# Patient Record
Sex: FEMALE | Race: WHITE | NOT HISPANIC OR LATINO | Employment: OTHER | URBAN - METROPOLITAN AREA
[De-identification: names, ages, dates, MRNs, and addresses within clinical notes are randomized per-mention and may not be internally consistent; named-entity substitution may affect disease eponyms.]

---

## 2017-01-19 ENCOUNTER — OFFICE VISIT (OUTPATIENT)
Dept: FAMILY MEDICINE | Facility: CLINIC | Age: 52
End: 2017-01-19
Payer: COMMERCIAL

## 2017-01-19 VITALS
WEIGHT: 145 LBS | TEMPERATURE: 98.3 F | OXYGEN SATURATION: 96 % | SYSTOLIC BLOOD PRESSURE: 132 MMHG | DIASTOLIC BLOOD PRESSURE: 87 MMHG | RESPIRATION RATE: 18 BRPM | HEIGHT: 65 IN | HEART RATE: 112 BPM | BODY MASS INDEX: 24.16 KG/M2

## 2017-01-19 DIAGNOSIS — E03.9 HYPOTHYROIDISM, UNSPECIFIED TYPE: ICD-10-CM

## 2017-01-19 DIAGNOSIS — J45.41 MODERATE PERSISTENT ASTHMA WITH (ACUTE) EXACERBATION: Primary | ICD-10-CM

## 2017-01-19 DIAGNOSIS — Z12.31 VISIT FOR SCREENING MAMMOGRAM: ICD-10-CM

## 2017-01-19 DIAGNOSIS — Z11.59 NEED FOR HEPATITIS C SCREENING TEST: ICD-10-CM

## 2017-01-19 DIAGNOSIS — Z12.11 SCREEN FOR COLON CANCER: ICD-10-CM

## 2017-01-19 LAB
HCV AB SERPL QL IA: NORMAL
TSH SERPL DL<=0.005 MIU/L-ACNC: 2.66 MU/L (ref 0.4–4)

## 2017-01-19 PROCEDURE — 84443 ASSAY THYROID STIM HORMONE: CPT | Performed by: NURSE PRACTITIONER

## 2017-01-19 PROCEDURE — 99214 OFFICE O/P EST MOD 30 MIN: CPT | Performed by: NURSE PRACTITIONER

## 2017-01-19 PROCEDURE — 36415 COLL VENOUS BLD VENIPUNCTURE: CPT | Performed by: NURSE PRACTITIONER

## 2017-01-19 PROCEDURE — 86803 HEPATITIS C AB TEST: CPT | Performed by: NURSE PRACTITIONER

## 2017-01-19 RX ORDER — FLUTICASONE PROPIONATE 110 UG/1
2 AEROSOL, METERED RESPIRATORY (INHALATION) 2 TIMES DAILY
Qty: 1 INHALER | Refills: 5 | Status: SHIPPED | OUTPATIENT
Start: 2017-01-19 | End: 2017-07-10

## 2017-01-19 RX ORDER — ALBUTEROL SULFATE 90 UG/1
1-2 AEROSOL, METERED RESPIRATORY (INHALATION) EVERY 4 HOURS PRN
Qty: 1 INHALER | Status: SHIPPED | OUTPATIENT
Start: 2017-01-19 | End: 2017-04-25

## 2017-01-19 RX ORDER — PREDNISONE 20 MG/1
TABLET ORAL
Qty: 20 TABLET | Refills: 1 | Status: SHIPPED
Start: 2017-01-19 | End: 2018-07-16

## 2017-01-19 NOTE — MR AVS SNAPSHOT
After Visit Summary   1/19/2017    Kathy Lei    MRN: [id number]           Patient Information     Date Of Birth          1965        Visit Information        Provider Department      1/19/2017 7:30 AM Rachel Arauz NP [facility]        Today's Diagnoses     Screen for colon cancer    -  1     Visit for screening mammogram         Need for hepatitis C screening test         Need for prophylactic vaccination and inoculation against influenza         Moderate persistent asthma with (acute) exacerbation         Hypothyroidism, unspecified type           Care Instructions    As soon as you start having cold symptoms, increase Flovent to 4 puffs twice daily.         Follow-ups after your visit        Future tests that were ordered for you today     Open Future Orders        Priority Expected Expires Ordered    MA SCREENING DIGITAL BILAT - Future  (s+30) Routine  1/19/2018 1/19/2017    Fecal colorectal cancer screen (FIT) Routine 2/9/2017 4/13/2017 1/19/2017            Who to contact     If you have questions or need follow up information about today's clinic visit or your schedule please contact [facility] directly at [phone].  Normal or non-critical lab and imaging results will be communicated to you by [employer], letter or phone within 4 business days after the clinic has received the results. If you do not hear from us within 7 days, please contact the clinic through [employer] or phone. If you have a critical or abnormal lab result, we will notify you by phone as soon as possible.  Submit refill requests through [employer] or call your pharmacy and they will forward the refill request to us. Please allow 3 business days for your refill to be completed.          Additional Information About Your Visit        MyChart Information     [employer] gives you secure access to your electronic health record. If you see a primary care provider, you can also  "send messages to your care team and make appointments. If you have questions, please call your primary care clinic.  If you do not have a primary care provider, please call [phone] and they will assist you.        Care EveryWhere ID     This is your Care EveryWhere ID. This could be used by other organizations to access your [place] medical records  [id number]        Your Vitals Were     Pulse Temperature Respirations Height BMI (Body Mass Index) Pulse Oximetry    112 98.3  F (36.8  C) (Tympanic) 18 5' 4.75\" (1.645 m) 24.31 kg/m2 96%       Blood Pressure from Last 3 Encounters:   01/19/17 132/87   12/07/15 135/85   01/26/15 130/86    Weight from Last 3 Encounters:   01/19/17 145 lb (65.772 kg)   12/07/15 154 lb (69.854 kg)   01/26/15 152 lb 4 oz (69.06 kg)              We Performed the Following     Asthma Action Plan (AAP)     HC FLU VAC PRESRV FREE QUAD SPLIT VIR 3+YRS IM     Hepatitis C Screen Reflex to HCV RNA Quant and Genotype     TSH with free T4 reflex          Where to get your medicines      These medications were sent to [id number] - [place], [place] - [address] AT [facility] [address]  [address], [place] [address]     Phone:  [phone]    - albuterol 108 (90 BASE) MCG/ACT Inhaler  - fluticasone 110 MCG/ACT Inhaler  - predniSONE 20 MG tablet       Primary Care Provider Office Phone # Fax #    Rachel Arauz [phone]       [facility] [address]  [facility] [address]        Thank you!     Thank you for choosing [facility]  for your care. Our goal is always to provide you with excellent care. Hearing back from our patients is one way we can continue to improve our services. Please take a few minutes to complete the written survey that you may receive in the mail after your visit with us. Thank you!             Your Updated Medication List - Protect others around you: Learn how to safely use, store and " throw away your medicines at www.disposemymeds.org.          This list is accurate as of: 1/19/17  8:00 AM.  Always use your most recent med list.                   Brand Name Dispense Instructions for use    ADVIL 200 MG tablet   Generic drug:  ibuprofen     0    1 TABLET EVERY 4 TO 6 HOURS AS NEEDED       * albuterol (2.5 MG/3ML) 0.083% neb solution     60 vial    Take 1 vial (2.5 mg) by nebulization every 6 hours as needed for shortness of breath / dyspnea or wheezing       * albuterol 108 (90 BASE) MCG/ACT Inhaler    PROAIR HFA/PROVENTIL HFA/VENTOLIN HFA    1 Inhaler    Inhale 1-2 puffs into the lungs every 4 hours as needed for shortness of breath / dyspnea       fluticasone 110 MCG/ACT Inhaler    FLOVENT HFA    1 Inhaler    Inhale 2 puffs into the lungs 2 times daily       levothyroxine 88 MCG tablet    SYNTHROID/LEVOTHROID    30 tablet    Take 1 tablet (88 mcg) by mouth daily PLEASE SCHEDULE A LAB APPOINTMENT TO RECEIVE FUTURE REFILLS [phone]       predniSONE 20 MG tablet    DELTASONE    20 tablet    Take 3 tablets daily for 3 days then decrease to 2 tablets daily for 3 days then decrease to one tablet daily for 3 days then 1/2 tablet daily for 3 days       * Notice:  This list has 2 medication(s) that are the same as other medications prescribed for you. Read the directions carefully, and ask your doctor or other care provider to review them with you.

## 2017-01-19 NOTE — Clinical Note
My Asthma Action Plan  Name: Kathy Lei   YOB: 1965  Date: 1/19/2017   My doctor: Rachel Arauz   My clinic: [facility]      My Control Medicine: fluticasone (FLOVENT HFA) 110 MCG/ACT inhaler    albuterol (PROAIR HFA, PROVENTIL HFA, VENTOLIN HFA) 108 (90 BASE) MCG/ACT inhaler    albuterol (2.5 MG/3ML) 0.083% nebulizer solution My Asthma Severity: mild persistent  Avoid your asthma triggers:         GREEN ZONE   Good Control    I feel good    No cough or wheeze    Can work, sleep and play without asthma symptoms       Take your asthma control medicine every day.     1. If exercise triggers your asthma, take your rescue medication    15 minutes before exercise or sports, and    During exercise if you have asthma symptoms  2. Spacer to use with inhaler: If you have a spacer, make sure to use it with your inhaler             YELLOW ZONE Getting Worse  I have ANY of these:    I do not feel good    Cough or wheeze    Chest feels tight    Wake up at night   1. Keep taking your Green Zone medications  2. Start taking your rescue medicine:    every 20 minutes for up to 1 hour. Then every 4 hours for 24-48 hours.  3. If you stay in the Yellow Zone for more than 12-24 hours, contact your doctor.  4. If you do not return to the Green Zone in 12-24 hours or you get worse, start taking your oral steroid medicine if prescribed by your provider.           RED ZONE Medical Alert - Get Help  I have ANY of these:    I feel awful    Medicine is not helping    Breathing getting harder    Trouble walking or talking    Nose opens wide to breathe       1. Take your rescue medicine NOW  2. If your provider has prescribed an oral steroid medicine, start taking it NOW  3. Call your doctor NOW  4. If you are still in the Red Zone after 20 minutes and you have not reached your doctor:    Take your rescue medicine again and    Call 911 or go to the emergency room right away    See your regular  doctor within 2 weeks of an Emergency Room or Urgent Care visit for follow-up treatment.        The above medication may be given at school or day care?: N/A (Adult Patient)  Child can carry and use inhaler(s) at school with approval of school nurse?: N/A (Adult Patient)    Electronically signed by: Brynn Goodman, January 19, 2017    Annual Reminders:  Meet with Asthma Educator,  Flu Shot in the Fall, consider Pneumonia Vaccination for patients with asthma (aged 19 and older).    Pharmacy:    [facility] - [place], [place] - [address]  [facility], [place] [address] [facility]  [id number] - [place], [place] - [address] AT [facility] [address]  [facility] [place], [place] - [address] AT [facility] & [address]                    Asthma Triggers  How To Control Things That Make Your Asthma Worse    Triggers are things that make your asthma worse.  Look at the list below to help you find your triggers and what you can do about them.  You can help prevent asthma flare-ups by staying away from your triggers.      Trigger                                                          What you can do   Cigarette Smoke  Tobacco smoke can make asthma worse. Do not allow smoking in your home, car or around you.  Be sure no one smokes at a child s day care or school.  If you smoke, ask your health care provider for ways to help you quit.  Ask family members to quit too.  Ask your health care provider for a referral to Quit Plan to help you quit smoking, or call 3-168-167-PLAN.     Colds, Flu, Bronchitis  These are common triggers of asthma. Wash your hands often.  Don t touch your eyes, nose or mouth.  Get a flu shot every year.     Dust Mites  These are tiny bugs that live in cloth or carpet. They are too small to see. Wash sheets and blankets in hot water every week.   Encase pillows and  mattress in dust mite proof covers.  Avoid having carpet if you can. If you have carpet, vacuum weekly.   Use a dust mask and HEPA vacuum.   Pollen and Outdoor Mold  Some people are allergic to trees, grass, or weed pollen, or molds. Try to keep your windows closed.  Limit time out doors when pollen count is high.   Ask you health care provider about taking medicine during allergy season.     Animal Dander  Some people are allergic to skin flakes, urine or saliva from pets with fur or feathers. Keep pets with fur or feathers out of your home.    If you can t keep the pet outdoors, then keep the pet out of your bedroom.  Keep the bedroom door closed.  Keep pets off cloth furniture and away from stuffed toys.     Mice, Rats, and Cockroaches  Some people are allergic to the waste from these pests.   Cover food and garbage.  Clean up spills and food crumbs.  Store grease in the refrigerator.   Keep food out of the bedroom.   Indoor Mold  This can be a trigger if your home has high moisture. Fix leaking faucets, pipes, or other sources of water.   Clean moldy surfaces.  Dehumidify basement if it is damp and smelly.   Smoke, Strong Odors, and Sprays  These can reduce air quality. Stay away from strong odors and sprays, such as perfume, powder, hair spray, paints, smoke incense, paint, cleaning products, candles and new carpet.   Exercise or Sports  Some people with asthma have this trigger. Be active!  Ask your doctor about taking medicine before sports or exercise to prevent symptoms.    Warm up for 5-10 minutes before and after sports or exercise.     Other Triggers of Asthma  Cold air:  Cover your nose and mouth with a scarf.  Sometimes laughing or crying can be a trigger.  Some medicines and food can trigger asthma.

## 2017-01-19 NOTE — PROGRESS NOTES
SUBJECTIVE:                                                    Kathy Lei is a 51 year old female who presents to clinic today for the following health issues:      Asthma Follow-Up    Symptoms started around cesilia  Symptoms include: coughing, difficulty breathing, triggering asthma.  Pt has needed to use her nebulizer medication every 3 hours.  Inhalers are effective   She had a cold about a month ago and this triggered an exacerbation of her asthma.  Her asthma has been much better controlled on Flovent and she very infrequently needs to use her albuterol unless she has has a viral illness.      Was ACT completed today?    Yes    ACT Total Scores 1/19/2017   ACT TOTAL SCORE -   ASTHMA ER VISITS -   ASTHMA HOSPITALIZATIONS -   ACT TOTAL SCORE (Goal Greater than or Equal to 20) 7   In the past 12 months, how many times did you visit the emergency room for your asthma without being admitted to the hospital? 0   In the past 12 months, how many times were you hospitalized overnight because of your asthma? 0         Amount of exercise or physical activity: none    Problems taking medications regularly: No    Medication side effects: none    Diet: regular (no restrictions) and gluten-free/reduced        Problem list and histories reviewed & adjusted, as indicated.  Additional history: as documented    Problem list, Medication list, Allergies, and Medical/Social/Surgical histories reviewed in [facility] and updated as appropriate.    ROS:  C: NEGATIVE for fever, chills, change in weight  INTEGUMENTARY/SKIN: NEGATIVE for worrisome rashes, moles or lesions  EYES: NEGATIVE for vision changes or irritation  E/M: NEGATIVE for ear, mouth and throat problems  RESP:see HPI  CV: NEGATIVE for chest pain, palpitations or peripheral edema  GI: NEGATIVE for nausea, abdominal pain, heartburn, or change in bowel habits  MUSCULOSKELETAL: NEGATIVE for significant arthralgias or myalgia  NEURO: NEGATIVE for weakness, dizziness or  "paresthesias  ENDOCRINE: NEGATIVE for temperature intolerance, skin/hair changes    OBJECTIVE:                                                    /87 mmHg  Pulse 112  Temp(Src) 98.3  F (36.8  C) (Tympanic)  Resp 18  Ht 5' 4.75\" (1.645 m)  Wt 145 lb (65.772 kg)  BMI 24.31 kg/m2  SpO2 96%  Body mass index is 24.31 kg/(m^2).  GENERAL: healthy, alert and no distress  EYES: Eyes grossly normal to inspection, PERRL and conjunctivae and sclerae normal  HENT: ear canals and TM's normal, nose and mouth without ulcers or lesions  NECK: no adenopathy, no asymmetry, masses, or scars and thyroid normal to palpation  RESP: expiratory wheezes throughout and inspiratory wheezes throughout  CV: regular rate and rhythm, normal S1 S2, no S3 or S4, no murmur, click or rub, no peripheral edema and peripheral pulses strong  SKIN: no suspicious lesions or rashes         ASSESSMENT/PLAN:                                                            1. Moderate persistent asthma with (acute) exacerbation  Patient Instructions   As soon as you start having cold symptoms, increase Flovent to 4 puffs twice daily.      She should increase Flovent now to 4 puffs BID and start Prednisone.    Go to the ED if symptoms worsen.   - fluticasone (FLOVENT HFA) 110 MCG/ACT Inhaler; Inhale 2 puffs into the lungs 2 times daily  Dispense: 1 Inhaler; Refill: 5  - predniSONE (DELTASONE) 20 MG tablet; Take 3 tablets daily for 3 days then decrease to 2 tablets daily for 3 days then decrease to one tablet daily for 3 days then 1/2 tablet daily for 3 days  Dispense: 20 tablet; Refill: 1  - albuterol (PROAIR HFA/PROVENTIL HFA/VENTOLIN HFA) 108 (90 BASE) MCG/ACT Inhaler; Inhale 1-2 puffs into the lungs every 4 hours as needed for shortness of breath / dyspnea  Dispense: 1 Inhaler; Refill: PRN    2. Hypothyroidism, unspecified type    - TSH with free T4 reflex    3. Screen for colon cancer    - Fecal colorectal cancer screen (FIT); Future    4. Visit for " screening mammogram    - MA SCREENING DIGITAL BILAT - Future  (s+30); Future    5. Need for hepatitis C screening test    - Hepatitis C Screen Reflex to HCV RNA Quant and Genotype    6. Need for prophylactic vaccination and inoculation against influenza    - HC FLU VAC PRESRV FREE QUAD SPLIT VIR 3+YRS IM    Schedule a physical.     Rachel Arauz, SALINAS  [facility]

## 2017-01-19 NOTE — NURSING NOTE
"Chief Complaint   Patient presents with     Asthma       Initial /87 mmHg  Pulse 112  Temp(Src) 98.3  F (36.8  C) (Tympanic)  Resp 18  Ht 5' 4.75\" (1.645 m)  Wt 145 lb (65.772 kg)  BMI 24.31 kg/m2  SpO2 96% Estimated body mass index is 24.31 kg/(m^2) as calculated from the following:    Height as of this encounter: 5' 4.75\" (1.645 m).    Weight as of this encounter: 145 lb (65.772 kg).  BP completed using cuff size: regular    Brynn Goodman MA      "

## 2017-01-20 ASSESSMENT — ASTHMA QUESTIONNAIRES: ACT_TOTALSCORE: 7

## 2017-03-29 ENCOUNTER — TELEPHONE (OUTPATIENT)
Dept: FAMILY MEDICINE | Facility: CLINIC | Age: 52
End: 2017-03-29

## 2017-03-29 NOTE — TELEPHONE ENCOUNTER
Type of outreach:  Phone, left message for patient to call back.  and Sent letter. [employer] message sent.   Health Maintenance Due   Topic Date Due     LIPID SCREEN Q5 YR FEMALE (SYSTEM ASSIGNED)  10/08/2012     COLON CANCER SCREEN (SYSTEM ASSIGNED)  11/16/2013     PAP Q3 YR  10/10/2014     MAMMO SCREEN Q2 YR (SYSTEM ASSIGNED)  11/02/2016     TETANUS IMMUNIZATION (SYSTEM ASSIGNED)  02/06/2017     Brynn Goodman MA

## 2017-03-29 NOTE — LETTER
[facility]   [address] [place] [address]  [id number]  [phone]      March 29, 2017      Kathy Lei  [address] [place] [address]          Dear Kathy,    We noted that your last asthma control test score was low. We are just attempting to reach you to touch base to see how you are doing. We want to give you the best care so we are just checking in to see that your medication(s) are controlling your symptoms. If you could please complete and mail back the asthma questionnaire or schedule an asthma follow up appointment at your earliest convenience it would be appreciated. To schedule an appointment please call the clinic at [phone].      Sincerely,      Your Healthcare Team

## 2017-04-25 DIAGNOSIS — J45.41 MODERATE PERSISTENT ASTHMA WITH (ACUTE) EXACERBATION: ICD-10-CM

## 2017-04-27 RX ORDER — ALBUTEROL SULFATE 90 UG/1
AEROSOL, METERED RESPIRATORY (INHALATION)
Qty: 18 G | Refills: 0 | Status: SHIPPED | OUTPATIENT
Start: 2017-04-27 | End: 2017-06-12

## 2017-04-27 NOTE — TELEPHONE ENCOUNTER
Prescription approved per [facility] Refill Protocol.  RAINA Clemons, BSN, RN

## 2017-06-12 ENCOUNTER — E-VISIT (OUTPATIENT)
Dept: FAMILY MEDICINE | Facility: CLINIC | Age: 52
End: 2017-06-12
Payer: COMMERCIAL

## 2017-06-12 DIAGNOSIS — J45.41 MODERATE PERSISTENT ASTHMA WITH (ACUTE) EXACERBATION: ICD-10-CM

## 2017-06-12 PROCEDURE — 98969 ZZC NONPHYSICIAN ONLINE ASSESSMENT AND MANAGEMENT: CPT | Performed by: NURSE PRACTITIONER

## 2017-06-12 RX ORDER — PREDNISONE 20 MG/1
TABLET ORAL
Qty: 20 TABLET | Refills: 0 | Status: SHIPPED | OUTPATIENT
Start: 2017-06-12 | End: 2017-07-10

## 2017-06-14 NOTE — TELEPHONE ENCOUNTER
Ventolin rx was sent to [facility] pharmacy 6/12/17 by PCP. THis request is from [facility].  Attempted to reach, unable. Left message  to call back.    Need to ask pt which pharmacy she wanted.    Araceli Agee RN

## 2017-06-14 NOTE — TELEPHONE ENCOUNTER
Ventolin     Too early  Last Written Prescription Date: 6/12/17  Last Fill Quantity: 18g, # refills: prn    Last Office Visit with G, P or [facility] prescribing provider:  1/19/17  kimmy noonan     Future Office Visit:       Date of Last Asthma Action Plan Letter:   Asthma Action Plan Q1 Year    Topic Date Due     Asthma Action Plan - yearly  01/19/2018      Asthma Control Test:   ACT Total Scores 1/19/2017   ACT TOTAL SCORE (Goal Greater than or Equal to 20) 7   In the past 12 months, how many times did you visit the emergency room for your asthma without being admitted to the hospital? 0   In the past 12 months, how many times were you hospitalized overnight because of your asthma? 0       Date of Last Spirometry Test:   No results found for this or any previous visit.

## 2017-07-03 NOTE — TELEPHONE ENCOUNTER
LMOM that she could have [facility] call [facility] and transfer the RX over to [facility].  Allison Beltran RN

## 2017-07-05 RX ORDER — ALBUTEROL SULFATE 90 UG/1
AEROSOL, METERED RESPIRATORY (INHALATION)
Qty: 1 G | Refills: 0 | OUTPATIENT
Start: 2017-07-05

## 2017-07-05 NOTE — TELEPHONE ENCOUNTER
DUPLICATE    Refused Prescriptions:                       Disp   Refills    VENTOLIN  (90 BASE) MCG/ACT Inhaler*1 g    0        Sig: INHALE 1-2 PUFFS INTO THE LUNGS EVERY 4 HOURS AS           NEEDED FOR SHORTNESS OF BREATH OR DIFFICULTY           BREATHING  Refused By: VENESSA PARHAM  Reason for Refusal: Duplicate      Closing encounter - no further actions needed at this time    Venessa Parham RN

## 2017-07-10 ENCOUNTER — E-VISIT (OUTPATIENT)
Dept: FAMILY MEDICINE | Facility: CLINIC | Age: 52
End: 2017-07-10
Payer: COMMERCIAL

## 2017-07-10 ENCOUNTER — MYC REFILL (OUTPATIENT)
Dept: FAMILY MEDICINE | Facility: CLINIC | Age: 52
End: 2017-07-10

## 2017-07-10 DIAGNOSIS — J45.41 MODERATE PERSISTENT ASTHMA WITH (ACUTE) EXACERBATION: ICD-10-CM

## 2017-07-10 PROCEDURE — 98969 ZZC NONPHYSICIAN ONLINE ASSESSMENT AND MANAGEMENT: CPT | Performed by: NURSE PRACTITIONER

## 2017-07-10 RX ORDER — PREDNISONE 20 MG/1
TABLET ORAL
Qty: 20 TABLET | Refills: 0 | Status: CANCELLED | OUTPATIENT
Start: 2017-07-10

## 2017-07-10 RX ORDER — PREDNISONE 20 MG/1
TABLET ORAL
Qty: 20 TABLET | Refills: 0 | Status: SHIPPED | OUTPATIENT
Start: 2017-07-10 | End: 2018-07-16

## 2017-07-10 RX ORDER — FLUTICASONE PROPIONATE 110 UG/1
2 AEROSOL, METERED RESPIRATORY (INHALATION) 2 TIMES DAILY
Qty: 1 INHALER | Refills: 5 | Status: CANCELLED | OUTPATIENT
Start: 2017-07-10

## 2017-07-10 RX ORDER — FLUTICASONE PROPIONATE 110 UG/1
2 AEROSOL, METERED RESPIRATORY (INHALATION) 2 TIMES DAILY
Qty: 1 INHALER | Refills: 5 | Status: SHIPPED | OUTPATIENT
Start: 2017-07-10 | End: 2018-07-16

## 2017-07-10 NOTE — MR AVS SNAPSHOT
After Visit Summary   7/10/2017    Kathy Lei    MRN: [id number]           Patient Information     Date Of Birth          1965        Visit Information        Provider Department      7/10/2017 8:41 AM Rachel Arauz NP [facility]        Today's Diagnoses     Moderate persistent asthma with (acute) exacerbation           Follow-ups after your visit        Who to contact     If you have questions or need follow up information about today's clinic visit or your schedule please contact [facility] directly at [phone].  Normal or non-critical lab and imaging results will be communicated to you by [employer], letter or phone within 4 business days after the clinic has received the results. If you do not hear from us within 7 days, please contact the clinic through [employer] or phone. If you have a critical or abnormal lab result, we will notify you by phone as soon as possible.  Submit refill requests through [employer] or call your pharmacy and they will forward the refill request to us. Please allow 3 business days for your refill to be completed.          Additional Information About Your Visit        MyChart Information     [employer] gives you secure access to your electronic health record. If you see a primary care provider, you can also send messages to your care team and make appointments. If you have questions, please call your primary care clinic.  If you do not have a primary care provider, please call [phone] and they will assist you.        Care EveryWhere ID     This is your Care EveryWhere ID. This could be used by other organizations to access your [facility] medical records  [id number]         Blood Pressure from Last 3 Encounters:   01/19/17 132/87   12/07/15 135/85   01/26/15 130/86    Weight from Last 3 Encounters:   01/19/17 145 lb (65.8 kg)   12/07/15 154 lb (69.9 kg)   01/26/15 152 lb 4 oz (69.1 kg)              Today, you had  the following     No orders found for display         Where to get your medicines      These medications were sent to [facility] - [place], [place] - [address]  [address], [place] [address]     Phone:  [phone]     fluticasone 110 MCG/ACT Inhaler         Some of these will need a paper prescription and others can be bought over the counter.  Ask your nurse if you have questions.     Bring a paper prescription for each of these medications     predniSONE 20 MG tablet          Primary Care Provider Office Phone # Fax #    Rachel Arauz [phone]       [facility] [address]  [facility] [address]        Equal Access to Services     JESÚS VÁZQUEZ : Hadii aad ku hadasho Sofaustina, waaxda luqadaha, qaybta kaalmada adeegyada, jose francisco. So [facility] [phone].    ATENCIÓN: Si habla español, tiene a phillips disposición servicios gratuitos de asistencia lingüística. Llame al [phone].    We comply with applicable federal civil rights laws and [place] laws. We do not discriminate on the basis of race, color, national origin, age, disability sex, sexual orientation or gender identity.            Thank you!     Thank you for choosing [facility]  for your care. Our goal is always to provide you with excellent care. Hearing back from our patients is one way we can continue to improve our services. Please take a few minutes to complete the written survey that you may receive in the mail after your visit with us. Thank you!             Your Updated Medication List - Protect others around you: Learn how to safely use, store and throw away your medicines at www.disposemymeds.org.          This list is accurate as of: 7/10/17 12:43 PM.  Always use your most recent med list.                   Brand Name Dispense Instructions for use Diagnosis    ADVIL 200 MG tablet   Generic drug:  ibuprofen     0    1 TABLET  EVERY 4 TO 6 HOURS AS NEEDED        * albuterol (2.5 MG/3ML) 0.083% neb solution     60 vial    Take 1 vial (2.5 mg) by nebulization every 6 hours as needed for shortness of breath / dyspnea or wheezing    Moderate persistent asthma with (acute) exacerbation       * albuterol 108 (90 BASE) MCG/ACT Inhaler    VENTOLIN HFA    18 g    Inhale 2 puffs into the lungs every 4 hours as needed for shortness of breath / dyspnea or wheezing    Moderate persistent asthma with (acute) exacerbation       * VENTOLIN  (90 BASE) MCG/ACT Inhaler   Generic drug:  albuterol     18 g    INHALE 1-2 PUFFS INTO THE LUNGS EVERY 4 HOURS AS NEEDED FOR SHORTNESS OF BREATH OR DIFFICULTY BREATHING.    Moderate persistent asthma with (acute) exacerbation       fluticasone 110 MCG/ACT Inhaler    FLOVENT HFA    1 Inhaler    Inhale 2 puffs into the lungs 2 times daily    Moderate persistent asthma with (acute) exacerbation       levothyroxine 88 MCG tablet    SYNTHROID/LEVOTHROID    90 tablet    Take 1 tablet (88 mcg) by mouth daily    Hypothyroidism, unspecified type       * predniSONE 20 MG tablet    DELTASONE    20 tablet    Take 3 tablets daily for 3 days then decrease to 2 tablets daily for 3 days then decrease to one tablet daily for 3 days then 1/2 tablet daily for 3 days    Moderate persistent asthma with (acute) exacerbation       * predniSONE 20 MG tablet    DELTASONE    20 tablet    Take 3 tablets daily for 3 days then decrease to 2 tablets daily for 3 days then decrease to one tablet daily for 3 days then 1/2 tablet daily for 3 days    Moderate persistent asthma with (acute) exacerbation       * Notice:  This list has 5 medication(s) that are the same as other medications prescribed for you. Read the directions carefully, and ask your doctor or other care provider to review them with you.

## 2017-07-10 NOTE — TELEPHONE ENCOUNTER
Message from MyChart:  Original authorizing provider: SALINAS Mae would like a refill of the following medications:  fluticasone (FLOVENT HFA) 110 MCG/ACT Inhaler [Rachel Arauz NP]  predniSONE (DELTASONE) 20 MG tablet [Rachel Arauz NP]    Preferred pharmacy: [facility] - [place], [place] - [address]    Comment:

## 2017-07-10 NOTE — TELEPHONE ENCOUNTER
I walked Rx for Prednisone 20 MG tablet to [facility].    Brynn Goodman MA

## 2017-07-15 ENCOUNTER — HEALTH MAINTENANCE LETTER (OUTPATIENT)
Age: 52
End: 2017-07-15

## 2017-07-27 NOTE — TELEPHONE ENCOUNTER
Completed ACT, Patient says answers pertain to when she ran out of her flovent, but feels much better now, looking for alternative medication as FLovent is not  Covered by her insurance and is outrageous in cost. Document placed in pcp folder for review.    Sophie Henriquez, CMA

## 2017-07-28 ASSESSMENT — ASTHMA QUESTIONNAIRES: ACT_TOTALSCORE: 13

## 2017-08-03 RX ORDER — BUDESONIDE AND FORMOTEROL FUMARATE DIHYDRATE 160; 4.5 UG/1; UG/1
2 AEROSOL RESPIRATORY (INHALATION) 2 TIMES DAILY
Qty: 1 INHALER | Status: SHIPPED | OUTPATIENT
Start: 2017-08-03 | End: 2018-07-16

## 2017-08-03 NOTE — TELEPHONE ENCOUNTER
ADELITA the pharmacist said to try the symbicort as it is covered and she has coupons to bring the cost down more. It will be worth a try.  Please sign off if you agree. Check dosing.   Allison Beltran RN

## 2017-08-03 NOTE — TELEPHONE ENCOUNTER
I think there is a new medication similar to Advair that is generic.  Please check with pharmacy.

## 2018-07-16 ENCOUNTER — OFFICE VISIT (OUTPATIENT)
Dept: FAMILY MEDICINE | Facility: CLINIC | Age: 53
End: 2018-07-16
Payer: COMMERCIAL

## 2018-07-16 VITALS
SYSTOLIC BLOOD PRESSURE: 129 MMHG | HEART RATE: 86 BPM | BODY MASS INDEX: 24.22 KG/M2 | HEIGHT: 65 IN | TEMPERATURE: 97.8 F | DIASTOLIC BLOOD PRESSURE: 84 MMHG | WEIGHT: 145.38 LBS | OXYGEN SATURATION: 97 % | RESPIRATION RATE: 18 BRPM

## 2018-07-16 DIAGNOSIS — Z00.00 ROUTINE GENERAL MEDICAL EXAMINATION AT A HEALTH CARE FACILITY: Primary | ICD-10-CM

## 2018-07-16 DIAGNOSIS — R10.9 ABDOMINAL PAIN, UNSPECIFIED ABDOMINAL LOCATION: ICD-10-CM

## 2018-07-16 DIAGNOSIS — G47.00 INSOMNIA, UNSPECIFIED TYPE: ICD-10-CM

## 2018-07-16 DIAGNOSIS — Z23 NEED FOR TDAP VACCINATION: ICD-10-CM

## 2018-07-16 DIAGNOSIS — J45.40 MODERATE PERSISTENT ASTHMA WITHOUT COMPLICATION: ICD-10-CM

## 2018-07-16 DIAGNOSIS — E03.9 HYPOTHYROIDISM, UNSPECIFIED TYPE: ICD-10-CM

## 2018-07-16 DIAGNOSIS — F41.9 ANXIETY: ICD-10-CM

## 2018-07-16 DIAGNOSIS — K04.7 TOOTH INFECTION: ICD-10-CM

## 2018-07-16 LAB
CHOLEST SERPL-MCNC: 214 MG/DL
GLUCOSE SERPL-MCNC: 96 MG/DL (ref 70–99)
HDLC SERPL-MCNC: 66 MG/DL
LDLC SERPL CALC-MCNC: 131 MG/DL
NONHDLC SERPL-MCNC: 148 MG/DL
TRIGL SERPL-MCNC: 86 MG/DL
TSH SERPL DL<=0.005 MIU/L-ACNC: 2.93 MU/L (ref 0.4–4)

## 2018-07-16 PROCEDURE — 87624 HPV HI-RISK TYP POOLED RSLT: CPT | Performed by: NURSE PRACTITIONER

## 2018-07-16 PROCEDURE — 82947 ASSAY GLUCOSE BLOOD QUANT: CPT | Performed by: NURSE PRACTITIONER

## 2018-07-16 PROCEDURE — G0145 SCR C/V CYTO,THINLAYER,RESCR: HCPCS | Performed by: NURSE PRACTITIONER

## 2018-07-16 PROCEDURE — 90715 TDAP VACCINE 7 YRS/> IM: CPT | Performed by: NURSE PRACTITIONER

## 2018-07-16 PROCEDURE — 90471 IMMUNIZATION ADMIN: CPT | Performed by: NURSE PRACTITIONER

## 2018-07-16 PROCEDURE — 36415 COLL VENOUS BLD VENIPUNCTURE: CPT | Performed by: NURSE PRACTITIONER

## 2018-07-16 PROCEDURE — 99214 OFFICE O/P EST MOD 30 MIN: CPT | Mod: 25 | Performed by: NURSE PRACTITIONER

## 2018-07-16 PROCEDURE — 80061 LIPID PANEL: CPT | Performed by: NURSE PRACTITIONER

## 2018-07-16 PROCEDURE — 84443 ASSAY THYROID STIM HORMONE: CPT | Performed by: NURSE PRACTITIONER

## 2018-07-16 PROCEDURE — 99396 PREV VISIT EST AGE 40-64: CPT | Mod: 25 | Performed by: NURSE PRACTITIONER

## 2018-07-16 RX ORDER — LEVOTHYROXINE SODIUM 88 UG/1
88 TABLET ORAL DAILY
Qty: 90 TABLET | Status: SHIPPED | OUTPATIENT
Start: 2018-07-16 | End: 2019-04-10

## 2018-07-16 RX ORDER — ALBUTEROL SULFATE 90 UG/1
AEROSOL, METERED RESPIRATORY (INHALATION)
Qty: 18 G | Status: SHIPPED | OUTPATIENT
Start: 2018-07-16 | End: 2019-08-23

## 2018-07-16 RX ORDER — FLUTICASONE PROPIONATE 110 UG/1
2 AEROSOL, METERED RESPIRATORY (INHALATION) 2 TIMES DAILY
Qty: 1 INHALER | Status: SHIPPED | OUTPATIENT
Start: 2018-07-16 | End: 2019-02-06

## 2018-07-16 RX ORDER — LORAZEPAM 0.5 MG/1
0.5 TABLET ORAL EVERY 8 HOURS PRN
Qty: 20 TABLET | Refills: 0 | Status: SHIPPED | OUTPATIENT
Start: 2018-07-16 | End: 2018-08-13

## 2018-07-16 RX ORDER — PENICILLIN V POTASSIUM 500 MG/1
500 TABLET, FILM COATED ORAL 2 TIMES DAILY
Qty: 20 TABLET | Refills: 0 | Status: SHIPPED | OUTPATIENT
Start: 2018-07-16 | End: 2018-11-08

## 2018-07-16 RX ORDER — TRAZODONE HYDROCHLORIDE 50 MG/1
50-100 TABLET, FILM COATED ORAL
Qty: 90 TABLET | Status: SHIPPED | OUTPATIENT
Start: 2018-07-16 | End: 2019-01-03

## 2018-07-16 NOTE — NURSING NOTE
Prior to injection verified patient identity using patient's name and date of birth.  Due to injection administration, patient instructed to remain in clinic for 15 minutes  afterwards, and to report any adverse reaction to me immediately.    Screening Questionnaire for Adult Immunization    Are you sick today?   Yes   Do you have allergies to medications, food, a vaccine component or latex?   No   Have you ever had a serious reaction after receiving a vaccination?   No   Do you have a long-term health problem with heart disease, lung disease, asthma, kidney disease, metabolic disease (e.g. diabetes), anemia, or other blood disorder?   Asthma    Do you have cancer, leukemia, HIV/AIDS, or any other immune system problem?   No   In the past 3 months, have you taken medications that affect  your immune system, such as prednisone, other steroids, or anticancer drugs; drugs for the treatment of rheumatoid arthritis, Crohn s disease, or psoriasis; or have you had radiation treatments?   Prednisone over 3 months ago    Have you had a seizure, or a brain or other nervous system problem?   No   During the past year, have you received a transfusion of blood or blood     products, or been given immune (gamma) globulin or antiviral drug?   No   For women: Are you pregnant or is there a chance you could become        pregnant during the next month?   No   Have you received any vaccinations in the past 4 weeks?   No     Immunization questionnaire was positive for at least one answer.        Per orders of Rachel Arauz, injection of Adacel given by Brynn Goodman. Patient instructed to remain in clinic for 15 minutes afterwards, and to report any adverse reaction to me immediately.       Screening performed by Brynn Goodman on 7/16/2018 at 11:15 AM.

## 2018-07-16 NOTE — PROGRESS NOTES
SUBJECTIVE:   CC: Kathy Lei is an 52 year old woman who presents for preventive health visit.     Physical   Annual:     Getting at least 3 servings of Calcium per day:  NO    Bi-annual eye exam:  NO    Dental care twice a year:  NO    Sleep apnea or symptoms of sleep apnea:  Daytime drowsiness    Diet:  Gluten-free/reduced    Frequency of exercise:  None    Taking medications regularly:  Yes    Medication side effects:  None    Additional concerns today:  YES      Not sleeping. Since menopause at starting at age 48  When pt does wake up she has a feeling of panic.   She is waking up frequently throughout the night.  She has occasional anxiety during the day.  Discuss Trazodone for sleep and Lorazepam for anxiety     She has some discomfort above her umbilicus when she does any type of abdominal exercises. It can be tender to the touch.  She does not have any fevers, nausea or vomiting, change in bowel habits, melena, or hematochezia.               Today's PHQ-2 Score:   PHQ-2 ( 1999 Pfizer) 7/16/2018   Q1: Little interest or pleasure in doing things 0   Q2: Feeling down, depressed or hopeless 0   PHQ-2 Score 0   Q1: Little interest or pleasure in doing things Not at all   Q2: Feeling down, depressed or hopeless Not at all   PHQ-2 Score 0       Abuse: Current or Past(Physical, Sexual or Emotional)- No  Do you feel safe in your environment - Yes    Social History   Substance Use Topics     Smoking status: Passive Smoke Exposure - Never Smoker     Packs/day: 0.50     Years: 30.00     Types: Cigarettes     Start date: 11/16/1980     Last attempt to quit: 6/30/2011     Smokeless tobacco: Never Used      Comment: social smoker at times     Alcohol use Yes      Comment: on weekends if going out     Alcohol Use 7/16/2018   If you drink alcohol do you typically have greater than 3 drinks per day OR greater than 7 drinks per week? No       Reviewed orders with patient.  Reviewed health maintenance and updated  "orders accordingly - Yes          Pertinent mammograms are reviewed under the imaging tab.  History of abnormal Pap smear: NO - age 30-65 PAP every 5 years with negative HPV co-testing recommended  PAP / HPV 10/10/2011 10/8/2007 11/14/2005   PAP NIL NIL NIL     Reviewed and updated as needed this visit by clinical staff  Tobacco  Allergies  Meds  Med Hx  Surg Hx  Fam Hx  Soc Hx        Reviewed and updated as needed this visit by Provider            Review of Systems  CONSTITUTIONAL: NEGATIVE for fever, chills, change in weight  INTEGUMENTARY/SKIN: NEGATIVE for worrisome rashes, moles or lesions  EYES: NEGATIVE for vision changes or irritation  ENT: NEGATIVE for ear, mouth and throat problems  RESP: NEGATIVE for significant cough or SOB  BREAST: NEGATIVE for masses, tenderness or discharge  CV: NEGATIVE for chest pain, palpitations or peripheral edema  GI: NEGATIVE for nausea, heartburn, or change in bowel habits; see HPI  : NEGATIVE for unusual urinary or vaginal symptoms. No vaginal bleeding.  MUSCULOSKELETAL: NEGATIVE for significant arthralgias or myalgia  NEURO: NEGATIVE for weakness, dizziness or paresthesias  PSYCHIATRIC: see HPI     OBJECTIVE:   /84  Pulse 86  Temp 97.8  F (36.6  C) (Oral)  Resp 18  Ht 5' 4.75\" (1.645 m)  Wt 145 lb 6 oz (65.9 kg)  SpO2 97%  BMI 24.38 kg/m2  Physical Exam  GENERAL: healthy, alert and no distress  EYES: Eyes grossly normal to inspection, PERRL and conjunctivae and sclerae normal  HENT: ear canals and TM's normal, nose and mouth without ulcers or lesions  NECK: no adenopathy, no asymmetry, masses, or scars and thyroid normal to palpation  RESP: lungs clear to auscultation - no rales, rhonchi or wheezes  BREAST: normal without masses, tenderness or nipple discharge and no palpable axillary masses or adenopathy  CV: regular rate and rhythm, normal S1 S2, no S3 or S4, no murmur, click or rub, no peripheral edema and peripheral pulses strong  ABDOMEN: soft, mild " tenderness proximal to umbilicus, no hepatosplenomegaly, no masses and bowel sounds normal   (female): normal female external genitalia, normal urethral meatus, vaginal mucosa pink, moist, well rugated, and normal cervix/adnexa/uterus without masses or discharge  MS: no gross musculoskeletal defects noted, no edema  SKIN: no suspicious lesions or rashes  NEURO: Normal strength and tone, mentation intact and speech normal  PSYCH: mentation appears normal, affect normal/bright        ASSESSMENT/PLAN:   1. Routine general medical examination at a health care facility    - DX Hip/Pelvis/Spine; Future  - Fecal colorectal cancer screen (FIT); Future  - Lipid panel reflex to direct LDL Fasting  - Glucose  - Pap imaged thin layer screen with HPV - recommended age 30 - 65 years (select HPV order below)  - HPV High Risk Types DNA Cervical    2. Tooth infection  She would like some penicillin to keep on hand in case of a tooth infection while she's out of town.  She has a broken tooth and has had an infection in the past.  She cannot afford to see a dentist at the moment.   - penicillin V potassium (VEETID) 500 MG tablet; Take 1 tablet (500 mg) by mouth 2 times daily  Dispense: 20 tablet; Refill: 0    3. Insomnia, unspecified type  Discussed the use and indication of this medication as well as potential side effects.   - traZODone (DESYREL) 50 MG tablet; Take 1-2 tablets ( mg) by mouth nightly as needed for sleep  Dispense: 90 tablet; Refill: PRN    4. Anxiety  Take very sparingly.  Do not take with alcohol or drive after taking.  If anxiety becomes more consistent, she will follow up to discuss other treatment options.    - LORazepam (ATIVAN) 0.5 MG tablet; Take 1 tablet (0.5 mg) by mouth every 8 hours as needed for anxiety  Dispense: 20 tablet; Refill: 0    5. Hypothyroidism, unspecified type    - levothyroxine (SYNTHROID/LEVOTHROID) 88 MCG tablet; Take 1 tablet (88 mcg) by mouth daily  Dispense: 90 tablet; Refill:  "PRN  - TSH with free T4 reflex    6. Moderate persistent asthma with (acute) exacerbation  Well-controlled  - fluticasone (FLOVENT HFA) 110 MCG/ACT Inhaler; Inhale 2 puffs into the lungs 2 times daily  Dispense: 1 Inhaler; Refill: PRN  - albuterol (VENTOLIN HFA) 108 (90 Base) MCG/ACT Inhaler; INHALE 1-2 PUFFS INTO THE LUNGS EVERY 4 HOURS AS NEEDED FOR SHORTNESS OF BREATH OR DIFFICULTY BREATHING.  Dispense: 18 g; Refill: PRN    7. Abdominal pain, unspecified abdominal location  Will obtain an abdominal US to evaluate for possible hernia.   - US Abdomen Complete; Future    8. Need for Tdap vaccination    - TDAP, IM (10 - 64 YRS) - Adacel    COUNSELING:  Reviewed preventive health counseling, as reflected in patient instructions    BP Readings from Last 1 Encounters:   07/16/18 129/84     Estimated body mass index is 24.38 kg/(m^2) as calculated from the following:    Height as of this encounter: 5' 4.75\" (1.645 m).    Weight as of this encounter: 145 lb 6 oz (65.9 kg).           reports that she is a non-smoker but has been exposed to tobacco smoke. The exposure started about 37 years ago. She has been exposed to 0.50 packs per day for the past 30.00 years. She has never used smokeless tobacco.      Counseling Resources:  ATP IV Guidelines  Pooled Cohorts Equation Calculator  Breast Cancer Risk Calculator  FRAX Risk Assessment  ICSI Preventive Guidelines  Dietary Guidelines for Americans, 2010  USDA's MyPlate  ASA Prophylaxis  Lung CA Screening    Rachel Arauz NP  [facility]  Answers for HPI/ROS submitted by the patient on 7/16/2018   PHQ-2 Score: 0    "

## 2018-07-16 NOTE — MR AVS SNAPSHOT
After Visit Summary   7/16/2018    Kathy Lei    MRN: [id number]           Patient Information     Date Of Birth          1965        Visit Information        Provider Department      7/16/2018 9:00 AM Rachel Arauz NP [facility]        Today's Diagnoses     Routine general medical examination at a health care facility    -  1    Tooth infection        Insomnia, unspecified type        Anxiety        Hypothyroidism, unspecified type        Moderate persistent asthma with (acute) exacerbation        Abdominal pain, unspecified abdominal location        Need for Tdap vaccination          Care Instructions      Preventive Health Recommendations  Female Ages 50 - 64    Yearly exam: See your health care provider every year in order to  o Review health changes.   o Discuss preventive care.    o Review your medicines if your doctor has prescribed any.      Get a Pap test every three years (unless you have an abnormal result and your provider advises testing more often).    If you get Pap tests with HPV test, you only need to test every 5 years, unless you have an abnormal result.     You do not need a Pap test if your uterus was removed (hysterectomy) and you have not had cancer.    You should be tested each year for STDs (sexually transmitted diseases) if you're at risk.     Have a mammogram every 1 to 2 years.    Have a colonoscopy at age 50, or have a yearly FIT test (stool test). These exams screen for colon cancer.      Have a cholesterol test every 5 years, or more often if advised.    Have a diabetes test (fasting glucose) every three years. If you are at risk for diabetes, you should have this test more often.     If you are at risk for osteoporosis (brittle bone disease), think about having a bone density scan (DEXA).    Shots: Get a flu shot each year. Get a tetanus shot every 10 years.    Nutrition:     Eat at least 5 servings of fruits and vegetables each  "day.    Eat whole-grain bread, whole-wheat pasta and brown rice instead of white grains and rice.    Get adequate Calcium and Vitamin D.     Lifestyle    Exercise at least 150 minutes a week (30 minutes a day, 5 days a week). This will help you control your weight and prevent disease.    Limit alcohol to one drink per day.    No smoking.     Wear sunscreen to prevent skin cancer.     See your dentist every six months for an exam and cleaning.    See your eye doctor every 1 to 2 years.            Follow-ups after your visit        Your next 10 appointments already scheduled     Aug 13, 2018  9:00 AM CDT   MA SCREEN WITH IMPLANTS DIGITAL BILAT with EAMA1   [facility]    [address] ,[address]  [place] [address] [phone]           Do not use any powder, lotion or deodorant under your arms or on your breast. If you do, we will ask you to remove it before your exam.  Wear comfortable, two-piece clothing.  If you have any allergies, tell your care team.  Bring any previous mammograms from other facilities or have them mailed to the breast center. Three-dimensional (3D) mammograms are available at [place] locations in [facility], [facility], [facility], [place], [place], and [place]. [facility] locations include [place] and [facility] in [place]. Benefits of 3D mammograms include: - Improved rate of cancer detection - Decreases your chance of having to go back for more tests, which means fewer: - \"False-positive\" results (This means that there is an abnormal area but it isn't cancer.) - Invasive testing procedures, such as a biopsy or surgery - Can provide clearer images of the breast if you have dense breast tissue. 3D mammography is an optional exam that anyone can have with a 2D mammogram. It doesn't replace or take the place of a 2D mammogram. 2D mammograms remain an effective screening test for all women.  Not all " "insurance companies cover the cost of a 3D mammogram. Check with your insurance.              Future tests that were ordered for you today     Open Future Orders        Priority Expected Expires Ordered    US Abdomen Complete Routine  7/16/2019 7/16/2018    Fecal colorectal cancer screen (FIT) Routine 8/6/2018 10/8/2018 7/16/2018    DX Hip/Pelvis/Spine Routine  7/16/2019 7/16/2018            Who to contact     If you have questions or need follow up information about today's clinic visit or your schedule please contact [facility] directly at [phone].  Normal or non-critical lab and imaging results will be communicated to you by [employer]hart, letter or phone within 4 business days after the clinic has received the results. If you do not hear from us within 7 days, please contact the clinic through GenePeekst or phone. If you have a critical or abnormal lab result, we will notify you by phone as soon as possible.  Submit refill requests through [employer] or call your pharmacy and they will forward the refill request to us. Please allow 3 business days for your refill to be completed.          Additional Information About Your Visit        MyChart Information     [employer] gives you secure access to your electronic health record. If you see a primary care provider, you can also send messages to your care team and make appointments. If you have questions, please call your primary care clinic.  If you do not have a primary care provider, please call [phone] and they will assist you.        Care EveryWhere ID     This is your Care EveryWhere ID. This could be used by other organizations to access your [facility] medical records  [id number]        Your Vitals Were     Pulse Temperature Respirations Height Pulse Oximetry BMI (Body Mass Index)    86 97.8  F (36.6  C) (Oral) 18 5' 4.75\" (1.645 m) 97% 24.38 kg/m2       Blood Pressure from Last 3 Encounters:   07/16/18 129/84   01/19/17 132/87   12/07/15 " 135/85    Weight from Last 3 Encounters:   07/16/18 145 lb 6 oz (65.9 kg)   01/19/17 145 lb (65.8 kg)   12/07/15 154 lb (69.9 kg)              We Performed the Following     Glucose     Lipid panel reflex to direct LDL Fasting     TDAP, IM (10 - 64 YRS) - Adacel     TSH with free T4 reflex          Today's Medication Changes          These changes are accurate as of 7/16/18  9:57 AM.  If you have any questions, ask your nurse or doctor.               Start taking these medicines.        Dose/Directions    LORazepam 0.5 MG tablet   Commonly known as:  ATIVAN   Used for:  Anxiety   Started by:  Rachel Arauz NP        Dose:  0.5 mg   Take 1 tablet (0.5 mg) by mouth every 8 hours as needed for anxiety   Quantity:  20 tablet   Refills:  0       penicillin V potassium 500 MG tablet   Commonly known as:  VEETID   Used for:  Tooth infection   Started by:  Rachel Arauz NP        Dose:  500 mg   Take 1 tablet (500 mg) by mouth 2 times daily   Quantity:  20 tablet   Refills:  0       traZODone 50 MG tablet   Commonly known as:  DESYREL   Used for:  Insomnia, unspecified type   Started by:  Rachel Arauz NP        Dose:   mg   Take 1-2 tablets ( mg) by mouth nightly as needed for sleep   Quantity:  90 tablet   Refills:  PRN            Where to get your medicines      These medications were sent to [id number] - [place], [place] - [address] [facility] [address]  [address], [place] [address]     Phone:  [phone]     albuterol 108 (90 Base) MCG/ACT Inhaler    fluticasone 110 MCG/ACT Inhaler    levothyroxine 88 MCG tablet    penicillin V potassium 500 MG tablet    traZODone 50 MG tablet         Some of these will need a paper prescription and others can be bought over the counter.  Ask your nurse if you have questions.     Bring a paper prescription for each of these medications     LORazepam 0.5 MG tablet                Primary Care Provider Office Phone # Fax #     Rachel Arauz [phone]       [address] [facility] [address]        Equal Access to Services     JESÚS VÁZQUEZ : Hadii aad ku hadcao Sofaustina, waaxda luqadaha, qaybta kaalmada adedinh, jose cee marlincarmen larsen lalinwoodeleazar francisco. So [facility] [phone].    ATENCIÓN: Si habla español, tiene a phillips disposición servicios gratuitos de asistencia lingüística. Llame al [phone].    We comply with applicable federal civil rights laws and [place] laws. We do not discriminate on the basis of race, color, national origin, age, disability, sex, sexual orientation, or gender identity.            Thank you!     Thank you for choosing [facility]  for your care. Our goal is always to provide you with excellent care. Hearing back from our patients is one way we can continue to improve our services. Please take a few minutes to complete the written survey that you may receive in the mail after your visit with us. Thank you!             Your Updated Medication List - Protect others around you: Learn how to safely use, store and throw away your medicines at www.disposemymeds.org.          This list is accurate as of 7/16/18  9:57 AM.  Always use your most recent med list.                   Brand Name Dispense Instructions for use Diagnosis    ADVIL 200 MG tablet   Generic drug:  ibuprofen     0    1 TABLET EVERY 4 TO 6 HOURS AS NEEDED        albuterol 108 (90 Base) MCG/ACT Inhaler    VENTOLIN HFA    18 g    INHALE 1-2 PUFFS INTO THE LUNGS EVERY 4 HOURS AS NEEDED FOR SHORTNESS OF BREATH OR DIFFICULTY BREATHING.    Moderate persistent asthma with (acute) exacerbation       fluticasone 110 MCG/ACT Inhaler    FLOVENT HFA    1 Inhaler    Inhale 2 puffs into the lungs 2 times daily    Moderate persistent asthma with (acute) exacerbation       levothyroxine 88 MCG tablet    SYNTHROID/LEVOTHROID    90 tablet    Take 1 tablet (88 mcg) by mouth daily    Hypothyroidism, unspecified type        LORazepam 0.5 MG tablet    ATIVAN    20 tablet    Take 1 tablet (0.5 mg) by mouth every 8 hours as needed for anxiety    Anxiety       penicillin V potassium 500 MG tablet    VEETID    20 tablet    Take 1 tablet (500 mg) by mouth 2 times daily    Tooth infection       traZODone 50 MG tablet    DESYREL    90 tablet    Take 1-2 tablets ( mg) by mouth nightly as needed for sleep    Insomnia, unspecified type

## 2018-07-17 ASSESSMENT — ASTHMA QUESTIONNAIRES: ACT_TOTALSCORE: 22

## 2018-07-18 LAB
COPATH REPORT: NORMAL
PAP: NORMAL

## 2018-07-19 LAB
FINAL DIAGNOSIS: NORMAL
HPV HR 12 DNA CVX QL NAA+PROBE: NEGATIVE
HPV16 DNA SPEC QL NAA+PROBE: NEGATIVE
HPV18 DNA SPEC QL NAA+PROBE: NEGATIVE
SPECIMEN DESCRIPTION: NORMAL
SPECIMEN SOURCE CVX/VAG CYTO: NORMAL

## 2018-08-13 DIAGNOSIS — F41.9 ANXIETY: ICD-10-CM

## 2018-08-13 NOTE — TELEPHONE ENCOUNTER
LORazepam (ATIVAN) 0.5 MG tablet      Last Written Prescription Date:  7-16-18  Last Fill Quantity: 20 tab,   # refills: 0  Last Office Visit: 7-16-18  Future Office visit:       Routing refill request to provider for review/approval because:  Drug not on the [facility], P or [facility] refill protocol or controlled substance

## 2018-08-14 RX ORDER — LORAZEPAM 0.5 MG/1
0.5 TABLET ORAL EVERY 8 HOURS PRN
Qty: 20 TABLET | Refills: 0 | Status: SHIPPED | OUTPATIENT
Start: 2018-08-14 | End: 2019-01-03

## 2018-08-14 NOTE — TELEPHONE ENCOUNTER
How often is she needing to take Ativan?  It would appear that she is taking it fairly regularly.  I think we should probably follow up to discuss other treatment options to better control her ongoing anxiety.

## 2018-08-21 ENCOUNTER — TELEPHONE (OUTPATIENT)
Dept: FAMILY MEDICINE | Facility: CLINIC | Age: 53
End: 2018-08-21

## 2018-08-21 NOTE — TELEPHONE ENCOUNTER
Called patient to remind her about submission of FIT test, no answer, left detailed voice message to return call

## 2018-08-28 ENCOUNTER — MYC MEDICAL ADVICE (OUTPATIENT)
Dept: FAMILY MEDICINE | Facility: CLINIC | Age: 53
End: 2018-08-28

## 2018-09-10 ENCOUNTER — E-VISIT (OUTPATIENT)
Dept: FAMILY MEDICINE | Facility: CLINIC | Age: 53
End: 2018-09-10
Payer: COMMERCIAL

## 2018-09-10 DIAGNOSIS — F41.9 ANXIETY: Primary | ICD-10-CM

## 2018-09-10 PROCEDURE — 99444 ZZC PHYSICIAN ONLINE EVALUATION & MANAGEMENT SERVICE: CPT | Performed by: NURSE PRACTITIONER

## 2018-09-10 ASSESSMENT — ANXIETY QUESTIONNAIRES
GAD7 TOTAL SCORE: 7
1. FEELING NERVOUS, ANXIOUS, OR ON EDGE: NEARLY EVERY DAY
3. WORRYING TOO MUCH ABOUT DIFFERENT THINGS: SEVERAL DAYS
4. TROUBLE RELAXING: SEVERAL DAYS
7. FEELING AFRAID AS IF SOMETHING AWFUL MIGHT HAPPEN: SEVERAL DAYS
7. FEELING AFRAID AS IF SOMETHING AWFUL MIGHT HAPPEN: SEVERAL DAYS
5. BEING SO RESTLESS THAT IT IS HARD TO SIT STILL: NOT AT ALL
6. BECOMING EASILY ANNOYED OR IRRITABLE: NOT AT ALL
GAD7 TOTAL SCORE: 7
2. NOT BEING ABLE TO STOP OR CONTROL WORRYING: SEVERAL DAYS

## 2018-09-11 ASSESSMENT — ANXIETY QUESTIONNAIRES: GAD7 TOTAL SCORE: 7

## 2018-10-02 ENCOUNTER — E-VISIT (OUTPATIENT)
Dept: FAMILY MEDICINE | Facility: CLINIC | Age: 53
End: 2018-10-02
Payer: COMMERCIAL

## 2018-10-02 ENCOUNTER — MYC MEDICAL ADVICE (OUTPATIENT)
Dept: FAMILY MEDICINE | Facility: CLINIC | Age: 53
End: 2018-10-02

## 2018-10-02 DIAGNOSIS — J45.41 MODERATE PERSISTENT ASTHMA WITH (ACUTE) EXACERBATION: ICD-10-CM

## 2018-10-02 PROCEDURE — 99444 ZZC PHYSICIAN ONLINE EVALUATION & MANAGEMENT SERVICE: CPT | Performed by: NURSE PRACTITIONER

## 2018-10-02 NOTE — MR AVS SNAPSHOT
After Visit Summary   10/2/2018    Kathy Lei    MRN: [id number]           Patient Information     Date Of Birth          1965        Visit Information        Provider Department      10/2/2018 8:52 PM Rachel Arauz NP [facility]        Today's Diagnoses     Moderate persistent asthma with (acute) exacerbation           Follow-ups after your visit        Who to contact     If you have questions or need follow up information about today's clinic visit or your schedule please contact [facility] directly at [phone].  Normal or non-critical lab and imaging results will be communicated to you by ExecNotehart, letter or phone within 4 business days after the clinic has received the results. If you do not hear from us within 7 days, please contact the clinic through [employer] or phone. If you have a critical or abnormal lab result, we will notify you by phone as soon as possible.  Submit refill requests through [email or web address] or call your pharmacy and they will forward the refill request to us. Please allow 3 business days for your refill to be completed.          Additional Information About Your Visit        MyChart Information     [email or web address] gives you secure access to your electronic health record. If you see a primary care provider, you can also send messages to your care team and make appointments. If you have questions, please call your primary care clinic.  If you do not have a primary care provider, please call [phone] and they will assist you.        Care EveryWhere ID     This is your Care EveryWhere ID. This could be used by other organizations to access your [facility] medical records  [id number]         Blood Pressure from Last 3 Encounters:   07/16/18 129/84   01/19/17 132/87   12/07/15 135/85    Weight from Last 3 Encounters:   07/16/18 145 lb 6 oz (65.9 kg)   01/19/17 145 lb (65.8 kg)   12/07/15 154 lb (69.9 kg)              Today, you had  the following     No orders found for display         Today's Medication Changes          These changes are accurate as of 10/2/18 11:59 PM.  If you have any questions, ask your nurse or doctor.               Start taking these medicines.        Dose/Directions    predniSONE 20 MG tablet   Commonly known as:  DELTASONE   Used for:  Moderate persistent asthma with (acute) exacerbation   Started by:  Rachel Arauz NP        Take 3 tablets daily for 3 days then decrease to 2 tablets daily for 3 days then decrease to one tablet daily for 3 days then 1/2 tablet daily for 3 days   Quantity:  20 tablet   Refills:  0            Where to get your medicines      Some of these will need a paper prescription and others can be bought over the counter.  Ask your nurse if you have questions.     Bring a paper prescription for each of these medications     predniSONE 20 MG tablet                Primary Care Provider Office Phone # Fax #    Rachel Arauz [phone] [address] [facility] [address]        Equal Access to Services     KAY VÁZQUEZ : Hadii aad ku hadasho Soomaali, waaxda luqadaha, qaybta kaalmada adeegyada, waxay ayden gutierrez . So [facility] [phone].    ATENCIÓN: Si habla español, tiene a phillips disposición servicios gratuitos de asistencia lingüística. Llame al [phone].    We comply with applicable federal civil rights laws and [place] laws. We do not discriminate on the basis of race, color, national origin, age, disability, sex, sexual orientation, or gender identity.            Thank you!     Thank you for choosing [facility]  for your care. Our goal is always to provide you with excellent care. Hearing back from our patients is one way we can continue to improve our services. Please take a few minutes to complete the written survey that you may receive in the mail after your visit with us. Thank you!             Your Updated  Medication List - Protect others around you: Learn how to safely use, store and throw away your medicines at www.disposemymeds.org.          This list is accurate as of 10/2/18 11:59 PM.  Always use your most recent med list.                   Brand Name Dispense Instructions for use Diagnosis    ADVIL 200 MG tablet   Generic drug:  ibuprofen     0    1 TABLET EVERY 4 TO 6 HOURS AS NEEDED        albuterol 108 (90 Base) MCG/ACT inhaler    VENTOLIN HFA    18 g    INHALE 1-2 PUFFS INTO THE LUNGS EVERY 4 HOURS AS NEEDED FOR SHORTNESS OF BREATH OR DIFFICULTY BREATHING.    Moderate persistent asthma without complication       fluticasone 110 MCG/ACT Inhaler    FLOVENT HFA    1 Inhaler    Inhale 2 puffs into the lungs 2 times daily    Moderate persistent asthma without complication       levothyroxine 88 MCG tablet    SYNTHROID/LEVOTHROID    90 tablet    Take 1 tablet (88 mcg) by mouth daily    Hypothyroidism, unspecified type       LORazepam 0.5 MG tablet    ATIVAN    20 tablet    Take 1 tablet (0.5 mg) by mouth every 8 hours as needed for anxiety    Anxiety       penicillin V potassium 500 MG tablet    VEETID    20 tablet    Take 1 tablet (500 mg) by mouth 2 times daily    Tooth infection       predniSONE 20 MG tablet    DELTASONE    20 tablet    Take 3 tablets daily for 3 days then decrease to 2 tablets daily for 3 days then decrease to one tablet daily for 3 days then 1/2 tablet daily for 3 days    Moderate persistent asthma with (acute) exacerbation       sertraline 50 MG tablet    ZOLOFT    30 tablet    Take 1/2 tablet daily for one week then increase to one tablet daily.    Anxiety       traZODone 50 MG tablet    DESYREL    90 tablet    Take 1-2 tablets ( mg) by mouth nightly as needed for sleep    Insomnia, unspecified type

## 2018-10-03 RX ORDER — PREDNISONE 20 MG/1
TABLET ORAL
Qty: 20 TABLET | Refills: 0 | Status: SHIPPED | OUTPATIENT
Start: 2018-10-03 | End: 2018-11-26

## 2018-10-03 NOTE — TELEPHONE ENCOUNTER
I faxed Rx for Prednisone 20 MG tablet to [facility]  Start date: 10/03/2018    Brynn Goodman MA

## 2018-11-08 ENCOUNTER — E-VISIT (OUTPATIENT)
Dept: FAMILY MEDICINE | Facility: CLINIC | Age: 53
End: 2018-11-08
Payer: COMMERCIAL

## 2018-11-08 DIAGNOSIS — K04.7 TOOTH INFECTION: ICD-10-CM

## 2018-11-08 PROCEDURE — 99444 ZZC PHYSICIAN ONLINE EVALUATION & MANAGEMENT SERVICE: CPT | Performed by: FAMILY MEDICINE

## 2018-11-08 RX ORDER — PENICILLIN V POTASSIUM 500 MG/1
500 TABLET, FILM COATED ORAL 2 TIMES DAILY
Qty: 20 TABLET | Refills: 0 | Status: SHIPPED | OUTPATIENT
Start: 2018-11-08 | End: 2018-11-26

## 2018-11-26 ENCOUNTER — E-VISIT (OUTPATIENT)
Dept: FAMILY MEDICINE | Facility: CLINIC | Age: 53
End: 2018-11-26
Payer: COMMERCIAL

## 2018-11-26 DIAGNOSIS — J45.41 MODERATE PERSISTENT ASTHMA WITH (ACUTE) EXACERBATION: ICD-10-CM

## 2018-11-26 DIAGNOSIS — K04.7 TOOTH INFECTION: ICD-10-CM

## 2018-11-26 PROCEDURE — 99444 ZZC PHYSICIAN ONLINE EVALUATION & MANAGEMENT SERVICE: CPT | Performed by: NURSE PRACTITIONER

## 2018-11-26 RX ORDER — PREDNISONE 20 MG/1
TABLET ORAL
Qty: 20 TABLET | Refills: 0 | Status: SHIPPED | OUTPATIENT
Start: 2018-11-26 | End: 2018-12-06

## 2018-11-26 RX ORDER — PENICILLIN V POTASSIUM 500 MG/1
500 TABLET, FILM COATED ORAL 2 TIMES DAILY
Qty: 20 TABLET | Refills: 0 | Status: SHIPPED | OUTPATIENT
Start: 2018-11-26 | End: 2019-02-06

## 2018-11-26 NOTE — TELEPHONE ENCOUNTER
I faxed Rx for Prednisone 20 MG tablet to [facility]-[place], [place].  Start date: 11/26/2018    Brynn Goodman MA

## 2018-11-26 NOTE — MR AVS SNAPSHOT
After Visit Summary   11/26/2018    Kathy Lei    MRN: [id number]           Patient Information     Date Of Birth          1965        Visit Information        Provider Department      11/26/2018 6:34 AM Rachel Arauz NP [facility]        Today's Diagnoses     Moderate persistent asthma with (acute) exacerbation        Tooth infection           Follow-ups after your visit        Who to contact     If you have questions or need follow up information about today's clinic visit or your schedule please contact [facility] directly at [phone].  Normal or non-critical lab and imaging results will be communicated to you by JLGOVhart, letter or phone within 4 business days after the clinic has received the results. If you do not hear from us within 7 days, please contact the clinic through Roll20t or phone. If you have a critical or abnormal lab result, we will notify you by phone as soon as possible.  Submit refill requests through [employer] or call your pharmacy and they will forward the refill request to us. Please allow 3 business days for your refill to be completed.          Additional Information About Your Visit        MyChart Information     [employer] gives you secure access to your electronic health record. If you see a primary care provider, you can also send messages to your care team and make appointments. If you have questions, please call your primary care clinic.  If you do not have a primary care provider, please call [phone] and they will assist you.        Care EveryWhere ID     This is your Care EveryWhere ID. This could be used by other organizations to access your [facility] medical records  [id number]         Blood Pressure from Last 3 Encounters:   07/16/18 129/84   01/19/17 132/87   12/07/15 135/85    Weight from Last 3 Encounters:   07/16/18 145 lb 6 oz (65.9 kg)   01/19/17 145 lb (65.8 kg)   12/07/15 154 lb (69.9 kg)               Today, you had the following     No orders found for display         Where to get your medicines      These medications were sent to [id number] - [place], [place] - [address] AT [facility] [address]  [address], [place] [address]     Phone:  [phone]     penicillin V 500 MG tablet         Some of these will need a paper prescription and others can be bought over the counter.  Ask your nurse if you have questions.     Bring a paper prescription for each of these medications     predniSONE 20 MG tablet          Primary Care Provider Office Phone # Fax #    Rachel Arauz [phone]       [address]  [facility] [address]        Equal Access to Services     JESÚS VÁZQUEZ : Hadii won sheppardo Sofaustina, waaxda luqadaha, qaybta kaalmada adeegyada, jose gutierrez . So [facility] [phone].    ATENCIÓN: Si habla español, tiene a phillips disposición servicios gratuitos de asistencia lingüística. [facility] [phone].    We comply with applicable federal civil rights laws and [place] laws. We do not discriminate on the basis of race, color, national origin, age, disability, sex, sexual orientation, or gender identity.            Thank you!     Thank you for choosing [facility]  for your care. Our goal is always to provide you with excellent care. Hearing back from our patients is one way we can continue to improve our services. Please take a few minutes to complete the written survey that you may receive in the mail after your visit with us. Thank you!             Your Updated Medication List - Protect others around you: Learn how to safely use, store and throw away your medicines at www.disposemymeds.org.          This list is accurate as of 11/26/18  5:00 PM.  Always use your most recent med list.                   Brand Name Dispense Instructions for use Diagnosis    ADVIL 200 MG tablet   Generic drug:  ibuprofen      0    1 TABLET EVERY 4 TO 6 HOURS AS NEEDED        albuterol 108 (90 Base) MCG/ACT inhaler    VENTOLIN HFA    18 g    INHALE 1-2 PUFFS INTO THE LUNGS EVERY 4 HOURS AS NEEDED FOR SHORTNESS OF BREATH OR DIFFICULTY BREATHING.    Moderate persistent asthma without complication       fluticasone 110 MCG/ACT inhaler    FLOVENT HFA    1 Inhaler    Inhale 2 puffs into the lungs 2 times daily    Moderate persistent asthma without complication       levothyroxine 88 MCG tablet    SYNTHROID/LEVOTHROID    90 tablet    Take 1 tablet (88 mcg) by mouth daily    Hypothyroidism, unspecified type       LORazepam 0.5 MG tablet    ATIVAN    20 tablet    Take 1 tablet (0.5 mg) by mouth every 8 hours as needed for anxiety    Anxiety       penicillin V 500 MG tablet    VEETID    20 tablet    Take 1 tablet (500 mg) by mouth 2 times daily    Tooth infection       predniSONE 20 MG tablet    DELTASONE    20 tablet    Take 3 tablets daily for 3 days then decrease to 2 tablets daily for 3 days then decrease to one tablet daily for 3 days then 1/2 tablet daily for 3 days    Moderate persistent asthma with (acute) exacerbation       sertraline 50 MG tablet    ZOLOFT    30 tablet    Take 1/2 tablet daily for one week then increase to one tablet daily.    Anxiety       traZODone 50 MG tablet    DESYREL    90 tablet    Take 1-2 tablets ( mg) by mouth nightly as needed for sleep    Insomnia, unspecified type

## 2018-12-03 DIAGNOSIS — F41.9 ANXIETY: ICD-10-CM

## 2018-12-04 NOTE — TELEPHONE ENCOUNTER
"Requested Prescriptions   Pending Prescriptions Disp Refills     sertraline (ZOLOFT) 50 MG tablet [Pharmacy Med Name: SERTRALINE HCL 50MG TABS]  Last Written Prescription Date:  09/10/2018  Last Fill Quantity: 30,  # refills: 1   Last office visit: 7/16/2018 with prescribing provider:  KAPIL   Future Office Visit:     30 tablet 1     Sig: TAKE ONE-HALF TABLET BY MOUTH ONCE DAILY FOR 1 WEEK THEN INCREASE TO TAKE ONE TABLET BY MOUTH EVERY DAY    SSRIs Protocol Passed  No flowsheet data found.  ANH-7 SCORE 3/5/2012 3/4/2013 9/10/2018   Total Score 0 1 -   Total Score - 1 7 (mild anxiety)   Total Score - - 7           12/3/2018 12:07 AM       Passed - Recent (12 mo) or future (30 days) visit within the authorizing provider's specialty    Patient had office visit in the last 12 months or has a visit in the next 30 days with authorizing provider or within the authorizing provider's specialty.  See \"Patient Info\" tab in inbasket, or \"Choose Columns\" in Meds & Orders section of the refill encounter.             Passed - Patient is age 18 or older       Passed - No active pregnancy on record       Passed - No positive pregnancy test in last 12 months          "

## 2018-12-05 ENCOUNTER — MYC REFILL (OUTPATIENT)
Dept: FAMILY MEDICINE | Facility: CLINIC | Age: 53
End: 2018-12-05

## 2018-12-05 DIAGNOSIS — F41.9 ANXIETY: ICD-10-CM

## 2018-12-05 NOTE — TELEPHONE ENCOUNTER
Message from MyChart:  Original authorizing provider: SALINAS Mae would like a refill of the following medications:  sertraline (ZOLOFT) 50 MG tablet [Rachel Arauz NP]    Preferred pharmacy: [facility] - [place], [place] - [address]    Comment:

## 2018-12-05 NOTE — TELEPHONE ENCOUNTER
Duplicate request. See request on 12/3.   Patient was asked to initiate an e-visit.    Addie Rosales

## 2018-12-05 NOTE — TELEPHONE ENCOUNTER
Routing refill request to provider for review/approval because:  Patient overdue for eVisit follow up for anxiety, per review of 9/10/18 eVisit plan.      Kellie-Please sign if agree.  One month supply pended.    Team coordinators-Please contact patient to remind her to request eVisit for anxiety follow up with PCP.    Thank you!  RAINA Wong, AGN, RN

## 2018-12-05 NOTE — TELEPHONE ENCOUNTER
Patient due for eVisit f/u for anxiety.   [facility] message sent to patient.     Delilah AMBROCIO     [facility]

## 2018-12-05 NOTE — TELEPHONE ENCOUNTER
Per 9/10/2018 visit was to follow up in 1 month    Reception: please call and schedule visit then send back and we can refill until appt.  thanks

## 2018-12-06 ENCOUNTER — E-VISIT (OUTPATIENT)
Dept: FAMILY MEDICINE | Facility: CLINIC | Age: 53
End: 2018-12-06
Payer: COMMERCIAL

## 2018-12-06 DIAGNOSIS — F41.9 ANXIETY: ICD-10-CM

## 2018-12-06 PROCEDURE — 99444 ZZC PHYSICIAN ONLINE EVALUATION & MANAGEMENT SERVICE: CPT | Performed by: FAMILY MEDICINE

## 2018-12-06 ASSESSMENT — ANXIETY QUESTIONNAIRES
2. NOT BEING ABLE TO STOP OR CONTROL WORRYING: NOT AT ALL
1. FEELING NERVOUS, ANXIOUS, OR ON EDGE: NOT AT ALL
3. WORRYING TOO MUCH ABOUT DIFFERENT THINGS: NOT AT ALL
4. TROUBLE RELAXING: NOT AT ALL
GAD7 TOTAL SCORE: 0
7. FEELING AFRAID AS IF SOMETHING AWFUL MIGHT HAPPEN: NOT AT ALL
7. FEELING AFRAID AS IF SOMETHING AWFUL MIGHT HAPPEN: NOT AT ALL
5. BEING SO RESTLESS THAT IT IS HARD TO SIT STILL: NOT AT ALL
6. BECOMING EASILY ANNOYED OR IRRITABLE: NOT AT ALL
GAD7 TOTAL SCORE: 0

## 2018-12-06 NOTE — TELEPHONE ENCOUNTER
Pt informed of mychart error.   She states she will make eVisit appt as soon as she is able.   She understands medication was not approved at this time.         Delilah AMBROCIO     [facility]

## 2018-12-06 NOTE — TELEPHONE ENCOUNTER
Did send in one month if she wants to do the e visit with nasreen. nasreen is not back til next week.     Wally Gutierrez

## 2018-12-06 NOTE — TELEPHONE ENCOUNTER
Called patient back.  She was informed of the refill done by Dr. Gutierrez and also when her provider will be back in the office.   She states she will still be making eVisit.     Delilah AMBROCIO     [facility]

## 2018-12-07 ASSESSMENT — ANXIETY QUESTIONNAIRES: GAD7 TOTAL SCORE: 0

## 2019-01-03 ENCOUNTER — OFFICE VISIT (OUTPATIENT)
Dept: FAMILY MEDICINE | Facility: CLINIC | Age: 54
End: 2019-01-03
Payer: COMMERCIAL

## 2019-01-03 VITALS
HEIGHT: 65 IN | TEMPERATURE: 97.8 F | SYSTOLIC BLOOD PRESSURE: 128 MMHG | BODY MASS INDEX: 24.41 KG/M2 | HEART RATE: 106 BPM | RESPIRATION RATE: 18 BRPM | DIASTOLIC BLOOD PRESSURE: 84 MMHG | WEIGHT: 146.5 LBS | OXYGEN SATURATION: 94 %

## 2019-01-03 DIAGNOSIS — J32.9 SINUSITIS, UNSPECIFIED CHRONICITY, UNSPECIFIED LOCATION: ICD-10-CM

## 2019-01-03 DIAGNOSIS — J45.41 MODERATE PERSISTENT ASTHMA WITH (ACUTE) EXACERBATION: Primary | ICD-10-CM

## 2019-01-03 DIAGNOSIS — N63.0 LUMP OR MASS IN BREAST: ICD-10-CM

## 2019-01-03 PROCEDURE — 99214 OFFICE O/P EST MOD 30 MIN: CPT | Performed by: NURSE PRACTITIONER

## 2019-01-03 RX ORDER — PREDNISONE 20 MG/1
TABLET ORAL
Qty: 18 TABLET | Refills: 0 | Status: SHIPPED | OUTPATIENT
Start: 2019-01-03 | End: 2019-01-31

## 2019-01-03 RX ORDER — AZITHROMYCIN 250 MG/1
TABLET, FILM COATED ORAL
Qty: 6 TABLET | Refills: 0 | Status: SHIPPED | OUTPATIENT
Start: 2019-01-03 | End: 2019-02-06

## 2019-01-03 ASSESSMENT — MIFFLIN-ST. JEOR: SCORE: 1256.43

## 2019-01-03 NOTE — PROGRESS NOTES
"  SUBJECTIVE:   Kathy Lei is a 53 year old female who presents to clinic today for the following health issues:  Shooting pain in breast: Mammogram scheduled for Tuesday     RESPIRATORY SYMPTOMS      Duration: 7 days ago    Description  started out with a sore throat, cough, congestion: drainage, post nasal drainage, asthma exacerbated         Severity: severe    Accompanying signs and symptoms: none     History (predisposing factors): client on Friday was sick    Precipitating or alleviating factors: None    Therapies tried and outcome:  airborne, zinc, vicks, generic [employer] Mucinex D: helped a little bit    Recently had some right breat pain, noticed two lumps.  No redness, nipple discharge.  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  She has a diagnostic mammogram scheduled for next week.         Problem list and histories reviewed & adjusted, as indicated.  Additional history: as documented        Reviewed and updated as needed this visit by clinical staff  Meds       Reviewed and updated as needed this visit by Provider         ROS:  CONSTITUTIONAL: NEGATIVE for fever, chills, change in weight  INTEGUMENTARY/SKIN: NEGATIVE for worrisome rashes, moles or lesions  ENT/MOUTH: see HPI  RESP:see HPI  BREAST: see HPI  CV: NEGATIVE for chest pain, palpitations or peripheral edema  GI: NEGATIVE for nausea, abdominal pain, heartburn, or change in bowel habits  MUSCULOSKELETAL: NEGATIVE for significant arthralgias or myalgia  NEURO: NEGATIVE for weakness, dizziness or paresthesias    OBJECTIVE:     /84   Pulse 106   Temp 97.8  F (36.6  C) (Oral)   Resp 18   Ht 1.645 m (5' 4.75\")   Wt 66.5 kg (146 lb 8 oz)   SpO2 94%   BMI 24.57 kg/m    Body mass index is 24.57 kg/m .  GENERAL: healthy, alert and no distress  HENT: normal cephalic/atraumatic, ear canals and TM's normal, nose and mouth without ulcers or lesions, oropharynx clear, oral mucous membranes moist and sinuses: maxillary " tenderness on bilateral  NECK: no adenopathy, no asymmetry, masses, or scars and thyroid normal to palpation  RESP: expiratory wheezes throughout  BREAST: discrete mass at the 8:00 position and possibly the 6:00 position of the right breast   CV: regular rate and rhythm, normal S1 S2, no S3 or S4, no murmur, click or rub, no peripheral edema and peripheral pulses strong  SKIN: no suspicious lesions or rashes  PSYCH: mentation appears normal, affect normal/bright        ASSESSMENT/PLAN:             1. Moderate persistent asthma with (acute) exacerbation  Start Prednisone.  Use albuterol PRN.  Go to the ED if symptoms worsen.  Follow up in 2 days if no improvement.   - predniSONE (DELTASONE) 20 MG tablet; Take 3 tablets daily for 3 days then 2 tablets daily for 3 days then one tablet daily for 3 days.  Dispense: 18 tablet; Refill: 0    2. Sinusitis, unspecified chronicity, unspecified location    - azithromycin (ZITHROMAX) 250 MG tablet; two tablets first day and 1 tablet daily for 4 days  Dispense: 6 tablet; Refill: 0    3. Lump or mass in breast  She will have a diagnostic mammogram next week.           Rachel Arauz, SALINAS  [facility]

## 2019-01-08 ENCOUNTER — ANCILLARY PROCEDURE (OUTPATIENT)
Dept: MAMMOGRAPHY | Facility: CLINIC | Age: 54
End: 2019-01-08
Attending: NURSE PRACTITIONER
Payer: COMMERCIAL

## 2019-01-08 DIAGNOSIS — N63.10 LUMP OF RIGHT BREAST: ICD-10-CM

## 2019-01-14 ENCOUNTER — ANCILLARY PROCEDURE (OUTPATIENT)
Dept: MAMMOGRAPHY | Facility: CLINIC | Age: 54
End: 2019-01-14
Payer: COMMERCIAL

## 2019-01-14 DIAGNOSIS — N63.10 LUMP OF RIGHT BREAST: ICD-10-CM

## 2019-01-14 RX ORDER — IOPAMIDOL 612 MG/ML
100 INJECTION, SOLUTION INTRAVASCULAR ONCE
Status: COMPLETED | OUTPATIENT
Start: 2019-01-14 | End: 2019-01-14

## 2019-01-14 RX ORDER — LIDOCAINE HYDROCHLORIDE 10 MG/ML
10 INJECTION, SOLUTION EPIDURAL; INFILTRATION; INTRACAUDAL; PERINEURAL ONCE
Status: COMPLETED | OUTPATIENT
Start: 2019-01-14 | End: 2019-01-14

## 2019-01-14 RX ADMIN — IOPAMIDOL 100 ML: 612 INJECTION, SOLUTION INTRAVASCULAR at 10:15

## 2019-01-14 RX ADMIN — LIDOCAINE HYDROCHLORIDE 10 ML: 10 INJECTION, SOLUTION EPIDURAL; INFILTRATION; INTRACAUDAL; PERINEURAL at 11:36

## 2019-01-16 ENCOUNTER — MYC MEDICAL ADVICE (OUTPATIENT)
Dept: FAMILY MEDICINE | Facility: CLINIC | Age: 54
End: 2019-01-16

## 2019-01-16 ENCOUNTER — TELEPHONE (OUTPATIENT)
Dept: MAMMOGRAPHY | Facility: CLINIC | Age: 54
End: 2019-01-16

## 2019-01-16 NOTE — TELEPHONE ENCOUNTER
Spoke to Mily about her new diagnosis of Invasive Mammary Carcinoma found during her breast biopsy earlier this week.  We discussed the Radiologist's recommendation of surgical on oncology consultation.  Due to the size of the area she will be starting by seeing Dr. Christian Guzman on Tuesday, January 22nd at 4:00pm to discuss oncology treatment.  Mily verbalized understanding and all questions and concerns were answered at this time.

## 2019-01-18 LAB — COPATH REPORT: NORMAL

## 2019-01-21 NOTE — TELEPHONE ENCOUNTER
RECORDS STATUS - BREAST    [facility]   DATE RECEIVED:    NOTES STATUS DETAILS   OFFICE NOTE from referring provider  [facility]   OFFICE NOTE from medical oncologist NA    OFFICE NOTE from surgeon ARELI    OFFICE NOTE from radiation oncologist NA    DISCHARGE SUMMARY from hospital NA    DISCHARGE REPORT from the ER NA    OPERATIVE REPORT  Epic   MEDICATION LIST  [facility]   CLINICAL TRIAL TREATMENTS TO DATE     LABS     PATHOLOGY REPORTS  (Tissue diagnosis, Stage, ER/CO percentage positive and intensity of staining, HER2 IHC, FISH, and all biopsies from breast and any distant metastasis)                 1/14/19 - Her 2 (Epic)  1/14/19 - UMP (Complete)  Per pt, Path done @ [facility] in 11/2014 & 1/2015 were benign. Report in CE Complete   GENONOMIC TESTING     TYPE:   (Next Generation Sequencing, including Foundation One testing, and Oncotype score) NA    IMAGING (NEED IMAGES & REPORT)     CT SCANS NA    MRI NA    MAMMO  PACS   ULTRASOUND  PACS   PET NA    BONE SCAN NA    BRAIN MRI NA

## 2019-01-22 ENCOUNTER — PRE VISIT (OUTPATIENT)
Dept: ONCOLOGY | Facility: CLINIC | Age: 54
End: 2019-01-22

## 2019-01-22 ENCOUNTER — ONCOLOGY VISIT (OUTPATIENT)
Dept: ONCOLOGY | Facility: CLINIC | Age: 54
End: 2019-01-22
Attending: INTERNAL MEDICINE
Payer: COMMERCIAL

## 2019-01-22 VITALS
HEART RATE: 92 BPM | RESPIRATION RATE: 14 BRPM | OXYGEN SATURATION: 98 % | TEMPERATURE: 97.1 F | BODY MASS INDEX: 24.74 KG/M2 | SYSTOLIC BLOOD PRESSURE: 147 MMHG | DIASTOLIC BLOOD PRESSURE: 90 MMHG | HEIGHT: 65 IN | WEIGHT: 148.5 LBS

## 2019-01-22 DIAGNOSIS — Z17.0 MALIGNANT NEOPLASM OF UPPER-OUTER QUADRANT OF RIGHT BREAST IN FEMALE, ESTROGEN RECEPTOR POSITIVE (H): ICD-10-CM

## 2019-01-22 DIAGNOSIS — C50.411 MALIGNANT NEOPLASM OF UPPER-OUTER QUADRANT OF RIGHT BREAST IN FEMALE, ESTROGEN RECEPTOR POSITIVE (H): ICD-10-CM

## 2019-01-22 PROCEDURE — 99205 OFFICE O/P NEW HI 60 MIN: CPT | Mod: ZP | Performed by: INTERNAL MEDICINE

## 2019-01-22 PROCEDURE — G0463 HOSPITAL OUTPT CLINIC VISIT: HCPCS | Mod: ZF

## 2019-01-22 ASSESSMENT — MIFFLIN-ST. JEOR: SCORE: 1265.72

## 2019-01-22 ASSESSMENT — PAIN SCALES - GENERAL: PAINLEVEL: MILD PAIN (3)

## 2019-01-22 NOTE — TELEPHONE ENCOUNTER
Received path slides from [facility] & sent to our path dept for review.    [id number] - 6 slides & report  [id number] - 4 slides & report

## 2019-01-22 NOTE — NURSING NOTE
"Oncology Rooming Note    January 22, 2019 3:40 PM   Kathy Lei is a 53 year old female who presents for:    Chief Complaint   Patient presents with     Oncology Clinic Visit     P NEW- BREAST CA     Initial Vitals: /90 (BP Location: Right arm, Patient Position: Chair, Cuff Size: Adult Regular)   Pulse 92   Temp 97.1  F (36.2  C) (Oral)   Resp 14   Ht 1.645 m (5' 4.76\")   Wt 67.4 kg (148 lb 8 oz)   SpO2 98%   BMI 24.89 kg/m   Estimated body mass index is 24.89 kg/m  as calculated from the following:    Height as of this encounter: 1.645 m (5' 4.76\").    Weight as of this encounter: 67.4 kg (148 lb 8 oz). Body surface area is 1.75 meters squared.  Mild Pain (3) Comment: Data Unavailable   No LMP recorded. Patient is perimenopausal.  Allergies reviewed: Yes  Medications reviewed: Yes    Medications: Medication refills not needed today.  Pharmacy name entered into [facility]:    [facility] - [place], [place] - [address] [facility] [address] [place], [place] - [address] [facility] [address] [facility] - [place], [place] - [address] [facility] [address]    Clinical concerns: No new concerns. Thomas was notified.    10 minutes for nursing intake (face to face time)     Shakir Skaggs LPN            "

## 2019-01-22 NOTE — LETTER
2019       RE: Kathy Lei   [address] [place] [address]     Dear Colleague,    Thank you for referring your patient, Kathy Lei, to the [facility]. Please see a copy of my visit note below.        2019      Rachel Arauz NP   [facility]    [address], [place] [address]      RE: Kathy Lei   MRN: [id number]   : 1965      Dear Ms. Arauz:   Thank you for referring Kathy Lei to our clinic for a new diagnosis of an estrogen-receptor positive, SC-positive, HER2-negative breast cancer, grade 2, in the upper outer quadrant of the right breast.      Mily presented between  and New Years of 2018 with new sharp pains in the upper outer quadrant of the right breast.  She underwent mammographic screening.      IMAGING:  Mammography and ultrasound:  She had a diagnostic mammogram which showed bilateral prepectoral saline implants.  On the right breast at 11:30 position, there were grouped coarse heterogeneous calcifications spanning 1.3 cm, new since , adjacent calcifications 11-o'clock position posterior depth.  There was an irregular mass in the lateral right breast 9-o'clock position mid-posterior depth, and an additional mass with obscured margins.  No significant changes in the left breast.  Right breast ultrasound was performed.  In the area of the palpable lump in the right breast, 11-o'clock position, 6 cm from the nipple, there is an irregular hypoechoic mass with indistinct margins measuring approximately 1.0 x 0.9 x 1.6 cm in size.  Immediately adjacent to the mass, 11:30 position, are microcalcifications.  In the second area of palpable lump right breast, 9:30 position, 5 cm from the nipple, there was an irregular hypoechoic mass measuring 3.2 x 0.5 x 1.3 cm in size felt to account for the mammographic findings.  There were multiple additional round hypoechoic masses  similar to the findings at 9:30 position seen incidentally during real time and not directly palpable.  For example, in the right breast at 8-o'clock position, 4 cm from the nipple, there was a mass measuring 0.8 x 0.6 x 0.6 cm, BI-RADS 4.      Contrast mammogram was subsequently performed and the contrast mammogram showed a large area of mass and non-mass-like enhancement in the upper outer right breast measuring 7.2 cm in greatest dimension, corresponding global asymmetry in the upper outer right breast.  Enhancement extended to the implant without evidence of extension to the skin surface or nipple, and the calcifications were noted as previously found.      PATHOLOGY:  The pathology showed part A, breast needle biopsy 11 o'clock, 6 cm from the nipple, invasive mammary carcinoma, no special type, ductal (and mucinous) Donna grade 2.  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  Calcifications were associated with the DCIS.  There was a focus suspicious for lymphovascular invasion.  Invasive carcinoma was estrogen receptor positive and progesterone receptor negative by immunohistochemistry.  In part B, breast right, 8 o'clock, 4 cm from the nipple, ultrasound-guided core biopsy, invasive mammary carcinoma, no special type, ductal (and mucinous) Donna grade 2, occasional calcifications were noted.  Invasive carcinoma was estrogen receptor positive and progesterone receptor negative by immunohistochemistry.  On quantitation of the ER and FL, ER was positive 99% of the cells staining with strong intensity.  FL was subsequently noted to be positive with 15% of the cells staining with moderate intensity.      There was also a HER2 FISH performed, and the HER2 FISH showed average number of HER2 signals per nucleus 2.6.  Average number of CEN17 signals 1.7 with a ratio of 1.6, VASILIY negative for biopsy A.  For biopsy B, the HER2 signals per nucleus 2.9.  Average number  CEN17 signals per nucleus 1.7 with a ratio of 1.7, VASILIY negative.  Overall, by 2018 ASCO/CAP guidelines, this tumor is HER2 negative.      Mily now comes to clinic with her , Neri, and her son, Clay, for recommendations on how to proceed.  I did discuss the I-SPY 2 clinical trial as a potential option.  This discussion is detailed below.      Overall, Mily has been doing quite well.  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  She is able to perform all of her household activities and housework.  Overall, she has an ECOG 0 performance status.  She does complain of stabbing pain in the upper outer quadrant of the right breast which continues and it happens several times a day.  She rates the pain as a 2/10.  She has no fatigue, depression treated with Zoloft and no anxiety, although she did have a panic attack the other day.      She has no weight loss.  Diet has not changed.  No loss of energy.  She does not sleep during the day.      She has a mass in the right breast, which was self-palpated.  No masses in the left breast and has noted no nipple discharge, skin changes, breast or nipple redness.  She has had some increase in right breast size.  No pain and no changes in the nipple, and no dimpling of the breast.      REVIEW OF SYSTEMS:  She has no fever, headaches, cough, chest pain, shortness of breath, hemoptysis, loss of appetite, nausea, vomiting, abdominal pain, constipation, diarrhea, bone pain, back pain, muscle or joint complaints, numbness or tingling in the hands and feet or hearing loss.  She does have depression treated with Zoloft. The remainder of a 12 point review of systems was negative.     PAST MEDICAL HISTORY:  In terms of her breast history, she does have a history of a smaller right breast which was treated with implants 19 years ago.  She has a history of 2 papillomas in the right breast, 1 removed at the 6-o'clock position 5 years ago, another removed at the  6-o'clock position approximately 10 years ago.  These incisions are approximately at the 5:30 to 6-o'clock position.  She has no history of radiation therapy.  No history of breast cancer of any type.  No history of tumor of any kind.  No history of heart problems, heart attack, breathing problems, blood clots, seizures, stroke, arthritis, peptic ulcer disease or osteoporosis.  No history of bone fractures.  She is not currently participating in a clinical trial. She does have a history of asthma and a history of hypothyroidism.      The remainder of a 10-point review of systems is negative.      FAMILY HISTORY:  Entirely negative for breast cancer or ovarian cancer or breast cancer in a male relative.      ALLERGIES:  No known drug allergies.  No allergy to seafood, iodine or contrast dye.  She does not take aspirin.      PAST MENSTRUAL HISTORY:  First period was at age 11.  First and only pregnancy was at age 26.  No miscarriages or abortions.  Occasional use of oral contraceptives in her 20s.  No history of hormone replacement therapy.  Last period was 3 years ago.      SOCIAL HISTORY:  Tobacco history was from age 15 to present, smoking a pack of cigarettes a week.  She is now trying to stop cigarettes.      In terms of alcohol use, in her early teens and early 20s, she drank approximately 10 drinks on the weekend and has tapered off drinking since then.      PHYSICAL EXAMINATION:   VITAL SIGNS:  Blood pressure 149/90 which is higher than usual for her.  Pulse 92, respirations 14, temperature 97.1, O2 sat 98% on room air, weight 67.4 kg and height 1.6 m.  Pain score of 3.   GENERAL:  Mily appeared generally well.  She has no alopecia.   HEENT:  Oropharynx is without lesions.   LYMPH:  There is no palpable cervical, supraclavicular, subclavicular or axillary lymphadenopathy.   BREASTS:  Examination of right breast reveals overlying freckles, as are all over her chest.  She has a 7 x 7 cm mass at the 11-o'clock  position adjacent to the nipple-areolar complex.  There are no other masses in the right breast.  No masses in the left breast.   LUNGS:  Clear to percussion and auscultation.   HEART:  There is a regular rate and rhythm, S1, S2.   ABDOMEN:  Soft, nontender without hepatosplenomegaly.   EXTREMITIES:  Without edema.   PSYCHIATRIC:  Mood and affect were normal.   NEUROLOGIC:  Cranial nerves II-XII were grossly intact.  Motor strength was 5/5 in the upper and lower extremities bilaterally.  Deep tendon reflexes were 2+ in the upper and lower extremities bilaterally, and toes were downgoing bilaterally.  Finger-to-nose was intact bilaterally.      LABORATORY DATA:  Pending.      ASSESSMENT AND PLAN:     1.  Mily Lei is a 53-year-old woman with a new diagnosis of a clinical stage II, T3N0MX, invasive ductal carcinoma of the upper outer quadrant of the right breast, which is multifocal, 7.2 cm in largest dimension.  The mass is easily palpable.  The tumor is estrogen receptor positive in 95% of the cells, progesterone receptor positive in 15% of the cells, HER2 nonamplified, grade 2 and multifocal.  Mily comes to clinic now for recommendations regarding treatment options.  I discussed the I-SPY 2 clinical trial as a potential option and discussed the strengths and potential benefits of the clinical trial.  Mily would like to be considered for the trial and was given the consent form.  I also discussed with her that off-trial treatment would also be very reasonable and that based on the final analysis of MRI imaging and MammaPrint we would make a recommendation of either neoadjuvant chemotherapy or potentially off-trial neoadjuvant hormonal therapy versus surgery followed by adjuvant therapy.  Nonetheless, the 7 cm tumor size estimated from the contrast mammogram would make it clinical high risk and only 58 patients on MINDACT had clinical high risk and genomic low risk tumors.  Given that patients with tumors >5  cm were only 3.7% of the patients on the trial, it is not conclusive that the MammaPrint is a definitive arbiter of treatment decisions for patients with large T3  tumors.  Nonetheless if the MammaPrint is low risk neoadjuvant hormonal therapy could be considered, but I would still favor neoadjuvant chemotherapy for a large T3 invasive ductal cancer.  Mily would like to pursue the potential option of the I-SPY 2 clinical trial.  I have informed Lorne Geller, our clinical trials nurse, and he will be in touch with Mily tomorrow to discuss the trial.   2.  Discussion of I-SPY 2 clinical trial.  I discussed that her tumor is ER positive and HER2 negative.  I discussed that a MammaPrint test involving 70 genes would be sent to test whether her breast cancer is high risk or low risk.  I discussed that this will take 10 business days.  If the breast cancer comes back as low risk, she would not be a candidate for the I-SPY 2 clinical trial.  If it comes back high risk, she would be.   3.  I discussed on the I-SPY 2 clinical trial the platform consists of weekly paclitaxel 80 mg/m2 given for 12 weeks followed by dose-dense AC with Adriamycin 60 mg/m2 and cyclophosphamide 600 mg/m2 given on day 1 and Neulasta on day 2 to maintain blood counts.  The dose-dense AC is given every 2 weeks for a total of 4 cycles.  The total time for neoadjuvant chemotherapy would be 20 weeks.  Then surgery would be performed, and we would determine the response to treatment.  I discussed that knowing the response to treatment could be very helpful in terms of determining future treatment.  For example, there are clinical trials for ER-positive, HER2-negative breast cancer following neoadjuvant therapy which exhibits significant residual disease.  I discussed that in the context of the I-SPY 2 clinical trial there would also be an experimental agent offered in combination with the Taxol or she could be on a control arm.  I discussed that we  are currently beginning amendment 20 and that Lorne would go over the options on amendment 20 with her.  One of the potential options is pembrolizumab for 8 cycles.   4.  I discussed that she will need a port for treatment on the I-SPY 2 clinical trial.   5.  I discussed that there would be hair loss with the paclitaxel and that her hair would regrow after treatment is completed.   6.  I discussed the risks of the neoadjuvant therapy include the 0.5 or less percent risk of acute leukemia.  Another potential risk would be a 3% risk of congestive heart failure.  I discussed that her borderline hypertension as well as prior tobacco history are risk factors and that I strongly recommend tapering off smoking the cigarettes as soon as possible.  I discussed another potential risk is febrile neutropenia, and she would need to go to the emergency room within 1 hour if she has a fever.  Full chemotherapy teaching will be performed once we know the status of the MammaPrint result.   7.  I discussed in terms of hormonal therapy, hormonal therapy would be given for a total of 10 years.  Hormonal therapy could consist of use of an aromatase inhibitor.  Associated with the aromatase inhibitor, there could be a loss in bone density as well as joint stiffness.   8.  Discussion of diet and exercise.  We recommend a diet low in saturated fat but not low in fat, and I recommend 150 minutes of exercise per week.   9.  Bone health. Calcium and vitamin D3 are recommended.   10.  Check of menopausal status.  FSH and LH and E2 will be checked before beginning hormonal therapy if neoadjuvant.   11.  Follow up.  Mily was given the consent form and will meet with Lorne Leos in the next day or so.      Thank you, Ms. Regla, for referring Mily to our clinic and allowing us to participate in her care.  She will meet with Lorne within the next 1-2 days and will plan on signing the consent form and participating in the I-SPY 2  clinical trial.      Thank you for referring Mily to our clinic and allowing us to participate in her care.      Sincerely,       Christian Guzman MD        [facility]     ADDENDUM:  Further information was obtained about the Deepthi kim on 1-28-19.  From the [place] chart, Deepthi kim dollar sign shape September 14, 1978.  Placed at [facility].  Photocopy of surgeon's note page from the chart but no model number or further information.         I spent 90 minutes with the patient more than 50% of which was in counseling and coordination of care.                                                   Again, thank you for allowing me to participate in the care of your patient.      Sincerely,    Christian Guzman MD

## 2019-01-23 ENCOUNTER — TRANSFERRED RECORDS (OUTPATIENT)
Dept: HEALTH INFORMATION MANAGEMENT | Facility: CLINIC | Age: 54
End: 2019-01-23

## 2019-01-23 PROCEDURE — 00000346 ZZHCL STATISTIC REVIEW OUTSIDE SLIDES TC 88321: Performed by: INTERNAL MEDICINE

## 2019-01-23 NOTE — PROGRESS NOTES
2019      Rachel Arauz NP   [facility]    [address], [place] [address]      RE: Kathy Lei   MRN: [id number]   : 1965      Dear Ms. Arauz:   Thank you for referring Kathy Lei to our clinic for a new diagnosis of an estrogen-receptor positive, MS-positive, HER2-negative breast cancer, grade 2, in the upper outer quadrant of the right breast.      Mily presented between  and New Years of 2018 with new sharp pains in the upper outer quadrant of the right breast.  She underwent mammographic screening.      IMAGING:  Mammography and ultrasound:  She had a diagnostic mammogram which showed bilateral prepectoral saline implants.  On the right breast at 11:30 position, there were grouped coarse heterogeneous calcifications spanning 1.3 cm, new since , adjacent calcifications 11-o'clock position posterior depth.  There was an irregular mass in the lateral right breast 9-o'clock position mid-posterior depth, and an additional mass with obscured margins.  No significant changes in the left breast.  Right breast ultrasound was performed.  In the area of the palpable lump in the right breast, 11-o'clock position, 6 cm from the nipple, there is an irregular hypoechoic mass with indistinct margins measuring approximately 1.0 x 0.9 x 1.6 cm in size.  Immediately adjacent to the mass, 11:30 position, are microcalcifications.  In the second area of palpable lump right breast, 9:30 position, 5 cm from the nipple, there was an irregular hypoechoic mass measuring 3.2 x 0.5 x 1.3 cm in size felt to account for the mammographic findings.  There were multiple additional round hypoechoic masses similar to the findings at 9:30 position seen incidentally during real time and not directly palpable.  For example, in the right breast at 8-o'clock position, 4 cm from the nipple, there was a mass measuring 0.8 x 0.6 x 0.6 cm, BI-RADS 4.      Contrast  mammogram was subsequently performed and the contrast mammogram showed a large area of mass and non-mass-like enhancement in the upper outer right breast measuring 7.2 cm in greatest dimension, corresponding global asymmetry in the upper outer right breast.  Enhancement extended to the implant without evidence of extension to the skin surface or nipple, and the calcifications were noted as previously found.      PATHOLOGY:  The pathology showed part A, breast needle biopsy 11 o'clock, 6 cm from the nipple, invasive mammary carcinoma, no special type, ductal (and mucinous) Donna grade 2.  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  Calcifications were associated with the DCIS.  There was a focus suspicious for lymphovascular invasion.  Invasive carcinoma was estrogen receptor positive and progesterone receptor negative by immunohistochemistry.  In part B, breast right, 8 o'clock, 4 cm from the nipple, ultrasound-guided core biopsy, invasive mammary carcinoma, no special type, ductal (and mucinous) Clarence grade 2, occasional calcifications were noted.  Invasive carcinoma was estrogen receptor positive and progesterone receptor negative by immunohistochemistry.  On quantitation of the ER and NV, ER was positive 99% of the cells staining with strong intensity.  NV was subsequently noted to be positive with 15% of the cells staining with moderate intensity.      There was also a HER2 FISH performed, and the HER2 FISH showed average number of HER2 signals per nucleus 2.6.  Average number of CEN17 signals 1.7 with a ratio of 1.6, VASILIY negative for biopsy A.  For biopsy B, the HER2 signals per nucleus 2.9.  Average number CEN17 signals per nucleus 1.7 with a ratio of 1.7, VASILIY negative.  Overall, by 2018 ASCO/CAP guidelines, this tumor is HER2 negative.      Mily now comes to clinic with her , Neri, and her son, Clay, for recommendations on how to proceed.  I did  discuss the I-SPY 2 clinical trial as a potential option.  This discussion is detailed below.      Overall, Mily has been doing quite well.  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  She is able to perform all of her household activities and housework.  Overall, she has an ECOG 0 performance status.  She does complain of stabbing pain in the upper outer quadrant of the right breast which continues and it happens several times a day.  She rates the pain as a 2/10.  She has no fatigue, depression treated with Zoloft and no anxiety, although she did have a panic attack the other day.      She has no weight loss.  Diet has not changed.  No loss of energy.  She does not sleep during the day.      She has a mass in the right breast, which was self-palpated.  No masses in the left breast and has noted no nipple discharge, skin changes, breast or nipple redness.  She has had some increase in right breast size.  No pain and no changes in the nipple, and no dimpling of the breast.      REVIEW OF SYSTEMS:  She has no fever, headaches, cough, chest pain, shortness of breath, hemoptysis, loss of appetite, nausea, vomiting, abdominal pain, constipation, diarrhea, bone pain, back pain, muscle or joint complaints, numbness or tingling in the hands and feet or hearing loss.  She does have depression treated with Zoloft. The remainder of a 12 point review of systems was negative.     PAST MEDICAL HISTORY:  In terms of her breast history, she does have a history of a smaller right breast which was treated with implants 19 years ago.  She has a history of 2 papillomas in the right breast, 1 removed at the 6-o'clock position 5 years ago, another removed at the 6-o'clock position approximately 10 years ago.  These incisions are approximately at the 5:30 to 6-o'clock position.  She has no history of radiation therapy.  No history of breast cancer of any type.  No history of tumor of any kind.  No history of heart  problems, heart attack, breathing problems, blood clots, seizures, stroke, arthritis, peptic ulcer disease or osteoporosis.  No history of bone fractures.  She is not currently participating in a clinical trial. She does have a history of asthma and a history of hypothyroidism.      The remainder of a 10-point review of systems is negative.      FAMILY HISTORY:  Entirely negative for breast cancer or ovarian cancer or breast cancer in a male relative.      ALLERGIES:  No known drug allergies.  No allergy to seafood, iodine or contrast dye.  She does not take aspirin.      PAST MENSTRUAL HISTORY:  First period was at age 11.  First and only pregnancy was at age 26.  No miscarriages or abortions.  Occasional use of oral contraceptives in her 20s.  No history of hormone replacement therapy.  Last period was 3 years ago.      SOCIAL HISTORY:  Tobacco history was from age 15 to present, smoking a pack of cigarettes a week.  She is now trying to stop cigarettes.      In terms of alcohol use, in her early teens and early 20s, she drank approximately 10 drinks on the weekend and has tapered off drinking since then.      PHYSICAL EXAMINATION:   VITAL SIGNS:  Blood pressure 149/90 which is higher than usual for her.  Pulse 92, respirations 14, temperature 97.1, O2 sat 98% on room air, weight 67.4 kg and height 1.6 m.  Pain score of 3.   GENERAL:  Mily appeared generally well.  She has no alopecia.   HEENT:  Oropharynx is without lesions.   LYMPH:  There is no palpable cervical, supraclavicular, subclavicular or axillary lymphadenopathy.   BREASTS:  Examination of right breast reveals overlying freckles, as are all over her chest.  She has a 7 x 7 cm mass at the 11-o'clock position adjacent to the nipple-areolar complex.  There are no other masses in the right breast.  No masses in the left breast.   LUNGS:  Clear to percussion and auscultation.   HEART:  There is a regular rate and rhythm, S1, S2.   ABDOMEN:  Soft, nontender  without hepatosplenomegaly.   EXTREMITIES:  Without edema.   PSYCHIATRIC:  Mood and affect were normal.   NEUROLOGIC:  Cranial nerves II-XII were grossly intact.  Motor strength was 5/5 in the upper and lower extremities bilaterally.  Deep tendon reflexes were 2+ in the upper and lower extremities bilaterally, and toes were downgoing bilaterally.  Finger-to-nose was intact bilaterally.      LABORATORY DATA:  Pending.      ASSESSMENT AND PLAN:     1.  Mily Lei is a 53-year-old woman with a new diagnosis of a clinical stage II, T3N0MX, invasive ductal carcinoma of the upper outer quadrant of the right breast, which is multifocal, 7.2 cm in largest dimension.  The mass is easily palpable.  The tumor is estrogen receptor positive in 95% of the cells, progesterone receptor positive in 15% of the cells, HER2 nonamplified, grade 2 and multifocal.  Mily comes to clinic now for recommendations regarding treatment options.  I discussed the I-SPY 2 clinical trial as a potential option and discussed the strengths and potential benefits of the clinical trial.  Mily would like to be considered for the trial and was given the consent form.  I also discussed with her that off-trial treatment would also be very reasonable and that based on the final analysis of MRI imaging and MammaPrint we would make a recommendation of either neoadjuvant chemotherapy or potentially off-trial neoadjuvant hormonal therapy versus surgery followed by adjuvant therapy.  Nonetheless, the 7 cm tumor size estimated from the contrast mammogram would make it clinical high risk and only 58 patients on MINDACT had clinical high risk and genomic low risk tumors.  Given that patients with tumors >5 cm were only 3.7% of the patients on the trial, it is not conclusive that the MammaPrint is a definitive arbiter of treatment decisions for patients with large T3  tumors.  Nonetheless if the MammaPrint is low risk neoadjuvant hormonal therapy could be  considered, but I would still favor neoadjuvant chemotherapy for a large T3 invasive ductal cancer.  Mily would like to pursue the potential option of the I-SPY 2 clinical trial.  I have informed Lorne Geller, our clinical trials nurse, and he will be in touch with Mily tomorrow to discuss the trial.   2.  Discussion of I-SPY 2 clinical trial.  I discussed that her tumor is ER positive and HER2 negative.  I discussed that a MammaPrint test involving 70 genes would be sent to test whether her breast cancer is high risk or low risk.  I discussed that this will take 10 business days.  If the breast cancer comes back as low risk, she would not be a candidate for the I-SPY 2 clinical trial.  If it comes back high risk, she would be.   3.  I discussed on the I-SPY 2 clinical trial the platform consists of weekly paclitaxel 80 mg/m2 given for 12 weeks followed by dose-dense AC with Adriamycin 60 mg/m2 and cyclophosphamide 600 mg/m2 given on day 1 and Neulasta on day 2 to maintain blood counts.  The dose-dense AC is given every 2 weeks for a total of 4 cycles.  The total time for neoadjuvant chemotherapy would be 20 weeks.  Then surgery would be performed, and we would determine the response to treatment.  I discussed that knowing the response to treatment could be very helpful in terms of determining future treatment.  For example, there are clinical trials for ER-positive, HER2-negative breast cancer following neoadjuvant therapy which exhibits significant residual disease.  I discussed that in the context of the I-SPY 2 clinical trial there would also be an experimental agent offered in combination with the Taxol or she could be on a control arm.  I discussed that we are currently beginning amendment 20 and that Lorne would go over the options on amendment 20 with her.  One of the potential options is pembrolizumab for 8 cycles.   4.  I discussed that she will need a port for treatment on the I-SPY 2 clinical trial.    5.  I discussed that there would be hair loss with the paclitaxel and that her hair would regrow after treatment is completed.   6.  I discussed the risks of the neoadjuvant therapy include the 0.5 or less percent risk of acute leukemia.  Another potential risk would be a 3% risk of congestive heart failure.  I discussed that her borderline hypertension as well as prior tobacco history are risk factors and that I strongly recommend tapering off smoking the cigarettes as soon as possible.  I discussed another potential risk is febrile neutropenia, and she would need to go to the emergency room within 1 hour if she has a fever.  Full chemotherapy teaching will be performed once we know the status of the MammaPrint result.   7.  I discussed in terms of hormonal therapy, hormonal therapy would be given for a total of 10 years.  Hormonal therapy could consist of use of an aromatase inhibitor.  Associated with the aromatase inhibitor, there could be a loss in bone density as well as joint stiffness.   8.  Discussion of diet and exercise.  We recommend a diet low in saturated fat but not low in fat, and I recommend 150 minutes of exercise per week.   9.  Bone health. Calcium and vitamin D3 are recommended.   10.  Check of menopausal status.  FSH and LH and E2 will be checked before beginning hormonal therapy if neoadjuvant.   11.  Follow up.  Mily was given the consent form and will meet with Lorne Leos in the next day or so.      Thank you, Ms. Arauz, for referring Mily to our clinic and allowing us to participate in her care.  She will meet with Lorne within the next 1-2 days and will plan on signing the consent form and participating in the I-SPY 2 clinical trial.      Thank you for referring Mily to our clinic and allowing us to participate in her care.      Sincerely,       Christian Guzman MD        [facility]     ADDENDUM:  Further information was  obtained about the Deepthi kim on 1-28-19.  From the [place] chart, Deepthi alvarez dollar sign shape September 14, 1978.  Placed at [facility].  Photocopy of surgeon's note page from the chart but no model number or further information.         I spent 90 minutes with the patient more than 50% of which was in counseling and coordination of care.     ADDENDUM2:  Echocardiogram:  EF 50-55% with some concentric hypertrophy.   I did call her on 2-7 to discuss a cardiology consult.  Started on amlodipine today by PCP but could consider carvedilol instead.   Checking with Dr. Mckeon about microscopic hematuria.   She was able to tolerate the breast MRI. Results below.  CT CAP showed no distant metastatic disease discussed with her on 2-5-19.      Exam: Breast MRI, with and without Contrast, and with color encoding      History/Indication: Right breast cancer. ISPY 2 trial.     Comparison: 1/14/2019     Technique: Precontrast gradient recall axial nonfat sat T1.Precontrast  gradient recall axial fat-sat T1. Precontrast STIR axial fat  sat.Postcontrast gradient recall axial fat-sat T1 with dynamic  postcontrast acquisitions at multiple time points with subtractions.  Delayed high-resolution gradient recall sagittal fat-sat T1. MIP of  subtractions, axial coronal and sagittal. Color encoding of post  contrast dynamic acquisitions. IV contrast: 7.5mL Gadavist     Breast composition: Heterogeneous fibroglandular tissue     Background parenchymal enhancement 1st post C image: Minimal     Findings:      Bilateral saline implants. They appear at least partially subpectoral  in location superior laterally although there is no discernible  thickness of pectoralis muscle over the implants more anteriorly.     Heterogeneous clustered ring enhancement which is multifocal  anteriorly and confluent posteriorly in the upper outer and lower  outer right breast measures up to 8.9 cm in greatest dimension  measured on the  axial MIP images. There is closely contiguous or  superficial involvement of the posterior areola. Neoplasm posteriorly  tracks along the anterior implant scar capsule.      No suspicious enhancement in the left breast. No internal mammary  chain or axillary lymphadenopathy.                                                                       Impression: BI-RADS CATEGORY: 6 - Known Biopsy-Proven  Malignancy-Appropriate Action Should Be Taken.     Recommendation: Continued oncologic and surgical follow-up.     I have personally reviewed the examination and initial interpretation  and I agree with the findings.     JESSY MONREAL MD

## 2019-01-24 ENCOUNTER — CARE COORDINATION (OUTPATIENT)
Dept: ONCOLOGY | Facility: CLINIC | Age: 54
End: 2019-01-24

## 2019-01-24 ENCOUNTER — APPOINTMENT (OUTPATIENT)
Dept: ONCOLOGY | Facility: CLINIC | Age: 54
End: 2019-01-24
Attending: INTERNAL MEDICINE
Payer: COMMERCIAL

## 2019-01-24 ENCOUNTER — RESEARCH ENCOUNTER (OUTPATIENT)
Dept: ONCOLOGY | Facility: CLINIC | Age: 54
End: 2019-01-24

## 2019-01-24 PROCEDURE — 40000114 ZZH STATISTIC NO CHARGE CLINIC VISIT

## 2019-01-24 NOTE — NURSING NOTE
[id number]: Informed Consent Note     The consent form, including purpose, risks and benefits, was reviewed with Kathy Lei, and all questions were answered before she signed the consent form. The patient understands that the study involves an active treatment phase as well as a post-treatment follow up phase.     Present during the discussion was Mily and  Vincenzo. A copy of the signed form was provided to the patient. No procedures specific to this study were performed prior to the patient signing the consent form.    Consent Version Date: 10/31/2018  Consent obtained by: Lorne Leos    Date: 1/24/2019  HIPAA authorization signed?: yes, x4.  HIPAA authorization version date: 1/24/2012  Lorne Leos RN     Pager: [phone]      Form 503.03.01 (Version 2)     Effective date: 01AUG2018     Next Review Date: 01AUG2020

## 2019-01-24 NOTE — PROGRESS NOTES
Met with Mily and  Neri today and gave them writers card to contact the Nurse Triage with issues and not to send MyChart messages with symptom management. Gave brochures for [employer], [employer], [employer] and [employer]. Answered questions regarding chemotherapy and hair loss and being to work. Will send a referral to M[facility] to be in contact with her and her .  Answered all patient and 's questions and verbalized understanding. Mily Mortensen RN, BSN. .

## 2019-01-28 PROBLEM — C50.411 MALIGNANT NEOPLASM OF UPPER-OUTER QUADRANT OF RIGHT BREAST IN FEMALE, ESTROGEN RECEPTOR POSITIVE (H): Status: ACTIVE | Noted: 2019-01-28

## 2019-01-28 PROBLEM — Z17.0 MALIGNANT NEOPLASM OF UPPER-OUTER QUADRANT OF RIGHT BREAST IN FEMALE, ESTROGEN RECEPTOR POSITIVE (H): Status: ACTIVE | Noted: 2019-01-28

## 2019-01-30 ENCOUNTER — CARE COORDINATION (OUTPATIENT)
Dept: ONCOLOGY | Facility: CLINIC | Age: 54
End: 2019-01-30

## 2019-01-30 NOTE — PROGRESS NOTES
Returned call to patient with questions of scheduling the MRI. Contacted Lorne Geller by pager and left a VM questioning when MRI to be scheduled for the ISPY study. Message left included telephone number to contact patient when MRI is scheduled with patient having 2/4/19 off from her employment and would prefer that day.  Answered all patient's questions and verbalized understanding. Mily Mortensen RN, BSN.

## 2019-01-31 ENCOUNTER — E-VISIT (OUTPATIENT)
Dept: FAMILY MEDICINE | Facility: CLINIC | Age: 54
End: 2019-01-31
Payer: COMMERCIAL

## 2019-01-31 DIAGNOSIS — Z17.0 MALIGNANT NEOPLASM OF UPPER-OUTER QUADRANT OF RIGHT BREAST IN FEMALE, ESTROGEN RECEPTOR POSITIVE (H): Primary | ICD-10-CM

## 2019-01-31 DIAGNOSIS — C50.411 MALIGNANT NEOPLASM OF UPPER-OUTER QUADRANT OF RIGHT BREAST IN FEMALE, ESTROGEN RECEPTOR POSITIVE (H): Primary | ICD-10-CM

## 2019-01-31 DIAGNOSIS — J45.41 MODERATE PERSISTENT ASTHMA WITH (ACUTE) EXACERBATION: ICD-10-CM

## 2019-01-31 PROCEDURE — 99444 ZZC PHYSICIAN ONLINE EVALUATION & MANAGEMENT SERVICE: CPT | Performed by: NURSE PRACTITIONER

## 2019-01-31 RX ORDER — PREDNISONE 20 MG/1
TABLET ORAL
Qty: 18 TABLET | Refills: 0 | Status: SHIPPED | OUTPATIENT
Start: 2019-01-31 | End: 2019-02-25

## 2019-02-04 ENCOUNTER — ANCILLARY PROCEDURE (OUTPATIENT)
Dept: MRI IMAGING | Facility: CLINIC | Age: 54
End: 2019-02-04
Payer: COMMERCIAL

## 2019-02-04 ENCOUNTER — ANCILLARY PROCEDURE (OUTPATIENT)
Dept: MAMMOGRAPHY | Facility: CLINIC | Age: 54
End: 2019-02-04
Payer: COMMERCIAL

## 2019-02-04 DIAGNOSIS — Z00.6 EXAMINATION OF PARTICIPANT OR CONTROL IN CLINICAL RESEARCH: Primary | ICD-10-CM

## 2019-02-04 RX ORDER — GADOBUTROL 604.72 MG/ML
7.5 INJECTION INTRAVENOUS ONCE
Status: COMPLETED | OUTPATIENT
Start: 2019-02-04 | End: 2019-02-04

## 2019-02-04 RX ORDER — LIDOCAINE HYDROCHLORIDE 10 MG/ML
10 INJECTION, SOLUTION EPIDURAL; INFILTRATION; INTRACAUDAL; PERINEURAL ONCE
Status: COMPLETED | OUTPATIENT
Start: 2019-02-04 | End: 2019-02-04

## 2019-02-04 RX ADMIN — GADOBUTROL 7.5 ML: 604.72 INJECTION INTRAVENOUS at 10:49

## 2019-02-04 RX ADMIN — LIDOCAINE HYDROCHLORIDE 10 ML: 10 INJECTION, SOLUTION EPIDURAL; INFILTRATION; INTRACAUDAL; PERINEURAL at 11:34

## 2019-02-04 NOTE — DISCHARGE INSTRUCTIONS
MRI Contrast Discharge Instructions    The IV contrast you received today will pass out of your body in your  urine. This will happen in the next 24 hours. You will not feel this process.  Your urine will not change color.    Drink at least 4 extra glasses of water or juice today (unless your doctor  has restricted your fluids). This reduces the stress on your kidneys.  You may take your regular medicines.    If you are on dialysis: It is best to have dialysis today.    If you have a reaction: Most reactions happen right away. If you have  any new symptoms after leaving the hospital (such as hives or swelling),  call your hospital at the correct number below. Or call your family doctor.  If you have breathing distress or wheezing, call 911.    Special instructions: ***    I have read and understand the above information.    Signature:______________________________________ Date:___________    Staff:__________________________________________ Date:___________     Time:__________    [place] [facility] Departments:    ___[place]: [phone]  ___[facility]: [phone]  ___[place]: [phone] ___[facility]: [phone]  ___[facility]: [phone]  ___[facility]: [phone]  ___[phone]  ___[facility]: [phone]  ___[phone]

## 2019-02-05 ENCOUNTER — APPOINTMENT (OUTPATIENT)
Dept: LAB | Facility: CLINIC | Age: 54
End: 2019-02-05
Attending: INTERNAL MEDICINE
Payer: COMMERCIAL

## 2019-02-05 ENCOUNTER — MYC MEDICAL ADVICE (OUTPATIENT)
Dept: FAMILY MEDICINE | Facility: CLINIC | Age: 54
End: 2019-02-05

## 2019-02-05 ENCOUNTER — TELEPHONE (OUTPATIENT)
Dept: ONCOLOGY | Facility: CLINIC | Age: 54
End: 2019-02-05

## 2019-02-05 ENCOUNTER — ANCILLARY PROCEDURE (OUTPATIENT)
Dept: CT IMAGING | Facility: CLINIC | Age: 54
End: 2019-02-05
Payer: COMMERCIAL

## 2019-02-05 ENCOUNTER — ALLIED HEALTH/NURSE VISIT (OUTPATIENT)
Dept: ONCOLOGY | Facility: CLINIC | Age: 54
End: 2019-02-05
Attending: INTERNAL MEDICINE
Payer: COMMERCIAL

## 2019-02-05 ENCOUNTER — ANCILLARY PROCEDURE (OUTPATIENT)
Dept: CARDIOLOGY | Facility: CLINIC | Age: 54
End: 2019-02-05
Payer: COMMERCIAL

## 2019-02-05 VITALS
OXYGEN SATURATION: 97 % | SYSTOLIC BLOOD PRESSURE: 145 MMHG | DIASTOLIC BLOOD PRESSURE: 91 MMHG | WEIGHT: 152.6 LBS | BODY MASS INDEX: 25.58 KG/M2 | HEART RATE: 86 BPM | TEMPERATURE: 99.5 F | RESPIRATION RATE: 16 BRPM

## 2019-02-05 DIAGNOSIS — Z17.0 MALIGNANT NEOPLASM OF UPPER-OUTER QUADRANT OF RIGHT BREAST IN FEMALE, ESTROGEN RECEPTOR POSITIVE (H): ICD-10-CM

## 2019-02-05 DIAGNOSIS — C50.411 MALIGNANT NEOPLASM OF UPPER-OUTER QUADRANT OF RIGHT BREAST IN FEMALE, ESTROGEN RECEPTOR POSITIVE (H): ICD-10-CM

## 2019-02-05 LAB
ALBUMIN SERPL-MCNC: 3.5 G/DL (ref 3.4–5)
ALBUMIN UR-MCNC: NEGATIVE MG/DL
ALP SERPL-CCNC: 46 U/L (ref 40–150)
ALT SERPL W P-5'-P-CCNC: 18 U/L (ref 0–50)
ANION GAP SERPL CALCULATED.3IONS-SCNC: 6 MMOL/L (ref 3–14)
APPEARANCE UR: CLEAR
APTT PPP: 26 SEC (ref 22–37)
AST SERPL W P-5'-P-CCNC: 9 U/L (ref 0–45)
BASOPHILS # BLD AUTO: 0 10E9/L (ref 0–0.2)
BASOPHILS NFR BLD AUTO: 0.2 %
BILIRUB SERPL-MCNC: 0.2 MG/DL (ref 0.2–1.3)
BILIRUB UR QL STRIP: NEGATIVE
BUN SERPL-MCNC: 16 MG/DL (ref 7–30)
CALCIUM SERPL-MCNC: 8.4 MG/DL (ref 8.5–10.1)
CHLORIDE SERPL-SCNC: 105 MMOL/L (ref 94–109)
CO2 SERPL-SCNC: 27 MMOL/L (ref 20–32)
COLOR UR AUTO: COLORLESS
CREAT SERPL-MCNC: 0.6 MG/DL (ref 0.52–1.04)
DIFFERENTIAL METHOD BLD: ABNORMAL
EOSINOPHIL # BLD AUTO: 0 10E9/L (ref 0–0.7)
EOSINOPHIL NFR BLD AUTO: 0.2 %
ERYTHROCYTE [DISTWIDTH] IN BLOOD BY AUTOMATED COUNT: 12.7 % (ref 10–15)
FIBRINOGEN PPP-MCNC: 264 MG/DL (ref 200–420)
GFR SERPL CREATININE-BSD FRML MDRD: >90 ML/MIN/{1.73_M2}
GLUCOSE SERPL-MCNC: 86 MG/DL (ref 70–99)
GLUCOSE UR STRIP-MCNC: NEGATIVE MG/DL
HBA1C MFR BLD: 5.6 % (ref 0–5.6)
HCT VFR BLD AUTO: 40.7 % (ref 35–47)
HGB BLD-MCNC: 13.1 G/DL (ref 11.7–15.7)
HGB UR QL STRIP: ABNORMAL
IMM GRANULOCYTES # BLD: 0.1 10E9/L (ref 0–0.4)
IMM GRANULOCYTES NFR BLD: 0.7 %
INR PPP: 1.02 (ref 0.86–1.14)
KETONES UR STRIP-MCNC: NEGATIVE MG/DL
LEUKOCYTE ESTERASE UR QL STRIP: NEGATIVE
LYMPHOCYTES # BLD AUTO: 2.4 10E9/L (ref 0.8–5.3)
LYMPHOCYTES NFR BLD AUTO: 19.5 %
MAGNESIUM SERPL-MCNC: 2 MG/DL (ref 1.6–2.3)
MCH RBC QN AUTO: 28.2 PG (ref 26.5–33)
MCHC RBC AUTO-ENTMCNC: 32.2 G/DL (ref 31.5–36.5)
MCV RBC AUTO: 88 FL (ref 78–100)
MONOCYTES # BLD AUTO: 0.9 10E9/L (ref 0–1.3)
MONOCYTES NFR BLD AUTO: 7.6 %
NEUTROPHILS # BLD AUTO: 8.7 10E9/L (ref 1.6–8.3)
NEUTROPHILS NFR BLD AUTO: 71.8 %
NITRATE UR QL: NEGATIVE
NRBC # BLD AUTO: 0 10*3/UL
NRBC BLD AUTO-RTO: 0 /100
PH UR STRIP: 6 PH (ref 5–7)
PLATELET # BLD AUTO: 314 10E9/L (ref 150–450)
POTASSIUM SERPL-SCNC: 3.4 MMOL/L (ref 3.4–5.3)
PROT SERPL-MCNC: 6.8 G/DL (ref 6.8–8.8)
RBC # BLD AUTO: 4.64 10E12/L (ref 3.8–5.2)
RBC #/AREA URNS AUTO: <1 /HPF (ref 0–2)
SODIUM SERPL-SCNC: 138 MMOL/L (ref 133–144)
SOURCE: ABNORMAL
SP GR UR STRIP: 1.02 (ref 1–1.03)
TSH SERPL DL<=0.005 MIU/L-ACNC: 1.27 MU/L (ref 0.4–4)
UROBILINOGEN UR STRIP-MCNC: 0 MG/DL (ref 0–2)
WBC # BLD AUTO: 12.2 10E9/L (ref 4–11)
WBC #/AREA URNS AUTO: <1 /HPF (ref 0–5)

## 2019-02-05 PROCEDURE — 83735 ASSAY OF MAGNESIUM: CPT | Performed by: INTERNAL MEDICINE

## 2019-02-05 PROCEDURE — 81001 URINALYSIS AUTO W/SCOPE: CPT | Performed by: INTERNAL MEDICINE

## 2019-02-05 PROCEDURE — 36592 COLLECT BLOOD FROM PICC: CPT

## 2019-02-05 PROCEDURE — 80053 COMPREHEN METABOLIC PANEL: CPT | Performed by: INTERNAL MEDICINE

## 2019-02-05 PROCEDURE — 93005 ELECTROCARDIOGRAM TRACING: CPT

## 2019-02-05 PROCEDURE — 84443 ASSAY THYROID STIM HORMONE: CPT | Performed by: INTERNAL MEDICINE

## 2019-02-05 PROCEDURE — 93010 ELECTROCARDIOGRAM REPORT: CPT | Performed by: INTERNAL MEDICINE

## 2019-02-05 PROCEDURE — 85610 PROTHROMBIN TIME: CPT | Performed by: INTERNAL MEDICINE

## 2019-02-05 PROCEDURE — 83036 HEMOGLOBIN GLYCOSYLATED A1C: CPT | Performed by: INTERNAL MEDICINE

## 2019-02-05 PROCEDURE — 85384 FIBRINOGEN ACTIVITY: CPT | Performed by: INTERNAL MEDICINE

## 2019-02-05 PROCEDURE — 85730 THROMBOPLASTIN TIME PARTIAL: CPT | Performed by: INTERNAL MEDICINE

## 2019-02-05 PROCEDURE — 85025 COMPLETE CBC W/AUTO DIFF WBC: CPT | Performed by: INTERNAL MEDICINE

## 2019-02-05 RX ORDER — IOPAMIDOL 755 MG/ML
91 INJECTION, SOLUTION INTRAVASCULAR ONCE
Status: COMPLETED | OUTPATIENT
Start: 2019-02-05 | End: 2019-02-05

## 2019-02-05 RX ADMIN — IOPAMIDOL 91 ML: 755 INJECTION, SOLUTION INTRAVASCULAR at 08:36

## 2019-02-05 ASSESSMENT — PAIN SCALES - GENERAL: PAINLEVEL: NO PAIN (0)

## 2019-02-05 NOTE — TELEPHONE ENCOUNTER
Rachel Arauz, NP     Please review my chart message - concerns for elevated blood pressure (appointment made by writer for tomorrow 2/6/19)     BP Readings from Last 6 Encounters:   02/05/19 (!) 145/91   01/22/19 147/90   01/03/19 128/84   07/16/18 129/84   01/19/17 132/87   12/07/15 135/85     Patient has been diagnosed with breast cancer and seems that every time she has her bp checked it is high   Family history of hypertension and lots of stress with other things going on     Bettie Summers Registered Nurse   [facility]

## 2019-02-05 NOTE — DISCHARGE INSTRUCTIONS

## 2019-02-05 NOTE — TELEPHONE ENCOUNTER
I called Mily and discussed the results of the CT CAP and the breast MRI.  She was able to tolerate the MRI with slight warming of the hardware.  A lower magnetic field strength was used.     Christian Guzman MD

## 2019-02-05 NOTE — NURSING NOTE
Chief Complaint   Patient presents with     Blood Draw     labs drawn from previously-placed piv by rn.  vs taken     Good blood return noted from previously-placed piv.  Labs drawn by rn and piv dc'd.  Pt tolerated well. VS taken and pt checked in for next appt.    Katy Huizar RN

## 2019-02-06 ENCOUNTER — OFFICE VISIT (OUTPATIENT)
Dept: FAMILY MEDICINE | Facility: CLINIC | Age: 54
End: 2019-02-06
Payer: COMMERCIAL

## 2019-02-06 VITALS
SYSTOLIC BLOOD PRESSURE: 130 MMHG | RESPIRATION RATE: 18 BRPM | WEIGHT: 151 LBS | HEART RATE: 99 BPM | DIASTOLIC BLOOD PRESSURE: 82 MMHG | TEMPERATURE: 97.9 F | BODY MASS INDEX: 25.31 KG/M2 | OXYGEN SATURATION: 95 %

## 2019-02-06 DIAGNOSIS — Z17.0 MALIGNANT NEOPLASM OF UPPER-OUTER QUADRANT OF RIGHT BREAST IN FEMALE, ESTROGEN RECEPTOR POSITIVE (H): ICD-10-CM

## 2019-02-06 DIAGNOSIS — J45.40 MODERATE PERSISTENT ASTHMA WITHOUT COMPLICATION: ICD-10-CM

## 2019-02-06 DIAGNOSIS — C50.411 MALIGNANT NEOPLASM OF UPPER-OUTER QUADRANT OF RIGHT BREAST IN FEMALE, ESTROGEN RECEPTOR POSITIVE (H): ICD-10-CM

## 2019-02-06 DIAGNOSIS — F41.9 ANXIETY: ICD-10-CM

## 2019-02-06 DIAGNOSIS — I10 BENIGN ESSENTIAL HYPERTENSION: Primary | ICD-10-CM

## 2019-02-06 PROCEDURE — 99214 OFFICE O/P EST MOD 30 MIN: CPT | Performed by: NURSE PRACTITIONER

## 2019-02-06 RX ORDER — AMLODIPINE BESYLATE 2.5 MG/1
2.5 TABLET ORAL DAILY
Qty: 90 TABLET | Refills: 3 | Status: SHIPPED | OUTPATIENT
Start: 2019-02-06 | End: 2019-02-25

## 2019-02-06 RX ORDER — FLUTICASONE PROPIONATE 110 UG/1
2 AEROSOL, METERED RESPIRATORY (INHALATION) 2 TIMES DAILY
Qty: 1 INHALER | Status: SHIPPED | OUTPATIENT
Start: 2019-02-06 | End: 2020-10-26

## 2019-02-06 RX ORDER — LORAZEPAM 0.5 MG/1
0.5 TABLET ORAL EVERY 8 HOURS PRN
Qty: 20 TABLET | Refills: 2 | Status: SHIPPED | OUTPATIENT
Start: 2019-02-06 | End: 2019-03-11

## 2019-02-06 NOTE — TELEPHONE ENCOUNTER
I called Mily and went over the results of the CT CAP and breast MRI which she tolerated well.     Christian Guzman MD

## 2019-02-06 NOTE — PROGRESS NOTES
SUBJECTIVE:   Kathy Lei is a 53 year old female who presents to clinic today for the following health issues:      Hypertension Follow-up    Has been being seen at the [facility] for breast cancer      Outpatient blood pressures are being checked at [facility].     Low Salt Diet: no added salt    She was recently diagnosed with breast cancer, awaiting further results before starting treatment in a trial.  Her blood pressure has been elevated on a few occasions recently and this concerns her.    She had not been using Flovent regularly due to cost and has had a couple of asthma exacerbations requiring Prednisone recently.     Zoloft is working well for her anxiety.  She infrequently needs to take Ativan.  She feels like she is dealing with her diagnosis well.         Problem list and histories reviewed & adjusted, as indicated.  Additional history: as documented        Reviewed and updated as needed this visit by clinical staff       Reviewed and updated as needed this visit by Provider         ROS:  CONSTITUTIONAL: NEGATIVE for fever, chills, change in weight  ENT/MOUTH: NEGATIVE for ear, mouth and throat problems  RESP: NEGATIVE for significant cough or SOB  CV: NEGATIVE for chest pain, palpitations or peripheral edema  GI: NEGATIVE for nausea, abdominal pain, heartburn, or change in bowel habits  MUSCULOSKELETAL: NEGATIVE for significant arthralgias or myalgia  NEURO: NEGATIVE for weakness, dizziness or paresthesias  ENDOCRINE: NEGATIVE for temperature intolerance, skin/hair changes  PSYCHIATRIC: see HPI    OBJECTIVE:     /82   Pulse 99   Temp 97.9  F (36.6  C) (Oral)   Resp 18   Wt 68.5 kg (151 lb)   SpO2 95%   BMI 25.31 kg/m    Body mass index is 25.31 kg/m .  GENERAL: healthy, alert and no distress  RESP: lungs clear to auscultation - no rales, rhonchi or wheezes  CV: regular rate and rhythm, normal S1 S2, no S3 or S4, no murmur, click or rub, no peripheral edema and peripheral pulses  strong  PSYCH: mentation appears normal, affect teary        ASSESSMENT/PLAN:             1. Benign essential hypertension  Start a low dose of Amlodipine.  Discussed the use and indication of this medication as well as potential side effects.   Recheck blood pressure in 2 weeks.   - amLODIPine (NORVASC) 2.5 MG tablet; Take 1 tablet (2.5 mg) by mouth daily  Dispense: 90 tablet; Refill: 3    2. Moderate persistent asthma without complication  Restart Flovent.   - fluticasone (FLOVENT HFA) 110 MCG/ACT inhaler; Inhale 2 puffs into the lungs 2 times daily  Dispense: 1 Inhaler; Refill: PRN    3. Malignant neoplasm of upper-outer quadrant of right breast in female, estrogen receptor positive (H)  She will follow up with oncology.     4. Anxiety  She declines a therapy referral.  - LORazepam (ATIVAN) 0.5 MG tablet; Take 1 tablet (0.5 mg) by mouth every 8 hours as needed for anxiety  Dispense: 20 tablet; Refill: 2        Rachel Arauz NP  [facility]

## 2019-02-08 ENCOUNTER — CARE COORDINATION (OUTPATIENT)
Dept: ONCOLOGY | Facility: CLINIC | Age: 54
End: 2019-02-08

## 2019-02-08 DIAGNOSIS — Z17.0 MALIGNANT NEOPLASM OF UPPER-OUTER QUADRANT OF RIGHT BREAST IN FEMALE, ESTROGEN RECEPTOR POSITIVE (H): Primary | ICD-10-CM

## 2019-02-08 DIAGNOSIS — L65.9 ALOPECIA: ICD-10-CM

## 2019-02-08 DIAGNOSIS — C50.411 MALIGNANT NEOPLASM OF UPPER-OUTER QUADRANT OF RIGHT BREAST IN FEMALE, ESTROGEN RECEPTOR POSITIVE (H): Primary | ICD-10-CM

## 2019-02-08 LAB — COPATH REPORT: NORMAL

## 2019-02-08 ASSESSMENT — ENCOUNTER SYMPTOMS
DEPRESSION: 0
WHEEZING: 1
LIGHT-HEADEDNESS: 0
DYSPNEA ON EXERTION: 0
FATIGUE: 0
DECREASED APPETITE: 0
PALPITATIONS: 0
POLYPHAGIA: 0
HALLUCINATIONS: 0
HYPERTENSION: 1
NIGHT SWEATS: 0
NERVOUS/ANXIOUS: 1
INSOMNIA: 0
BREAST PAIN: 1
SLEEP DISTURBANCES DUE TO BREATHING: 0
SHORTNESS OF BREATH: 1
WEIGHT LOSS: 0
EXERCISE INTOLERANCE: 0
HYPOTENSION: 0
COUGH DISTURBING SLEEP: 0
COUGH: 0
DECREASED CONCENTRATION: 0
PANIC: 1
WEIGHT GAIN: 1
LEG PAIN: 0
SYNCOPE: 0
HEMOPTYSIS: 0
ORTHOPNEA: 0
FEVER: 0
SNORES LOUDLY: 0
ALTERED TEMPERATURE REGULATION: 0
CHILLS: 0
BREAST MASS: 1
SPUTUM PRODUCTION: 0
POLYDIPSIA: 0
POSTURAL DYSPNEA: 0
INCREASED ENERGY: 0

## 2019-02-08 NOTE — PROGRESS NOTES
Called Dr Alanis's nurse and arranged an appointment for 2/11/19 at 08:30 for discussion of echocardiogram results low normal prior to starting chemotherapy. Instructed patient to purchase a BP cuff and periodically check BP prior to coming to appointment with Dr Alanis and bring her BP machine to verify it is reading out accurately.  Patient is aware of appointment details of appointment with Dr Alanis. Prescription for cranial prosthesis mailed to patient's home. Answered all patient's questions and verbalized understanding. Mily Mortensen RN, BSN.

## 2019-02-10 NOTE — PROGRESS NOTES
CARDIOLOGY CLINIC NOTE  02/11/2019    HPI:  The patient is a 53-year-old female with a past medical history significant for asthma, depression, hypothyroidism, and hypertension (recently started on amlodipine), and newly diagnosed ER/MI positive, HER-2 negative breast cancer who presents for evaluation of an abnormal echocardiogram.    The patient has recently diagnosed breast cancer in for her initial visit a echocardiogram was ordered and was found to have abnormal strain imaging at 16%, an ejection fraction of 50-55%, and mild left ventricular hypertrophy.  The plan is to undergo neoadjuvant chemotherapy with Adriamycin followed by surgery and cardiology is consulted for evaluation of this in the setting of possible I-SPY enrollment.     The patient reports that her main complaint today is a fluttering feeling in her chest.  She describes a sensation as a racing sensation.  It is abrupt in both onset and cessation.  It has been present for at least the past 3 years.  It occurs daily, at least 2-3 times per day.  The duration is seconds.  She has no  associated syncope or presyncope.  She carries a historical diagnosis of anxiety, though reports that this is a separate sensation for her anxiety sensation.    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  She does not exercise.  She is able to walk up and down steps without issue.  She reports no chest pressure, orthopnea, paroxysmal nocturnal dyspnea, or lower extremity swelling.    She has no personal history of atrial fibrillation or coronary artery disease.  She has been newly diagnosed with hypertension and has initiated Norvasc for treatment.    Her mother and father both have a history of hypertension.    She reports that she previously smoked socially.  She smoked approximately a pack per week for 30 years.  She quit 1 month ago.  She drinks approximately 2-3 fifths of vodka per month.    ROS:  A complete 10-point ROS was negative except  as above.    PAST MEDICAL HISTORY:  Past Medical History:   Diagnosis Date     Depressive disorder, not elsewhere classified      Hypothyroidism      Hypothyroidism, unspecified hypothyroidism type      Malignant neoplasm of upper-outer quadrant of right breast in female, estrogen receptor positive (H) 2019     Mild intermittent asthma      Scoliosis (and kyphoscoliosis), idiopathic        PAST SURGICAL HISTORY:  Past Surgical History:   Procedure Laterality Date     ABDOMEN SURGERY   c section     BACK SURGERY  scoliosis fusion      BIOPSY  breast     BREAST SURGERY  implants      C/SECTION, LOW TRANSVERSE      , Low Transverse     COSMETIC SURGERY  breast implants      SURGICAL HISTORY OF -       spinal fusion- Lacey kim     SURGICAL HISTORY OF -       removal of right breast papilloma       FAMILY HISTORY:  Family History   Problem Relation Age of Onset     Hypertension Mother      Thyroid Disease Mother         on meds     Psychotic Disorder Mother         anxiety     Cerebrovascular Disease Father         minor     Hypertension Father      Osteoporosis Maternal Grandmother      Asthma Maternal Grandfather      Thyroid Disease Sister         x2-on meds     Psychotic Disorder Sister         problems making decisions     Asthma Other      C.A.D. No family hx of      Diabetes No family hx of      Breast Cancer No family hx of      Cancer - colorectal No family hx of        SOCIAL HISTORY:  Social History     Socioeconomic History     Marital status:      Spouse name: Not on file     Number of children: Not on file     Years of education: Not on file     Highest education level: Not on file   Social Needs     Financial resource strain: Not on file     Food insecurity - worry: Not on file     Food insecurity - inability: Not on file     Transportation needs - medical: Not on file     Transportation needs - non-medical: Not on file   Occupational History     Not on file    Tobacco Use     Smoking status: Former Smoker     Packs/day: 0.50     Years: 30.00     Pack years: 15.00     Types: Cigarettes     Start date: 1980     Last attempt to quit: 2011     Years since quittin.6     Smokeless tobacco: Never Used     Tobacco comment: social smoker at times   Substance and Sexual Activity     Alcohol use: Yes     Comment: on weekends if going out     Drug use: No     Sexual activity: Not Currently     Partners: Male     Birth control/protection: Post-menopausal     Comment:    Other Topics Concern     Parent/sibling w/ CABG, MI or angioplasty before 65F 55M? No   Social History Narrative    Dairy/d 1 servings/d.     Caffeine 2-3 servings/d    Exercise 0 x week    Sunscreen used - Yes    Seatbelts used - Yes    Working smoke/CO detectors in the home - Yes    Guns stored in the home - Yes,hunting guns/rifles in gun cabinet    Self Breast Exams - No due to implants    Self Testicular Exam - NA    Eye Exam up to date - No    Dental Exam up to date - No    Pap Smear up to date - No    Mammogram up to date - Not sure how long ago,hx implants    PSA up to date - NA    Dexa Scan up to date - NA    Flex Sig / Colonoscopy up to date - NA    Immunizations up to date - Yes-Td     Abuse: Current or Past(Physical, Sexual or Emotional)- No    Do you feel safe in your environment - Yes       MEDICATIONS:  Current Outpatient Medications   Medication     ADVIL 200 MG OR TABS     albuterol (VENTOLIN HFA) 108 (90 Base) MCG/ACT Inhaler     amLODIPine (NORVASC) 2.5 MG tablet     fluticasone (FLOVENT HFA) 110 MCG/ACT inhaler     levothyroxine (SYNTHROID/LEVOTHROID) 88 MCG tablet     LORazepam (ATIVAN) 0.5 MG tablet     order for DME     predniSONE (DELTASONE) 20 MG tablet     sertraline (ZOLOFT) 50 MG tablet     albuterol (2.5 MG/3ML) 0.083% nebulizer solution     No current facility-administered medications for this visit.        ALLERGIES:   No Known Allergies    PHYSICAL EXAM:  BP  "130/84 (BP Location: Right arm, Patient Position: Chair, Cuff Size: Adult Regular)   Pulse 104   Ht 1.676 m (5' 6\")   Wt 69.9 kg (154 lb)   SpO2 95%   BMI 24.86 kg/m    Gen: alert, interactive, NAD  HEENT: atraumatic, EOMI, MMM  Neck: supple, no JVD  CV: RRR, no m/r/g  Chest: CTAB, no wheezes or crackles  Abd: soft, NT, ND, +BS  Ext: no LE edema, 2+ peripheral pulses  Skin: warm and dry, no rashes on exposed surfaces  Neuro: alert and oriented, no focal deficits  Endocrine: no thyromegaly  Musculoskeletal: no joint swelling or tenderness  Psych: pleasant and conversant      LABS:    Chemistry panel:   Recent Labs   Lab Test 02/05/19  1038 07/16/18  0959     --    POTASSIUM 3.4  --    CHLORIDE 105  --    CO2 27  --    ANIONGAP 6  --    GLC 86 96   BUN 16  --    CR 0.60  --    BRYANT 8.4*  --    MAG 2.0  --    GFRESTIMATED >90  --    AST 9  --    ALT 18  --        CBC:   Recent Labs   Lab Test 02/05/19  1038 01/26/15  1603   WBC 12.2*  --    RBC 4.64  --    HGB 13.1 13.8   HCT 40.7  --    MCV 88  --    MCH 28.2  --    MCHC 32.2  --    RDW 12.7  --      --        Lipid Panel:  Recent Labs   Lab Test 07/16/18  0959   CHOL 214*   HDL 66   *   TRIG 86       Thyroid:   TSH   Date Value Ref Range Status   02/05/2019 1.27 0.40 - 4.00 mU/L Final     T4 Free   Date Value Ref Range Status   06/13/2016 0.70 (L) 0.76 - 1.46 ng/dL Final     Hemoglobin A1C   Date Value Ref Range Status   02/05/2019 5.6 0 - 5.6 % Final     Comment:     Normal <5.7% Prediabetes 5.7-6.4%  Diabetes 6.5% or higher - adopted from ADA   consensus guidelines.       INR   Date Value Ref Range Status   02/05/2019 1.02 0.86 - 1.14 Final         CARDIAC DATA:  ECG 2/11/19: inferior Q waves, NSR        TTE 2/5/19  Interpretation Summary  Borderline (EF 50-55%) reduced left ventricular function is present.Mild concentric wall thickening consistent with left ventricular hypertrophy is present.  Global right ventricular function is " normal.  Mild sclerosis of the aortic valve and mitral annulus present.  The inferior vena cava was normal in size with preserved respiratory variability.  No pericardial effusion is present.      ASSESSMENT/PLAN:  In summary, this is a 53-year-old female with a past medical history notable for anxiety, hypothyroidism, asthma, newly diagnosed hypertension, and newly diagnosed breast cancer who presents for evaluation prior to undergoing Adriamycin neoadjuvant chemotherapy.    1.  Abnormal echocardiogram: The patient's echocardiogram has both low strain and low normal ejection fraction.  Both of these tests are very borderline, and she has no symptoms of heart failure.  In addition, she has no historical features that would put her at risk of having heart failure, with the exception of mildly excessive alcohol use.  However, in the setting of future Adriamycin use, she is likely at higher risk than normal for chemotherapy-induced cardiomyopathy.  Therefore, recommend that the patient switch to lisinopril 5 mg instead of amlodipine for cardioprotection.  She should be followed with serial strain imaging throughout the course of her chemotherapy.    2.  Palpitations: Unclear etiology.  She is overall not bothered by these palpitations, though they are occurring quite frequently. No further evaluation needed at this time.    Return to clinic in three months with a limited TTE.    Pt seen and discussed with Dr. Alanis.    Ji Quarles MD  Cardiology Fellow, PGY-4  [phone]      ATTENDING ATTESTATION:  This patient has been seen and examined by me February 11, 2019 with Ji Quarles MD, cardiology fellow. I have reviewed the vitals, laboratory and imaging data relevant to this patient's care. I have edited this note to reflect our joint assessment and plan, and discussed the plan with the patient.    Mily is a 53-year-old woman who was recently diagnosed with early stage breast cancer and mild essential hypertension.   She has no known history of coronary disease heart failure or significant valvular disease.  She underwent an echocardiogram prior to starting chemotherapy which is notable for low normal left ventricular systolic function, ejection fraction 53% and a mildly reduced global longitudinal strain of -16%.  She is here for cardiac evaluation. She also has a history of alcohol use and tobacco use.  There is no family history of premature cardiac disease.    Mily denies any anginal heart failure symptoms.  She is fairly active and reports an exercise tolerance of greater than 4 METs.  On exam she is euvolemic.  Her blood pressure is in normal range.  ECG is notable for sinus rhythm with minimal Q waves inferiorly.  Echocardiogram done on 2 5/19/2019 was notable for low normal LV function normal, right ventricular function,  mild sclerosis of the aortic and mitral valves.  There is no evidence of any inferior wall hypokinesis, pulmonary hypertension or pericardial effusion. None of these are prohibitive signs for her to proceed with the planned chemotherapy.     She was recently initiated on amlodipine which I would like to switch to ACE or ARB inhibitor,  given that there is some cardioprotective data in patients undergoing chemotherapy.  She was advised to contact us in the event she developed cough.     Follow up in 3 months with a limited echo, which will be after she completes the weekly paclitaxel, prior to initiation of dose dense AC therapy.     Mary Alanis MD, MS  Staff Cardiologist  Pager: [phone]

## 2019-02-11 ENCOUNTER — RESEARCH ENCOUNTER (OUTPATIENT)
Dept: ONCOLOGY | Facility: CLINIC | Age: 54
End: 2019-02-11

## 2019-02-11 ENCOUNTER — OFFICE VISIT (OUTPATIENT)
Dept: CARDIOLOGY | Facility: CLINIC | Age: 54
End: 2019-02-11
Attending: INTERNAL MEDICINE
Payer: COMMERCIAL

## 2019-02-11 VITALS
DIASTOLIC BLOOD PRESSURE: 84 MMHG | HEART RATE: 104 BPM | BODY MASS INDEX: 24.75 KG/M2 | SYSTOLIC BLOOD PRESSURE: 130 MMHG | OXYGEN SATURATION: 95 % | HEIGHT: 66 IN | WEIGHT: 154 LBS

## 2019-02-11 DIAGNOSIS — I42.9 CARDIOMYOPATHY, UNSPECIFIED TYPE (H): Primary | ICD-10-CM

## 2019-02-11 DIAGNOSIS — Z17.0 MALIGNANT NEOPLASM OF UPPER-OUTER QUADRANT OF RIGHT BREAST IN FEMALE, ESTROGEN RECEPTOR POSITIVE (H): Primary | ICD-10-CM

## 2019-02-11 DIAGNOSIS — C50.411 MALIGNANT NEOPLASM OF UPPER-OUTER QUADRANT OF RIGHT BREAST IN FEMALE, ESTROGEN RECEPTOR POSITIVE (H): Primary | ICD-10-CM

## 2019-02-11 DIAGNOSIS — C50.411 MALIGNANT NEOPLASM OF UPPER-OUTER QUADRANT OF RIGHT BREAST IN FEMALE, ESTROGEN RECEPTOR POSITIVE (H): ICD-10-CM

## 2019-02-11 DIAGNOSIS — Z17.0 MALIGNANT NEOPLASM OF UPPER-OUTER QUADRANT OF RIGHT BREAST IN FEMALE, ESTROGEN RECEPTOR POSITIVE (H): ICD-10-CM

## 2019-02-11 PROCEDURE — G0463 HOSPITAL OUTPT CLINIC VISIT: HCPCS

## 2019-02-11 PROCEDURE — 93005 ELECTROCARDIOGRAM TRACING: CPT | Mod: ZF

## 2019-02-11 PROCEDURE — 93010 ELECTROCARDIOGRAM REPORT: CPT | Mod: ZP | Performed by: INTERNAL MEDICINE

## 2019-02-11 PROCEDURE — 99204 OFFICE O/P NEW MOD 45 MIN: CPT | Mod: GC | Performed by: INTERNAL MEDICINE

## 2019-02-11 RX ORDER — LISINOPRIL 5 MG/1
5 TABLET ORAL DAILY
Qty: 90 TABLET | Refills: 3 | Status: SHIPPED | OUTPATIENT
Start: 2019-02-11 | End: 2019-04-16

## 2019-02-11 ASSESSMENT — MIFFLIN-ST. JEOR: SCORE: 1310.29

## 2019-02-11 ASSESSMENT — PAIN SCALES - GENERAL: PAINLEVEL: NO PAIN (0)

## 2019-02-11 NOTE — NURSING NOTE
Cardiac Testing: Patient given instructions regarding limited ECHO. Discussed purpose, preparation, procedure and when to expect results reported back to the patient. Patient demonstrated understanding of this information and agreed to call with further questions or concerns.  Med Reconcile: Reviewed and verified all current medications with the patient. The updated medication list was printed and given to the patient.  New Medication: Patient was educated regarding newly prescribed medication, including discussion of  the indication, administration, side effects, and when to report to MD or RN. Patient demonstrated understanding of this information and agreed to call with further questions or concerns.  Return Appointment: Patient given instructions regarding scheduling next clinic visit. Patient demonstrated understanding of this information and agreed to call with further questions or concerns.  Patient stated she understood all health information given and agreed to call with further questions or concerns.

## 2019-02-11 NOTE — NURSING NOTE
Chief Complaint   Patient presents with     New Patient     referral from Dr. Guzman for at risk for cardiomyopathy     Vitals were taken and medications were reconciled.EKG performed.    9:04 AM Stephen Huerta, Student CMA

## 2019-02-11 NOTE — LETTER
2/11/2019      RE: Kathy Lei  [address] [place] [address]       Dear Colleague,    Thank you for the opportunity to participate in the care of your patient, Kathy Lei, at the [facility]. Please see a copy of my visit note below.    CARDIOLOGY CLINIC NOTE  02/11/2019    HPI:  The patient is a 53-year-old female with a past medical history significant for asthma, depression, hypothyroidism, and hypertension (recently started on amlodipine), and newly diagnosed ER/AZ positive, HER-2 negative breast cancer who presents for evaluation of an abnormal echocardiogram.    The patient has recently diagnosed breast cancer in for her initial visit a echocardiogram was ordered and was found to have abnormal strain imaging at 16%, an ejection fraction of 50-55%, and mild left ventricular hypertrophy.  The plan is to undergo neoadjuvant chemotherapy with Adriamycin followed by surgery and cardiology is consulted for evaluation of this in the setting of possible I-SPY enrollment.     The patient reports that her main complaint today is a fluttering feeling in her chest.  She describes a sensation as a racing sensation.  It is abrupt in both onset and cessation.  It has been present for at least the past 3 years.  It occurs daily, at least 2-3 times per day.  The duration is seconds.  She has no  associated syncope or presyncope.  She carries a historical diagnosis of anxiety, though reports that this is a separate sensation for her anxiety sensation.    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  She does not exercise.  She is able to walk up and down steps without issue.  She reports no chest pressure, orthopnea, paroxysmal nocturnal dyspnea, or lower extremity swelling.    She has no personal history of atrial fibrillation or coronary artery disease.  She has been newly diagnosed with hypertension and has  initiated Norvasc for treatment.    Her mother and father both have a history of hypertension.    She reports that she previously smoked socially.  She smoked approximately a pack per week for 30 years.  She quit 1 month ago.  She drinks approximately 2-3 fifths of vodka per month.    ROS:  A complete 10-point ROS was negative except as above.    PAST MEDICAL HISTORY:  Past Medical History:   Diagnosis Date     Depressive disorder, not elsewhere classified      Hypothyroidism      Hypothyroidism, unspecified hypothyroidism type      Malignant neoplasm of upper-outer quadrant of right breast in female, estrogen receptor positive (H) 2019     Mild intermittent asthma      Scoliosis (and kyphoscoliosis), idiopathic        PAST SURGICAL HISTORY:  Past Surgical History:   Procedure Laterality Date     ABDOMEN SURGERY   c section     BACK SURGERY  scoliosis fusion      BIOPSY  breast     BREAST SURGERY  implants      C/SECTION, LOW TRANSVERSE      , Low Transverse     COSMETIC SURGERY  breast implants      SURGICAL HISTORY OF -       spinal fusion- Lacey kim     SURGICAL HISTORY OF -       removal of right breast papilloma       FAMILY HISTORY:  Family History   Problem Relation Age of Onset     Hypertension Mother      Thyroid Disease Mother         on meds     Psychotic Disorder Mother         anxiety     Cerebrovascular Disease Father         minor     Hypertension Father      Osteoporosis Maternal Grandmother      Asthma Maternal Grandfather      Thyroid Disease Sister         x2-on meds     Psychotic Disorder Sister         problems making decisions     Asthma Other      C.A.D. No family hx of      Diabetes No family hx of      Breast Cancer No family hx of      Cancer - colorectal No family hx of        SOCIAL HISTORY:  Social History     Socioeconomic History     Marital status:      Spouse name: Not on file     Number of children: Not on file     Years of education: Not on  file     Highest education level: Not on file   Social Needs     Financial resource strain: Not on file     Food insecurity - worry: Not on file     Food insecurity - inability: Not on file     Transportation needs - medical: Not on file     Transportation needs - non-medical: Not on file   Occupational History     Not on file   Tobacco Use     Smoking status: Former Smoker     Packs/day: 0.50     Years: 30.00     Pack years: 15.00     Types: Cigarettes     Start date: 1980     Last attempt to quit: 2011     Years since quittin.6     Smokeless tobacco: Never Used     Tobacco comment: social smoker at times   Substance and Sexual Activity     Alcohol use: Yes     Comment: on weekends if going out     Drug use: No     Sexual activity: Not Currently     Partners: Male     Birth control/protection: Post-menopausal     Comment:    Other Topics Concern     Parent/sibling w/ CABG, MI or angioplasty before 65F 55M? No   Social History Narrative    Dairy/d 1 servings/d.     Caffeine 2-3 servings/d    Exercise 0 x week    Sunscreen used - Yes    Seatbelts used - Yes    Working smoke/CO detectors in the home - Yes    Guns stored in the home - Yes,hunting guns/rifles in gun cabinet    Self Breast Exams - No due to implants    Self Testicular Exam - NA    Eye Exam up to date - No    Dental Exam up to date - No    Pap Smear up to date - No    Mammogram up to date - Not sure how long ago,hx implants    PSA up to date - NA    Dexa Scan up to date - NA    Flex Sig / Colonoscopy up to date - NA    Immunizations up to date - Yes-Td     Abuse: Current or Past(Physical, Sexual or Emotional)- No    Do you feel safe in your environment - Yes       MEDICATIONS:  Current Outpatient Medications   Medication     ADVIL 200 MG OR TABS     albuterol (VENTOLIN HFA) 108 (90 Base) MCG/ACT Inhaler     amLODIPine (NORVASC) 2.5 MG tablet     fluticasone (FLOVENT HFA) 110 MCG/ACT inhaler     levothyroxine  "(SYNTHROID/LEVOTHROID) 88 MCG tablet     LORazepam (ATIVAN) 0.5 MG tablet     order for DME     predniSONE (DELTASONE) 20 MG tablet     sertraline (ZOLOFT) 50 MG tablet     albuterol (2.5 MG/3ML) 0.083% nebulizer solution     No current facility-administered medications for this visit.        ALLERGIES:   No Known Allergies    PHYSICAL EXAM:  /84 (BP Location: Right arm, Patient Position: Chair, Cuff Size: Adult Regular)   Pulse 104   Ht 1.676 m (5' 6\")   Wt 69.9 kg (154 lb)   SpO2 95%   BMI 24.86 kg/m     Gen: alert, interactive, NAD  HEENT: atraumatic, EOMI, MMM  Neck: supple, no JVD  CV: RRR, no m/r/g  Chest: CTAB, no wheezes or crackles  Abd: soft, NT, ND, +BS  Ext: no LE edema, 2+ peripheral pulses  Skin: warm and dry, no rashes on exposed surfaces  Neuro: alert and oriented, no focal deficits  Endocrine: no thyromegaly  Musculoskeletal: no joint swelling or tenderness  Psych: pleasant and conversant      LABS:    Chemistry panel:   Recent Labs   Lab Test 02/05/19  1038 07/16/18  0959     --    POTASSIUM 3.4  --    CHLORIDE 105  --    CO2 27  --    ANIONGAP 6  --    GLC 86 96   BUN 16  --    CR 0.60  --    BRYANT 8.4*  --    MAG 2.0  --    GFRESTIMATED >90  --    AST 9  --    ALT 18  --        CBC:   Recent Labs   Lab Test 02/05/19  1038 01/26/15  1603   WBC 12.2*  --    RBC 4.64  --    HGB 13.1 13.8   HCT 40.7  --    MCV 88  --    MCH 28.2  --    MCHC 32.2  --    RDW 12.7  --      --        Lipid Panel:  Recent Labs   Lab Test 07/16/18  0959   CHOL 214*   HDL 66   *   TRIG 86       Thyroid:   TSH   Date Value Ref Range Status   02/05/2019 1.27 0.40 - 4.00 mU/L Final     T4 Free   Date Value Ref Range Status   06/13/2016 0.70 (L) 0.76 - 1.46 ng/dL Final     Hemoglobin A1C   Date Value Ref Range Status   02/05/2019 5.6 0 - 5.6 % Final     Comment:     Normal <5.7% Prediabetes 5.7-6.4%  Diabetes 6.5% or higher - adopted from ADA   consensus guidelines.       INR   Date Value Ref Range " Status   02/05/2019 1.02 0.86 - 1.14 Final         CARDIAC DATA:  ECG 2/11/19: inferior Q waves, NSR        TTE 2/5/19  Interpretation Summary  Borderline (EF 50-55%) reduced left ventricular function is present.Mild concentric wall thickening consistent with left ventricular hypertrophy is present.  Global right ventricular function is normal.  Mild sclerosis of the aortic valve and mitral annulus present.  The inferior vena cava was normal in size with preserved respiratory variability.  No pericardial effusion is present.      ASSESSMENT/PLAN:  In summary, this is a 53-year-old female with a past medical history notable for anxiety, hypothyroidism, asthma, newly diagnosed hypertension, and newly diagnosed breast cancer who presents for evaluation prior to undergoing Adriamycin neoadjuvant chemotherapy.    1.  Abnormal echocardiogram: The patient's echocardiogram has both low strain and low normal ejection fraction.  Both of these tests are very borderline, and she has no symptoms of heart failure.  In addition, she has no historical features that would put her at risk of having heart failure, with the exception of mildly excessive alcohol use.  However, in the setting of future Adriamycin use, she is likely at higher risk than normal for chemotherapy-induced cardiomyopathy.  Therefore, recommend that the patient switch to lisinopril 5 mg instead of amlodipine for cardioprotection.  She should be followed with serial strain imaging throughout the course of her chemotherapy.    2.  Palpitations: Unclear etiology.  She is overall not bothered by these palpitations, though they are occurring quite frequently. No further evaluation needed at this time.    Return to clinic in three months with a limited TTE.    Pt seen and discussed with Dr. Alanis.    Ji Quarles MD  Cardiology Fellow, PGY-4  [phone]      ATTENDING ATTESTATION:  This patient has been seen and examined by me February 11, 2019 with Ji Quarles MD,  cardiology fellow. I have reviewed the vitals, laboratory and imaging data relevant to this patient's care. I have edited this note to reflect our joint assessment and plan, and discussed the plan with the patient.    Mily is a 53-year-old woman who was recently diagnosed with early stage breast cancer and mild essential hypertension.  She has no known history of coronary disease heart failure or significant valvular disease.  She underwent an echocardiogram prior to starting chemotherapy which is notable for low normal left ventricular systolic function, ejection fraction 53% and a mildly reduced global longitudinal strain of -16%.  She is here for cardiac evaluation. She also has a history of alcohol use and tobacco use.  There is no family history of premature cardiac disease.    Mily denies any anginal heart failure symptoms.  She is fairly active and reports an exercise tolerance of greater than 4 METs.  On exam she is euvolemic.  Her blood pressure is in normal range.  ECG is notable for sinus rhythm with minimal Q waves inferiorly.  Echocardiogram done on 2 5/19/2019 was notable for low normal LV function normal, right ventricular function,  mild sclerosis of the aortic and mitral valves.  There is no evidence of any inferior wall hypokinesis, pulmonary hypertension or pericardial effusion. None of these are prohibitive signs for her to proceed with the planned chemotherapy.     She was recently initiated on amlodipine which I would like to switch to ACE or ARB inhibitor,  given that there is some cardioprotective data in patients undergoing chemotherapy.  She was advised to contact us in the event she developed cough.     Follow up in 3 months with a limited echo, which will be after she completes the weekly paclitaxel, prior to initiation of dose dense AC therapy.     Mary Alanis MD, MS  Staff Cardiologist  Pager: [phone]

## 2019-02-11 NOTE — PATIENT INSTRUCTIONS
Patient Instructions:  It was a pleasure to see you in the cardiology clinic today.      If you have any questions, call  Daphne Mcmillan RN, at [phone].  Press Option #1 for the [facility], and then press Option #3 for nursing.  We are encouraging the use of tolingohart to communicate with your HealthCare Provider    Note the new medications: Lisinopril 5 mg by mouth everyday  Stop the following medications: none    The results from today include: none  Please follow up with Dr. Mary Alanis in three months with a limited ECHO      If you have an urgent need after hours (8:00 am to 4:30 pm) please call [phone] and ask for the cardiology fellow on call.    Patient Education     Lisinopril Oral tablet  What is this medicine?  LISINOPRIL (lyse IN oh pril) is an ACE inhibitor. This medicine is used to treat high blood pressure and heart failure. It is also used to protect the heart immediately after a heart attack.  This medicine may be used for other purposes; ask your health care provider or pharmacist if you have questions.  What should I tell my health care provider before I take this medicine?  They need to know if you have any of these conditions:    diabetes    heart or blood vessel disease    immune system disease like lupus or scleroderma    kidney disease    low blood pressure    previous swelling of the tongue, face, or lips with difficulty breathing, difficulty swallowing, hoarseness, or tightening of the throat    an unusual or allergic reaction to lisinopril, other ACE inhibitors, insect venom, foods, dyes, or preservatives    pregnant or trying to get pregnant    breast-feeding  How should I use this medicine?  Take this medicine by mouth with a glass of water. Follow the directions on your prescription label. You may take this medicine with or without food. Take your medicine at regular intervals. Do not stop taking this medicine except on the advice of your doctor or  health care professional.  Talk to your pediatrician regarding the use of this medicine in children. Special care may be needed. While this drug may be prescribed for children as young as 6 years of age for selected conditions, precautions do apply.  Overdosage: If you think you have taken too much of this medicine contact a poison control center or emergency room at once.  NOTE: This medicine is only for you. Do not share this medicine with others.  What if I miss a dose?  If you miss a dose, take it as soon as you can. If it is almost time for your next dose, take only that dose. Do not take double or extra doses.  What may interact with this medicine?    diuretics    lithium    NSAIDs, medicines for pain and inflammation, like ibuprofen or naproxen    over-the-counter herbal supplements like hawthorn    potassium salts or potassium supplements    salt substitutes  This list may not describe all possible interactions. Give your health care provider a list of all the medicines, herbs, non-prescription drugs, or dietary supplements you use. Also tell them if you smoke, drink alcohol, or use illegal drugs. Some items may interact with your medicine.  What should I watch for while using this medicine?  Visit your doctor or health care professional for regular check ups. Check your blood pressure as directed. Ask your doctor what your blood pressure should be, and when you should contact him or her. Call your doctor or health care professional if you notice an irregular or fast heart beat.  Women should inform their doctor if they wish to become pregnant or think they might be pregnant. There is a potential for serious side effects to an unborn child. Talk to your health care professional or pharmacist for more information.  Check with your doctor or health care professional if you get an attack of severe diarrhea, nausea and vomiting, or if you sweat a lot. The loss of too much body fluid can make it dangerous for you  to take this medicine.  You may get drowsy or dizzy. Do not drive, use machinery, or do anything that needs mental alertness until you know how this drug affects you. Do not stand or sit up quickly, especially if you are an older patient. This reduces the risk of dizzy or fainting spells. Alcohol can make you more drowsy and dizzy. Avoid alcoholic drinks.  Avoid salt substitutes unless you are told otherwise by your doctor or health care professional.  Do not treat yourself for coughs, colds, or pain while you are taking this medicine without asking your doctor or health care professional for advice. Some ingredients may increase your blood pressure.  What side effects may I notice from receiving this medicine?  Side effects that you should report to your doctor or health care professional as soon as possible:    abdominal pain with or without nausea or vomiting    allergic reactions like skin rash or hives, swelling of the hands, feet, face, lips, throat, or tongue    dark urine    difficulty breathing    dizzy, lightheaded or fainting spell    fever or sore throat    irregular heart beat, chest pain    pain or difficulty passing urine    redness, blistering, peeling or loosening of the skin, including inside the mouth    unusually weak    yellowing of the eyes or skin  Side effects that usually do not require medical attention (report to your doctor or health care professional if they continue or are bothersome):    change in taste    cough    decreased sexual function or desire    headache    sun sensitivity    tiredness  This list may not describe all possible side effects. Call your doctor for medical advice about side effects. You may report side effects to FDA at 4-244-FDA-8632.  Where should I keep my medicine?  Keep out of the reach of children.  Store at room temperature between 15 and 30 degrees C (59 and 86 degrees F). Protect from moisture. Keep container tightly closed. Throw away any unused medicine  after the expiration date.  NOTE:This sheet is a summary. It may not cover all possible information. If you have questions about this medicine, talk to your doctor, pharmacist, or health care provider. Copyright  2016 Gold Standard

## 2019-02-12 ENCOUNTER — CARE COORDINATION (OUTPATIENT)
Dept: ONCOLOGY | Facility: CLINIC | Age: 54
End: 2019-02-12

## 2019-02-12 LAB — INTERPRETATION ECG - MUSE: NORMAL

## 2019-02-12 NOTE — PROGRESS NOTES
Spoke to patient requesting clarification of lab values per ISPY coordinator. Patient's WBC elevated with prednisone therapy with last dose taken two days ago and Calcium slightly below normal. Per IS protocol to repeat lab work next Monday.  Answered all patient's questions and verbalized understanding. Mily Mortensen RN, BSN.

## 2019-02-13 LAB — LAB SCANNED RESULT: NORMAL

## 2019-02-14 ENCOUNTER — MYC MEDICAL ADVICE (OUTPATIENT)
Dept: ONCOLOGY | Facility: CLINIC | Age: 54
End: 2019-02-14

## 2019-02-14 ENCOUNTER — CARE COORDINATION (OUTPATIENT)
Dept: ONCOLOGY | Facility: CLINIC | Age: 54
End: 2019-02-14

## 2019-02-14 DIAGNOSIS — C50.411 MALIGNANT NEOPLASM OF UPPER-OUTER QUADRANT OF RIGHT BREAST IN FEMALE, ESTROGEN RECEPTOR POSITIVE (H): Primary | ICD-10-CM

## 2019-02-14 DIAGNOSIS — Z17.0 MALIGNANT NEOPLASM OF UPPER-OUTER QUADRANT OF RIGHT BREAST IN FEMALE, ESTROGEN RECEPTOR POSITIVE (H): ICD-10-CM

## 2019-02-14 DIAGNOSIS — Z17.0 MALIGNANT NEOPLASM OF UPPER-OUTER QUADRANT OF RIGHT BREAST IN FEMALE, ESTROGEN RECEPTOR POSITIVE (H): Primary | ICD-10-CM

## 2019-02-14 DIAGNOSIS — C50.411 MALIGNANT NEOPLASM OF UPPER-OUTER QUADRANT OF RIGHT BREAST IN FEMALE, ESTROGEN RECEPTOR POSITIVE (H): ICD-10-CM

## 2019-02-14 LAB
ALBUMIN UR-MCNC: NEGATIVE MG/DL
APPEARANCE UR: CLEAR
BILIRUB UR QL STRIP: NEGATIVE
COLOR UR AUTO: YELLOW
GLUCOSE UR STRIP-MCNC: NEGATIVE MG/DL
HGB UR QL STRIP: NEGATIVE
KETONES UR STRIP-MCNC: NEGATIVE MG/DL
LEUKOCYTE ESTERASE UR QL STRIP: ABNORMAL
NITRATE UR QL: NEGATIVE
NON-SQ EPI CELLS #/AREA URNS LPF: ABNORMAL /LPF
PH UR STRIP: 7 PH (ref 5–7)
RBC #/AREA URNS AUTO: ABNORMAL /HPF
SOURCE: ABNORMAL
SP GR UR STRIP: 1.01 (ref 1–1.03)
UROBILINOGEN UR STRIP-ACNC: 0.2 EU/DL (ref 0.2–1)
WBC #/AREA URNS AUTO: ABNORMAL /HPF

## 2019-02-14 PROCEDURE — 81001 URINALYSIS AUTO W/SCOPE: CPT | Performed by: INTERNAL MEDICINE

## 2019-02-14 NOTE — PROGRESS NOTES
"Spoke to patient with Dr Guzman's recommendations to do a repeat UA/UC today with bladder \"pressure and stinging\" reviewing last UA on 2/5/19 with small blood in urine. Has not taken ibuprofen since repeat biopsy for the ISPY trial. Patient will go to leave a urine sample and will verify UA results with Dr Guzman. Veriified patient is not allergic to any medications.  Answered all patient's questions and verbalized understanding. Mily Mortensen RN, BSN.   Reviewed UA with Dr Guzman and will not treat UA results at this time. Spoke to patient with Dr Guzman's review at this time and instructed patient to return call to Nurse Triage if worsening symptoms of UTI, go to UC to repeat UA/UC.  Answered all patient's questions and verbalized understanding. Mily Mortensen RN, BSN.     "

## 2019-02-15 ENCOUNTER — TELEPHONE (OUTPATIENT)
Dept: ONCOLOGY | Facility: CLINIC | Age: 54
End: 2019-02-15

## 2019-02-15 LAB — COPATH REPORT: NORMAL

## 2019-02-17 ENCOUNTER — TELEPHONE (OUTPATIENT)
Dept: ONCOLOGY | Facility: CLINIC | Age: 54
End: 2019-02-17

## 2019-02-18 ENCOUNTER — DOCUMENTATION ONLY (OUTPATIENT)
Dept: CARE COORDINATION | Facility: CLINIC | Age: 54
End: 2019-02-18

## 2019-02-18 ENCOUNTER — MYC MEDICAL ADVICE (OUTPATIENT)
Dept: ONCOLOGY | Facility: CLINIC | Age: 54
End: 2019-02-18
Payer: COMMERCIAL

## 2019-02-18 DIAGNOSIS — R30.0 DYSURIA: Primary | ICD-10-CM

## 2019-02-18 DIAGNOSIS — C50.411 MALIGNANT NEOPLASM OF UPPER-OUTER QUADRANT OF RIGHT BREAST IN FEMALE, ESTROGEN RECEPTOR POSITIVE (H): ICD-10-CM

## 2019-02-18 DIAGNOSIS — Z17.0 MALIGNANT NEOPLASM OF UPPER-OUTER QUADRANT OF RIGHT BREAST IN FEMALE, ESTROGEN RECEPTOR POSITIVE (H): ICD-10-CM

## 2019-02-18 DIAGNOSIS — R30.0 DYSURIA: ICD-10-CM

## 2019-02-18 LAB
ALBUMIN SERPL-MCNC: 3.7 G/DL (ref 3.4–5)
ALBUMIN UR-MCNC: NEGATIVE MG/DL
ALP SERPL-CCNC: 46 U/L (ref 40–150)
ALT SERPL W P-5'-P-CCNC: 23 U/L (ref 0–50)
ANION GAP SERPL CALCULATED.3IONS-SCNC: 6 MMOL/L (ref 3–14)
APPEARANCE UR: CLEAR
AST SERPL W P-5'-P-CCNC: 17 U/L (ref 0–45)
BASOPHILS # BLD AUTO: 0 10E9/L (ref 0–0.2)
BASOPHILS NFR BLD AUTO: 0.5 %
BILIRUB SERPL-MCNC: 0.5 MG/DL (ref 0.2–1.3)
BILIRUB UR QL STRIP: NEGATIVE
BUN SERPL-MCNC: 13 MG/DL (ref 7–30)
CALCIUM SERPL-MCNC: 8.5 MG/DL (ref 8.5–10.1)
CHLORIDE SERPL-SCNC: 105 MMOL/L (ref 94–109)
CO2 SERPL-SCNC: 28 MMOL/L (ref 20–32)
COLOR UR AUTO: NORMAL
CREAT SERPL-MCNC: 0.55 MG/DL (ref 0.52–1.04)
DIFFERENTIAL METHOD BLD: NORMAL
EOSINOPHIL # BLD AUTO: 0.2 10E9/L (ref 0–0.7)
EOSINOPHIL NFR BLD AUTO: 2.7 %
ERYTHROCYTE [DISTWIDTH] IN BLOOD BY AUTOMATED COUNT: 12.6 % (ref 10–15)
GFR SERPL CREATININE-BSD FRML MDRD: >90 ML/MIN/{1.73_M2}
GLUCOSE SERPL-MCNC: 92 MG/DL (ref 70–99)
GLUCOSE UR STRIP-MCNC: NEGATIVE MG/DL
HCT VFR BLD AUTO: 43.1 % (ref 35–47)
HGB BLD-MCNC: 14 G/DL (ref 11.7–15.7)
HGB UR QL STRIP: NEGATIVE
IMM GRANULOCYTES # BLD: 0 10E9/L (ref 0–0.4)
IMM GRANULOCYTES NFR BLD: 0.3 %
KETONES UR STRIP-MCNC: NEGATIVE MG/DL
LEUKOCYTE ESTERASE UR QL STRIP: NEGATIVE
LYMPHOCYTES # BLD AUTO: 1.6 10E9/L (ref 0.8–5.3)
LYMPHOCYTES NFR BLD AUTO: 21.4 %
MCH RBC QN AUTO: 29.1 PG (ref 26.5–33)
MCHC RBC AUTO-ENTMCNC: 32.5 G/DL (ref 31.5–36.5)
MCV RBC AUTO: 90 FL (ref 78–100)
MONOCYTES # BLD AUTO: 0.6 10E9/L (ref 0–1.3)
MONOCYTES NFR BLD AUTO: 7.6 %
NEUTROPHILS # BLD AUTO: 5 10E9/L (ref 1.6–8.3)
NEUTROPHILS NFR BLD AUTO: 67.5 %
NITRATE UR QL: NEGATIVE
NRBC # BLD AUTO: 0 10*3/UL
NRBC BLD AUTO-RTO: 0 /100
PH UR STRIP: 7 PH (ref 5–7)
PLATELET # BLD AUTO: 289 10E9/L (ref 150–450)
POTASSIUM SERPL-SCNC: 3.9 MMOL/L (ref 3.4–5.3)
PROT SERPL-MCNC: 7.1 G/DL (ref 6.8–8.8)
RBC # BLD AUTO: 4.81 10E12/L (ref 3.8–5.2)
RBC #/AREA URNS AUTO: <1 /HPF (ref 0–2)
SODIUM SERPL-SCNC: 139 MMOL/L (ref 133–144)
SOURCE: NORMAL
SP GR UR STRIP: 1 (ref 1–1.03)
UROBILINOGEN UR STRIP-MCNC: 0 MG/DL (ref 0–2)
WBC # BLD AUTO: 7.4 10E9/L (ref 4–11)
WBC #/AREA URNS AUTO: <1 /HPF (ref 0–5)

## 2019-02-18 PROCEDURE — 81001 URINALYSIS AUTO W/SCOPE: CPT | Performed by: INTERNAL MEDICINE

## 2019-02-18 PROCEDURE — 80053 COMPREHEN METABOLIC PANEL: CPT | Performed by: INTERNAL MEDICINE

## 2019-02-18 PROCEDURE — 85025 COMPLETE CBC W/AUTO DIFF WBC: CPT | Performed by: INTERNAL MEDICINE

## 2019-02-18 NOTE — NURSING NOTE
Chief Complaint   Patient presents with     Lab Only     Pt here for venipuncture blood draw only.      Aide Villanueva MA

## 2019-02-18 NOTE — TELEPHONE ENCOUNTER
I called Mily and discussed the plan of UA tomorrow, port placement.  Cystoscopy on Friday.  Significant tobacco history. 14 day washout of prednisone. All of her questions were answered. Planned start date on February 25.  I discussed that the Mammaprint is high risk and the plan is to treat on trial if no evidence of bladder malignancy.  If there is a bladder malignancy we would still treat the breast cancer first but it would be off trial.  She understands the plan.     Christian Guzman MD

## 2019-02-20 ENCOUNTER — CARE COORDINATION (OUTPATIENT)
Dept: PALLIATIVE CARE | Facility: CLINIC | Age: 54
End: 2019-02-20

## 2019-02-20 NOTE — PROGRESS NOTES
Spoke to the [facility] PA department with the approval number [id number] for the CT CAP done on 2/5/19. Mily Mortensen RN, BSN  [facility] Nurse Coordinator

## 2019-02-25 ENCOUNTER — INFUSION THERAPY VISIT (OUTPATIENT)
Dept: ONCOLOGY | Facility: CLINIC | Age: 54
End: 2019-02-25
Attending: INTERNAL MEDICINE
Payer: COMMERCIAL

## 2019-02-25 ENCOUNTER — RESEARCH ENCOUNTER (OUTPATIENT)
Dept: ONCOLOGY | Facility: CLINIC | Age: 54
End: 2019-02-25

## 2019-02-25 ENCOUNTER — ONCOLOGY VISIT (OUTPATIENT)
Dept: ONCOLOGY | Facility: CLINIC | Age: 54
End: 2019-02-25
Attending: PHYSICIAN ASSISTANT
Payer: COMMERCIAL

## 2019-02-25 ENCOUNTER — APPOINTMENT (OUTPATIENT)
Dept: LAB | Facility: CLINIC | Age: 54
End: 2019-02-25
Attending: INTERNAL MEDICINE
Payer: COMMERCIAL

## 2019-02-25 VITALS
RESPIRATION RATE: 18 BRPM | OXYGEN SATURATION: 97 % | TEMPERATURE: 97.9 F | BODY MASS INDEX: 24.59 KG/M2 | SYSTOLIC BLOOD PRESSURE: 116 MMHG | HEIGHT: 66 IN | DIASTOLIC BLOOD PRESSURE: 86 MMHG | HEART RATE: 91 BPM | WEIGHT: 153 LBS

## 2019-02-25 DIAGNOSIS — C50.411 MALIGNANT NEOPLASM OF UPPER-OUTER QUADRANT OF RIGHT BREAST IN FEMALE, ESTROGEN RECEPTOR POSITIVE (H): Primary | ICD-10-CM

## 2019-02-25 DIAGNOSIS — Z17.0 MALIGNANT NEOPLASM OF UPPER-OUTER QUADRANT OF RIGHT BREAST IN FEMALE, ESTROGEN RECEPTOR POSITIVE (H): Primary | ICD-10-CM

## 2019-02-25 LAB
ALBUMIN SERPL-MCNC: 3.8 G/DL (ref 3.4–5)
ALP SERPL-CCNC: 51 U/L (ref 40–150)
ALT SERPL W P-5'-P-CCNC: 22 U/L (ref 0–50)
ANION GAP SERPL CALCULATED.3IONS-SCNC: 5 MMOL/L (ref 3–14)
AST SERPL W P-5'-P-CCNC: 20 U/L (ref 0–45)
BASOPHILS # BLD AUTO: 0 10E9/L (ref 0–0.2)
BASOPHILS NFR BLD AUTO: 0.5 %
BILIRUB SERPL-MCNC: 0.3 MG/DL (ref 0.2–1.3)
BUN SERPL-MCNC: 16 MG/DL (ref 7–30)
CALCIUM SERPL-MCNC: 8.8 MG/DL (ref 8.5–10.1)
CHLORIDE SERPL-SCNC: 104 MMOL/L (ref 94–109)
CO2 SERPL-SCNC: 28 MMOL/L (ref 20–32)
CREAT SERPL-MCNC: 0.57 MG/DL (ref 0.52–1.04)
DIFFERENTIAL METHOD BLD: NORMAL
EOSINOPHIL # BLD AUTO: 0.2 10E9/L (ref 0–0.7)
EOSINOPHIL NFR BLD AUTO: 2.7 %
ERYTHROCYTE [DISTWIDTH] IN BLOOD BY AUTOMATED COUNT: 12.5 % (ref 10–15)
GFR SERPL CREATININE-BSD FRML MDRD: >90 ML/MIN/{1.73_M2}
GLUCOSE SERPL-MCNC: 96 MG/DL (ref 70–99)
HCT VFR BLD AUTO: 43.1 % (ref 35–47)
HGB BLD-MCNC: 14.2 G/DL (ref 11.7–15.7)
IMM GRANULOCYTES # BLD: 0 10E9/L (ref 0–0.4)
IMM GRANULOCYTES NFR BLD: 0.3 %
LYMPHOCYTES # BLD AUTO: 1.9 10E9/L (ref 0.8–5.3)
LYMPHOCYTES NFR BLD AUTO: 29.8 %
MCH RBC QN AUTO: 29 PG (ref 26.5–33)
MCHC RBC AUTO-ENTMCNC: 32.9 G/DL (ref 31.5–36.5)
MCV RBC AUTO: 88 FL (ref 78–100)
MONOCYTES # BLD AUTO: 0.5 10E9/L (ref 0–1.3)
MONOCYTES NFR BLD AUTO: 7.6 %
NEUTROPHILS # BLD AUTO: 3.7 10E9/L (ref 1.6–8.3)
NEUTROPHILS NFR BLD AUTO: 59.1 %
NRBC # BLD AUTO: 0 10*3/UL
NRBC BLD AUTO-RTO: 0 /100
PLATELET # BLD AUTO: 301 10E9/L (ref 150–450)
POTASSIUM SERPL-SCNC: 4 MMOL/L (ref 3.4–5.3)
PROT SERPL-MCNC: 7.2 G/DL (ref 6.8–8.8)
RBC # BLD AUTO: 4.89 10E12/L (ref 3.8–5.2)
SODIUM SERPL-SCNC: 138 MMOL/L (ref 133–144)
TSH SERPL DL<=0.005 MIU/L-ACNC: 2.2 MU/L (ref 0.4–4)
WBC # BLD AUTO: 6.2 10E9/L (ref 4–11)

## 2019-02-25 PROCEDURE — 25800030 ZZH RX IP 258 OP 636: Mod: ZF | Performed by: INTERNAL MEDICINE

## 2019-02-25 PROCEDURE — G0463 HOSPITAL OUTPT CLINIC VISIT: HCPCS | Mod: ZF

## 2019-02-25 PROCEDURE — 96417 CHEMO IV INFUS EACH ADDL SEQ: CPT

## 2019-02-25 PROCEDURE — 85025 COMPLETE CBC W/AUTO DIFF WBC: CPT | Performed by: INTERNAL MEDICINE

## 2019-02-25 PROCEDURE — 84443 ASSAY THYROID STIM HORMONE: CPT | Performed by: INTERNAL MEDICINE

## 2019-02-25 PROCEDURE — 99214 OFFICE O/P EST MOD 30 MIN: CPT | Mod: ZP | Performed by: PHYSICIAN ASSISTANT

## 2019-02-25 PROCEDURE — 96367 TX/PROPH/DG ADDL SEQ IV INF: CPT

## 2019-02-25 PROCEDURE — 96413 CHEMO IV INFUSION 1 HR: CPT

## 2019-02-25 PROCEDURE — 96415 CHEMO IV INFUSION ADDL HR: CPT

## 2019-02-25 PROCEDURE — 96375 TX/PRO/DX INJ NEW DRUG ADDON: CPT

## 2019-02-25 PROCEDURE — 25000128 H RX IP 250 OP 636: Mod: ZF | Performed by: INTERNAL MEDICINE

## 2019-02-25 PROCEDURE — 80053 COMPREHEN METABOLIC PANEL: CPT | Performed by: INTERNAL MEDICINE

## 2019-02-25 RX ORDER — PROCHLORPERAZINE MALEATE 10 MG
10 TABLET ORAL EVERY 6 HOURS PRN
Qty: 30 TABLET | Refills: 2 | Status: SHIPPED | OUTPATIENT
Start: 2019-02-25 | End: 2019-03-05

## 2019-02-25 RX ORDER — LIDOCAINE/PRILOCAINE 2.5 %-2.5%
CREAM (GRAM) TOPICAL PRN
Qty: 30 G | Refills: 1 | Status: SHIPPED | OUTPATIENT
Start: 2019-02-25 | End: 2019-05-06

## 2019-02-25 RX ORDER — LORAZEPAM 0.5 MG/1
0.5 TABLET ORAL EVERY 4 HOURS PRN
Qty: 30 TABLET | Refills: 2 | Status: SHIPPED | OUTPATIENT
Start: 2019-02-25 | End: 2019-03-11

## 2019-02-25 RX ORDER — ALBUTEROL SULFATE 0.83 MG/ML
2.5 SOLUTION RESPIRATORY (INHALATION)
Status: CANCELLED | OUTPATIENT
Start: 2019-02-25

## 2019-02-25 RX ORDER — DEXAMETHASONE SODIUM PHOSPHATE 10 MG/ML
10 INJECTION, SOLUTION INTRAMUSCULAR; INTRAVENOUS
Status: CANCELLED | OUTPATIENT
Start: 2019-02-25

## 2019-02-25 RX ORDER — EPINEPHRINE 1 MG/ML
0.3 INJECTION, SOLUTION INTRAMUSCULAR; SUBCUTANEOUS EVERY 5 MIN PRN
Status: CANCELLED | OUTPATIENT
Start: 2019-02-25

## 2019-02-25 RX ORDER — EPINEPHRINE 0.3 MG/.3ML
0.3 INJECTION SUBCUTANEOUS EVERY 5 MIN PRN
Status: CANCELLED | OUTPATIENT
Start: 2019-02-25

## 2019-02-25 RX ORDER — LORAZEPAM 2 MG/ML
0.5 INJECTION INTRAMUSCULAR EVERY 4 HOURS PRN
Status: CANCELLED
Start: 2019-02-25

## 2019-02-25 RX ORDER — MEPERIDINE HYDROCHLORIDE 25 MG/ML
25 INJECTION INTRAMUSCULAR; INTRAVENOUS; SUBCUTANEOUS EVERY 30 MIN PRN
Status: CANCELLED | OUTPATIENT
Start: 2019-02-25

## 2019-02-25 RX ORDER — SODIUM CHLORIDE 9 MG/ML
1000 INJECTION, SOLUTION INTRAVENOUS CONTINUOUS PRN
Status: CANCELLED
Start: 2019-02-25

## 2019-02-25 RX ORDER — ALBUTEROL SULFATE 90 UG/1
1-2 AEROSOL, METERED RESPIRATORY (INHALATION)
Status: CANCELLED
Start: 2019-02-25

## 2019-02-25 RX ORDER — DIPHENHYDRAMINE HCL 25 MG
50 CAPSULE ORAL ONCE
Status: CANCELLED
Start: 2019-02-25

## 2019-02-25 RX ORDER — METHYLPREDNISOLONE SODIUM SUCCINATE 125 MG/2ML
125 INJECTION, POWDER, LYOPHILIZED, FOR SOLUTION INTRAMUSCULAR; INTRAVENOUS
Status: CANCELLED
Start: 2019-02-25

## 2019-02-25 RX ORDER — DIPHENHYDRAMINE HYDROCHLORIDE 50 MG/ML
50 INJECTION INTRAMUSCULAR; INTRAVENOUS
Status: CANCELLED
Start: 2019-02-25

## 2019-02-25 RX ADMIN — DIPHENHYDRAMINE HYDROCHLORIDE 50 MG: 50 INJECTION INTRAMUSCULAR; INTRAVENOUS at 13:14

## 2019-02-25 RX ADMIN — SODIUM CHLORIDE 250 ML: 9 INJECTION, SOLUTION INTRAVENOUS at 11:41

## 2019-02-25 RX ADMIN — FAMOTIDINE 20 MG: 20 INJECTION, SOLUTION INTRAVENOUS at 13:46

## 2019-02-25 RX ADMIN — SODIUM CHLORIDE 1500 MG: 9 INJECTION, SOLUTION INTRAVENOUS at 12:01

## 2019-02-25 RX ADMIN — PACLITAXEL 142 MG: 6 INJECTION, SOLUTION INTRAVENOUS at 14:11

## 2019-02-25 RX ADMIN — DEXAMETHASONE SODIUM PHOSPHATE 20 MG: 10 INJECTION, SOLUTION INTRAMUSCULAR; INTRAVENOUS at 13:29

## 2019-02-25 ASSESSMENT — MIFFLIN-ST. JEOR: SCORE: 1305.75

## 2019-02-25 ASSESSMENT — PAIN SCALES - GENERAL: PAINLEVEL: NO PAIN (0)

## 2019-02-25 NOTE — NURSING NOTE
Chief Complaint   Patient presents with     Blood Draw     Labs drawn via /PIV placed by RN in lab. VS taken.     Leidy Ruelas RN

## 2019-02-25 NOTE — PROGRESS NOTES
SPIRITUAL HEALTH SERVICES  SPIRITUAL ASSESSMENT Progress Note  [facility]- ATC    REFERRAL SOURCE: New Patient       This was Mily's visit time in the ATC    Introduced myself and [facility]S to Mily and her family (, son, and a friend with her)    Mily was happy to have others with her today to help capture all the information    Mily shared that her son is getting  this Sept in [place], [place] and she is very excited and focused on the wedding    She was feeling very positive about her treatment     PLAN: [facility]/ATC will continue to support Mily while she is receiving treatment in the clinic.    Adali Mireles  Chaplain Resident

## 2019-02-25 NOTE — PROGRESS NOTES
Infusion Nursing Note:  Kathy Lei presents today for Day 1 Cycle 1 of Study- Durvalumab (IDS#3903) and Taxol.    Patient seen by provider today: Yes: LIMA Cantor   present during visit today: Not Applicable.    Note: Patient new to oncology infusion room and is receiving Study- Durvalumab (IDS#3903), Taxol, and oral study-olaparib (IDS#3903) for the first time. Pt oriented to infusion room and call light. New patient teaching done with Mily Mortensen RNCC while in infusion. Pt received new pt folder while in infusion. Writer reinforced chemotherapy teaching/side effects and schedule. Patient instructed to call triage with questions/concerns or if he/she has chills and/or temperature greater than or equal to 100.5. Triage number: [phone]; After hours, weekends, or holidays, call [phone] and ask for oncology doctor on call.    Patient received and took study-olaparib (IDS#3903) prior to starting study- durvalumab (IDS#3903).    Per Lorne, research RN, patient does not need an EKG today. RN to do premedication after completion of Durvalumab and prior to Taxol.     Taxol titrated per protocol:  600 mL/hr for 14 mL  10 mL/hr for 5 minutes  25 mL/hr for 5 minutes  50 mL/hr for 5 minutes  100 mL/hr for 5 minutes  Max rate for the remainder of the infusion.     Intravenous Access:  Peripheral IV placed.    Treatment Conditions:  Lab Results   Component Value Date    HGB 14.2 02/25/2019     Lab Results   Component Value Date    WBC 6.2 02/25/2019      Lab Results   Component Value Date    ANEU 3.7 02/25/2019     Lab Results   Component Value Date     02/25/2019      Lab Results   Component Value Date     02/25/2019                   Lab Results   Component Value Date    POTASSIUM 4.0 02/25/2019           Lab Results   Component Value Date    MAG 2.0 02/05/2019            Lab Results   Component Value Date    CR 0.57 02/25/2019                   Lab Results   Component Value  Date    BRYANT 8.8 02/25/2019                Lab Results   Component Value Date    BILITOTAL 0.3 02/25/2019           Lab Results   Component Value Date    ALBUMIN 3.8 02/25/2019                    Lab Results   Component Value Date    ALT 22 02/25/2019           Lab Results   Component Value Date    AST 20 02/25/2019       Results reviewed, labs MET treatment parameters, ok to proceed with treatment.      Post Infusion Assessment:  Patient tolerated infusion without incident.  Blood return noted pre and post infusion.  Site patent and intact, free from redness, edema or discomfort.  No evidence of extravasations.  Access discontinued per protocol.    Discharge Plan:   Prescription refills given for Compazine and Ativan.  Discharge instructions reviewed with: Patient and Family.  Patient and/or family verbalized understanding of discharge instructions and all questions answered.  Copy of AVS reviewed with patient and/or family.  Patient aware scheduling to work on future appointment and should call if she does not see anything in Mychart in the next couple of days. Patient discharged in stable condition accompanied by: , son and friend.  Departure Mode: Ambulatory.    Tanya Davila RN

## 2019-02-25 NOTE — PROGRESS NOTES
Oncology/Hematology Visit Note  Feb 25, 2019    Reason for Visit: follow up of right breast cancer    History of Present Illness: Kathy Lei is a 53 year old female with recent diagnosis of ER/NH positive, HER2 negative, grade 2 breast cancer. Mily presented between Christmas and New Years of 2018 with new sharp pains in the upper outer quadrant of the right breast.  Diagnostic mammogram on 1/8/19 with grouped coarse heterogeneous calcifications at 11:30 position, irregular mass at 9:00 position. US with irregular mass at 11:00 position, 1.0 x 0.9 x 1.6cm. At 9:30 position, irregular mass, 3.2 x 0.5, x 1.3 cm. Contrast mammogram was subsequently performed and the contrast mammogram showed a large area of mass and non-mass-like enhancement in the upper outer right breast measuring 7.2 cm in greatest dimension.      The pathology showed part A, breast needle biopsy 11 o'clock, 6 cm from the nipple, invasive mammary carcinoma, no special type, ductal (and mucinous) Donna grade 2.  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  Calcifications were associated with the DCIS.  There was a focus suspicious for lymphovascular invasion.  Invasive carcinoma was estrogen receptor positive and progesterone receptor negative by immunohistochemistry.  In part B, breast right, 8 o'clock, 4 cm from the nipple, ultrasound-guided core biopsy, invasive mammary carcinoma, no special type, ductal (and mucinous) Donna grade 2, occasional calcifications were noted.  Invasive carcinoma was estrogen receptor positive and progesterone receptor negative by immunohistochemistry.  On quantitation of the ER and NH, ER was positive 99% of the cells staining with strong intensity.  NH was subsequently noted to be positive with 15% of the cells staining with moderate intensity.       There was also a HER2 FISH performed, and the HER2 FISH showed average number of HER2 signals per nucleus  2.6.  Average number of CEN17 signals 1.7 with a ratio of 1.6, VASILIY negative for biopsy A.  For biopsy B, the HER2 signals per nucleus 2.9.  Average number CEN17 signals per nucleus 1.7 with a ratio of 1.7, VASILIY negative.  Overall, by 2018 ASCO/CAP guidelines, this tumor is HER2 negative.     She was enrolled in I-SPY2 and is here to start cycle 1 Durvulumab, Taxol and Olaparib. Please see Dr. Guzman's previous notes for further details on the patient's history.     Interval History:  Mily is here with her  Vincenzo, her son Clay and her friend. She is overall feeling well. She had bronchitis a few weeks ago and completed a Zpack and course of prednisone. She denies any cough, wheezing, fevers. Appetite good. She has some apprehension about developing nausea on chemotherapy. Weight is stable. She is on Zoloft for depression and feels this is well-controlled. She is working at a .    Review of Systems:  Patient denies any of the following except if noted above: fatigue, chest pain, dyspnea, abdominal pain, nausea, vomiting, diarrhea, constipation, urinary concerns, headaches, numbness, tingling, rashes or skin lesions, bleeding or bruising issues.    Current Outpatient Medications   Medication Sig Dispense Refill     ADVIL 200 MG OR TABS 1 TABLET EVERY 4 TO 6 HOURS AS NEEDED 0 0     albuterol (2.5 MG/3ML) 0.083% nebulizer solution Take 3 mLs by nebulization once for 1 dose. 3 mL 0     albuterol (VENTOLIN HFA) 108 (90 Base) MCG/ACT Inhaler INHALE 1-2 PUFFS INTO THE LUNGS EVERY 4 HOURS AS NEEDED FOR SHORTNESS OF BREATH OR DIFFICULTY BREATHING. 18 g PRN     amLODIPine (NORVASC) 2.5 MG tablet Take 1 tablet (2.5 mg) by mouth daily 90 tablet 3     ASPIRIN NOT PRESCRIBED (INTENTIONAL) Please choose reason not prescribed, below       fluticasone (FLOVENT HFA) 110 MCG/ACT inhaler Inhale 2 puffs into the lungs 2 times daily 1 Inhaler PRN     levothyroxine (SYNTHROID/LEVOTHROID) 88 MCG tablet Take 1 tablet (88 mcg)  by mouth daily 90 tablet PRN     lisinopril (PRINIVIL/ZESTRIL) 5 MG tablet Take 1 tablet (5 mg) by mouth daily 90 tablet 3     LORazepam (ATIVAN) 0.5 MG tablet Take 1 tablet (0.5 mg) by mouth every 8 hours as needed for anxiety 20 tablet 2     order for DME Cranial prosthesis 1 Units 1     predniSONE (DELTASONE) 20 MG tablet Take 3 tablets daily for 3 days then 2 tablets daily for 3 days then one tablet daily for 3 days. 18 tablet 0     sertraline (ZOLOFT) 50 MG tablet One daily 90 tablet 1     STATIN NOT PRESCRIBED (INTENTIONAL) Please choose reason not prescribed, below         Physical Examination:  General: The patient is a pleasant female in no acute distress.  There were no vitals taken for this visit.  Wt Readings from Last 10 Encounters:   02/11/19 69.9 kg (154 lb)   02/06/19 68.5 kg (151 lb)   02/05/19 69.2 kg (152 lb 9.6 oz)   01/22/19 67.4 kg (148 lb 8 oz)   01/03/19 66.5 kg (146 lb 8 oz)   07/16/18 65.9 kg (145 lb 6 oz)   01/19/17 65.8 kg (145 lb)   12/07/15 69.9 kg (154 lb)   01/26/15 69.1 kg (152 lb 4 oz)   01/02/15 69.4 kg (153 lb)     HEENT: EOMI, PERRL. Sclerae are anicteric. Oral mucosa is pink and moist with no lesions or thrush.   Lymph: No palpable cervical, supraclavicular, subclavicular or axillary lymphadenopathy.  Heart: Regular rate and rhythm.   Lungs: Clear to auscultation bilaterally.   Breast: Right breast with 9cm x 7cm mass in upper outer quadrant.  Abdomen: Bowel sounds present, soft, nontender with no palpable hepatosplenomegaly or masses.   Extremities: No lower extremity edema noted bilaterally.   Neuro: Cranial nerves II through XII are grossly intact.  Skin: No rashes, petechiae, or bruising noted on exposed skin.    Laboratory Data:  Results for LUAN GALA J (MRN [id number]) as of 2/25/2019 10:09   Ref. Range 2/25/2019 09:09   Sodium Latest Ref Range: 133 - 144 mmol/L 138   Potassium Latest Ref Range: 3.4 - 5.3 mmol/L 4.0   Chloride Latest Ref Range: 94 - 109 mmol/L 104    Carbon Dioxide Latest Ref Range: 20 - 32 mmol/L 28   Urea Nitrogen Latest Ref Range: 7 - 30 mg/dL 16   Creatinine Latest Ref Range: 0.52 - 1.04 mg/dL 0.57   GFR Estimate Latest Ref Range: >60 mL/min/1.73_m2 >90   GFR Estimate If Black Latest Ref Range: >60 mL/min/1.73_m2 >90   Calcium Latest Ref Range: 8.5 - 10.1 mg/dL 8.8   Anion Gap Latest Ref Range: 3 - 14 mmol/L 5   Albumin Latest Ref Range: 3.4 - 5.0 g/dL 3.8   Protein Total Latest Ref Range: 6.8 - 8.8 g/dL 7.2   Bilirubin Total Latest Ref Range: 0.2 - 1.3 mg/dL 0.3   Alkaline Phosphatase Latest Ref Range: 40 - 150 U/L 51   ALT Latest Ref Range: 0 - 50 U/L 22   AST Latest Ref Range: 0 - 45 U/L 20   TSH Latest Ref Range: 0.40 - 4.00 mU/L 2.20   Glucose Latest Ref Range: 70 - 99 mg/dL 96   WBC Latest Ref Range: 4.0 - 11.0 10e9/L 6.2   Hemoglobin Latest Ref Range: 11.7 - 15.7 g/dL 14.2   Hematocrit Latest Ref Range: 35.0 - 47.0 % 43.1   Platelet Count Latest Ref Range: 150 - 450 10e9/L 301   RBC Count Latest Ref Range: 3.8 - 5.2 10e12/L 4.89   MCV Latest Ref Range: 78 - 100 fl 88   MCH Latest Ref Range: 26.5 - 33.0 pg 29.0   MCHC Latest Ref Range: 31.5 - 36.5 g/dL 32.9   RDW Latest Ref Range: 10.0 - 15.0 % 12.5   Diff Method Unknown Automated Method   % Neutrophils Latest Units: % 59.1   % Lymphocytes Latest Units: % 29.8   % Monocytes Latest Units: % 7.6   % Eosinophils Latest Units: % 2.7   % Basophils Latest Units: % 0.5   % Immature Granulocytes Latest Units: % 0.3   Nucleated RBCs Latest Ref Range: 0 /100 0   Absolute Neutrophil Latest Ref Range: 1.6 - 8.3 10e9/L 3.7   Absolute Lymphocytes Latest Ref Range: 0.8 - 5.3 10e9/L 1.9   Absolute Monocytes Latest Ref Range: 0.0 - 1.3 10e9/L 0.5   Absolute Eosinophils Latest Ref Range: 0.0 - 0.7 10e9/L 0.2   Absolute Basophils Latest Ref Range: 0.0 - 0.2 10e9/L 0.0   Abs Immature Granulocytes Latest Ref Range: 0 - 0.4 10e9/L 0.0   Absolute Nucleated RBC Unknown 0.0     Imaging:  Examination:  CT CHEST/ABDOMEN/PELVIS  W CONTRAST, 2/5/2019 8:46 AM      Comparison: Breast MRI 2/4/2019     History: Research; Malignant neoplasm of upper-outer quadrant of right  breast in female, estrogen receptor positive (H); Malignant neoplasm  of upper-outer quadrant of right breast in female, estrogen receptor  positive (H)     Technique: Volumetric helical acquisition of CT images from the  thoracic inlet to the symphysis pubis after the uncomplicated  administration of Isovue 370 91cc. Coronal and sagittal images and  axial MIP images were reconstructed from the source data.     Findings:  Chest:  Bilateral subglandular saline breast implants. Multifocal nodular  enhancement in the lateral right breast similar in appearance to the  comparison MR. The visualized thyroid is unremarkable. Heart size is  normal. The great vessels are normal in caliber and appearance. There  is no pericardial effusion. No mediastinal, hilar, or axillary  lymphadenopathy by the short axis criterion. Mildly prominent axillary  lymph nodes are symmetric in appearance. The central tracheobronchial  tree is patent. There is no focal airspace consolidation, pleural  effusion, or pneumothorax. There is a single 2 mm nodule in the right  lower lobe (series 10, image 107).     Abdomen/pelvis:  The liver, gallbladder, kidneys, adrenal glands, spleen, and pancreas  are normal. There are no dilated loops of large or small bowel. No  focal bowel wall thickening or mucosal hyperenhancement. The appendix  is normal. Scattered colonic diverticulosis without evidence for  active diverticulitis. The major intra-abdominal vasculature is patent  and within normal limits for caliber. There is no intra-abdominal or  pelvic free air, fluid, or lymphadenopathy. The bladder is distended  and normal in appearance. No pelvic masses.     Bones:  Thoracolumbar scoliosis with a Lacey kim in place and multilevel  ankylosis of the thoracic laminae. No acute osseus abnormality or  suspicious  bony lesion.                                                                      Impression:   1. Nodular enhancing lesions in the right breast consistent with  biopsy proven cancer.  2. No evidence of metastatic disease in the chest, abdomen, or pelvis.  3. 2 mm right lower lobe pulmonary nodule is technically  indeterminate.  4. Diverticulosis without diverticulitis.     I have personally reviewed the examination and initial interpretation  and I agree with the findings.     AMOS NAIR MD    Assessment and Plan:    Right breast cancer, ER/NM positive, HER2 negative: She was enrolled in I-SPY2 and is here to start cycle 1 Durvulumab, Taxol and Olaparib. We reviewed potential side effects of Taxol and their management including myelosuppression, nausea, vomiting, bowel changes, hair loss, arthralgias/myalgias, fatigue, peripheral neuropathy, mouth sores, temporary elevation in LFT's, ankle swelling, and nail changes. We reviewed the use of antiemetics, bowel medications, and salt/soda swishes. We reviewed that olaparib can also cause nausea/vomiting, diarrhea. We reviewed the most common side effect of immunotherapy is fatigue, of which the intensity is variable. We also reviewed the potential for an immune mediated side effect involving inflammation in the pituitary gland, thyroid, heart, lungs, liver, kidneys, pancreas, or colon. We reviewed immune mediated side effects would be treated with high dose steroids.   --Labs reviewed. Will proceed with C1  --Port scheduled for 3/6.    Abnormal echocardiogram: The patient's echocardiogram has both low strain and low normal ejection fraction.  Both of these tests are very borderline, and she has no symptoms of heart failure.  In addition, she has no historical features that would put her at risk of having heart failure, with the exception of mildly excessive alcohol use.  However, in the setting of future Adriamycin use, she is likely at higher risk than normal  for chemotherapy-induced cardiomyopathy.  Therefore, recommend that the patient switch to lisinopril 5 mg instead of amlodipine for cardioprotection.  She should be followed with serial strain imaging throughout the course of her chemotherapy.  --Scheduled for echo and visit with Dr. Alanis on 5/6 Hx depression: On Zoloft  Hx asthma: On flovent BID and albuterol prn  Hx hypothyroidism: on Levothyroxine 88mcg daily. TSH WNL today.    Kerry Norwood PA-C  [facility]  [address], [place] [id number] [phone]

## 2019-02-25 NOTE — PATIENT INSTRUCTIONS
Contact Numbers  [facility]: [phone]    After Hours:  [phone]  Triage: [phone]    Please call the [facility] Triage line if you experience a temperature greater than or equal to 100.5, shaking chills, have uncontrolled nausea, vomiting and/or diarrhea, dizziness, shortness of breath, chest pain, bleeding, unexplained bruising, or if you have any other new/concerning symptoms, questions or concerns.     If it is after hours, weekends, or holidays, please call the main hospital  at  [phone] and ask to speak to the Oncology doctor on call.     If you are having any concerning symptoms or wish to speak to a provider before your next infusion visit, please call your care coordinator or triage to notify them so we can adequately serve you.     If you need a refill on a narcotic prescription or other medication, please call triage before your infusion appointment.       Lab Results:  Recent Results (from the past 12 hour(s))   CBC with platelets differential    Collection Time: 02/25/19  9:09 AM   Result Value Ref Range    WBC 6.2 4.0 - 11.0 10e9/L    RBC Count 4.89 3.8 - 5.2 10e12/L    Hemoglobin 14.2 11.7 - 15.7 g/dL    Hematocrit 43.1 35.0 - 47.0 %    MCV 88 78 - 100 fl    MCH 29.0 26.5 - 33.0 pg    MCHC 32.9 31.5 - 36.5 g/dL    RDW 12.5 10.0 - 15.0 %    Platelet Count 301 150 - 450 10e9/L    Diff Method Automated Method     % Neutrophils 59.1 %    % Lymphocytes 29.8 %    % Monocytes 7.6 %    % Eosinophils 2.7 %    % Basophils 0.5 %    % Immature Granulocytes 0.3 %    Nucleated RBCs 0 0 /100    Absolute Neutrophil 3.7 1.6 - 8.3 10e9/L    Absolute Lymphocytes 1.9 0.8 - 5.3 10e9/L    Absolute Monocytes 0.5 0.0 - 1.3 10e9/L    Absolute Eosinophils 0.2 0.0 - 0.7 10e9/L    Absolute Basophils 0.0 0.0 - 0.2 10e9/L    Abs Immature Granulocytes 0.0 0 - 0.4 10e9/L    Absolute Nucleated RBC 0.0    Comprehensive metabolic panel    Collection Time: 02/25/19  9:09 AM   Result Value Ref Range     Sodium 138 133 - 144 mmol/L    Potassium 4.0 3.4 - 5.3 mmol/L    Chloride 104 94 - 109 mmol/L    Carbon Dioxide 28 20 - 32 mmol/L    Anion Gap 5 3 - 14 mmol/L    Glucose 96 70 - 99 mg/dL    Urea Nitrogen 16 7 - 30 mg/dL    Creatinine 0.57 0.52 - 1.04 mg/dL    GFR Estimate >90 >60 mL/min/[1.73_m2]    GFR Estimate If Black >90 >60 mL/min/[1.73_m2]    Calcium 8.8 8.5 - 10.1 mg/dL    Bilirubin Total 0.3 0.2 - 1.3 mg/dL    Albumin 3.8 3.4 - 5.0 g/dL    Protein Total 7.2 6.8 - 8.8 g/dL    Alkaline Phosphatase 51 40 - 150 U/L    ALT 22 0 - 50 U/L    AST 20 0 - 45 U/L   TSH    Collection Time: 02/25/19  9:09 AM   Result Value Ref Range    TSH 2.20 0.40 - 4.00 mU/L

## 2019-02-25 NOTE — NURSING NOTE
[id number]: Informed Consent Note     The consent form, including purpose, risks and benefits, was reviewed with Kathy Lei, and all questions were answered before she signed the consent form. The patient understands that the study involves an active treatment phase as well as a post-treatment follow up phase.     Present during the discussion was Mily,  Vincenzo, son, and friend. A copy of the signed form was provided to the patient. No procedures specific to this study were performed prior to the patient signing the consent form.    Consent Version Date: 10/31/2018  Consent obtained by: Lorne Leos    Date: 2/25/2019  HIPAA authorization signed?: no, signed  authorization   Form 503.03.01 (Version 2)     Effective date: 01AUG2018     Next Review Date: 01AUG2020 [id number]: Study Visit Note   Subject name: Kathy Lei     Visit: C1    Did the study visit occur within the appropriate window allowed by the protocol? yes    Patient here for first study treatment. Patient currently has no complaints.    This writer gave first dose of olaparib and gave instructions in how and when to take medications; side effects were reviewed and patient was instructed when to call provider.    I have personally interviewed Kathy Lei and reviewed her medical record for adverse events and concomitant medications and these have been recorded on the corresponding logs in Kathy Lei's research file.     Kathy Lei was given the opportunity to ask any trial related questions.  Please see provider progress note for physical exam and other clinical information. Labs were reviewed - any significant lab values were addressed and reviewed.      [id number]: Medication Count/IDS Note      Kathy Lei is enrolled on the trial number listed above. The patient presented for her C1 day visit.   IDS number: [id number]  Drug name: Olaparib  Number of bottles dispensed: 1  Lot  number(s): [id number]  Number of pills dispensed: 60    Drug diary provided to patient.    Lorne Leos RN     Pager: [phone]      Form 504.00.01 (Version 1)     Effective date: 01JUL2018     Next Review Date: 01JUL2020

## 2019-02-25 NOTE — LETTER
2/25/2019      RE: Kathy Lei  [address] [place] [address]       Oncology/Hematology Visit Note  Feb 25, 2019    Reason for Visit: follow up of right breast cancer    History of Present Illness: Kathy Lei is a 53 year old female with recent diagnosis of ER/NY positive, HER2 negative, grade 2 breast cancer. Mily presented between Christmas and New Years of 2018 with new sharp pains in the upper outer quadrant of the right breast.   Diagnostic mammogram on 1/8/19 with grouped coarse heterogeneous calcifications at 11:30 position, irregular mass at 9:00 position. US with irregular mass at 11:00 position, 1.0 x 0.9 x 1.6cm. At 9:30 position, irregular mass, 3.2 x 0.5, x 1.3 cm. Contrast mammogram was subsequently performed and the contrast mammogram showed a large area of mass and non-mass-like enhancement in the upper outer right breast measuring 7.2 cm in greatest dimension.      The pathology showed part A, breast needle biopsy 11 o'clock, 6 cm from the nipple, invasive mammary carcinoma, no special type, ductal (and mucinous) [place] grade 2.  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  Calcifications were associated with the DCIS.  There was a focus suspicious for lymphovascular invasion.  Invasive carcinoma was estrogen receptor positive and progesterone receptor negative by immunohistochemistry.  In part B, breast right, 8 o'clock, 4 cm from the nipple, ultrasound-guided core biopsy, invasive mammary carcinoma, no special type, ductal (and mucinous) [place] grade 2, occasional calcifications were noted.  Invasive carcinoma was estrogen receptor positive and progesterone receptor negative by immunohistochemistry.  On quantitation of the ER and NY, ER was positive 99% of the cells staining with strong intensity.  [place] was subsequently noted to be positive with 15% of the cells staining with moderate intensity.       There was also  a HER2 FISH performed, and the HER2 FISH showed average number of HER2 signals per nucleus 2.6.  Average number of CEN17 signals 1.7 with a ratio of 1.6, VASILIY negative for biopsy A.  For biopsy B, the HER2 signals per nucleus 2.9.  Average number CEN17 signals per nucleus 1.7 with a ratio of 1.7, VASILIY negative.  Overall, by 2018 ASCO/CAP guidelines, this tumor is HER2 negative.     She was enrolled in I-SPY2 and is here to start cycle 1 Durvulumab, Taxol and Olaparib. Please see Dr. Guzman's previous notes for further details on the patient's history.     Interval History:  Mily is here with her  Vincenzo, her son Clay and her friend. She is overall feeling well. She had bronchitis a few weeks ago and completed a Zpack and course of prednisone. She denies any cough, wheezing, fevers. Appetite good. She has some apprehension about developing nausea on chemotherapy. Weight is stable. She is on Zoloft for depression and feels this is well-controlled. She is working at a hair dresser.    Review of Systems:  Patient denies any of the following except if noted above: fatigue, chest pain, dyspnea, abdominal pain, nausea, vomiting, diarrhea, constipation, urinary concerns, headaches, numbness, tingling, rashes or skin lesions, bleeding or bruising issues.    Current Outpatient Medications   Medication Sig Dispense Refill     ADVIL 200 MG OR TABS 1 TABLET EVERY 4 TO 6 HOURS AS NEEDED 0 0     albuterol (2.5 MG/3ML) 0.083% nebulizer solution Take 3 mLs by nebulization once for 1 dose. 3 mL 0     albuterol (VENTOLIN HFA) 108 (90 Base) MCG/ACT Inhaler INHALE 1-2 PUFFS INTO THE LUNGS EVERY 4 HOURS AS NEEDED FOR SHORTNESS OF BREATH OR DIFFICULTY BREATHING. 18 g PRN     amLODIPine (NORVASC) 2.5 MG tablet Take 1 tablet (2.5 mg) by mouth daily 90 tablet 3     ASPIRIN NOT PRESCRIBED (INTENTIONAL) Please choose reason not prescribed, below       fluticasone (FLOVENT HFA) 110 MCG/ACT inhaler Inhale 2 puffs into the lungs 2 times daily  1 Inhaler PRN     levothyroxine (SYNTHROID/LEVOTHROID) 88 MCG tablet Take 1 tablet (88 mcg) by mouth daily 90 tablet PRN     lisinopril (PRINIVIL/ZESTRIL) 5 MG tablet Take 1 tablet (5 mg) by mouth daily 90 tablet 3     LORazepam (ATIVAN) 0.5 MG tablet Take 1 tablet (0.5 mg) by mouth every 8 hours as needed for anxiety 20 tablet 2     order for DME Cranial prosthesis 1 Units 1     predniSONE (DELTASONE) 20 MG tablet Take 3 tablets daily for 3 days then 2 tablets daily for 3 days then one tablet daily for 3 days. 18 tablet 0     sertraline (ZOLOFT) 50 MG tablet One daily 90 tablet 1     STATIN NOT PRESCRIBED (INTENTIONAL) Please choose reason not prescribed, below         Physical Examination:  General: The patient is a pleasant female in no acute distress.  There were no vitals taken for this visit.  Wt Readings from Last 10 Encounters:   02/11/19 69.9 kg (154 lb)   02/06/19 68.5 kg (151 lb)   02/05/19 69.2 kg (152 lb 9.6 oz)   01/22/19 67.4 kg (148 lb 8 oz)   01/03/19 66.5 kg (146 lb 8 oz)   07/16/18 65.9 kg (145 lb 6 oz)   01/19/17 65.8 kg (145 lb)   12/07/15 69.9 kg (154 lb)   01/26/15 69.1 kg (152 lb 4 oz)   01/02/15 69.4 kg (153 lb)     HEENT: EOMI, PERRL. Sclerae are anicteric. Oral mucosa is pink and moist with no lesions or thrush.   Lymph: No palpable cervical, supraclavicular, subclavicular or axillary lymphadenopathy.  Heart: Regular rate and rhythm.   Lungs: Clear to auscultation bilaterally.   Breast: Right breast with 9cm x 7cm mass in upper outer quadrant.  Abdomen: Bowel sounds present, soft, nontender with no palpable hepatosplenomegaly or masses.   Extremities: No lower extremity edema noted bilaterally.   Neuro: Cranial nerves II through XII are grossly intact.  Skin: No rashes, petechiae, or bruising noted on exposed skin.    Laboratory Data:  Results for GALA ROLAND (MRN [id number]) as of 2/25/2019 10:09   Ref. Range 2/25/2019 09:09   Sodium Latest Ref Range: 133 - 144 mmol/L 138    Potassium Latest Ref Range: 3.4 - 5.3 mmol/L 4.0   Chloride Latest Ref Range: 94 - 109 mmol/L 104   Carbon Dioxide Latest Ref Range: 20 - 32 mmol/L 28   Urea Nitrogen Latest Ref Range: 7 - 30 mg/dL 16   Creatinine Latest Ref Range: 0.52 - 1.04 mg/dL 0.57   GFR Estimate Latest Ref Range: >60 mL/min/1.73_m2 >90   GFR Estimate If Black Latest Ref Range: >60 mL/min/1.73_m2 >90   Calcium Latest Ref Range: 8.5 - 10.1 mg/dL 8.8   Anion Gap Latest Ref Range: 3 - 14 mmol/L 5   Albumin Latest Ref Range: 3.4 - 5.0 g/dL 3.8   Protein Total Latest Ref Range: 6.8 - 8.8 g/dL 7.2   Bilirubin Total Latest Ref Range: 0.2 - 1.3 mg/dL 0.3   Alkaline Phosphatase Latest Ref Range: 40 - 150 U/L 51   ALT Latest Ref Range: 0 - 50 U/L 22   AST Latest Ref Range: 0 - 45 U/L 20   TSH Latest Ref Range: 0.40 - 4.00 mU/L 2.20   Glucose Latest Ref Range: 70 - 99 mg/dL 96   WBC Latest Ref Range: 4.0 - 11.0 10e9/L 6.2   Hemoglobin Latest Ref Range: 11.7 - 15.7 g/dL 14.2   Hematocrit Latest Ref Range: 35.0 - 47.0 % 43.1   Platelet Count Latest Ref Range: 150 - 450 10e9/L 301   RBC Count Latest Ref Range: 3.8 - 5.2 10e12/L 4.89   MCV Latest Ref Range: 78 - 100 fl 88   MCH Latest Ref Range: 26.5 - 33.0 pg 29.0   MCHC Latest Ref Range: 31.5 - 36.5 g/dL 32.9   RDW Latest Ref Range: 10.0 - 15.0 % 12.5   Diff Method Unknown Automated Method   % Neutrophils Latest Units: % 59.1   % Lymphocytes Latest Units: % 29.8   % Monocytes Latest Units: % 7.6   % Eosinophils Latest Units: % 2.7   % Basophils Latest Units: % 0.5   % Immature Granulocytes Latest Units: % 0.3   Nucleated RBCs Latest Ref Range: 0 /100 0   Absolute Neutrophil Latest Ref Range: 1.6 - 8.3 10e9/L 3.7   Absolute Lymphocytes Latest Ref Range: 0.8 - 5.3 10e9/L 1.9   Absolute Monocytes Latest Ref Range: 0.0 - 1.3 10e9/L 0.5   Absolute Eosinophils Latest Ref Range: 0.0 - 0.7 10e9/L 0.2   Absolute Basophils Latest Ref Range: 0.0 - 0.2 10e9/L 0.0   Abs Immature Granulocytes Latest Ref Range: 0 - 0.4  10e9/L 0.0   Absolute Nucleated RBC Unknown 0.0     Imaging:  Examination:  CT CHEST/ABDOMEN/PELVIS W CONTRAST, 2/5/2019 8:46 AM      Comparison: Breast MRI 2/4/2019     History: Research; Malignant neoplasm of upper-outer quadrant of right  breast in female, estrogen receptor positive (H); Malignant neoplasm  of upper-outer quadrant of right breast in female, estrogen receptor  positive (H)     Technique: Volumetric helical acquisition of CT images from the  thoracic inlet to the symphysis pubis after the uncomplicated  administration of Isovue 370 91cc. Coronal and sagittal images and  axial MIP images were reconstructed from the source data.     Findings:  Chest:  Bilateral subglandular saline breast implants. Multifocal nodular  enhancement in the lateral right breast similar in appearance to the  comparison MR. The visualized thyroid is unremarkable. Heart size is  normal. The great vessels are normal in caliber and appearance. There  is no pericardial effusion. No mediastinal, hilar, or axillary  lymphadenopathy by the short axis criterion. Mildly prominent axillary  lymph nodes are symmetric in appearance. The central tracheobronchial  tree is patent. There is no focal airspace consolidation, pleural  effusion, or pneumothorax. There is a single 2 mm nodule in the right  lower lobe (series 10, image 107).     Abdomen/pelvis:  The liver, gallbladder, kidneys, adrenal glands, spleen, and pancreas  are normal. There are no dilated loops of large or small bowel. No  focal bowel wall thickening or mucosal hyperenhancement. The appendix  is normal. Scattered colonic diverticulosis without evidence for  active diverticulitis. The major intra-abdominal vasculature is patent  and within normal limits for caliber. There is no intra-abdominal or  pelvic free air, fluid, or lymphadenopathy. The bladder is distended  and normal in appearance. No pelvic masses.     Bones:  Thoracolumbar scoliosis with a Lacey kim in  place and multilevel  ankylosis of the thoracic laminae. No acute osseus abnormality or  suspicious bony lesion.                                                                      Impression:   1. Nodular enhancing lesions in the right breast consistent with  biopsy proven cancer.  2. No evidence of metastatic disease in the chest, abdomen, or pelvis.  3. 2 mm right lower lobe pulmonary nodule is technically  indeterminate.  4. Diverticulosis without diverticulitis.     I have personally reviewed the examination and initial interpretation  and I agree with the findings.     AMOS NAIR MD    Assessment and Plan:    Right breast cancer, ER/[place] positive, HER2 negative: She was enrolled in I-SPY2 and is here to start cycle 1 Durvulumab, Taxol and Olaparib. We reviewed potential side effects of Taxol and their management including myelosuppression, nausea, vomiting, bowel changes, hair loss, arthralgias/myalgias, fatigue, peripheral neuropathy, mouth sores, temporary elevation in LFT's, ankle swelling, and nail changes. We reviewed the use of antiemetics, bowel medications, and salt/soda swishes. We reviewed that olaparib can also cause nausea/vomiting, diarrhea. We reviewed the most common side effect of immunotherapy is fatigue, of which the intensity is variable. We also reviewed the potential for an immune mediated side effect involving inflammation in the pituitary gland, thyroid, heart, lungs, liver, kidneys, pancreas, or colon. We reviewed immune mediated side effects would be treated with high dose steroids.   --Labs reviewed. Will proceed with C1  --Port scheduled for 3/6.    Abnormal echocardiogram: The patient's echocardiogram has both low strain and low normal ejection fraction.  Both of these tests are very borderline, and she has no symptoms of heart failure.  In addition, she has no historical features that would put her at risk of having heart failure, with the exception of mildly excessive  alcohol use.  However, in the setting of future Adriamycin use, she is likely at higher risk than normal for chemotherapy-induced cardiomyopathy.  Therefore, recommend that the patient switch to lisinopril 5 mg instead of amlodipine for cardioprotection.  She should be followed with serial strain imaging throughout the course of her chemotherapy.  --Scheduled for echo and visit with Dr. Alanis on 5/6      Hx depression: On Zoloft  Hx asthma: On flovent BID and albuterol prn  Hx hypothyroidism: on Levothyroxine 88mcg daily. TSH WNL today.    Kerry Norwood PA-C  [facility]  [address] [place] [address] [phone]

## 2019-02-25 NOTE — NURSING NOTE
"Oncology Rooming Note    February 25, 2019 9:19 AM   Kathy Lei is a 53 year old female who presents for:    Chief Complaint   Patient presents with     Blood Draw     Labs drawn via /PIV placed by RN in lab. VS taken.     Oncology Clinic Visit     Breast Cancer     Initial Vitals: /86 (BP Location: Right arm, Patient Position: Sitting, Cuff Size: Adult Regular)   Pulse 91   Temp 97.9  F (36.6  C) (Oral)   Resp 18   Ht 1.676 m (5' 6\")   Wt 69.4 kg (153 lb)   SpO2 97%   BMI 24.69 kg/m   Estimated body mass index is 24.69 kg/m  as calculated from the following:    Height as of this encounter: 1.676 m (5' 6\").    Weight as of this encounter: 69.4 kg (153 lb). Body surface area is 1.8 meters squared.  No Pain (0) Comment: Data Unavailable   No LMP recorded. Patient is perimenopausal.  Allergies reviewed: Yes  Medications reviewed: Yes    Medications:Lorazepam, Zofran  Pharmacy name entered into [facility]:    [facility] - [place], [place] - [address] [place] [address] DRUG STORE [address] [place] [address] [address] AT [address]    Clinical concerns: No New Concerns    5 minutes for nursing intake (face to face time)     FERNIE Garcia      "

## 2019-02-27 DIAGNOSIS — Z17.0 MALIGNANT NEOPLASM OF UPPER-OUTER QUADRANT OF RIGHT BREAST IN FEMALE, ESTROGEN RECEPTOR POSITIVE (H): Primary | ICD-10-CM

## 2019-02-27 DIAGNOSIS — C50.411 MALIGNANT NEOPLASM OF UPPER-OUTER QUADRANT OF RIGHT BREAST IN FEMALE, ESTROGEN RECEPTOR POSITIVE (H): Primary | ICD-10-CM

## 2019-03-05 ENCOUNTER — RESEARCH ENCOUNTER (OUTPATIENT)
Dept: ONCOLOGY | Facility: CLINIC | Age: 54
End: 2019-03-05

## 2019-03-05 ENCOUNTER — ANESTHESIA EVENT (OUTPATIENT)
Dept: SURGERY | Facility: AMBULATORY SURGERY CENTER | Age: 54
End: 2019-03-05

## 2019-03-05 ENCOUNTER — APPOINTMENT (OUTPATIENT)
Dept: LAB | Facility: CLINIC | Age: 54
End: 2019-03-05
Attending: INTERNAL MEDICINE
Payer: COMMERCIAL

## 2019-03-05 ENCOUNTER — ONCOLOGY VISIT (OUTPATIENT)
Dept: ONCOLOGY | Facility: CLINIC | Age: 54
End: 2019-03-05
Attending: INTERNAL MEDICINE
Payer: COMMERCIAL

## 2019-03-05 VITALS
BODY MASS INDEX: 24.48 KG/M2 | DIASTOLIC BLOOD PRESSURE: 81 MMHG | RESPIRATION RATE: 16 BRPM | HEIGHT: 66 IN | SYSTOLIC BLOOD PRESSURE: 132 MMHG | TEMPERATURE: 98.3 F | OXYGEN SATURATION: 97 % | WEIGHT: 152.3 LBS | HEART RATE: 85 BPM

## 2019-03-05 DIAGNOSIS — Z17.0 MALIGNANT NEOPLASM OF UPPER-OUTER QUADRANT OF RIGHT BREAST IN FEMALE, ESTROGEN RECEPTOR POSITIVE (H): ICD-10-CM

## 2019-03-05 DIAGNOSIS — C50.411 MALIGNANT NEOPLASM OF UPPER-OUTER QUADRANT OF RIGHT BREAST IN FEMALE, ESTROGEN RECEPTOR POSITIVE (H): Primary | ICD-10-CM

## 2019-03-05 DIAGNOSIS — K21.9 GASTROESOPHAGEAL REFLUX DISEASE WITHOUT ESOPHAGITIS: Primary | ICD-10-CM

## 2019-03-05 DIAGNOSIS — Z17.0 MALIGNANT NEOPLASM OF UPPER-OUTER QUADRANT OF RIGHT BREAST IN FEMALE, ESTROGEN RECEPTOR POSITIVE (H): Primary | ICD-10-CM

## 2019-03-05 DIAGNOSIS — C50.411 MALIGNANT NEOPLASM OF UPPER-OUTER QUADRANT OF RIGHT BREAST IN FEMALE, ESTROGEN RECEPTOR POSITIVE (H): ICD-10-CM

## 2019-03-05 LAB
ALBUMIN SERPL-MCNC: 3.8 G/DL (ref 3.4–5)
ALP SERPL-CCNC: 53 U/L (ref 40–150)
ALT SERPL W P-5'-P-CCNC: 22 U/L (ref 0–50)
ANION GAP SERPL CALCULATED.3IONS-SCNC: 7 MMOL/L (ref 3–14)
AST SERPL W P-5'-P-CCNC: 15 U/L (ref 0–45)
BASOPHILS # BLD AUTO: 0 10E9/L (ref 0–0.2)
BASOPHILS NFR BLD AUTO: 0.7 %
BILIRUB SERPL-MCNC: 0.3 MG/DL (ref 0.2–1.3)
BUN SERPL-MCNC: 10 MG/DL (ref 7–30)
CALCIUM SERPL-MCNC: 8.9 MG/DL (ref 8.5–10.1)
CHLORIDE SERPL-SCNC: 103 MMOL/L (ref 94–109)
CO2 SERPL-SCNC: 28 MMOL/L (ref 20–32)
CREAT SERPL-MCNC: 0.64 MG/DL (ref 0.52–1.04)
DIFFERENTIAL METHOD BLD: NORMAL
EOSINOPHIL # BLD AUTO: 0.2 10E9/L (ref 0–0.7)
EOSINOPHIL NFR BLD AUTO: 3.2 %
ERYTHROCYTE [DISTWIDTH] IN BLOOD BY AUTOMATED COUNT: 12.7 % (ref 10–15)
GFR SERPL CREATININE-BSD FRML MDRD: >90 ML/MIN/{1.73_M2}
GLUCOSE SERPL-MCNC: 95 MG/DL (ref 70–99)
HCT VFR BLD AUTO: 41.8 % (ref 35–47)
HGB BLD-MCNC: 14.2 G/DL (ref 11.7–15.7)
IMM GRANULOCYTES # BLD: 0 10E9/L (ref 0–0.4)
IMM GRANULOCYTES NFR BLD: 0.4 %
LYMPHOCYTES # BLD AUTO: 1.9 10E9/L (ref 0.8–5.3)
LYMPHOCYTES NFR BLD AUTO: 34.8 %
MCH RBC QN AUTO: 29.5 PG (ref 26.5–33)
MCHC RBC AUTO-ENTMCNC: 34 G/DL (ref 31.5–36.5)
MCV RBC AUTO: 87 FL (ref 78–100)
MONOCYTES # BLD AUTO: 0.4 10E9/L (ref 0–1.3)
MONOCYTES NFR BLD AUTO: 7.9 %
NEUTROPHILS # BLD AUTO: 3 10E9/L (ref 1.6–8.3)
NEUTROPHILS NFR BLD AUTO: 53 %
NRBC # BLD AUTO: 0 10*3/UL
NRBC BLD AUTO-RTO: 0 /100
PLATELET # BLD AUTO: 326 10E9/L (ref 150–450)
POTASSIUM SERPL-SCNC: 3.8 MMOL/L (ref 3.4–5.3)
PROT SERPL-MCNC: 7.4 G/DL (ref 6.8–8.8)
RBC # BLD AUTO: 4.82 10E12/L (ref 3.8–5.2)
SODIUM SERPL-SCNC: 137 MMOL/L (ref 133–144)
WBC # BLD AUTO: 5.6 10E9/L (ref 4–11)

## 2019-03-05 PROCEDURE — 25800030 ZZH RX IP 258 OP 636: Mod: ZF | Performed by: PHYSICIAN ASSISTANT

## 2019-03-05 PROCEDURE — 99214 OFFICE O/P EST MOD 30 MIN: CPT | Mod: ZP | Performed by: PHYSICIAN ASSISTANT

## 2019-03-05 PROCEDURE — 80053 COMPREHEN METABOLIC PANEL: CPT | Performed by: INTERNAL MEDICINE

## 2019-03-05 PROCEDURE — 96375 TX/PRO/DX INJ NEW DRUG ADDON: CPT

## 2019-03-05 PROCEDURE — 25000128 H RX IP 250 OP 636: Mod: ZF | Performed by: PHYSICIAN ASSISTANT

## 2019-03-05 PROCEDURE — 85025 COMPLETE CBC W/AUTO DIFF WBC: CPT | Performed by: INTERNAL MEDICINE

## 2019-03-05 PROCEDURE — G0463 HOSPITAL OUTPT CLINIC VISIT: HCPCS | Mod: ZF

## 2019-03-05 PROCEDURE — 96413 CHEMO IV INFUSION 1 HR: CPT

## 2019-03-05 PROCEDURE — 25000125 ZZHC RX 250: Mod: ZF | Performed by: PHYSICIAN ASSISTANT

## 2019-03-05 RX ORDER — DIPHENHYDRAMINE HCL 25 MG
25 CAPSULE ORAL ONCE
Status: CANCELLED
Start: 2019-03-05

## 2019-03-05 RX ORDER — PROCHLORPERAZINE MALEATE 10 MG
10 TABLET ORAL EVERY 6 HOURS PRN
Qty: 30 TABLET | Refills: 3 | Status: SHIPPED | OUTPATIENT
Start: 2019-03-05 | End: 2019-04-08

## 2019-03-05 RX ORDER — MEPERIDINE HYDROCHLORIDE 25 MG/ML
25 INJECTION INTRAMUSCULAR; INTRAVENOUS; SUBCUTANEOUS EVERY 30 MIN PRN
Status: CANCELLED | OUTPATIENT
Start: 2019-03-05

## 2019-03-05 RX ORDER — LORAZEPAM 2 MG/ML
0.5 INJECTION INTRAMUSCULAR EVERY 4 HOURS PRN
Status: CANCELLED
Start: 2019-03-05

## 2019-03-05 RX ORDER — DEXAMETHASONE SODIUM PHOSPHATE 10 MG/ML
10 INJECTION, SOLUTION INTRAMUSCULAR; INTRAVENOUS
Status: CANCELLED | OUTPATIENT
Start: 2019-03-05

## 2019-03-05 RX ORDER — DIPHENHYDRAMINE HYDROCHLORIDE 50 MG/ML
50 INJECTION INTRAMUSCULAR; INTRAVENOUS
Status: CANCELLED
Start: 2019-03-05

## 2019-03-05 RX ORDER — EPINEPHRINE 1 MG/ML
0.3 INJECTION, SOLUTION INTRAMUSCULAR; SUBCUTANEOUS EVERY 5 MIN PRN
Status: CANCELLED | OUTPATIENT
Start: 2019-03-05

## 2019-03-05 RX ORDER — EPINEPHRINE 0.3 MG/.3ML
0.3 INJECTION SUBCUTANEOUS EVERY 5 MIN PRN
Status: CANCELLED | OUTPATIENT
Start: 2019-03-05

## 2019-03-05 RX ORDER — ALBUTEROL SULFATE 0.83 MG/ML
2.5 SOLUTION RESPIRATORY (INHALATION)
Status: CANCELLED | OUTPATIENT
Start: 2019-03-05

## 2019-03-05 RX ORDER — ALBUTEROL SULFATE 90 UG/1
1-2 AEROSOL, METERED RESPIRATORY (INHALATION)
Status: CANCELLED
Start: 2019-03-05

## 2019-03-05 RX ORDER — METHYLPREDNISOLONE SODIUM SUCCINATE 125 MG/2ML
125 INJECTION, POWDER, LYOPHILIZED, FOR SOLUTION INTRAMUSCULAR; INTRAVENOUS
Status: CANCELLED
Start: 2019-03-05

## 2019-03-05 RX ORDER — SODIUM CHLORIDE 9 MG/ML
1000 INJECTION, SOLUTION INTRAVENOUS CONTINUOUS PRN
Status: CANCELLED
Start: 2019-03-05

## 2019-03-05 RX ADMIN — SODIUM CHLORIDE 250 ML: 9 INJECTION, SOLUTION INTRAVENOUS at 11:56

## 2019-03-05 RX ADMIN — FAMOTIDINE 20 MG: 10 INJECTION INTRAVENOUS at 11:57

## 2019-03-05 RX ADMIN — PACLITAXEL 142 MG: 6 INJECTION, SOLUTION INTRAVENOUS at 12:46

## 2019-03-05 RX ADMIN — DIPHENHYDRAMINE HYDROCHLORIDE 25 MG: 50 INJECTION INTRAMUSCULAR; INTRAVENOUS at 12:21

## 2019-03-05 RX ADMIN — DEXAMETHASONE SODIUM PHOSPHATE 10 MG: 10 INJECTION, SOLUTION INTRAMUSCULAR; INTRAVENOUS at 12:02

## 2019-03-05 ASSESSMENT — PAIN SCALES - GENERAL: PAINLEVEL: NO PAIN (0)

## 2019-03-05 ASSESSMENT — MIFFLIN-ST. JEOR: SCORE: 1302.58

## 2019-03-05 NOTE — PATIENT INSTRUCTIONS
Contact Numbers    [facility] [place] [place] Main Line: [phone]  [facility] [place] [place] Triage and after hours / weekends / holidays:  [phone]      Please call the triage or after hours line if you experience a temperature greater than or equal to 100.5, shaking chills, have uncontrolled nausea, vomiting and/or diarrhea, dizziness, shortness of breath, chest pain, bleeding, unexplained bruising, or if you have any other new/concerning symptoms, questions or concerns.      If you are having any concerning symptoms or wish to speak to a provider before your next infusion visit, please call your care coordinator or triage to notify them so we can adequately serve you.     If you need a refill on a narcotic prescription or other medication, please call before your infusion appointment.           March 2019 Sunday Monday Tuesday Wednesday Thursday Friday Saturday                            1     2       3     4     5    [facility] LAB DRAW  10:00 AM   (15 min.)    [facility] LAB DRAW   [facility] Lab Draw    P RETURN  10:15 AM   (50 min.)   Kerry Norwood PA   [facility] ONC INFUSION 120  12:30 PM   (120 min.)    ONCOLOGY INFUSION   [facility] 6    IR CHEST PORT PLACEMENT >5 YRS   8:30 AM   (60 min.)   [id number]   [facility]    INSERT PORT VASCULAR ACCESS  10:00 AM   Jerome Dash PA-C    OR    Outpatient Visit  10:00 AM   [facility] 7     8     9       10     11    P [facility] LAB DRAW  10:45 AM   (15 min.)    [facility]    [facility] RETURN  11:05 AM   (50 min.)   Kerry Norwood [facility]   [facility]   1:00 PM   (120 min.)    ONCOLOGY INFUSION   [facility] 12    MR BREAST BILATERAL WWO   6:15 PM   (45 min.)   MR1   [facility] MRI 13     14     15     16       17     18    P [facility] LAB DRAW  12:30 PM   (15 min.)   [facility]  DRAW   [facility] Lab Draw    P RETURN  12:55 PM   (50 min.)   Kerry Norwood PA   [facility] ONC INFUSION 120   2:00 PM   (120 min.)    ONCOLOGY INFUSION   [facility] 19     20     21     22     23       24     25     26    [facility] LAB DRAW   8:30 AM   (15 min.)   [facility] DRAW   [facility] Lab Draw    [facility] RETURN   8:45 AM   (30 min.)   Christian Guzman MD   [facility] ONC INFUSION 360   9:30 AM   (360 min.)    ONCOLOGY INFUSION   [facility] 27     28     29     30       31 April 2019 Sunday Monday Tuesday Wednesday Thursday Friday Saturday        1     2     3     4     5     6       7     8     9     10     11     12     13       14     15     16     17     18     19     20       21     22     23     24     25     26     27       28     29     30                                        Recent Results (from the past 24 hour(s))   CBC with platelets differential    Collection Time: 03/05/19 10:25 AM   Result Value Ref Range    WBC 5.6 4.0 - 11.0 10e9/L    RBC Count 4.82 3.8 - 5.2 10e12/L    Hemoglobin 14.2 11.7 - 15.7 g/dL    Hematocrit 41.8 35.0 - 47.0 %    MCV 87 78 - 100 fl    MCH 29.5 26.5 - 33.0 pg    MCHC 34.0 31.5 - 36.5 g/dL    RDW 12.7 10.0 - 15.0 %    Platelet Count 326 150 - 450 10e9/L    Diff Method Automated Method     % Neutrophils 53.0 %    % Lymphocytes 34.8 %    % Monocytes 7.9 %    % Eosinophils 3.2 %    % Basophils 0.7 %    % Immature Granulocytes 0.4 %    Nucleated RBCs 0 0 /100    Absolute Neutrophil 3.0 1.6 - 8.3 10e9/L    Absolute Lymphocytes 1.9 0.8 - 5.3 10e9/L    Absolute Monocytes 0.4 0.0 - 1.3 10e9/L    Absolute Eosinophils 0.2 0.0 - 0.7 10e9/L    Absolute Basophils 0.0 0.0 - 0.2 10e9/L    Abs Immature Granulocytes 0.0 0 - 0.4 10e9/L    Absolute Nucleated RBC 0.0    Comprehensive metabolic panel     Collection Time: 03/05/19 10:25 AM   Result Value Ref Range    Sodium 137 133 - 144 mmol/L    Potassium 3.8 3.4 - 5.3 mmol/L    Chloride 103 94 - 109 mmol/L    Carbon Dioxide 28 20 - 32 mmol/L    Anion Gap 7 3 - 14 mmol/L    Glucose 95 70 - 99 mg/dL    Urea Nitrogen 10 7 - 30 mg/dL    Creatinine 0.64 0.52 - 1.04 mg/dL    GFR Estimate >90 >60 mL/min/[1.73_m2]    GFR Estimate If Black >90 >60 mL/min/[1.73_m2]    Calcium 8.9 8.5 - 10.1 mg/dL    Bilirubin Total 0.3 0.2 - 1.3 mg/dL    Albumin 3.8 3.4 - 5.0 g/dL    Protein Total 7.4 6.8 - 8.8 g/dL    Alkaline Phosphatase 53 40 - 150 U/L    ALT 22 0 - 50 U/L    AST 15 0 - 45 U/L

## 2019-03-05 NOTE — NURSING NOTE
"Oncology Rooming Note    March 5, 2019 10:35 AM   Kathy Lei is a 53 year old female who presents for:    Chief Complaint   Patient presents with     Blood Draw     labs drawn with piv start by rn.  vs taken     Oncology Clinic Visit     Breast Cancer     Initial Vitals: /81 (BP Location: Right arm, Patient Position: Sitting, Cuff Size: Adult Regular)   Pulse 85   Temp 98.3  F (36.8  C) (Oral)   Resp 16   Ht 1.676 m (5' 6\")   Wt 69.1 kg (152 lb 4.8 oz)   SpO2 97%   BMI 24.58 kg/m   Estimated body mass index is 24.58 kg/m  as calculated from the following:    Height as of this encounter: 1.676 m (5' 6\").    Weight as of this encounter: 69.1 kg (152 lb 4.8 oz). Body surface area is 1.79 meters squared.  No Pain (0) Comment: Data Unavailable   No LMP recorded. Patient is perimenopausal.  Allergies reviewed: Yes  Medications reviewed: Yes    Medications: Medication refills not needed today.  Pharmacy name entered into [facility]:    [facility] - [place], [place] - [address] [place] [address] DRUG STORE [id number] - [place], [place] - [address] AT [facility] [address]    Clinical concerns: No New Concerns    5 minutes for nursing intake (face to face time)     FERNIE Garcia      "

## 2019-03-05 NOTE — PROGRESS NOTES
Oncology/Hematology Visit Note  Mar 5, 2019    Reason for Visit: follow up of right breast cancer    History of Present Illness: Kathy Lei is a 53 year old female with recent diagnosis of ER/AR positive, HER2 negative, grade 2 breast cancer. Mily presented between Christmas and New Years of 2018 with new sharp pains in the upper outer quadrant of the right breast.  Diagnostic mammogram on 1/8/19 with grouped coarse heterogeneous calcifications at 11:30 position, irregular mass at 9:00 position. US with irregular mass at 11:00 position, 1.0 x 0.9 x 1.6cm. At 9:30 position, irregular mass, 3.2 x 0.5, x 1.3 cm. Contrast mammogram was subsequently performed and the contrast mammogram showed a large area of mass and non-mass-like enhancement in the upper outer right breast measuring 7.2 cm in greatest dimension.      The pathology showed part A, breast needle biopsy 11 o'clock, 6 cm from the nipple, invasive mammary carcinoma, no special type, ductal (and mucinous) Donna grade 2.  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  Calcifications were associated with the DCIS.  There was a focus suspicious for lymphovascular invasion.  Invasive carcinoma was estrogen receptor positive and progesterone receptor negative by immunohistochemistry.  In part B, breast right, 8 o'clock, 4 cm from the nipple, ultrasound-guided core biopsy, invasive mammary carcinoma, no special type, ductal (and mucinous) Donna grade 2, occasional calcifications were noted.  Invasive carcinoma was estrogen receptor positive and progesterone receptor negative by immunohistochemistry.  On quantitation of the ER and AR, ER was positive 99% of the cells staining with strong intensity.  AR was subsequently noted to be positive with 15% of the cells staining with moderate intensity.       There was also a HER2 FISH performed, and the HER2 FISH showed average number of HER2 signals per nucleus  "2.6.  Average number of CEN17 signals 1.7 with a ratio of 1.6, VASILIY negative for biopsy A.  For biopsy B, the HER2 signals per nucleus 2.9.  Average number CEN17 signals per nucleus 1.7 with a ratio of 1.7, VASILIY negative.  Overall, by 2018 ASCO/CAP guidelines, this tumor is HER2 negative.     She was enrolled in I-SPY2 and is here to start cycle 1 Durvulumab, Taxol and Olaparib. Please see Dr. Guzman's previous notes for further details on the patient's history.     Interval History:  Mily is here for follow up. On day of infusion, she felt great, but on day 2 she \"crashed\" with fatigue, bodyaches and abdominal carmps. She describes feeling as if she were going to get her menstrual cramps. She took one advil which helped. No loose stools or constipation. She has been taking compazine prior to taking olaparib and has not had any nausea. Appetite has been good. She has been drinking plenty of water. She did have some heartburn when eating pizza but did not take anything for it.     She is starting to have some scalp tenderness and \"acne\" on areas of scalp. Having some hair loss. No rashes, mucositis, changes in nails. No peripheral neuropathy.    On flovent, no dyspnea, wheezing, cough. She is on Zoloft for depression and feels this is well-controlled. She is working at a . No urinary concerns, no leg swelling.    Current Outpatient Medications   Medication Sig Dispense Refill     ADVIL 200 MG OR TABS 1 TABLET EVERY 4 TO 6 HOURS AS NEEDED 0 0     albuterol (2.5 MG/3ML) 0.083% nebulizer solution Take 3 mLs by nebulization once for 1 dose. 3 mL 0     albuterol (VENTOLIN HFA) 108 (90 Base) MCG/ACT Inhaler INHALE 1-2 PUFFS INTO THE LUNGS EVERY 4 HOURS AS NEEDED FOR SHORTNESS OF BREATH OR DIFFICULTY BREATHING. 18 g PRN     ASPIRIN NOT PRESCRIBED (INTENTIONAL) Please choose reason not prescribed, below       fluticasone (FLOVENT HFA) 110 MCG/ACT inhaler Inhale 2 puffs into the lungs 2 times daily 1 Inhaler PRN "     levothyroxine (SYNTHROID/LEVOTHROID) 88 MCG tablet Take 1 tablet (88 mcg) by mouth daily 90 tablet PRN     lidocaine-prilocaine (EMLA) 2.5-2.5 % external cream Apply topically as needed Apply to port site one hour prior to access start six days after port insertion 30 g 1     lisinopril (PRINIVIL/ZESTRIL) 5 MG tablet Take 1 tablet (5 mg) by mouth daily 90 tablet 3     LORazepam (ATIVAN) 0.5 MG tablet Take 1 tablet (0.5 mg) by mouth every 4 hours as needed (Anxiety, Nausea/Vomiting or Sleep) 30 tablet 2     LORazepam (ATIVAN) 0.5 MG tablet Take 1 tablet (0.5 mg) by mouth every 8 hours as needed for anxiety 20 tablet 2     order for DME Cranial prosthesis 1 Units 1     prochlorperazine (COMPAZINE) 10 MG tablet Take 1 tablet (10 mg) by mouth every 6 hours as needed (Nausea/Vomiting) 30 tablet 2     sertraline (ZOLOFT) 50 MG tablet One daily 90 tablet 1     STATIN NOT PRESCRIBED (INTENTIONAL) Please choose reason not prescribed, below       study - olaparib (IDS# 3903) 100 mg tablet CHEMOTHERAPY Take 1 tablet (100 mg) by mouth every 12 hours Take at approximately the same times each day with an 8 oz (240 ml) glass of water. Tablets should be swallowed whole and not chewed, crushed, dissolved or divided. 60 tablet 0       Physical Examination:  General: The patient is a pleasant female in no acute distress.  There were no vitals taken for this visit.  Wt Readings from Last 10 Encounters:   02/25/19 69.4 kg (153 lb)   02/11/19 69.9 kg (154 lb)   02/06/19 68.5 kg (151 lb)   02/05/19 69.2 kg (152 lb 9.6 oz)   01/22/19 67.4 kg (148 lb 8 oz)   01/03/19 66.5 kg (146 lb 8 oz)   07/16/18 65.9 kg (145 lb 6 oz)   01/19/17 65.8 kg (145 lb)   12/07/15 69.9 kg (154 lb)   01/26/15 69.1 kg (152 lb 4 oz)     HEENT: Scalp is nontender. No erythema or folliculitis. EOMI, PERRL. Sclerae are anicteric. Oral mucosa is pink and moist with no lesions or thrush.   Lymph: No palpable cervical, supraclavicular, subclavicular or axillary  lymphadenopathy.  Heart: Regular rate and rhythm.   Lungs: Clear to auscultation bilaterally.   Breast: Right breast with 8.5 cm x 7cm mass in upper outer quadrant.  Abdomen: Bowel sounds present, soft, nontender with no palpable hepatosplenomegaly or masses.   Extremities: No lower extremity edema noted bilaterally.   Neuro: Cranial nerves II through XII are grossly intact.  Skin: No rashes, petechiae, or bruising noted on exposed skin.    Laboratory Data:  Results for GALA ROLAND (MRN [id number]) as of 3/5/2019 11:02   Ref. Range 3/5/2019 10:25   Sodium Latest Ref Range: 133 - 144 mmol/L 137   Potassium Latest Ref Range: 3.4 - 5.3 mmol/L 3.8   Chloride Latest Ref Range: 94 - 109 mmol/L 103   Carbon Dioxide Latest Ref Range: 20 - 32 mmol/L 28   Urea Nitrogen Latest Ref Range: 7 - 30 mg/dL 10   Creatinine Latest Ref Range: 0.52 - 1.04 mg/dL 0.64   GFR Estimate Latest Ref Range: >60 mL/min/1.73_m2 >90   GFR Estimate If Black Latest Ref Range: >60 mL/min/1.73_m2 >90   Calcium Latest Ref Range: 8.5 - 10.1 mg/dL 8.9   Anion Gap Latest Ref Range: 3 - 14 mmol/L 7   Albumin Latest Ref Range: 3.4 - 5.0 g/dL 3.8   Protein Total Latest Ref Range: 6.8 - 8.8 g/dL 7.4   Bilirubin Total Latest Ref Range: 0.2 - 1.3 mg/dL 0.3   Alkaline Phosphatase Latest Ref Range: 40 - 150 U/L 53   ALT Latest Ref Range: 0 - 50 U/L 22   AST Latest Ref Range: 0 - 45 U/L 15   Glucose Latest Ref Range: 70 - 99 mg/dL 95   WBC Latest Ref Range: 4.0 - 11.0 10e9/L 5.6   Hemoglobin Latest Ref Range: 11.7 - 15.7 g/dL 14.2   Hematocrit Latest Ref Range: 35.0 - 47.0 % 41.8   Platelet Count Latest Ref Range: 150 - 450 10e9/L 326   RBC Count Latest Ref Range: 3.8 - 5.2 10e12/L 4.82   MCV Latest Ref Range: 78 - 100 fl 87   MCH Latest Ref Range: 26.5 - 33.0 pg 29.5   MCHC Latest Ref Range: 31.5 - 36.5 g/dL 34.0   RDW Latest Ref Range: 10.0 - 15.0 % 12.7   Diff Method Unknown Automated Method   % Neutrophils Latest Units: % 53.0   % Lymphocytes Latest  Units: % 34.8   % Monocytes Latest Units: % 7.9   % Eosinophils Latest Units: % 3.2   % Basophils Latest Units: % 0.7   % Immature Granulocytes Latest Units: % 0.4   Nucleated RBCs Latest Ref Range: 0 /100 0   Absolute Neutrophil Latest Ref Range: 1.6 - 8.3 10e9/L 3.0   Absolute Lymphocytes Latest Ref Range: 0.8 - 5.3 10e9/L 1.9   Absolute Monocytes Latest Ref Range: 0.0 - 1.3 10e9/L 0.4   Absolute Eosinophils Latest Ref Range: 0.0 - 0.7 10e9/L 0.2   Absolute Basophils Latest Ref Range: 0.0 - 0.2 10e9/L 0.0   Abs Immature Granulocytes Latest Ref Range: 0 - 0.4 10e9/L 0.0   Absolute Nucleated RBC Unknown 0.0       Assessment and Plan:    Right breast cancer, ER/NM positive, HER2 negative: She was enrolled in I-SPY2 and started cycle 1 Durvulumab, Taxol and Olaparib on 2/25/19. Tolerating well aside from fatigue and myalgias on day 2. Starting to have hair loss.   --Labs reviewed. Here today for C2 Taxol. As no reaction to C1, will decrease dex and benadryl pre-medication. If tolerated, can discontinue next cycle.   --Port scheduled for 3/6.    Abnormal echocardiogram: The patient's echocardiogram has both low strain and low normal ejection fraction.  Both of these tests are very borderline, and she has no symptoms of heart failure.  In addition, she has no historical features that would put her at risk of having heart failure, with the exception of mildly excessive alcohol use.  However, in the setting of future Adriamycin use, she is likely at higher risk than normal for chemotherapy-induced cardiomyopathy.  Therefore, recommend that the patient switch to lisinopril 5 mg instead of amlodipine for cardioprotection.  She should be followed with serial strain imaging throughout the course of her chemotherapy.  --Scheduled for echo and visit with Dr. Alanis on 5/6     GERD: Start prilosec   Hx depression: On Zoloft  Hx asthma: On flovent BID and albuterol prn  Hx hypothyroidism: on Levothyroxine 88mcg daily. TSH WNL  today.      Kerry Norwood PA-C  [facility]  [address], [place] [id number] [phone]

## 2019-03-05 NOTE — NURSING NOTE
[id number]: Study Visit Note   Subject name: Kathy Lei     Visit: C2    Did the study visit occur within the appropriate window allowed by the protocol? yes    Since the last study visit, She has been doing well. Reports having fatigue, acne, muscle aches, and abdominal cramps that have all since resolved last week. Reports intermittent heartburn. Also reported hair starting to fall out. Otherwise, patient reports she is feeling good.    I have personally interviewed Kathy Lei and reviewed her medical record for adverse events and concomitant medications and these have been recorded on the corresponding logs in Kathy Lei's research file.     Kathy Lei was given the opportunity to ask any trial related questions.  Please see provider progress note for physical exam and other clinical information. Labs were reviewed - any significant lab values were addressed and reviewed.    Lorne Leos RN     Pager: [phone]

## 2019-03-05 NOTE — PROGRESS NOTES
"SPIRITUAL HEALTH SERVICES  SPIRITUAL ASSESSMENT Progress Note  [facility]    REFERRAL SOURCE: Follow Up      This was my second visit with Mily. She was in the clinic today with her 'best friend since \", Ann Marie.    She is feeling well (new to treatment) and very optimistic    Mily shared her son's engagement photo with me. She is really looking forward to the wedding    PLAN: [facility] will continue to spiritually support Mily while she is receiving treatment at the [facility]. Mily appreciates  visits.    Adali Mireles  Chaplain Resident    "

## 2019-03-05 NOTE — PROGRESS NOTES
Infusion Nursing Note:  Kathy Lei presents today for Cycle 2 Day 1 Taxol.    Patient seen by provider today: Yes: Sarah AMATO    Treatment Conditions:  Lab Results   Component Value Date    HGB 14.2 03/05/2019     Lab Results   Component Value Date    WBC 5.6 03/05/2019      Lab Results   Component Value Date    ANEU 3.0 03/05/2019     Lab Results   Component Value Date     03/05/2019      Lab Results   Component Value Date     03/05/2019                   Lab Results   Component Value Date    POTASSIUM 3.8 03/05/2019           Lab Results   Component Value Date    MAG 2.0 02/05/2019            Lab Results   Component Value Date    CR 0.64 03/05/2019                   Lab Results   Component Value Date    BRYANT 8.9 03/05/2019                Lab Results   Component Value Date    BILITOTAL 0.3 03/05/2019           Lab Results   Component Value Date    ALBUMIN 3.8 03/05/2019                    Lab Results   Component Value Date    ALT 22 03/05/2019           Lab Results   Component Value Date    AST 15 03/05/2019       Results reviewed, labs MET treatment parameters, ok to proceed with treatment.      Note: Pt with no concerns during infusion.    Intravenous Access:  Peripheral IV placed.    Post Infusion Assessment:  Patient tolerated infusion without incident.  Blood return noted pre and post infusion.  Access discontinued per protocol.    Discharge Plan:   Prescription refills given for Prilosec, Compazine.  Discharge instructions reviewed with: Patient.  Patient and/or family verbalized understanding of discharge instructions and all questions answered.  Copy of AVS reviewed with patient and/or family.  Patient will return 3/11/19 for next appointment.  Patient discharged in stable condition accompanied by: friend.  Departure Mode: Ambulatory.  Face to Face time: 0.    Sunita Griffin RN

## 2019-03-05 NOTE — LETTER
3/5/2019      RE: Kathy Lei  [address] [place] [address]       Oncology/Hematology Visit Note  Mar 5, 2019    Reason for Visit: follow up of right breast cancer    History of Present Illness: Kathy Lei is a 53 year old female with recent diagnosis of ER/WI positive, HER2 negative, grade 2 breast cancer. Mily presented between Christmas and New Years of 2018 with new sharp pains in the upper outer quadrant of the right breast.  Diagnostic mammogram on 1/8/19 with grouped coarse heterogeneous calcifications at 11:30 position, irregular mass at 9:00 position. US with irregular mass at 11:00 position, 1.0 x 0.9 x 1.6cm. At 9:30 position, irregular mass, 3.2 x 0.5, x 1.3 cm. Contrast mammogram was subsequently performed and the contrast mammogram showed a large area of mass and non-mass-like enhancement in the upper outer right breast measuring 7.2 cm in greatest dimension.      The pathology showed part A, breast needle biopsy 11 o'clock, 6 cm from the nipple, invasive mammary carcinoma, no special type, ductal (and mucinous) Donna grade 2.  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  Calcifications were associated with the DCIS.  There was a focus suspicious for lymphovascular invasion.  Invasive carcinoma was estrogen receptor positive and progesterone receptor negative by immunohistochemistry.  In part B, breast right, 8 o'clock, 4 cm from the nipple, ultrasound-guided core biopsy, invasive mammary carcinoma, no special type, ductal (and mucinous) Donna grade 2, occasional calcifications were noted.  Invasive carcinoma was estrogen receptor positive and progesterone receptor negative by immunohistochemistry.  On quantitation of the ER and WI, ER was positive 99% of the cells staining with strong intensity.  WI was subsequently noted to be positive with 15% of the cells staining with moderate intensity.       There was also a  "HER2 FISH performed, and the HER2 FISH showed average number of HER2 signals per nucleus 2.6.  Average number of CEN17 signals 1.7 with a ratio of 1.6, VASILIY negative for biopsy A.  For biopsy B, the HER2 signals per nucleus 2.9.  Average number CEN17 signals per nucleus 1.7 with a ratio of 1.7, VASILIY negative.  Overall, by 2018 ASCO/CAP guidelines, this tumor is HER2 negative.     She was enrolled in I-SPY2 and is here to start cycle 1 Durvulumab, Taxol and Olaparib. Please see Dr. Guzman's previous notes for further details on the patient's history.     Interval History:  Mily is here for follow up. On day of infusion, she felt great, but on day 2 she \"crashed\" with fatigue, bodyaches and abdominal carmps. She describes feeling as if she were going to get her menstrual cramps. She took one advil which helped. No loose stools or constipation. She has been taking compazine prior to taking olaparib and has not had any nausea. Appetite has been good. She has been drinking plenty of water. She did have some heartburn when eating pizza but did not take anything for it.     She is starting to have some scalp tenderness and \"acne\" on areas of scalp. Having some hair loss. No rashes, mucositis, changes in nails. No peripheral neuropathy.    On flovent, no dyspnea, wheezing, cough. She is on Zoloft for depression and feels this is well-controlled. She is working at a . No urinary concerns, no leg swelling.    Current Outpatient Medications   Medication Sig Dispense Refill     ADVIL 200 MG OR TABS 1 TABLET EVERY 4 TO 6 HOURS AS NEEDED 0 0     albuterol (2.5 MG/3ML) 0.083% nebulizer solution Take 3 mLs by nebulization once for 1 dose. 3 mL 0     albuterol (VENTOLIN HFA) 108 (90 Base) MCG/ACT Inhaler INHALE 1-2 PUFFS INTO THE LUNGS EVERY 4 HOURS AS NEEDED FOR SHORTNESS OF BREATH OR DIFFICULTY BREATHING. 18 g PRN     ASPIRIN NOT PRESCRIBED (INTENTIONAL) Please choose reason not prescribed, below       fluticasone " (FLOVENT HFA) 110 MCG/ACT inhaler Inhale 2 puffs into the lungs 2 times daily 1 Inhaler PRN     levothyroxine (SYNTHROID/LEVOTHROID) 88 MCG tablet Take 1 tablet (88 mcg) by mouth daily 90 tablet PRN     lidocaine-prilocaine (EMLA) 2.5-2.5 % external cream Apply topically as needed Apply to port site one hour prior to access start six days after port insertion 30 g 1     lisinopril (PRINIVIL/ZESTRIL) 5 MG tablet Take 1 tablet (5 mg) by mouth daily 90 tablet 3     LORazepam (ATIVAN) 0.5 MG tablet Take 1 tablet (0.5 mg) by mouth every 4 hours as needed (Anxiety, Nausea/Vomiting or Sleep) 30 tablet 2     LORazepam (ATIVAN) 0.5 MG tablet Take 1 tablet (0.5 mg) by mouth every 8 hours as needed for anxiety 20 tablet 2     order for DME Cranial prosthesis 1 Units 1     prochlorperazine (COMPAZINE) 10 MG tablet Take 1 tablet (10 mg) by mouth every 6 hours as needed (Nausea/Vomiting) 30 tablet 2     sertraline (ZOLOFT) 50 MG tablet One daily 90 tablet 1     STATIN NOT PRESCRIBED (INTENTIONAL) Please choose reason not prescribed, below       study - olaparib (IDS# [id number]) 100 mg tablet CHEMOTHERAPY Take 1 tablet (100 mg) by mouth every 12 hours Take at approximately the same times each day with an 8 oz (240 ml) glass of water. Tablets should be swallowed whole and not chewed, crushed, dissolved or divided. 60 tablet 0       Physical Examination:  General: The patient is a pleasant female in no acute distress.  There were no vitals taken for this visit.  Wt Readings from Last 10 Encounters:   02/25/19 69.4 kg (153 lb)   02/11/19 69.9 kg (154 lb)   02/06/19 68.5 kg (151 lb)   02/05/19 69.2 kg (152 lb 9.6 oz)   01/22/19 67.4 kg (148 lb 8 oz)   01/03/19 66.5 kg (146 lb 8 oz)   07/16/18 65.9 kg (145 lb 6 oz)   01/19/17 65.8 kg (145 lb)   12/07/15 69.9 kg (154 lb)   01/26/15 69.1 kg (152 lb 4 oz)     HEENT: Scalp is nontender. No erythema or folliculitis. EOMI, PERRL. Sclerae are anicteric. Oral mucosa is pink and moist with no  lesions or thrush.   Lymph: No palpable cervical, supraclavicular, subclavicular or axillary lymphadenopathy.  Heart: Regular rate and rhythm.   Lungs: Clear to auscultation bilaterally.   Breast: Right breast with 8.5 cm x 7cm mass in upper outer quadrant.  Abdomen: Bowel sounds present, soft, nontender with no palpable hepatosplenomegaly or masses.   Extremities: No lower extremity edema noted bilaterally.   Neuro: Cranial nerves II through XII are grossly intact.  Skin: No rashes, petechiae, or bruising noted on exposed skin.    Laboratory Data:  Results for GALA ROLAND (MRN [id number]) as of 3/5/2019 11:02   Ref. Range 3/5/2019 10:25   Sodium Latest Ref Range: 133 - 144 mmol/L 137   Potassium Latest Ref Range: 3.4 - 5.3 mmol/L 3.8   Chloride Latest Ref Range: 94 - 109 mmol/L 103   Carbon Dioxide Latest Ref Range: 20 - 32 mmol/L 28   Urea Nitrogen Latest Ref Range: 7 - 30 mg/dL 10   Creatinine Latest Ref Range: 0.52 - 1.04 mg/dL 0.64   GFR Estimate Latest Ref Range: >60 mL/min/1.73_m2 >90   GFR Estimate If Black Latest Ref Range: >60 mL/min/1.73_m2 >90   Calcium Latest Ref Range: 8.5 - 10.1 mg/dL 8.9   Anion Gap Latest Ref Range: 3 - 14 mmol/L 7   Albumin Latest Ref Range: 3.4 - 5.0 g/dL 3.8   Protein Total Latest Ref Range: 6.8 - 8.8 g/dL 7.4   Bilirubin Total Latest Ref Range: 0.2 - 1.3 mg/dL 0.3   Alkaline Phosphatase Latest Ref Range: 40 - 150 U/L 53   ALT Latest Ref Range: 0 - 50 U/L 22   AST Latest Ref Range: 0 - 45 U/L 15   Glucose Latest Ref Range: 70 - 99 mg/dL 95   WBC Latest Ref Range: 4.0 - 11.0 10e9/L 5.6   Hemoglobin Latest Ref Range: 11.7 - 15.7 g/dL 14.2   Hematocrit Latest Ref Range: 35.0 - 47.0 % 41.8   Platelet Count Latest Ref Range: 150 - 450 10e9/L 326   RBC Count Latest Ref Range: 3.8 - 5.2 10e12/L 4.82   MCV Latest Ref Range: 78 - 100 fl 87   MCH Latest Ref Range: 26.5 - 33.0 pg 29.5   MCHC Latest Ref Range: 31.5 - 36.5 g/dL 34.0   RDW Latest Ref Range: 10.0 - 15.0 % 12.7   Diff  Method Unknown Automated Method   % Neutrophils Latest Units: % 53.0   % Lymphocytes Latest Units: % 34.8   % Monocytes Latest Units: % 7.9   % Eosinophils Latest Units: % 3.2   % Basophils Latest Units: % 0.7   % Immature Granulocytes Latest Units: % 0.4   Nucleated RBCs Latest Ref Range: 0 /100 0   Absolute Neutrophil Latest Ref Range: 1.6 - 8.3 10e9/L 3.0   Absolute Lymphocytes Latest Ref Range: 0.8 - 5.3 10e9/L 1.9   Absolute Monocytes Latest Ref Range: 0.0 - 1.3 10e9/L 0.4   Absolute Eosinophils Latest Ref Range: 0.0 - 0.7 10e9/L 0.2   Absolute Basophils Latest Ref Range: 0.0 - 0.2 10e9/L 0.0   Abs Immature Granulocytes Latest Ref Range: 0 - 0.4 10e9/L 0.0   Absolute Nucleated RBC Unknown 0.0       Assessment and Plan:    Right breast cancer, ER/RI positive, HER2 negative: She was enrolled in I-SPY2 and started cycle 1 Durvulumab, Taxol and Olaparib on 2/25/19. Tolerating well aside from fatigue and myalgias on day 2. Starting to have hair loss.   --Labs reviewed. Here today for C2 Taxol. As no reaction to C1, will decrease dex and benadryl pre-medication. If tolerated, can discontinue next cycle.   --Port scheduled for 3/6.    Abnormal echocardiogram: The patient's echocardiogram has both low strain and low normal ejection fraction.  Both of these tests are very borderline, and she has no symptoms of heart failure.  In addition, she has no historical features that would put her at risk of having heart failure, with the exception of mildly excessive alcohol use.  However, in the setting of future Adriamycin use, she is likely at higher risk than normal for chemotherapy-induced cardiomyopathy.  Therefore, recommend that the patient switch to lisinopril 5 mg instead of amlodipine for cardioprotection.  She should be followed with serial strain imaging throughout the course of her chemotherapy.  --Scheduled for echo and visit with Dr. Alanis on 5/6     GERD: Start prilosec   Hx depression: On Zoloft  Hx asthma: On  flovent BID and albuterol prn  Hx hypothyroidism: on Levothyroxine 88mcg daily. TSH WNL today.      Kerry Norwood PA-C  [facility]  [address] [place] [address] [phone]

## 2019-03-05 NOTE — NURSING NOTE
Chief Complaint   Patient presents with     Blood Draw     labs drawn with piv start by rn.  vs taken     Labs drawn with PIV start by rn.  Pt tolerated well.  VS taken and pt checked in for next appt.  Katy Huizar RN

## 2019-03-06 ENCOUNTER — HOSPITAL ENCOUNTER (OUTPATIENT)
Facility: AMBULATORY SURGERY CENTER | Age: 54
End: 2019-03-06
Attending: PHYSICIAN ASSISTANT
Payer: COMMERCIAL

## 2019-03-06 ENCOUNTER — ANCILLARY PROCEDURE (OUTPATIENT)
Dept: RADIOLOGY | Facility: AMBULATORY SURGERY CENTER | Age: 54
End: 2019-03-06
Attending: INTERNAL MEDICINE
Payer: COMMERCIAL

## 2019-03-06 ENCOUNTER — ANESTHESIA (OUTPATIENT)
Dept: SURGERY | Facility: AMBULATORY SURGERY CENTER | Age: 54
End: 2019-03-06

## 2019-03-06 VITALS
RESPIRATION RATE: 16 BRPM | HEART RATE: 94 BPM | HEIGHT: 66 IN | WEIGHT: 153 LBS | TEMPERATURE: 97.7 F | SYSTOLIC BLOOD PRESSURE: 126 MMHG | DIASTOLIC BLOOD PRESSURE: 72 MMHG | OXYGEN SATURATION: 99 % | BODY MASS INDEX: 24.59 KG/M2

## 2019-03-06 DIAGNOSIS — Z17.0 MALIGNANT NEOPLASM OF UPPER-OUTER QUADRANT OF RIGHT BREAST IN FEMALE, ESTROGEN RECEPTOR POSITIVE (H): ICD-10-CM

## 2019-03-06 DIAGNOSIS — C50.411 MALIGNANT NEOPLASM OF UPPER-OUTER QUADRANT OF RIGHT BREAST IN FEMALE, ESTROGEN RECEPTOR POSITIVE (H): ICD-10-CM

## 2019-03-06 DEVICE — CATH PORT POWERPORT CLEARVUE ISP 8FR 5608062: Type: IMPLANTABLE DEVICE | Status: FUNCTIONAL

## 2019-03-06 RX ORDER — PROPOFOL 10 MG/ML
INJECTION, EMULSION INTRAVENOUS PRN
Status: DISCONTINUED | OUTPATIENT
Start: 2019-03-06 | End: 2019-03-06

## 2019-03-06 RX ORDER — PROPOFOL 10 MG/ML
INJECTION, EMULSION INTRAVENOUS CONTINUOUS PRN
Status: DISCONTINUED | OUTPATIENT
Start: 2019-03-06 | End: 2019-03-06

## 2019-03-06 RX ORDER — SODIUM CHLORIDE, SODIUM LACTATE, POTASSIUM CHLORIDE, CALCIUM CHLORIDE 600; 310; 30; 20 MG/100ML; MG/100ML; MG/100ML; MG/100ML
INJECTION, SOLUTION INTRAVENOUS CONTINUOUS
Status: DISCONTINUED | OUTPATIENT
Start: 2019-03-06 | End: 2019-03-07 | Stop reason: HOSPADM

## 2019-03-06 RX ORDER — LIDOCAINE HYDROCHLORIDE 20 MG/ML
INJECTION, SOLUTION INFILTRATION; PERINEURAL PRN
Status: DISCONTINUED | OUTPATIENT
Start: 2019-03-06 | End: 2019-03-06

## 2019-03-06 RX ORDER — LIDOCAINE 40 MG/G
CREAM TOPICAL
Status: DISCONTINUED | OUTPATIENT
Start: 2019-03-06 | End: 2019-03-07 | Stop reason: HOSPADM

## 2019-03-06 RX ORDER — SODIUM CHLORIDE, SODIUM LACTATE, POTASSIUM CHLORIDE, CALCIUM CHLORIDE 600; 310; 30; 20 MG/100ML; MG/100ML; MG/100ML; MG/100ML
INJECTION, SOLUTION INTRAVENOUS CONTINUOUS PRN
Status: DISCONTINUED | OUTPATIENT
Start: 2019-03-06 | End: 2019-03-06

## 2019-03-06 RX ORDER — OXYCODONE HYDROCHLORIDE 5 MG/1
5 TABLET ORAL EVERY 4 HOURS PRN
Status: DISCONTINUED | OUTPATIENT
Start: 2019-03-06 | End: 2019-03-07 | Stop reason: HOSPADM

## 2019-03-06 RX ORDER — GABAPENTIN 300 MG/1
300 CAPSULE ORAL ONCE
Status: COMPLETED | OUTPATIENT
Start: 2019-03-06 | End: 2019-03-06

## 2019-03-06 RX ORDER — HEPARIN SODIUM (PORCINE) LOCK FLUSH IV SOLN 100 UNIT/ML 100 UNIT/ML
5 SOLUTION INTRAVENOUS
Status: CANCELLED | OUTPATIENT
Start: 2019-03-06

## 2019-03-06 RX ORDER — ONDANSETRON 4 MG/1
4 TABLET, ORALLY DISINTEGRATING ORAL EVERY 30 MIN PRN
Status: DISCONTINUED | OUTPATIENT
Start: 2019-03-06 | End: 2019-03-07 | Stop reason: HOSPADM

## 2019-03-06 RX ORDER — HEPARIN SODIUM,PORCINE 10 UNIT/ML
5 VIAL (ML) INTRAVENOUS EVERY 24 HOURS
Status: CANCELLED | OUTPATIENT
Start: 2019-03-06

## 2019-03-06 RX ORDER — MEPERIDINE HYDROCHLORIDE 25 MG/ML
12.5 INJECTION INTRAMUSCULAR; INTRAVENOUS; SUBCUTANEOUS
Status: DISCONTINUED | OUTPATIENT
Start: 2019-03-06 | End: 2019-03-07 | Stop reason: HOSPADM

## 2019-03-06 RX ORDER — ACETAMINOPHEN 325 MG/1
975 TABLET ORAL ONCE
Status: COMPLETED | OUTPATIENT
Start: 2019-03-06 | End: 2019-03-06

## 2019-03-06 RX ORDER — NALOXONE HYDROCHLORIDE 0.4 MG/ML
.1-.4 INJECTION, SOLUTION INTRAMUSCULAR; INTRAVENOUS; SUBCUTANEOUS
Status: DISCONTINUED | OUTPATIENT
Start: 2019-03-06 | End: 2019-03-07 | Stop reason: HOSPADM

## 2019-03-06 RX ORDER — CEFAZOLIN SODIUM 2 G/50ML
2 SOLUTION INTRAVENOUS
Status: COMPLETED | OUTPATIENT
Start: 2019-03-06 | End: 2019-03-06

## 2019-03-06 RX ORDER — ONDANSETRON 2 MG/ML
INJECTION INTRAMUSCULAR; INTRAVENOUS PRN
Status: DISCONTINUED | OUTPATIENT
Start: 2019-03-06 | End: 2019-03-06

## 2019-03-06 RX ORDER — FENTANYL CITRATE 50 UG/ML
25-50 INJECTION, SOLUTION INTRAMUSCULAR; INTRAVENOUS
Status: DISCONTINUED | OUTPATIENT
Start: 2019-03-06 | End: 2019-03-07 | Stop reason: HOSPADM

## 2019-03-06 RX ORDER — ONDANSETRON 2 MG/ML
4 INJECTION INTRAMUSCULAR; INTRAVENOUS EVERY 30 MIN PRN
Status: DISCONTINUED | OUTPATIENT
Start: 2019-03-06 | End: 2019-03-07 | Stop reason: HOSPADM

## 2019-03-06 RX ADMIN — PROPOFOL 40 MG: 10 INJECTION, EMULSION INTRAVENOUS at 10:11

## 2019-03-06 RX ADMIN — GABAPENTIN 300 MG: 300 CAPSULE ORAL at 09:07

## 2019-03-06 RX ADMIN — PROPOFOL 40 MG: 10 INJECTION, EMULSION INTRAVENOUS at 10:04

## 2019-03-06 RX ADMIN — CEFAZOLIN SODIUM 2 G: 2 SOLUTION INTRAVENOUS at 10:00

## 2019-03-06 RX ADMIN — PROPOFOL 100 MCG/KG/MIN: 10 INJECTION, EMULSION INTRAVENOUS at 10:00

## 2019-03-06 RX ADMIN — ACETAMINOPHEN 975 MG: 325 TABLET ORAL at 09:07

## 2019-03-06 RX ADMIN — SODIUM CHLORIDE, SODIUM LACTATE, POTASSIUM CHLORIDE, CALCIUM CHLORIDE: 600; 310; 30; 20 INJECTION, SOLUTION INTRAVENOUS at 09:52

## 2019-03-06 RX ADMIN — ONDANSETRON 4 MG: 2 INJECTION INTRAMUSCULAR; INTRAVENOUS at 10:02

## 2019-03-06 RX ADMIN — LIDOCAINE HYDROCHLORIDE 50 MG: 20 INJECTION, SOLUTION INFILTRATION; PERINEURAL at 10:30

## 2019-03-06 ASSESSMENT — MIFFLIN-ST. JEOR: SCORE: 1305.75

## 2019-03-06 ASSESSMENT — LIFESTYLE VARIABLES: TOBACCO_USE: 1

## 2019-03-06 NOTE — H&P
Interventional Radiology Pre-Procedure H&P Assessment     Kathy Lei  [id number]  1965    3/6/2019     Reason for procedure: Vascular access for chemotherapy    History: Right breast cancer    Past Medical History:   Diagnosis Date     Depressive disorder, not elsewhere classified      Hypothyroidism      Hypothyroidism, unspecified hypothyroidism type      Malignant neoplasm of upper-outer quadrant of right breast in female, estrogen receptor positive (H) 2019     Mild intermittent asthma      Scoliosis (and kyphoscoliosis), idiopathic      Past Surgical History:   Procedure Laterality Date     ABDOMEN SURGERY   c section     BACK SURGERY  scoliosis fusion      BIOPSY  breast     BREAST SURGERY  implants      C/SECTION, LOW TRANSVERSE      , Low Transverse     COSMETIC SURGERY  breast implants      SURGICAL HISTORY OF -       spinal fusion- Lacey kim     SURGICAL HISTORY OF -       removal of right breast papilloma       Current Outpatient Medications:      ADVIL 200 MG OR TABS, 1 TABLET EVERY 4 TO 6 HOURS AS NEEDED, Disp: 0, Rfl: 0     albuterol (VENTOLIN HFA) 108 (90 Base) MCG/ACT Inhaler, INHALE 1-2 PUFFS INTO THE LUNGS EVERY 4 HOURS AS NEEDED FOR SHORTNESS OF BREATH OR DIFFICULTY BREATHING., Disp: 18 g, Rfl: PRN     fluticasone (FLOVENT HFA) 110 MCG/ACT inhaler, Inhale 2 puffs into the lungs 2 times daily, Disp: 1 Inhaler, Rfl: PRN     levothyroxine (SYNTHROID/LEVOTHROID) 88 MCG tablet, Take 1 tablet (88 mcg) by mouth daily, Disp: 90 tablet, Rfl: PRN     lisinopril (PRINIVIL/ZESTRIL) 5 MG tablet, Take 1 tablet (5 mg) by mouth daily, Disp: 90 tablet, Rfl: 3     LORazepam (ATIVAN) 0.5 MG tablet, Take 1 tablet (0.5 mg) by mouth every 8 hours as needed for anxiety, Disp: 20 tablet, Rfl: 2     omeprazole (PRILOSEC) 20 MG DR capsule, Take 1 capsule (20 mg) by mouth daily, Disp: 30 capsule, Rfl: 0     prochlorperazine (COMPAZINE) 10 MG tablet, Take 1 tablet (10 mg) by  "mouth every 6 hours as needed (Nausea/Vomiting), Disp: 30 tablet, Rfl: 3     sertraline (ZOLOFT) 50 MG tablet, One daily, Disp: 90 tablet, Rfl: 1     study - olaparib (IDS# 3903) 100 mg tablet CHEMOTHERAPY, Take 1 tablet (100 mg) by mouth every 12 hours Take at approximately the same times each day with an 8 oz (240 ml) glass of water. Tablets should be swallowed whole and not chewed, crushed, dissolved or divided., Disp: 60 tablet, Rfl: 0     albuterol (2.5 MG/3ML) 0.083% nebulizer solution, Take 3 mLs by nebulization once for 1 dose., Disp: 3 mL, Rfl: 0     ASPIRIN NOT PRESCRIBED (INTENTIONAL), Please choose reason not prescribed, below, Disp: , Rfl:      lidocaine-prilocaine (EMLA) 2.5-2.5 % external cream, Apply topically as needed Apply to port site one hour prior to access start six days after port insertion, Disp: 30 g, Rfl: 1     LORazepam (ATIVAN) 0.5 MG tablet, Take 1 tablet (0.5 mg) by mouth every 4 hours as needed (Anxiety, Nausea/Vomiting or Sleep), Disp: 30 tablet, Rfl: 2     order for DME, Cranial prosthesis, Disp: 1 Units, Rfl: 1     STATIN NOT PRESCRIBED (INTENTIONAL), Please choose reason not prescribed, below, Disp: , Rfl:     No Known Allergies    Review of systems:  General: negative for fever, chills  Resp: No shortness of breath or cough  CV: negative for chest pain  GI: negative for nausea, vomiting, abdominal pain, constipation and diarrhea  : negative  Musculoskeletal: negative  Neurologic: negative  Endocrine: negative    Exam:  /85 (Cuff Size: Adult Regular)   Pulse 94   Temp 98  F (36.7  C) (Oral)   Resp 16   Ht 1.676 m (5' 6\")   Wt 69.4 kg (153 lb)   LMP 11/02/2014   SpO2 96%   BMI 24.69 kg/m    Mallampati: Grade 2:  Soft palate, base of uvula, tonsillar pillars, and portion of posterior pharyngeal wall visible  Lungs: Lungs Clear with good breath sounds bilaterally  Heart: Normal heart sounds and rate    Shadi Dash PA-C  Interventional Radiology  [phone]      "

## 2019-03-06 NOTE — PROCEDURES
Interventional Radiology Brief Post Procedure Note    Procedure: IR CHEST PORT PLACEMENT > 5 YRS OF AGE [[id number]]     Proceduralist: Shadi Dash PA-C    Assistant: None    Time Out: Prior to the start of the procedure and with procedural staff participation, I verbally confirmed the patient s identity using two indicators, relevant allergies, that the procedure was appropriate and matched the consent or emergent situation, and that the correct equipment/implants were available. Immediately prior to starting the procedure I conducted the Time Out with the procedural staff and re-confirmed the patient s name, procedure, and site/side. (The Joint Commission universal protocol was followed.)  Yes    Sedation: Monitored Anesthesia Care (MAC) administered and documented by Anesthesia Care Provider    Findings: Completed image-guided placement of 8 Gabonese 27 cm single lumen power-injectable central venous port via left IJ. Aspirates and flushes freely, heparin locked and is ready for immediate use.    Estimated Blood Loss: Minimal    Fluoroscopy Time: 2.0 minute(s)    Specimens: None    Complications: 1. None     Condition: Stable    Plan: Ready for immediate use. Follow-up per primary team. Return for removal as indicated.    Comments: See dictated procedure note for full details.    Shadi Dash PA-C  Interventional Radiology  [phone]

## 2019-03-06 NOTE — ANESTHESIA CARE TRANSFER NOTE
Patient: Kathy Lei    Procedure(s):  Single Lumen Chest Port Placement    Diagnosis: Malignant Neoplasm of Upper Outer Quadrant of Right Female Breast  Diagnosis Additional Information: No value filed.    Anesthesia Type:   No value filed.     Note:  Airway :Room Air  Patient transferred to:Phase II  Comments: Arrive Phase II, Stable, Airway Intact  128/87, 79,16,98%,97.7  All questions answered.Handoff Report: Identifed the Patient, Identified the Reponsible Provider, Reviewed the pertinent medical history, Discussed the surgical course, Reviewed Intra-OP anesthesia mangement and issues during anesthesia, Set expectations for post-procedure period and Allowed opportunity for questions and acknowledgement of understanding      Vitals: (Last set prior to Anesthesia Care Transfer)    CRNA VITALS  3/6/2019 1015 - 3/6/2019 1052      3/6/2019             Pulse:  78    SpO2:  100 %    Resp Rate (set):  10                Electronically Signed By: CECILE Trujillo CRNA  March 6, 2019  10:52 AM

## 2019-03-06 NOTE — ANESTHESIA POSTPROCEDURE EVALUATION
Anesthesia POST Procedure Evaluation    Patient: Kathy Lei   MRN:     [id number] Gender:   female   Age:    53 year old :      1965        Preoperative Diagnosis: Malignant Neoplasm of Upper Outer Quadrant of Right Female Breast   Procedure(s):  Single Lumen Chest Port Placement   Postop Comments: No value filed.       Anesthesia Type:  MAC    Reportable Event: NO     PAIN: Uncomplicated   Sign Out status: Comfortable, Well controlled pain     PONV: No PONV   Sign Out status:  No Nausea or Vomiting     Neuro/Psych: Uneventful perioperative course   Sign Out Status: Preoperative baseline; Age appropriate mentation     Airway/Resp.: Uneventful perioperative course   Sign Out Status: Non labored breathing, age appropriate RR; Resp. Status within EXPECTED Parameters     CV: Uneventful perioperative course   Sign Out status: Appropriate BP and perfusion indices; Appropriate HR/Rhythm     Disposition:   Sign Out in:  PACU  Disposition:  Phase II; Home  Recovery Course: Uneventful  Follow-Up: Not required           Last Anesthesia Record Vitals:  CRNA VITALS  3/6/2019 1015 - 3/6/2019 1049      3/6/2019             Pulse:  78    SpO2:  100 %    Resp Rate (set):  10          Last PACU/Preop Vitals:  Vitals:    19 0800   BP: 136/85   Pulse: 94   Resp: 16   Temp: 36.7  C (98  F)   SpO2: 96%         Electronically Signed By: Jayant Sears MD, 2019, 10:49 AM

## 2019-03-06 NOTE — DISCHARGE INSTRUCTIONS
A collaboration between [facility] and [facility]  Experts in minimally invasive, targeted treatments performed using imaging guidance    Venous Access Device,  Port Catheter or Tunneled or Non-Tunneled Central Line Placement    Today you had a procedure today to install a venous access device; either a tunneled central vein catheter or a subcutaneous port catheter.    One of our Radiology PAs performed this procedure for you today:    ? Shadi Dash PA-C      After you go home:  - Drink plenty of fluids.  Generally 6-8 (8 ounce) glasses a day is recommended.  - Resume your regular diet unless otherwise ordered by a medical provider.  - Keep any applied tape/gauze dressings clean and dry.  Change tape/gauze dressings if they get wet or soiled.  - You may shower the following day after procedure, however cover and protect from moisture any tape/gauze dressings.  You may let water hit and run over dried skin glue, but do not scrub.  Pat the area dry after showering.  - Port placement incisions are closed with absorbable suture, meaning they do not need to be removed at a later date, and a topical skin adhesive (skin glue).  This glue will wear off in 7-14 days.  Do not remove before this time.  If 14 days have passed and residual glue is present, you may gently remove it.  - Do not apply gels, lotions, or ointments to the glue site for the first 10 days as this may cause the glue to prematurely soften and fail.  - Do not perform strenuous activities or lift greater than 10 pounds for the next three days.  - If there is bleeding or oozing from the procedure site, apply firm pressure to the area for 5-10 minutes.  If the bleeding continues seek medical advice at the numbers below.  - Mild procedure site discomfort can be treated with an ice pack and over-the-counter pain relievers.        For 24 hours after any sedation used:  - Relax and take it easy.  No strenuous  activities.  - Do not drive or operate machines at home or at work.  - No alcohol consumption.  - Do not make any important or legal decisions.    Call our Interventional Radiology (IR) service if:  - If you start bleeding from the procedure site.  If you do start to bleed from the site, lie down and hold some pressure on the site.  Our radiology provider can help you decide if you need to return to the hospital.  - If you have new or worsening pain related to the procedure.  - If you have concerning swelling at the procedure site.  - If you develop persistent nausea or vomiting.  - If you develop hives or a rash or any unexplained itching.  - If you have a fever of greater than 100.5  F and chills in the first 5 days after procedure.  - Any other concerns related to your procedure.      [facility]  Interventional Radiology (IR)  [address]  [address] [facility] [place] [address]    Contact Number:  [phone]  (IR control desk)  - Monday - Friday 8:00 am - 4:30 pm    After hours for urgent concerns:  [phone]  - After 4:30 pm Monday - Friday, Weekends and Holidays.   - Ask for Interventional Radiology on-call.  Someone is available 24 hours a day.  - [facility] toll free number:  [phone]    [facility]  Home Care Following Anesthesia  For 24 hours after surgery:  1. Get plenty of rest.  A responsible adult must stay with you for at least 24 hours after you leave the surgery center.  2. Do not drive or use heavy equipment.  If you have weakness or tingling, don't drive or use heavy equipment until this feeling goes away.   3. Do not drink alcohol.   4. Avoid strenuous or risky activities.  Ask for help when climbing stairs.  5. You may feel lightheaded.  IF so, sit for a few minutes before standing.  Have someone help you get up.   6. If you have nausea (feel sick to your stomach): Drink only clear liquids such as apple  juice, ginger ale, broth or 7-Up.  Rest may also help.  Be sure to drink enough fluids.  Move to a regular diet as you feel able.   7. You may have a slight fever.  Call the doctor if your fever is over 100 F (37.7 C) (taken under the tongue) or lasts longer than 24 hours.  8. You may have a dry mouth, a sore throat, muscle aches or trouble sleeping. These should go away after 24 hours.  9. Do not make important or legal decisions.     Tylenol/Acetaminophen Consumption  To help encourage the safe use of acetaminophen, the makers of TYLENOL  have lowered the maximum daily dose for single-ingredient Extra Strength TYLENOL  (acetaminophen) products sold in the [place] from 8 pills per day (4,000 mg) to 6 pills per day (3,000 mg). The dosing interval has also changed from 2 pills every 4-6 hours to 2 pills every 6 hours.    If you feel your pain relief is insufficient, you may take Tylenol/Acetaminophen in addition to your narcotic pain medication.     Be careful not to exceed 3,000 mg of Tylenol/Acetaminophen in a 24 hour period from all sources.    If you are taking extra strength Tylenol/acetaminophen (500 mg), the maximum dose is 6 tablets in 24 hours.    If you are taking regular strength acetaminophen (325 mg), the maximum dose is 9 tablets in 24 hours.    Call a doctor for any of the followin. Signs of infection (fever, growing tenderness at the surgery site, a large amount of drainage or bleeding, severe pain, foul-smelling drainage, redness, swelling).  2. It has been over 8 to 10 hours since surgery and you are still not able to urinate (pass water).  3. Headache for over 24 hours.  For emergency care, call the:  [facility]:  [phone] (TTY for hearing impaired: [phone])

## 2019-03-06 NOTE — ANESTHESIA PREPROCEDURE EVALUATION
Anesthesia Pre-Procedure Evaluation    Patient: Kathy Lei   MRN:     [id number] Gender:   female   Age:    53 year old :      1965        Preoperative Diagnosis: Malignant Neoplasm of Upper Outer Quadrant of Right Female Breast   Procedure(s):  Single Lumen Chest Port Placement     Past Medical History:   Diagnosis Date     Depressive disorder, not elsewhere classified      Hypothyroidism      Hypothyroidism, unspecified hypothyroidism type      Malignant neoplasm of upper-outer quadrant of right breast in female, estrogen receptor positive (H) 2019     Mild intermittent asthma      Scoliosis (and kyphoscoliosis), idiopathic       Past Surgical History:   Procedure Laterality Date     ABDOMEN SURGERY   c section     BACK SURGERY  scoliosis fusion      BIOPSY  breast     BREAST SURGERY  implants      C/SECTION, LOW TRANSVERSE      , Low Transverse     COSMETIC SURGERY  breast implants      SURGICAL HISTORY OF -       spinal fusion- Lacey kim     SURGICAL HISTORY OF -       removal of right breast papilloma          Anesthesia Evaluation     . Pt has had prior anesthetic.     History of anesthetic complications   - PONV        ROS/MED HX    ENT/Pulmonary:     (+)tobacco use, Past use asthma Treatment: Inhaler prn,  , . .    Neurologic:  - neg neurologic ROS     Cardiovascular:     (+) hypertension----. : . . . :. . Previous cardiac testing Echodate:2019results:Interpretation Summary  Borderline (EF 50-55%) reduced left ventricular function is present.Mild  concentric wall thickening consistent with left ventricular hypertrophy is  present.  Global right ventricular function is normal.  Mild sclerosis of the aortic valve and mitral annulus present.  The inferior vena cava was normal in size with preserved respiratory  variability.  No pericardial effusion is present.date: results:ECG reviewed date: results:Sinus rhythm date: results:          METS/Exercise  Tolerance:  >4 METS   Hematologic:  - neg hematologic  ROS       Musculoskeletal:   (+) , , other musculoskeletal- scoliosis      GI/Hepatic:  - neg GI/hepatic ROS       Renal/Genitourinary:         Endo:     (+) thyroid problem hypothyroidism, .      Psychiatric:     (+) psychiatric history depression      Infectious Disease:         Malignancy:   (+) Malignancy History of Breast  Breast CA Active status post.         Other:    (+) no H/O Chronic Pain,                       PHYSICAL EXAM:   Mental Status/Neuro: A/A/O   Airway: Facies: Feasible  Mallampati: II  Mouth/Opening: Full  TM distance: > 6 cm  Neck ROM: Full   Respiratory: Auscultation: CTAB     Resp. Rate: Normal     Resp. Effort: Normal      CV: Rhythm: Regular  Rate: Age appropriate  Heart: Normal Sounds   Comments:      Dental: Normal                  Lab Results   Component Value Date    WBC 5.6 03/05/2019    HGB 14.2 03/05/2019    HCT 41.8 03/05/2019     03/05/2019     03/05/2019    POTASSIUM 3.8 03/05/2019    CHLORIDE 103 03/05/2019    CO2 28 03/05/2019    BUN 10 03/05/2019    CR 0.64 03/05/2019    GLC 95 03/05/2019    BRYANT 8.9 03/05/2019    MAG 2.0 02/05/2019    ALBUMIN 3.8 03/05/2019    PROTTOTAL 7.4 03/05/2019    ALT 22 03/05/2019    AST 15 03/05/2019    ALKPHOS 53 03/05/2019    BILITOTAL 0.3 03/05/2019    PTT 26 02/05/2019    INR 1.02 02/05/2019    FIBR 264 02/05/2019    TSH 2.20 02/25/2019    T4 0.70 (L) 06/13/2016    HCG (A) 11/20/2002     Canceled, Test credited  CORRECTED ON 11/21 AT 1617: PREVIOUSLY REPORTED AS Negative       Preop Vitals  BP Readings from Last 3 Encounters:   03/06/19 136/85   03/05/19 132/81   02/25/19 116/86    Pulse Readings from Last 3 Encounters:   03/06/19 94   03/05/19 85   02/25/19 91      Resp Readings from Last 3 Encounters:   03/06/19 16   03/05/19 16   02/25/19 18    SpO2 Readings from Last 3 Encounters:   03/06/19 96%   03/05/19 97%   02/25/19 97%      Temp Readings from Last 1 Encounters:  "  03/06/19 36.7  C (98  F) (Oral)    Ht Readings from Last 1 Encounters:   03/06/19 1.676 m (5' 6\")      Wt Readings from Last 1 Encounters:   03/06/19 69.4 kg (153 lb)    Estimated body mass index is 24.69 kg/m  as calculated from the following:    Height as of this encounter: 1.676 m (5' 6\").    Weight as of this encounter: 69.4 kg (153 lb).     LDA:  Peripheral IV 03/06/19 Right Hand (Active)   Site Assessment WDL 3/6/2019  9:20 AM   Line Status Infusing 3/6/2019  9:20 AM   Phlebitis Scale 0-->no symptoms 3/6/2019  9:20 AM   Infiltration Scale 0 3/6/2019  9:20 AM   Infiltration Site Treatment Method  None 3/6/2019  9:20 AM   Extravasation? No 3/6/2019  9:20 AM   Number of days: 0            Assessment:   ASA SCORE: 2    NPO Status: > 6 hours since completed Solid Foods   Documentation: H&P complete; Preop Testing complete; Consents complete   Proceeding: Proceed without further delay  Tobacco Use:  NO Active use of Tobacco/UNKNOWN Tobacco use status     Plan:   Anes. Type:  MAC   Pre-Induction: Midazolam IV   Induction:  IV (Standard)   Airway: Native Airway   Access/Monitoring: PIV   Maintenance: Propofol; IV   Emergence: Procedure Site   Logistics: Same Day Surgery     Postop Pain/Sedation Strategy:  Standard-Options: Opioids PRN     PONV Management:  Adult Risk Factors: Female, Non-Smoker, Postop Opioids  Prevention: Propofol Infusion     CONSENT: Direct conversation   Plan and risks discussed with: Patient   Blood Products: N/a                         Jayant Sears MD  "

## 2019-03-11 ENCOUNTER — APPOINTMENT (OUTPATIENT)
Dept: LAB | Facility: CLINIC | Age: 54
End: 2019-03-11
Attending: INTERNAL MEDICINE
Payer: COMMERCIAL

## 2019-03-11 ENCOUNTER — RESEARCH ENCOUNTER (OUTPATIENT)
Dept: ONCOLOGY | Facility: CLINIC | Age: 54
End: 2019-03-11

## 2019-03-11 ENCOUNTER — INFUSION THERAPY VISIT (OUTPATIENT)
Dept: ONCOLOGY | Facility: CLINIC | Age: 54
End: 2019-03-11
Attending: INTERNAL MEDICINE
Payer: COMMERCIAL

## 2019-03-11 ENCOUNTER — ONCOLOGY VISIT (OUTPATIENT)
Dept: ONCOLOGY | Facility: CLINIC | Age: 54
End: 2019-03-11
Attending: PHYSICIAN ASSISTANT
Payer: COMMERCIAL

## 2019-03-11 VITALS
HEART RATE: 95 BPM | TEMPERATURE: 97.7 F | SYSTOLIC BLOOD PRESSURE: 126 MMHG | OXYGEN SATURATION: 96 % | DIASTOLIC BLOOD PRESSURE: 74 MMHG | BODY MASS INDEX: 24.4 KG/M2 | HEIGHT: 66 IN | RESPIRATION RATE: 18 BRPM | WEIGHT: 151.8 LBS

## 2019-03-11 DIAGNOSIS — Z17.0 MALIGNANT NEOPLASM OF UPPER-OUTER QUADRANT OF RIGHT BREAST IN FEMALE, ESTROGEN RECEPTOR POSITIVE (H): ICD-10-CM

## 2019-03-11 DIAGNOSIS — C50.411 MALIGNANT NEOPLASM OF UPPER-OUTER QUADRANT OF RIGHT BREAST IN FEMALE, ESTROGEN RECEPTOR POSITIVE (H): Primary | ICD-10-CM

## 2019-03-11 DIAGNOSIS — Z17.0 MALIGNANT NEOPLASM OF UPPER-OUTER QUADRANT OF RIGHT BREAST IN FEMALE, ESTROGEN RECEPTOR POSITIVE (H): Primary | ICD-10-CM

## 2019-03-11 DIAGNOSIS — C50.411 MALIGNANT NEOPLASM OF UPPER-OUTER QUADRANT OF RIGHT BREAST IN FEMALE, ESTROGEN RECEPTOR POSITIVE (H): ICD-10-CM

## 2019-03-11 LAB
ALBUMIN SERPL-MCNC: 3.7 G/DL (ref 3.4–5)
ALP SERPL-CCNC: 48 U/L (ref 40–150)
ALT SERPL W P-5'-P-CCNC: 34 U/L (ref 0–50)
ANION GAP SERPL CALCULATED.3IONS-SCNC: 4 MMOL/L (ref 3–14)
AST SERPL W P-5'-P-CCNC: 21 U/L (ref 0–45)
BASOPHILS # BLD AUTO: 0.1 10E9/L (ref 0–0.2)
BASOPHILS NFR BLD AUTO: 0.9 %
BILIRUB SERPL-MCNC: 0.3 MG/DL (ref 0.2–1.3)
BUN SERPL-MCNC: 15 MG/DL (ref 7–30)
CALCIUM SERPL-MCNC: 8.6 MG/DL (ref 8.5–10.1)
CHLORIDE SERPL-SCNC: 105 MMOL/L (ref 94–109)
CO2 SERPL-SCNC: 28 MMOL/L (ref 20–32)
CREAT SERPL-MCNC: 0.6 MG/DL (ref 0.52–1.04)
DIFFERENTIAL METHOD BLD: NORMAL
EOSINOPHIL # BLD AUTO: 0.1 10E9/L (ref 0–0.7)
EOSINOPHIL NFR BLD AUTO: 1.6 %
ERYTHROCYTE [DISTWIDTH] IN BLOOD BY AUTOMATED COUNT: 12.8 % (ref 10–15)
GFR SERPL CREATININE-BSD FRML MDRD: >90 ML/MIN/{1.73_M2}
GLUCOSE SERPL-MCNC: 97 MG/DL (ref 70–99)
HCT VFR BLD AUTO: 40.8 % (ref 35–47)
HGB BLD-MCNC: 13.5 G/DL (ref 11.7–15.7)
IMM GRANULOCYTES # BLD: 0 10E9/L (ref 0–0.4)
IMM GRANULOCYTES NFR BLD: 0.5 %
LYMPHOCYTES # BLD AUTO: 1.6 10E9/L (ref 0.8–5.3)
LYMPHOCYTES NFR BLD AUTO: 28.6 %
MCH RBC QN AUTO: 28.9 PG (ref 26.5–33)
MCHC RBC AUTO-ENTMCNC: 33.1 G/DL (ref 31.5–36.5)
MCV RBC AUTO: 87 FL (ref 78–100)
MONOCYTES # BLD AUTO: 0.2 10E9/L (ref 0–1.3)
MONOCYTES NFR BLD AUTO: 3.7 %
NEUTROPHILS # BLD AUTO: 3.7 10E9/L (ref 1.6–8.3)
NEUTROPHILS NFR BLD AUTO: 64.7 %
NRBC # BLD AUTO: 0 10*3/UL
NRBC BLD AUTO-RTO: 0 /100
PLATELET # BLD AUTO: 296 10E9/L (ref 150–450)
POTASSIUM SERPL-SCNC: 3.7 MMOL/L (ref 3.4–5.3)
PROT SERPL-MCNC: 7.1 G/DL (ref 6.8–8.8)
RBC # BLD AUTO: 4.67 10E12/L (ref 3.8–5.2)
SODIUM SERPL-SCNC: 137 MMOL/L (ref 133–144)
WBC # BLD AUTO: 5.7 10E9/L (ref 4–11)

## 2019-03-11 PROCEDURE — 25000128 H RX IP 250 OP 636: Mod: ZF | Performed by: INTERNAL MEDICINE

## 2019-03-11 PROCEDURE — 25800030 ZZH RX IP 258 OP 636: Mod: ZF | Performed by: PHYSICIAN ASSISTANT

## 2019-03-11 PROCEDURE — G0463 HOSPITAL OUTPT CLINIC VISIT: HCPCS | Mod: ZF

## 2019-03-11 PROCEDURE — 80053 COMPREHEN METABOLIC PANEL: CPT | Performed by: PHYSICIAN ASSISTANT

## 2019-03-11 PROCEDURE — 99214 OFFICE O/P EST MOD 30 MIN: CPT | Mod: ZP | Performed by: PHYSICIAN ASSISTANT

## 2019-03-11 PROCEDURE — 96375 TX/PRO/DX INJ NEW DRUG ADDON: CPT

## 2019-03-11 PROCEDURE — 85025 COMPLETE CBC W/AUTO DIFF WBC: CPT | Performed by: PHYSICIAN ASSISTANT

## 2019-03-11 PROCEDURE — 96413 CHEMO IV INFUSION 1 HR: CPT

## 2019-03-11 PROCEDURE — 25000128 H RX IP 250 OP 636: Mod: ZF | Performed by: PHYSICIAN ASSISTANT

## 2019-03-11 RX ORDER — LORAZEPAM 0.5 MG/1
0.5 TABLET ORAL EVERY 4 HOURS PRN
Qty: 30 TABLET | Refills: 2 | Status: SHIPPED | OUTPATIENT
Start: 2019-03-11 | End: 2019-04-08

## 2019-03-11 RX ORDER — SODIUM CHLORIDE 9 MG/ML
1000 INJECTION, SOLUTION INTRAVENOUS CONTINUOUS PRN
Status: CANCELLED
Start: 2019-03-11

## 2019-03-11 RX ORDER — ALBUTEROL SULFATE 0.83 MG/ML
2.5 SOLUTION RESPIRATORY (INHALATION)
Status: CANCELLED | OUTPATIENT
Start: 2019-03-11

## 2019-03-11 RX ORDER — EPINEPHRINE 0.3 MG/.3ML
0.3 INJECTION SUBCUTANEOUS EVERY 5 MIN PRN
Status: CANCELLED | OUTPATIENT
Start: 2019-03-11

## 2019-03-11 RX ORDER — EPINEPHRINE 1 MG/ML
0.3 INJECTION, SOLUTION INTRAMUSCULAR; SUBCUTANEOUS EVERY 5 MIN PRN
Status: CANCELLED | OUTPATIENT
Start: 2019-03-11

## 2019-03-11 RX ORDER — METHYLPREDNISOLONE SODIUM SUCCINATE 125 MG/2ML
125 INJECTION, POWDER, LYOPHILIZED, FOR SOLUTION INTRAMUSCULAR; INTRAVENOUS
Status: CANCELLED
Start: 2019-03-11

## 2019-03-11 RX ORDER — HEPARIN SODIUM (PORCINE) LOCK FLUSH IV SOLN 100 UNIT/ML 100 UNIT/ML
5 SOLUTION INTRAVENOUS ONCE
Status: COMPLETED | OUTPATIENT
Start: 2019-03-11 | End: 2019-03-11

## 2019-03-11 RX ORDER — ONDANSETRON 2 MG/ML
8 INJECTION INTRAMUSCULAR; INTRAVENOUS ONCE
Status: COMPLETED | OUTPATIENT
Start: 2019-03-11 | End: 2019-03-11

## 2019-03-11 RX ORDER — ALBUTEROL SULFATE 90 UG/1
1-2 AEROSOL, METERED RESPIRATORY (INHALATION)
Status: CANCELLED
Start: 2019-03-11

## 2019-03-11 RX ORDER — DEXAMETHASONE SODIUM PHOSPHATE 10 MG/ML
10 INJECTION, SOLUTION INTRAMUSCULAR; INTRAVENOUS
Status: CANCELLED | OUTPATIENT
Start: 2019-03-11

## 2019-03-11 RX ORDER — LORAZEPAM 2 MG/ML
0.5 INJECTION INTRAMUSCULAR EVERY 4 HOURS PRN
Status: CANCELLED
Start: 2019-03-11

## 2019-03-11 RX ORDER — MEPERIDINE HYDROCHLORIDE 25 MG/ML
25 INJECTION INTRAMUSCULAR; INTRAVENOUS; SUBCUTANEOUS EVERY 30 MIN PRN
Status: CANCELLED | OUTPATIENT
Start: 2019-03-11

## 2019-03-11 RX ORDER — DIPHENHYDRAMINE HYDROCHLORIDE 50 MG/ML
50 INJECTION INTRAMUSCULAR; INTRAVENOUS
Status: CANCELLED
Start: 2019-03-11

## 2019-03-11 RX ADMIN — HEPARIN 5 ML: 100 SYRINGE at 14:54

## 2019-03-11 RX ADMIN — PACLITAXEL 142 MG: 6 INJECTION, SOLUTION INTRAVENOUS at 13:46

## 2019-03-11 RX ADMIN — SODIUM CHLORIDE 250 ML: 9 INJECTION, SOLUTION INTRAVENOUS at 13:23

## 2019-03-11 RX ADMIN — HEPARIN SODIUM (PORCINE) LOCK FLUSH IV SOLN 100 UNIT/ML 5 ML: 100 SOLUTION at 11:14

## 2019-03-11 RX ADMIN — ONDANSETRON 8 MG: 2 INJECTION INTRAMUSCULAR; INTRAVENOUS at 13:25

## 2019-03-11 ASSESSMENT — PAIN SCALES - GENERAL: PAINLEVEL: NO PAIN (0)

## 2019-03-11 ASSESSMENT — MIFFLIN-ST. JEOR: SCORE: 1300.06

## 2019-03-11 NOTE — NURSING NOTE
"Oncology Rooming Note    March 11, 2019 11:35 AM   Kathy Lei is a 53 year old female who presents for:    Chief Complaint   Patient presents with     Port Draw     labs drawn via port by RN     Oncology Clinic Visit     Return visit related to Breast Cancer     Initial Vitals: /74 (BP Location: Left arm, Patient Position: Chair, Cuff Size: Adult Regular)   Pulse 95   Temp 97.7  F (36.5  C) (Oral)   Resp 18   Ht 1.676 m (5' 5.98\")   Wt 68.9 kg (151 lb 12.8 oz)   LMP 11/02/2014   SpO2 96%   BMI 24.51 kg/m   Estimated body mass index is 24.51 kg/m  as calculated from the following:    Height as of this encounter: 1.676 m (5' 5.98\").    Weight as of this encounter: 68.9 kg (151 lb 12.8 oz). Body surface area is 1.79 meters squared.  No Pain (0) Comment: Data Unavailable   Patient's last menstrual period was 11/02/2014.  Allergies reviewed: Yes  Medications reviewed: Yes    Medications: MEDICATION REFILLS NEEDED TODAY. Provider was notified.  Pharmacy name entered into [facility]:    [id number] - [place], [place] - [address] [facility] - [place], [place] - [address] AT [facility] [address]    Clinical concerns: Refills needed today. Provider was notified.      Marlin Hernandez LPN            "

## 2019-03-11 NOTE — LETTER
3/11/2019      RE: Kathy Lei  [address] [place] [address]       Oncology/Hematology Visit Note  Mar 11, 2019    Reason for Visit: follow up of right breast cancer    History of Present Illness: Kathy Lei is a 53 year old female with recent diagnosis of ER/NJ positive, HER2 negative, grade 2 breast cancer. Mily presented between Christmas and New Years of 2018 with new sharp pains in the upper outer quadrant of the right breast.  Diagnostic mammogram on 1/8/19 with grouped coarse heterogeneous calcifications at 11:30 position, irregular mass at 9:00 position. US with irregular mass at 11:00 position, 1.0 x 0.9 x 1.6cm. At 9:30 position, irregular mass, 3.2 x 0.5, x 1.3 cm. Contrast mammogram was subsequently performed and the contrast mammogram showed a large area of mass and non-mass-like enhancement in the upper outer right breast measuring 7.2 cm in greatest dimension.      The pathology showed part A, breast needle biopsy 11 o'clock, 6 cm from the nipple, invasive mammary carcinoma, no special type, ductal (and mucinous) [place] grade 2.  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  Calcifications were associated with the DCIS.  There was a focus suspicious for lymphovascular invasion.  Invasive carcinoma was estrogen receptor positive and progesterone receptor negative by immunohistochemistry.  In part B, breast right, 8 o'clock, 4 cm from the nipple, ultrasound-guided core biopsy, invasive mammary carcinoma, no special type, ductal (and mucinous) [place] grade 2, occasional calcifications were noted.  Invasive carcinoma was estrogen receptor positive and progesterone receptor negative by immunohistochemistry.  On quantitation of the ER and NJ, ER was positive 99% of the cells staining with strong intensity.  NJ was subsequently noted to be positive with 15% of the cells staining with moderate intensity.       There was also  a HER2 FISH performed, and the HER2 FISH showed average number of HER2 signals per nucleus 2.6.  Average number of CEN17 signals 1.7 with a ratio of 1.6, VASILIY negative for biopsy A.  For biopsy B, the HER2 signals per nucleus 2.9.  Average number CEN17 signals per nucleus 1.7 with a ratio of 1.7, VASILIY negative.  Overall, by 2018 ASCO/CAP guidelines, this tumor is HER2 negative.     She was enrolled in I-SPY2 and is here to start cycle 1 Durvulumab, Taxol and Olaparib. Please see Dr. Guzman's previous notes for further details on the patient's history.     Interval History:  Mily is here for follow up. She had port placed on 3/6 and is still a little sore. Using ice packs and tylenol. No erythema or concerns. We reduced her steroids last cycle and she did not experience the fatigue, bodyaches and abdominal cramps that she had on day 2 after first infusion.     Appetite is good. No nausea. She had some heartburn--she is now taking prilosec daily and tums prn. No diarrhea or constipation. She continues to have some hair loss. No rashes, mucositis, changes in nails. No peripheral neuropathy.    On flovent, no dyspnea, wheezing, cough. She is on Zoloft for depression and feels this is well-controlled. She is working at a . No urinary concerns, no leg swelling.    Current Outpatient Medications   Medication Sig Dispense Refill     ADVIL 200 MG OR TABS 1 TABLET EVERY 4 TO 6 HOURS AS NEEDED 0 0     albuterol (2.5 MG/3ML) 0.083% nebulizer solution Take 3 mLs by nebulization once for 1 dose. 3 mL 0     albuterol (VENTOLIN HFA) 108 (90 Base) MCG/ACT Inhaler INHALE 1-2 PUFFS INTO THE LUNGS EVERY 4 HOURS AS NEEDED FOR SHORTNESS OF BREATH OR DIFFICULTY BREATHING. 18 g PRN     ASPIRIN NOT PRESCRIBED (INTENTIONAL) Please choose reason not prescribed, below       fluticasone (FLOVENT HFA) 110 MCG/ACT inhaler Inhale 2 puffs into the lungs 2 times daily 1 Inhaler PRN     levothyroxine (SYNTHROID/LEVOTHROID) 88 MCG tablet  Take 1 tablet (88 mcg) by mouth daily 90 tablet PRN     lidocaine-prilocaine (EMLA) 2.5-2.5 % external cream Apply topically as needed Apply to port site one hour prior to access start six days after port insertion 30 g 1     lisinopril (PRINIVIL/ZESTRIL) 5 MG tablet Take 1 tablet (5 mg) by mouth daily 90 tablet 3     LORazepam (ATIVAN) 0.5 MG tablet Take 1 tablet (0.5 mg) by mouth every 4 hours as needed (Anxiety, Nausea/Vomiting or Sleep) 30 tablet 2     LORazepam (ATIVAN) 0.5 MG tablet Take 1 tablet (0.5 mg) by mouth every 8 hours as needed for anxiety 20 tablet 2     omeprazole (PRILOSEC) 20 MG DR capsule Take 1 capsule (20 mg) by mouth daily 30 capsule 0     order for DME Cranial prosthesis 1 Units 1     prochlorperazine (COMPAZINE) 10 MG tablet Take 1 tablet (10 mg) by mouth every 6 hours as needed (Nausea/Vomiting) 30 tablet 3     sertraline (ZOLOFT) 50 MG tablet One daily 90 tablet 1     STATIN NOT PRESCRIBED (INTENTIONAL) Please choose reason not prescribed, below       study - olaparib (IDS# [id number]) 100 mg tablet CHEMOTHERAPY Take 1 tablet (100 mg) by mouth every 12 hours Take at approximately the same times each day with an 8 oz (240 ml) glass of water. Tablets should be swallowed whole and not chewed, crushed, dissolved or divided. 60 tablet 0       Physical Examination:  General: The patient is a pleasant female in no acute distress. ECOG 0  LMP 11/02/2014   Wt Readings from Last 10 Encounters:   03/06/19 69.4 kg (153 lb)   03/05/19 69.1 kg (152 lb 4.8 oz)   02/25/19 69.4 kg (153 lb)   02/11/19 69.9 kg (154 lb)   02/06/19 68.5 kg (151 lb)   02/05/19 69.2 kg (152 lb 9.6 oz)   01/22/19 67.4 kg (148 lb 8 oz)   01/03/19 66.5 kg (146 lb 8 oz)   07/16/18 65.9 kg (145 lb 6 oz)   01/19/17 65.8 kg (145 lb)     HEENT: EOMI, PERRL. Sclerae are anicteric. Oral mucosa is pink and moist with no lesions or thrush.   Lymph: No palpable cervical, supraclavicular, subclavicular or axillary lymphadenopathy.  Heart: Regular  rate and rhythm.   Lungs: Clear to auscultation bilaterally.   Breast: Right breast with 8.5 cm x 6.5 cm mass in upper outer quadrant.  Abdomen: Bowel sounds present, soft, nontender with no palpable hepatosplenomegaly or masses.   Extremities: No lower extremity edema noted bilaterally.   Neuro: Cranial nerves II through XII are grossly intact.  Skin: No rashes, petechiae, or bruising noted on exposed skin.    Laboratory Data:  Results for GALA ROLAND (MRN [id number]) as of 3/11/2019 12:01   3/11/2019 11:22   Sodium 137   Potassium 3.7   Chloride 105   Carbon Dioxide 28   Urea Nitrogen 15   Creatinine 0.60   GFR Estimate >90   GFR Estimate If Black >90   Calcium 8.6   Anion Gap 4   Albumin 3.7   Protein Total 7.1   Bilirubin Total 0.3   Alkaline Phosphatase 48   ALT 34   AST 21   Glucose 97   WBC 5.7   Hemoglobin 13.5   Hematocrit 40.8   Platelet Count 296   RBC Count 4.67   MCV 87   MCH 28.9   MCHC 33.1   RDW 12.8   Diff Method Automated Method   % Neutrophils 64.7   % Lymphocytes 28.6   % Monocytes 3.7   % Eosinophils 1.6   % Basophils 0.9   % Immature Granulocytes 0.5   Nucleated RBCs 0   Absolute Neutrophil 3.7   Absolute Lymphocytes 1.6   Absolute Monocytes 0.2   Absolute Eosinophils 0.1   Absolute Basophils 0.1   Abs Immature Granulocytes 0.0   Absolute Nucleated RBC 0.0       Assessment and Plan:    Right breast cancer, ER/[place] positive, HER2 negative: She was enrolled in I-SPY2 and started cycle 1 Durvulumab, Taxol and Olaparib on 2/25/19. Tolerating well.   --Labs reviewed. Here today for C3 Taxol. Will discontinue dex and benadryl this cycle.   --Continues on olaparib.     Abnormal echocardiogram: The patient's echocardiogram has both low strain and low normal ejection fraction.  Both of these tests are very borderline, and she has no symptoms of heart failure.  In addition, she has no historical features that would put her at risk of having heart failure, with the exception of mildly excessive  alcohol use.  However, in the setting of future Adriamycin use, she is likely at higher risk than normal for chemotherapy-induced cardiomyopathy.  Therefore, recommend that the patient switch to lisinopril 5 mg instead of amlodipine for cardioprotection.  She should be followed with serial strain imaging throughout the course of her chemotherapy.  --Scheduled for echo and visit with Dr. Alanis on 5/6     GERD: Continue prilosec. Tums prn   Hx depression: On Zoloft  Hx asthma: On flovent BID and albuterol prn  Hx hypothyroidism: on Levothyroxine 88mcg daily.       Kerry Norwood PA-C  [facility]  [address], [place] [id number] [phone]

## 2019-03-11 NOTE — PROGRESS NOTES
Oncology/Hematology Visit Note  Mar 11, 2019    Reason for Visit: follow up of right breast cancer    History of Present Illness: Kathy Lei is a 53 year old female with recent diagnosis of ER/VT positive, HER2 negative, grade 2 breast cancer. Mily presented between Christmas and New Years of 2018 with new sharp pains in the upper outer quadrant of the right breast.  Diagnostic mammogram on 1/8/19 with grouped coarse heterogeneous calcifications at 11:30 position, irregular mass at 9:00 position. US with irregular mass at 11:00 position, 1.0 x 0.9 x 1.6cm. At 9:30 position, irregular mass, 3.2 x 0.5, x 1.3 cm. Contrast mammogram was subsequently performed and the contrast mammogram showed a large area of mass and non-mass-like enhancement in the upper outer right breast measuring 7.2 cm in greatest dimension.      The pathology showed part A, breast needle biopsy 11 o'clock, 6 cm from the nipple, invasive mammary carcinoma, no special type, ductal (and mucinous) [place] grade 2.  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  Calcifications were associated with the DCIS.  There was a focus suspicious for lymphovascular invasion.  Invasive carcinoma was estrogen receptor positive and progesterone receptor negative by immunohistochemistry.  In part B, breast right, 8 o'clock, 4 cm from the nipple, ultrasound-guided core biopsy, invasive mammary carcinoma, no special type, ductal (and mucinous) [place] grade 2, occasional calcifications were noted.  Invasive carcinoma was estrogen receptor positive and progesterone receptor negative by immunohistochemistry.  On quantitation of the ER and VT, ER was positive 99% of the cells staining with strong intensity.  VT was subsequently noted to be positive with 15% of the cells staining with moderate intensity.       There was also a HER2 FISH performed, and the HER2 FISH showed average number of HER2 signals per nucleus  2.6.  Average number of CEN17 signals 1.7 with a ratio of 1.6, VASILIY negative for biopsy A.  For biopsy B, the HER2 signals per nucleus 2.9.  Average number CEN17 signals per nucleus 1.7 with a ratio of 1.7, VASILIY negative.  Overall, by 2018 ASCO/CAP guidelines, this tumor is HER2 negative.     She was enrolled in I-SPY2 and is here to start cycle 1 Durvulumab, Taxol and Olaparib. Please see Dr. Guzman's previous notes for further details on the patient's history.     Interval History:  Mily is here for follow up. She had port placed on 3/6 and is still a little sore. Using ice packs and tylenol. No erythema or concerns. We reduced her steroids last cycle and she did not experience the fatigue, bodyaches and abdominal cramps that she had on day 2 after first infusion.     Appetite is good. No nausea. She had some heartburn--she is now taking prilosec daily and tums prn. No diarrhea or constipation. She continues to have some hair loss. No rashes, mucositis, changes in nails. No peripheral neuropathy.    On flovent, no dyspnea, wheezing, cough. She is on Zoloft for depression and feels this is well-controlled. She is working at a . No urinary concerns, no leg swelling.    Current Outpatient Medications   Medication Sig Dispense Refill     ADVIL 200 MG OR TABS 1 TABLET EVERY 4 TO 6 HOURS AS NEEDED 0 0     albuterol (2.5 MG/3ML) 0.083% nebulizer solution Take 3 mLs by nebulization once for 1 dose. 3 mL 0     albuterol (VENTOLIN HFA) 108 (90 Base) MCG/ACT Inhaler INHALE 1-2 PUFFS INTO THE LUNGS EVERY 4 HOURS AS NEEDED FOR SHORTNESS OF BREATH OR DIFFICULTY BREATHING. 18 g PRN     ASPIRIN NOT PRESCRIBED (INTENTIONAL) Please choose reason not prescribed, below       fluticasone (FLOVENT HFA) 110 MCG/ACT inhaler Inhale 2 puffs into the lungs 2 times daily 1 Inhaler PRN     levothyroxine (SYNTHROID/LEVOTHROID) 88 MCG tablet Take 1 tablet (88 mcg) by mouth daily 90 tablet PRN     lidocaine-prilocaine (EMLA) 2.5-2.5 %  external cream Apply topically as needed Apply to port site one hour prior to access start six days after port insertion 30 g 1     lisinopril (PRINIVIL/ZESTRIL) 5 MG tablet Take 1 tablet (5 mg) by mouth daily 90 tablet 3     LORazepam (ATIVAN) 0.5 MG tablet Take 1 tablet (0.5 mg) by mouth every 4 hours as needed (Anxiety, Nausea/Vomiting or Sleep) 30 tablet 2     LORazepam (ATIVAN) 0.5 MG tablet Take 1 tablet (0.5 mg) by mouth every 8 hours as needed for anxiety 20 tablet 2     omeprazole (PRILOSEC) 20 MG DR capsule Take 1 capsule (20 mg) by mouth daily 30 capsule 0     order for DME Cranial prosthesis 1 Units 1     prochlorperazine (COMPAZINE) 10 MG tablet Take 1 tablet (10 mg) by mouth every 6 hours as needed (Nausea/Vomiting) 30 tablet 3     sertraline (ZOLOFT) 50 MG tablet One daily 90 tablet 1     STATIN NOT PRESCRIBED (INTENTIONAL) Please choose reason not prescribed, below       study - olaparib (IDS# [id number]) 100 mg tablet CHEMOTHERAPY Take 1 tablet (100 mg) by mouth every 12 hours Take at approximately the same times each day with an 8 oz (240 ml) glass of water. Tablets should be swallowed whole and not chewed, crushed, dissolved or divided. 60 tablet 0       Physical Examination:  General: The patient is a pleasant female in no acute distress. ECOG 0  LMP 11/02/2014   Wt Readings from Last 10 Encounters:   03/06/19 69.4 kg (153 lb)   03/05/19 69.1 kg (152 lb 4.8 oz)   02/25/19 69.4 kg (153 lb)   02/11/19 69.9 kg (154 lb)   02/06/19 68.5 kg (151 lb)   02/05/19 69.2 kg (152 lb 9.6 oz)   01/22/19 67.4 kg (148 lb 8 oz)   01/03/19 66.5 kg (146 lb 8 oz)   07/16/18 65.9 kg (145 lb 6 oz)   01/19/17 65.8 kg (145 lb)     HEENT: EOMI, PERRL. Sclerae are anicteric. Oral mucosa is pink and moist with no lesions or thrush.   Lymph: No palpable cervical, supraclavicular, subclavicular or axillary lymphadenopathy.  Heart: Regular rate and rhythm.   Lungs: Clear to auscultation bilaterally.   Breast: Right breast with 8.5  cm x 6.5 cm mass in upper outer quadrant.  Abdomen: Bowel sounds present, soft, nontender with no palpable hepatosplenomegaly or masses.   Extremities: No lower extremity edema noted bilaterally.   Neuro: Cranial nerves II through XII are grossly intact.  Skin: No rashes, petechiae, or bruising noted on exposed skin.    Laboratory Data:  Results for GALA ROLAND (MRN [id number]) as of 3/11/2019 12:01   3/11/2019 11:22   Sodium 137   Potassium 3.7   Chloride 105   Carbon Dioxide 28   Urea Nitrogen 15   Creatinine 0.60   GFR Estimate >90   GFR Estimate If Black >90   Calcium 8.6   Anion Gap 4   Albumin 3.7   Protein Total 7.1   Bilirubin Total 0.3   Alkaline Phosphatase 48   ALT 34   AST 21   Glucose 97   WBC 5.7   Hemoglobin 13.5   Hematocrit 40.8   Platelet Count 296   RBC Count 4.67   MCV 87   MCH 28.9   MCHC 33.1   RDW 12.8   Diff Method Automated Method   % Neutrophils 64.7   % Lymphocytes 28.6   % Monocytes 3.7   % Eosinophils 1.6   % Basophils 0.9   % Immature Granulocytes 0.5   Nucleated RBCs 0   Absolute Neutrophil 3.7   Absolute Lymphocytes 1.6   Absolute Monocytes 0.2   Absolute Eosinophils 0.1   Absolute Basophils 0.1   Abs Immature Granulocytes 0.0   Absolute Nucleated RBC 0.0       Assessment and Plan:    Right breast cancer, ER/[place] positive, HER2 negative: She was enrolled in I-SPY2 and started cycle 1 Durvulumab, Taxol and Olaparib on 2/25/19. Tolerating well.   --Labs reviewed. Here today for C3 Taxol. Will discontinue dex and benadryl this cycle.   --Continues on olaparib.     Abnormal echocardiogram: The patient's echocardiogram has both low strain and low normal ejection fraction.  Both of these tests are very borderline, and she has no symptoms of heart failure.  In addition, she has no historical features that would put her at risk of having heart failure, with the exception of mildly excessive alcohol use.  However, in the setting of future Adriamycin use, she is likely at higher risk than  normal for chemotherapy-induced cardiomyopathy.  Therefore, recommend that the patient switch to lisinopril 5 mg instead of amlodipine for cardioprotection.  She should be followed with serial strain imaging throughout the course of her chemotherapy.  --Scheduled for echo and visit with Dr. Alanis on 5/6     GERD: Continue prilosec. Tums prn   Hx depression: On Zoloft  Hx asthma: On flovent BID and albuterol prn  Hx hypothyroidism: on Levothyroxine 88mcg daily.       Kerry Norwood PA-C  [facility] [address] [id number] [phone]

## 2019-03-11 NOTE — NURSING NOTE
Chief Complaint   Patient presents with     Port Draw     labs drawn via port by RN     /74 (BP Location: Left arm, Patient Position: Chair, Cuff Size: Adult Regular)   Pulse 95   Temp 97.7  F (36.5  C) (Oral)   Wt 68.9 kg (151 lb 12.8 oz)   LMP 11/02/2014   SpO2 96%   BMI 24.50 kg/m      Port accessed by RN in lab. Labs collected and sent. Line flushed with NS & Heparin. Pt tolerated well.   Pt checked in for next appointment.    Hui Martin, RN

## 2019-03-11 NOTE — NURSING NOTE
[id number]: Study Visit Note   Subject name: Kathy Lei     Visit: C3    Did the study visit occur within the appropriate window allowed by the protocol? yes    Since the last study visit, She has been doing well. Reports soreness to port. Still reporting some heartburn; taking tums prn and prilosec. Starting to lose hair. Otherwise, no other complaints since most recent treatment.    I have personally interviewed Kathy Lei and reviewed her medical record for adverse events and concomitant medications and these have been recorded on the corresponding logs in Kathy Lei's research file.     Kathy Lei was given the opportunity to ask any trial related questions.  Please see provider progress note for physical exam and other clinical information. Labs were reviewed - any significant lab values were addressed and reviewed.    Lorne Leos RN     Pager: [phone]

## 2019-03-11 NOTE — PROGRESS NOTES
Infusion Nursing Note:  Kathy Lei presents today for Cycle 3 Day 1 Taxol.    Patient seen by provider today: Yes: LIMA Cantor   present during visit today: Not Applicable.    Note: Patient is feeling well today, she denies any new complaints.    Intravenous Access:  Implanted port.    Treatment Conditions:  Lab Results   Component Value Date    HGB 13.5 03/11/2019     Lab Results   Component Value Date    WBC 5.7 03/11/2019      Lab Results   Component Value Date    ANEU 3.7 03/11/2019     Lab Results   Component Value Date     03/11/2019      Lab Results   Component Value Date     03/11/2019                   Lab Results   Component Value Date    POTASSIUM 3.7 03/11/2019           Lab Results   Component Value Date    CR 0.60 03/11/2019                   Lab Results   Component Value Date    BRYANT 8.6 03/11/2019                Lab Results   Component Value Date    BILITOTAL 0.3 03/11/2019           Lab Results   Component Value Date    ALBUMIN 3.7 03/11/2019                    Lab Results   Component Value Date    ALT 34 03/11/2019           Lab Results   Component Value Date    AST 21 03/11/2019       Results reviewed, labs MET treatment parameters, ok to proceed with treatment.      Post Infusion Assessment:  Patient tolerated infusion without incident.  Blood return noted pre and post infusion.  Site patent and intact, free from redness, edema or discomfort.  No evidence of extravasations.  Access discontinued per protocol.    Discharge Plan:   Patient declined prescription refills.  Discharge instructions reviewed with: Patient and Family.  Patient and/or family verbalized understanding of discharge instructions and all questions answered.  AVS to patient via [employer].  Patient will return 3/18/2019 for next provider visit and infusion appointment.   Patient discharged in stable condition accompanied by: sister-in-law.  Departure Mode: Ambulatory.    Laverne Richey  RN

## 2019-03-12 ENCOUNTER — ANCILLARY PROCEDURE (OUTPATIENT)
Dept: MRI IMAGING | Facility: CLINIC | Age: 54
End: 2019-03-12
Attending: INTERNAL MEDICINE
Payer: COMMERCIAL

## 2019-03-12 DIAGNOSIS — Z17.0 MALIGNANT NEOPLASM OF UPPER-OUTER QUADRANT OF RIGHT BREAST IN FEMALE, ESTROGEN RECEPTOR POSITIVE (H): ICD-10-CM

## 2019-03-12 DIAGNOSIS — C50.411 MALIGNANT NEOPLASM OF UPPER-OUTER QUADRANT OF RIGHT BREAST IN FEMALE, ESTROGEN RECEPTOR POSITIVE (H): ICD-10-CM

## 2019-03-12 RX ORDER — GADOBUTROL 604.72 MG/ML
7.5 INJECTION INTRAVENOUS ONCE
Status: COMPLETED | OUTPATIENT
Start: 2019-03-12 | End: 2019-03-12

## 2019-03-12 RX ADMIN — GADOBUTROL 7.5 ML: 604.72 INJECTION INTRAVENOUS at 18:11

## 2019-03-13 ENCOUNTER — RESEARCH ENCOUNTER (OUTPATIENT)
Dept: ONCOLOGY | Facility: CLINIC | Age: 54
End: 2019-03-13

## 2019-03-13 DIAGNOSIS — C50.411 MALIGNANT NEOPLASM OF UPPER-OUTER QUADRANT OF RIGHT BREAST IN FEMALE, ESTROGEN RECEPTOR POSITIVE (H): Primary | ICD-10-CM

## 2019-03-13 DIAGNOSIS — Z17.0 MALIGNANT NEOPLASM OF UPPER-OUTER QUADRANT OF RIGHT BREAST IN FEMALE, ESTROGEN RECEPTOR POSITIVE (H): Primary | ICD-10-CM

## 2019-03-18 ENCOUNTER — INFUSION THERAPY VISIT (OUTPATIENT)
Dept: ONCOLOGY | Facility: CLINIC | Age: 54
End: 2019-03-18
Attending: INTERNAL MEDICINE
Payer: COMMERCIAL

## 2019-03-18 ENCOUNTER — RESEARCH ENCOUNTER (OUTPATIENT)
Dept: ONCOLOGY | Facility: CLINIC | Age: 54
End: 2019-03-18

## 2019-03-18 ENCOUNTER — APPOINTMENT (OUTPATIENT)
Dept: LAB | Facility: CLINIC | Age: 54
End: 2019-03-18
Attending: INTERNAL MEDICINE
Payer: COMMERCIAL

## 2019-03-18 ENCOUNTER — ONCOLOGY VISIT (OUTPATIENT)
Dept: ONCOLOGY | Facility: CLINIC | Age: 54
End: 2019-03-18
Attending: PHYSICIAN ASSISTANT
Payer: COMMERCIAL

## 2019-03-18 VITALS
DIASTOLIC BLOOD PRESSURE: 80 MMHG | HEIGHT: 66 IN | RESPIRATION RATE: 16 BRPM | TEMPERATURE: 98.3 F | HEART RATE: 92 BPM | OXYGEN SATURATION: 95 % | BODY MASS INDEX: 24.72 KG/M2 | WEIGHT: 153.8 LBS | SYSTOLIC BLOOD PRESSURE: 133 MMHG

## 2019-03-18 DIAGNOSIS — Z17.0 MALIGNANT NEOPLASM OF UPPER-OUTER QUADRANT OF RIGHT BREAST IN FEMALE, ESTROGEN RECEPTOR POSITIVE (H): Primary | ICD-10-CM

## 2019-03-18 DIAGNOSIS — Z17.0 MALIGNANT NEOPLASM OF UPPER-OUTER QUADRANT OF RIGHT BREAST IN FEMALE, ESTROGEN RECEPTOR POSITIVE (H): ICD-10-CM

## 2019-03-18 DIAGNOSIS — C50.411 MALIGNANT NEOPLASM OF UPPER-OUTER QUADRANT OF RIGHT BREAST IN FEMALE, ESTROGEN RECEPTOR POSITIVE (H): Primary | ICD-10-CM

## 2019-03-18 DIAGNOSIS — C50.411 MALIGNANT NEOPLASM OF UPPER-OUTER QUADRANT OF RIGHT BREAST IN FEMALE, ESTROGEN RECEPTOR POSITIVE (H): ICD-10-CM

## 2019-03-18 LAB
ALBUMIN SERPL-MCNC: 3.7 G/DL (ref 3.4–5)
ALP SERPL-CCNC: 52 U/L (ref 40–150)
ALT SERPL W P-5'-P-CCNC: 28 U/L (ref 0–50)
ANION GAP SERPL CALCULATED.3IONS-SCNC: 6 MMOL/L (ref 3–14)
AST SERPL W P-5'-P-CCNC: 15 U/L (ref 0–45)
BASOPHILS # BLD AUTO: 0 10E9/L (ref 0–0.2)
BASOPHILS NFR BLD AUTO: 0.6 %
BILIRUB SERPL-MCNC: 0.2 MG/DL (ref 0.2–1.3)
BUN SERPL-MCNC: 16 MG/DL (ref 7–30)
CALCIUM SERPL-MCNC: 8.3 MG/DL (ref 8.5–10.1)
CHLORIDE SERPL-SCNC: 106 MMOL/L (ref 94–109)
CO2 SERPL-SCNC: 24 MMOL/L (ref 20–32)
CREAT SERPL-MCNC: 0.51 MG/DL (ref 0.52–1.04)
DIFFERENTIAL METHOD BLD: NORMAL
EOSINOPHIL # BLD AUTO: 0.1 10E9/L (ref 0–0.7)
EOSINOPHIL NFR BLD AUTO: 2 %
ERYTHROCYTE [DISTWIDTH] IN BLOOD BY AUTOMATED COUNT: 12.9 % (ref 10–15)
GFR SERPL CREATININE-BSD FRML MDRD: >90 ML/MIN/{1.73_M2}
GLUCOSE SERPL-MCNC: 97 MG/DL (ref 70–99)
HCT VFR BLD AUTO: 40.6 % (ref 35–47)
HGB BLD-MCNC: 12.8 G/DL (ref 11.7–15.7)
IMM GRANULOCYTES # BLD: 0 10E9/L (ref 0–0.4)
IMM GRANULOCYTES NFR BLD: 0.6 %
LYMPHOCYTES # BLD AUTO: 1.4 10E9/L (ref 0.8–5.3)
LYMPHOCYTES NFR BLD AUTO: 27.9 %
MCH RBC QN AUTO: 28.3 PG (ref 26.5–33)
MCHC RBC AUTO-ENTMCNC: 31.5 G/DL (ref 31.5–36.5)
MCV RBC AUTO: 90 FL (ref 78–100)
MONOCYTES # BLD AUTO: 0.3 10E9/L (ref 0–1.3)
MONOCYTES NFR BLD AUTO: 6 %
NEUTROPHILS # BLD AUTO: 3.1 10E9/L (ref 1.6–8.3)
NEUTROPHILS NFR BLD AUTO: 62.9 %
NRBC # BLD AUTO: 0 10*3/UL
NRBC BLD AUTO-RTO: 0 /100
PLATELET # BLD AUTO: 346 10E9/L (ref 150–450)
POTASSIUM SERPL-SCNC: 3.7 MMOL/L (ref 3.4–5.3)
PROT SERPL-MCNC: 7.1 G/DL (ref 6.8–8.8)
RBC # BLD AUTO: 4.52 10E12/L (ref 3.8–5.2)
SODIUM SERPL-SCNC: 136 MMOL/L (ref 133–144)
WBC # BLD AUTO: 5 10E9/L (ref 4–11)

## 2019-03-18 PROCEDURE — 25000128 H RX IP 250 OP 636: Mod: ZF | Performed by: INTERNAL MEDICINE

## 2019-03-18 PROCEDURE — G0463 HOSPITAL OUTPT CLINIC VISIT: HCPCS | Mod: ZF

## 2019-03-18 PROCEDURE — 96375 TX/PRO/DX INJ NEW DRUG ADDON: CPT

## 2019-03-18 PROCEDURE — 25000128 H RX IP 250 OP 636: Mod: ZF | Performed by: PHYSICIAN ASSISTANT

## 2019-03-18 PROCEDURE — 25800030 ZZH RX IP 258 OP 636: Mod: ZF | Performed by: PHYSICIAN ASSISTANT

## 2019-03-18 PROCEDURE — 99214 OFFICE O/P EST MOD 30 MIN: CPT | Mod: ZP | Performed by: PHYSICIAN ASSISTANT

## 2019-03-18 PROCEDURE — 96413 CHEMO IV INFUSION 1 HR: CPT

## 2019-03-18 PROCEDURE — 85025 COMPLETE CBC W/AUTO DIFF WBC: CPT | Performed by: INTERNAL MEDICINE

## 2019-03-18 PROCEDURE — 80053 COMPREHEN METABOLIC PANEL: CPT | Performed by: INTERNAL MEDICINE

## 2019-03-18 RX ORDER — HEPARIN SODIUM (PORCINE) LOCK FLUSH IV SOLN 100 UNIT/ML 100 UNIT/ML
5 SOLUTION INTRAVENOUS ONCE
Status: COMPLETED | OUTPATIENT
Start: 2019-03-18 | End: 2019-03-18

## 2019-03-18 RX ORDER — HEPARIN SODIUM (PORCINE) LOCK FLUSH IV SOLN 100 UNIT/ML 100 UNIT/ML
5 SOLUTION INTRAVENOUS ONCE
Status: CANCELLED
Start: 2019-03-19 | End: 2019-03-19

## 2019-03-18 RX ORDER — EPINEPHRINE 1 MG/ML
0.3 INJECTION, SOLUTION INTRAMUSCULAR; SUBCUTANEOUS EVERY 5 MIN PRN
Status: CANCELLED | OUTPATIENT
Start: 2019-03-19

## 2019-03-18 RX ORDER — DIPHENHYDRAMINE HYDROCHLORIDE 50 MG/ML
50 INJECTION INTRAMUSCULAR; INTRAVENOUS
Status: CANCELLED
Start: 2019-03-19

## 2019-03-18 RX ORDER — ONDANSETRON 2 MG/ML
8 INJECTION INTRAMUSCULAR; INTRAVENOUS ONCE
Status: COMPLETED | OUTPATIENT
Start: 2019-03-18 | End: 2019-03-18

## 2019-03-18 RX ORDER — MEPERIDINE HYDROCHLORIDE 25 MG/ML
25 INJECTION INTRAMUSCULAR; INTRAVENOUS; SUBCUTANEOUS EVERY 30 MIN PRN
Status: CANCELLED | OUTPATIENT
Start: 2019-03-19

## 2019-03-18 RX ORDER — SODIUM CHLORIDE 9 MG/ML
1000 INJECTION, SOLUTION INTRAVENOUS CONTINUOUS PRN
Status: CANCELLED
Start: 2019-03-19

## 2019-03-18 RX ORDER — METHYLPREDNISOLONE SODIUM SUCCINATE 125 MG/2ML
125 INJECTION, POWDER, LYOPHILIZED, FOR SOLUTION INTRAMUSCULAR; INTRAVENOUS
Status: CANCELLED
Start: 2019-03-19

## 2019-03-18 RX ORDER — EPINEPHRINE 0.3 MG/.3ML
0.3 INJECTION SUBCUTANEOUS EVERY 5 MIN PRN
Status: CANCELLED | OUTPATIENT
Start: 2019-03-19

## 2019-03-18 RX ORDER — ALBUTEROL SULFATE 0.83 MG/ML
2.5 SOLUTION RESPIRATORY (INHALATION)
Status: CANCELLED | OUTPATIENT
Start: 2019-03-19

## 2019-03-18 RX ORDER — LORAZEPAM 2 MG/ML
0.5 INJECTION INTRAMUSCULAR EVERY 4 HOURS PRN
Status: CANCELLED
Start: 2019-03-19

## 2019-03-18 RX ORDER — ALBUTEROL SULFATE 90 UG/1
1-2 AEROSOL, METERED RESPIRATORY (INHALATION)
Status: CANCELLED
Start: 2019-03-19

## 2019-03-18 RX ORDER — DEXAMETHASONE SODIUM PHOSPHATE 10 MG/ML
10 INJECTION, SOLUTION INTRAMUSCULAR; INTRAVENOUS
Status: CANCELLED | OUTPATIENT
Start: 2019-03-19

## 2019-03-18 RX ADMIN — PACLITAXEL 142 MG: 6 INJECTION, SOLUTION INTRAVENOUS at 14:10

## 2019-03-18 RX ADMIN — ONDANSETRON 8 MG: 2 INJECTION INTRAMUSCULAR; INTRAVENOUS at 13:48

## 2019-03-18 RX ADMIN — HEPARIN SODIUM (PORCINE) LOCK FLUSH IV SOLN 100 UNIT/ML 5 ML: 100 SOLUTION at 12:16

## 2019-03-18 RX ADMIN — SODIUM CHLORIDE 250 ML: 9 INJECTION, SOLUTION INTRAVENOUS at 13:47

## 2019-03-18 RX ADMIN — HEPARIN SODIUM (PORCINE) LOCK FLUSH IV SOLN 100 UNIT/ML 5 ML: 100 SOLUTION at 15:13

## 2019-03-18 ASSESSMENT — PAIN SCALES - GENERAL: PAINLEVEL: NO PAIN (0)

## 2019-03-18 ASSESSMENT — MIFFLIN-ST. JEOR: SCORE: 1309.13

## 2019-03-18 NOTE — NURSING NOTE
[id number]: Study Visit Note   Subject name: Kathy Lei     Visit: C4    Did the study visit occur within the appropriate window allowed by the protocol? yes    Since the last study visit, She has been doing okay. Patient reporting intermittent nausea without vomiting and states does not interfere with appetite. Reports dry eyes toward the end of the day that improves after sleeping.     I have personally interviewed Kathy Lei and reviewed her medical record for adverse events and concomitant medications and these have been recorded on the corresponding logs in Kathy Lei's research file.     Kathy Lei was given the opportunity to ask any trial related questions.  Please see provider progress note for physical exam and other clinical information. Labs were reviewed - any significant lab values were addressed and reviewed.    Lorne Leos RN     Pager: [phone]

## 2019-03-18 NOTE — LETTER
RE: Kathy Lei  [address] [place] [address]       Oncology/Hematology Visit Note  Mar 18, 2019    Reason for Visit: follow up of right breast cancer    History of Present Illness: Kathy Lei is a 53 year old female with recent diagnosis of ER/NM positive, HER2 negative, grade 2 breast cancer. Mily presented between Christmas and New Years of 2018 with new sharp pains in the upper outer quadrant of the right breast.  Diagnostic mammogram on 1/8/19 with grouped coarse heterogeneous calcifications at 11:30 position, irregular mass at 9:00 position. US with irregular mass at 11:00 position, 1.0 x 0.9 x 1.6cm. At 9:30 position, irregular mass, 3.2 x 0.5, x 1.3 cm. Contrast mammogram was subsequently performed and the contrast mammogram showed a large area of mass and non-mass-like enhancement in the upper outer right breast measuring 7.2 cm in greatest dimension.      The pathology showed part A, breast needle biopsy 11 o'clock, 6 cm from the nipple, invasive mammary carcinoma, no special type, ductal (and mucinous) Donna grade 2.  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  Calcifications were associated with the DCIS.  There was a focus suspicious for lymphovascular invasion.  Invasive carcinoma was estrogen receptor positive and progesterone receptor negative by immunohistochemistry.  In part B, breast right, 8 o'clock, 4 cm from the nipple, ultrasound-guided core biopsy, invasive mammary carcinoma, no special type, ductal (and mucinous) [place] grade 2, occasional calcifications were noted.  Invasive carcinoma was estrogen receptor positive and progesterone receptor negative by immunohistochemistry.  On quantitation of the ER and NM, ER was positive 99% of the cells staining with strong intensity.  NM was subsequently noted to be positive with 15% of the cells staining with moderate intensity.       There was also a HER2 FISH  performed, and the HER2 FISH showed average number of HER2 signals per nucleus 2.6.  Average number of CEN17 signals 1.7 with a ratio of 1.6, VASILIY negative for biopsy A.  For biopsy B, the HER2 signals per nucleus 2.9.  Average number CEN17 signals per nucleus 1.7 with a ratio of 1.7, VASILIY negative.  Overall, by 2018 ASCO/CAP guidelines, this tumor is HER2 negative.     She was enrolled in I-SPY2 and is here to start cycle 1 Durvulumab, Taxol and Olaparib. Please see Dr. Guzman's previous notes for further details on the patient's history.     Interval History:  Mily is here for follow up. She is overall feeling well. She has been having some fatigue, so reduced work from 10 hours, 3 days a week as a  to 6 or 8 hour days. She notices she gets some blurry vision when fatigued, but when rested she sees clearly.    Appetite good. She had some loose stools after lemonade yesterday. No abdominal cramping and no other episodes. She takes compazine as needed for nausea. No heartburn. She has not needed prilosec recently. Port site is no longer tender. She continues to have some hair loss. No rashes, mucositis, changes in nails. No peripheral neuropathy.     has URI currently but she has no symptoms. On flovent, no dyspnea, wheezing, cough, sore throat, sneezing, fever or chills. She is on Zoloft for depression and feels this is well-controlled. No urinary concerns, no leg swelling.    Current Outpatient Medications   Medication Sig Dispense Refill     ADVIL 200 MG OR TABS 1 TABLET EVERY 4 TO 6 HOURS AS NEEDED 0 0     albuterol (VENTOLIN HFA) 108 (90 Base) MCG/ACT Inhaler INHALE 1-2 PUFFS INTO THE LUNGS EVERY 4 HOURS AS NEEDED FOR SHORTNESS OF BREATH OR DIFFICULTY BREATHING. 18 g PRN     fluticasone (FLOVENT HFA) 110 MCG/ACT inhaler Inhale 2 puffs into the lungs 2 times daily 1 Inhaler PRN     levothyroxine (SYNTHROID/LEVOTHROID) 88 MCG tablet Take 1 tablet (88 mcg) by mouth daily 90 tablet PRN      "lidocaine-prilocaine (EMLA) 2.5-2.5 % external cream Apply topically as needed Apply to port site one hour prior to access start six days after port insertion 30 g 1     lisinopril (PRINIVIL/ZESTRIL) 5 MG tablet Take 1 tablet (5 mg) by mouth daily 90 tablet 3     LORazepam (ATIVAN) 0.5 MG tablet Take 1 tablet (0.5 mg) by mouth every 4 hours as needed (Anxiety, Nausea/Vomiting or Sleep) 30 tablet 2     omeprazole (PRILOSEC) 20 MG DR capsule Take 1 capsule (20 mg) by mouth daily 30 capsule 0     order for DME Cranial prosthesis 1 Units 1     prochlorperazine (COMPAZINE) 10 MG tablet Take 1 tablet (10 mg) by mouth every 6 hours as needed (Nausea/Vomiting) 30 tablet 3     sertraline (ZOLOFT) 50 MG tablet One daily 90 tablet 1     study - olaparib (IDS# 3903) 100 mg tablet CHEMOTHERAPY Take 1 tablet (100 mg) by mouth every 12 hours Take at approximately the same times each day with an 8 oz (240 ml) glass of water. Tablets should be swallowed whole and not chewed, crushed, dissolved or divided. 60 tablet 0     albuterol (2.5 MG/3ML) 0.083% nebulizer solution Take 3 mLs by nebulization once for 1 dose. 3 mL 0     ASPIRIN NOT PRESCRIBED (INTENTIONAL) Please choose reason not prescribed, below (Patient not taking: Reported on 3/18/2019)       STATIN NOT PRESCRIBED (INTENTIONAL) Please choose reason not prescribed, below (Patient not taking: Reported on 3/18/2019)         Physical Examination:  General: The patient is a pleasant female in no acute distress. ECOG 0  /80 (BP Location: Right arm, Patient Position: Sitting, Cuff Size: Adult Regular)   Pulse 92   Temp 98.3  F (36.8  C) (Oral)   Resp 16   Ht 1.676 m (5' 5.98\")   Wt 69.8 kg (153 lb 12.8 oz)   LMP 11/02/2014   SpO2 95%   BMI 24.84 kg/m     Wt Readings from Last 10 Encounters:   03/18/19 69.8 kg (153 lb 12.8 oz)   03/11/19 68.9 kg (151 lb 12.8 oz)   03/06/19 69.4 kg (153 lb)   03/05/19 69.1 kg (152 lb 4.8 oz)   02/25/19 69.4 kg (153 lb)   02/11/19 69.9 " kg (154 lb)   02/06/19 68.5 kg (151 lb)   02/05/19 69.2 kg (152 lb 9.6 oz)   01/22/19 67.4 kg (148 lb 8 oz)   01/03/19 66.5 kg (146 lb 8 oz)     HEENT: EOMI, PERRL. Sclerae are anicteric. Oral mucosa is pink and moist with no lesions or thrush.   Lymph: No palpable cervical, supraclavicular, subclavicular or axillary lymphadenopathy.  Heart: Regular rate and rhythm.   Lungs: Clear to auscultation bilaterally.   Breast: Right breast with 8.5 cm x 6.5 cm mass in upper outer quadrant.  Abdomen: Bowel sounds present, soft, nontender with no palpable hepatosplenomegaly or masses.   Extremities: No lower extremity edema noted bilaterally.   Neuro: Cranial nerves II through XII are grossly intact.  Skin: No rashes, petechiae, or bruising noted on exposed skin.    Laboratory Data:  Results for GALA ROLAND (MRN [id number]) as of 3/18/2019 13:31   3/18/2019 12:22   Sodium 136   Potassium 3.7   Chloride 106   Carbon Dioxide 24   Urea Nitrogen 16   Creatinine 0.51 (L)   GFR Estimate >90   GFR Estimate If Black >90   Calcium 8.3 (L)   Anion Gap 6   Albumin 3.7   Protein Total 7.1   Bilirubin Total 0.2   Alkaline Phosphatase 52   ALT 28   AST 15   Glucose 97   WBC 5.0   Hemoglobin 12.8   Hematocrit 40.6   Platelet Count 346   RBC Count 4.52   MCV 90   MCH 28.3   MCHC 31.5   RDW 12.9   Diff Method Automated Method   % Neutrophils 62.9   % Lymphocytes 27.9   % Monocytes 6.0   % Eosinophils 2.0   % Basophils 0.6   % Immature Granulocytes 0.6   Nucleated RBCs 0   Absolute Neutrophil 3.1   Absolute Lymphocytes 1.4   Absolute Monocytes 0.3   Absolute Eosinophils 0.1   Absolute Basophils 0.0   Abs Immature Granulocytes 0.0   Absolute Nucleated RBC 0.0       Imaging:  Exam: Breast MRI, with and without Contrast, and with color encoding      History/Indication: Right breast cancer. I-SPY 2 clinical trial time  point #2.     Comparison: 2/4/2019, 1/14/2019, 1/8/2019.  Mammogram 1/14/2019,  11/24/2014     Technique: Precontrast  gradient recall axial nonfat sat T1.Precontrast  gradient recall axial fat-sat T1. Precontrast STIR axial fat sat.  Postcontrast gradient recall axial fat-sat T1 with dynamic  postcontrast acquisitions at multiple time points with subtractions.  Delayed high-resolution gradient recall sagittal fat-sat T1. MIP of  subtractions, axial coronal and sagittal. Color encoding of post  contrast dynamic acquisitions. IV contrast: 7.5mL Gadavist     Breast composition: Heterogeneous fibroglandular tissue     Background parenchymal enhancement 1st post C image: Mild     Findings: Heterogeneous enhancement involving a large part of the  upper outer, upper inner and central right breast, is not  substantially changed in extent compared with 2/4/2018.  It measures  8.9 cm in greatest dimension and axial MIP images which has not  significantly changed. It extends to the base of the area laterally  and tracks along the anterior implant scar capsule.     No suspicious enhancement in the left breast. No axillary or internal  mammary chain lymphadenopathy.                                                                      Impression: BI-RADS CATEGORY: 6 - Known Biopsy-Proven  Malignancy-Appropriate Action Should Be Taken.     Recommendation: Continued oncologic and surgical follow-up.     I have personally reviewed the examination and initial interpretation  and I agree with the findings.     BONNIE RAZA MD    Assessment and Plan:    Right breast cancer, ER/HI positive, HER2 negative: She was enrolled in I-SPY2 and started cycle 1 Durvulumab, Taxol and Olaparib on 2/25/19. Tolerating well aside from some fatigue, mild nausea. MRI after cycle 3 without much change from prior imaging. Mily has felt clinical improvement as she is no longer having pain in right breast.   --Labs reviewed. Will proceed with C4 Taxol.    --Continues on olaparib.     Abnormal echocardiogram: The patient's echocardiogram has both low strain and low normal  ejection fraction.  Both of these tests are very borderline, and she has no symptoms of heart failure.  In addition, she has no historical features that would put her at risk of having heart failure, with the exception of mildly excessive alcohol use.  However, in the setting of future Adriamycin use, she is likely at higher risk than normal for chemotherapy-induced cardiomyopathy.  Therefore, recommend that the patient switch to lisinopril 5 mg instead of amlodipine for cardioprotection.  She should be followed with serial strain imaging throughout the course of her chemotherapy.  --Scheduled for echo and visit with Dr. Alanis on 5/6     GERD: No recent symptoms. She has prilosec. Tums prn   Nausea: Compazine prn  Hx depression: On Zoloft  Hx asthma: On flovent BID and albuterol prn  Hx hypothyroidism: on Levothyroxine 88mcg daily.     Kerry Norwood PA-C  [facility]  [address], [place] [id number] [phone]

## 2019-03-18 NOTE — PROGRESS NOTES
"SPIRITUAL HEALTH SERVICES  SPIRITUAL ASSESSMENT Progress Note  [facility]    REFERRAL SOURCE: Follow Up      Mily was with her sister-in-law Mohini today.    She is feeling well    Mily is anticipating her hair loss soon. \"Thanks to Mily ([employer]) I have a really great wig - real hair. I also bring my shaver with me wherever I go. I do not want to walk around with patches.\"    I reminded Mily that chaplains are available should she want to talk - just let her nurse know.    Mily appreciates the visits.     PLAN: [facility] will continue to spiritually support Mily while she is receiving treatment at the [facility].    Adali Mireles  Chaplain Resident      "

## 2019-03-18 NOTE — NURSING NOTE
Chief Complaint   Patient presents with     RECHECK     ONC Breast Cancer      Port Draw     Labs drawn from port by RN in lab. Line flushed with saline and heparin. Vs taken and pt checked in for appt     Labs drawn from port by RN in lab. Line flushed with saline and heparin. Vs taken and pt checked in for appt    Melissa Rodriguez RN

## 2019-03-18 NOTE — PROGRESS NOTES
Infusion Nursing Note:  Kathy Lei presents today for Cycle 4 Taxol.    Patient seen by provider today: Yes: LIMA Cantor    Intravenous Access:  Implanted Port.    Treatment Conditions:  Lab Results   Component Value Date    HGB 12.8 03/18/2019     Lab Results   Component Value Date    WBC 5.0 03/18/2019      Lab Results   Component Value Date    ANEU 3.1 03/18/2019     Lab Results   Component Value Date     03/18/2019      Lab Results   Component Value Date     03/18/2019                   Lab Results   Component Value Date    POTASSIUM 3.7 03/18/2019           Lab Results   Component Value Date    MAG 2.0 02/05/2019            Lab Results   Component Value Date    CR 0.51 03/18/2019                   Lab Results   Component Value Date    BRYANT 8.3 03/18/2019                Lab Results   Component Value Date    BILITOTAL 0.2 03/18/2019           Lab Results   Component Value Date    ALBUMIN 3.7 03/18/2019                    Lab Results   Component Value Date    ALT 28 03/18/2019           Lab Results   Component Value Date    AST 15 03/18/2019     Results reviewed, labs MET treatment parameters, ok to proceed with treatment.      Post Infusion Assessment:  Patient tolerated infusion without incident.  Blood return noted pre and post infusion.  Site patent and intact, free from redness, edema or discomfort.  No evidence of extravasations. Access discontinued per protocol.     Discharge Plan:   Patient declined prescription refills.  Copy of AVS reviewed with patient and/or family.  Patient will return 3/26 for next appointment.  Patient discharged in stable condition accompanied by: sister-in-law.  Departure Mode: Ambulatory.    Michelle Person RN

## 2019-03-18 NOTE — PROGRESS NOTES
Oncology/Hematology Visit Note  Mar 18, 2019    Reason for Visit: follow up of right breast cancer    History of Present Illness: Kathy Lei is a 53 year old female with recent diagnosis of ER/WI positive, HER2 negative, grade 2 breast cancer. Mily presented between Christmas and New Years of 2018 with new sharp pains in the upper outer quadrant of the right breast.  Diagnostic mammogram on 1/8/19 with grouped coarse heterogeneous calcifications at 11:30 position, irregular mass at 9:00 position. US with irregular mass at 11:00 position, 1.0 x 0.9 x 1.6cm. At 9:30 position, irregular mass, 3.2 x 0.5, x 1.3 cm. Contrast mammogram was subsequently performed and the contrast mammogram showed a large area of mass and non-mass-like enhancement in the upper outer right breast measuring 7.2 cm in greatest dimension.      The pathology showed part A, breast needle biopsy 11 o'clock, 6 cm from the nipple, invasive mammary carcinoma, no special type, ductal (and mucinous) Donna grade 2.  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  Calcifications were associated with the DCIS.  There was a focus suspicious for lymphovascular invasion.  Invasive carcinoma was estrogen receptor positive and progesterone receptor negative by immunohistochemistry.  In part B, breast right, 8 o'clock, 4 cm from the nipple, ultrasound-guided core biopsy, invasive mammary carcinoma, no special type, ductal (and mucinous) Donna grade 2, occasional calcifications were noted.  Invasive carcinoma was estrogen receptor positive and progesterone receptor negative by immunohistochemistry.  On quantitation of the ER and WI, ER was positive 99% of the cells staining with strong intensity.  WI was subsequently noted to be positive with 15% of the cells staining with moderate intensity.       There was also a HER2 FISH performed, and the HER2 FISH showed average number of HER2 signals per nucleus  2.6.  Average number of CEN17 signals 1.7 with a ratio of 1.6, VASILIY negative for biopsy A.  For biopsy B, the HER2 signals per nucleus 2.9.  Average number CEN17 signals per nucleus 1.7 with a ratio of 1.7, VASILIY negative.  Overall, by 2018 ASCO/CAP guidelines, this tumor is HER2 negative.     She was enrolled in I-SPY2 and is here to start cycle 1 Durvulumab, Taxol and Olaparib. Please see Dr. Guzman's previous notes for further details on the patient's history.     Interval History:  Mily is here for follow up. She is overall feeling well. She has been having some fatigue, so reduced work from 10 hours, 3 days a week as a  to 6 or 8 hour days. She notices she gets some blurry vision when fatigued, but when rested she sees clearly.    Appetite good. She had some loose stools after lemonade yesterday. No abdominal cramping and no other episodes. She takes compazine as needed for nausea. No heartburn. She has not needed prilosec recently. Port site is no longer tender. She continues to have some hair loss. No rashes, mucositis, changes in nails. No peripheral neuropathy.     has URI currently but she has no symptoms. On flovent, no dyspnea, wheezing, cough, sore throat, sneezing, fever or chills. She is on Zoloft for depression and feels this is well-controlled. No urinary concerns, no leg swelling.    Current Outpatient Medications   Medication Sig Dispense Refill     ADVIL 200 MG OR TABS 1 TABLET EVERY 4 TO 6 HOURS AS NEEDED 0 0     albuterol (VENTOLIN HFA) 108 (90 Base) MCG/ACT Inhaler INHALE 1-2 PUFFS INTO THE LUNGS EVERY 4 HOURS AS NEEDED FOR SHORTNESS OF BREATH OR DIFFICULTY BREATHING. 18 g PRN     fluticasone (FLOVENT HFA) 110 MCG/ACT inhaler Inhale 2 puffs into the lungs 2 times daily 1 Inhaler PRN     levothyroxine (SYNTHROID/LEVOTHROID) 88 MCG tablet Take 1 tablet (88 mcg) by mouth daily 90 tablet PRN     lidocaine-prilocaine (EMLA) 2.5-2.5 % external cream Apply topically as needed Apply  "to port site one hour prior to access start six days after port insertion 30 g 1     lisinopril (PRINIVIL/ZESTRIL) 5 MG tablet Take 1 tablet (5 mg) by mouth daily 90 tablet 3     LORazepam (ATIVAN) 0.5 MG tablet Take 1 tablet (0.5 mg) by mouth every 4 hours as needed (Anxiety, Nausea/Vomiting or Sleep) 30 tablet 2     omeprazole (PRILOSEC) 20 MG DR capsule Take 1 capsule (20 mg) by mouth daily 30 capsule 0     order for DME Cranial prosthesis 1 Units 1     prochlorperazine (COMPAZINE) 10 MG tablet Take 1 tablet (10 mg) by mouth every 6 hours as needed (Nausea/Vomiting) 30 tablet 3     sertraline (ZOLOFT) 50 MG tablet One daily 90 tablet 1     study - olaparib (IDS# 3903) 100 mg tablet CHEMOTHERAPY Take 1 tablet (100 mg) by mouth every 12 hours Take at approximately the same times each day with an 8 oz (240 ml) glass of water. Tablets should be swallowed whole and not chewed, crushed, dissolved or divided. 60 tablet 0     albuterol (2.5 MG/3ML) 0.083% nebulizer solution Take 3 mLs by nebulization once for 1 dose. 3 mL 0     ASPIRIN NOT PRESCRIBED (INTENTIONAL) Please choose reason not prescribed, below (Patient not taking: Reported on 3/18/2019)       STATIN NOT PRESCRIBED (INTENTIONAL) Please choose reason not prescribed, below (Patient not taking: Reported on 3/18/2019)         Physical Examination:  General: The patient is a pleasant female in no acute distress. ECOG 0  /80 (BP Location: Right arm, Patient Position: Sitting, Cuff Size: Adult Regular)   Pulse 92   Temp 98.3  F (36.8  C) (Oral)   Resp 16   Ht 1.676 m (5' 5.98\")   Wt 69.8 kg (153 lb 12.8 oz)   LMP 11/02/2014   SpO2 95%   BMI 24.84 kg/m    Wt Readings from Last 10 Encounters:   03/18/19 69.8 kg (153 lb 12.8 oz)   03/11/19 68.9 kg (151 lb 12.8 oz)   03/06/19 69.4 kg (153 lb)   03/05/19 69.1 kg (152 lb 4.8 oz)   02/25/19 69.4 kg (153 lb)   02/11/19 69.9 kg (154 lb)   02/06/19 68.5 kg (151 lb)   02/05/19 69.2 kg (152 lb 9.6 oz)   01/22/19 " 67.4 kg (148 lb 8 oz)   01/03/19 66.5 kg (146 lb 8 oz)     HEENT: EOMI, PERRL. Sclerae are anicteric. Oral mucosa is pink and moist with no lesions or thrush.   Lymph: No palpable cervical, supraclavicular, subclavicular or axillary lymphadenopathy.  Heart: Regular rate and rhythm.   Lungs: Clear to auscultation bilaterally.   Breast: Right breast with 8.5 cm x 6.5 cm mass in upper outer quadrant.  Abdomen: Bowel sounds present, soft, nontender with no palpable hepatosplenomegaly or masses.   Extremities: No lower extremity edema noted bilaterally.   Neuro: Cranial nerves II through XII are grossly intact.  Skin: No rashes, petechiae, or bruising noted on exposed skin.    Laboratory Data:  Results for GALA ROLAND (MRN [id number]) as of 3/18/2019 13:31   3/18/2019 12:22   Sodium 136   Potassium 3.7   Chloride 106   Carbon Dioxide 24   Urea Nitrogen 16   Creatinine 0.51 (L)   GFR Estimate >90   GFR Estimate If Black >90   Calcium 8.3 (L)   Anion Gap 6   Albumin 3.7   Protein Total 7.1   Bilirubin Total 0.2   Alkaline Phosphatase 52   ALT 28   AST 15   Glucose 97   WBC 5.0   Hemoglobin 12.8   Hematocrit 40.6   Platelet Count 346   RBC Count 4.52   MCV 90   MCH 28.3   MCHC 31.5   RDW 12.9   Diff Method Automated Method   % Neutrophils 62.9   % Lymphocytes 27.9   % Monocytes 6.0   % Eosinophils 2.0   % Basophils 0.6   % Immature Granulocytes 0.6   Nucleated RBCs 0   Absolute Neutrophil 3.1   Absolute Lymphocytes 1.4   Absolute Monocytes 0.3   Absolute Eosinophils 0.1   Absolute Basophils 0.0   Abs Immature Granulocytes 0.0   Absolute Nucleated RBC 0.0       Imaging:  Exam: Breast MRI, with and without Contrast, and with color encoding      History/Indication: Right breast cancer. I-SPY 2 clinical trial time  point #2.     Comparison: 2/4/2019, 1/14/2019, 1/8/2019.  Mammogram 1/14/2019,  11/24/2014     Technique: Precontrast gradient recall axial nonfat sat T1.Precontrast  gradient recall axial fat-sat T1.  Precontrast STIR axial fat sat.  Postcontrast gradient recall axial fat-sat T1 with dynamic  postcontrast acquisitions at multiple time points with subtractions.  Delayed high-resolution gradient recall sagittal fat-sat T1. MIP of  subtractions, axial coronal and sagittal. Color encoding of post  contrast dynamic acquisitions. IV contrast: 7.5mL Gadavist     Breast composition: Heterogeneous fibroglandular tissue     Background parenchymal enhancement 1st post C image: Mild     Findings: Heterogeneous enhancement involving a large part of the  upper outer, upper inner and central right breast, is not  substantially changed in extent compared with 2/4/2018.  It measures  8.9 cm in greatest dimension and axial MIP images which has not  significantly changed. It extends to the base of the area laterally  and tracks along the anterior implant scar capsule.     No suspicious enhancement in the left breast. No axillary or internal  mammary chain lymphadenopathy.                                                                      Impression: BI-RADS CATEGORY: 6 - Known Biopsy-Proven  Malignancy-Appropriate Action Should Be Taken.     Recommendation: Continued oncologic and surgical follow-up.     I have personally reviewed the examination and initial interpretation  and I agree with the findings.     BONNIE RAZA MD    Assessment and Plan:    Right breast cancer, ER/CT positive, HER2 negative: She was enrolled in I-SPY2 and started cycle 1 Durvulumab, Taxol and Olaparib on 2/25/19. Tolerating well aside from some fatigue, mild nausea. MRI after cycle 3 without much change from prior imaging. Mily has felt clinical improvement as she is no longer having pain in right breast.   --Labs reviewed. Will proceed with C4 Taxol.    --Continues on olaparib.     Abnormal echocardiogram: The patient's echocardiogram has both low strain and low normal ejection fraction.  Both of these tests are very borderline, and she has no  symptoms of heart failure.  In addition, she has no historical features that would put her at risk of having heart failure, with the exception of mildly excessive alcohol use.  However, in the setting of future Adriamycin use, she is likely at higher risk than normal for chemotherapy-induced cardiomyopathy.  Therefore, recommend that the patient switch to lisinopril 5 mg instead of amlodipine for cardioprotection.  She should be followed with serial strain imaging throughout the course of her chemotherapy.  --Scheduled for echo and visit with Dr. Alanis on 5/6     GERD: No recent symptoms. She has prilosec. Tums prn   Nausea: Compazine prn  Hx depression: On Zoloft  Hx asthma: On flovent BID and albuterol prn  Hx hypothyroidism: on Levothyroxine 88mcg daily.     Kerry Norwood PA-C  [facility]  [address], [place] [id number] [phone]

## 2019-03-22 ENCOUNTER — RESEARCH ENCOUNTER (OUTPATIENT)
Dept: ONCOLOGY | Facility: CLINIC | Age: 54
End: 2019-03-22

## 2019-03-23 NOTE — PROGRESS NOTES
Rachel Arauz NP   [facility]    [address], [place] [address]      RE:       Kathy Lei   MRN:   [id number]   :    1965      Dear Ms. Arauz:   Thank you for referring Kathy Lei to our clinic for a new diagnosis of an estrogen-receptor positive, OK-positive, HER2-negative breast cancer, grade 2, in the upper outer quadrant of the right breast.      Mily presented between  and New Years of 2018 with new sharp pains in the upper outer quadrant of the right breast.  She underwent mammographic screening.       IMAGING:  Mammography and ultrasound:  She had a diagnostic mammogram which showed bilateral prepectoral saline implants.  On the right breast at 11:30 position, there were grouped coarse heterogeneous calcifications spanning 1.3 cm, new since , adjacent calcifications 11-o'clock position posterior depth.  There was an irregular mass in the lateral right breast 9-o'clock position mid-posterior depth, and an additional mass with obscured margins.  No significant changes in the left breast.  Right breast ultrasound was performed.  In the area of the palpable lump in the right breast, 11-o'clock position, 6 cm from the nipple, there is an irregular hypoechoic mass with indistinct margins measuring approximately 1.0 x 0.9 x 1.6 cm in size.  Immediately adjacent to the mass, 11:30 position, are microcalcifications.  In the second area of palpable lump right breast, 9:30 position, 5 cm from the nipple, there was an irregular hypoechoic mass measuring 3.2 x 0.5 x 1.3 cm in size felt to account for the mammographic findings.  There were multiple additional round hypoechoic masses similar to the findings at 9:30 position seen incidentally during real time and not directly palpable.  For example, in the right breast at 8-o'clock position, 4 cm from the nipple, there was a mass measuring 0.8 x 0.6 x 0.6 cm, BI-RADS 4.       Contrast mammogram was  subsequently performed and the contrast mammogram showed a large area of mass and non-mass-like enhancement in the upper outer right breast measuring 7.2 cm in greatest dimension, corresponding global asymmetry in the upper outer right breast.  Enhancement extended to the implant without evidence of extension to the skin surface or nipple, and the calcifications were noted as previously found.      PATHOLOGY:  The pathology showed part A, breast needle biopsy 11 o'clock, 6 cm from the nipple, invasive mammary carcinoma, no special type, ductal (and mucinous) Donna grade 2.  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  Calcifications were associated with the DCIS.  There was a focus suspicious for lymphovascular invasion.  Invasive carcinoma was estrogen receptor positive and progesterone receptor negative by immunohistochemistry.  In part B, breast right, 8 o'clock, 4 cm from the nipple, ultrasound-guided core biopsy, invasive mammary carcinoma, no special type, ductal (and mucinous) Tyrone grade 2, occasional calcifications were noted.  Invasive carcinoma was estrogen receptor positive and progesterone receptor negative by immunohistochemistry.  On quantitation of the ER and IA, ER was positive 99% of the cells staining with strong intensity.  IA was subsequently noted to be positive with 15% of the cells staining with moderate intensity.       There was also a HER2 FISH performed, and the HER2 FISH showed average number of HER2 signals per nucleus 2.6.  Average number of CEN17 signals 1.7 with a ratio of 1.6, VASILIY negative for biopsy A.  For biopsy B, the HER2 signals per nucleus 2.9.  Average number CEN17 signals per nucleus 1.7 with a ratio of 1.7, VASILIY negative.  Overall, by 2018 ASCO/CAP guidelines, this tumor is HER2 negative.      Mily now comes to clinic with her , Neri, and her son, Clay, for recommendations on how to proceed.  I did discuss the  I-SPY 2 clinical trial as a potential option.  This discussion is detailed below.      Overall, Mily has been doing quite well.  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  She is able to perform all of her household activities and housework.  Overall, she has an ECOG 0 performance status.  She does complain of stabbing pain in the upper outer quadrant of the right breast which continues and it happens several times a day.  She rates the pain as a 2/10.  She has no fatigue, depression treated with Zoloft and no anxiety, although she did have a panic attack the other day.      She has no weight loss.  Diet has not changed.  No loss of energy.  She does not sleep during the day.      She has a mass in the right breast, which was self-palpated.  No masses in the left breast and has noted no nipple discharge, skin changes, breast or nipple redness.  She has had some increase in right breast size.  No pain and no changes in the nipple, and no dimpling of the breast.      REVIEW OF SYSTEMS:  She has no fever, headaches, cough, chest pain, shortness of breath, hemoptysis, loss of appetite, nausea, vomiting, abdominal pain, constipation, diarrhea, bone pain, back pain, muscle or joint complaints, numbness or tingling in the hands and feet or hearing loss.  She does have depression treated with Zoloft. The remainder of a 12 point review of systems was negative.     PAST MEDICAL HISTORY:  In terms of her breast history, she does have a history of a smaller right breast which was treated with implants 19 years ago.  She has a history of 2 papillomas in the right breast, 1 removed at the 6-o'clock position 5 years ago, another removed at the 6-o'clock position approximately 10 years ago.  These incisions are approximately at the 5:30 to 6-o'clock position.  She has no history of radiation therapy.  No history of breast cancer of any type.  No history of tumor of any kind.  No history of heart problems, heart  attack, breathing problems, blood clots, seizures, stroke, arthritis, peptic ulcer disease or osteoporosis.  No history of bone fractures.  She is not currently participating in a clinical trial. She does have a history of asthma and a history of hypothyroidism.      The remainder of a 10-point review of systems is negative.      FAMILY HISTORY:  Entirely negative for breast cancer or ovarian cancer or breast cancer in a male relative.      ALLERGIES:  No known drug allergies.  No allergy to seafood, iodine or contrast dye.  She does not take aspirin.      PAST MENSTRUAL HISTORY:  First period was at age 11.  First and only pregnancy was at age 26.  No miscarriages or abortions.  Occasional use of oral contraceptives in her 20s.  No history of hormone replacement therapy.  Last period was 3 years ago.      SOCIAL HISTORY:  Tobacco history was from age 15 to present, smoking a pack of cigarettes a week.  She is now trying to stop cigarettes.      In terms of alcohol use, in her early teens and early 20s, she drank approximately 10 drinks on the weekend and has tapered off drinking since then.     INTERVAL HISTORY:    HISTORY OF PRESENT ILLNESS:  Mily returns to clinic for cycle 5 of treatment.  She is receiving durvalumab and paclitaxel today as well as continuing on the olaparib.  She has no pain, mild fatigue, no depression, no anxiety.  She stopped alcohol entirely.  She is working full time as a hairdresser, but she is unable to work in the evenings as she gets tired.  She has had occasional mild blood on the tissue when blowing her nose. She denies tobacco and alcohol use.       REVIEW OF SYSTEMS:  She denies fevers or chills, cough, chest pain or shortness of breath.  She does have mild nausea with the olaparib and the Compazine relieves the nausea.  She has had no vomiting.  No problems with diarrhea or constipation, bone pain, back pain or headache.  The remainder of a 10-point review of systems is negative.   She also has symptoms of asthma and is using Flovent.  She has noticed a new rash on the anterior aspect of both lower legs, which is a maculopapular rash which is mostly not raised and mostly macular.  A photograph was taken.  See below.      PHYSICAL EXAMINATION:   VITAL SIGNS:  Blood pressure is 138/87, temperature 98, pulse 98, respirations 18, O2 sat 98% on room air, weight 70.1 kg.  Pain score is 0.     GENERAL:  Mily appeared generally well.  She has alopecia and shaved her hair last weekend.  She is wearing a wig.   HEENT:  No lesions in the oropharynx.   LYMPH:  There is no palpable cervical, supraclavicular, subclavicular or axillary lymphadenopathy.   BREASTS:  She has bilateral implants in place.  There is freckled appearance of her skin and some tanning.  There is an area of slight nodularity measuring 5 mm in size on her breast implant at the 8:30 position at the edge of the nipple-areolar complex.  There is also an area of slight nodularity measuring 3-5 mm in size at the 11:30 position in the nipple-areolar complex on the right.  No other masses palpable in the right breast.  No masses palpable in the left breast.   LUNGS:  Clear to percussion and auscultation.   HEART:  Regular rate and rhythm, S1, S2.   ABDOMEN:  Soft and nontender, consistent with increased BMI.   EXTREMITIES:  Without edema.   PSYCHIATRIC:  Mood and affect were normal.   SKIN:  Revealed on the front aspect of both lower legs a maculopapular rash, which was mostly macular.  The lesions were barely palpable.  The rash was erythematous and not confluent and nonblanching.      LABORATORY DATA:  The CBC and CMP were within normal limits.      IMAGING:  Breast MRI from 03/12/2019 showed BI-RADS 6, known biopsy-proven malignancy.  Appropriate action should be taken.  Heterogeneous enhancement involving large part of the upper outer and upper inner and central right breast is not substantially changed in extent compared with the  02/04/2019 MRI and measures 8.9 cm in greatest dimension and the MIP images were not significantly changed.      ASSESSMENT AND PLAN:     1.  Mily Lei is a 53-year-old woman with a recent diagnosis of clinical stage II, T3N0MX, invasive ductal carcinoma of the upper outer quadrant of the right breast, which was multifocal, measuring 8.9 cm in largest dimension on MRI.  The mass was easily palpable but is no longer easily palpable.  She is here for cycle 5 on the I-SPY 2 clinical trial for ER-positive, HER2-negative breast cancer with the treatment being durvalumab given every 28 days and she will receive the durvalumab today.  She is also getting weekly paclitaxel 80 mg/m2.  She is also on olaparib and has some nausea grade 1 associated with the olaparib which has been well treated with Compazine.    2.  The rash on the lower legs is likely related to the durvalumab.  Grade 1. She has no other rash on the abdomen or elsewhere on her body.  We will prescribe 0.5% triamcinolone cream for this problem which can be used twice daily.  All of Mily's questions and all of her 's questions were answered.  So in summary, she has grade 1 nasal mucosal irritation and grade 1 nausea related to the olaparib and grade 1 rash associated with the durvalumab.     3.  Asthma, being treated with Flovent. I also discussed that her allergies could be treated with Flonase but only intermittently.  I would not take the Flonase on a regular basis.   4.  Followup.  We will see Mily in followup in our clinic in 1 week.  All of her questions were answered. Follow up as scheduled.  Breast MRI on April 2.  Paclitaxel on 4-1 and visit with Jade Olguin.  CBC, CMP.  Paclitaxel 4-8 and visit with Kerry.  SLOAN, CMP     Thank you for allowing us to continue to participate in Mily Lei's care.      Christian Guzman MD      [facility]         I spent 40 minutes with the patient more than  50% of which was in counseling and coordination of care.

## 2019-03-26 ENCOUNTER — RESEARCH ENCOUNTER (OUTPATIENT)
Dept: ONCOLOGY | Facility: CLINIC | Age: 54
End: 2019-03-26

## 2019-03-26 ENCOUNTER — TELEPHONE (OUTPATIENT)
Dept: ONCOLOGY | Facility: CLINIC | Age: 54
End: 2019-03-26

## 2019-03-26 ENCOUNTER — ONCOLOGY VISIT (OUTPATIENT)
Dept: ONCOLOGY | Facility: CLINIC | Age: 54
End: 2019-03-26
Attending: INTERNAL MEDICINE
Payer: COMMERCIAL

## 2019-03-26 ENCOUNTER — APPOINTMENT (OUTPATIENT)
Dept: LAB | Facility: CLINIC | Age: 54
End: 2019-03-26
Attending: INTERNAL MEDICINE
Payer: COMMERCIAL

## 2019-03-26 VITALS
RESPIRATION RATE: 18 BRPM | DIASTOLIC BLOOD PRESSURE: 87 MMHG | WEIGHT: 154.6 LBS | TEMPERATURE: 98 F | HEART RATE: 98 BPM | SYSTOLIC BLOOD PRESSURE: 138 MMHG | OXYGEN SATURATION: 98 % | BODY MASS INDEX: 24.97 KG/M2

## 2019-03-26 DIAGNOSIS — R21 RASH: ICD-10-CM

## 2019-03-26 DIAGNOSIS — Z17.0 MALIGNANT NEOPLASM OF UPPER-OUTER QUADRANT OF RIGHT BREAST IN FEMALE, ESTROGEN RECEPTOR POSITIVE (H): Primary | ICD-10-CM

## 2019-03-26 DIAGNOSIS — C50.411 MALIGNANT NEOPLASM OF UPPER-OUTER QUADRANT OF RIGHT BREAST IN FEMALE, ESTROGEN RECEPTOR POSITIVE (H): Primary | ICD-10-CM

## 2019-03-26 LAB
ALBUMIN SERPL-MCNC: 4 G/DL (ref 3.4–5)
ALP SERPL-CCNC: 53 U/L (ref 40–150)
ALT SERPL W P-5'-P-CCNC: 21 U/L (ref 0–50)
ANION GAP SERPL CALCULATED.3IONS-SCNC: 6 MMOL/L (ref 3–14)
AST SERPL W P-5'-P-CCNC: 14 U/L (ref 0–45)
BASOPHILS # BLD AUTO: 0.1 10E9/L (ref 0–0.2)
BASOPHILS NFR BLD AUTO: 1.1 %
BILIRUB SERPL-MCNC: 0.2 MG/DL (ref 0.2–1.3)
BUN SERPL-MCNC: 14 MG/DL (ref 7–30)
CALCIUM SERPL-MCNC: 8.9 MG/DL (ref 8.5–10.1)
CHLORIDE SERPL-SCNC: 105 MMOL/L (ref 94–109)
CO2 SERPL-SCNC: 25 MMOL/L (ref 20–32)
CREAT SERPL-MCNC: 0.58 MG/DL (ref 0.52–1.04)
DIFFERENTIAL METHOD BLD: NORMAL
EOSINOPHIL # BLD AUTO: 0.1 10E9/L (ref 0–0.7)
EOSINOPHIL NFR BLD AUTO: 3.1 %
ERYTHROCYTE [DISTWIDTH] IN BLOOD BY AUTOMATED COUNT: 13.2 % (ref 10–15)
GFR SERPL CREATININE-BSD FRML MDRD: >90 ML/MIN/{1.73_M2}
GLUCOSE SERPL-MCNC: 107 MG/DL (ref 70–99)
HCT VFR BLD AUTO: 42.2 % (ref 35–47)
HGB BLD-MCNC: 13.9 G/DL (ref 11.7–15.7)
IMM GRANULOCYTES # BLD: 0 10E9/L (ref 0–0.4)
IMM GRANULOCYTES NFR BLD: 0.7 %
LYMPHOCYTES # BLD AUTO: 1.5 10E9/L (ref 0.8–5.3)
LYMPHOCYTES NFR BLD AUTO: 34.2 %
MCH RBC QN AUTO: 29.3 PG (ref 26.5–33)
MCHC RBC AUTO-ENTMCNC: 32.9 G/DL (ref 31.5–36.5)
MCV RBC AUTO: 89 FL (ref 78–100)
MONOCYTES # BLD AUTO: 0.3 10E9/L (ref 0–1.3)
MONOCYTES NFR BLD AUTO: 7.1 %
NEUTROPHILS # BLD AUTO: 2.4 10E9/L (ref 1.6–8.3)
NEUTROPHILS NFR BLD AUTO: 53.8 %
NRBC # BLD AUTO: 0 10*3/UL
NRBC BLD AUTO-RTO: 0 /100
PLATELET # BLD AUTO: 352 10E9/L (ref 150–450)
POTASSIUM SERPL-SCNC: 4.2 MMOL/L (ref 3.4–5.3)
PROT SERPL-MCNC: 7.2 G/DL (ref 6.8–8.8)
RBC # BLD AUTO: 4.74 10E12/L (ref 3.8–5.2)
SODIUM SERPL-SCNC: 136 MMOL/L (ref 133–144)
WBC # BLD AUTO: 4.5 10E9/L (ref 4–11)

## 2019-03-26 PROCEDURE — 25800030 ZZH RX IP 258 OP 636: Mod: ZF | Performed by: INTERNAL MEDICINE

## 2019-03-26 PROCEDURE — 99215 OFFICE O/P EST HI 40 MIN: CPT | Mod: ZP | Performed by: INTERNAL MEDICINE

## 2019-03-26 PROCEDURE — 96375 TX/PRO/DX INJ NEW DRUG ADDON: CPT

## 2019-03-26 PROCEDURE — 25000128 H RX IP 250 OP 636: Mod: ZF | Performed by: INTERNAL MEDICINE

## 2019-03-26 PROCEDURE — 96413 CHEMO IV INFUSION 1 HR: CPT

## 2019-03-26 PROCEDURE — G0463 HOSPITAL OUTPT CLINIC VISIT: HCPCS | Mod: ZF

## 2019-03-26 PROCEDURE — 80053 COMPREHEN METABOLIC PANEL: CPT | Performed by: INTERNAL MEDICINE

## 2019-03-26 PROCEDURE — 96417 CHEMO IV INFUS EACH ADDL SEQ: CPT

## 2019-03-26 PROCEDURE — 85025 COMPLETE CBC W/AUTO DIFF WBC: CPT | Performed by: INTERNAL MEDICINE

## 2019-03-26 RX ORDER — ALBUTEROL SULFATE 0.83 MG/ML
2.5 SOLUTION RESPIRATORY (INHALATION)
Status: CANCELLED | OUTPATIENT
Start: 2019-03-26

## 2019-03-26 RX ORDER — EPINEPHRINE 0.3 MG/.3ML
0.3 INJECTION SUBCUTANEOUS EVERY 5 MIN PRN
Status: CANCELLED | OUTPATIENT
Start: 2019-03-26

## 2019-03-26 RX ORDER — METHYLPREDNISOLONE SODIUM SUCCINATE 125 MG/2ML
125 INJECTION, POWDER, LYOPHILIZED, FOR SOLUTION INTRAMUSCULAR; INTRAVENOUS
Status: CANCELLED
Start: 2019-03-26

## 2019-03-26 RX ORDER — ALBUTEROL SULFATE 90 UG/1
1-2 AEROSOL, METERED RESPIRATORY (INHALATION)
Status: CANCELLED
Start: 2019-03-26

## 2019-03-26 RX ORDER — DEXAMETHASONE SODIUM PHOSPHATE 10 MG/ML
10 INJECTION, SOLUTION INTRAMUSCULAR; INTRAVENOUS
Status: CANCELLED | OUTPATIENT
Start: 2019-03-26

## 2019-03-26 RX ORDER — DIPHENHYDRAMINE HYDROCHLORIDE 50 MG/ML
50 INJECTION INTRAMUSCULAR; INTRAVENOUS
Status: CANCELLED
Start: 2019-03-26

## 2019-03-26 RX ORDER — HEPARIN SODIUM (PORCINE) LOCK FLUSH IV SOLN 100 UNIT/ML 100 UNIT/ML
5 SOLUTION INTRAVENOUS EVERY 8 HOURS PRN
Status: DISCONTINUED | OUTPATIENT
Start: 2019-03-26 | End: 2019-03-29 | Stop reason: HOSPADM

## 2019-03-26 RX ORDER — HEPARIN SODIUM (PORCINE) LOCK FLUSH IV SOLN 100 UNIT/ML 100 UNIT/ML
5 SOLUTION INTRAVENOUS ONCE
Status: CANCELLED
Start: 2019-03-26 | End: 2019-03-26

## 2019-03-26 RX ORDER — TRIAMCINOLONE ACETONIDE 5 MG/G
CREAM TOPICAL 2 TIMES DAILY
Qty: 30 G | Refills: 3 | Status: SHIPPED | OUTPATIENT
Start: 2019-03-26 | End: 2019-04-08

## 2019-03-26 RX ORDER — MEPERIDINE HYDROCHLORIDE 25 MG/ML
25 INJECTION INTRAMUSCULAR; INTRAVENOUS; SUBCUTANEOUS EVERY 30 MIN PRN
Status: CANCELLED | OUTPATIENT
Start: 2019-03-26

## 2019-03-26 RX ORDER — HEPARIN SODIUM (PORCINE) LOCK FLUSH IV SOLN 100 UNIT/ML 100 UNIT/ML
5 SOLUTION INTRAVENOUS ONCE
Status: COMPLETED | OUTPATIENT
Start: 2019-03-26 | End: 2019-03-26

## 2019-03-26 RX ORDER — EPINEPHRINE 1 MG/ML
0.3 INJECTION, SOLUTION INTRAMUSCULAR; SUBCUTANEOUS EVERY 5 MIN PRN
Status: CANCELLED | OUTPATIENT
Start: 2019-03-26

## 2019-03-26 RX ORDER — SODIUM CHLORIDE 9 MG/ML
1000 INJECTION, SOLUTION INTRAVENOUS CONTINUOUS PRN
Status: CANCELLED
Start: 2019-03-26

## 2019-03-26 RX ORDER — LORAZEPAM 2 MG/ML
0.5 INJECTION INTRAMUSCULAR EVERY 4 HOURS PRN
Status: CANCELLED
Start: 2019-03-26

## 2019-03-26 RX ORDER — ONDANSETRON 2 MG/ML
8 INJECTION INTRAMUSCULAR; INTRAVENOUS ONCE
Status: COMPLETED | OUTPATIENT
Start: 2019-03-26 | End: 2019-03-26

## 2019-03-26 RX ADMIN — PACLITAXEL 142 MG: 6 INJECTION, SOLUTION INTRAVENOUS at 11:59

## 2019-03-26 RX ADMIN — SODIUM CHLORIDE 250 ML: 9 INJECTION, SOLUTION INTRAVENOUS at 10:17

## 2019-03-26 RX ADMIN — HEPARIN SODIUM (PORCINE) LOCK FLUSH IV SOLN 100 UNIT/ML 5 ML: 100 SOLUTION at 09:03

## 2019-03-26 RX ADMIN — ONDANSETRON 8 MG: 2 INJECTION INTRAMUSCULAR; INTRAVENOUS at 10:18

## 2019-03-26 RX ADMIN — SODIUM CHLORIDE 1500 MG: 9 INJECTION, SOLUTION INTRAVENOUS at 10:50

## 2019-03-26 RX ADMIN — HEPARIN SODIUM (PORCINE) LOCK FLUSH IV SOLN 100 UNIT/ML 5 ML: 100 SOLUTION at 13:03

## 2019-03-26 ASSESSMENT — PAIN SCALES - GENERAL: PAINLEVEL: NO PAIN (0)

## 2019-03-26 NOTE — NURSING NOTE
Chief Complaint   Patient presents with     Port Draw     Labs drawn via port by RN in lab. Line flushed and hep locked. VS taken.     Leidy Ruelas RN

## 2019-03-26 NOTE — NURSING NOTE
[id number]: Study Visit Note   Subject name: Kathy Lei     Visit: C5    Did the study visit occur within the appropriate window allowed by the protocol? yes    Since the last study visit, She has been doing okay. Patient reports rash to biltateral lower extremities; prescribed traiamcinolone cream. Otherwise, no new complaints since most recent treatment.    I have personally interviewed Kathy Lei and reviewed her medical record for adverse events and concomitant medications and these have been recorded on the corresponding logs in Kathy Lei's research file.     Kathy Lei was given the opportunity to ask any trial related questions.  Please see provider progress note for physical exam and other clinical information. Labs were reviewed - any significant lab values were addressed and reviewed.      [id number]: Medication Count/IDS Note      Kathy Lei is enrolled on the trial number listed above. The patient presented for her C5 day visit.   IDS number: [id number]  Drug name: Olaparib  Number of bottles returned: 1  Lot number(s): [id number]  Number of pills returned: 2      Number of bottles dispensed: 1  Lot number(s):[id number]  Number of pills dispensed: 60    Drug diary returned? yes    Are there any discrepancies between the amount of medication the patient was instructed to take and the amount recorded as taken in the patient s drug diary?  no    Are there any discrepancies between the amount of medication the patient has recorded as taken in the drug diary and the amount that would be expected to be returned based on the amount recorded as taken?  Amalia Leos RN     Pager: [phone]      Form 504.00.01 (Version 1)     Effective date: 01JUL2018     Next Review Date: 01JUL2020

## 2019-03-26 NOTE — PROGRESS NOTES
Infusion Nursing Note:  Kathy Lei presents today for C5D1 Study Durvalumab/Taxol.    Patient seen by provider today: Yes: Christian Guzman MD   present during visit today: Not Applicable.    Note: Patient feels well. No complaints made. Otherwise well.    As per Lorne (Research Nurse), may proceed with treatment as planned. She took her PO study-olaparib.    Intravenous Access:  Implanted Port.    Treatment Conditions:  Lab Results   Component Value Date    HGB 13.9 03/26/2019     Lab Results   Component Value Date    WBC 4.5 03/26/2019      Lab Results   Component Value Date    ANEU 2.4 03/26/2019     Lab Results   Component Value Date     03/26/2019      Lab Results   Component Value Date     03/26/2019                   Lab Results   Component Value Date    POTASSIUM 4.2 03/26/2019           Lab Results   Component Value Date    MAG 2.0 02/05/2019            Lab Results   Component Value Date    CR 0.58 03/26/2019                   Lab Results   Component Value Date    BRYANT 8.9 03/26/2019                Lab Results   Component Value Date    BILITOTAL 0.2 03/26/2019           Lab Results   Component Value Date    ALBUMIN 4.0 03/26/2019                    Lab Results   Component Value Date    ALT 21 03/26/2019           Lab Results   Component Value Date    AST 14 03/26/2019       Results reviewed, labs MET treatment parameters, ok to proceed with treatment.      Post Infusion Assessment:  Patient tolerated infusion without incident.  Blood return noted pre and post infusion.  Site patent and intact, free from redness, edema or discomfort.  No evidence of extravasations.  Access discontinued per protocol.       Discharge Plan:   Prescription refills given for Triancinolone.  Discharge instructions reviewed with: Patient.  Patient and/or family verbalized understanding of discharge instructions and all questions answered.  Copy of AVS reviewed with patient and/or family.  Patient will  return 4/1/19 for next appointment.  Patient discharged in stable condition accompanied by: self.  Departure Mode: Ambulatory.    JULISSA SHIN RN

## 2019-03-26 NOTE — LETTER
3/26/2019       RE: Kathy Lei   [address] [place] [address]     Dear Colleague,    Thank you for referring your patient, Kathy Lei, to the [facility]. Please see a copy of my visit note below.    Rachel Arauz NP   [facility]    [address], [place] [address]      RE:       Kathy Lei   MRN:   [id number]   :    1965      Dear Ms. Arauz:   Thank you for referring Kathy Lei to our clinic for a new diagnosis of an estrogen-receptor positive, AK-positive, HER2-negative breast cancer, grade 2, in the upper outer quadrant of the right breast.      Mily presented between  and New Years of 2018 with new sharp pains in the upper outer quadrant of the right breast.  She underwent mammographic screening.       IMAGING:  Mammography and ultrasound:  She had a diagnostic mammogram which showed bilateral prepectoral saline implants.  On the right breast at 11:30 position, there were grouped coarse heterogeneous calcifications spanning 1.3 cm, new since , adjacent calcifications 11-o'clock position posterior depth.  There was an irregular mass in the lateral right breast 9-o'clock position mid-posterior depth, and an additional mass with obscured margins.  No significant changes in the left breast.  Right breast ultrasound was performed.  In the area of the palpable lump in the right breast, 11-o'clock position, 6 cm from the nipple, there is an irregular hypoechoic mass with indistinct margins measuring approximately 1.0 x 0.9 x 1.6 cm in size.  Immediately adjacent to the mass, 11:30 position, are microcalcifications.  In the second area of palpable lump right breast, 9:30 position, 5 cm from the nipple, there was an irregular hypoechoic mass measuring 3.2 x 0.5 x 1.3 cm in size felt to account for the mammographic findings.  There were multiple additional round hypoechoic masses similar to the  findings at 9:30 position seen incidentally during real time and not directly palpable.  For example, in the right breast at 8-o'clock position, 4 cm from the nipple, there was a mass measuring 0.8 x 0.6 x 0.6 cm, BI-RADS 4.       Contrast mammogram was subsequently performed and the contrast mammogram showed a large area of mass and non-mass-like enhancement in the upper outer right breast measuring 7.2 cm in greatest dimension, corresponding global asymmetry in the upper outer right breast.  Enhancement extended to the implant without evidence of extension to the skin surface or nipple, and the calcifications were noted as previously found.      PATHOLOGY:  The pathology showed part A, breast needle biopsy 11 o'clock, 6 cm from the nipple, invasive mammary carcinoma, no special type, ductal (and mucinous) Donna grade 2.  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  Calcifications were associated with the DCIS.  There was a focus suspicious for lymphovascular invasion.  Invasive carcinoma was estrogen receptor positive and progesterone receptor negative by immunohistochemistry.  In part B, breast right, 8 o'clock, 4 cm from the nipple, ultrasound-guided core biopsy, invasive mammary carcinoma, no special type, ductal (and mucinous) Donna grade 2, occasional calcifications were noted.  Invasive carcinoma was estrogen receptor positive and progesterone receptor negative by immunohistochemistry.  On quantitation of the ER and AZ, ER was positive 99% of the cells staining with strong intensity.  AZ was subsequently noted to be positive with 15% of the cells staining with moderate intensity.       There was also a HER2 FISH performed, and the HER2 FISH showed average number of HER2 signals per nucleus 2.6.  Average number of CEN17 signals 1.7 with a ratio of 1.6, VASILIY negative for biopsy A.  For biopsy B, the HER2 signals per nucleus 2.9.  Average number CEN17 signals  per nucleus 1.7 with a ratio of 1.7, VASILIY negative.  Overall, by 2018 ASCO/CAP guidelines, this tumor is HER2 negative.      Mily now comes to clinic with her , Neri, and her son, Clay, for recommendations on how to proceed.  I did discuss the I-SPY 2 clinical trial as a potential option.  This discussion is detailed below.      Overall, Mily has been doing quite well.  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  She is able to perform all of her household activities and housework.  Overall, she has an ECOG 0 performance status.  She does complain of stabbing pain in the upper outer quadrant of the right breast which continues and it happens several times a day.  She rates the pain as a 2/10.  She has no fatigue, depression treated with Zoloft and no anxiety, although she did have a panic attack the other day.      She has no weight loss.  Diet has not changed.  No loss of energy.  She does not sleep during the day.      She has a mass in the right breast, which was self-palpated.  No masses in the left breast and has noted no nipple discharge, skin changes, breast or nipple redness.  She has had some increase in right breast size.  No pain and no changes in the nipple, and no dimpling of the breast.      REVIEW OF SYSTEMS:  She has no fever, headaches, cough, chest pain, shortness of breath, hemoptysis, loss of appetite, nausea, vomiting, abdominal pain, constipation, diarrhea, bone pain, back pain, muscle or joint complaints, numbness or tingling in the hands and feet or hearing loss.  She does have depression treated with Zoloft. The remainder of a 12 point review of systems was negative.     PAST MEDICAL HISTORY:  In terms of her breast history, she does have a history of a smaller right breast which was treated with implants 19 years ago.  She has a history of 2 papillomas in the right breast, 1 removed at the 6-o'clock position 5 years ago, another removed at the 6-o'clock position  approximately 10 years ago.  These incisions are approximately at the 5:30 to 6-o'clock position.  She has no history of radiation therapy.  No history of breast cancer of any type.  No history of tumor of any kind.  No history of heart problems, heart attack, breathing problems, blood clots, seizures, stroke, arthritis, peptic ulcer disease or osteoporosis.  No history of bone fractures.  She is not currently participating in a clinical trial. She does have a history of asthma and a history of hypothyroidism.      The remainder of a 10-point review of systems is negative.      FAMILY HISTORY:  Entirely negative for breast cancer or ovarian cancer or breast cancer in a male relative.      ALLERGIES:  No known drug allergies.  No allergy to seafood, iodine or contrast dye.  She does not take aspirin.      PAST MENSTRUAL HISTORY:  First period was at age 11.  First and only pregnancy was at age 26.  No miscarriages or abortions.  Occasional use of oral contraceptives in her 20s.  No history of hormone replacement therapy.  Last period was 3 years ago.      SOCIAL HISTORY:  Tobacco history was from age 15 to present, smoking a pack of cigarettes a week.  She is now trying to stop cigarettes.      In terms of alcohol use, in her early teens and early 20s, she drank approximately 10 drinks on the weekend and has tapered off drinking since then.     INTERVAL HISTORY:    HISTORY OF PRESENT ILLNESS:  Mily returns to clinic for cycle 5 of treatment.  She is receiving durvalumab and paclitaxel today as well as continuing on the olaparib.  She has no pain, mild fatigue, no depression, no anxiety.  She stopped alcohol entirely.  She is working full time as a hairdresser, but she is unable to work in the evenings as she gets tired.  She has had occasional mild blood on the tissue when blowing her nose. She denies tobacco and alcohol use.       REVIEW OF SYSTEMS:  She denies fevers or chills, cough, chest pain or shortness of  breath.  She does have mild nausea with the olaparib and the Compazine relieves the nausea.  She has had no vomiting.  No problems with diarrhea or constipation, bone pain, back pain or headache.  The remainder of a 10-point review of systems is negative.  She also has symptoms of asthma and is using Flovent.  She has noticed a new rash on the anterior aspect of both lower legs, which is a maculopapular rash which is mostly not raised and mostly macular.  A photograph was taken.  See below.      PHYSICAL EXAMINATION:   VITAL SIGNS:  Blood pressure is 138/87, temperature 98, pulse 98, respirations 18, O2 sat 98% on room air, weight 70.1 kg.  Pain score is 0.     GENERAL:  Mily appeared generally well.  She has alopecia and shaved her hair last weekend.  She is wearing a wig.   HEENT:  No lesions in the oropharynx.   LYMPH:  There is no palpable cervical, supraclavicular, subclavicular or axillary lymphadenopathy.   BREASTS:  She has bilateral implants in place.  There is freckled appearance of her skin and some tanning.  There is an area of slight nodularity measuring 5 mm in size on her breast implant at the 8:30 position at the edge of the nipple-areolar complex.  There is also an area of slight nodularity measuring 3-5 mm in size at the 11:30 position in the nipple-areolar complex on the right.  No other masses palpable in the right breast.  No masses palpable in the left breast.   LUNGS:  Clear to percussion and auscultation.   HEART:  Regular rate and rhythm, S1, S2.   ABDOMEN:  Soft and nontender, consistent with increased BMI.   EXTREMITIES:  Without edema.   PSYCHIATRIC:  Mood and affect were normal.   SKIN:  Revealed on the front aspect of both lower legs a maculopapular rash, which was mostly macular.  The lesions were barely palpable.  The rash was erythematous and not confluent and nonblanching.      LABORATORY DATA:  The CBC and CMP were within normal limits.      IMAGING:  Breast MRI from 03/12/2019  showed BI-RADS 6, known biopsy-proven malignancy.  Appropriate action should be taken.  Heterogeneous enhancement involving large part of the upper outer and upper inner and central right breast is not substantially changed in extent compared with the 02/04/2019 MRI and measures 8.9 cm in greatest dimension and the MIP images were not significantly changed.      ASSESSMENT AND PLAN:     1.  Mily Lei is a 53-year-old woman with a recent diagnosis of clinical stage II, T3N0MX, invasive ductal carcinoma of the upper outer quadrant of the right breast, which was multifocal, measuring 8.9 cm in largest dimension on MRI.  The mass was easily palpable but is no longer easily palpable.  She is here for cycle 5 on the I-SPY 2 clinical trial for ER-positive, HER2-negative breast cancer with the treatment being durvalumab given every 28 days and she will receive the durvalumab today.  She is also getting weekly paclitaxel 80 mg/m2.  She is also on olaparib and has some nausea grade 1 associated with the olaparib which has been well treated with Compazine.    2.  The rash on the lower legs is likely related to the durvalumab.  Grade 1. She has no other rash on the abdomen or elsewhere on her body.  We will prescribe 0.5% triamcinolone cream for this problem which can be used twice daily.  All of Mily's questions and all of her 's questions were answered.  So in summary, she has grade 1 nasal mucosal irritation and grade 1 nausea related to the olaparib and grade 1 rash associated with the durvalumab.     3.  Asthma, being treated with Flovent. I also discussed that her allergies could be treated with Flonase but only intermittently.  I would not take the Flonase on a regular basis.   4.  Followup.  We will see Mily in followup in our clinic in 1 week.  All of her questions were answered. Follow up as scheduled.  Breast MRI on April 2.  Paclitaxel on 4-1 and visit with Jade Olguin.  CBC, CMP.  Paclitaxel 4-8 and  visit with Tien LISA CMP     Thank you for allowing us to continue to participate in Mily Lei's care.      Christian Guzman MD      [facility]         I spent 40 minutes with the patient more than 50% of which was in counseling and coordination of care.     Again, thank you for allowing me to participate in the care of your patient.      Sincerely,    Christian Guzman MD

## 2019-03-26 NOTE — PATIENT INSTRUCTIONS
March 2019 Sunday Monday Tuesday Wednesday Thursday Friday Saturday                            1     2       3     4     5    [facility] D[facility]  10:00 AM   (15 min.)   [facility]   [facility]    [facility] RETURN  10:15 AM   (50 min.)   Kerry Norwood PA   [facility]    [facility] ONC INFUSION 120  12:30 PM   (120 min.)   [facility] ONCOLOGY INFUSION   [facility] 6    IR CHEST PORT PLACEMENT >5 YRS   8:30 AM   (60 min.)   [id number]   [facility]    Outpatient Visit   8:32 AM   [facility]    INSERT PORT VASCULAR ACCESS  10:00 AM   Jerome Dash PA-C   [facility] OR 7     8     9       10     11    [facility]  10:45 AM   (15 min.)   [facility]   [facility]    [facility] RETURN  11:05 AM   (50 min.)   Kerry Norwood PA   [facility]    [facility] ONC INFUSION 120   1:00 PM   (120 min.)   [facility] ONCOL[facility]Y INFUSION   [facility] 12    MR BREAST BILATERAL WWO   6:15 PM   (45 min.)   [email or web address]   [facility] MRI 13     14     15     16       17     18    [facility] DRAW  12:30 PM   (15 min.)   [facility]   [facility] Draw    [facility] RETURN  12:55 PM   (50 min.)   Kerry Norwood PA   [facility]    [facility] ONC INFUSION 120   2:00 PM   (120 min.)    ONCOLOGY INFUSION   [facility] 19     20     21     22     23       24     25     26    [facility] DRAW   8:30 AM   (15 min.)   [facility]   [facility] Draw    [facility] RETURN   8:45 AM   (30 min.)   Christian Guzman MD   [facility]    [facility] ONC INFUSION 360   9:30 AM   (360 min.)   [facility] ONCOL[facility] INFUSION   [facility] 27     28     29     30       31                                               April 2019 Sunday Monday Tuesday Wednesday Thursday Friday Saturday        1    [facility] DRAW    7:30 AM   (15 min.)   [facility] D[facility]   [facility] Draw    [facility] RETURN   7:45 AM   (60 min.)   [facility] Ol[facility]in [facility]-C   [facility]   9:30 AM   (360 min.)    ONCOLOGY INFUSION   [facility] 2    MR BREAST BILATERAL WWO   4:45 PM   (45 min.)   UCMR1   [facility] MRI 3     4     5     6       7     8    [facility] DRAW   7:30 AM   (15 min.)   [facility]   [facility] Draw    [facility] RETURN   7:45 AM   (50 min.)   Kerry Norwood PA   [facility]   9:00 AM   (120 min.)    ONCOLOGY INFUSION   [facility] 9     10     11     12     13       14     15     16    [facility] DRAW   8:30 AM   (15 min.)   [facility]   [facility]    [facility] RETURN   8:45 AM   (30 min.)   Christian Guzman MD   [facility]  11:00 AM   (120 min.)    ONCOLOGY INFUSION   [facility] 17     18     19     20       21     22    [facility] DRAW   8:15 AM   (15 min.)   [facility]   [facility] Draw    [facility] RETURN   8:35 AM   (50 min.)   Kerry Norwood PA   [facility]  10:00 AM   (120 min.)    ONCOLOGY INFUSION   [facility] 23     24     25     26     27       28     29    [facility] DRAW   7:30 AM   (15 min.)   [facility] D[facility]   [facility] Draw    [facility] RETURN   7:45 AM   (50 min.)   Kerry Norwood PA   [facility]    [facility] ONC INFUSION 120   9:00 AM   (120 min.)    ONCOLOGY INFUSION   [facility] 30                                         Lab Results:  Recent Results (from the past 12 hour(s))   CBC with platelets differential    Collection Time: 03/26/19  9:07 AM   Result Value Ref Range    WBC 4.5 4.0 - 11.0 10e9/L    RBC Count 4.74 3.8  - 5.2 10e12/L    Hemoglobin 13.9 11.7 - 15.7 g/dL    Hematocrit 42.2 35.0 - 47.0 %    MCV 89 78 - 100 fl    MCH 29.3 26.5 - 33.0 pg    MCHC 32.9 31.5 - 36.5 g/dL    RDW 13.2 10.0 - 15.0 %    Platelet Count 352 150 - 450 10e9/L    Diff Method Automated Method     % Neutrophils 53.8 %    % Lymphocytes 34.2 %    % Monocytes 7.1 %    % Eosinophils 3.1 %    % Basophils 1.1 %    % Immature Granulocytes 0.7 %    Nucleated RBCs 0 0 /100    Absolute Neutrophil 2.4 1.6 - 8.3 10e9/L    Absolute Lymphocytes 1.5 0.8 - 5.3 10e9/L    Absolute Monocytes 0.3 0.0 - 1.3 10e9/L    Absolute Eosinophils 0.1 0.0 - 0.7 10e9/L    Absolute Basophils 0.1 0.0 - 0.2 10e9/L    Abs Immature Granulocytes 0.0 0 - 0.4 10e9/L    Absolute Nucleated RBC 0.0    Comprehensive metabolic panel    Collection Time: 03/26/19  9:07 AM   Result Value Ref Range    Sodium 136 133 - 144 mmol/L    Potassium 4.2 3.4 - 5.3 mmol/L    Chloride 105 94 - 109 mmol/L    Carbon Dioxide 25 20 - 32 mmol/L    Anion Gap 6 3 - 14 mmol/L    Glucose 107 (H) 70 - 99 mg/dL    Urea Nitrogen 14 7 - 30 mg/dL    Creatinine 0.58 0.52 - 1.04 mg/dL    GFR Estimate >90 >60 mL/min/[1.73_m2]    GFR Estimate If Black >90 >60 mL/min/[1.73_m2]    Calcium 8.9 8.5 - 10.1 mg/dL    Bilirubin Total 0.2 0.2 - 1.3 mg/dL    Albumin 4.0 3.4 - 5.0 g/dL    Protein Total 7.2 6.8 - 8.8 g/dL    Alkaline Phosphatase 53 40 - 150 U/L    ALT 21 0 - 50 U/L    AST 14 0 - 45 U/L

## 2019-03-26 NOTE — TELEPHONE ENCOUNTER
ONCOLOGY INTAKE: Records Information    *Needs to be scheduled for date and time below*  APPT INFORMATION: 4/5/19 at 10:00AM at the [facility]  Referring provider:  Dr. Christian Guzman  Referring provider s clinic[facility]  [facility]  Reason for visit/diagnosis:  Breast Cancer    Were the records received with the referral (via [employer])? N/A    Has patient been seen for any external appt for this diagnosis (enter clinic/location)? No - pt is currently being treated by Dr. Guzman at the [facility]. We have received all outside records for this pt.

## 2019-03-26 NOTE — NURSING NOTE
"Oncology Rooming Note    March 26, 2019 9:22 AM   Kathy Lei is a 53 year old female who presents for:    Chief Complaint   Patient presents with     Port Draw     Labs drawn via port by RN in lab. Line flushed and hep locked. VS taken.     Oncology Clinic Visit     [facility] RETURN- BREAST CA     Initial Vitals: /87 (BP Location: Right arm, Patient Position: Sitting, Cuff Size: Adult Regular)   Pulse 98   Temp 98  F (36.7  C) (Oral)   Resp 18   Wt 70.1 kg (154 lb 9.6 oz)   LMP 11/02/2014   SpO2 98%   BMI 24.97 kg/m   Estimated body mass index is 24.97 kg/m  as calculated from the following:    Height as of 3/18/19: 1.676 m (5' 5.98\").    Weight as of this encounter: 70.1 kg (154 lb 9.6 oz). Body surface area is 1.81 meters squared.  No Pain (0) Comment: Data Unavailable   Patient's last menstrual period was 11/02/2014.  Allergies reviewed: Yes  Medications reviewed: Yes    Medications: Medication refills not needed today.  Pharmacy name entered into [employer]:    [id number] - [place], [place] - [address] [id number] - [place], [place] - [address] [address]    Clinical concerns: Legs and feet itch and has red dots all over, what is causing it, and if there any cream to help. Thomas was notified.      Shakir Skaggs LPN            "

## 2019-03-29 NOTE — TELEPHONE ENCOUNTER
Scheduled appt for 4/5/19 at 10am with [place] Yousif [place] [facility] [place] cabrera/call to patient.

## 2019-04-01 ENCOUNTER — ONCOLOGY VISIT (OUTPATIENT)
Dept: ONCOLOGY | Facility: CLINIC | Age: 54
End: 2019-04-01
Attending: PHYSICIAN ASSISTANT
Payer: COMMERCIAL

## 2019-04-01 ENCOUNTER — APPOINTMENT (OUTPATIENT)
Dept: LAB | Facility: CLINIC | Age: 54
End: 2019-04-01
Attending: INTERNAL MEDICINE
Payer: COMMERCIAL

## 2019-04-01 ENCOUNTER — CARE COORDINATION (OUTPATIENT)
Dept: ONCOLOGY | Facility: CLINIC | Age: 54
End: 2019-04-01

## 2019-04-01 ENCOUNTER — RESEARCH ENCOUNTER (OUTPATIENT)
Dept: ONCOLOGY | Facility: CLINIC | Age: 54
End: 2019-04-01

## 2019-04-01 ENCOUNTER — INFUSION THERAPY VISIT (OUTPATIENT)
Dept: ONCOLOGY | Facility: CLINIC | Age: 54
End: 2019-04-01
Attending: INTERNAL MEDICINE
Payer: COMMERCIAL

## 2019-04-01 VITALS
DIASTOLIC BLOOD PRESSURE: 85 MMHG | HEART RATE: 87 BPM | RESPIRATION RATE: 16 BRPM | TEMPERATURE: 98.2 F | SYSTOLIC BLOOD PRESSURE: 120 MMHG | BODY MASS INDEX: 25.09 KG/M2 | WEIGHT: 155.4 LBS | OXYGEN SATURATION: 96 %

## 2019-04-01 DIAGNOSIS — C50.411 MALIGNANT NEOPLASM OF UPPER-OUTER QUADRANT OF RIGHT BREAST IN FEMALE, ESTROGEN RECEPTOR POSITIVE (H): Primary | ICD-10-CM

## 2019-04-01 DIAGNOSIS — R09.89 ERYTHEMATOUS NASAL MUCOSA: ICD-10-CM

## 2019-04-01 DIAGNOSIS — C50.411 MALIGNANT NEOPLASM OF UPPER-OUTER QUADRANT OF RIGHT BREAST IN FEMALE, ESTROGEN RECEPTOR POSITIVE (H): ICD-10-CM

## 2019-04-01 DIAGNOSIS — E03.9 HYPOTHYROIDISM, UNSPECIFIED TYPE: Primary | ICD-10-CM

## 2019-04-01 DIAGNOSIS — Z17.0 MALIGNANT NEOPLASM OF UPPER-OUTER QUADRANT OF RIGHT BREAST IN FEMALE, ESTROGEN RECEPTOR POSITIVE (H): Primary | ICD-10-CM

## 2019-04-01 DIAGNOSIS — Z17.0 MALIGNANT NEOPLASM OF UPPER-OUTER QUADRANT OF RIGHT BREAST IN FEMALE, ESTROGEN RECEPTOR POSITIVE (H): ICD-10-CM

## 2019-04-01 DIAGNOSIS — R21 RASH AND NONSPECIFIC SKIN ERUPTION: ICD-10-CM

## 2019-04-01 DIAGNOSIS — R53.83 OTHER FATIGUE: ICD-10-CM

## 2019-04-01 LAB
ALBUMIN SERPL-MCNC: 3.8 G/DL (ref 3.4–5)
ALP SERPL-CCNC: 51 U/L (ref 40–150)
ALT SERPL W P-5'-P-CCNC: 24 U/L (ref 0–50)
ANION GAP SERPL CALCULATED.3IONS-SCNC: 5 MMOL/L (ref 3–14)
AST SERPL W P-5'-P-CCNC: 18 U/L (ref 0–45)
BASOPHILS # BLD AUTO: 0.1 10E9/L (ref 0–0.2)
BASOPHILS NFR BLD AUTO: 1.3 %
BILIRUB SERPL-MCNC: 0.3 MG/DL (ref 0.2–1.3)
BUN SERPL-MCNC: 11 MG/DL (ref 7–30)
CALCIUM SERPL-MCNC: 8.9 MG/DL (ref 8.5–10.1)
CHLORIDE SERPL-SCNC: 105 MMOL/L (ref 94–109)
CO2 SERPL-SCNC: 26 MMOL/L (ref 20–32)
CREAT SERPL-MCNC: 0.58 MG/DL (ref 0.52–1.04)
DIFFERENTIAL METHOD BLD: NORMAL
EOSINOPHIL # BLD AUTO: 0.1 10E9/L (ref 0–0.7)
EOSINOPHIL NFR BLD AUTO: 3 %
ERYTHROCYTE [DISTWIDTH] IN BLOOD BY AUTOMATED COUNT: 13.2 % (ref 10–15)
FSH SERPL-ACNC: 81.6 IU/L
GFR SERPL CREATININE-BSD FRML MDRD: >90 ML/MIN/{1.73_M2}
GLUCOSE SERPL-MCNC: 98 MG/DL (ref 70–99)
HCT VFR BLD AUTO: 39.1 % (ref 35–47)
HGB BLD-MCNC: 13.1 G/DL (ref 11.7–15.7)
IMM GRANULOCYTES # BLD: 0 10E9/L (ref 0–0.4)
IMM GRANULOCYTES NFR BLD: 0.5 %
LH SERPL-ACNC: 30 IU/L
LYMPHOCYTES # BLD AUTO: 1.3 10E9/L (ref 0.8–5.3)
LYMPHOCYTES NFR BLD AUTO: 33.5 %
MCH RBC QN AUTO: 29.9 PG (ref 26.5–33)
MCHC RBC AUTO-ENTMCNC: 33.5 G/DL (ref 31.5–36.5)
MCV RBC AUTO: 89 FL (ref 78–100)
MONOCYTES # BLD AUTO: 0.3 10E9/L (ref 0–1.3)
MONOCYTES NFR BLD AUTO: 6.5 %
NEUTROPHILS # BLD AUTO: 2.2 10E9/L (ref 1.6–8.3)
NEUTROPHILS NFR BLD AUTO: 55.2 %
NRBC # BLD AUTO: 0 10*3/UL
NRBC BLD AUTO-RTO: 0 /100
PLATELET # BLD AUTO: 284 10E9/L (ref 150–450)
POTASSIUM SERPL-SCNC: 3.8 MMOL/L (ref 3.4–5.3)
PROT SERPL-MCNC: 7.2 G/DL (ref 6.8–8.8)
RBC # BLD AUTO: 4.38 10E12/L (ref 3.8–5.2)
SODIUM SERPL-SCNC: 136 MMOL/L (ref 133–144)
WBC # BLD AUTO: 4 10E9/L (ref 4–11)

## 2019-04-01 PROCEDURE — 83002 ASSAY OF GONADOTROPIN (LH): CPT | Performed by: INTERNAL MEDICINE

## 2019-04-01 PROCEDURE — 25000128 H RX IP 250 OP 636: Mod: ZF | Performed by: PHYSICIAN ASSISTANT

## 2019-04-01 PROCEDURE — 99214 OFFICE O/P EST MOD 30 MIN: CPT | Mod: ZP | Performed by: PHYSICIAN ASSISTANT

## 2019-04-01 PROCEDURE — 96413 CHEMO IV INFUSION 1 HR: CPT

## 2019-04-01 PROCEDURE — 96375 TX/PRO/DX INJ NEW DRUG ADDON: CPT

## 2019-04-01 PROCEDURE — 85025 COMPLETE CBC W/AUTO DIFF WBC: CPT | Performed by: INTERNAL MEDICINE

## 2019-04-01 PROCEDURE — 25800030 ZZH RX IP 258 OP 636: Mod: ZF | Performed by: PHYSICIAN ASSISTANT

## 2019-04-01 PROCEDURE — G0463 HOSPITAL OUTPT CLINIC VISIT: HCPCS | Mod: ZF

## 2019-04-01 PROCEDURE — 80053 COMPREHEN METABOLIC PANEL: CPT | Performed by: INTERNAL MEDICINE

## 2019-04-01 PROCEDURE — 25000128 H RX IP 250 OP 636: Mod: ZF | Performed by: INTERNAL MEDICINE

## 2019-04-01 PROCEDURE — 83001 ASSAY OF GONADOTROPIN (FSH): CPT | Performed by: INTERNAL MEDICINE

## 2019-04-01 RX ORDER — ONDANSETRON 2 MG/ML
8 INJECTION INTRAMUSCULAR; INTRAVENOUS ONCE
Status: COMPLETED | OUTPATIENT
Start: 2019-04-01 | End: 2019-04-01

## 2019-04-01 RX ORDER — SODIUM CHLORIDE 9 MG/ML
1000 INJECTION, SOLUTION INTRAVENOUS CONTINUOUS PRN
Status: CANCELLED
Start: 2019-04-01

## 2019-04-01 RX ORDER — MEPERIDINE HYDROCHLORIDE 25 MG/ML
25 INJECTION INTRAMUSCULAR; INTRAVENOUS; SUBCUTANEOUS EVERY 30 MIN PRN
Status: CANCELLED | OUTPATIENT
Start: 2019-04-01

## 2019-04-01 RX ORDER — METHYLPREDNISOLONE SODIUM SUCCINATE 125 MG/2ML
125 INJECTION, POWDER, LYOPHILIZED, FOR SOLUTION INTRAMUSCULAR; INTRAVENOUS
Status: CANCELLED
Start: 2019-04-01

## 2019-04-01 RX ORDER — HEPARIN SODIUM (PORCINE) LOCK FLUSH IV SOLN 100 UNIT/ML 100 UNIT/ML
5 SOLUTION INTRAVENOUS ONCE
Status: CANCELLED
Start: 2019-04-01 | End: 2019-04-01

## 2019-04-01 RX ORDER — EPINEPHRINE 1 MG/ML
0.3 INJECTION, SOLUTION INTRAMUSCULAR; SUBCUTANEOUS EVERY 5 MIN PRN
Status: CANCELLED | OUTPATIENT
Start: 2019-04-01

## 2019-04-01 RX ORDER — ALBUTEROL SULFATE 0.83 MG/ML
2.5 SOLUTION RESPIRATORY (INHALATION)
Status: CANCELLED | OUTPATIENT
Start: 2019-04-01

## 2019-04-01 RX ORDER — EPINEPHRINE 0.3 MG/.3ML
0.3 INJECTION SUBCUTANEOUS EVERY 5 MIN PRN
Status: CANCELLED | OUTPATIENT
Start: 2019-04-01

## 2019-04-01 RX ORDER — LORAZEPAM 2 MG/ML
0.5 INJECTION INTRAMUSCULAR EVERY 4 HOURS PRN
Status: CANCELLED
Start: 2019-04-01

## 2019-04-01 RX ORDER — HEPARIN SODIUM (PORCINE) LOCK FLUSH IV SOLN 100 UNIT/ML 100 UNIT/ML
5 SOLUTION INTRAVENOUS ONCE
Status: COMPLETED | OUTPATIENT
Start: 2019-04-01 | End: 2019-04-01

## 2019-04-01 RX ORDER — ALBUTEROL SULFATE 90 UG/1
1-2 AEROSOL, METERED RESPIRATORY (INHALATION)
Status: CANCELLED
Start: 2019-04-01

## 2019-04-01 RX ORDER — DIPHENHYDRAMINE HYDROCHLORIDE 50 MG/ML
50 INJECTION INTRAMUSCULAR; INTRAVENOUS
Status: CANCELLED
Start: 2019-04-01

## 2019-04-01 RX ORDER — DEXAMETHASONE SODIUM PHOSPHATE 10 MG/ML
10 INJECTION, SOLUTION INTRAMUSCULAR; INTRAVENOUS
Status: CANCELLED | OUTPATIENT
Start: 2019-04-01

## 2019-04-01 RX ORDER — HEPARIN SODIUM (PORCINE) LOCK FLUSH IV SOLN 100 UNIT/ML 100 UNIT/ML
5 SOLUTION INTRAVENOUS
Status: COMPLETED | OUTPATIENT
Start: 2019-04-01 | End: 2019-04-01

## 2019-04-01 RX ADMIN — SODIUM CHLORIDE 250 ML: 9 INJECTION, SOLUTION INTRAVENOUS at 08:54

## 2019-04-01 RX ADMIN — HEPARIN SODIUM (PORCINE) LOCK FLUSH IV SOLN 100 UNIT/ML 5 ML: 100 SOLUTION at 07:54

## 2019-04-01 RX ADMIN — PACLITAXEL 142 MG: 6 INJECTION, SOLUTION INTRAVENOUS at 09:15

## 2019-04-01 RX ADMIN — HEPARIN SODIUM (PORCINE) LOCK FLUSH IV SOLN 100 UNIT/ML 5 ML: 100 SOLUTION at 10:22

## 2019-04-01 RX ADMIN — ONDANSETRON 8 MG: 2 INJECTION INTRAMUSCULAR; INTRAVENOUS at 08:55

## 2019-04-01 ASSESSMENT — PAIN SCALES - GENERAL: PAINLEVEL: NO PAIN (0)

## 2019-04-01 NOTE — LETTER
4/1/2019      RE: Kathy Lei  [address] [place] [address]       Heme/onc visit note  April 1, 2019  Oncology team: Thomas/Cuco/Festus  Research RN: Lorne Geller    Reason for visit: Follow up breast cancer, here for C6 I-SPY 2 on durvalumab, paclitaxel, and olaparib.     Cancer history: Kathy Lei is a 53 year old female with clinical stage II, T3N0MX, invasive ductal breast cancer in the upper outer quadrant of right breast, multifocal, measuring 8.9 cm on MRI. ER positive and HER2 negative. She is enrolled in I-SPY 2 and on durvalumab every 28 days, weekly Taxol, and olaparib.     Interval history: Mily has no new complaints today. The rash on her b/l LE is better with the triamcinolone cream, less red and itchy. She feels it is on her low back too. Nausea is well controlled with compazine prn. She has painless bloody mucus when she blows her nose in the AM. She is fatigued, instead of working 10-12 hour days she is working 8 and coming home tired, eating and going to bed. She started losing her hair last week and shaved her head which was emotionally challenging. She denies feeling depressed in general and continues on zoloft. She has gained a few lbs.     10 pt ROS otherwise negative.     Past medical history:  Patient Active Problem List   Diagnosis     CARDIOVASCULAR SCREENING; LDL GOAL LESS THAN 160     Anxiety     Hypothyroidism     Hypothyroidism, unspecified hypothyroidism type     Moderate persistent asthma without complication     Malignant neoplasm of upper-outer quadrant of right breast in female, estrogen receptor positive (H)       Medications:    Current Outpatient Medications on File Prior to Visit:  ADVIL 200 MG OR TABS 1 TABLET EVERY 4 TO 6 HOURS AS NEEDED   albuterol (2.5 MG/3ML) 0.083% nebulizer solution Take 3 mLs by nebulization once for 1 dose.   albuterol (VENTOLIN HFA) 108 (90 Base) MCG/ACT Inhaler INHALE 1-2 PUFFS INTO THE LUNGS EVERY 4 HOURS AS  NEEDED FOR SHORTNESS OF BREATH OR DIFFICULTY BREATHING.   ASPIRIN NOT PRESCRIBED (INTENTIONAL) Please choose reason not prescribed, below (Patient not taking: Reported on 3/18/2019)   fluticasone (FLOVENT HFA) 110 MCG/ACT inhaler Inhale 2 puffs into the lungs 2 times daily   levothyroxine (SYNTHROID/LEVOTHROID) 88 MCG tablet Take 1 tablet (88 mcg) by mouth daily   lidocaine-prilocaine (EMLA) 2.5-2.5 % external cream Apply topically as needed Apply to port site one hour prior to access start six days after port insertion   lisinopril (PRINIVIL/ZESTRIL) 5 MG tablet Take 1 tablet (5 mg) by mouth daily   LORazepam (ATIVAN) 0.5 MG tablet Take 1 tablet (0.5 mg) by mouth every 4 hours as needed (Anxiety, Nausea/Vomiting or Sleep)   omeprazole (PRILOSEC) 20 MG DR capsule Take 1 capsule (20 mg) by mouth daily   order for DME Cranial prosthesis   prochlorperazine (COMPAZINE) 10 MG tablet Take 1 tablet (10 mg) by mouth every 6 hours as needed (Nausea/Vomiting)   sertraline (ZOLOFT) 50 MG tablet One daily   STATIN NOT PRESCRIBED (INTENTIONAL) Please choose reason not prescribed, below (Patient not taking: Reported on 3/18/2019)   study - olaparib (IDS# 3903) 100 mg tablet CHEMOTHERAPY Take 1 tablet (100 mg) by mouth every 12 hours Take at approximately the same times each day with an 8 oz (240 ml) glass of water. Tablets should be swallowed whole and not chewed, crushed, dissolved or divided.   study - olaparib (IDS# 3903) 100 mg tablet CHEMOTHERAPY Take 1 tablet (100 mg) by mouth every 12 hours Take at approximately the same times each day with an 8 oz (240 ml) glass of water. Tablets should be swallowed whole and not chewed, crushed, dissolved or divided.   triamcinolone (ARISTOCORT HP) 0.5 % external cream Apply topically 2 times daily     No current facility-administered medications on file prior to visit.     Allergy:   No Known Allergies    Physical exam  /85 (BP Location: Right arm, Patient Position: Sitting, Cuff  Size: Adult Regular)   Pulse 87   Temp 98.2  F (36.8  C) (Oral)   Resp 16   Wt 70.5 kg (155 lb 6.4 oz)   LMP 11/02/2014   SpO2 96%   BMI 25.09 kg/m     Wt Readings from Last 4 Encounters:   04/01/19 70.5 kg (155 lb 6.4 oz)   03/26/19 70.1 kg (154 lb 9.6 oz)   03/18/19 69.8 kg (153 lb 12.8 oz)   03/11/19 68.9 kg (151 lb 12.8 oz)     General: No acute distress, teary at times  HEENT: Sclera anicteric. Nasal mucosa ref and friable. Oral mucosa pink and moist.  No mucositis or thrush  Lymph: No lymphadenopathy in neck, supraclavicular, and axillary areas  Heart: Regular, rate, and rhythm  Lungs: Clear to ascultation bilaterally  Right breast exam: Implant in place. 7cm mass in right upper outer quadrant.   Abdomen: Positive bowel sounds. Soft, non-distended, non-tender.    Extremities: no lower extremity edema  Neuro: Cranial nerves grossly intact  Rash: Maculopapular rash of bilateral LE, less erythematous than last weeks photo. Very mild dryness and faint red macules on low back.     Media Information      Document Information     Photograph   Right lower extremity    04/01/2019 8:14 AM   Attached To:   Oncology Visit on 4/1/19 with Jade Olguin PA-C   Source Information     Jade Olguin PA-C   Oncology Adult       Labs:  Results for GALA ROLAND (MRN [id number]) as of 4/1/2019 08:32   Ref. Range 4/1/2019 08:02   Sodium Latest Ref Range: 133 - 144 mmol/L 136   Potassium Latest Ref Range: 3.4 - 5.3 mmol/L 3.8   Chloride Latest Ref Range: 94 - 109 mmol/L 105   Carbon Dioxide Latest Ref Range: 20 - 32 mmol/L 26   Urea Nitrogen Latest Ref Range: 7 - 30 mg/dL 11   Creatinine Latest Ref Range: 0.52 - 1.04 mg/dL 0.58   GFR Estimate Latest Ref Range: >60 mL/min/1.73_m2 >90   GFR Estimate If Black Latest Ref Range: >60 mL/min/1.73_m2 >90   Calcium Latest Ref Range: 8.5 - 10.1 mg/dL 8.9   Anion Gap Latest Ref Range: 3 - 14 mmol/L 5   Albumin Latest Ref Range: 3.4 - 5.0 g/dL 3.8   Protein Total Latest Ref  Range: 6.8 - 8.8 g/dL 7.2   Bilirubin Total Latest Ref Range: 0.2 - 1.3 mg/dL 0.3   Alkaline Phosphatase Latest Ref Range: 40 - 150 U/L 51   ALT Latest Ref Range: 0 - 50 U/L 24   AST Latest Ref Range: 0 - 45 U/L 18   Glucose Latest Ref Range: 70 - 99 mg/dL 98   WBC Latest Ref Range: 4.0 - 11.0 10e9/L 4.0   Hemoglobin Latest Ref Range: 11.7 - 15.7 g/dL 13.1   Hematocrit Latest Ref Range: 35.0 - 47.0 % 39.1   Platelet Count Latest Ref Range: 150 - 450 10e9/L 284   RBC Count Latest Ref Range: 3.8 - 5.2 10e12/L 4.38   MCV Latest Ref Range: 78 - 100 fl 89   MCH Latest Ref Range: 26.5 - 33.0 pg 29.9   MCHC Latest Ref Range: 31.5 - 36.5 g/dL 33.5   RDW Latest Ref Range: 10.0 - 15.0 % 13.2   Diff Method Unknown Automated Method   % Neutrophils Latest Units: % 55.2   % Lymphocytes Latest Units: % 33.5   % Monocytes Latest Units: % 6.5   % Eosinophils Latest Units: % 3.0   % Basophils Latest Units: % 1.3   % Immature Granulocytes Latest Units: % 0.5   Nucleated RBCs Latest Ref Range: 0 /100 0   Absolute Neutrophil Latest Ref Range: 1.6 - 8.3 10e9/L 2.2   Absolute Lymphocytes Latest Ref Range: 0.8 - 5.3 10e9/L 1.3   Absolute Monocytes Latest Ref Range: 0.0 - 1.3 10e9/L 0.3   Absolute Eosinophils Latest Ref Range: 0.0 - 0.7 10e9/L 0.1   Absolute Basophils Latest Ref Range: 0.0 - 0.2 10e9/L 0.1   Abs Immature Granulocytes Latest Ref Range: 0 - 0.4 10e9/L 0.0   Absolute Nucleated RBC Unknown 0.0     Assessment and plan:  Kathy Lei is a 53 year old female with clinical stage II, T3N0MX, invasive ductal breast cancer in the upper outer quadrant of right breast, multifocal, measuring 8.9 cm on MRI. ER positive and HER2 negative. She is enrolled in I-SPY 2 and on durvalumab every 28 days, weekly Taxol, and olaparib.     ECOG 1 (due to changing her work schedule due to fatigue)    1. Breast cancer. She is here for cycle 6 today. She is doing well and we will proceed. She has a MRI scheduled tomorrow and will return in 1  week to see Kerry.     2. Nausea, grade 1. Controlled with compazine    3. Maculopapular rash of b/l LE and low back, grade 1. Continue Triamcinolone BID on affected areas and advised use of emollient cream.    4. Nasal mucositis, grade 1. Suggested saline nasal spray    5. Fatigue. Grade 1. She has hypothyroidism, on synthroid. Last TSH was 2/25 and normal. TSH will be checked again at cycle 7.       Over 50% of 25 min visit was spent counseling and coordinating care    Jade Olguin PA-C

## 2019-04-01 NOTE — PROGRESS NOTES
Heme/onc visit note  April 1, 2019  Oncology team: Thomas/Cuco/Festus  Research RN: Lorne Geller    Reason for visit: Follow up breast cancer, here for C6 I-SPY 2 on durvalumab, paclitaxel, and olaparib.     Cancer history: Kathy Lei is a 53 year old female with clinical stage II, T3N0MX, invasive ductal breast cancer in the upper outer quadrant of right breast, multifocal, measuring 8.9 cm on MRI. ER positive and HER2 negative. She is enrolled in I-SPY 2 and on durvalumab every 28 days, weekly Taxol, and olaparib.     Interval history: Mily has no new complaints today. The rash on her b/l LE is better with the triamcinolone cream, less red and itchy. She feels it is on her low back too. Nausea is well controlled with compazine prn. She has painless bloody mucus when she blows her nose in the AM. She is fatigued, instead of working 10-12 hour days she is working 8 and coming home tired, eating and going to bed. She started losing her hair last week and shaved her head which was emotionally challenging. She denies feeling depressed in general and continues on zoloft. She has gained a few lbs.     10 pt ROS otherwise negative.     Past medical history:  Patient Active Problem List   Diagnosis     CARDIOVASCULAR SCREENING; LDL GOAL LESS THAN 160     Anxiety     Hypothyroidism     Hypothyroidism, unspecified hypothyroidism type     Moderate persistent asthma without complication     Malignant neoplasm of upper-outer quadrant of right breast in female, estrogen receptor positive (H)       Medications:    Current Outpatient Medications on File Prior to Visit:  ADVIL 200 MG OR TABS 1 TABLET EVERY 4 TO 6 HOURS AS NEEDED   albuterol (2.5 MG/3ML) 0.083% nebulizer solution Take 3 mLs by nebulization once for 1 dose.   albuterol (VENTOLIN HFA) 108 (90 Base) MCG/ACT Inhaler INHALE 1-2 PUFFS INTO THE LUNGS EVERY 4 HOURS AS NEEDED FOR SHORTNESS OF BREATH OR DIFFICULTY BREATHING.   ASPIRIN NOT PRESCRIBED  (INTENTIONAL) Please choose reason not prescribed, below (Patient not taking: Reported on 3/18/2019)   fluticasone (FLOVENT HFA) 110 MCG/ACT inhaler Inhale 2 puffs into the lungs 2 times daily   levothyroxine (SYNTHROID/LEVOTHROID) 88 MCG tablet Take 1 tablet (88 mcg) by mouth daily   lidocaine-prilocaine (EMLA) 2.5-2.5 % external cream Apply topically as needed Apply to port site one hour prior to access start six days after port insertion   lisinopril (PRINIVIL/ZESTRIL) 5 MG tablet Take 1 tablet (5 mg) by mouth daily   LORazepam (ATIVAN) 0.5 MG tablet Take 1 tablet (0.5 mg) by mouth every 4 hours as needed (Anxiety, Nausea/Vomiting or Sleep)   omeprazole (PRILOSEC) 20 MG DR capsule Take 1 capsule (20 mg) by mouth daily   order for DME Cranial prosthesis   prochlorperazine (COMPAZINE) 10 MG tablet Take 1 tablet (10 mg) by mouth every 6 hours as needed (Nausea/Vomiting)   sertraline (ZOLOFT) 50 MG tablet One daily   STATIN NOT PRESCRIBED (INTENTIONAL) Please choose reason not prescribed, below (Patient not taking: Reported on 3/18/2019)   study - olaparib (IDS# 3903) 100 mg tablet CHEMOTHERAPY Take 1 tablet (100 mg) by mouth every 12 hours Take at approximately the same times each day with an 8 oz (240 ml) glass of water. Tablets should be swallowed whole and not chewed, crushed, dissolved or divided.   study - olaparib (IDS# 3903) 100 mg tablet CHEMOTHERAPY Take 1 tablet (100 mg) by mouth every 12 hours Take at approximately the same times each day with an 8 oz (240 ml) glass of water. Tablets should be swallowed whole and not chewed, crushed, dissolved or divided.   triamcinolone (ARISTOCORT HP) 0.5 % external cream Apply topically 2 times daily     No current facility-administered medications on file prior to visit.     Allergy:   No Known Allergies    Physical exam  /85 (BP Location: Right arm, Patient Position: Sitting, Cuff Size: Adult Regular)   Pulse 87   Temp 98.2  F (36.8  C) (Oral)   Resp 16    Wt 70.5 kg (155 lb 6.4 oz)   LMP 11/02/2014   SpO2 96%   BMI 25.09 kg/m    Wt Readings from Last 4 Encounters:   04/01/19 70.5 kg (155 lb 6.4 oz)   03/26/19 70.1 kg (154 lb 9.6 oz)   03/18/19 69.8 kg (153 lb 12.8 oz)   03/11/19 68.9 kg (151 lb 12.8 oz)     General: No acute distress, teary at times  HEENT: Sclera anicteric. Nasal mucosa ref and friable. Oral mucosa pink and moist.  No mucositis or thrush  Lymph: No lymphadenopathy in neck, supraclavicular, and axillary areas  Heart: Regular, rate, and rhythm  Lungs: Clear to ascultation bilaterally  Right breast exam: Implant in place. 7cm mass in right upper outer quadrant.   Abdomen: Positive bowel sounds. Soft, non-distended, non-tender.    Extremities: no lower extremity edema  Neuro: Cranial nerves grossly intact  Rash: Maculopapular rash of bilateral LE, less erythematous than last weeks photo. Very mild dryness and faint red macules on low back.     Media Information      Document Information     Photograph   Right lower extremity    04/01/2019 8:14 AM   Attached To:   Oncology Visit on 4/1/19 with Jade Olguin PA-C   Source Information     Jade Olguin PA-C   Oncology Adult       Labs:  Results for GALA ROLAND (MRN [id number]) as of 4/1/2019 08:32   Ref. Range 4/1/2019 08:02   Sodium Latest Ref Range: 133 - 144 mmol/L 136   Potassium Latest Ref Range: 3.4 - 5.3 mmol/L 3.8   Chloride Latest Ref Range: 94 - 109 mmol/L 105   Carbon Dioxide Latest Ref Range: 20 - 32 mmol/L 26   Urea Nitrogen Latest Ref Range: 7 - 30 mg/dL 11   Creatinine Latest Ref Range: 0.52 - 1.04 mg/dL 0.58   GFR Estimate Latest Ref Range: >60 mL/min/1.73_m2 >90   GFR Estimate If Black Latest Ref Range: >60 mL/min/1.73_m2 >90   Calcium Latest Ref Range: 8.5 - 10.1 mg/dL 8.9   Anion Gap Latest Ref Range: 3 - 14 mmol/L 5   Albumin Latest Ref Range: 3.4 - 5.0 g/dL 3.8   Protein Total Latest Ref Range: 6.8 - 8.8 g/dL 7.2   Bilirubin Total Latest Ref Range: 0.2 - 1.3 mg/dL  0.3   Alkaline Phosphatase Latest Ref Range: 40 - 150 U/L 51   ALT Latest Ref Range: 0 - 50 U/L 24   AST Latest Ref Range: 0 - 45 U/L 18   Glucose Latest Ref Range: 70 - 99 mg/dL 98   WBC Latest Ref Range: 4.0 - 11.0 10e9/L 4.0   Hemoglobin Latest Ref Range: 11.7 - 15.7 g/dL 13.1   Hematocrit Latest Ref Range: 35.0 - 47.0 % 39.1   Platelet Count Latest Ref Range: 150 - 450 10e9/L 284   RBC Count Latest Ref Range: 3.8 - 5.2 10e12/L 4.38   MCV Latest Ref Range: 78 - 100 fl 89   MCH Latest Ref Range: 26.5 - 33.0 pg 29.9   MCHC Latest Ref Range: 31.5 - 36.5 g/dL 33.5   RDW Latest Ref Range: 10.0 - 15.0 % 13.2   Diff Method Unknown Automated Method   % Neutrophils Latest Units: % 55.2   % Lymphocytes Latest Units: % 33.5   % Monocytes Latest Units: % 6.5   % Eosinophils Latest Units: % 3.0   % Basophils Latest Units: % 1.3   % Immature Granulocytes Latest Units: % 0.5   Nucleated RBCs Latest Ref Range: 0 /100 0   Absolute Neutrophil Latest Ref Range: 1.6 - 8.3 10e9/L 2.2   Absolute Lymphocytes Latest Ref Range: 0.8 - 5.3 10e9/L 1.3   Absolute Monocytes Latest Ref Range: 0.0 - 1.3 10e9/L 0.3   Absolute Eosinophils Latest Ref Range: 0.0 - 0.7 10e9/L 0.1   Absolute Basophils Latest Ref Range: 0.0 - 0.2 10e9/L 0.1   Abs Immature Granulocytes Latest Ref Range: 0 - 0.4 10e9/L 0.0   Absolute Nucleated RBC Unknown 0.0     Assessment and plan:  Kathy Lei is a 53 year old female with clinical stage II, T3N0MX, invasive ductal breast cancer in the upper outer quadrant of right breast, multifocal, measuring 8.9 cm on MRI. ER positive and HER2 negative. She is enrolled in I-SPY 2 and on durvalumab every 28 days, weekly Taxol, and olaparib.     ECOG 1 (due to changing her work schedule due to fatigue)    1. Breast cancer. She is here for cycle 6 today. She is doing well and we will proceed. She has a MRI scheduled tomorrow and will return in 1 week to see Kerry.     2. Nausea, grade 1. Controlled with compazine    3.  Maculopapular rash of b/l LE and low back, grade 1. Continue Triamcinolone BID on affected areas and advised use of emollient cream.    4. Nasal mucositis, grade 1. Suggested saline nasal spray    5. Fatigue. Grade 1. She has hypothyroidism, on synthroid. Last TSH was 2/25 and normal. TSH will be checked again at cycle 7.       Over 50% of 25 min visit was spent counseling and coordinating care    Jade Olguin PA-C

## 2019-04-01 NOTE — NURSING NOTE
[id number]: Study Visit Note   Subject name: Kathy Lei     Visit: C6    Did the study visit occur within the appropriate window allowed by the protocol? yes    Since the last study visit, She has been doing okay. Reports that rash to bilateral lower extremities improving. Otherwise, no new symptoms since treatment last study visit.    I have personally interviewed Kathy Lei and reviewed her medical record for adverse events and concomitant medications and these have been recorded on the corresponding logs in Kathy Lei's research file.     Kathy Lei was given the opportunity to ask any trial related questions.  Please see provider progress note for physical exam and other clinical information. Labs were reviewed - any significant lab values were addressed and reviewed.    Lorne Leos RN     Pager: [phone]

## 2019-04-01 NOTE — NURSING NOTE
"Chief Complaint   Patient presents with     Port Draw     labs drawn from port by rn.  vs taken     Port accessed with 20 gauge 3/4\" gripper needle and labs drawn by rn.  Port flushed with NS and heparin.  Pt tolerated well.  VS taken.  Pt checked in for next appt.    Katy Huizar RN      "

## 2019-04-01 NOTE — PATIENT INSTRUCTIONS
Contact Numbers    [facility] Main Line: [phone]  [facility] Triage and after hours / weekends / holidays:  [phone]      Please call the triage or after hours line if you experience a temperature greater than or equal to 100.5, shaking chills, have uncontrolled nausea, vomiting and/or diarrhea, dizziness, shortness of breath, chest pain, bleeding, unexplained bruising, or if you have any other new/concerning symptoms, questions or concerns.      If you are having any concerning symptoms or wish to speak to a provider before your next infusion visit, please call your care coordinator or triage to notify them so we can adequately serve you.     If you need a refill on a narcotic prescription or other medication, please call before your infusion appointment.          Recent Results (from the past 24 hour(s))   CBC with platelets differential    Collection Time: 04/01/19  8:02 AM   Result Value Ref Range    WBC 4.0 4.0 - 11.0 10e9/L    RBC Count 4.38 3.8 - 5.2 10e12/L    Hemoglobin 13.1 11.7 - 15.7 g/dL    Hematocrit 39.1 35.0 - 47.0 %    MCV 89 78 - 100 fl    MCH 29.9 26.5 - 33.0 pg    MCHC 33.5 31.5 - 36.5 g/dL    RDW 13.2 10.0 - 15.0 %    Platelet Count 284 150 - 450 10e9/L    Diff Method Automated Method     % Neutrophils 55.2 %    % Lymphocytes 33.5 %    % Monocytes 6.5 %    % Eosinophils 3.0 %    % Basophils 1.3 %    % Immature Granulocytes 0.5 %    Nucleated RBCs 0 0 /100    Absolute Neutrophil 2.2 1.6 - 8.3 10e9/L    Absolute Lymphocytes 1.3 0.8 - 5.3 10e9/L    Absolute Monocytes 0.3 0.0 - 1.3 10e9/L    Absolute Eosinophils 0.1 0.0 - 0.7 10e9/L    Absolute Basophils 0.1 0.0 - 0.2 10e9/L    Abs Immature Granulocytes 0.0 0 - 0.4 10e9/L    Absolute Nucleated RBC 0.0    Comprehensive metabolic panel    Collection Time: 04/01/19  8:02 AM   Result Value Ref Range    Sodium 136 133 - 144 mmol/L    Potassium 3.8 3.4 - 5.3 mmol/L    Chloride 105 94 - 109 mmol/L    Carbon Dioxide 26 20 - 32 mmol/L    Anion Gap 5 3 - 14 mmol/L     Glucose 98 70 - 99 mg/dL    Urea Nitrogen 11 7 - 30 mg/dL    Creatinine 0.58 0.52 - 1.04 mg/dL    GFR Estimate >90 >60 mL/min/[1.73_m2]    GFR Estimate If Black >90 >60 mL/min/[1.73_m2]    Calcium 8.9 8.5 - 10.1 mg/dL    Bilirubin Total 0.3 0.2 - 1.3 mg/dL    Albumin 3.8 3.4 - 5.0 g/dL    Protein Total 7.2 6.8 - 8.8 g/dL    Alkaline Phosphatase 51 40 - 150 U/L    ALT 24 0 - 50 U/L    AST 18 0 - 45 U/L

## 2019-04-01 NOTE — PROGRESS NOTES
Infusion Nursing Note:  Kathy Lei presents today for Cycle 6 Day 1 Taxol.    Patient seen by provider today: Yes: LIMA Russo    Note: Ok to treat today per Lorne Munoz RN.    Intravenous Access:  Implanted Port.    Treatment Conditions:  Lab Results   Component Value Date    HGB 13.1 04/01/2019     Lab Results   Component Value Date    WBC 4.0 04/01/2019      Lab Results   Component Value Date    ANEU 2.2 04/01/2019     Lab Results   Component Value Date     04/01/2019      Lab Results   Component Value Date     04/01/2019                   Lab Results   Component Value Date    POTASSIUM 3.8 04/01/2019           Lab Results   Component Value Date    MAG 2.0 02/05/2019            Lab Results   Component Value Date    CR 0.58 04/01/2019                   Lab Results   Component Value Date    BRYANT 8.9 04/01/2019                Lab Results   Component Value Date    BILITOTAL 0.3 04/01/2019           Lab Results   Component Value Date    ALBUMIN 3.8 04/01/2019                    Lab Results   Component Value Date    ALT 24 04/01/2019           Lab Results   Component Value Date    AST 18 04/01/2019     Results reviewed, labs MET treatment parameters, ok to proceed with treatment.      Post Infusion Assessment:  Patient tolerated infusion without incident.  Blood return noted pre and post infusion.  Access discontinued per protocol.       Discharge Plan:   Patient declined prescription refills.  Discharge instructions reviewed with: Patient.  Patient verbalized understanding of discharge instructions and all questions answered.  Copy of AVS reviewed with patient.  Patient will return 4/8/19 for next infusion appointment.  Patient discharged in stable condition accompanied by: sister.  Face to Face time: 0.    ARVIND CHAPARRO RN

## 2019-04-01 NOTE — NURSING NOTE
"Oncology Rooming Note    April 1, 2019 8:09 AM   Kathy Lei is a 53 year old female who presents for:    Chief Complaint   Patient presents with     Port Draw     labs drawn from port by rn.  vs taken     Initial Vitals: /85 (BP Location: Right arm, Patient Position: Sitting, Cuff Size: Adult Regular)   Pulse 87   Temp 98.2  F (36.8  C) (Oral)   Resp 16   Wt 70.5 kg (155 lb 6.4 oz)   LMP 11/02/2014   SpO2 96%   BMI 25.09 kg/m   Estimated body mass index is 25.09 kg/m  as calculated from the following:    Height as of 3/18/19: 1.676 m (5' 5.98\").    Weight as of this encounter: 70.5 kg (155 lb 6.4 oz). Body surface area is 1.81 meters squared.  No Pain (0) Comment: Data Unavailable   Patient's last menstrual period was 11/02/2014.  Allergies reviewed: Yes  Medications reviewed: Yes    Medications: Medication refills not needed today.  Pharmacy name entered into [facility]:    [id number] [address] [id number] - [place], [place] - [address] AT [facility] [address]    Clinical concerns: No new concerns. Sharif was notified.      Shakir Skaggs LPN            "

## 2019-04-01 NOTE — PROGRESS NOTES
Called patient's cell phone and left a VM to discuss hair loss issues and to inquire if she spoke to ANTOINETTE today. Also, to discuss time held with Dr Anne Yousif for surgery consult April 5, 19 if patient is able to make that appointment. Mily Mortensen RN, BSN  [facility] Nurse Coordinator

## 2019-04-02 ENCOUNTER — ANCILLARY PROCEDURE (OUTPATIENT)
Dept: MRI IMAGING | Facility: CLINIC | Age: 54
End: 2019-04-02
Attending: INTERNAL MEDICINE
Payer: COMMERCIAL

## 2019-04-02 DIAGNOSIS — C50.411 MALIGNANT NEOPLASM OF UPPER-OUTER QUADRANT OF RIGHT BREAST IN FEMALE, ESTROGEN RECEPTOR POSITIVE (H): ICD-10-CM

## 2019-04-02 DIAGNOSIS — Z17.0 MALIGNANT NEOPLASM OF UPPER-OUTER QUADRANT OF RIGHT BREAST IN FEMALE, ESTROGEN RECEPTOR POSITIVE (H): ICD-10-CM

## 2019-04-02 RX ORDER — GADOBUTROL 604.72 MG/ML
7.5 INJECTION INTRAVENOUS ONCE
Status: COMPLETED | OUTPATIENT
Start: 2019-04-02 | End: 2019-04-02

## 2019-04-02 RX ADMIN — GADOBUTROL 7.5 ML: 604.72 INJECTION INTRAVENOUS at 16:24

## 2019-04-02 NOTE — DISCHARGE INSTRUCTIONS
MRI Contrast Discharge Instructions    The IV contrast you received today will pass out of your body in your  urine. This will happen in the next 24 hours. You will not feel this process.  Your urine will not change color.    Drink at least 4 extra glasses of water or juice today (unless your doctor  has restricted your fluids). This reduces the stress on your kidneys.  You may take your regular medicines.    If you are on dialysis: It is best to have dialysis today.    If you have a reaction: Most reactions happen right away. If you have  any new symptoms after leaving the hospital (such as hives or swelling),  call your hospital at the correct number below. Or call your family doctor.  If you have breathing distress or wheezing, call 911.    Special instructions: ***    I have read and understand the above information.    Signature:______________________________________ Date:___________    Staff:__________________________________________ Date:___________     Time:__________    [facility] Departments:    ___[place]: [phone]  ___[facility]: [phone]  ___[place]: [phone] ___[facility]: [phone]  ___[facility]: [phone]  ___[facility]: [phone]  ___[phone]  ___[facility]: [phone]  ___[phone]

## 2019-04-03 ENCOUNTER — TELEPHONE (OUTPATIENT)
Dept: ONCOLOGY | Facility: CLINIC | Age: 54
End: 2019-04-03

## 2019-04-03 NOTE — TELEPHONE ENCOUNTER
I called Mily and let her know that the breast MRI shows no change.  I discussed that I would not do anything differently and would continue with the remaining 6 weeks of the durvalumab, olaparib, paclitaxel.  She has had some nausea with the olaparib but it is controlled with antiemetics. She has been working part time but took off today because she has some increase of nausea after the MRI. Follow up with Kerry on Monday.    Christian Guzman MD

## 2019-04-08 ENCOUNTER — ONCOLOGY VISIT (OUTPATIENT)
Dept: ONCOLOGY | Facility: CLINIC | Age: 54
End: 2019-04-08
Attending: PHYSICIAN ASSISTANT
Payer: COMMERCIAL

## 2019-04-08 ENCOUNTER — RESEARCH ENCOUNTER (OUTPATIENT)
Dept: ONCOLOGY | Facility: CLINIC | Age: 54
End: 2019-04-08

## 2019-04-08 ENCOUNTER — INFUSION THERAPY VISIT (OUTPATIENT)
Dept: ONCOLOGY | Facility: CLINIC | Age: 54
End: 2019-04-08
Attending: INTERNAL MEDICINE
Payer: COMMERCIAL

## 2019-04-08 ENCOUNTER — APPOINTMENT (OUTPATIENT)
Dept: LAB | Facility: CLINIC | Age: 54
End: 2019-04-08
Attending: INTERNAL MEDICINE
Payer: COMMERCIAL

## 2019-04-08 VITALS
RESPIRATION RATE: 16 BRPM | WEIGHT: 153.2 LBS | SYSTOLIC BLOOD PRESSURE: 127 MMHG | HEIGHT: 66 IN | TEMPERATURE: 97.4 F | OXYGEN SATURATION: 96 % | HEART RATE: 84 BPM | BODY MASS INDEX: 24.62 KG/M2 | DIASTOLIC BLOOD PRESSURE: 80 MMHG

## 2019-04-08 DIAGNOSIS — C50.411 MALIGNANT NEOPLASM OF UPPER-OUTER QUADRANT OF RIGHT BREAST IN FEMALE, ESTROGEN RECEPTOR POSITIVE (H): ICD-10-CM

## 2019-04-08 DIAGNOSIS — C50.411 MALIGNANT NEOPLASM OF UPPER-OUTER QUADRANT OF RIGHT BREAST IN FEMALE, ESTROGEN RECEPTOR POSITIVE (H): Primary | ICD-10-CM

## 2019-04-08 DIAGNOSIS — R04.0 EPISTAXIS: Primary | ICD-10-CM

## 2019-04-08 DIAGNOSIS — Z17.0 MALIGNANT NEOPLASM OF UPPER-OUTER QUADRANT OF RIGHT BREAST IN FEMALE, ESTROGEN RECEPTOR POSITIVE (H): ICD-10-CM

## 2019-04-08 DIAGNOSIS — Z17.0 MALIGNANT NEOPLASM OF UPPER-OUTER QUADRANT OF RIGHT BREAST IN FEMALE, ESTROGEN RECEPTOR POSITIVE (H): Primary | ICD-10-CM

## 2019-04-08 DIAGNOSIS — R21 RASH: ICD-10-CM

## 2019-04-08 LAB
ALBUMIN SERPL-MCNC: 3.9 G/DL (ref 3.4–5)
ALP SERPL-CCNC: 50 U/L (ref 40–150)
ALT SERPL W P-5'-P-CCNC: 27 U/L (ref 0–50)
ANION GAP SERPL CALCULATED.3IONS-SCNC: 5 MMOL/L (ref 3–14)
AST SERPL W P-5'-P-CCNC: 17 U/L (ref 0–45)
BASOPHILS # BLD AUTO: 0.1 10E9/L (ref 0–0.2)
BASOPHILS NFR BLD AUTO: 1.3 %
BILIRUB SERPL-MCNC: 0.4 MG/DL (ref 0.2–1.3)
BUN SERPL-MCNC: 13 MG/DL (ref 7–30)
CALCIUM SERPL-MCNC: 9 MG/DL (ref 8.5–10.1)
CHLORIDE SERPL-SCNC: 105 MMOL/L (ref 94–109)
CO2 SERPL-SCNC: 25 MMOL/L (ref 20–32)
CREAT SERPL-MCNC: 0.62 MG/DL (ref 0.52–1.04)
DIFFERENTIAL METHOD BLD: ABNORMAL
EOSINOPHIL # BLD AUTO: 0.2 10E9/L (ref 0–0.7)
EOSINOPHIL NFR BLD AUTO: 3.8 %
ERYTHROCYTE [DISTWIDTH] IN BLOOD BY AUTOMATED COUNT: 13.7 % (ref 10–15)
FSH SERPL-ACNC: 78.8 IU/L
GFR SERPL CREATININE-BSD FRML MDRD: >90 ML/MIN/{1.73_M2}
GLUCOSE SERPL-MCNC: 101 MG/DL (ref 70–99)
HCT VFR BLD AUTO: 38.9 % (ref 35–47)
HGB BLD-MCNC: 12.9 G/DL (ref 11.7–15.7)
IMM GRANULOCYTES # BLD: 0 10E9/L (ref 0–0.4)
IMM GRANULOCYTES NFR BLD: 0.5 %
LH SERPL-ACNC: 31 IU/L
LYMPHOCYTES # BLD AUTO: 1.3 10E9/L (ref 0.8–5.3)
LYMPHOCYTES NFR BLD AUTO: 32.3 %
MCH RBC QN AUTO: 29.5 PG (ref 26.5–33)
MCHC RBC AUTO-ENTMCNC: 33.2 G/DL (ref 31.5–36.5)
MCV RBC AUTO: 89 FL (ref 78–100)
MONOCYTES # BLD AUTO: 0.3 10E9/L (ref 0–1.3)
MONOCYTES NFR BLD AUTO: 7.1 %
NEUTROPHILS # BLD AUTO: 2.2 10E9/L (ref 1.6–8.3)
NEUTROPHILS NFR BLD AUTO: 55 %
NRBC # BLD AUTO: 0 10*3/UL
NRBC BLD AUTO-RTO: 0 /100
PLATELET # BLD AUTO: 285 10E9/L (ref 150–450)
POTASSIUM SERPL-SCNC: 3.8 MMOL/L (ref 3.4–5.3)
PROT SERPL-MCNC: 7 G/DL (ref 6.8–8.8)
RBC # BLD AUTO: 4.38 10E12/L (ref 3.8–5.2)
SODIUM SERPL-SCNC: 135 MMOL/L (ref 133–144)
T4 FREE SERPL-MCNC: 0.86 NG/DL (ref 0.76–1.46)
TSH SERPL DL<=0.005 MIU/L-ACNC: 20.25 MU/L (ref 0.4–4)
WBC # BLD AUTO: 3.9 10E9/L (ref 4–11)

## 2019-04-08 PROCEDURE — 80053 COMPREHEN METABOLIC PANEL: CPT | Performed by: PHYSICIAN ASSISTANT

## 2019-04-08 PROCEDURE — 96375 TX/PRO/DX INJ NEW DRUG ADDON: CPT

## 2019-04-08 PROCEDURE — 99215 OFFICE O/P EST HI 40 MIN: CPT | Mod: ZP | Performed by: PHYSICIAN ASSISTANT

## 2019-04-08 PROCEDURE — 84443 ASSAY THYROID STIM HORMONE: CPT | Performed by: PHYSICIAN ASSISTANT

## 2019-04-08 PROCEDURE — 25000128 H RX IP 250 OP 636: Mod: ZF | Performed by: PHYSICIAN ASSISTANT

## 2019-04-08 PROCEDURE — G0463 HOSPITAL OUTPT CLINIC VISIT: HCPCS | Mod: ZF

## 2019-04-08 PROCEDURE — 83001 ASSAY OF GONADOTROPIN (FSH): CPT | Performed by: INTERNAL MEDICINE

## 2019-04-08 PROCEDURE — 84439 ASSAY OF FREE THYROXINE: CPT | Performed by: PHYSICIAN ASSISTANT

## 2019-04-08 PROCEDURE — 25800030 ZZH RX IP 258 OP 636: Mod: ZF | Performed by: PHYSICIAN ASSISTANT

## 2019-04-08 PROCEDURE — 25000128 H RX IP 250 OP 636: Mod: ZF | Performed by: INTERNAL MEDICINE

## 2019-04-08 PROCEDURE — 85025 COMPLETE CBC W/AUTO DIFF WBC: CPT | Performed by: PHYSICIAN ASSISTANT

## 2019-04-08 PROCEDURE — 83002 ASSAY OF GONADOTROPIN (LH): CPT | Performed by: INTERNAL MEDICINE

## 2019-04-08 PROCEDURE — 96413 CHEMO IV INFUSION 1 HR: CPT

## 2019-04-08 RX ORDER — TRIAMCINOLONE ACETONIDE 5 MG/G
CREAM TOPICAL 2 TIMES DAILY
Qty: 30 G | Refills: 3 | Status: SHIPPED | OUTPATIENT
Start: 2019-04-08 | End: 2019-08-13

## 2019-04-08 RX ORDER — METHYLPREDNISOLONE SODIUM SUCCINATE 125 MG/2ML
125 INJECTION, POWDER, LYOPHILIZED, FOR SOLUTION INTRAMUSCULAR; INTRAVENOUS
Status: CANCELLED
Start: 2019-04-08

## 2019-04-08 RX ORDER — PROCHLORPERAZINE MALEATE 10 MG
10 TABLET ORAL EVERY 6 HOURS PRN
Qty: 30 TABLET | Refills: 3 | Status: SHIPPED | OUTPATIENT
Start: 2019-04-08 | End: 2019-04-29

## 2019-04-08 RX ORDER — DIPHENHYDRAMINE HYDROCHLORIDE 50 MG/ML
50 INJECTION INTRAMUSCULAR; INTRAVENOUS
Status: CANCELLED
Start: 2019-04-08

## 2019-04-08 RX ORDER — HEPARIN SODIUM (PORCINE) LOCK FLUSH IV SOLN 100 UNIT/ML 100 UNIT/ML
5 SOLUTION INTRAVENOUS ONCE
Status: COMPLETED | OUTPATIENT
Start: 2019-04-08 | End: 2019-04-08

## 2019-04-08 RX ORDER — LORAZEPAM 2 MG/ML
0.5 INJECTION INTRAMUSCULAR EVERY 4 HOURS PRN
Status: CANCELLED
Start: 2019-04-08

## 2019-04-08 RX ORDER — ECHINACEA PURPUREA EXTRACT 125 MG
TABLET ORAL
Qty: 30 ML | Refills: 3 | Status: SHIPPED | OUTPATIENT
Start: 2019-04-08 | End: 2020-10-26

## 2019-04-08 RX ORDER — DEXAMETHASONE SODIUM PHOSPHATE 10 MG/ML
10 INJECTION, SOLUTION INTRAMUSCULAR; INTRAVENOUS
Status: CANCELLED | OUTPATIENT
Start: 2019-04-08

## 2019-04-08 RX ORDER — LORAZEPAM 0.5 MG/1
0.5 TABLET ORAL EVERY 4 HOURS PRN
Qty: 30 TABLET | Refills: 2 | Status: SHIPPED | OUTPATIENT
Start: 2019-04-08 | End: 2019-05-14

## 2019-04-08 RX ORDER — EPINEPHRINE 1 MG/ML
0.3 INJECTION, SOLUTION INTRAMUSCULAR; SUBCUTANEOUS EVERY 5 MIN PRN
Status: CANCELLED | OUTPATIENT
Start: 2019-04-08

## 2019-04-08 RX ORDER — HEPARIN SODIUM (PORCINE) LOCK FLUSH IV SOLN 100 UNIT/ML 100 UNIT/ML
5 SOLUTION INTRAVENOUS ONCE
Status: CANCELLED
Start: 2019-04-08

## 2019-04-08 RX ORDER — ONDANSETRON 2 MG/ML
8 INJECTION INTRAMUSCULAR; INTRAVENOUS ONCE
Status: COMPLETED | OUTPATIENT
Start: 2019-04-08 | End: 2019-04-08

## 2019-04-08 RX ORDER — ALBUTEROL SULFATE 90 UG/1
1-2 AEROSOL, METERED RESPIRATORY (INHALATION)
Status: CANCELLED
Start: 2019-04-08

## 2019-04-08 RX ORDER — MEPERIDINE HYDROCHLORIDE 25 MG/ML
25 INJECTION INTRAMUSCULAR; INTRAVENOUS; SUBCUTANEOUS EVERY 30 MIN PRN
Status: CANCELLED | OUTPATIENT
Start: 2019-04-08

## 2019-04-08 RX ORDER — SODIUM CHLORIDE 9 MG/ML
1000 INJECTION, SOLUTION INTRAVENOUS CONTINUOUS PRN
Status: CANCELLED
Start: 2019-04-08

## 2019-04-08 RX ORDER — ALBUTEROL SULFATE 0.83 MG/ML
2.5 SOLUTION RESPIRATORY (INHALATION)
Status: CANCELLED | OUTPATIENT
Start: 2019-04-08

## 2019-04-08 RX ORDER — EPINEPHRINE 0.3 MG/.3ML
0.3 INJECTION SUBCUTANEOUS EVERY 5 MIN PRN
Status: CANCELLED | OUTPATIENT
Start: 2019-04-08

## 2019-04-08 RX ADMIN — ONDANSETRON 8 MG: 2 INJECTION INTRAMUSCULAR; INTRAVENOUS at 09:01

## 2019-04-08 RX ADMIN — SODIUM CHLORIDE 250 ML: 9 INJECTION, SOLUTION INTRAVENOUS at 09:00

## 2019-04-08 RX ADMIN — HEPARIN 5 ML: 100 SYRINGE at 07:46

## 2019-04-08 RX ADMIN — PACLITAXEL 142 MG: 6 INJECTION, SOLUTION INTRAVENOUS at 09:31

## 2019-04-08 RX ADMIN — HEPARIN 5 ML: 100 SYRINGE at 10:35

## 2019-04-08 ASSESSMENT — MIFFLIN-ST. JEOR: SCORE: 1306.34

## 2019-04-08 ASSESSMENT — PAIN SCALES - GENERAL: PAINLEVEL: NO PAIN (0)

## 2019-04-08 NOTE — PROGRESS NOTES
Oncology/Hematology Visit Note  Apr 8, 2019    Reason for Visit: follow up of right breast cancer    History of Present Illness: Kathy Lei is a 53 year old female with recent diagnosis of ER/MT positive, HER2 negative, grade 2 breast cancer. Mily presented between Christmas and New Years of 2018 with new sharp pains in the upper outer quadrant of the right breast.  Diagnostic mammogram on 1/8/19 with grouped coarse heterogeneous calcifications at 11:30 position, irregular mass at 9:00 position. US with irregular mass at 11:00 position, 1.0 x 0.9 x 1.6cm. At 9:30 position, irregular mass, 3.2 x 0.5, x 1.3 cm. Contrast mammogram was subsequently performed and the contrast mammogram showed a large area of mass and non-mass-like enhancement in the upper outer right breast measuring 7.2 cm in greatest dimension.      The pathology showed part A, breast needle biopsy 11 o'clock, 6 cm from the nipple, invasive mammary carcinoma, no special type, ductal (and mucinous) Donna grade 2.  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  Calcifications were associated with the DCIS.  There was a focus suspicious for lymphovascular invasion.  Invasive carcinoma was estrogen receptor positive and progesterone receptor negative by immunohistochemistry.  In part B, breast right, 8 o'clock, 4 cm from the nipple, ultrasound-guided core biopsy, invasive mammary carcinoma, no special type, ductal (and mucinous) Donna grade 2, occasional calcifications were noted.  Invasive carcinoma was estrogen receptor positive and progesterone receptor negative by immunohistochemistry.  On quantitation of the ER and MT, ER was positive 99% of the cells staining with strong intensity.  MT was subsequently noted to be positive with 15% of the cells staining with moderate intensity.       There was also a HER2 FISH performed, and the HER2 FISH showed average number of HER2 signals per nucleus  2.6.  Average number of CEN17 signals 1.7 with a ratio of 1.6, VASILIY negative for biopsy A.  For biopsy B, the HER2 signals per nucleus 2.9.  Average number CEN17 signals per nucleus 1.7 with a ratio of 1.7, VASILIY negative.  Overall, by 2018 ASCO/CAP guidelines, this tumor is HER2 negative.     She was enrolled in I-SPY2 and is here to start cycle 1 Durvulumab, Taxol and Olaparib. Please see Dr. Guzman's previous notes for further details on the patient's history.     Interval History:  Mily is here for follow up. She continues to have a pruritic rash on lower legs and back. She has been applying triamcinolone cream up to twice daily when she remembers and this has helped with symptoms. She has not been applying any emollient although skin is dry as was concerned about doing both. Itching bothers her more at night. She does take lorazepam at night to help her sleep. She continues to have intermittent nausea for which she takes compazine. Denies vomiting. Has intermittent heartburn for which she takes prilosec as needed.     She continues to have some bloody mucous after blowing her nose, but no spontaneous nose bleeds. She continues to work about 8 hours instead of previous 10-12 hour days, 3 days/week. Weight is stable. No recent problems with asthma. Mood controlled on Zoloft.        Current Outpatient Medications   Medication Sig Dispense Refill     ADVIL 200 MG OR TABS 1 TABLET EVERY 4 TO 6 HOURS AS NEEDED 0 0     albuterol (2.5 MG/3ML) 0.083% nebulizer solution Take 3 mLs by nebulization once for 1 dose. 3 mL 0     albuterol (VENTOLIN HFA) 108 (90 Base) MCG/ACT Inhaler INHALE 1-2 PUFFS INTO THE LUNGS EVERY 4 HOURS AS NEEDED FOR SHORTNESS OF BREATH OR DIFFICULTY BREATHING. 18 g PRN     ASPIRIN NOT PRESCRIBED (INTENTIONAL) Please choose reason not prescribed, below (Patient not taking: Reported on 3/18/2019)       fluticasone (FLOVENT HFA) 110 MCG/ACT inhaler Inhale 2 puffs into the lungs 2 times daily 1 Inhaler PRN      levothyroxine (SYNTHROID/LEVOTHROID) 88 MCG tablet Take 1 tablet (88 mcg) by mouth daily 90 tablet PRN     lidocaine-prilocaine (EMLA) 2.5-2.5 % external cream Apply topically as needed Apply to port site one hour prior to access start six days after port insertion 30 g 1     lisinopril (PRINIVIL/ZESTRIL) 5 MG tablet Take 1 tablet (5 mg) by mouth daily 90 tablet 3     LORazepam (ATIVAN) 0.5 MG tablet Take 1 tablet (0.5 mg) by mouth every 4 hours as needed (Anxiety, Nausea/Vomiting or Sleep) 30 tablet 2     omeprazole (PRILOSEC) 20 MG DR capsule Take 1 capsule (20 mg) by mouth daily 30 capsule 0     order for DME Cranial prosthesis 1 Units 1     prochlorperazine (COMPAZINE) 10 MG tablet Take 1 tablet (10 mg) by mouth every 6 hours as needed (Nausea/Vomiting) 30 tablet 3     sertraline (ZOLOFT) 50 MG tablet One daily 90 tablet 1     STATIN NOT PRESCRIBED (INTENTIONAL) Please choose reason not prescribed, below (Patient not taking: Reported on 3/18/2019)       study - olaparib (IDS# 3903) 100 mg tablet CHEMOTHERAPY Take 1 tablet (100 mg) by mouth every 12 hours Take at approximately the same times each day with an 8 oz (240 ml) glass of water. Tablets should be swallowed whole and not chewed, crushed, dissolved or divided. (Patient not taking: Reported on 4/1/2019) 60 tablet 0     study - olaparib (IDS# 3903) 100 mg tablet CHEMOTHERAPY Take 1 tablet (100 mg) by mouth every 12 hours Take at approximately the same times each day with an 8 oz (240 ml) glass of water. Tablets should be swallowed whole and not chewed, crushed, dissolved or divided. 60 tablet 0     triamcinolone (ARISTOCORT HP) 0.5 % external cream Apply topically 2 times daily 30 g 3       Physical Examination:  General: The patient is a pleasant female in no acute distress. ECOG 1  /80 (BP Location: Right arm, Patient Position: Sitting, Cuff Size: Adult Regular)   Pulse 84   Temp 97.4  F (36.3  C) (Oral)   Resp 16   Wt 69.5 kg (153 lb 3.2 oz)    LMP 11/02/2014   SpO2 96%   BMI 24.74 kg/m    Wt Readings from Last 10 Encounters:   04/08/19 69.5 kg (153 lb 3.2 oz)   04/01/19 70.5 kg (155 lb 6.4 oz)   03/26/19 70.1 kg (154 lb 9.6 oz)   03/18/19 69.8 kg (153 lb 12.8 oz)   03/11/19 68.9 kg (151 lb 12.8 oz)   03/06/19 69.4 kg (153 lb)   03/05/19 69.1 kg (152 lb 4.8 oz)   02/25/19 69.4 kg (153 lb)   02/11/19 69.9 kg (154 lb)   02/06/19 68.5 kg (151 lb)     HEENT: EOMI, PERRL. Sclerae are anicteric. Oral mucosa is pink and moist with no lesions or thrush.   Lymph: No palpable cervical, supraclavicular, subclavicular or axillary lymphadenopathy.  Heart: Regular rate and rhythm.   Lungs: Clear to auscultation bilaterally.   Breast: Right breast with 7.5 cm x 8 cm mass in upper outer quadrant.  Abdomen: Bowel sounds present, soft, nontender with no palpable hepatosplenomegaly or masses.   Extremities: No lower extremity edema noted bilaterally.   Neuro: Cranial nerves II through XII are grossly intact.  Skin: Maculopapular rash of bilateral LE, less erythematous than last weeks photo. Very mild dryness and faint red macules on low back. No other rashes, petechiae, or bruising noted on exposed skin.    Laboratory Data:      Imaging:  Exam: Breast MRI, with and without Contrast, and with color encoding      History/Indication: Right breast cancer. I-SPY 2 clinical trial time  point #3.     Comparison: 3/12/2019, 2/4/2019     Technique: Precontrast gradient recall axial nonfat sat T1.Precontrast  gradient recall axial fat-sat T1. Precontrast STIR axial fat sat.  Postcontrast gradient recall axial fat-sat T1 with dynamic  postcontrast acquisitions at multiple time points with subtractions.  Delayed high-resolution gradient recall sagittal fat-sat T1. MIP of  subtractions, axial coronal and sagittal. Color encoding of post  contrast dynamic acquisitions. IV contrast: 7.5mL Gadavist     Breast composition: Heterogeneous fibroglandular tissue     Background parenchymal  "enhancement 1st post C image: Mild     Findings: There is no significant change in the right breast cancer  since prior 2 exams when it had been described as \"heterogeneous  enhancement involving a large part of the  upper outer, upper inner and central right breast...\" It again  measures  8.9 cm in greatest dimension on axial MIP images.     No suspicious enhancement in the left breast.     Bilateral axillary lymph nodes are symmetric. No internal  mammary chain lymphadenopathy identified.                                                                      Impression: BI-RADS CATEGORY: 6 - Known Biopsy-Proven  Malignancy-Appropriate Action Should Be Taken.     Recommendation: Continued oncologic and surgical follow-up.     JESSY MONREAL MD    Assessment and Plan:  Kathy Lei is a 53 year old female with clinical stage II, T3N0MX, invasive ductal breast cancer in the upper outer quadrant of right breast, multifocal, measuring 8.9 cm on MRI. ER positive and HER2 negative. She is enrolled in I-SPY 2 and on durvalumab every 28 days, weekly Taxol, and olaparib.      ECOG 1 (due to changing her work schedule due to fatigue)    Right breast cancer, ER/AL positive, HER2 negative: She was enrolled in I-SPY2 and started cycle 1 Durvulumab, Taxol and Olaparib on 2/25/19. Tolerating well aside from some fatigue, mild nausea. MRI after cycle 6 without much change from prior imaging. Mily has felt clinical improvement as she is no longer having pain in right breast.   --Labs reviewed. Will proceed with C7 Taxol.    --Continues on olaparib.     Abnormal echocardiogram: The patient's echocardiogram has both low strain and low normal ejection fraction.  Both of these tests are very borderline, and she has no symptoms of heart failure.  In addition, she has no historical features that would put her at risk of having heart failure, with the exception of mildly excessive alcohol use.  However, in the setting of future " Adriamycin use, she is likely at higher risk than normal for chemotherapy-induced cardiomyopathy.  Therefore, recommend that the patient switch to lisinopril 5 mg instead of amlodipine for cardioprotection.  She should be followed with serial strain imaging throughout the course of her chemotherapy.  --Scheduled for echo and visit with Dr. Alanis on 5/6     GERD: No recent symptoms. She has prilosec. Tums prn   Nausea: Compazine prn  Maculopapular rash of BLE and low back, grade 1: Continue triamcinolone BID on affected areas and emollient cream  Nasal mucositis, grade 1: Suggested saline nasal spray. Sent to pharmacy  Hx depression: On Zoloft  Hx asthma: On flovent BID and albuterol prn  Hx hypothyroidism: on Levothyroxine 88mcg daily. TSH elevated, but T4 WNL. May be 2/2 durvulumab. Will continue to monitor.   Addendum: As TSH is >20 and she is experiencing more fatigue, will increase levothyroxine from 88mcg daily to 100 mcg daily.     Kerry Norwood PA-C  [facility] [place] [id number] [place], [place] [id number] [phone]

## 2019-04-08 NOTE — NURSING NOTE
[id number]: Study Visit Note   Subject name: Kathy Lei     Visit: C7    Did the study visit occur within the appropriate window allowed by the protocol? yes    Since the last study visit, She has been doing okay. Reporting slowly worsening fatigue. Reports rash unchanged. No new complaints since most recent treatment visit.     I have personally interviewed Kathy Lei and reviewed her medical record for adverse events and concomitant medications and these have been recorded on the corresponding logs in Kathy Lei's research file.     Kathy Lei was given the opportunity to ask any trial related questions.  Please see provider progress note for physical exam and other clinical information. Labs were reviewed - any significant lab values were addressed and reviewed.    Lorne Leos RN     Pager: [phone]

## 2019-04-08 NOTE — NURSING NOTE
Chief Complaint   Patient presents with     Port Draw     Labs drawn via port by RN in lab. VS taken. Pt checked in for next appt     Port accessed with 20g gripper needle by RN, labs collected, line flushed with saline and heparin.  Vitals taken. Pt checked in for appointment(s).    Martita WYNN RN PHN BSN  BMT/Oncology Lab

## 2019-04-08 NOTE — LETTER
4/8/2019      RE: Kathy Lei  [address] [place] [address]       Oncology/Hematology Visit Note  Apr 8, 2019    Reason for Visit: follow up of right breast cancer    History of Present Illness: Kathy Lei is a 53 year old female with recent diagnosis of ER/PA positive, HER2 negative, grade 2 breast cancer. Mily presented between Christmas and New Years of 2018 with new sharp pains in the upper outer quadrant of the right breast.  Diagnostic mammogram on 1/8/19 with grouped coarse heterogeneous calcifications at 11:30 position, irregular mass at 9:00 position. US with irregular mass at 11:00 position, 1.0 x 0.9 x 1.6cm. At 9:30 position, irregular mass, 3.2 x 0.5, x 1.3 cm. Contrast mammogram was subsequently performed and the contrast mammogram showed a large area of mass and non-mass-like enhancement in the upper outer right breast measuring 7.2 cm in greatest dimension.      The pathology showed part A, breast needle biopsy 11 o'clock, 6 cm from the nipple, invasive mammary carcinoma, no special type, ductal (and mucinous) Donna grade 2.  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  Calcifications were associated with the DCIS.  There was a focus suspicious for lymphovascular invasion.  Invasive carcinoma was estrogen receptor positive and progesterone receptor negative by immunohistochemistry.  In part B, breast right, 8 o'clock, 4 cm from the nipple, ultrasound-guided core biopsy, invasive mammary carcinoma, no special type, ductal (and mucinous) Donna grade 2, occasional calcifications were noted.  Invasive carcinoma was estrogen receptor positive and progesterone receptor negative by immunohistochemistry.  On quantitation of the ER and PA, ER was positive 99% of the cells staining with strong intensity.  PA was subsequently noted to be positive with 15% of the cells staining with moderate intensity.       There was also a  HER2 FISH performed, and the HER2 FISH showed average number of HER2 signals per nucleus 2.6.  Average number of CEN17 signals 1.7 with a ratio of 1.6, VASILIY negative for biopsy A.  For biopsy B, the HER2 signals per nucleus 2.9.  Average number CEN17 signals per nucleus 1.7 with a ratio of 1.7, VASILIY negative.  Overall, by 2018 ASCO/CAP guidelines, this tumor is HER2 negative.     She was enrolled in I-SPY2 and is here to start cycle 1 Durvulumab, Taxol and Olaparib. Please see Dr. Guzman's previous notes for further details on the patient's history.     Interval History:  Mily is here for follow up. She continues to have a pruritic rash on lower legs and back. She has been applying triamcinolone cream up to twice daily when she remembers and this has helped with symptoms. She has not been applying any emollient although skin is dry as was concerned about doing both. Itching bothers her more at night. She does take lorazepam at night to help her sleep. She continues to have intermittent nausea for which she takes compazine. Denies vomiting. Has intermittent heartburn for which she takes prilosec as needed.     She continues to have some bloody mucous after blowing her nose, but no spontaneous nose bleeds. She continues to work about 8 hours instead of previous 10-12 hour days, 3 days/week. Weight is stable. No recent problems with asthma. Mood controlled on Zoloft.        Current Outpatient Medications   Medication Sig Dispense Refill     ADVIL 200 MG OR TABS 1 TABLET EVERY 4 TO 6 HOURS AS NEEDED 0 0     albuterol (2.5 MG/3ML) 0.083% nebulizer solution Take 3 mLs by nebulization once for 1 dose. 3 mL 0     albuterol (VENTOLIN HFA) 108 (90 Base) MCG/ACT Inhaler INHALE 1-2 PUFFS INTO THE LUNGS EVERY 4 HOURS AS NEEDED FOR SHORTNESS OF BREATH OR DIFFICULTY BREATHING. 18 g PRN     ASPIRIN NOT PRESCRIBED (INTENTIONAL) Please choose reason not prescribed, below (Patient not taking: Reported on 3/18/2019)       fluticasone  (FLOVENT HFA) 110 MCG/ACT inhaler Inhale 2 puffs into the lungs 2 times daily 1 Inhaler PRN     levothyroxine (SYNTHROID/LEVOTHROID) 88 MCG tablet Take 1 tablet (88 mcg) by mouth daily 90 tablet PRN     lidocaine-prilocaine (EMLA) 2.5-2.5 % external cream Apply topically as needed Apply to port site one hour prior to access start six days after port insertion 30 g 1     lisinopril (PRINIVIL/ZESTRIL) 5 MG tablet Take 1 tablet (5 mg) by mouth daily 90 tablet 3     LORazepam (ATIVAN) 0.5 MG tablet Take 1 tablet (0.5 mg) by mouth every 4 hours as needed (Anxiety, Nausea/Vomiting or Sleep) 30 tablet 2     omeprazole (PRILOSEC) 20 MG DR capsule Take 1 capsule (20 mg) by mouth daily 30 capsule 0     order for DME Cranial prosthesis 1 Units 1     prochlorperazine (COMPAZINE) 10 MG tablet Take 1 tablet (10 mg) by mouth every 6 hours as needed (Nausea/Vomiting) 30 tablet 3     sertraline (ZOLOFT) 50 MG tablet One daily 90 tablet 1     STATIN NOT PRESCRIBED (INTENTIONAL) Please choose reason not prescribed, below (Patient not taking: Reported on 3/18/2019)       study - olaparib (IDS# 3903) 100 mg tablet CHEMOTHERAPY Take 1 tablet (100 mg) by mouth every 12 hours Take at approximately the same times each day with an 8 oz (240 ml) glass of water. Tablets should be swallowed whole and not chewed, crushed, dissolved or divided. (Patient not taking: Reported on 4/1/2019) 60 tablet 0     study - olaparib (IDS# [id number]) 100 mg tablet CHEMOTHERAPY Take 1 tablet (100 mg) by mouth every 12 hours Take at approximately the same times each day with an 8 oz (240 ml) glass of water. Tablets should be swallowed whole and not chewed, crushed, dissolved or divided. 60 tablet 0     triamcinolone (ARISTOCORT HP) 0.5 % external cream Apply topically 2 times daily 30 g 3       Physical Examination:  General: The patient is a pleasant female in no acute distress. ECOG 1  /80 (BP Location: Right arm, Patient Position: Sitting, Cuff Size: Adult  Regular)   Pulse 84   Temp 97.4  F (36.3  C) (Oral)   Resp 16   Wt 69.5 kg (153 lb 3.2 oz)   LMP 11/02/2014   SpO2 96%   BMI 24.74 kg/m     Wt Readings from Last 10 Encounters:   04/08/19 69.5 kg (153 lb 3.2 oz)   04/01/19 70.5 kg (155 lb 6.4 oz)   03/26/19 70.1 kg (154 lb 9.6 oz)   03/18/19 69.8 kg (153 lb 12.8 oz)   03/11/19 68.9 kg (151 lb 12.8 oz)   03/06/19 69.4 kg (153 lb)   03/05/19 69.1 kg (152 lb 4.8 oz)   02/25/19 69.4 kg (153 lb)   02/11/19 69.9 kg (154 lb)   02/06/19 68.5 kg (151 lb)     HEENT: EOMI, PERRL. Sclerae are anicteric. Oral mucosa is pink and moist with no lesions or thrush.   Lymph: No palpable cervical, supraclavicular, subclavicular or axillary lymphadenopathy.  Heart: Regular rate and rhythm.   Lungs: Clear to auscultation bilaterally.   Breast: Right breast with 7.5 cm x 8 cm mass in upper outer quadrant.  Abdomen: Bowel sounds present, soft, nontender with no palpable hepatosplenomegaly or masses.   Extremities: No lower extremity edema noted bilaterally.   Neuro: Cranial nerves II through XII are grossly intact.  Skin: Maculopapular rash of bilateral LE, less erythematous than last weeks photo. Very mild dryness and faint red macules on low back.  No other rashes, petechiae, or bruising noted on exposed skin.    Laboratory Data:      Imaging:  Exam: Breast MRI, with and without Contrast, and with color encoding      History/Indication: Right breast cancer. I-SPY 2 clinical trial time  point #3.     Comparison: 3/12/2019, 2/4/2019     Technique: Precontrast gradient recall axial nonfat sat T1.Precontrast  gradient recall axial fat-sat T1. Precontrast STIR axial fat sat.  Postcontrast gradient recall axial fat-sat T1 with dynamic  postcontrast acquisitions at multiple time points with subtractions.  Delayed high-resolution gradient recall sagittal fat-sat T1. MIP of  subtractions, axial coronal and sagittal. Color encoding of post  contrast dynamic acquisitions. IV contrast: 7.5mL  "Gadavist     Breast composition: Heterogeneous fibroglandular tissue     Background parenchymal enhancement 1st post C image: Mild     Findings: There is no significant change in the right breast cancer  since prior 2 exams when it had been described as \"heterogeneous  enhancement involving a large part of the  upper outer, upper inner and central right breast...\" It again  measures  8.9 cm in greatest dimension on axial MIP images.     No suspicious enhancement in the left breast.     Bilateral axillary lymph nodes are symmetric. No internal  mammary chain lymphadenopathy identified.                                                                      Impression: BI-RADS CATEGORY: 6 - Known Biopsy-Proven  Malignancy-Appropriate Action Should Be Taken.     Recommendation: Continued oncologic and surgical follow-up.     JESSY MONREAL MD    Assessment and Plan:  Kathy Lei is a 53 year old female with clinical stage II, T3N0MX, invasive ductal breast cancer in the upper outer quadrant of right breast, multifocal, measuring 8.9 cm on MRI. ER positive and HER2 negative. She is enrolled in I-SPY 2 and on durvalumab every 28 days, weekly Taxol, and olaparib.      ECOG 1 (due to changing her work schedule due to fatigue)    Right breast cancer, ER/MD positive, HER2 negative: She was enrolled in I-SPY2 and started cycle 1 Durvulumab, Taxol and Olaparib on 2/25/19. Tolerating well aside from some fatigue, mild nausea. MRI after cycle 6 without much change from prior imaging. Mily has felt clinical improvement as she is no longer having pain in right breast.   --Labs reviewed. Will proceed with C7 Taxol.    --Continues on olaparib.     Abnormal echocardiogram: The patient's echocardiogram has both low strain and low normal ejection fraction.  Both of these tests are very borderline, and she has no symptoms of heart failure.  In addition, she has no historical features that would put her at risk of having heart " failure, with the exception of mildly excessive alcohol use.  However, in the setting of future Adriamycin use, she is likely at higher risk than normal for chemotherapy-induced cardiomyopathy.  Therefore, recommend that the patient switch to lisinopril 5 mg instead of amlodipine for cardioprotection.  She should be followed with serial strain imaging throughout the course of her chemotherapy.  --Scheduled for echo and visit with Dr. Alanis on 5/6     GERD: No recent symptoms. She has prilosec. Tums prn   Nausea: Compazine prn  Maculopapular rash of BLE and low back, grade 1: Continue triamcinolone BID on affected areas and emollient cream  Nasal mucositis, grade 1: Suggested saline nasal spray. Sent to pharmacy  Hx depression: On Zoloft  Hx asthma: On flovent BID and albuterol prn  Hx hypothyroidism: on Levothyroxine 88mcg daily. TSH elevated, but T4 WNL. May be 2/2 durvulumab. Will continue to monitor.   Addendum: As TSH is >20 and she is experiencing more fatigue, will increase levothyroxine from 88mcg daily to 100 mcg daily.     Kerry Norwood PA-C  [facility]  [address] [place] [address]  [phone]      LIMA Echeverria

## 2019-04-08 NOTE — PROGRESS NOTES
Infusion Nursing Note:  Kathy Lei presents today for Cycle 7, day 1 Taxol.    Patient seen by provider today: Yes: LIMA Cantor    Note: Patient presents to clinic today feeling well with no questions.  Pt did not request or require any intervention for pain today.    Intravenous Access:  Implanted Port.    Treatment Conditions:  Lab Results   Component Value Date    HGB 12.9 04/08/2019     Lab Results   Component Value Date    WBC 3.9 04/08/2019      Lab Results   Component Value Date    ANEU 2.2 04/08/2019     Lab Results   Component Value Date     04/08/2019      Lab Results   Component Value Date     04/08/2019                   Lab Results   Component Value Date    POTASSIUM 3.8 04/08/2019           Lab Results   Component Value Date    MAG 2.0 02/05/2019            Lab Results   Component Value Date    CR 0.62 04/08/2019                   Lab Results   Component Value Date    BRYANT 9.0 04/08/2019                Lab Results   Component Value Date    BILITOTAL 0.4 04/08/2019           Lab Results   Component Value Date    ALBUMIN 3.9 04/08/2019                    Lab Results   Component Value Date    ALT 27 04/08/2019           Lab Results   Component Value Date    AST 17 04/08/2019     Results reviewed, labs MET treatment parameters, ok to proceed with treatment.  TSH elevated.  Per PA, T4 normal, no intervention at this time.    Post Infusion Assessment:  Patient tolerated infusion without incident.  Blood return noted pre and post infusion.  Site patent and intact, free from redness, edema or discomfort.  No evidence of extravasations.  Access discontinued per protocol.    Discharge Plan:   Prescription refills given for Ativan, Compazine, Aristocort cream and nasal spray.  Discharge instructions reviewed with: Patient.  Patient and/or family verbalized understanding of discharge instructions and all questions answered.  AVS to patient via [employer].  Patient will return 4/16/2019  for next appointment.   Patient discharged in stable condition accompanied by: sisters.  Departure Mode: Ambulatory.    Mel Saldivar RN

## 2019-04-08 NOTE — PATIENT INSTRUCTIONS
Contact Numbers    [facility] Main Line: [phone]  [facility] Triage:  [phone]    Call triage with chills and/or temperature greater than or equal to 100.5, uncontrolled nausea/vomiting, diarrhea, constipation, dizziness, shortness of breath, chest pain, bleeding, unexplained bruising, or any new/concerning symptoms, questions/concerns.     If you are having any concerning symptoms or wish to speak to a provider before your next infusion visit, please call your care coordinator or triage to notify them so we can adequately serve you.       After Hours: [phone]    If after hours, weekends, or holidays, call main hospital  and ask for Oncology doctor on call.       April 2019 Sunday Monday Tuesday Wednesday Thursday Friday Saturday        1    [facility] DRAW   7:30 AM   (15 min.)    [facility]    [facility] RETURN   7:45 AM   (60 min.)   Jade Olguin PA-C   [facility] ONC INFUSION 360   9:30 AM   (360 min.)    ONCOLOGY INFUSION   [facility] 2    MR BREAST BILATERAL WWO   4:45 PM   (45 min.)   [email or web address]   [facility] MRI 3     4     5     6       7     8    [facility] DRAW   7:30 AM   (15 min.)    [facility] DRAW   [facility] Lab Draw    P RETURN   7:45 AM   (50 min.)   Kerry Norwood PA   [facility] ONC INFUSION 120   9:00 AM   (120 min.)    ONCOLOGY INFUSION   [facility] 9     10     11     12     13       14     15     16    [facility] DRAW   8:30 AM   (15 min.)    [facility] DRAW   [facility] Draw    [facility] RETURN   8:45 AM   (30 min.)   Christian Guzman MD   [facility] ONC INFUSION 120  11:00 AM   (120 min.)    ONCOLOGY INFUSION   [facility] 17     18     19     20       21     22    [facility] DRAW   8:15 AM   (15 min.)    [facility] DRAW   [facility]  Lab Draw    [facility] RETURN   8:35 AM   (50 min.)   Kerry Norwood PA   [facility]    [facility] ONC INFUSION 120  10:00 AM   (120 min.)    ONCOLOGY INFUSION   [facility] 23     24     25     26     27       28     29    [facility] DRAW   7:30 AM   (15 min.)    [facility] LAB DRAW   [facility] Lab Draw    [facility] RETURN   7:45 AM   (50 min.)   Kerry Norwood PA   [facility]    [facility] ONC INFUSION 120   9:00 AM   (120 min.)    ONCOLOGY INFUSION   [facility] 30    [facility] NEW  12:15 PM   (45 min.)   Anne Yousif MD   [facility]                                 May 2019      [place] Monday Tuesday Wednesday Thursday Friday Saturday                  1     2     3     4       5     6    ECHO LIMITED  10:30 AM   (60 min.)   [id number]   [facility]    [facility] RETURN  11:15 AM   (30 min.)   Mary Alanis MD   [facility] 7     8     9     10     11       12     13     14     15     16     17     18       19     20     21     22     23     24     25       26     27     28     29     30     31                          Lab Results:  Recent Results (from the past 12 hour(s))   CBC with platelets differential    Collection Time: 04/08/19  7:51 AM   Result Value Ref Range    WBC 3.9 (L) 4.0 - 11.0 10e9/L    RBC Count 4.38 3.8 - 5.2 10e12/L    Hemoglobin 12.9 11.7 - 15.7 g/dL    Hematocrit 38.9 35.0 - 47.0 %    MCV 89 78 - 100 fl    MCH 29.5 26.5 - 33.0 pg    MCHC 33.2 31.5 - 36.5 g/dL    RDW 13.7 10.0 - 15.0 %    Platelet Count 285 150 - 450 10e9/L    Diff Method Automated Method     % Neutrophils 55.0 %    % Lymphocytes 32.3 %    % Monocytes 7.1 %    % Eosinophils 3.8 %    % Basophils 1.3 %    % Immature Granulocytes 0.5 %    Nucleated RBCs 0 0 /100    Absolute Neutrophil 2.2 1.6 - 8.3 10e9/L    Absolute Lymphocytes 1.3 0.8 - 5.3 10e9/L    Absolute Monocytes 0.3 0.0 - 1.3 10e9/L    Absolute Eosinophils 0.2 0.0 -  0.7 10e9/L    Absolute Basophils 0.1 0.0 - 0.2 10e9/L    Abs Immature Granulocytes 0.0 0 - 0.4 10e9/L    Absolute Nucleated RBC 0.0    Comprehensive metabolic panel    Collection Time: 04/08/19  7:51 AM   Result Value Ref Range    Sodium 135 133 - 144 mmol/L    Potassium 3.8 3.4 - 5.3 mmol/L    Chloride 105 94 - 109 mmol/L    Carbon Dioxide 25 20 - 32 mmol/L    Anion Gap 5 3 - 14 mmol/L    Glucose 101 (H) 70 - 99 mg/dL    Urea Nitrogen 13 7 - 30 mg/dL    Creatinine 0.62 0.52 - 1.04 mg/dL    GFR Estimate >90 >60 mL/min/[1.73_m2]    GFR Estimate If Black >90 >60 mL/min/[1.73_m2]    Calcium 9.0 8.5 - 10.1 mg/dL    Bilirubin Total 0.4 0.2 - 1.3 mg/dL    Albumin 3.9 3.4 - 5.0 g/dL    Protein Total 7.0 6.8 - 8.8 g/dL    Alkaline Phosphatase 50 40 - 150 U/L    ALT 27 0 - 50 U/L    AST 17 0 - 45 U/L   TSH    Collection Time: 04/08/19  7:51 AM   Result Value Ref Range    TSH 20.25 (H) 0.40 - 4.00 mU/L   T4 free    Collection Time: 04/08/19  7:51 AM   Result Value Ref Range    T4 Free 0.86 0.76 - 1.46 ng/dL

## 2019-04-08 NOTE — NURSING NOTE
"Oncology Rooming Note    April 8, 2019 8:00 AM   Kathy Lei is a 53 year old female who presents for:    Chief Complaint   Patient presents with     Port Draw     Labs drawn via port by RN in lab. VS taken. Pt checked in for next appt     Oncology Clinic Visit     Return: Breast CA     Initial Vitals: /80 (BP Location: Right arm, Patient Position: Sitting, Cuff Size: Adult Regular)   Pulse 84   Temp 97.4  F (36.3  C) (Oral)   Resp 16   Ht 1.676 m (5' 5.98\")   Wt 69.5 kg (153 lb 3.2 oz)   LMP 11/02/2014   SpO2 96%   BMI 24.74 kg/m   Estimated body mass index is 24.74 kg/m  as calculated from the following:    Height as of this encounter: 1.676 m (5' 5.98\").    Weight as of this encounter: 69.5 kg (153 lb 3.2 oz). Body surface area is 1.8 meters squared.  No Pain (0) Comment: Data Unavailable   Patient's last menstrual period was 11/02/2014.  Allergies reviewed: Yes  Medications reviewed: Yes    Medications: MEDICATION REFILLS NEEDED TODAY. Provider was notified.  Pharmacy name entered into [facility]:    [id number] - [address] [id number] - [place], [place] - [address] AT [facility] [address]    Clinical concerns: Pt requesting refills on the Compazine, Ativan, and Triamcinolone cream.  Kerry Norwood was notified.      Aide Villanueva CMA              "

## 2019-04-10 DIAGNOSIS — E03.9 HYPOTHYROIDISM, UNSPECIFIED TYPE: ICD-10-CM

## 2019-04-10 RX ORDER — LEVOTHYROXINE SODIUM 100 UG/1
88 TABLET ORAL DAILY
Qty: 30 TABLET | Refills: 3 | Status: SHIPPED | OUTPATIENT
Start: 2019-04-10 | End: 2019-04-16

## 2019-04-14 NOTE — PROGRESS NOTES
Rachel Arauz NP   [facility]    [address], [place] [address]      RE:       Kathy Lei   MRN:   [id number]   :    1965      Dear Ms. Arauz:   Thank you for referring Kathy Lei to our clinic for a new diagnosis of an estrogen-receptor positive, [place]-positive, HER2-negative breast cancer, grade 2, in the upper outer quadrant of the right breast.      Mily presented between  and New Years of 2018 with new sharp pains in the upper outer quadrant of the right breast.  She underwent mammographic screening.       IMAGING:  Mammography and ultrasound:  She had a diagnostic mammogram which showed bilateral prepectoral saline implants.  On the right breast at 11:30 position, there were grouped coarse heterogeneous calcifications spanning 1.3 cm, new since , adjacent calcifications 11-o'clock position posterior depth.  There was an irregular mass in the lateral right breast 9-o'clock position mid-posterior depth, and an additional mass with obscured margins.  No significant changes in the left breast.  Right breast ultrasound was performed.  In the area of the palpable lump in the right breast, 11-o'clock position, 6 cm from the nipple, there is an irregular hypoechoic mass with indistinct margins measuring approximately 1.0 x 0.9 x 1.6 cm in size.  Immediately adjacent to the mass, 11:30 position, are microcalcifications.  In the second area of palpable lump right breast, 9:30 position, 5 cm from the nipple, there was an irregular hypoechoic mass measuring 3.2 x 0.5 x 1.3 cm in size felt to account for the mammographic findings.  There were multiple additional round hypoechoic masses similar to the findings at 9:30 position seen incidentally during real time and not directly palpable.  For example, in the right breast at 8-o'clock position, 4 cm from the nipple, there was a mass measuring 0.8 x 0.6 x 0.6 cm, BI-RADS 4.       Contrast mammogram was  subsequently performed and the contrast mammogram showed a large area of mass and non-mass-like enhancement in the upper outer right breast measuring 7.2 cm in greatest dimension, corresponding global asymmetry in the upper outer right breast.  Enhancement extended to the implant without evidence of extension to the skin surface or nipple, and the calcifications were noted as previously found.      PATHOLOGY:  The pathology showed part A, breast needle biopsy 11 o'clock, 6 cm from the nipple, invasive mammary carcinoma, no special type, ductal (and mucinous) Donna grade 2.  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  Calcifications were associated with the DCIS.  There was a focus suspicious for lymphovascular invasion.  Invasive carcinoma was estrogen receptor positive and progesterone receptor negative by immunohistochemistry.  In part B, breast right, 8 o'clock, 4 cm from the nipple, ultrasound-guided core biopsy, invasive mammary carcinoma, no special type, ductal (and mucinous) [place] grade 2, occasional calcifications were noted.  Invasive carcinoma was estrogen receptor positive and progesterone receptor negative by immunohistochemistry.  On quantitation of the ER and SD, ER was positive 99% of the cells staining with strong intensity.  SD was subsequently noted to be positive with 15% of the cells staining with moderate intensity.       There was also a HER2 FISH performed, and the HER2 FISH showed average number of HER2 signals per nucleus 2.6.  Average number of CEN17 signals 1.7 with a ratio of 1.6, VASILIY negative for biopsy A.  For biopsy B, the HER2 signals per nucleus 2.9.  Average number CEN17 signals per nucleus 1.7 with a ratio of 1.7, VASILIY negative.  Overall, by 2018 ASCO/CAP guidelines, this tumor is HER2 negative.      Mily now comes to clinic with her , Neri, and her son, Clay, for recommendations on how to proceed.  I did discuss the  I-SPY 2 clinical trial as a potential option.  This discussion is detailed below.      Overall, Mily has been doing quite well.  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  She is able to perform all of her household activities and housework.  Overall, she has an ECOG 0 performance status.  She does complain of stabbing pain in the upper outer quadrant of the right breast which continues and it happens several times a day.  She rates the pain as a 2/10.  She has no fatigue, depression treated with Zoloft and no anxiety, although she did have a panic attack the other day.      She has no weight loss.  Diet has not changed.  No loss of energy.  She does not sleep during the day.      She has a mass in the right breast, which was self-palpated.  No masses in the left breast and has noted no nipple discharge, skin changes, breast or nipple redness.  She has had some increase in right breast size.  No pain and no changes in the nipple, and no dimpling of the breast.        PAST MEDICAL HISTORY:  In terms of her breast history, she does have a history of a smaller right breast which was treated with implants 19 years ago.  She has a history of 2 papillomas in the right breast, 1 removed at the 6-o'clock position 5 years ago, another removed at the 6-o'clock position approximately 10 years ago.  These incisions are approximately at the 5:30 to 6-o'clock position.  She has no history of radiation therapy.  No history of breast cancer of any type.  No history of tumor of any kind.  No history of heart problems, heart attack, breathing problems, blood clots, seizures, stroke, arthritis, peptic ulcer disease or osteoporosis.  No history of bone fractures.  She is not currently participating in a clinical trial. She does have a history of asthma and a history of hypothyroidism.      The remainder of a 10-point review of systems is negative.      FAMILY HISTORY:  Entirely negative for breast cancer or ovarian  cancer or breast cancer in a male relative.      ALLERGIES:  No known drug allergies.  No allergy to seafood, iodine or contrast dye.  She does not take aspirin.      PAST MENSTRUAL HISTORY:  First period was at age 11.  First and only pregnancy was at age 26.  No miscarriages or abortions.  Occasional use of oral contraceptives in her 20s.  No history of hormone replacement therapy.  Last period was 3 years ago.      SOCIAL HISTORY:  Tobacco history was from age 15 to present, smoking a pack of cigarettes a week.  She is now trying to stop cigarettes.      In terms of alcohol use, in her early teens and early 20s, she drank approximately 10 drinks on the weekend and has tapered off drinking since then.      INTERVAL HISTORY:    HISTORY OF PRESENT ILLNESS:  Mily returns to clinic for cycle 8 of weekly paclitaxel on the arm of durvalumab, olaparib and paclitaxel on the I-SPY 2 clinical trial.  She has no pain.  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  No depression, no anxiety.      REVIEW OF SYSTEMS:  She denies fevers or chills, cough, chest pain, shortness of breath, nausea, vomiting, constipation, diarrhea, bone pain, back pain or headache.  The remainder of a 10-point review of systems is negative.  She does have a rash on her lower legs for which she has used triamcinolone cream and the rash is not getting any worse.  She has an ECOG 0 performance status.  She does have some insomnia and we would like to switch the lorazepam at bedtime to mirtazapine 7.5 mg and she is willing to do this.  Her TSH is elevated and we will increase her Synthroid to 112 mcg.      PHYSICAL EXAMINATION:   VITAL SIGNS:  Blood pressure 117/80, pulse 89, respirations 18, temperature 98.9, weight 70.8 kg.  GENERAL:  Mily appeared generally well.  She has alopecia and is wearing a wig.   HEENT:  No lesions in the oropharynx.   LYMPH:  There is no palpable cervical, supraclavicular,  subclavicular or axillary lymphadenopathy.   BREASTS:  Examination of both breasts reveals bilateral implants in place.  In the right breast at the 11-o'clock position, 2 fingerbreadths from the nipple-areolar complex there is a 2 x 2 cm area of firmness.  There are no other masses in the right breast.  The left breast has an implant in place, no masses.   LUNGS:  Clear to percussion and auscultation.   HEART:  Regular rate and rhythm, S1, S2.   ABDOMEN:  Soft and nontender without hepatosplenomegaly.   EXTREMITIES:  Without edema.   PSYCHIATRIC:  Mood and affect were normal.   SKIN:  Reveals an erythematous macular rash on her shins bilaterally.      LABORATORY DATA:  The CBC and CMP were within normal limits.  TSH 18.43.      ASSESSMENT AND PLAN:     1.  Mily Lei is a 53-year-old woman with a recent diagnosis of a clinical stage II, T3N0MX, invasive ductal carcinoma of the upper outer quadrant of the right breast, which is multifocal, measuring 8.9 cm in largest dimension on MRI.  The mass was easily palpable and it is no longer easily palpable.  There is a 2 x 2 cm area of firmness at the 11-o'clock position 2 cm from the nipple-areolar complex.  She is here for cycle 8 on the I-SPY 2 clinical trial for ER-positive, HER2-negative breast cancer and is receiving durvalumab every 28 days as well as paclitaxel 80 mg/m2 weekly and olaparib with some nausea.  The nausea on the olaparib is grade 1 and is being treated effectively with Compazine.  She does have some grade 1 fatigue.   2.  Rash on the lower legs is likely related to the durvalumab grade 1.  She has no other rash on the abdomen or elsewhere on the body.  She is using triamcinolone cream for this problem.   3.  Asthma is being treated with Flovent.   4.  For insomnia, we will begin mirtazapine 7.5 mg in place of the lorazepam.    5.  Followup.  All of her questions and all of her 's questions were answered.  She is in agreement with this plan.   We will continue to follow Mily weekly and she will receive durvalumab on cycle 9 and continue with weekly paclitaxel through cycle 12 and take the olaparib orally.   Cycle 9 with durvulamab and paclitaxel 4-22 with Kerry CBC, CMP; C10 paclitaxel 4-29 with Kerry CBC, CMP; C11 paclitaxel 5-6 with Kerry CBC, CMP; C12 paclitaxel 5-13 with me CBC, CMP, and echocardiogram.     Thank you for allowing us to continue to participate in Mily Lei's care.      Christian Guzman MD      [facility]               I spent 35 minutes with the patient more than 50% of which was in counseling and coordination of care.

## 2019-04-16 ENCOUNTER — RESEARCH ENCOUNTER (OUTPATIENT)
Dept: ONCOLOGY | Facility: CLINIC | Age: 54
End: 2019-04-16

## 2019-04-16 ENCOUNTER — ONCOLOGY VISIT (OUTPATIENT)
Dept: ONCOLOGY | Facility: CLINIC | Age: 54
End: 2019-04-16
Attending: INTERNAL MEDICINE
Payer: COMMERCIAL

## 2019-04-16 ENCOUNTER — APPOINTMENT (OUTPATIENT)
Dept: LAB | Facility: CLINIC | Age: 54
End: 2019-04-16
Attending: INTERNAL MEDICINE
Payer: COMMERCIAL

## 2019-04-16 VITALS
OXYGEN SATURATION: 98 % | DIASTOLIC BLOOD PRESSURE: 80 MMHG | HEART RATE: 89 BPM | SYSTOLIC BLOOD PRESSURE: 117 MMHG | WEIGHT: 156 LBS | TEMPERATURE: 98.9 F | RESPIRATION RATE: 18 BRPM | BODY MASS INDEX: 25.19 KG/M2

## 2019-04-16 DIAGNOSIS — F51.04 PSYCHOPHYSIOLOGICAL INSOMNIA: Primary | ICD-10-CM

## 2019-04-16 DIAGNOSIS — C50.411 MALIGNANT NEOPLASM OF UPPER-OUTER QUADRANT OF RIGHT BREAST IN FEMALE, ESTROGEN RECEPTOR POSITIVE (H): Primary | ICD-10-CM

## 2019-04-16 DIAGNOSIS — I42.9 CARDIOMYOPATHY, UNSPECIFIED TYPE (H): ICD-10-CM

## 2019-04-16 DIAGNOSIS — Z17.0 MALIGNANT NEOPLASM OF UPPER-OUTER QUADRANT OF RIGHT BREAST IN FEMALE, ESTROGEN RECEPTOR POSITIVE (H): ICD-10-CM

## 2019-04-16 DIAGNOSIS — E03.9 HYPOTHYROIDISM, UNSPECIFIED TYPE: ICD-10-CM

## 2019-04-16 DIAGNOSIS — C50.411 MALIGNANT NEOPLASM OF UPPER-OUTER QUADRANT OF RIGHT BREAST IN FEMALE, ESTROGEN RECEPTOR POSITIVE (H): ICD-10-CM

## 2019-04-16 DIAGNOSIS — Z17.0 MALIGNANT NEOPLASM OF UPPER-OUTER QUADRANT OF RIGHT BREAST IN FEMALE, ESTROGEN RECEPTOR POSITIVE (H): Primary | ICD-10-CM

## 2019-04-16 LAB
ALBUMIN SERPL-MCNC: 4 G/DL (ref 3.4–5)
ALP SERPL-CCNC: 53 U/L (ref 40–150)
ALT SERPL W P-5'-P-CCNC: 25 U/L (ref 0–50)
ANION GAP SERPL CALCULATED.3IONS-SCNC: 6 MMOL/L (ref 3–14)
AST SERPL W P-5'-P-CCNC: 21 U/L (ref 0–45)
BASOPHILS # BLD AUTO: 0.1 10E9/L (ref 0–0.2)
BASOPHILS NFR BLD AUTO: 1.2 %
BILIRUB SERPL-MCNC: 0.3 MG/DL (ref 0.2–1.3)
BUN SERPL-MCNC: 8 MG/DL (ref 7–30)
CALCIUM SERPL-MCNC: 9 MG/DL (ref 8.5–10.1)
CHLORIDE SERPL-SCNC: 105 MMOL/L (ref 94–109)
CO2 SERPL-SCNC: 26 MMOL/L (ref 20–32)
CREAT SERPL-MCNC: 0.59 MG/DL (ref 0.52–1.04)
DIFFERENTIAL METHOD BLD: NORMAL
EOSINOPHIL # BLD AUTO: 0.2 10E9/L (ref 0–0.7)
EOSINOPHIL NFR BLD AUTO: 3.6 %
ERYTHROCYTE [DISTWIDTH] IN BLOOD BY AUTOMATED COUNT: 14.4 % (ref 10–15)
FSH SERPL-ACNC: 75.6 IU/L
GFR SERPL CREATININE-BSD FRML MDRD: >90 ML/MIN/{1.73_M2}
GLUCOSE SERPL-MCNC: 97 MG/DL (ref 70–99)
HCT VFR BLD AUTO: 39.9 % (ref 35–47)
HGB BLD-MCNC: 13.5 G/DL (ref 11.7–15.7)
IMM GRANULOCYTES # BLD: 0 10E9/L (ref 0–0.4)
IMM GRANULOCYTES NFR BLD: 0.6 %
LH SERPL-ACNC: 27.7 IU/L
LYMPHOCYTES # BLD AUTO: 1.5 10E9/L (ref 0.8–5.3)
LYMPHOCYTES NFR BLD AUTO: 30.2 %
MCH RBC QN AUTO: 29.8 PG (ref 26.5–33)
MCHC RBC AUTO-ENTMCNC: 33.8 G/DL (ref 31.5–36.5)
MCV RBC AUTO: 88 FL (ref 78–100)
MONOCYTES # BLD AUTO: 0.4 10E9/L (ref 0–1.3)
MONOCYTES NFR BLD AUTO: 8 %
NEUTROPHILS # BLD AUTO: 2.8 10E9/L (ref 1.6–8.3)
NEUTROPHILS NFR BLD AUTO: 56.4 %
NRBC # BLD AUTO: 0 10*3/UL
NRBC BLD AUTO-RTO: 0 /100
PLATELET # BLD AUTO: 338 10E9/L (ref 150–450)
POTASSIUM SERPL-SCNC: 3.7 MMOL/L (ref 3.4–5.3)
PROT SERPL-MCNC: 7.2 G/DL (ref 6.8–8.8)
RBC # BLD AUTO: 4.53 10E12/L (ref 3.8–5.2)
SODIUM SERPL-SCNC: 137 MMOL/L (ref 133–144)
T4 FREE SERPL-MCNC: 0.88 NG/DL (ref 0.76–1.46)
TSH SERPL DL<=0.005 MIU/L-ACNC: 18.43 MU/L (ref 0.4–4)
WBC # BLD AUTO: 5 10E9/L (ref 4–11)

## 2019-04-16 PROCEDURE — 96375 TX/PRO/DX INJ NEW DRUG ADDON: CPT

## 2019-04-16 PROCEDURE — 83001 ASSAY OF GONADOTROPIN (FSH): CPT | Performed by: INTERNAL MEDICINE

## 2019-04-16 PROCEDURE — 80053 COMPREHEN METABOLIC PANEL: CPT | Performed by: INTERNAL MEDICINE

## 2019-04-16 PROCEDURE — 84443 ASSAY THYROID STIM HORMONE: CPT | Performed by: INTERNAL MEDICINE

## 2019-04-16 PROCEDURE — 96413 CHEMO IV INFUSION 1 HR: CPT

## 2019-04-16 PROCEDURE — G0463 HOSPITAL OUTPT CLINIC VISIT: HCPCS | Mod: ZF

## 2019-04-16 PROCEDURE — 83002 ASSAY OF GONADOTROPIN (LH): CPT | Performed by: INTERNAL MEDICINE

## 2019-04-16 PROCEDURE — 84439 ASSAY OF FREE THYROXINE: CPT | Performed by: INTERNAL MEDICINE

## 2019-04-16 PROCEDURE — 25000128 H RX IP 250 OP 636: Mod: ZF | Performed by: INTERNAL MEDICINE

## 2019-04-16 PROCEDURE — 85025 COMPLETE CBC W/AUTO DIFF WBC: CPT | Performed by: INTERNAL MEDICINE

## 2019-04-16 PROCEDURE — 25800030 ZZH RX IP 258 OP 636: Mod: ZF | Performed by: INTERNAL MEDICINE

## 2019-04-16 PROCEDURE — 99215 OFFICE O/P EST HI 40 MIN: CPT | Mod: ZP | Performed by: INTERNAL MEDICINE

## 2019-04-16 RX ORDER — MEPERIDINE HYDROCHLORIDE 25 MG/ML
25 INJECTION INTRAMUSCULAR; INTRAVENOUS; SUBCUTANEOUS EVERY 30 MIN PRN
Status: CANCELLED | OUTPATIENT
Start: 2019-04-16

## 2019-04-16 RX ORDER — MIRTAZAPINE 7.5 MG/1
7.5 TABLET, FILM COATED ORAL AT BEDTIME
Qty: 30 TABLET | Refills: 1 | Status: SHIPPED | OUTPATIENT
Start: 2019-04-16 | End: 2019-04-22

## 2019-04-16 RX ORDER — ONDANSETRON 2 MG/ML
8 INJECTION INTRAMUSCULAR; INTRAVENOUS ONCE
Status: COMPLETED | OUTPATIENT
Start: 2019-04-16 | End: 2019-04-16

## 2019-04-16 RX ORDER — LORAZEPAM 2 MG/ML
0.5 INJECTION INTRAMUSCULAR EVERY 4 HOURS PRN
Status: CANCELLED
Start: 2019-04-16

## 2019-04-16 RX ORDER — LISINOPRIL 5 MG/1
5 TABLET ORAL DAILY
Qty: 90 TABLET | Refills: 3 | Status: SHIPPED | OUTPATIENT
Start: 2019-04-16 | End: 2020-07-28

## 2019-04-16 RX ORDER — METHYLPREDNISOLONE SODIUM SUCCINATE 125 MG/2ML
125 INJECTION, POWDER, LYOPHILIZED, FOR SOLUTION INTRAMUSCULAR; INTRAVENOUS
Status: CANCELLED
Start: 2019-04-16

## 2019-04-16 RX ORDER — DIPHENHYDRAMINE HYDROCHLORIDE 50 MG/ML
50 INJECTION INTRAMUSCULAR; INTRAVENOUS
Status: CANCELLED
Start: 2019-04-16

## 2019-04-16 RX ORDER — HEPARIN SODIUM (PORCINE) LOCK FLUSH IV SOLN 100 UNIT/ML 100 UNIT/ML
5 SOLUTION INTRAVENOUS EVERY 8 HOURS
Status: DISCONTINUED | OUTPATIENT
Start: 2019-04-16 | End: 2019-04-20 | Stop reason: HOSPADM

## 2019-04-16 RX ORDER — HEPARIN SODIUM (PORCINE) LOCK FLUSH IV SOLN 100 UNIT/ML 100 UNIT/ML
5 SOLUTION INTRAVENOUS ONCE
Status: CANCELLED
Start: 2019-04-16

## 2019-04-16 RX ORDER — DEXAMETHASONE SODIUM PHOSPHATE 10 MG/ML
10 INJECTION, SOLUTION INTRAMUSCULAR; INTRAVENOUS
Status: CANCELLED | OUTPATIENT
Start: 2019-04-16

## 2019-04-16 RX ORDER — LEVOTHYROXINE SODIUM 112 UG/1
112 TABLET ORAL DAILY
Qty: 30 TABLET | Refills: 1 | Status: SHIPPED | OUTPATIENT
Start: 2019-04-16 | End: 2019-05-14

## 2019-04-16 RX ORDER — ALBUTEROL SULFATE 90 UG/1
1-2 AEROSOL, METERED RESPIRATORY (INHALATION)
Status: CANCELLED
Start: 2019-04-16

## 2019-04-16 RX ORDER — EPINEPHRINE 0.3 MG/.3ML
0.3 INJECTION SUBCUTANEOUS EVERY 5 MIN PRN
Status: CANCELLED | OUTPATIENT
Start: 2019-04-16

## 2019-04-16 RX ORDER — SODIUM CHLORIDE 9 MG/ML
1000 INJECTION, SOLUTION INTRAVENOUS CONTINUOUS PRN
Status: CANCELLED
Start: 2019-04-16

## 2019-04-16 RX ORDER — HEPARIN SODIUM (PORCINE) LOCK FLUSH IV SOLN 100 UNIT/ML 100 UNIT/ML
5 SOLUTION INTRAVENOUS ONCE
Status: COMPLETED | OUTPATIENT
Start: 2019-04-16 | End: 2019-04-16

## 2019-04-16 RX ORDER — ALBUTEROL SULFATE 0.83 MG/ML
2.5 SOLUTION RESPIRATORY (INHALATION)
Status: CANCELLED | OUTPATIENT
Start: 2019-04-16

## 2019-04-16 RX ORDER — EPINEPHRINE 1 MG/ML
0.3 INJECTION, SOLUTION INTRAMUSCULAR; SUBCUTANEOUS EVERY 5 MIN PRN
Status: CANCELLED | OUTPATIENT
Start: 2019-04-16

## 2019-04-16 RX ADMIN — HEPARIN SODIUM (PORCINE) LOCK FLUSH IV SOLN 100 UNIT/ML 5 ML: 100 SOLUTION at 11:52

## 2019-04-16 RX ADMIN — ONDANSETRON 8 MG: 2 INJECTION INTRAMUSCULAR; INTRAVENOUS at 10:28

## 2019-04-16 RX ADMIN — PACLITAXEL 142 MG: 6 INJECTION, SOLUTION INTRAVENOUS at 10:43

## 2019-04-16 RX ADMIN — SODIUM CHLORIDE 250 ML: 9 INJECTION, SOLUTION INTRAVENOUS at 10:28

## 2019-04-16 RX ADMIN — HEPARIN SODIUM (PORCINE) LOCK FLUSH IV SOLN 100 UNIT/ML 5 ML: 100 SOLUTION at 08:57

## 2019-04-16 ASSESSMENT — PAIN SCALES - GENERAL: PAINLEVEL: NO PAIN (0)

## 2019-04-16 NOTE — NURSING NOTE
Chief Complaint   Patient presents with     Port Draw     Labs drawn via PORT by RN in lab. Line flushed with saline and heparin. VS taken.      Ross Borja RN

## 2019-04-16 NOTE — NURSING NOTE
"Oncology Rooming Note    April 16, 2019 9:14 AM   Kathy Lei is a 53 year old female who presents for:    Chief Complaint   Patient presents with     Port Draw     Labs drawn via PORT by RN in lab. Line flushed with saline and heparin. VS taken.      Oncology Clinic Visit     [facility] RETURN- BREAST CA     Initial Vitals: /80 (BP Location: Right arm, Patient Position: Sitting, Cuff Size: Adult Regular)   Pulse 89   Temp 98.9  F (37.2  C) (Oral)   Resp 18   Wt 70.8 kg (156 lb)   LMP 11/02/2014   SpO2 98%   BMI 25.19 kg/m   Estimated body mass index is 25.19 kg/m  as calculated from the following:    Height as of 4/8/19: 1.676 m (5' 5.98\").    Weight as of this encounter: 70.8 kg (156 lb). Body surface area is 1.82 meters squared.  No Pain (0) Comment: Data Unavailable   Patient's last menstrual period was 11/02/2014.  Allergies reviewed: Yes  Medications reviewed: Yes    Medications: Medication refills not needed today.  Pharmacy name entered into [facility]:    [id number] [address] [id number] - [place], [place] - [address] AT [facility] [address]    Clinical concerns: No new concerns. Thomas was notified.      Shakir Skaggs LPN            "

## 2019-04-16 NOTE — NURSING NOTE
[id number]: Study Visit Note   Subject name: Kathy Lei     Visit: C8D1    Did the study visit occur within the appropriate window allowed by the protocol? Yes      Since the last study visit, She has been doing well.  No new issues or concerns at this time.  Patient's TSH continues to be elevated so Dr. Guzman increased her synthroid medication.       I have personally interviewed Kathy Lei and reviewed her medical record for adverse events and concomitant medications and these have been recorded on the corresponding logs in Kathy Lei's research file.     Kathy Lei was given the opportunity to ask any trial related questions.  Please see provider progress note for physical exam and other clinical information. Labs were reviewed - any significant lab values were addressed and reviewed.    Dasha White RN     Pager [phone]

## 2019-04-16 NOTE — LETTER
2019       RE: Kathy Lei   [address] [place] [address]     Dear Colleague,    Thank you for referring your patient, Kathy Lei, to the [facility]. Please see a copy of my visit note below.    Rachel Arauz NP   [facility]    [address], [place] [address]      RE:       Kathy Lei   MRN:   [id number]   :    1965      Dear Ms. Arauz:   Thank you for referring Kathy Lei to our clinic for a new diagnosis of an estrogen-receptor positive, [place]-positive, HER2-negative breast cancer, grade 2, in the upper outer quadrant of the right breast.      Mily presented between  and New Years of 2018 with new sharp pains in the upper outer quadrant of the right breast.  She underwent mammographic screening.       IMAGING:  Mammography and ultrasound:  She had a diagnostic mammogram which showed bilateral prepectoral saline implants.  On the right breast at 11:30 position, there were grouped coarse heterogeneous calcifications spanning 1.3 cm, new since , adjacent calcifications 11-o'clock position posterior depth.  There was an irregular mass in the lateral right breast 9-o'clock position mid-posterior depth, and an additional mass with obscured margins.  No significant changes in the left breast.  Right breast ultrasound was performed.  In the area of the palpable lump in the right breast, 11-o'clock position, 6 cm from the nipple, there is an irregular hypoechoic mass with indistinct margins measuring approximately 1.0 x 0.9 x 1.6 cm in size.  Immediately adjacent to the mass, 11:30 position, are microcalcifications.  In the second area of palpable lump right breast, 9:30 position, 5 cm from the nipple, there was an irregular hypoechoic mass measuring 3.2 x 0.5 x 1.3 cm in size felt to account for the mammographic findings.  There were multiple additional round hypoechoic masses similar to the  findings at 9:30 position seen incidentally during real time and not directly palpable.  For example, in the right breast at 8-o'clock position, 4 cm from the nipple, there was a mass measuring 0.8 x 0.6 x 0.6 cm, BI-RADS 4.       Contrast mammogram was subsequently performed and the contrast mammogram showed a large area of mass and non-mass-like enhancement in the upper outer right breast measuring 7.2 cm in greatest dimension, corresponding global asymmetry in the upper outer right breast.  Enhancement extended to the implant without evidence of extension to the skin surface or nipple, and the calcifications were noted as previously found.      PATHOLOGY:  The pathology showed part A, breast needle biopsy 11 o'clock, 6 cm from the nipple, invasive mammary carcinoma, no special type, ductal (and mucinous) Donna grade 2.  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  Calcifications were associated with the DCIS.  There was a focus suspicious for lymphovascular invasion.  Invasive carcinoma was estrogen receptor positive and progesterone receptor negative by immunohistochemistry.  In part B, breast right, 8 o'clock, 4 cm from the nipple, ultrasound-guided core biopsy, invasive mammary carcinoma, no special type, ductal (and mucinous) Donna grade 2, occasional calcifications were noted.  Invasive carcinoma was estrogen receptor positive and progesterone receptor negative by immunohistochemistry.  On quantitation of the ER and ND, ER was positive 99% of the cells staining with strong intensity.  ND was subsequently noted to be positive with 15% of the cells staining with moderate intensity.       There was also a HER2 FISH performed, and the HER2 FISH showed average number of HER2 signals per nucleus 2.6.  Average number of CEN17 signals 1.7 with a ratio of 1.6, VASILIY negative for biopsy A.  For biopsy B, the HER2 signals per nucleus 2.9.  Average number CEN17 signals  per nucleus 1.7 with a ratio of 1.7, VASILIY negative.  Overall, by 2018 ASCO/CAP guidelines, this tumor is HER2 negative.      Mliy now comes to clinic with her , Neri, and her son, Clay, for recommendations on how to proceed.  I did discuss the I-SPY 2 clinical trial as a potential option.  This discussion is detailed below.      Overall, Mily has been doing quite well.  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  She is able to perform all of her household activities and housework.  Overall, she has an ECOG 0 performance status.  She does complain of stabbing pain in the upper outer quadrant of the right breast which continues and it happens several times a day.  She rates the pain as a 2/10.  She has no fatigue, depression treated with Zoloft and no anxiety, although she did have a panic attack the other day.      She has no weight loss.  Diet has not changed.  No loss of energy.  She does not sleep during the day.      She has a mass in the right breast, which was self-palpated.  No masses in the left breast and has noted no nipple discharge, skin changes, breast or nipple redness.  She has had some increase in right breast size.  No pain and no changes in the nipple, and no dimpling of the breast.        PAST MEDICAL HISTORY:  In terms of her breast history, she does have a history of a smaller right breast which was treated with implants 19 years ago.  She has a history of 2 papillomas in the right breast, 1 removed at the 6-o'clock position 5 years ago, another removed at the 6-o'clock position approximately 10 years ago.  These incisions are approximately at the 5:30 to 6-o'clock position.  She has no history of radiation therapy.  No history of breast cancer of any type.  No history of tumor of any kind.  No history of heart problems, heart attack, breathing problems, blood clots, seizures, stroke, arthritis, peptic ulcer disease or osteoporosis.  No history of bone fractures.  She  is not currently participating in a clinical trial. She does have a history of asthma and a history of hypothyroidism.      The remainder of a 10-point review of systems is negative.      FAMILY HISTORY:  Entirely negative for breast cancer or ovarian cancer or breast cancer in a male relative.      ALLERGIES:  No known drug allergies.  No allergy to seafood, iodine or contrast dye.  She does not take aspirin.      PAST MENSTRUAL HISTORY:  First period was at age 11.  First and only pregnancy was at age 26.  No miscarriages or abortions.  Occasional use of oral contraceptives in her 20s.  No history of hormone replacement therapy.  Last period was 3 years ago.      SOCIAL HISTORY:  Tobacco history was from age 15 to present, smoking a pack of cigarettes a week.  She is now trying to stop cigarettes.      In terms of alcohol use, in her early teens and early 20s, she drank approximately 10 drinks on the weekend and has tapered off drinking since then.      INTERVAL HISTORY:    HISTORY OF PRESENT ILLNESS:  Mily returns to clinic for cycle 8 of weekly paclitaxel on the arm of durvalumab, olaparib and paclitaxel on the I-SPY 2 clinical trial.  She has no pain.  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  No depression, no anxiety.      REVIEW OF SYSTEMS:  She denies fevers or chills, cough, chest pain, shortness of breath, nausea, vomiting, constipation, diarrhea, bone pain, back pain or headache.  The remainder of a 10-point review of systems is negative.  She does have a rash on her lower legs for which she has used triamcinolone cream and the rash is not getting any worse.  She has an ECOG 0 performance status.  She does have some insomnia and we would like to switch the lorazepam at bedtime to mirtazapine 7.5 mg and she is willing to do this.  Her TSH is elevated and we will increase her Synthroid to 112 mcg.      PHYSICAL EXAMINATION:   VITAL SIGNS:  Blood pressure 117/80,  pulse 89, respirations 18, temperature 98.9, weight 70.8 kg.  GENERAL:  Mily appeared generally well.  She has alopecia and is wearing a wig.   HEENT:  No lesions in the oropharynx.   LYMPH:  There is no palpable cervical, supraclavicular, subclavicular or axillary lymphadenopathy.   BREASTS:  Examination of both breasts reveals bilateral implants in place.  In the right breast at the 11-o'clock position, 2 fingerbreadths from the nipple-areolar complex there is a 2 x 2 cm area of firmness.  There are no other masses in the right breast.  The left breast has an implant in place, no masses.   LUNGS:  Clear to percussion and auscultation.   HEART:  Regular rate and rhythm, S1, S2.   ABDOMEN:  Soft and nontender without hepatosplenomegaly.   EXTREMITIES:  Without edema.   PSYCHIATRIC:  Mood and affect were normal.   SKIN:  Reveals an erythematous macular rash on her shins bilaterally.      LABORATORY DATA:  The CBC and CMP were within normal limits.  TSH 18.43.      ASSESSMENT AND PLAN:     1.  Mily Lei is a 53-year-old woman with a recent diagnosis of a clinical stage II, T3N0MX, invasive ductal carcinoma of the upper outer quadrant of the right breast, which is multifocal, measuring 8.9 cm in largest dimension on MRI.  The mass was easily palpable and it is no longer easily palpable.  There is a 2 x 2 cm area of firmness at the 11-o'clock position 2 cm from the nipple-areolar complex.  She is here for cycle 8 on the I-SPY 2 clinical trial for ER-positive, HER2-negative breast cancer and is receiving durvalumab every 28 days as well as paclitaxel 80 mg/m2 weekly and olaparib with some nausea.  The nausea on the olaparib is grade 1 and is being treated effectively with Compazine.  She does have some grade 1 fatigue.   2.  Rash on the lower legs is likely related to the durvalumab grade 1.  She has no other rash on the abdomen or elsewhere on the body.  She is using triamcinolone cream for this problem.   3.   Asthma is being treated with Flovent.   4.  For insomnia, we will begin mirtazapine 7.5 mg in place of the lorazepam.    5.  Followup.  All of her questions and all of her 's questions were answered.  She is in agreement with this plan.  We will continue to follow Mily weekly and she will receive durvalumab on cycle 9 and continue with weekly paclitaxel through cycle 12 and take the olaparib orally.   Cycle 9 with durvulamab and paclitaxel 4-22 with Kerry CBC, CMP; C10 paclitaxel 4-29 with Kerry CBC, CMP; C11 paclitaxel 5-6 with Kerry CBC, CMP; C12 paclitaxel 5-13 with me CBC, CMP, and echocardiogram.     Thank you for allowing us to continue to participate in Mily Lei's care.      Christian Guzman MD      [facility]               I spent 35 minutes with the patient more than 50% of which was in counseling and coordination of care.     Again, thank you for allowing me to participate in the care of your patient.      Sincerely,    Christian Guzman MD

## 2019-04-22 ENCOUNTER — INFUSION THERAPY VISIT (OUTPATIENT)
Dept: ONCOLOGY | Facility: CLINIC | Age: 54
End: 2019-04-22
Attending: INTERNAL MEDICINE
Payer: COMMERCIAL

## 2019-04-22 ENCOUNTER — APPOINTMENT (OUTPATIENT)
Dept: LAB | Facility: CLINIC | Age: 54
End: 2019-04-22
Attending: INTERNAL MEDICINE
Payer: COMMERCIAL

## 2019-04-22 ENCOUNTER — RESEARCH ENCOUNTER (OUTPATIENT)
Dept: ONCOLOGY | Facility: CLINIC | Age: 54
End: 2019-04-22

## 2019-04-22 ENCOUNTER — ONCOLOGY VISIT (OUTPATIENT)
Dept: ONCOLOGY | Facility: CLINIC | Age: 54
End: 2019-04-22
Attending: PHYSICIAN ASSISTANT
Payer: COMMERCIAL

## 2019-04-22 VITALS
BODY MASS INDEX: 25.08 KG/M2 | HEART RATE: 99 BPM | WEIGHT: 155.3 LBS | SYSTOLIC BLOOD PRESSURE: 121 MMHG | DIASTOLIC BLOOD PRESSURE: 77 MMHG | OXYGEN SATURATION: 96 % | TEMPERATURE: 97.9 F | RESPIRATION RATE: 16 BRPM

## 2019-04-22 DIAGNOSIS — Z17.0 MALIGNANT NEOPLASM OF UPPER-OUTER QUADRANT OF RIGHT BREAST IN FEMALE, ESTROGEN RECEPTOR POSITIVE (H): ICD-10-CM

## 2019-04-22 DIAGNOSIS — Z17.0 MALIGNANT NEOPLASM OF UPPER-OUTER QUADRANT OF RIGHT BREAST IN FEMALE, ESTROGEN RECEPTOR POSITIVE (H): Primary | ICD-10-CM

## 2019-04-22 DIAGNOSIS — C50.411 MALIGNANT NEOPLASM OF UPPER-OUTER QUADRANT OF RIGHT BREAST IN FEMALE, ESTROGEN RECEPTOR POSITIVE (H): Primary | ICD-10-CM

## 2019-04-22 DIAGNOSIS — C50.411 MALIGNANT NEOPLASM OF UPPER-OUTER QUADRANT OF RIGHT BREAST IN FEMALE, ESTROGEN RECEPTOR POSITIVE (H): ICD-10-CM

## 2019-04-22 LAB
ALBUMIN SERPL-MCNC: 3.9 G/DL (ref 3.4–5)
ALP SERPL-CCNC: 58 U/L (ref 40–150)
ALT SERPL W P-5'-P-CCNC: 36 U/L (ref 0–50)
ANION GAP SERPL CALCULATED.3IONS-SCNC: 8 MMOL/L (ref 3–14)
AST SERPL W P-5'-P-CCNC: 31 U/L (ref 0–45)
BASOPHILS # BLD AUTO: 0.1 10E9/L (ref 0–0.2)
BASOPHILS NFR BLD AUTO: 1.5 %
BILIRUB SERPL-MCNC: 0.4 MG/DL (ref 0.2–1.3)
BUN SERPL-MCNC: 8 MG/DL (ref 7–30)
CALCIUM SERPL-MCNC: 9.2 MG/DL (ref 8.5–10.1)
CHLORIDE SERPL-SCNC: 106 MMOL/L (ref 94–109)
CO2 SERPL-SCNC: 24 MMOL/L (ref 20–32)
CREAT SERPL-MCNC: 0.68 MG/DL (ref 0.52–1.04)
DIFFERENTIAL METHOD BLD: NORMAL
EOSINOPHIL # BLD AUTO: 0.1 10E9/L (ref 0–0.7)
EOSINOPHIL NFR BLD AUTO: 3 %
ERYTHROCYTE [DISTWIDTH] IN BLOOD BY AUTOMATED COUNT: 14.2 % (ref 10–15)
GFR SERPL CREATININE-BSD FRML MDRD: >90 ML/MIN/{1.73_M2}
GLUCOSE SERPL-MCNC: 98 MG/DL (ref 70–99)
HCT VFR BLD AUTO: 38 % (ref 35–47)
HGB BLD-MCNC: 13 G/DL (ref 11.7–15.7)
IMM GRANULOCYTES # BLD: 0 10E9/L (ref 0–0.4)
IMM GRANULOCYTES NFR BLD: 0.2 %
LYMPHOCYTES # BLD AUTO: 1.3 10E9/L (ref 0.8–5.3)
LYMPHOCYTES NFR BLD AUTO: 27.6 %
MCH RBC QN AUTO: 30.4 PG (ref 26.5–33)
MCHC RBC AUTO-ENTMCNC: 34.2 G/DL (ref 31.5–36.5)
MCV RBC AUTO: 89 FL (ref 78–100)
MONOCYTES # BLD AUTO: 0.3 10E9/L (ref 0–1.3)
MONOCYTES NFR BLD AUTO: 6.4 %
NEUTROPHILS # BLD AUTO: 2.9 10E9/L (ref 1.6–8.3)
NEUTROPHILS NFR BLD AUTO: 61.3 %
NRBC # BLD AUTO: 0 10*3/UL
NRBC BLD AUTO-RTO: 0 /100
PLATELET # BLD AUTO: 308 10E9/L (ref 150–450)
POTASSIUM SERPL-SCNC: 3.7 MMOL/L (ref 3.4–5.3)
PROT SERPL-MCNC: 7.1 G/DL (ref 6.8–8.8)
RBC # BLD AUTO: 4.28 10E12/L (ref 3.8–5.2)
SODIUM SERPL-SCNC: 138 MMOL/L (ref 133–144)
T4 FREE SERPL-MCNC: 1 NG/DL (ref 0.76–1.46)
TSH SERPL DL<=0.005 MIU/L-ACNC: 14.76 MU/L (ref 0.4–4)
WBC # BLD AUTO: 4.7 10E9/L (ref 4–11)

## 2019-04-22 PROCEDURE — 96413 CHEMO IV INFUSION 1 HR: CPT

## 2019-04-22 PROCEDURE — G0463 HOSPITAL OUTPT CLINIC VISIT: HCPCS | Mod: ZF

## 2019-04-22 PROCEDURE — 85025 COMPLETE CBC W/AUTO DIFF WBC: CPT | Performed by: PHYSICIAN ASSISTANT

## 2019-04-22 PROCEDURE — 99215 OFFICE O/P EST HI 40 MIN: CPT | Mod: ZP | Performed by: PHYSICIAN ASSISTANT

## 2019-04-22 PROCEDURE — 96417 CHEMO IV INFUS EACH ADDL SEQ: CPT

## 2019-04-22 PROCEDURE — 25000128 H RX IP 250 OP 636: Mod: ZF | Performed by: INTERNAL MEDICINE

## 2019-04-22 PROCEDURE — 25000128 H RX IP 250 OP 636: Mod: ZF | Performed by: PHYSICIAN ASSISTANT

## 2019-04-22 PROCEDURE — 84443 ASSAY THYROID STIM HORMONE: CPT | Performed by: PHYSICIAN ASSISTANT

## 2019-04-22 PROCEDURE — 96375 TX/PRO/DX INJ NEW DRUG ADDON: CPT

## 2019-04-22 PROCEDURE — 84439 ASSAY OF FREE THYROXINE: CPT | Performed by: PHYSICIAN ASSISTANT

## 2019-04-22 PROCEDURE — 80053 COMPREHEN METABOLIC PANEL: CPT | Performed by: PHYSICIAN ASSISTANT

## 2019-04-22 PROCEDURE — 25800030 ZZH RX IP 258 OP 636: Mod: ZF | Performed by: PHYSICIAN ASSISTANT

## 2019-04-22 RX ORDER — ALBUTEROL SULFATE 0.83 MG/ML
2.5 SOLUTION RESPIRATORY (INHALATION)
Status: CANCELLED | OUTPATIENT
Start: 2019-04-22

## 2019-04-22 RX ORDER — DIPHENHYDRAMINE HYDROCHLORIDE 50 MG/ML
50 INJECTION INTRAMUSCULAR; INTRAVENOUS
Status: CANCELLED
Start: 2019-04-22

## 2019-04-22 RX ORDER — MEPERIDINE HYDROCHLORIDE 25 MG/ML
25 INJECTION INTRAMUSCULAR; INTRAVENOUS; SUBCUTANEOUS EVERY 30 MIN PRN
Status: CANCELLED | OUTPATIENT
Start: 2019-04-22

## 2019-04-22 RX ORDER — ONDANSETRON 2 MG/ML
8 INJECTION INTRAMUSCULAR; INTRAVENOUS ONCE
Status: COMPLETED | OUTPATIENT
Start: 2019-04-22 | End: 2019-04-22

## 2019-04-22 RX ORDER — EPINEPHRINE 0.3 MG/.3ML
0.3 INJECTION SUBCUTANEOUS EVERY 5 MIN PRN
Status: CANCELLED | OUTPATIENT
Start: 2019-04-22

## 2019-04-22 RX ORDER — HEPARIN SODIUM (PORCINE) LOCK FLUSH IV SOLN 100 UNIT/ML 100 UNIT/ML
5 SOLUTION INTRAVENOUS EVERY 8 HOURS
Status: DISCONTINUED | OUTPATIENT
Start: 2019-04-22 | End: 2019-04-22 | Stop reason: HOSPADM

## 2019-04-22 RX ORDER — DEXAMETHASONE SODIUM PHOSPHATE 10 MG/ML
10 INJECTION, SOLUTION INTRAMUSCULAR; INTRAVENOUS
Status: CANCELLED | OUTPATIENT
Start: 2019-04-22

## 2019-04-22 RX ORDER — EPINEPHRINE 1 MG/ML
0.3 INJECTION, SOLUTION INTRAMUSCULAR; SUBCUTANEOUS EVERY 5 MIN PRN
Status: CANCELLED | OUTPATIENT
Start: 2019-04-22

## 2019-04-22 RX ORDER — SODIUM CHLORIDE 9 MG/ML
1000 INJECTION, SOLUTION INTRAVENOUS CONTINUOUS PRN
Status: CANCELLED
Start: 2019-04-22

## 2019-04-22 RX ORDER — LORAZEPAM 2 MG/ML
0.5 INJECTION INTRAMUSCULAR EVERY 4 HOURS PRN
Status: CANCELLED
Start: 2019-04-22

## 2019-04-22 RX ORDER — HEPARIN SODIUM (PORCINE) LOCK FLUSH IV SOLN 100 UNIT/ML 100 UNIT/ML
5 SOLUTION INTRAVENOUS ONCE
Status: CANCELLED
Start: 2019-04-22

## 2019-04-22 RX ORDER — HEPARIN SODIUM (PORCINE) LOCK FLUSH IV SOLN 100 UNIT/ML 100 UNIT/ML
5 SOLUTION INTRAVENOUS ONCE
Status: COMPLETED | OUTPATIENT
Start: 2019-04-22 | End: 2019-04-22

## 2019-04-22 RX ORDER — METHYLPREDNISOLONE SODIUM SUCCINATE 125 MG/2ML
125 INJECTION, POWDER, LYOPHILIZED, FOR SOLUTION INTRAMUSCULAR; INTRAVENOUS
Status: CANCELLED
Start: 2019-04-22

## 2019-04-22 RX ORDER — ALBUTEROL SULFATE 90 UG/1
1-2 AEROSOL, METERED RESPIRATORY (INHALATION)
Status: CANCELLED
Start: 2019-04-22

## 2019-04-22 RX ADMIN — ONDANSETRON 8 MG: 2 INJECTION INTRAMUSCULAR; INTRAVENOUS at 10:07

## 2019-04-22 RX ADMIN — PACLITAXEL 142 MG: 6 INJECTION, SOLUTION INTRAVENOUS at 12:26

## 2019-04-22 RX ADMIN — SODIUM CHLORIDE 1500 MG: 9 INJECTION, SOLUTION INTRAVENOUS at 11:18

## 2019-04-22 RX ADMIN — SODIUM CHLORIDE 250 ML: 9 INJECTION, SOLUTION INTRAVENOUS at 10:06

## 2019-04-22 RX ADMIN — HEPARIN 5 ML: 100 SYRINGE at 09:05

## 2019-04-22 RX ADMIN — HEPARIN SODIUM (PORCINE) LOCK FLUSH IV SOLN 100 UNIT/ML 5 ML: 100 SOLUTION at 13:31

## 2019-04-22 ASSESSMENT — PAIN SCALES - GENERAL: PAINLEVEL: NO PAIN (0)

## 2019-04-22 NOTE — LETTER
4/22/2019      RE: Kathy Lei  [address] [place] [address]       Oncology/Hematology Visit Note  Apr 22, 2019    Reason for Visit: follow up of right breast cancer    History of Present Illness: Kathy Lei is a 53 year old female with recent diagnosis of ER/OR positive, HER2 negative, grade 2 breast cancer. Mily presented between Christmas and New Years of 2018 with new sharp pains in the upper outer quadrant of the right breast.  Diagnostic mammogram on 1/8/19 with grouped coarse heterogeneous calcifications at 11:30 position, irregular mass at 9:00 position. US with irregular mass at 11:00 position, 1.0 x 0.9 x 1.6cm. At 9:30 position, irregular mass, 3.2 x 0.5, x 1.3 cm. Contrast mammogram was subsequently performed and the contrast mammogram showed a large area of mass and non-mass-like enhancement in the upper outer right breast measuring 7.2 cm in greatest dimension.      The pathology showed part A, breast needle biopsy 11 o'clock, 6 cm from the nipple, invasive mammary carcinoma, no special type, ductal (and mucinous) Waldorf grade 2.  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  Calcifications were associated with the DCIS.  There was a focus suspicious for lymphovascular invasion.  Invasive carcinoma was estrogen receptor positive and progesterone receptor negative by immunohistochemistry.  In part B, breast right, 8 o'clock, 4 cm from the nipple, ultrasound-guided core biopsy, invasive mammary carcinoma, no special type, ductal (and mucinous) Waldorf grade 2, occasional calcifications were noted.  Invasive carcinoma was estrogen receptor positive and progesterone receptor negative by immunohistochemistry.  On quantitation of the ER and OR, ER was positive 99% of the cells staining with strong intensity.  OR was subsequently noted to be positive with 15% of the cells staining with moderate intensity.       There was also  a HER2 FISH performed, and the HER2 FISH showed average number of HER2 signals per nucleus 2.6.  Average number of CEN17 signals 1.7 with a ratio of 1.6, VASILIY negative for biopsy A.  For biopsy B, the HER2 signals per nucleus 2.9.  Average number CEN17 signals per nucleus 1.7 with a ratio of 1.7, VASILIY negative.  Overall, by 2018 ASCO/CAP guidelines, this tumor is HER2 negative.     She was enrolled in I-SPY2 to Durvulumab, Taxol and Olaparib arm. Please see Dr. Guzman's previous notes for further details on the patient's history.     Interval History:  Mily is here for follow up. She tried Remeron for one dose but she was dizzy and drowsy the next day and missed 2 days of work. She had to hold onto things to walk due to vertigo. No falls. Denies any nausea. Symptoms resolved fully after 2 days. She is back on Zoloft. She is no longer taking ativan regularly at bedtime to sleep. She took maybe once this past week when she was feeling anxious.     Appetite good. She has not needed any anti-emetics. No heartburn, diarrhea, constipation. Her levothyroxine is now 112 mcg daily and she feels fatigue is improved. She states rash on bilateral legs has improved. She denies any pruritic symptoms. She has been using triamcinolone once daily. Denies neuropathy, headaches.     She continues to have some bloody mucous after blowing her nose, but no spontaneous nose bleeds. She continues to work about 8 hours instead of previous 10-12 hour days, 3 days/week. Weight is increased slightly.      Current Outpatient Medications   Medication Sig Dispense Refill     ADVIL 200 MG OR TABS 1 TABLET EVERY 4 TO 6 HOURS AS NEEDED 0 0     albuterol (2.5 MG/3ML) 0.083% nebulizer solution Take 3 mLs by nebulization once for 1 dose. 3 mL 0     albuterol (VENTOLIN HFA) 108 (90 Base) MCG/ACT Inhaler INHALE 1-2 PUFFS INTO THE LUNGS EVERY 4 HOURS AS NEEDED FOR SHORTNESS OF BREATH OR DIFFICULTY BREATHING. 18 g PRN     ASPIRIN NOT PRESCRIBED  (INTENTIONAL) Please choose reason not prescribed, below (Patient not taking: Reported on 3/18/2019)       fluticasone (FLOVENT HFA) 110 MCG/ACT inhaler Inhale 2 puffs into the lungs 2 times daily 1 Inhaler PRN     levothyroxine (SYNTHROID) 112 MCG tablet Take 1 tablet (112 mcg) by mouth daily 30 tablet 1     lidocaine-prilocaine (EMLA) 2.5-2.5 % external cream Apply topically as needed Apply to port site one hour prior to access start six days after port insertion (Patient not taking: Reported on 4/8/2019) 30 g 1     lisinopril (PRINIVIL/ZESTRIL) 5 MG tablet Take 1 tablet (5 mg) by mouth daily 90 tablet 3     LORazepam (ATIVAN) 0.5 MG tablet Take 1 tablet (0.5 mg) by mouth every 4 hours as needed (Anxiety, Nausea/Vomiting or Sleep) 30 tablet 2     mirtazapine (REMERON) 7.5 MG tablet Take 1 tablet (7.5 mg) by mouth At Bedtime 30 tablet 1     omeprazole (PRILOSEC) 20 MG DR capsule Take 1 capsule (20 mg) by mouth daily 30 capsule 0     order for DME Cranial prosthesis 1 Units 1     prochlorperazine (COMPAZINE) 10 MG tablet Take 1 tablet (10 mg) by mouth every 6 hours as needed (Nausea/Vomiting) 30 tablet 3     sertraline (ZOLOFT) 50 MG tablet One daily 90 tablet 1     sodium chloride (OCEAN) 0.65 % nasal spray Spray in both nostrils four times daily 30 mL 3     STATIN NOT PRESCRIBED (INTENTIONAL) Please choose reason not prescribed, below (Patient not taking: Reported on 3/18/2019)       study - olaparib (IDS# 3903) 100 mg tablet CHEMOTHERAPY Take 1 tablet (100 mg) by mouth every 12 hours Take at approximately the same times each day with an 8 oz (240 ml) glass of water. Tablets should be swallowed whole and not chewed, crushed, dissolved or divided. (Patient not taking: Reported on 4/1/2019) 60 tablet 0     study - olaparib (IDS# 3903) 100 mg tablet CHEMOTHERAPY Take 1 tablet (100 mg) by mouth every 12 hours Take at approximately the same times each day with an 8 oz (240 ml) glass of water. Tablets should be  swallowed whole and not chewed, crushed, dissolved or divided. 60 tablet 0     triamcinolone (ARISTOCORT HP) 0.5 % external cream Apply topically 2 times daily 30 g 3       Physical Examination:  General: The patient is a pleasant female in no acute distress. ECOG 1  LMP 11/02/2014   Wt Readings from Last 10 Encounters:   04/16/19 70.8 kg (156 lb)   04/08/19 69.5 kg (153 lb 3.2 oz)   04/01/19 70.5 kg (155 lb 6.4 oz)   03/26/19 70.1 kg (154 lb 9.6 oz)   03/18/19 69.8 kg (153 lb 12.8 oz)   03/11/19 68.9 kg (151 lb 12.8 oz)   03/06/19 69.4 kg (153 lb)   03/05/19 69.1 kg (152 lb 4.8 oz)   02/25/19 69.4 kg (153 lb)   02/11/19 69.9 kg (154 lb)     HEENT: EOMI, PERRL. Sclerae are anicteric. Oral mucosa is pink and moist with no lesions or thrush.   Lymph: No palpable cervical, supraclavicular, subclavicular or axillary lymphadenopathy.  Heart: Regular rate and rhythm.   Lungs: Clear to auscultation bilaterally.   Breast: Right breast with 2 x 2 cm mass in upper outer quadrant.  Abdomen: Bowel sounds present, soft, nontender with no palpable hepatosplenomegaly or masses.   Extremities: No lower extremity edema noted bilaterally.   Neuro: Cranial nerves II through XII are grossly intact.  Skin: Maculopapular rash of bilateral LE is resolving. No other rashes, petechiae, or bruising noted on exposed skin.    Laboratory Data:  Results for GALA ROLAND (MRN [id number]) as of 4/22/2019 09:53   4/22/2019 09:05   Sodium 138   Potassium 3.7   Chloride 106   Carbon Dioxide 24   Urea Nitrogen 8   Creatinine 0.68   GFR Estimate >90   GFR Estimate If Black >90   Calcium 9.2   Anion Gap 8   Albumin 3.9   Protein Total 7.1   Bilirubin Total 0.4   Alkaline Phosphatase 58   ALT 36   AST 31   TSH 14.76 (H)   Glucose 98   WBC 4.7   Hemoglobin 13.0   Hematocrit 38.0   Platelet Count 308   RBC Count 4.28   MCV 89   MCH 30.4   MCHC 34.2   RDW 14.2   Diff Method Automated Method   % Neutrophils 61.3   % Lymphocytes 27.6   % Monocytes 6.4    % Eosinophils 3.0   % Basophils 1.5   % Immature Granulocytes 0.2   Nucleated RBCs 0   Absolute Neutrophil 2.9   Absolute Lymphocytes 1.3   Absolute Monocytes 0.3   Absolute Eosinophils 0.1   Absolute Basophils 0.1   Abs Immature Granulocytes 0.0   Absolute Nucleated RBC 0.0       Assessment and Plan:  Kathy Lei is a 53 year old female with clinical stage II, T3N0MX, invasive ductal breast cancer in the upper outer quadrant of right breast, multifocal, measuring 8.9 cm on MRI. ER positive and HER2 negative. She is enrolled in I-SPY 2 and on durvalumab every 28 days, weekly Taxol, and olaparib.      ECOG 1 (due to changing her work schedule due to fatigue)    Right breast cancer, ER/VT positive, HER2 negative: She was enrolled in I-SPY2 and started cycle 1 Durvulumab, Taxol and Olaparib on 2/25/19. Tolerating well aside from some grade 1 fatigue. No nausea this past week. She is here for C9.  --Labs reviewed. Will proceed with C9.    --Continues on olaparib.     Abnormal echocardiogram: The patient's echocardiogram has both low strain and low normal ejection fraction.  Both of these tests are very borderline, and she has no symptoms of heart failure.  In addition, she has no historical features that would put her at risk of having heart failure, with the exception of mildly excessive alcohol use.  However, in the setting of future Adriamycin use, she is likely at higher risk than normal for chemotherapy-induced cardiomyopathy.  Therefore, recommend that the patient switch to lisinopril 5 mg instead of amlodipine for cardioprotection.  She should be followed with serial strain imaging throughout the course of her chemotherapy.  --Scheduled for echo and visit with Dr. Alanis on 5/6     GERD: No recent symptoms. She has prilosec. Tums prn   Nausea: Compazine prn  Maculopapular rash of BLE, grade 1: Resolving Continue triamcinolone BID on affected areas and emollient cream  Nasal mucositis, grade 1:  Suggested saline nasal spray. Sent to pharmacy  Insomnia: Experienced dizziness/vertigo on Remeron after 1 dose. She states she is not having much insomnia but will let us know if this recurs. Discussed that lorazepam is addictive and not recommended to be taking regularly for insomnia.   Hx depression: On Zoloft  Hx asthma: On flovent BID and albuterol prn  Hx hypothyroidism: Levothyroxine increased from  88mcg daily to 112mcg daily by Dr. Guzman on 4/16/18 due to TSH elevation and symptomatic. Will need to continue to follow TFTs on Durvulumab. TSH down trending.     Kerry Norwood PA-C  [facility]  [address], [place] [id number] [phone]      LIMA Echeverria

## 2019-04-22 NOTE — PROGRESS NOTES
Infusion Nursing Note:  Kathy J Quique presents today for C9D1 Study Durvalumab/Taxol.    Patient seen by provider today: Yes: Kerry AMATO   present during visit today: Not Applicable.    Note: Patient feels well. PO Olaparib taken No complaints made. Otherwise well.     As per Lorne Geller RN(Research Nurse) may proceed with treatment as planned.    Intravenous Access:  Implanted Port.    Treatment Conditions:  Lab Results   Component Value Date    HGB 13.0 04/22/2019     Lab Results   Component Value Date    WBC 4.7 04/22/2019      Lab Results   Component Value Date    ANEU 2.9 04/22/2019     Lab Results   Component Value Date     04/22/2019      Lab Results   Component Value Date     04/22/2019                   Lab Results   Component Value Date    POTASSIUM 3.7 04/22/2019           Lab Results   Component Value Date    MAG 2.0 02/05/2019            Lab Results   Component Value Date    CR 0.68 04/22/2019                   Lab Results   Component Value Date    BRYANT 9.2 04/22/2019                Lab Results   Component Value Date    BILITOTAL 0.4 04/22/2019           Lab Results   Component Value Date    ALBUMIN 3.9 04/22/2019                    Lab Results   Component Value Date    ALT 36 04/22/2019           Lab Results   Component Value Date    AST 31 04/22/2019       Results reviewed, labs MET treatment parameters, ok to proceed with treatment.      Post Infusion Assessment:  Patient tolerated infusion without incident.  Blood return noted pre and post infusion.  Site patent and intact, free from redness, edema or discomfort.  No evidence of extravasations.  Access discontinued per protocol.       Discharge Plan:   Patient declined prescription refills.  Discharge instructions reviewed with: Patient and friend.  Patient and/or family verbalized understanding of discharge instructions and all questions answered.  AVS to patient via [employer].  Patient will return 4/29/19 for  next appointment.   Patient discharged in stable condition accompanied by: self and friend.  Departure Mode: Ambulatory.    JULISSA SHIN RN

## 2019-04-22 NOTE — NURSING NOTE
[id number]: Study Visit Note   Subject name: Kathy Lei     Visit: C9    Did the study visit occur within the appropriate window allowed by the protocol? yes    Since the last study visit, She has been doing okay. Reports dizziness and drowsiness for two days after starting mirtazapine. Patient stopped taking mirtazapine. No additional new complaints since most recent study visit.    I have personally interviewed Kathy Lei and reviewed her medical record for adverse events and concomitant medications and these have been recorded on the corresponding logs in Kathy Lei's research file.     Kathy Lei was given the opportunity to ask any trial related questions.  Please see provider progress note for physical exam and other clinical information. Labs were reviewed - any significant lab values were addressed and reviewed.    [id number]: Medication Count/IDS Note      Kathy Lei is enrolled on the trial number listed above. The patient presented for her cycle 9 visit.   IDS number: [id number]  Drug name: Olaparib  Number of bottles returned: 1  Lot number(s): [id number]  Number of pills returned: Patient had pills in medicine calendar box at home; will return next week for reconciliation    Number of bottles dispensed: 1  Lot number(s):[id number]  Number of pills dispensed: 60    Drug diary returned? yes    Lorne Leos RN     Pager: [phone]      Form 504.00.01 (Version 1)     Effective date: 01JUL2018     Next Review Date: 01JUL2020

## 2019-04-22 NOTE — PROGRESS NOTES
Oncology/Hematology Visit Note  Apr 22, 2019    Reason for Visit: follow up of right breast cancer    History of Present Illness: Kathy Lei is a 53 year old female with recent diagnosis of ER/OR positive, HER2 negative, grade 2 breast cancer. Mily presented between Christmas and New Years of 2018 with new sharp pains in the upper outer quadrant of the right breast.  Diagnostic mammogram on 1/8/19 with grouped coarse heterogeneous calcifications at 11:30 position, irregular mass at 9:00 position. US with irregular mass at 11:00 position, 1.0 x 0.9 x 1.6cm. At 9:30 position, irregular mass, 3.2 x 0.5, x 1.3 cm. Contrast mammogram was subsequently performed and the contrast mammogram showed a large area of mass and non-mass-like enhancement in the upper outer right breast measuring 7.2 cm in greatest dimension.      The pathology showed part A, breast needle biopsy 11 o'clock, 6 cm from the nipple, invasive mammary carcinoma, no special type, ductal (and mucinous) Donna grade 2.  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  Calcifications were associated with the DCIS.  There was a focus suspicious for lymphovascular invasion.  Invasive carcinoma was estrogen receptor positive and progesterone receptor negative by immunohistochemistry.  In part B, breast right, 8 o'clock, 4 cm from the nipple, ultrasound-guided core biopsy, invasive mammary carcinoma, no special type, ductal (and mucinous) Donna grade 2, occasional calcifications were noted.  Invasive carcinoma was estrogen receptor positive and progesterone receptor negative by immunohistochemistry.  On quantitation of the ER and OR, ER was positive 99% of the cells staining with strong intensity.  OR was subsequently noted to be positive with 15% of the cells staining with moderate intensity.       There was also a HER2 FISH performed, and the HER2 FISH showed average number of HER2 signals per nucleus  2.6.  Average number of CEN17 signals 1.7 with a ratio of 1.6, VASILIY negative for biopsy A.  For biopsy B, the HER2 signals per nucleus 2.9.  Average number CEN17 signals per nucleus 1.7 with a ratio of 1.7, VASILIY negative.  Overall, by 2018 ASCO/CAP guidelines, this tumor is HER2 negative.     She was enrolled in I-SPY2 to Durvulumab, Taxol and Olaparib arm. Please see Dr. Guzman's previous notes for further details on the patient's history.     Interval History:  Mily is here for follow up. She tried Remeron for one dose but she was dizzy and drowsy the next day and missed 2 days of work. She had to hold onto things to walk due to vertigo. No falls. Denies any nausea. Symptoms resolved fully after 2 days. She is back on Zoloft. She is no longer taking ativan regularly at bedtime to sleep. She took maybe once this past week when she was feeling anxious.     Appetite good. She has not needed any anti-emetics. No heartburn, diarrhea, constipation. Her levothyroxine is now 112 mcg daily and she feels fatigue is improved. She states rash on bilateral legs has improved. She denies any pruritic symptoms. She has been using triamcinolone once daily. Denies neuropathy, headaches.     She continues to have some bloody mucous after blowing her nose, but no spontaneous nose bleeds. She continues to work about 8 hours instead of previous 10-12 hour days, 3 days/week. Weight is increased slightly.      Current Outpatient Medications   Medication Sig Dispense Refill     ADVIL 200 MG OR TABS 1 TABLET EVERY 4 TO 6 HOURS AS NEEDED 0 0     albuterol (2.5 MG/3ML) 0.083% nebulizer solution Take 3 mLs by nebulization once for 1 dose. 3 mL 0     albuterol (VENTOLIN HFA) 108 (90 Base) MCG/ACT Inhaler INHALE 1-2 PUFFS INTO THE LUNGS EVERY 4 HOURS AS NEEDED FOR SHORTNESS OF BREATH OR DIFFICULTY BREATHING. 18 g PRN     ASPIRIN NOT PRESCRIBED (INTENTIONAL) Please choose reason not prescribed, below (Patient not taking: Reported on 3/18/2019)        fluticasone (FLOVENT HFA) 110 MCG/ACT inhaler Inhale 2 puffs into the lungs 2 times daily 1 Inhaler PRN     levothyroxine (SYNTHROID) 112 MCG tablet Take 1 tablet (112 mcg) by mouth daily 30 tablet 1     lidocaine-prilocaine (EMLA) 2.5-2.5 % external cream Apply topically as needed Apply to port site one hour prior to access start six days after port insertion (Patient not taking: Reported on 4/8/2019) 30 g 1     lisinopril (PRINIVIL/ZESTRIL) 5 MG tablet Take 1 tablet (5 mg) by mouth daily 90 tablet 3     LORazepam (ATIVAN) 0.5 MG tablet Take 1 tablet (0.5 mg) by mouth every 4 hours as needed (Anxiety, Nausea/Vomiting or Sleep) 30 tablet 2     mirtazapine (REMERON) 7.5 MG tablet Take 1 tablet (7.5 mg) by mouth At Bedtime 30 tablet 1     omeprazole (PRILOSEC) 20 MG DR capsule Take 1 capsule (20 mg) by mouth daily 30 capsule 0     order for DME Cranial prosthesis 1 Units 1     prochlorperazine (COMPAZINE) 10 MG tablet Take 1 tablet (10 mg) by mouth every 6 hours as needed (Nausea/Vomiting) 30 tablet 3     sertraline (ZOLOFT) 50 MG tablet One daily 90 tablet 1     sodium chloride (OCEAN) 0.65 % nasal spray Spray in both nostrils four times daily 30 mL 3     STATIN NOT PRESCRIBED (INTENTIONAL) Please choose reason not prescribed, below (Patient not taking: Reported on 3/18/2019)       study - olaparib (IDS# 3903) 100 mg tablet CHEMOTHERAPY Take 1 tablet (100 mg) by mouth every 12 hours Take at approximately the same times each day with an 8 oz (240 ml) glass of water. Tablets should be swallowed whole and not chewed, crushed, dissolved or divided. (Patient not taking: Reported on 4/1/2019) 60 tablet 0     study - olaparib (IDS# 3903) 100 mg tablet CHEMOTHERAPY Take 1 tablet (100 mg) by mouth every 12 hours Take at approximately the same times each day with an 8 oz (240 ml) glass of water. Tablets should be swallowed whole and not chewed, crushed, dissolved or divided. 60 tablet 0     triamcinolone (ARISTOCORT HP)  0.5 % external cream Apply topically 2 times daily 30 g 3       Physical Examination:  General: The patient is a pleasant female in no acute distress. ECOG 1  LMP 11/02/2014   Wt Readings from Last 10 Encounters:   04/16/19 70.8 kg (156 lb)   04/08/19 69.5 kg (153 lb 3.2 oz)   04/01/19 70.5 kg (155 lb 6.4 oz)   03/26/19 70.1 kg (154 lb 9.6 oz)   03/18/19 69.8 kg (153 lb 12.8 oz)   03/11/19 68.9 kg (151 lb 12.8 oz)   03/06/19 69.4 kg (153 lb)   03/05/19 69.1 kg (152 lb 4.8 oz)   02/25/19 69.4 kg (153 lb)   02/11/19 69.9 kg (154 lb)     HEENT: EOMI, PERRL. Sclerae are anicteric. Oral mucosa is pink and moist with no lesions or thrush.   Lymph: No palpable cervical, supraclavicular, subclavicular or axillary lymphadenopathy.  Heart: Regular rate and rhythm.   Lungs: Clear to auscultation bilaterally.   Breast: Right breast with 2 x 2 cm mass in upper outer quadrant.  Abdomen: Bowel sounds present, soft, nontender with no palpable hepatosplenomegaly or masses.   Extremities: No lower extremity edema noted bilaterally.   Neuro: Cranial nerves II through XII are grossly intact.  Skin: Maculopapular rash of bilateral LE is resolving. No other rashes, petechiae, or bruising noted on exposed skin.    Laboratory Data:  Results for GALA ROLAND (MRN [id number]) as of 4/22/2019 09:53   4/22/2019 09:05   Sodium 138   Potassium 3.7   Chloride 106   Carbon Dioxide 24   Urea Nitrogen 8   Creatinine 0.68   GFR Estimate >90   GFR Estimate If Black >90   Calcium 9.2   Anion Gap 8   Albumin 3.9   Protein Total 7.1   Bilirubin Total 0.4   Alkaline Phosphatase 58   ALT 36   AST 31   TSH 14.76 (H)   Glucose 98   WBC 4.7   Hemoglobin 13.0   Hematocrit 38.0   Platelet Count 308   RBC Count 4.28   MCV 89   MCH 30.4   MCHC 34.2   RDW 14.2   Diff Method Automated Method   % Neutrophils 61.3   % Lymphocytes 27.6   % Monocytes 6.4   % Eosinophils 3.0   % Basophils 1.5   % Immature Granulocytes 0.2   Nucleated RBCs 0   Absolute Neutrophil  2.9   Absolute Lymphocytes 1.3   Absolute Monocytes 0.3   Absolute Eosinophils 0.1   Absolute Basophils 0.1   Abs Immature Granulocytes 0.0   Absolute Nucleated RBC 0.0       Assessment and Plan:  Kathy Lei is a 53 year old female with clinical stage II, T3N0MX, invasive ductal breast cancer in the upper outer quadrant of right breast, multifocal, measuring 8.9 cm on MRI. ER positive and HER2 negative. She is enrolled in I-SPY 2 and on durvalumab every 28 days, weekly Taxol, and olaparib.      ECOG 1 (due to changing her work schedule due to fatigue)    Right breast cancer, ER/TX positive, HER2 negative: She was enrolled in I-SPY2 and started cycle 1 Durvulumab, Taxol and Olaparib on 2/25/19. Tolerating well aside from some grade 1 fatigue. No nausea this past week. She is here for C9.  --Labs reviewed. Will proceed with C9.    --Continues on olaparib.     Abnormal echocardiogram: The patient's echocardiogram has both low strain and low normal ejection fraction.  Both of these tests are very borderline, and she has no symptoms of heart failure.  In addition, she has no historical features that would put her at risk of having heart failure, with the exception of mildly excessive alcohol use.  However, in the setting of future Adriamycin use, she is likely at higher risk than normal for chemotherapy-induced cardiomyopathy.  Therefore, recommend that the patient switch to lisinopril 5 mg instead of amlodipine for cardioprotection.  She should be followed with serial strain imaging throughout the course of her chemotherapy.  --Scheduled for echo and visit with Dr. Alanis on 5/6     GERD: No recent symptoms. She has prilosec. Tums prn   Nausea: Compazine prn  Maculopapular rash of BLE, grade 1: Resolving Continue triamcinolone BID on affected areas and emollient cream  Nasal mucositis, grade 1: Suggested saline nasal spray. Sent to pharmacy  Insomnia: Experienced dizziness/vertigo on Remeron after 1 dose. She  states she is not having much insomnia but will let us know if this recurs. Discussed that lorazepam is addictive and not recommended to be taking regularly for insomnia.   Hx depression: On Zoloft  Hx asthma: On flovent BID and albuterol prn  Hx hypothyroidism: Levothyroxine increased from  88mcg daily to 112mcg daily by Dr. Guzman on 4/16/18 due to TSH elevation and symptomatic. Will need to continue to follow TFTs on Durvulumab. TSH down trending.     Kerry Norwood PA-C  [facility]  [address] [place], [place] [address] [phone]

## 2019-04-22 NOTE — NURSING NOTE
"Oncology Rooming Note    April 22, 2019 9:15 AM   Kathy Lei is a 53 year old female who presents for:    Chief Complaint   Patient presents with     Port Draw     accessed with power needle, heparin locked, vitals checked     Oncology Clinic Visit     Breast Ca , Labs, Tx     Initial Vitals: /77   Pulse 99   Temp 97.9  F (36.6  C)   Resp 16   Wt 70.4 kg (155 lb 4.8 oz)   LMP 11/02/2014   SpO2 96%   BMI 25.08 kg/m   Estimated body mass index is 25.08 kg/m  as calculated from the following:    Height as of 4/8/19: 1.676 m (5' 5.98\").    Weight as of this encounter: 70.4 kg (155 lb 4.8 oz). Body surface area is 1.81 meters squared.  No Pain (0) Comment: Data Unavailable   Patient's last menstrual period was 11/02/2014.  Allergies reviewed: Yes  Medications reviewed: Yes    Medications: Medication refills not needed today.  Pharmacy name entered into [facility]:    [id number] - [facility] [address] [place] [address] DRUG STORE [id number] - [place], [place] - [address] AT [facility] [address]    Clinical concerns: no concerns  Cuco   was notified.      Chery Soto MA              "

## 2019-04-22 NOTE — PATIENT INSTRUCTIONS
Contact Numbers  [facility]: [phone]    After Hours:  [phone]  Triage: [phone]    Please call the [facility] Triage line if you experience a temperature greater than or equal to 100.5, shaking chills, have uncontrolled nausea, vomiting and/or diarrhea, dizziness, shortness of breath, chest pain, bleeding, unexplained bruising, or if you have any other new/concerning symptoms, questions or concerns.     If it is after hours, weekends, or holidays, please call the main hospital  at  [phone] and ask to speak to the Oncology doctor on call.     If you are having any concerning symptoms or wish to speak to a provider before your next infusion visit, please call your care coordinator or triage to notify them so we can adequately serve you.     If you need a refill on a narcotic prescription or other medication, please call triage before your infusion appointment.         April 2019 Sunday Monday Tuesday Wednesday Thursday Friday Saturday        1    [facility] DRAW   7:30 AM   (15 min.)   [facility]   [facility] Lab Draw    [facility] RETURN   7:45 AM   (60 min.)   Jade Olguin PA-C   [facility] ONC INFUSION 360   9:30 AM   (360 min.)    ONCOLOGY INFUSION   [facility] 2    MR BREAST BILATERAL WWO   4:45 PM   (45 min.)   MR1   [facility] MRI 3     4     5     6       7     8    [facility] DRAW   7:30 AM   (15 min.)    [facility] DRAW   [facility] Lab Draw    [facility] RETURN   7:45 AM   (50 min.)   Kerry Norwood PA   [facility] ONC INFUSION 120   9:00 AM   (120 min.)    ONCOLOGY INFUSION   [facility] 9     10     11     12     13       14     15     16    [facility] DRAW   8:30 AM   (15 min.)   [facility] D[facility]   [facility] Lab Draw    [facility] RETURN   8:45 AM   (30 min.)   Christian Guzman MD   [facility]  [facility]    [facility] ONC INFUSION 120  11:00 AM   (120 min.)   [facility] ONCOLOGY INFUSION   [facility] 17     18     19     20       21     22    [facility] DRAW   8:15 AM   (15 min.)    [facility] DRAW   [facility] Draw    [facility] RETURN   8:35 AM   (50 min.)   Kerry Norwood PA   [facility]    [facility] ONC INFUSION 120  10:00 AM   (120 min.)   [facility] ONCOLOGY INFUSION   [facility] 23     24     25     26     27       28     29    [facility] DRAW   7:30 AM   (15 min.)    [facility]    [facility] RETURN   7:45 AM   (50 min.)   Kerry Norwood PA   [facility]    [facility] ONC INFUSION 120   9:00 AM   (120 min.)    ONCOLOGY INFUSION   [facility] 30    [facility] NEW  12:15 PM   (45 min.)   Anne Yousif MD   [facility]                                 May 2019      [place] Monday Tuesday Wednesday Thursday Friday Saturday                  1     2     3     4       5     6    ECHO LIMITED  10:30 AM   (60 min.)   [id number]   [facility] Cardiac Services    [facility] RETURN  11:15 AM   (30 min.)   Mary Alanis MD   [facility] Heart Care    [facility] DRAW  12:30 PM   (15 min.)    [facility] LAB DRAW   [facility] Draw    [facility] RETURN   1:05 PM   (50 min.)   Nasreen Grady PA-C   [facility]    [facility] ONC INFUSION 120   2:00 PM   (120 min.)   [facility] ONCOLOGY INFUSION   [facility] 7     8     9     10     11       12     13     14    [facility] DRAW  12:00 PM   (15 min.)    [facility] LAB DRAW   [facility] Draw    [facility] RETURN  12:15 PM   (30 min.)   Christian Guzman MD   [facility]    [facility] ONC INFUSION 120   2:30 PM   (120 min.)    ONCOLOGY INFUSION   [facility] 15     16     17     18       19     20     21     22     23     24     25       26     27     28     29     30     31                           Lab Results:  Recent Results (from the past 12 hour(s))   TSH with free T4 reflex    Collection Time: 04/22/19  9:05 AM   Result Value Ref Range    TSH 14.76 (H) 0.40 - 4.00 mU/L   CBC with platelets differential    Collection Time: 04/22/19  9:05 AM   Result Value Ref Range    WBC 4.7 4.0 - 11.0 10e9/L    RBC Count 4.28 3.8 - 5.2 10e12/L    Hemoglobin 13.0 11.7 - 15.7 g/dL    Hematocrit 38.0 35.0 - 47.0 %    MCV 89 78 - 100 fl    MCH 30.4 26.5 - 33.0 pg    MCHC 34.2 31.5 - 36.5 g/dL    RDW 14.2 10.0 - 15.0 %    Platelet Count 308 150 - 450 10e9/L    Diff Method Automated Method     % Neutrophils 61.3 %    % Lymphocytes 27.6 %    % Monocytes 6.4 %    % Eosinophils 3.0 %    % Basophils 1.5 %    % Immature Granulocytes 0.2 %    Nucleated RBCs 0 0 /100    Absolute Neutrophil 2.9 1.6 - 8.3 10e9/L    Absolute Lymphocytes 1.3 0.8 - 5.3 10e9/L    Absolute Monocytes 0.3 0.0 - 1.3 10e9/L    Absolute Eosinophils 0.1 0.0 - 0.7 10e9/L    Absolute Basophils 0.1 0.0 - 0.2 10e9/L    Abs Immature Granulocytes 0.0 0 - 0.4 10e9/L    Absolute Nucleated RBC 0.0    Comprehensive metabolic panel    Collection Time: 04/22/19  9:05 AM   Result Value Ref Range    Sodium 138 133 - 144 mmol/L    Potassium 3.7 3.4 - 5.3 mmol/L    Chloride 106 94 - 109 mmol/L    Carbon Dioxide 24 20 - 32 mmol/L    Anion Gap 8 3 - 14 mmol/L    Glucose 98 70 - 99 mg/dL    Urea Nitrogen 8 7 - 30 mg/dL    Creatinine 0.68 0.52 - 1.04 mg/dL    GFR Estimate >90 >60 mL/min/[1.73_m2]    GFR Estimate If Black >90 >60 mL/min/[1.73_m2]    Calcium 9.2 8.5 - 10.1 mg/dL    Bilirubin Total 0.4 0.2 - 1.3 mg/dL    Albumin 3.9 3.4 - 5.0 g/dL    Protein Total 7.1 6.8 - 8.8 g/dL    Alkaline Phosphatase 58 40 - 150 U/L    ALT 36 0 - 50 U/L    AST 31 0 - 45 U/L

## 2019-04-22 NOTE — NURSING NOTE
Chief Complaint   Patient presents with     Port Draw     accessed with power needle, heparin locked, vitals checked     Alessandra Ortiz RN on 4/22/2019 at 9:07 AM

## 2019-04-23 ASSESSMENT — ENCOUNTER SYMPTOMS
WEIGHT LOSS: 0
POOR WOUND HEALING: 0
CHILLS: 0
WEIGHT LOSS: 0
POLYPHAGIA: 0
FATIGUE: 1
INSOMNIA: 0
NIGHT SWEATS: 0
NAIL CHANGES: 0
DECREASED APPETITE: 0
DECREASED CONCENTRATION: 0
WEIGHT GAIN: 1
SKIN CHANGES: 0
WEIGHT GAIN: 1
HALLUCINATIONS: 0
ALTERED TEMPERATURE REGULATION: 0
INSOMNIA: 0
BREAST PAIN: 0
HALLUCINATIONS: 0
BREAST MASS: 1
CHILLS: 0
POLYDIPSIA: 0
FEVER: 0
NAIL CHANGES: 0
NIGHT SWEATS: 0
ALTERED TEMPERATURE REGULATION: 0
DEPRESSION: 0
PANIC: 0
FEVER: 0
POLYDIPSIA: 0
DECREASED APPETITE: 0
BREAST MASS: 1
FATIGUE: 1
POOR WOUND HEALING: 0
INCREASED ENERGY: 1
NERVOUS/ANXIOUS: 0
NERVOUS/ANXIOUS: 0
DECREASED CONCENTRATION: 0
DEPRESSION: 0
PANIC: 0
BREAST PAIN: 0
SKIN CHANGES: 0
INCREASED ENERGY: 1
POLYPHAGIA: 0

## 2019-04-29 ENCOUNTER — APPOINTMENT (OUTPATIENT)
Dept: LAB | Facility: CLINIC | Age: 54
End: 2019-04-29
Attending: INTERNAL MEDICINE
Payer: COMMERCIAL

## 2019-04-29 ENCOUNTER — ONCOLOGY VISIT (OUTPATIENT)
Dept: ONCOLOGY | Facility: CLINIC | Age: 54
End: 2019-04-29
Attending: INTERNAL MEDICINE
Payer: COMMERCIAL

## 2019-04-29 VITALS
WEIGHT: 152.3 LBS | BODY MASS INDEX: 24.6 KG/M2 | TEMPERATURE: 98.1 F | DIASTOLIC BLOOD PRESSURE: 81 MMHG | HEART RATE: 100 BPM | OXYGEN SATURATION: 96 % | SYSTOLIC BLOOD PRESSURE: 132 MMHG

## 2019-04-29 DIAGNOSIS — Z17.0 MALIGNANT NEOPLASM OF UPPER-OUTER QUADRANT OF RIGHT BREAST IN FEMALE, ESTROGEN RECEPTOR POSITIVE (H): Primary | ICD-10-CM

## 2019-04-29 DIAGNOSIS — Z17.0 MALIGNANT NEOPLASM OF UPPER-OUTER QUADRANT OF RIGHT BREAST IN FEMALE, ESTROGEN RECEPTOR POSITIVE (H): ICD-10-CM

## 2019-04-29 DIAGNOSIS — C50.411 MALIGNANT NEOPLASM OF UPPER-OUTER QUADRANT OF RIGHT BREAST IN FEMALE, ESTROGEN RECEPTOR POSITIVE (H): ICD-10-CM

## 2019-04-29 DIAGNOSIS — C50.411 MALIGNANT NEOPLASM OF UPPER-OUTER QUADRANT OF RIGHT BREAST IN FEMALE, ESTROGEN RECEPTOR POSITIVE (H): Primary | ICD-10-CM

## 2019-04-29 LAB
ALBUMIN SERPL-MCNC: 4 G/DL (ref 3.4–5)
ALP SERPL-CCNC: 54 U/L (ref 40–150)
ALT SERPL W P-5'-P-CCNC: 50 U/L (ref 0–50)
ANION GAP SERPL CALCULATED.3IONS-SCNC: 6 MMOL/L (ref 3–14)
AST SERPL W P-5'-P-CCNC: 33 U/L (ref 0–45)
BASOPHILS # BLD AUTO: 0.1 10E9/L (ref 0–0.2)
BASOPHILS NFR BLD AUTO: 1.4 %
BILIRUB SERPL-MCNC: 0.4 MG/DL (ref 0.2–1.3)
BUN SERPL-MCNC: 9 MG/DL (ref 7–30)
CALCIUM SERPL-MCNC: 9 MG/DL (ref 8.5–10.1)
CHLORIDE SERPL-SCNC: 106 MMOL/L (ref 94–109)
CO2 SERPL-SCNC: 26 MMOL/L (ref 20–32)
CREAT SERPL-MCNC: 0.64 MG/DL (ref 0.52–1.04)
DIFFERENTIAL METHOD BLD: NORMAL
EOSINOPHIL # BLD AUTO: 0.2 10E9/L (ref 0–0.7)
EOSINOPHIL NFR BLD AUTO: 4.8 %
ERYTHROCYTE [DISTWIDTH] IN BLOOD BY AUTOMATED COUNT: 14.5 % (ref 10–15)
GFR SERPL CREATININE-BSD FRML MDRD: >90 ML/MIN/{1.73_M2}
GLUCOSE SERPL-MCNC: 107 MG/DL (ref 70–99)
HCT VFR BLD AUTO: 39.7 % (ref 35–47)
HGB BLD-MCNC: 13.2 G/DL (ref 11.7–15.7)
IMM GRANULOCYTES # BLD: 0 10E9/L (ref 0–0.4)
IMM GRANULOCYTES NFR BLD: 0.5 %
LYMPHOCYTES # BLD AUTO: 1.2 10E9/L (ref 0.8–5.3)
LYMPHOCYTES NFR BLD AUTO: 29 %
MCH RBC QN AUTO: 30.1 PG (ref 26.5–33)
MCHC RBC AUTO-ENTMCNC: 33.2 G/DL (ref 31.5–36.5)
MCV RBC AUTO: 90 FL (ref 78–100)
MONOCYTES # BLD AUTO: 0.4 10E9/L (ref 0–1.3)
MONOCYTES NFR BLD AUTO: 9.3 %
NEUTROPHILS # BLD AUTO: 2.3 10E9/L (ref 1.6–8.3)
NEUTROPHILS NFR BLD AUTO: 55 %
NRBC # BLD AUTO: 0 10*3/UL
NRBC BLD AUTO-RTO: 0 /100
PLATELET # BLD AUTO: 319 10E9/L (ref 150–450)
POTASSIUM SERPL-SCNC: 3.7 MMOL/L (ref 3.4–5.3)
PROT SERPL-MCNC: 7.4 G/DL (ref 6.8–8.8)
RBC # BLD AUTO: 4.39 10E12/L (ref 3.8–5.2)
SODIUM SERPL-SCNC: 138 MMOL/L (ref 133–144)
WBC # BLD AUTO: 4.2 10E9/L (ref 4–11)

## 2019-04-29 PROCEDURE — 25000128 H RX IP 250 OP 636: Mod: ZF | Performed by: PHYSICIAN ASSISTANT

## 2019-04-29 PROCEDURE — 96375 TX/PRO/DX INJ NEW DRUG ADDON: CPT

## 2019-04-29 PROCEDURE — 25000128 H RX IP 250 OP 636: Mod: ZF | Performed by: INTERNAL MEDICINE

## 2019-04-29 PROCEDURE — 25800030 ZZH RX IP 258 OP 636: Mod: ZF | Performed by: PHYSICIAN ASSISTANT

## 2019-04-29 PROCEDURE — 80053 COMPREHEN METABOLIC PANEL: CPT | Performed by: INTERNAL MEDICINE

## 2019-04-29 PROCEDURE — 99214 OFFICE O/P EST MOD 30 MIN: CPT | Mod: ZP | Performed by: PHYSICIAN ASSISTANT

## 2019-04-29 PROCEDURE — 85025 COMPLETE CBC W/AUTO DIFF WBC: CPT | Performed by: INTERNAL MEDICINE

## 2019-04-29 PROCEDURE — 96413 CHEMO IV INFUSION 1 HR: CPT

## 2019-04-29 RX ORDER — DIPHENHYDRAMINE HYDROCHLORIDE 50 MG/ML
50 INJECTION INTRAMUSCULAR; INTRAVENOUS
Status: CANCELLED
Start: 2019-04-29

## 2019-04-29 RX ORDER — EPINEPHRINE 1 MG/ML
0.3 INJECTION, SOLUTION INTRAMUSCULAR; SUBCUTANEOUS EVERY 5 MIN PRN
Status: CANCELLED | OUTPATIENT
Start: 2019-04-29

## 2019-04-29 RX ORDER — MULTIVITAMIN WITH IRON
100 TABLET ORAL DAILY
Qty: 30 TABLET | Refills: 3 | Status: ON HOLD | OUTPATIENT
Start: 2019-04-29 | End: 2019-07-25

## 2019-04-29 RX ORDER — HEPARIN SODIUM (PORCINE) LOCK FLUSH IV SOLN 100 UNIT/ML 100 UNIT/ML
5 SOLUTION INTRAVENOUS ONCE
Status: CANCELLED
Start: 2019-04-29

## 2019-04-29 RX ORDER — METHYLPREDNISOLONE SODIUM SUCCINATE 125 MG/2ML
125 INJECTION, POWDER, LYOPHILIZED, FOR SOLUTION INTRAMUSCULAR; INTRAVENOUS
Status: CANCELLED
Start: 2019-04-29

## 2019-04-29 RX ORDER — EPINEPHRINE 0.3 MG/.3ML
0.3 INJECTION SUBCUTANEOUS EVERY 5 MIN PRN
Status: CANCELLED | OUTPATIENT
Start: 2019-04-29

## 2019-04-29 RX ORDER — PROCHLORPERAZINE MALEATE 10 MG
10 TABLET ORAL EVERY 6 HOURS PRN
Qty: 30 TABLET | Refills: 3 | Status: SHIPPED | OUTPATIENT
Start: 2019-04-29 | End: 2019-05-06

## 2019-04-29 RX ORDER — DEXAMETHASONE SODIUM PHOSPHATE 10 MG/ML
10 INJECTION, SOLUTION INTRAMUSCULAR; INTRAVENOUS
Status: CANCELLED | OUTPATIENT
Start: 2019-04-29

## 2019-04-29 RX ORDER — LORAZEPAM 2 MG/ML
0.5 INJECTION INTRAMUSCULAR EVERY 4 HOURS PRN
Status: CANCELLED
Start: 2019-04-29

## 2019-04-29 RX ORDER — SODIUM CHLORIDE 9 MG/ML
1000 INJECTION, SOLUTION INTRAVENOUS CONTINUOUS PRN
Status: CANCELLED
Start: 2019-04-29

## 2019-04-29 RX ORDER — HEPARIN SODIUM (PORCINE) LOCK FLUSH IV SOLN 100 UNIT/ML 100 UNIT/ML
5 SOLUTION INTRAVENOUS ONCE
Status: COMPLETED | OUTPATIENT
Start: 2019-04-29 | End: 2019-04-29

## 2019-04-29 RX ORDER — ALBUTEROL SULFATE 0.83 MG/ML
2.5 SOLUTION RESPIRATORY (INHALATION)
Status: CANCELLED | OUTPATIENT
Start: 2019-04-29

## 2019-04-29 RX ORDER — MEPERIDINE HYDROCHLORIDE 25 MG/ML
25 INJECTION INTRAMUSCULAR; INTRAVENOUS; SUBCUTANEOUS EVERY 30 MIN PRN
Status: CANCELLED | OUTPATIENT
Start: 2019-04-29

## 2019-04-29 RX ORDER — ALBUTEROL SULFATE 90 UG/1
1-2 AEROSOL, METERED RESPIRATORY (INHALATION)
Status: CANCELLED
Start: 2019-04-29

## 2019-04-29 RX ORDER — ONDANSETRON 2 MG/ML
8 INJECTION INTRAMUSCULAR; INTRAVENOUS ONCE
Status: COMPLETED | OUTPATIENT
Start: 2019-04-29 | End: 2019-04-29

## 2019-04-29 RX ADMIN — ONDANSETRON 8 MG: 2 INJECTION INTRAMUSCULAR; INTRAVENOUS at 09:30

## 2019-04-29 RX ADMIN — Medication 5 ML: at 07:33

## 2019-04-29 RX ADMIN — PACLITAXEL 142 MG: 6 INJECTION, SOLUTION INTRAVENOUS at 09:34

## 2019-04-29 RX ADMIN — HEPARIN SODIUM (PORCINE) LOCK FLUSH IV SOLN 100 UNIT/ML 5 ML: 100 SOLUTION at 10:45

## 2019-04-29 RX ADMIN — SODIUM CHLORIDE 250 ML: 9 INJECTION, SOLUTION INTRAVENOUS at 09:30

## 2019-04-29 ASSESSMENT — PAIN SCALES - GENERAL: PAINLEVEL: NO PAIN (0)

## 2019-04-29 NOTE — LETTER
4/29/2019      RE: Kathy Lei  [address] [place] [address]       Oncology/Hematology Visit Note  Apr 29, 2019    Reason for Visit: follow up of right breast cancer    History of Present Illness: Kathy Lei is a 53 year old female with recent diagnosis of ER/AR positive, HER2 negative, grade 2 breast cancer. Mily presented between Christmas and New Years of 2018 with new sharp pains in the upper outer quadrant of the right breast.  Diagnostic mammogram on 1/8/19 with grouped coarse heterogeneous calcifications at 11:30 position, irregular mass at 9:00 position. US with irregular mass at 11:00 position, 1.0 x 0.9 x 1.6cm. At 9:30 position, irregular mass, 3.2 x 0.5, x 1.3 cm. Contrast mammogram was subsequently performed and the contrast mammogram showed a large area of mass and non-mass-like enhancement in the upper outer right breast measuring 7.2 cm in greatest dimension.      The pathology showed part A, breast needle biopsy 11 o'clock, 6 cm from the nipple, invasive mammary carcinoma, no special type, ductal (and mucinous) [place] grade 2.  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  Calcifications were associated with the DCIS.  There was a focus suspicious for lymphovascular invasion.  Invasive carcinoma was estrogen receptor positive and progesterone receptor negative by immunohistochemistry.  In part B, breast right, 8 o'clock, 4 cm from the nipple, ultrasound-guided core biopsy, invasive mammary carcinoma, no special type, ductal (and mucinous) [place] grade 2, occasional calcifications were noted.  Invasive carcinoma was estrogen receptor positive and progesterone receptor negative by immunohistochemistry.  On quantitation of the ER and AR, ER was positive 99% of the cells staining with strong intensity.  AR was subsequently noted to be positive with 15% of the cells staining with moderate intensity.       There was also  "a HER2 FISH performed, and the HER2 FISH showed average number of HER2 signals per nucleus 2.6.  Average number of CEN17 signals 1.7 with a ratio of 1.6, VASILIY negative for biopsy A.  For biopsy B, the HER2 signals per nucleus 2.9.  Average number CEN17 signals per nucleus 1.7 with a ratio of 1.7, VASILIY negative.  Overall, by 2018 ASCO/CAP guidelines, this tumor is HER2 negative.     She was enrolled in I-SPY2 to Durvulumab, Taxol and Olaparib arm. Please see Dr. Guzman's previous notes for further details on the patient's history.     Interval History:  Mily is here for follow up. She started having some \"pins and needles\" sensation in her toes this past week and in her fingertips about 3-4 days ago. This was transient and lasted about 30 minutes. She feels fatigued, but was able to work 4 8 hour days this past week. The rash on lower legs is better. She is using triamcinolone at bedtime only.    Appetite is good. She takes compazine for occasional nausea. No heartburn. No diarrhea or constipation.      She continues to have some bloody mucous after blowing her nose, but no spontaneous nose bleeds. No other bleeding. Denies fevers, cough, dyspnea, chest pain, leg swelling. She has occasional headaches but nothing unusual for her. Denies any other concerns.     Current Outpatient Medications   Medication Sig Dispense Refill     ADVIL 200 MG OR TABS 1 TABLET EVERY 4 TO 6 HOURS AS NEEDED 0 0     albuterol (VENTOLIN HFA) 108 (90 Base) MCG/ACT Inhaler INHALE 1-2 PUFFS INTO THE LUNGS EVERY 4 HOURS AS NEEDED FOR SHORTNESS OF BREATH OR DIFFICULTY BREATHING. 18 g PRN     ASPIRIN NOT PRESCRIBED (INTENTIONAL) Please choose reason not prescribed, below       fluticasone (FLOVENT HFA) 110 MCG/ACT inhaler Inhale 2 puffs into the lungs 2 times daily 1 Inhaler PRN     levothyroxine (SYNTHROID) 112 MCG tablet Take 1 tablet (112 mcg) by mouth daily 30 tablet 1     lidocaine-prilocaine (EMLA) 2.5-2.5 % external cream Apply topically as " needed Apply to port site one hour prior to access start six days after port insertion 30 g 1     lisinopril (PRINIVIL/ZESTRIL) 5 MG tablet Take 1 tablet (5 mg) by mouth daily 90 tablet 3     LORazepam (ATIVAN) 0.5 MG tablet Take 1 tablet (0.5 mg) by mouth every 4 hours as needed (Anxiety, Nausea/Vomiting or Sleep) 30 tablet 2     omeprazole (PRILOSEC) 20 MG DR capsule Take 1 capsule (20 mg) by mouth daily 30 capsule 0     order for DME Cranial prosthesis 1 Units 1     prochlorperazine (COMPAZINE) 10 MG tablet Take 1 tablet (10 mg) by mouth every 6 hours as needed (Nausea/Vomiting) 30 tablet 3     sertraline (ZOLOFT) 50 MG tablet One daily 90 tablet 1     sodium chloride (OCEAN) 0.65 % nasal spray Spray in both nostrils four times daily 30 mL 3     STATIN NOT PRESCRIBED (INTENTIONAL) Please choose reason not prescribed, below       study - olaparib (IDS# 3903) 100 mg tablet CHEMOTHERAPY Take 1 tablet (100 mg) by mouth every 12 hours Take at approximately the same times each day with an 8 oz (240 ml) glass of water. Tablets should be swallowed whole and not chewed, crushed, dissolved or divided. 60 tablet 0     study - olaparib (IDS# 3903) 100 mg tablet CHEMOTHERAPY Take 1 tablet (100 mg) by mouth every 12 hours Take at approximately the same times each day with an 8 oz (240 ml) glass of water. Tablets should be swallowed whole and not chewed, crushed, dissolved or divided. 60 tablet 0     study - olaparib (IDS# 3903) 100 mg tablet CHEMOTHERAPY Take 1 tablet (100 mg) by mouth every 12 hours Take at approximately the same times each day with an 8 oz (240 ml) glass of water. Tablets should be swallowed whole and not chewed, crushed, dissolved or divided. 60 tablet 0     triamcinolone (ARISTOCORT HP) 0.5 % external cream Apply topically 2 times daily 30 g 3     albuterol (2.5 MG/3ML) 0.083% nebulizer solution Take 3 mLs by nebulization once for 1 dose. 3 mL 0       Physical Examination:  General: The patient is a  pleasant female in no acute distress. ECOG 1  /81 (BP Location: Left arm, Patient Position: Chair, Cuff Size: Adult Regular)   Pulse 100   Temp 98.1  F (36.7  C) (Oral)   Wt 69.1 kg (152 lb 4.8 oz)   LMP 11/02/2014   SpO2 96%   BMI 24.60 kg/m     Wt Readings from Last 10 Encounters:   04/29/19 69.1 kg (152 lb 4.8 oz)   04/22/19 70.4 kg (155 lb 4.8 oz)   04/16/19 70.8 kg (156 lb)   04/08/19 69.5 kg (153 lb 3.2 oz)   04/01/19 70.5 kg (155 lb 6.4 oz)   03/26/19 70.1 kg (154 lb 9.6 oz)   03/18/19 69.8 kg (153 lb 12.8 oz)   03/11/19 68.9 kg (151 lb 12.8 oz)   03/06/19 69.4 kg (153 lb)   03/05/19 69.1 kg (152 lb 4.8 oz)     HEENT: EOMI, PERRL. Sclerae are anicteric. Oral mucosa is pink and moist with no lesions or thrush.   Lymph: No palpable cervical, supraclavicular, subclavicular or axillary lymphadenopathy.  Heart: Regular rate and rhythm.   Lungs: Clear to auscultation bilaterally.   Breast: Right breast with 2 x 2 cm mass in upper outer quadrant.  Abdomen: Bowel sounds present, soft, nontender with no palpable hepatosplenomegaly or masses.   Extremities: No lower extremity edema noted bilaterally.   Neuro: Cranial nerves II through XII are grossly intact.  Skin: Maculopapular rash of bilateral LE is resolving. No other rashes, petechiae, or bruising noted on exposed skin.  Laboratory Data:  Results for GALA ROLAND (MRN [id number]) as of 4/29/2019 08:31   4/29/2019 07:39   Sodium 138   Potassium 3.7   Chloride 106   Carbon Dioxide 26   Urea Nitrogen 9   Creatinine 0.64   GFR Estimate >90   GFR Estimate If Black >90   Calcium 9.0   Anion Gap 6   Albumin 4.0   Protein Total 7.4   Bilirubin Total 0.4   Alkaline Phosphatase 54   ALT 50   AST 33   Glucose 107 (H)   WBC 4.2   Hemoglobin 13.2   Hematocrit 39.7   Platelet Count 319   RBC Count 4.39   MCV 90   MCH 30.1   MCHC 33.2   RDW 14.5   Diff Method Automated Method   % Neutrophils 55.0   % Lymphocytes 29.0   % Monocytes 9.3   % Eosinophils 4.8   %  Basophils 1.4   % Immature Granulocytes 0.5   Nucleated RBCs 0   Absolute Neutrophil 2.3   Absolute Lymphocytes 1.2   Absolute Monocytes 0.4   Absolute Eosinophils 0.2   Absolute Basophils 0.1   Abs Immature Granulocytes 0.0   Absolute Nucleated RBC 0.0       Assessment and Plan:  Kathy Lei is a 53 year old female with clinical stage II, T3N0MX, invasive ductal breast cancer in the upper outer quadrant of right breast, multifocal, measuring 8.9 cm on MRI. ER positive and HER2 negative. She is enrolled in I-SPY 2 and on durvalumab every 28 days, weekly Taxol, and olaparib.      ECOG 1 (due to changing her work schedule due to fatigue)    Right breast cancer, ER/AK positive, HER2 negative: She was enrolled in I-SPY2 and started cycle 1 Durvulumab, Taxol and Olaparib on 2/25/19. Tolerating well aside from some grade 1 fatigue, nausea and now brief episode of neuropathy. She is here for C10.  --Labs reviewed. Will proceed with C10.    --Continues on olaparib.     Abnormal echocardiogram: The patient's echocardiogram has both low strain and low normal ejection fraction.  Both of these tests are very borderline, and she has no symptoms of heart failure.  In addition, she has no historical features that would put her at risk of having heart failure, with the exception of mildly excessive alcohol use.  However, in the setting of future Adriamycin use, she is likely at higher risk than normal for chemotherapy-induced cardiomyopathy.  Therefore, recommend that the patient switch to lisinopril 5 mg instead of amlodipine for cardioprotection.  She should be followed with serial strain imaging throughout the course of her chemotherapy.  --Scheduled for echo and visit with Dr. Alanis on 5/6   Neuropathy: 1 brief episodes in fingers/toes last week. Continue to monitor. Start vitamin B6.  GERD: No recent symptoms. She has prilosec. Tums prn   Nausea: Compazine prn  Maculopapular rash of BLE, grade 1: Resolving Continue  triamcinolone on affected areas and emollient cream  Nasal mucositis, grade 1: Suggested saline nasal spray. Sent to pharmacy  Insomnia: Experienced dizziness/vertigo on Remeron after 1 dose. She states she is not having much insomnia but will let us know if this recurs. Discussed that lorazepam is addictive and not recommended to be taking regularly for insomnia.   Hx depression: On Zoloft  Hx asthma: On flovent BID and albuterol prn  Hx hypothyroidism: Levothyroxine increased from  88mcg daily to 112mcg daily by Dr. Guzman on 4/16/18 due to TSH elevation and symptomatic. Will need to continue to follow TFTs on Durvulumab. TSH down trending. Will check next visit.    Kerry Norwood PA-C  [facility]  [address] [place] [address] [phone]      LIMA Echeverria

## 2019-04-29 NOTE — PATIENT INSTRUCTIONS
Contact Numbers    [facility] Main Line: [phone]  [facility] Triage and after hours / weekends / holidays:  [phone]      Please call the triage or after hours line if you experience a temperature greater than or equal to 100.5, shaking chills, have uncontrolled nausea, vomiting and/or diarrhea, dizziness, shortness of breath, chest pain, bleeding, unexplained bruising, or if you have any other new/concerning symptoms, questions or concerns.      If you are having any concerning symptoms or wish to speak to a provider before your next infusion visit, please call your care coordinator or triage to notify them so we can adequately serve you.     If you need a refill on a narcotic prescription or other medication, please call before your infusion appointment.       April 2019 Sunday Monday Tuesday Wednesday Thursday Friday Saturday        1    [facility]   7:30 AM   (15 min.)    [facility] DRAW   [facility] Lab Draw    [facility] RETURN   7:45 AM   (60 min.)   Jade Olguin PA-C   [facility] ONC INFUSION 360   9:30 AM   (360 min.)    ONCOLOGY INFUSION   [facility] 2    MR BREAST BILATERAL WWO   4:45 PM   (45 min.)   MR1   [facility] MRI 3     4     5     6       7     8    [facility]   7:30 AM   (15 min.)    [facility] DRAW   [facility] Lab Draw    [facility] RETURN   7:45 AM   (50 min.)   Kerry Norwood PA   [facility] ONC INFUSION 120   9:00 AM   (120 min.)    ONCOLOGY INFUSION   [facility] 9     10     11     12     13       14     15     16    [facility] DRAW   8:30 AM   (15 min.)    [facility] DRAW   [facility] Lab Draw    [facility] RETURN   8:45 AM   (30 min.)   Christian Guzman MD   [facility] ONC INFUSION 120  11:00 AM   (120 min.)    ONCOLOGY INFUSION   [facility] 17     18     19     20       21      22    [facility]   8:15 AM   (15 min.)   [facility]   [facility] Draw    [facility] RETURN   8:35 AM   (50 min.)   Kerry Norwood PA   [facility]    [facility] ONC INFUSION 120  10:00 AM   (120 min.)    ONCOLOGY INFUSION   [facility] 23     24     25     26     27       28     29    [facility] DRAW   7:30 AM   (15 min.)   [facility] D[facility]   [facility] Draw    [facility] RETURN   7:45 AM   (50 min.)   Kerry Norwood PA   [facility]    [facility] ONC INFUSION 120   9:00 AM   (120 min.)    ONCOLOGY INFUSION   [facility] 30    [facility] NEW  12:15 PM   (45 min.)   Anne Yousif MD   [facility]                                 May 2019      [place] Monday Tuesday Wednesday Thursday Friday Saturday                  1     2     3     4       5     6    ECHO LIMITED  10:30 AM   (60 min.)   [id number]   [facility]    [facility] RETURN  11:15 AM   (30 min.)   Mary Alanis MD   [facility]    [facility] DRAW  12:30 PM   (15 min.)   [facility] DRAW   [facility] Draw    [facility] RETURN   1:05 PM   (50 min.)   Nasreen Grady PA-C   [facility]    [facility] ONC INFUSION 120   2:00 PM   (120 min.)    ONCOLOGY INFUSION   [facility] 7     8     9     10     11       12     13     14    [facility] DRAW  12:00 PM   (15 min.)   [facility] DRAW   [facility] Draw    [facility] RETURN  12:15 PM   (30 min.)   Christian Guzman MD   [facility]    [facility] ONC INFUSION 120   2:30 PM   (120 min.)    ONCOLOGY INFUSION   [facility] 15     16     17     18       19     20    [facility] DRAW   9:00 AM   (15 min.)   [facility] DRAW   [facility] Draw    [facility] RETURN   9:15 AM   (50 min.)   Nasreen Grady PA-C   [facility]    [facility] ONC INFUSION 120  11:00 AM    (120 min.)    ONCOLOGY [facility] 21     22     23     24     25       26     27     28     29     30     31                         Recent Results (from the past 24 hour(s))   CBC with platelets differential    Collection Time: 04/29/19  7:39 AM   Result Value Ref Range    WBC 4.2 4.0 - 11.0 10e9/L    RBC Count 4.39 3.8 - 5.2 10e12/L    Hemoglobin 13.2 11.7 - 15.7 g/dL    Hematocrit 39.7 35.0 - 47.0 %    MCV 90 78 - 100 fl    MCH 30.1 26.5 - 33.0 pg    MCHC 33.2 31.5 - 36.5 g/dL    RDW 14.5 10.0 - 15.0 %    Platelet Count 319 150 - 450 10e9/L    Diff Method Automated Method     % Neutrophils 55.0 %    % Lymphocytes 29.0 %    % Monocytes 9.3 %    % Eosinophils 4.8 %    % Basophils 1.4 %    % Immature Granulocytes 0.5 %    Nucleated RBCs 0 0 /100    Absolute Neutrophil 2.3 1.6 - 8.3 10e9/L    Absolute Lymphocytes 1.2 0.8 - 5.3 10e9/L    Absolute Monocytes 0.4 0.0 - 1.3 10e9/L    Absolute Eosinophils 0.2 0.0 - 0.7 10e9/L    Absolute Basophils 0.1 0.0 - 0.2 10e9/L    Abs Immature Granulocytes 0.0 0 - 0.4 10e9/L    Absolute Nucleated RBC 0.0    Comprehensive metabolic panel    Collection Time: 04/29/19  7:39 AM   Result Value Ref Range    Sodium 138 133 - 144 mmol/L    Potassium 3.7 3.4 - 5.3 mmol/L    Chloride 106 94 - 109 mmol/L    Carbon Dioxide 26 20 - 32 mmol/L    Anion Gap 6 3 - 14 mmol/L    Glucose 107 (H) 70 - 99 mg/dL    Urea Nitrogen 9 7 - 30 mg/dL    Creatinine 0.64 0.52 - 1.04 mg/dL    GFR Estimate >90 >60 mL/min/[1.73_m2]    GFR Estimate If Black >90 >60 mL/min/[1.73_m2]    Calcium 9.0 8.5 - 10.1 mg/dL    Bilirubin Total 0.4 0.2 - 1.3 mg/dL    Albumin 4.0 3.4 - 5.0 g/dL    Protein Total 7.4 6.8 - 8.8 g/dL    Alkaline Phosphatase 54 40 - 150 U/L    ALT 50 0 - 50 U/L    AST 33 0 - 45 U/L

## 2019-04-29 NOTE — PROGRESS NOTES
Infusion Nursing Note:  Kathy J Quique presents today for Cycle 10 Day 1 Taxol    Patient seen by provider today: Yes: Kerry BELLO PA-C   present during visit today: Not Applicable.    Note: Ok to proceed per study GORAN Hanson    Intravenous Access:  Implanted Port.    Treatment Conditions:  Lab Results   Component Value Date    HGB 13.2 04/29/2019     Lab Results   Component Value Date    WBC 4.2 04/29/2019      Lab Results   Component Value Date    ANEU 2.3 04/29/2019     Lab Results   Component Value Date     04/29/2019      Lab Results   Component Value Date     04/29/2019                   Lab Results   Component Value Date    POTASSIUM 3.7 04/29/2019           Lab Results   Component Value Date    MAG 2.0 02/05/2019            Lab Results   Component Value Date    CR 0.64 04/29/2019                   Lab Results   Component Value Date    BRYANT 9.0 04/29/2019                Lab Results   Component Value Date    BILITOTAL 0.4 04/29/2019           Lab Results   Component Value Date    ALBUMIN 4.0 04/29/2019                    Lab Results   Component Value Date    ALT 50 04/29/2019           Lab Results   Component Value Date    AST 33 04/29/2019       Results reviewed, labs MET treatment parameters, ok to proceed with treatment.      Post Infusion Assessment:  Patient tolerated infusion without incident.  Blood return noted pre and post infusion.  No evidence of extravasations.  Access discontinued per protocol.       Discharge Plan:   Prescription refills given to patient.  Copy of AVS reviewed with patient and/or family.  Patient will return 5/6 for next appointment.  Patient discharged in stable condition accompanied by: self.  Departure Mode: Ambulatory.    Brooke David RN

## 2019-04-29 NOTE — PROGRESS NOTES
Oncology/Hematology Visit Note  Apr 29, 2019    Reason for Visit: follow up of right breast cancer    History of Present Illness: Kathy Lei is a 53 year old female with recent diagnosis of ER/CO positive, HER2 negative, grade 2 breast cancer. Mily presented between Christmas and New Years of 2018 with new sharp pains in the upper outer quadrant of the right breast.  Diagnostic mammogram on 1/8/19 with grouped coarse heterogeneous calcifications at 11:30 position, irregular mass at 9:00 position. US with irregular mass at 11:00 position, 1.0 x 0.9 x 1.6cm. At 9:30 position, irregular mass, 3.2 x 0.5, x 1.3 cm. Contrast mammogram was subsequently performed and the contrast mammogram showed a large area of mass and non-mass-like enhancement in the upper outer right breast measuring 7.2 cm in greatest dimension.      The pathology showed part A, breast needle biopsy 11 o'clock, 6 cm from the nipple, invasive mammary carcinoma, no special type, ductal (and mucinous) Donna grade 2.  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  Calcifications were associated with the DCIS.  There was a focus suspicious for lymphovascular invasion.  Invasive carcinoma was estrogen receptor positive and progesterone receptor negative by immunohistochemistry.  In part B, breast right, 8 o'clock, 4 cm from the nipple, ultrasound-guided core biopsy, invasive mammary carcinoma, no special type, ductal (and mucinous) Donna grade 2, occasional calcifications were noted.  Invasive carcinoma was estrogen receptor positive and progesterone receptor negative by immunohistochemistry.  On quantitation of the ER and CO, ER was positive 99% of the cells staining with strong intensity.  CO was subsequently noted to be positive with 15% of the cells staining with moderate intensity.       There was also a HER2 FISH performed, and the HER2 FISH showed average number of HER2 signals per nucleus  "2.6.  Average number of CEN17 signals 1.7 with a ratio of 1.6, VASILIY negative for biopsy A.  For biopsy B, the HER2 signals per nucleus 2.9.  Average number CEN17 signals per nucleus 1.7 with a ratio of 1.7, VASILIY negative.  Overall, by 2018 ASCO/CAP guidelines, this tumor is HER2 negative.     She was enrolled in I-SPY2 to Durvulumab, Taxol and Olaparib arm. Please see Dr. Guzman's previous notes for further details on the patient's history.     Interval History:  Mily is here for follow up. She started having some \"pins and needles\" sensation in her toes this past week and in her fingertips about 3-4 days ago. This was transient and lasted about 30 minutes. She feels fatigued, but was able to work 4 8 hour days this past week. The rash on lower legs is better. She is using triamcinolone at bedtime only.    Appetite is good. She takes compazine for occasional nausea. No heartburn. No diarrhea or constipation.      She continues to have some bloody mucous after blowing her nose, but no spontaneous nose bleeds. No other bleeding. Denies fevers, cough, dyspnea, chest pain, leg swelling. She has occasional headaches but nothing unusual for her. Denies any other concerns.     Current Outpatient Medications   Medication Sig Dispense Refill     ADVIL 200 MG OR TABS 1 TABLET EVERY 4 TO 6 HOURS AS NEEDED 0 0     albuterol (VENTOLIN HFA) 108 (90 Base) MCG/ACT Inhaler INHALE 1-2 PUFFS INTO THE LUNGS EVERY 4 HOURS AS NEEDED FOR SHORTNESS OF BREATH OR DIFFICULTY BREATHING. 18 g PRN     ASPIRIN NOT PRESCRIBED (INTENTIONAL) Please choose reason not prescribed, below       fluticasone (FLOVENT HFA) 110 MCG/ACT inhaler Inhale 2 puffs into the lungs 2 times daily 1 Inhaler PRN     levothyroxine (SYNTHROID) 112 MCG tablet Take 1 tablet (112 mcg) by mouth daily 30 tablet 1     lidocaine-prilocaine (EMLA) 2.5-2.5 % external cream Apply topically as needed Apply to port site one hour prior to access start six days after port insertion 30 g " 1     lisinopril (PRINIVIL/ZESTRIL) 5 MG tablet Take 1 tablet (5 mg) by mouth daily 90 tablet 3     LORazepam (ATIVAN) 0.5 MG tablet Take 1 tablet (0.5 mg) by mouth every 4 hours as needed (Anxiety, Nausea/Vomiting or Sleep) 30 tablet 2     omeprazole (PRILOSEC) 20 MG DR capsule Take 1 capsule (20 mg) by mouth daily 30 capsule 0     order for DME Cranial prosthesis 1 Units 1     prochlorperazine (COMPAZINE) 10 MG tablet Take 1 tablet (10 mg) by mouth every 6 hours as needed (Nausea/Vomiting) 30 tablet 3     sertraline (ZOLOFT) 50 MG tablet One daily 90 tablet 1     sodium chloride (OCEAN) 0.65 % nasal spray Spray in both nostrils four times daily 30 mL 3     STATIN NOT PRESCRIBED (INTENTIONAL) Please choose reason not prescribed, below       study - olaparib (IDS# [id number]) 100 mg tablet CHEMOTHERAPY Take 1 tablet (100 mg) by mouth every 12 hours Take at approximately the same times each day with an 8 oz (240 ml) glass of water. Tablets should be swallowed whole and not chewed, crushed, dissolved or divided. 60 tablet 0     study - olaparib (IDS# [id number]) 100 mg tablet CHEMOTHERAPY Take 1 tablet (100 mg) by mouth every 12 hours Take at approximately the same times each day with an 8 oz (240 ml) glass of water. Tablets should be swallowed whole and not chewed, crushed, dissolved or divided. 60 tablet 0     study - olaparib (IDS# [id number]) 100 mg tablet CHEMOTHERAPY Take 1 tablet (100 mg) by mouth every 12 hours Take at approximately the same times each day with an 8 oz (240 ml) glass of water. Tablets should be swallowed whole and not chewed, crushed, dissolved or divided. 60 tablet 0     triamcinolone (ARISTOCORT HP) 0.5 % external cream Apply topically 2 times daily 30 g 3     albuterol (2.5 MG/3ML) 0.083% nebulizer solution Take 3 mLs by nebulization once for 1 dose. 3 mL 0       Physical Examination:  General: The patient is a pleasant female in no acute distress. ECOG 1  /81 (BP Location: Left arm, Patient Position:  Chair, Cuff Size: Adult Regular)   Pulse 100   Temp 98.1  F (36.7  C) (Oral)   Wt 69.1 kg (152 lb 4.8 oz)   LMP 11/02/2014   SpO2 96%   BMI 24.60 kg/m    Wt Readings from Last 10 Encounters:   04/29/19 69.1 kg (152 lb 4.8 oz)   04/22/19 70.4 kg (155 lb 4.8 oz)   04/16/19 70.8 kg (156 lb)   04/08/19 69.5 kg (153 lb 3.2 oz)   04/01/19 70.5 kg (155 lb 6.4 oz)   03/26/19 70.1 kg (154 lb 9.6 oz)   03/18/19 69.8 kg (153 lb 12.8 oz)   03/11/19 68.9 kg (151 lb 12.8 oz)   03/06/19 69.4 kg (153 lb)   03/05/19 69.1 kg (152 lb 4.8 oz)     HEENT: EOMI, PERRL. Sclerae are anicteric. Oral mucosa is pink and moist with no lesions or thrush.   Lymph: No palpable cervical, supraclavicular, subclavicular or axillary lymphadenopathy.  Heart: Regular rate and rhythm.   Lungs: Clear to auscultation bilaterally.   Breast: Right breast with 2 x 2 cm mass in upper outer quadrant.  Abdomen: Bowel sounds present, soft, nontender with no palpable hepatosplenomegaly or masses.   Extremities: No lower extremity edema noted bilaterally.   Neuro: Cranial nerves II through XII are grossly intact.  Skin: Maculopapular rash of bilateral LE is resolving. No other rashes, petechiae, or bruising noted on exposed skin.  Laboratory Data:  Results for GALA ROLAND (MRN [id number]) as of 4/29/2019 08:31   4/29/2019 07:39   Sodium 138   Potassium 3.7   Chloride 106   Carbon Dioxide 26   Urea Nitrogen 9   Creatinine 0.64   GFR Estimate >90   GFR Estimate If Black >90   Calcium 9.0   Anion Gap 6   Albumin 4.0   Protein Total 7.4   Bilirubin Total 0.4   Alkaline Phosphatase 54   ALT 50   AST 33   Glucose 107 (H)   WBC 4.2   Hemoglobin 13.2   Hematocrit 39.7   Platelet Count 319   RBC Count 4.39   MCV 90   MCH 30.1   MCHC 33.2   RDW 14.5   Diff Method Automated Method   % Neutrophils 55.0   % Lymphocytes 29.0   % Monocytes 9.3   % Eosinophils 4.8   % Basophils 1.4   % Immature Granulocytes 0.5   Nucleated RBCs 0   Absolute Neutrophil 2.3   Absolute  Lymphocytes 1.2   Absolute Monocytes 0.4   Absolute Eosinophils 0.2   Absolute Basophils 0.1   Abs Immature Granulocytes 0.0   Absolute Nucleated RBC 0.0       Assessment and Plan:  Kathy Lei is a 53 year old female with clinical stage II, T3N0MX, invasive ductal breast cancer in the upper outer quadrant of right breast, multifocal, measuring 8.9 cm on MRI. ER positive and HER2 negative. She is enrolled in I-SPY 2 and on durvalumab every 28 days, weekly Taxol, and olaparib.      ECOG 1 (due to changing her work schedule due to fatigue)    Right breast cancer, ER/MD positive, HER2 negative: She was enrolled in I-SPY2 and started cycle 1 Durvulumab, Taxol and Olaparib on 2/25/19. Tolerating well aside from some grade 1 fatigue, nausea and now brief episode of neuropathy. She is here for C10.  --Labs reviewed. Will proceed with C10.    --Continues on olaparib.     Abnormal echocardiogram: The patient's echocardiogram has both low strain and low normal ejection fraction.  Both of these tests are very borderline, and she has no symptoms of heart failure.  In addition, she has no historical features that would put her at risk of having heart failure, with the exception of mildly excessive alcohol use.  However, in the setting of future Adriamycin use, she is likely at higher risk than normal for chemotherapy-induced cardiomyopathy.  Therefore, recommend that the patient switch to lisinopril 5 mg instead of amlodipine for cardioprotection.  She should be followed with serial strain imaging throughout the course of her chemotherapy.  --Scheduled for echo and visit with Dr. Alanis on 5/6   Neuropathy: 1 brief episodes in fingers/toes last week. Continue to monitor. Start vitamin B6.  GERD: No recent symptoms. She has prilosec. Tums prn   Nausea: Compazine prn  Maculopapular rash of BLE, grade 1: Resolving Continue triamcinolone on affected areas and emollient cream  Nasal mucositis, grade 1: Suggested saline nasal  spray. Sent to pharmacy  Insomnia: Experienced dizziness/vertigo on Remeron after 1 dose. She states she is not having much insomnia but will let us know if this recurs. Discussed that lorazepam is addictive and not recommended to be taking regularly for insomnia.   Hx depression: On Zoloft  Hx asthma: On flovent BID and albuterol prn  Hx hypothyroidism: Levothyroxine increased from  88mcg daily to 112mcg daily by Dr. Guzman on 4/16/18 due to TSH elevation and symptomatic. Will need to continue to follow TFTs on Durvulumab. TSH down trending. Will check next visit.    Kerry Norwood PA-C  [facility]  [address] [place] [address] [phone]

## 2019-04-30 ENCOUNTER — ONCOLOGY VISIT (OUTPATIENT)
Dept: ONCOLOGY | Facility: CLINIC | Age: 54
End: 2019-04-30
Attending: INTERNAL MEDICINE
Payer: COMMERCIAL

## 2019-04-30 ENCOUNTER — RESEARCH ENCOUNTER (OUTPATIENT)
Dept: ONCOLOGY | Facility: CLINIC | Age: 54
End: 2019-04-30

## 2019-04-30 VITALS
WEIGHT: 153.8 LBS | BODY MASS INDEX: 24.72 KG/M2 | HEIGHT: 66 IN | SYSTOLIC BLOOD PRESSURE: 109 MMHG | RESPIRATION RATE: 16 BRPM | DIASTOLIC BLOOD PRESSURE: 79 MMHG | TEMPERATURE: 98 F | HEART RATE: 85 BPM | OXYGEN SATURATION: 97 %

## 2019-04-30 DIAGNOSIS — Z17.0 MALIGNANT NEOPLASM OF UPPER-OUTER QUADRANT OF RIGHT BREAST IN FEMALE, ESTROGEN RECEPTOR POSITIVE (H): ICD-10-CM

## 2019-04-30 DIAGNOSIS — C50.411 MALIGNANT NEOPLASM OF UPPER-OUTER QUADRANT OF RIGHT BREAST IN FEMALE, ESTROGEN RECEPTOR POSITIVE (H): ICD-10-CM

## 2019-04-30 PROCEDURE — G0463 HOSPITAL OUTPT CLINIC VISIT: HCPCS | Mod: ZF

## 2019-04-30 ASSESSMENT — PAIN SCALES - GENERAL: PAINLEVEL: NO PAIN (0)

## 2019-04-30 ASSESSMENT — MIFFLIN-ST. JEOR: SCORE: 1309.06

## 2019-04-30 NOTE — NURSING NOTE
I-SPY2 Study ([id number]): Study Visit Note       Patient here for cycle-10 in the I-SPY2 Study. Since the last study visit patient states that she has been doing okay, appetite is getting better and GERD has resolved. States that she has gotten sad every once in a while, but declines anxiety. States that she is ready for next treatment. Patient continues to report fatigue. States that rash is getting better by using Cortizone cream. Reports that has started feeling sensory neuropathy in hands and feet. Denies any changes to concomitant medications. I have personally interviewed the patient and reviewed her medical record for adverse events and concomitant medications and these have been recorded on the corresponding logs in patient's research file.     Patient was given the opportunity to ask any trial related questions. Please see Kerry Norwood's progress note for physical exam and other clinical information. Labs were reviewed - any significant lab values were addressed and reviewed and patient is okay to continue study treatment at current dose. TSH not tested today, provider will add it for next treatment visit. Provider wants patient to start taking vitamin B-6 for neuropathy, order placed. Patient will start medication as soon as possible. Patient returned study medication that forgot to bring last visit. Patient escorted to infusion center for study treatment. Study appointments scheduled per protocol. Reviewed schedule with patient, no need to make any adjustments. Instructed patient to call back with any questions or concerns. Patient voiced understanding.     IDS number: [id number]  Number of bottles returned: 1  Lot number: [id number]  Number of pills returned: 9    Did the study visit occur within the appropriate window allowed by the protocol? YES    Margie Benitez RN    Office: [phone]  Pager: [phone]      [id number]: Medication Count/IDS Note      Are there any  discrepancies between the amount of medication the patient was instructed to take and the amount recorded as taken in the patient s drug diary?  yes    If yes, provide amount and reason for the discrepancy. The patient forgot to take evening dose on cycle 6 day 7.  Are there any discrepancies between the amount of medication the patient has recorded as taken in the drug diary and the amount that would be expected to be returned based on the amount recorded as taken?  no    Lorne Leos RN     Pager: [phone]      Form 504.00.01 (Version 1)     Effective date: 01JUL2018     Next Review Date: 01JUL2020

## 2019-04-30 NOTE — Clinical Note
4/30/2019       RE: Kathy Lei  [address] [place] [address]     Dear Colleague,    Thank you for referring your patient, Kathy Lei, to the [facility] at [facility]. Please see a copy of my visit note below.    NEW CONSULTATION  Apr 30, 2019    Kathy Lei is a 53 year old woman who presents with a right  breast complaint.  She was referred by Dr Guzman.    Treatment to date:  1. Enrollment onto I-SPY2  2. Neoadjuvant durvalumab, taxol, olaparib (2/25/2019 to ongoing)    HPI:    She noted right breast pain in December 2018 and underwent imaging at that time.  She noted no masses in either breast, axilla, or neck. She denies any nipple discharge or nipple inversion.    Imaging showed heterogeneous enhancement in the outer half of the RIGHT breast, measuring up to 9 cm on breast MRI, without lymphadenopathy.    A biopsy was performed at the 11:00 position 6 cm FN and a clip was placed.  It showed invasive ductal and mucinous carcinoma, grade 2, ER+ ND+ HER2/johan non-amplified.  A biopsy was performed at the 8:00 position 4 cm FN and a clip was placed. It showed invasive ductal and mucinous carcinoma, grade 2, ER+ ND+ HER2/johan non-amplified.    She was evaluated by Dr Guzman in January 2019 and enrolled onto the I-SPY 2 clinical trial.  She is receiving durvalumab every 28 days and paclitaxel weekly with olaparib.    BREAST-SPECIFIC HISTORY:  Prior breast surgeries: Yes - bilateral augmentation with saline implants 20 years ago; right lumpectomy 8:00 (2015, sclerosing papillary lesion without atypia)  Prior radiation history: No  Hormone replacement therapy: No  Bra size: 36B  Dominant hand: Right    FAMILY HISTORY:  Breast ca: No  Ovarian ca: No  Pancreatic ca: No  Gastric ca: No  Melanoma: No  Colon ca: No  Other cancer: No    Past Medical History:   Diagnosis Date     Depressive disorder, not elsewhere classified       Hypothyroidism      Hypothyroidism, unspecified hypothyroidism type      Malignant neoplasm of upper-outer quadrant of right breast in female, estrogen receptor positive (H) 2019     Mild intermittent asthma      Scoliosis (and kyphoscoliosis), idiopathic    No MI, CVA, DM    Past Surgical History:   Procedure Laterality Date     ABDOMEN SURGERY   c section     BACK SURGERY  scoliosis fusion      BIOPSY  breast     BREAST SURGERY  implants      C/SECTION, LOW TRANSVERSE      , Low Transverse     COSMETIC SURGERY  breast implants      INSERT PORT VASCULAR ACCESS Left 3/6/2019    Procedure: Single Lumen Chest Port Placement;  Surgeon: Jerome Dash PA-C;  Location: [facility] OR     IR CHEST PORT PLACEMENT > 5 YRS OF AGE  3/6/2019     SURGICAL HISTORY OF -       spinal fusion- Lacey kim     SURGICAL HISTORY OF -       removal of right breast papilloma   No GA issues    Current Outpatient Medications   Medication Sig Dispense Refill     ADVIL 200 MG OR TABS 1 TABLET EVERY 4 TO 6 HOURS AS NEEDED 0 0     albuterol (2.5 MG/3ML) 0.083% nebulizer solution Take 3 mLs by nebulization once for 1 dose. 3 mL 0     albuterol (VENTOLIN HFA) 108 (90 Base) MCG/ACT Inhaler INHALE 1-2 PUFFS INTO THE LUNGS EVERY 4 HOURS AS NEEDED FOR SHORTNESS OF BREATH OR DIFFICULTY BREATHING. 18 g PRN     ASPIRIN NOT PRESCRIBED (INTENTIONAL) Please choose reason not prescribed, below       fluticasone (FLOVENT HFA) 110 MCG/ACT inhaler Inhale 2 puffs into the lungs 2 times daily 1 Inhaler PRN     levothyroxine (SYNTHROID) 112 MCG tablet Take 1 tablet (112 mcg) by mouth daily 30 tablet 1     lidocaine-prilocaine (EMLA) 2.5-2.5 % external cream Apply topically as needed Apply to port site one hour prior to access start six days after port insertion 30 g 1     lisinopril (PRINIVIL/ZESTRIL) 5 MG tablet Take 1 tablet (5 mg) by mouth daily 90 tablet 3     LORazepam (ATIVAN) 0.5 MG tablet Take 1 tablet (0.5 mg) by mouth  every 4 hours as needed (Anxiety, Nausea/Vomiting or Sleep) 30 tablet 2     omeprazole (PRILOSEC) 20 MG DR capsule Take 1 capsule (20 mg) by mouth daily 30 capsule 0     order for DME Cranial prosthesis 1 Units 1     prochlorperazine (COMPAZINE) 10 MG tablet Take 1 tablet (10 mg) by mouth every 6 hours as needed (Nausea/Vomiting) 30 tablet 3     sertraline (ZOLOFT) 50 MG tablet One daily 90 tablet 1     sodium chloride (OCEAN) 0.65 % nasal spray Spray in both nostrils four times daily 30 mL 3     STATIN NOT PRESCRIBED (INTENTIONAL) Please choose reason not prescribed, below       study - olaparib (IDS# 3903) 100 mg tablet CHEMOTHERAPY Take 1 tablet (100 mg) by mouth every 12 hours Take at approximately the same times each day with an 8 oz (240 ml) glass of water. Tablets should be swallowed whole and not chewed, crushed, dissolved or divided. 60 tablet 0     study - olaparib (IDS# [id number]) 100 mg tablet CHEMOTHERAPY Take 1 tablet (100 mg) by mouth every 12 hours Take at approximately the same times each day with an 8 oz (240 ml) glass of water. Tablets should be swallowed whole and not chewed, crushed, dissolved or divided. 60 tablet 0     study - olaparib (IDS# 3903) 100 mg tablet CHEMOTHERAPY Take 1 tablet (100 mg) by mouth every 12 hours Take at approximately the same times each day with an 8 oz (240 ml) glass of water. Tablets should be swallowed whole and not chewed, crushed, dissolved or divided. 60 tablet 0     triamcinolone (ARISTOCORT HP) 0.5 % external cream Apply topically 2 times daily 30 g 3     vitamin B6 (PYRIDOXINE) 100 MG tablet Take 1 tablet (100 mg) by mouth daily 30 tablet 3           Allergies   Allergen Reactions     Remeron [Mirtazapine]      Dizzy , cant function , balance        SOCIAL HISTORY:  Smokes: No - quit in January 2019  EtOH: Yes occasional  Illicit drugs: No    She works as a hairdresser    ROS:  Easy bruising/bleeding: {Yes / No:346285}    LMP 11/02/2014    Physical Exam  "  Constitutional: She appears well-developed and well-nourished.   Pulmonary/Chest: Right breast exhibits no inverted nipple, no mass, no nipple discharge, no skin change and no tenderness. Left breast exhibits no inverted nipple, no mass, no nipple discharge, no skin change and no tenderness. Breasts are symmetrical.   Patient was examined in both supine and upright positions. Bilateral implants in place.       Lymphadenopathy:     She has no cervical adenopathy.        Right cervical: No superficial cervical, no deep cervical and no posterior cervical adenopathy present.       Left cervical: No superficial cervical, no deep cervical and no posterior cervical adenopathy present.     She has no axillary adenopathy.        Right axillary: No pectoral and no lateral adenopathy present.        Left axillary: No pectoral and no lateral adenopathy present.       Right: No supraclavicular adenopathy present.        Left: No supraclavicular adenopathy present.   No lymphedema in bilateral upper extremities.   Skin: Skin is warm and dry.        INVESTIGATIONS:    Breast MRI (4/2/2019) showed:  Findings: There is no significant change in the right breast cancer since prior 2 exams when it had been described as \"heterogeneous enhancement involving a large part of the upper outer, upper inner and central right breast...\" It again measures 8.9 cm in greatest dimension on axial MIP images.  No suspicious enhancement in the left breast.  Bilateral axillary lymph nodes are symmetric. No internal mammary chain lymphadenopathy identified.  Impression: BI-RADS CATEGORY: 6 - Known Biopsy-Proven Malignancy-Appropriate Action Should Be Taken.    Breast MRI (2/4/2019) showed:  Findings:   Bilateral saline implants. They appear at least partially subpectoral in location superior laterally although there is no discernible thickness of pectoralis muscle over the implants more anteriorly.  Heterogeneous clustered ring enhancement which is " multifocal anteriorly and confluent posteriorly in the upper outer and lower outer right breast measures up to 8.9 cm in greatest dimension measured on the axial MIP images. There is closely contiguous or superficial involvement of the posterior areola. Neoplasm posteriorly tracks along the anterior implant scar capsule.   No suspicious enhancement in the left breast. No internal mammary chain or axillary lymphadenopathy.   Impression: BI-RADS CATEGORY: 6 - Known Biopsy-Proven Malignancy-Appropriate Action Should Be Taken.    Contrast-Enhanced Mammogram (1/14/2019) showed:  Findings: Low energy images demonstrate grouped coarse heterogeneous and find pleomorphic calcifications in the upper outer right breast mid to posterior depth measuring 1.7 cm in greatest dimension. Additionally, left breast amorphous calcifications projecting posterior to the implant on the craniocaudal view which may be capsular. Global asymmetry in the upper outer right breast.  Contrast images demonstrate large area of mass and non-mass enhancement involving the upper outer right breast measuring 7.2 cm in greatest dimension, and corresponding to the global asymmetry in the upper outer right breast. Enhancement extends up to the implant, without evidence of extension to the skin surface or nipple. Enhancement does involve the grouped calcifications described above.   IMPRESSION: BI-RADS CATEGORY: 4 - Suspicious Abnormality-Biopsy Should Be Considered.    Diagnostic Mammogram & Ultrasound (1/8/2019) showed:  BREAST DENSITY: Heterogeneously dense.  Findings:   Mammogram: Bilateral prepectoral saline implants. In the right breast approximately 11:30 position there are grouped coarse heterogeneous calcifications spanning up to 1.3 cm new since 2014. Adjacent to calcifications, 11:00 position posterior depth, is an irregular mass (tomosynthesis CC image 34/56) and in lateral right breast 9:00 position mid-posterior depth, an additional mass with  "obscured margins (tomosynthesis image 17/56). No significant change left breast.   Ultrasound: Targeted right breast ultrasound was performed by radiologist and technologist. In the area of palpable lump, right breast 11:00 position 6 cm from nipple, is an irregular hypoechoic mass with indistinct margins measuring approximately 1.0 x 0.9 x 1.6 cm. Immediately adjacent to mass, at approximately 11:30 position, are microcalcifications. In the area of second palpable lump, right breast 9:30 position 5 cm from nipple, is an irregular hypoechoic mass measuring 3.2 x 0.5 x 1.3 cm. These findings are felt to account for mammographic findings.   Multiple additional round hypoechoic masses, similar to findings at 9:30 position right breast seen incidentally during real-time and not directly palpable.  For example, right breast 8:00 position 4 cm from nipple measuring 0.8 x 0.6 x 0.6 cm.   Survey of the right axilla demonstrates normal appearing lymph nodes.  IMPRESSION: BI-RADS CATEGORY: 4 - Suspicious Abnormality-Biopsy Should Be Considered.    Biopsy (1/14/2019) showed:  A. Right breast 11:00 6 cm from nipple:   Invasive carcinoma involves multiple tissue cores, and is 8 mm in greatest dimension in a single core.  The Donna grade is 2 (tubule formation 3 + nuclear pleomorphism 2 + mitotic activity 2 = [place] score 8).    There are up to 10 mitotic figures in 10 high power fields (field diameter 0.5 mm).   There is a well defined carcinoma in situ component.   E-cadherin shows retained membranous staining in invasive carcinoma and carcinoma in situ, supporting a designation of \"ductal\" for each.   B. Right breast 8:00 4 cm from nipple:   Invasive carcinoma involves multiple tissue cores, and is 6 mm in greatest dimension in a single core.  The Donna grade is 2 (tubule formation 3 + nuclear pleomorphism 2 + mitotic activity 2 = Donna score 8).    There are up to 9 mitotic figures in 10 high power fields " (field diameter  0.5 mm).   There is no definite carcinoma in situ.  Focally, carcinoma shows a nearly solid pattern with occasional fibrovascular cores.  While a diagnosis of solid papillary carcinoma was considered for this component, the majority of the carcinoma in this specimen shows an infiltrative pattern.  Chromogranin and synaptophysin immunostains have been ordered, to evaluate for possible neuroendocrine differentiation.  The results will be   reported in an addendum.  ER positive (99%)  GA positive (15%)  HER2/johan negative    ASSESSMENT:  Kathy Lei is a 53 year old woman with right breast cancer, currently undergoing neoadjuvant systemic therapy.    Her stage is:  Cancer Staging  Malignant neoplasm of upper-outer quadrant of right breast in female, estrogen receptor positive (H)  Staging form: Breast, AJCC 8th Edition  - Clinical: Stage IIA (cT3(m), cN0, cM0, G2, ER: Positive, GA: Positive, HER2: Negative) - Signed by Christian Guzman MD on 1/28/2019    The diagnosis and management of locally advanced breast cancer was discussed with Kathy Lei. After completing the taxol portion of treatment, neoadjuvant AC is planned.  We reviewed that surgical resection is still recommended following completion of neoadjuvant therapy, in the form of either breast conservation (segmental mastectomy plus radiation) or mastectomy.  Kathy Lei IS NOT a candidate for breast conservation therapy owing to the extent of disease at presentation.  Surgery is performed 4-6 weeks following completion of chemotherapy.     We discussed the various types of mastectomy, including total, skin-sparing, and nipple-sparing mastectomy.  We reviewed that the nipple-sparing technique is cosmetic; sensation and contractility will likely be lost.  Kathy Lei is NOT a candidate for nipple-sparing mastectomy owing to the extent of disease at presentation.    The risks of a mastectomy were  discussed with the patient and her , including the risks of bleeding, wound infection, wound dehiscence, skin flap/nipple necrosis, and seroma formation.  Her existing implants will be removed at time of surgery.    The option of having immediate versus delayed reconstruction was also discussed.  Kathy Lei was interested in this; a Plastic Surgery consultation was offered and will be arranged. Depending on the margin and cristina status post-mastectomy, radiation may be necessary.    In addition to the surgical management of the breast, her axilla must be addressed.  She was found to be clinically node negative.  She is a candidate for sentinel lymph node biopsy. This is performed with the combination of the radioactive colloid and lymphazurin. The risks of a sentinel lymph node biopsy were discussed with the patient and her , including the risks of lymphedema (5-10%), bleeding, wound infection, wound dehiscence, seroma formation, and paresthesias. There is also a small risk of anaphylaxis with lymphazurin injection as part of the procedure. There is an approximately 10% false negative rate associated with sentinel lymph node biopsy as published in the literature.  The findings of the sentinel lymph node biopsy may result in the need for further surgery (i.e. Axillary lymph node dissection) versus radiation. There is a 5-10% chance of patients whose sentinel lymph nodes do not map despite dual tracer (radiocolloid and lymphazurin). Should this be the case, we discussed that I would proceed with an axillary lymph node dissection at the index procedure.  The higher risks of an axillary lymph node dissection were also reviewed, including lymphedema (20-30%), bleeding, wound infection, wound dehiscence, seroma formation, nerve injury, limited arm range of motion and paresthesias. We discussed that a drain would be placed intra-operatively should an axillary lymph node dissection be performed.    I  reviewed her case with Dr Guzman, who felt that it would be appropriate to remove her port at the time of surgery if she wishes.    All of the above was discussed with Kathy Lei and all questions were answered.  She elected to proceed with BILATERAL skin-sparing mastectomy, RIGHT axillary sentinel lymph node biopsy, possible axillary lymph node dissection approximately 4-6 weeks following completion of neoadjuvant systemic therapy.    Total time spent with the patient was 60 minutes, of which more than half was counseling.     PLAN:  1.  BILATERAL skin-sparing mastectomy, RIGHT axillary sentinel lymph node biopsy, possible axillary lymph node dissection  2. Plastic surgery consultation  3. Plan for surgery 4-6 weeks after completion of neoadjuvant AC  4. Port may be removed at time of surgery if patient wishes    Anne Yousif MD MSc RUSTC FACS    Division of Surgical Oncology  [facility]     Again, thank you for allowing me to participate in the care of your patient.      Sincerely,    Anne Yousif MD

## 2019-04-30 NOTE — LETTER
2019      RE: Kathy Lei   [address] [place] [address]     2019    Rachel Arauz, NP   [address]  [facility] [address]    RE: Kathy Lei  (: 1965)    Dear Rachel Arauz:    Your patient was seen for evaluation in my office.  Please find a copy of my notes for your record and review.  If you have any further questions, please feel free to contact my office.   Thank you for your kind referral.    Sincerely,   Anne Yousif MD MSc [facility] FACS    ---     NEW CONSULTATION  2019    Kathy Lei is a 53 year old woman who presents with a right  breast complaint.  She was referred by Dr Guzman.    Treatment to date:  1. Enrollment onto I-SPY2  2. Neoadjuvant durvalumab, taxol, olaparib (2019 to ongoing)    HPI:    She noted right breast pain in 2018 and underwent imaging at that time.  She noted no masses in either breast, axilla, or neck. She denies any nipple discharge or nipple inversion.    Imaging showed heterogeneous enhancement in the outer half of the RIGHT breast, measuring up to 9 cm on breast MRI, without lymphadenopathy.    A biopsy was performed at the 11:00 position 6 cm FN and a clip was placed.  It showed invasive ductal and mucinous carcinoma, grade 2, ER+ NM+ HER2/johan non-amplified.  A biopsy was performed at the 8:00 position 4 cm FN and a clip was placed. It showed invasive ductal and mucinous carcinoma, grade 2, ER+ NM+ HER2/johan non-amplified.    She was evaluated by Dr Guzman in 2019 and enrolled onto the I-SPY 2 clinical trial.  She is receiving durvalumab every 28 days and paclitaxel weekly with olaparib.    BREAST-SPECIFIC HISTORY:  Prior breast surgeries: Yes - bilateral augmentation with saline implants 20 years ago; right lumpectomy 8:00 (, sclerosing papillary lesion without atypia)  Prior radiation history: No  Hormone replacement therapy: No  Bra size: 36B  Dominant hand:  Right    FAMILY HISTORY:  Breast ca: No  Ovarian ca: No  Pancreatic ca: No  Gastric ca: No  Melanoma: No  Colon ca: No  Other cancer: No    Past Medical History:   Diagnosis Date     Asthma      Depressive disorder, not elsewhere classified      Hypertension      Hypothyroidism      Hypothyroidism, unspecified hypothyroidism type      Malignant neoplasm of upper-outer quadrant of right breast in female, estrogen receptor positive (H) 2019     Mild intermittent asthma      Scoliosis (and kyphoscoliosis), idiopathic    No MI, CVA, DM    Past Surgical History:   Procedure Laterality Date     ABDOMEN SURGERY   c section     BACK SURGERY  scoliosis fusion      BIOPSY  breast     BREAST SURGERY  implants      C/SECTION, LOW TRANSVERSE      , Low Transverse     COSMETIC SURGERY  breast implants      INSERT PORT VASCULAR ACCESS Left 3/6/2019    Procedure: Single Lumen Chest Port Placement;  Surgeon: Jerome Dash PA-C;  Location: [facility] OR     IR CHEST PORT PLACEMENT > 5 YRS OF AGE  3/6/2019     SURGICAL HISTORY OF -       spinal fusion- Lacey kim     SURGICAL HISTORY OF -       removal of right breast papilloma   No GA issues    Current Outpatient Medications   Medication Sig Dispense Refill     ADVIL 200 MG OR TABS 1 TABLET EVERY 4 TO 6 HOURS AS NEEDED 0 0     albuterol (VENTOLIN HFA) 108 (90 Base) MCG/ACT Inhaler INHALE 1-2 PUFFS INTO THE LUNGS EVERY 4 HOURS AS NEEDED FOR SHORTNESS OF BREATH OR DIFFICULTY BREATHING. 18 g PRN     ASPIRIN NOT PRESCRIBED (INTENTIONAL) Please choose reason not prescribed, below (Patient not taking: Reported on 2019)       fluticasone (FLOVENT HFA) 110 MCG/ACT inhaler Inhale 2 puffs into the lungs 2 times daily 1 Inhaler PRN     levothyroxine (SYNTHROID) 112 MCG tablet Take 1 tablet (112 mcg) by mouth daily 30 tablet 1     lisinopril (PRINIVIL/ZESTRIL) 5 MG tablet Take 1 tablet (5 mg) by mouth daily 90 tablet 3     LORazepam (ATIVAN) 0.5 MG tablet  "Take 1 tablet (0.5 mg) by mouth every 4 hours as needed (Anxiety, Nausea/Vomiting or Sleep) 30 tablet 2     sertraline (ZOLOFT) 50 MG tablet One daily 90 tablet 1     sodium chloride (OCEAN) 0.65 % nasal spray Spray in both nostrils four times daily 30 mL 3     STATIN NOT PRESCRIBED (INTENTIONAL) Please choose reason not prescribed, below (Patient not taking: Reported on 5/6/2019)       study - olaparib (IDS# [id number]) 100 mg tablet CHEMOTHERAPY Take 1 tablet (100 mg) by mouth every 12 hours Take at approximately the same times each day with an 8 oz (240 ml) glass of water. Tablets should be swallowed whole and not chewed, crushed, dissolved or divided. 60 tablet 0     triamcinolone (ARISTOCORT HP) 0.5 % external cream Apply topically 2 times daily 30 g 3     vitamin B6 (PYRIDOXINE) 100 MG tablet Take 1 tablet (100 mg) by mouth daily (Patient not taking: Reported on 5/6/2019) 30 tablet 3     albuterol (2.5 MG/3ML) 0.083% nebulizer solution Take 3 mLs by nebulization once for 1 dose. 3 mL 0     order for DME Cranial prosthesis (Patient not taking: Reported on 4/30/2019) 1 Units 1           Allergies   Allergen Reactions     Remeron [Mirtazapine]      Dizzy , cant function , balance        SOCIAL HISTORY:  Smokes: No - quit in January 2019  EtOH: Yes occasional  Illicit drugs: No    She works as a hairdresser    ROS:  Easy bruising/bleeding: No    /79   Pulse 85   Temp 98  F (36.7  C) (Oral)   Resp 16   Ht 1.676 m (5' 5.98\")   Wt 69.8 kg (153 lb 12.8 oz)   LMP 11/02/2014   SpO2 97%   BMI 24.84 kg/m      Physical Exam   Constitutional: She appears well-developed and well-nourished.   Pulmonary/Chest: Right breast exhibits no inverted nipple, no mass, no nipple discharge, no skin change and no tenderness. Left breast exhibits no inverted nipple, no mass, no nipple discharge, no skin change and no tenderness. Breasts are symmetrical.   Patient was examined in both supine and upright positions. Bilateral " "implants in place.       Lymphadenopathy:     She has no cervical adenopathy.        Right cervical: No superficial cervical, no deep cervical and no posterior cervical adenopathy present.       Left cervical: No superficial cervical, no deep cervical and no posterior cervical adenopathy present.     She has no axillary adenopathy.        Right axillary: No pectoral and no lateral adenopathy present.        Left axillary: No pectoral and no lateral adenopathy present.       Right: No supraclavicular adenopathy present.        Left: No supraclavicular adenopathy present.   No lymphedema in bilateral upper extremities.   Skin: Skin is warm and dry.        INVESTIGATIONS:    Breast MRI (4/2/2019) showed:  Findings: There is no significant change in the right breast cancer since prior 2 exams when it had been described as \"heterogeneous enhancement involving a large part of the upper outer, upper inner and central right breast...\" It again measures 8.9 cm in greatest dimension on axial MIP images.  No suspicious enhancement in the left breast.  Bilateral axillary lymph nodes are symmetric. No internal mammary chain lymphadenopathy identified.  Impression: BI-RADS CATEGORY: 6 - Known Biopsy-Proven Malignancy-Appropriate Action Should Be Taken.    Breast MRI (2/4/2019) showed:  Findings:   Bilateral saline implants. They appear at least partially subpectoral in location superior laterally although there is no discernible thickness of pectoralis muscle over the implants more anteriorly.  Heterogeneous clustered ring enhancement which is multifocal anteriorly and confluent posteriorly in the upper outer and lower outer right breast measures up to 8.9 cm in greatest dimension measured on the axial MIP images. There is closely contiguous or superficial involvement of the posterior areola. Neoplasm posteriorly tracks along the anterior implant scar capsule.   No suspicious enhancement in the left breast. No internal mammary " chain or axillary lymphadenopathy.   Impression: BI-RADS CATEGORY: 6 - Known Biopsy-Proven Malignancy-Appropriate Action Should Be Taken.    Contrast-Enhanced Mammogram (1/14/2019) showed:  Findings: Low energy images demonstrate grouped coarse heterogeneous and find pleomorphic calcifications in the upper outer right breast mid to posterior depth measuring 1.7 cm in greatest dimension. Additionally, left breast amorphous calcifications projecting posterior to the implant on the craniocaudal view which may be capsular. Global asymmetry in the upper outer right breast.  Contrast images demonstrate large area of mass and non-mass enhancement involving the upper outer right breast measuring 7.2 cm in greatest dimension, and corresponding to the global asymmetry in the upper outer right breast. Enhancement extends up to the implant, without evidence of extension to the skin surface or nipple. Enhancement does involve the grouped calcifications described above.   IMPRESSION: BI-RADS CATEGORY: 4 - Suspicious Abnormality-Biopsy Should Be Considered.    Diagnostic Mammogram & Ultrasound (1/8/2019) showed:  BREAST DENSITY: Heterogeneously dense.  Findings:   Mammogram: Bilateral prepectoral saline implants. In the right breast approximately 11:30 position there are grouped coarse heterogeneous calcifications spanning up to 1.3 cm new since 2014. Adjacent to calcifications, 11:00 position posterior depth, is an irregular mass (tomosynthesis CC image 34/56) and in lateral right breast 9:00 position mid-posterior depth, an additional mass with obscured margins (tomosynthesis image 17/56). No significant change left breast.   Ultrasound: Targeted right breast ultrasound was performed by radiologist and technologist. In the area of palpable lump, right breast 11:00 position 6 cm from nipple, is an irregular hypoechoic mass with indistinct margins measuring approximately 1.0 x 0.9 x 1.6 cm. Immediately adjacent to mass, at  "approximately 11:30 position, are microcalcifications. In the area of second palpable lump, right breast 9:30 position 5 cm from nipple, is an irregular hypoechoic mass measuring 3.2 x 0.5 x 1.3 cm. These findings are felt to account for mammographic findings.   Multiple additional round hypoechoic masses, similar to findings at 9:30 position right breast seen incidentally during real-time and not directly palpable.  For example, right breast 8:00 position 4 cm from nipple measuring 0.8 x 0.6 x 0.6 cm.   Survey of the right axilla demonstrates normal appearing lymph nodes.  IMPRESSION: BI-RADS CATEGORY: 4 - Suspicious Abnormality-Biopsy Should Be Considered.    Biopsy (1/14/2019) showed:  A. Right breast 11:00 6 cm from nipple:   Invasive carcinoma involves multiple tissue cores, and is 8 mm in greatest dimension in a single core.  The Donna grade is 2 (tubule formation 3 + nuclear pleomorphism 2 + mitotic activity 2 = [place] score 8).    There are up to 10 mitotic figures in 10 high power fields (field diameter 0.5 mm).   There is a well defined carcinoma in situ component.   E-cadherin shows retained membranous staining in invasive carcinoma and carcinoma in situ, supporting a designation of \"ductal\" for each.   B. Right breast 8:00 4 cm from nipple:   Invasive carcinoma involves multiple tissue cores, and is 6 mm in greatest dimension in a single core.  The [place] grade is 2 (tubule formation 3 + nuclear pleomorphism 2 + mitotic activity 2 = [place] score 8).    There are up to 9 mitotic figures in 10 high power fields (field diameter  0.5 mm).   There is no definite carcinoma in situ.  Focally, carcinoma shows a nearly solid pattern with occasional fibrovascular cores.  While a diagnosis of solid papillary carcinoma was considered for this component, the majority of the carcinoma in this specimen shows an infiltrative pattern.  Chromogranin and synaptophysin immunostains have been ordered, to " evaluate for possible neuroendocrine differentiation.  The results will be   reported in an addendum.  ER positive (99%)  VA positive (15%)  HER2/johan negative    ASSESSMENT:  Kathy Lei is a 53 year old woman with right breast cancer, currently undergoing neoadjuvant systemic therapy.    Her stage is:  Cancer Staging  Malignant neoplasm of upper-outer quadrant of right breast in female, estrogen receptor positive (H)  Staging form: Breast, AJCC 8th Edition  - Clinical: Stage IIA (cT3(m), cN0, cM0, G2, ER: Positive, VA: Positive, HER2: Negative) - Signed by Christian Guzman MD on 1/28/2019    The diagnosis and management of locally advanced breast cancer was discussed with Kathy Lei. After completing the taxol portion of treatment, neoadjuvant AC is planned.  We reviewed that surgical resection is still recommended following completion of neoadjuvant therapy, in the form of either breast conservation (segmental mastectomy plus radiation) or mastectomy.  Kathy Lei IS NOT a candidate for breast conservation therapy owing to the extent of disease at presentation.  Surgery is performed 4-6 weeks following completion of chemotherapy.     We discussed the various types of mastectomy, including total, skin-sparing, and nipple-sparing mastectomy.  We reviewed that the nipple-sparing technique is cosmetic; sensation and contractility will likely be lost.  Kathy Lei is NOT a candidate for nipple-sparing mastectomy owing to the extent of disease at presentation.    The risks of a mastectomy were discussed with the patient and her , including the risks of bleeding, wound infection, wound dehiscence, skin flap/nipple necrosis, and seroma formation.  Her existing implants will be removed at time of surgery.    The option of having immediate versus delayed reconstruction was also discussed.  Kathy Lei was interested in this; a Plastic Surgery consultation was  offered and will be arranged. Depending on the margin and cristina status post-mastectomy, radiation may be necessary.    In addition to the surgical management of the breast, her axilla must be addressed.  She was found to be clinically node negative.  She is a candidate for sentinel lymph node biopsy. This is performed with the combination of the radioactive colloid and lymphazurin. The risks of a sentinel lymph node biopsy were discussed with the patient and her , including the risks of lymphedema (5-10%), bleeding, wound infection, wound dehiscence, seroma formation, and paresthesias. There is also a small risk of anaphylaxis with lymphazurin injection as part of the procedure. There is an approximately 10% false negative rate associated with sentinel lymph node biopsy as published in the literature.  The findings of the sentinel lymph node biopsy may result in the need for further surgery (i.e. Axillary lymph node dissection) versus radiation. There is a 5-10% chance of patients whose sentinel lymph nodes do not map despite dual tracer (radiocolloid and lymphazurin). Should this be the case, we discussed that I would proceed with an axillary lymph node dissection at the index procedure.  The higher risks of an axillary lymph node dissection were also reviewed, including lymphedema (20-30%), bleeding, wound infection, wound dehiscence, seroma formation, nerve injury, limited arm range of motion and paresthesias. We discussed that a drain would be placed intra-operatively should an axillary lymph node dissection be performed.    I reviewed her case with Dr Guzman, who felt that it would be appropriate to remove her port at the time of surgery if she wishes.    All of the above was discussed with Kathy Lei and all questions were answered.  She elected to proceed with BILATERAL skin-sparing mastectomy, RIGHT axillary sentinel lymph node biopsy, possible axillary lymph node dissection approximately  4-6 weeks following completion of neoadjuvant systemic therapy.    Total time spent with the patient was 60 minutes, of which more than half was counseling.     PLAN:  1.  BILATERAL skin-sparing mastectomy, RIGHT axillary sentinel lymph node biopsy, possible axillary lymph node dissection  2. Plastic surgery consultation  3. Plan for surgery 4-6 weeks after completion of neoadjuvant AC  4. Port may be removed at time of surgery if patient wishes    Anne Yousif MD MSc [facility]    Division of Surgical Oncology  [facility]

## 2019-04-30 NOTE — NURSING NOTE
"Oncology Rooming Note    April 30, 2019 12:28 PM   Kathy Lei is a 53 year old female who presents for:    Chief Complaint   Patient presents with     New Patient     NEW PT; BREAST CA; VITALS COMPLETED BY CMA      Initial Vitals: /79   Pulse 85   Temp 98  F (36.7  C) (Oral)   Resp 16   Ht 1.676 m (5' 5.98\")   Wt 69.8 kg (153 lb 12.8 oz)   LMP 11/02/2014   SpO2 97%   BMI 24.84 kg/m   Estimated body mass index is 24.84 kg/m  as calculated from the following:    Height as of this encounter: 1.676 m (5' 5.98\").    Weight as of this encounter: 69.8 kg (153 lb 12.8 oz). Body surface area is 1.8 meters squared.  No Pain (0) Comment: Data Unavailable   Patient's last menstrual period was 11/02/2014.  Allergies reviewed: Yes  Medications reviewed: Yes    Medications: Medication refills not needed today.  Pharmacy name entered into [facility]:    [id number] - [place], [place] - [address] [place] [address] [id number] - [place], [place] - [address] AT [facility] OF [address]    Clinical concerns: No new concerns today  Dr. Yousif was notified.      Shweta Ibrahim              "

## 2019-04-30 NOTE — Clinical Note
4/30/2019      RE: Kathy Lei  [address] [place] [address]       NEW CONSULTATION  Apr 30, 2019    Kathy Lei is a 53 year old woman who presents with a right  breast complaint.  She was referred by Dr Guzman.    Treatment to date:  1. Enrollment onto I-SPY2  2. Neoadjuvant durvalumab, taxol, olaparib (2/25/2019 to ongoing)    HPI:    She noted right breast pain in December 2018 and underwent imaging at that time.  She noted no masses in either breast, axilla, or neck. She denies any nipple discharge or nipple inversion.    Imaging showed heterogeneous enhancement in the outer half of the RIGHT breast, measuring up to 9 cm on breast MRI, without lymphadenopathy.    A biopsy was performed at the 11:00 position 6 cm FN and a clip was placed.  It showed invasive ductal and mucinous carcinoma, grade 2, ER+ OK+ HER2/johan non-amplified.  A biopsy was performed at the 8:00 position 4 cm FN and a clip was placed. It showed invasive ductal and mucinous carcinoma, grade 2, ER+ OK+ HER2/johan non-amplified.    She was evaluated by Dr Guzman in January 2019 and enrolled onto the I-SPY 2 clinical trial.  She is receiving durvalumab every 28 days and paclitaxel weekly with olaparib.    BREAST-SPECIFIC HISTORY:  Prior breast surgeries: Yes - bilateral augmentation with saline implants 20 years ago; right lumpectomy 8:00 (2015, sclerosing papillary lesion without atypia)  Prior radiation history: No  Hormone replacement therapy: No  Bra size: 36B  Dominant hand: Right    FAMILY HISTORY:  Breast ca: No  Ovarian ca: No  Pancreatic ca: No  Gastric ca: No  Melanoma: No  Colon ca: No  Other cancer: No    Past Medical History:   Diagnosis Date     Depressive disorder, not elsewhere classified      Hypothyroidism      Hypothyroidism, unspecified hypothyroidism type      Malignant neoplasm of upper-outer quadrant of right breast in female, estrogen receptor positive (H) 1/28/2019     Mild intermittent  asthma      Scoliosis (and kyphoscoliosis), idiopathic    No MI, CVA, DM    Past Surgical History:   Procedure Laterality Date     ABDOMEN SURGERY   c section     BACK SURGERY  scoliosis fusion      BIOPSY  breast     BREAST SURGERY  implants      C/SECTION, LOW TRANSVERSE      , Low Transverse     COSMETIC SURGERY  breast implants      INSERT PORT VASCULAR ACCESS Left 3/6/2019    Procedure: Single Lumen Chest Port Placement;  Surgeon: Jerome Dash PA-C;  Location: [facility] OR     IR CHEST PORT PLACEMENT > 5 YRS OF AGE  3/6/2019     SURGICAL HISTORY OF -       spinal fusion- Lacey kim     SURGICAL HISTORY OF -       removal of right breast papilloma   No GA issues    Current Outpatient Medications   Medication Sig Dispense Refill     ADVIL 200 MG OR TABS 1 TABLET EVERY 4 TO 6 HOURS AS NEEDED 0 0     albuterol (2.5 MG/3ML) 0.083% nebulizer solution Take 3 mLs by nebulization once for 1 dose. 3 mL 0     albuterol (VENTOLIN HFA) 108 (90 Base) MCG/ACT Inhaler INHALE 1-2 PUFFS INTO THE LUNGS EVERY 4 HOURS AS NEEDED FOR SHORTNESS OF BREATH OR DIFFICULTY BREATHING. 18 g PRN     ASPIRIN NOT PRESCRIBED (INTENTIONAL) Please choose reason not prescribed, below       fluticasone (FLOVENT HFA) 110 MCG/ACT inhaler Inhale 2 puffs into the lungs 2 times daily 1 Inhaler PRN     levothyroxine (SYNTHROID) 112 MCG tablet Take 1 tablet (112 mcg) by mouth daily 30 tablet 1     lidocaine-prilocaine (EMLA) 2.5-2.5 % external cream Apply topically as needed Apply to port site one hour prior to access start six days after port insertion 30 g 1     lisinopril (PRINIVIL/ZESTRIL) 5 MG tablet Take 1 tablet (5 mg) by mouth daily 90 tablet 3     LORazepam (ATIVAN) 0.5 MG tablet Take 1 tablet (0.5 mg) by mouth every 4 hours as needed (Anxiety, Nausea/Vomiting or Sleep) 30 tablet 2     omeprazole (PRILOSEC) 20 MG DR capsule Take 1 capsule (20 mg) by mouth daily 30 capsule 0     order for DME Cranial prosthesis 1 Units  1     prochlorperazine (COMPAZINE) 10 MG tablet Take 1 tablet (10 mg) by mouth every 6 hours as needed (Nausea/Vomiting) 30 tablet 3     sertraline (ZOLOFT) 50 MG tablet One daily 90 tablet 1     sodium chloride (OCEAN) 0.65 % nasal spray Spray in both nostrils four times daily 30 mL 3     STATIN NOT PRESCRIBED (INTENTIONAL) Please choose reason not prescribed, below       study - olaparib (IDS# 3903) 100 mg tablet CHEMOTHERAPY Take 1 tablet (100 mg) by mouth every 12 hours Take at approximately the same times each day with an 8 oz (240 ml) glass of water. Tablets should be swallowed whole and not chewed, crushed, dissolved or divided. 60 tablet 0     study - olaparib (IDS# [id number]) 100 mg tablet CHEMOTHERAPY Take 1 tablet (100 mg) by mouth every 12 hours Take at approximately the same times each day with an 8 oz (240 ml) glass of water. Tablets should be swallowed whole and not chewed, crushed, dissolved or divided. 60 tablet 0     study - olaparib (IDS# [id number]) 100 mg tablet CHEMOTHERAPY Take 1 tablet (100 mg) by mouth every 12 hours Take at approximately the same times each day with an 8 oz (240 ml) glass of water. Tablets should be swallowed whole and not chewed, crushed, dissolved or divided. 60 tablet 0     triamcinolone (ARISTOCORT HP) 0.5 % external cream Apply topically 2 times daily 30 g 3     vitamin B6 (PYRIDOXINE) 100 MG tablet Take 1 tablet (100 mg) by mouth daily 30 tablet 3           Allergies   Allergen Reactions     Remeron [Mirtazapine]      Dizzy , cant function , balance        SOCIAL HISTORY:  Smokes: No - quit in January 2019  EtOH: Yes occasional  Illicit drugs: No    She works as a hairdresser    ROS:  Easy bruising/bleeding: {Yes / No:209314}    LMP 11/02/2014    Physical Exam   Constitutional: She appears well-developed and well-nourished.   Pulmonary/Chest: Right breast exhibits no inverted nipple, no mass, no nipple discharge, no skin change and no tenderness. Left breast exhibits no inverted  "nipple, no mass, no nipple discharge, no skin change and no tenderness. Breasts are symmetrical.   Patient was examined in both supine and upright positions. Bilateral implants in place.       Lymphadenopathy:     She has no cervical adenopathy.        Right cervical: No superficial cervical, no deep cervical and no posterior cervical adenopathy present.       Left cervical: No superficial cervical, no deep cervical and no posterior cervical adenopathy present.     She has no axillary adenopathy.        Right axillary: No pectoral and no lateral adenopathy present.        Left axillary: No pectoral and no lateral adenopathy present.       Right: No supraclavicular adenopathy present.        Left: No supraclavicular adenopathy present.   No lymphedema in bilateral upper extremities.   Skin: Skin is warm and dry.        INVESTIGATIONS:    Breast MRI (4/2/2019) showed:  Findings: There is no significant change in the right breast cancer since prior 2 exams when it had been described as \"heterogeneous enhancement involving a large part of the upper outer, upper inner and central right breast...\" It again measures 8.9 cm in greatest dimension on axial MIP images.  No suspicious enhancement in the left breast.  Bilateral axillary lymph nodes are symmetric. No internal mammary chain lymphadenopathy identified.  Impression: BI-RADS CATEGORY: 6 - Known Biopsy-Proven Malignancy-Appropriate Action Should Be Taken.    Breast MRI (2/4/2019) showed:  Findings:   Bilateral saline implants. They appear at least partially subpectoral in location superior laterally although there is no discernible thickness of pectoralis muscle over the implants more anteriorly.  Heterogeneous clustered ring enhancement which is multifocal anteriorly and confluent posteriorly in the upper outer and lower outer right breast measures up to 8.9 cm in greatest dimension measured on the axial MIP images. There is closely contiguous or superficial " involvement of the posterior areola. Neoplasm posteriorly tracks along the anterior implant scar capsule.   No suspicious enhancement in the left breast. No internal mammary chain or axillary lymphadenopathy.   Impression: BI-RADS CATEGORY: 6 - Known Biopsy-Proven Malignancy-Appropriate Action Should Be Taken.    Contrast-Enhanced Mammogram (1/14/2019) showed:  Findings: Low energy images demonstrate grouped coarse heterogeneous and find pleomorphic calcifications in the upper outer right breast mid to posterior depth measuring 1.7 cm in greatest dimension. Additionally, left breast amorphous calcifications projecting posterior to the implant on the craniocaudal view which may be capsular. Global asymmetry in the upper outer right breast.  Contrast images demonstrate large area of mass and non-mass enhancement involving the upper outer right breast measuring 7.2 cm in greatest dimension, and corresponding to the global asymmetry in the upper outer right breast. Enhancement extends up to the implant, without evidence of extension to the skin surface or nipple. Enhancement does involve the grouped calcifications described above.   IMPRESSION: BI-RADS CATEGORY: 4 - Suspicious Abnormality-Biopsy Should Be Considered.    Diagnostic Mammogram & Ultrasound (1/8/2019) showed:  BREAST DENSITY: Heterogeneously dense.  Findings:   Mammogram: Bilateral prepectoral saline implants. In the right breast approximately 11:30 position there are grouped coarse heterogeneous calcifications spanning up to 1.3 cm new since 2014. Adjacent to calcifications, 11:00 position posterior depth, is an irregular mass (tomosynthesis CC image 34/56) and in lateral right breast 9:00 position mid-posterior depth, an additional mass with obscured margins (tomosynthesis image 17/56). No significant change left breast.   Ultrasound: Targeted right breast ultrasound was performed by radiologist and technologist. In the area of palpable lump, right breast  "11:00 position 6 cm from nipple, is an irregular hypoechoic mass with indistinct margins measuring approximately 1.0 x 0.9 x 1.6 cm. Immediately adjacent to mass, at approximately 11:30 position, are microcalcifications. In the area of second palpable lump, right breast 9:30 position 5 cm from nipple, is an irregular hypoechoic mass measuring 3.2 x 0.5 x 1.3 cm. These findings are felt to account for mammographic findings.   Multiple additional round hypoechoic masses, similar to findings at 9:30 position right breast seen incidentally during real-time and not directly palpable.  For example, right breast 8:00 position 4 cm from nipple measuring 0.8 x 0.6 x 0.6 cm.   Survey of the right axilla demonstrates normal appearing lymph nodes.  IMPRESSION: BI-RADS CATEGORY: 4 - Suspicious Abnormality-Biopsy Should Be Considered.    Biopsy (1/14/2019) showed:  A. Right breast 11:00 6 cm from nipple:   Invasive carcinoma involves multiple tissue cores, and is 8 mm in greatest dimension in a single core.  The Donna grade is 2 (tubule formation 3 + nuclear pleomorphism 2 + mitotic activity 2 = Prinsburg score 8).    There are up to 10 mitotic figures in 10 high power fields (field diameter 0.5 mm).   There is a well defined carcinoma in situ component.   E-cadherin shows retained membranous staining in invasive carcinoma and carcinoma in situ, supporting a designation of \"ductal\" for each.   B. Right breast 8:00 4 cm from nipple:   Invasive carcinoma involves multiple tissue cores, and is 6 mm in greatest dimension in a single core.  The Donna grade is 2 (tubule formation 3 + nuclear pleomorphism 2 + mitotic activity 2 = Donna score 8).    There are up to 9 mitotic figures in 10 high power fields (field diameter  0.5 mm).   There is no definite carcinoma in situ.  Focally, carcinoma shows a nearly solid pattern with occasional fibrovascular cores.  While a diagnosis of solid papillary carcinoma was considered " for this component, the majority of the carcinoma in this specimen shows an infiltrative pattern.  Chromogranin and synaptophysin immunostains have been ordered, to evaluate for possible neuroendocrine differentiation.  The results will be   reported in an addendum.  ER positive (99%)  AR positive (15%)  HER2/johan negative    ASSESSMENT:  Kathy Lei is a 53 year old woman with right breast cancer, currently undergoing neoadjuvant systemic therapy.    Her stage is:  Cancer Staging  Malignant neoplasm of upper-outer quadrant of right breast in female, estrogen receptor positive (H)  Staging form: Breast, AJCC 8th Edition  - Clinical: Stage IIA (cT3(m), cN0, cM0, G2, ER: Positive, AR: Positive, HER2: Negative) - Signed by Christian Guzman MD on 1/28/2019    The diagnosis and management of locally advanced breast cancer was discussed with Kathy Lei. After completing the taxol portion of treatment, neoadjuvant AC is planned.  We reviewed that surgical resection is still recommended following completion of neoadjuvant therapy, in the form of either breast conservation (segmental mastectomy plus radiation) or mastectomy.  aKthy Lei IS NOT a candidate for breast conservation therapy owing to the extent of disease at presentation.  Surgery is performed 4-6 weeks following completion of chemotherapy.     We discussed the various types of mastectomy, including total, skin-sparing, and nipple-sparing mastectomy.  We reviewed that the nipple-sparing technique is cosmetic; sensation and contractility will likely be lost.  Kathy Lei is NOT a candidate for nipple-sparing mastectomy owing to the extent of disease at presentation.    The risks of a mastectomy were discussed with the patient and her , including the risks of bleeding, wound infection, wound dehiscence, skin flap/nipple necrosis, and seroma formation.  Her existing implants will be removed at time of  surgery.    The option of having immediate versus delayed reconstruction was also discussed.  Kathy Lei was interested in this; a Plastic Surgery consultation was offered and will be arranged. Depending on the margin and cristina status post-mastectomy, radiation may be necessary.    In addition to the surgical management of the breast, her axilla must be addressed.  She was found to be clinically node negative.  She is a candidate for sentinel lymph node biopsy. This is performed with the combination of the radioactive colloid and lymphazurin. The risks of a sentinel lymph node biopsy were discussed with the patient and her , including the risks of lymphedema (5-10%), bleeding, wound infection, wound dehiscence, seroma formation, and paresthesias. There is also a small risk of anaphylaxis with lymphazurin injection as part of the procedure. There is an approximately 10% false negative rate associated with sentinel lymph node biopsy as published in the literature.  The findings of the sentinel lymph node biopsy may result in the need for further surgery (i.e. Axillary lymph node dissection) versus radiation. There is a 5-10% chance of patients whose sentinel lymph nodes do not map despite dual tracer (radiocolloid and lymphazurin). Should this be the case, we discussed that I would proceed with an axillary lymph node dissection at the index procedure.  The higher risks of an axillary lymph node dissection were also reviewed, including lymphedema (20-30%), bleeding, wound infection, wound dehiscence, seroma formation, nerve injury, limited arm range of motion and paresthesias. We discussed that a drain would be placed intra-operatively should an axillary lymph node dissection be performed.    I reviewed her case with Dr Guzman, who felt that it would be appropriate to remove her port at the time of surgery if she wishes.    All of the above was discussed with Kathy Lei and all questions were  answered.  She elected to proceed with BILATERAL skin-sparing mastectomy, RIGHT axillary sentinel lymph node biopsy, possible axillary lymph node dissection approximately 4-6 weeks following completion of neoadjuvant systemic therapy.    Total time spent with the patient was 60 minutes, of which more than half was counseling.     PLAN:  1.  BILATERAL skin-sparing mastectomy, RIGHT axillary sentinel lymph node biopsy, possible axillary lymph node dissection  2. Plastic surgery consultation  3. Plan for surgery 4-6 weeks after completion of neoadjuvant AC  4. Port may be removed at time of surgery if patient wishes    Anne Yousif MD MSc [facility] FACS    Division of Surgical Oncology  [facility]     Anne Yousif MD

## 2019-04-30 NOTE — PROGRESS NOTES
NEW CONSULTATION  Apr 30, 2019    Kathy Lei is a 53 year old woman who presents with a right  breast complaint.  She was referred by Dr Guzman.    Treatment to date:  1. Enrollment onto I-SPY2  2. Neoadjuvant durvalumab, taxol, olaparib (2/25/2019 to ongoing)    HPI:    She noted right breast pain in December 2018 and underwent imaging at that time.  She noted no masses in either breast, axilla, or neck. She denies any nipple discharge or nipple inversion.    Imaging showed heterogeneous enhancement in the outer half of the RIGHT breast, measuring up to 9 cm on breast MRI, without lymphadenopathy.    A biopsy was performed at the 11:00 position 6 cm FN and a clip was placed.  It showed invasive ductal and mucinous carcinoma, grade 2, ER+ MD+ HER2/johan non-amplified.  A biopsy was performed at the 8:00 position 4 cm FN and a clip was placed. It showed invasive ductal and mucinous carcinoma, grade 2, ER+ MD+ HER2/johan non-amplified.    She was evaluated by Dr Guzman in January 2019 and enrolled onto the I-SPY 2 clinical trial.  She is receiving durvalumab every 28 days and paclitaxel weekly with olaparib.    BREAST-SPECIFIC HISTORY:  Prior breast surgeries: Yes - bilateral augmentation with saline implants 20 years ago; right lumpectomy 8:00 (2015, sclerosing papillary lesion without atypia)  Prior radiation history: No  Hormone replacement therapy: No  Bra size: 36B  Dominant hand: Right    FAMILY HISTORY:  Breast ca: No  Ovarian ca: No  Pancreatic ca: No  Gastric ca: No  Melanoma: No  Colon ca: No  Other cancer: No    Past Medical History:   Diagnosis Date     Asthma      Depressive disorder, not elsewhere classified      Hypertension      Hypothyroidism      Hypothyroidism, unspecified hypothyroidism type      Malignant neoplasm of upper-outer quadrant of right breast in female, estrogen receptor positive (H) 1/28/2019     Mild intermittent asthma      Scoliosis (and kyphoscoliosis), idiopathic    No MI,  CVA, DM    Past Surgical History:   Procedure Laterality Date     ABDOMEN SURGERY   c section     BACK SURGERY  scoliosis fusion      BIOPSY  breast     BREAST SURGERY  implants      C/SECTION, LOW TRANSVERSE      , Low Transverse     COSMETIC SURGERY  breast implants      INSERT PORT VASCULAR ACCESS Left 3/6/2019    Procedure: Single Lumen Chest Port Placement;  Surgeon: Jerome Dash PA-C;  Location: [facility] OR     IR CHEST PORT PLACEMENT > 5 YRS OF AGE  3/6/2019     SURGICAL HISTORY OF -       spinal fusion- Lacey kim     SURGICAL HISTORY OF -       removal of right breast papilloma   No GA issues    Current Outpatient Medications   Medication Sig Dispense Refill     ADVIL 200 MG OR TABS 1 TABLET EVERY 4 TO 6 HOURS AS NEEDED 0 0     albuterol (VENTOLIN HFA) 108 (90 Base) MCG/ACT Inhaler INHALE 1-2 PUFFS INTO THE LUNGS EVERY 4 HOURS AS NEEDED FOR SHORTNESS OF BREATH OR DIFFICULTY BREATHING. 18 g PRN     ASPIRIN NOT PRESCRIBED (INTENTIONAL) Please choose reason not prescribed, below (Patient not taking: Reported on 2019)       fluticasone (FLOVENT HFA) 110 MCG/ACT inhaler Inhale 2 puffs into the lungs 2 times daily 1 Inhaler PRN     levothyroxine (SYNTHROID) 112 MCG tablet Take 1 tablet (112 mcg) by mouth daily 30 tablet 1     lisinopril (PRINIVIL/ZESTRIL) 5 MG tablet Take 1 tablet (5 mg) by mouth daily 90 tablet 3     LORazepam (ATIVAN) 0.5 MG tablet Take 1 tablet (0.5 mg) by mouth every 4 hours as needed (Anxiety, Nausea/Vomiting or Sleep) 30 tablet 2     sertraline (ZOLOFT) 50 MG tablet One daily 90 tablet 1     sodium chloride (OCEAN) 0.65 % nasal spray Spray in both nostrils four times daily 30 mL 3     STATIN NOT PRESCRIBED (INTENTIONAL) Please choose reason not prescribed, below (Patient not taking: Reported on 2019)       study - olaparib (IDS# [id number]) 100 mg tablet CHEMOTHERAPY Take 1 tablet (100 mg) by mouth every 12 hours Take at approximately the same times each day  "with an 8 oz (240 ml) glass of water. Tablets should be swallowed whole and not chewed, crushed, dissolved or divided. 60 tablet 0     triamcinolone (ARISTOCORT HP) 0.5 % external cream Apply topically 2 times daily 30 g 3     vitamin B6 (PYRIDOXINE) 100 MG tablet Take 1 tablet (100 mg) by mouth daily (Patient not taking: Reported on 5/6/2019) 30 tablet 3     albuterol (2.5 MG/3ML) 0.083% nebulizer solution Take 3 mLs by nebulization once for 1 dose. 3 mL 0     order for DME Cranial prosthesis (Patient not taking: Reported on 4/30/2019) 1 Units 1           Allergies   Allergen Reactions     Remeron [Mirtazapine]      Dizzy , cant function , balance        SOCIAL HISTORY:  Smokes: No - quit in January 2019  EtOH: Yes occasional  Illicit drugs: No    She works as a hairdresser    ROS:  Easy bruising/bleeding: No    /79   Pulse 85   Temp 98  F (36.7  C) (Oral)   Resp 16   Ht 1.676 m (5' 5.98\")   Wt 69.8 kg (153 lb 12.8 oz)   LMP 11/02/2014   SpO2 97%   BMI 24.84 kg/m     Physical Exam   Constitutional: She appears well-developed and well-nourished.   Pulmonary/Chest: Right breast exhibits no inverted nipple, no mass, no nipple discharge, no skin change and no tenderness. Left breast exhibits no inverted nipple, no mass, no nipple discharge, no skin change and no tenderness. Breasts are symmetrical.   Patient was examined in both supine and upright positions. Bilateral implants in place.       Lymphadenopathy:     She has no cervical adenopathy.        Right cervical: No superficial cervical, no deep cervical and no posterior cervical adenopathy present.       Left cervical: No superficial cervical, no deep cervical and no posterior cervical adenopathy present.     She has no axillary adenopathy.        Right axillary: No pectoral and no lateral adenopathy present.        Left axillary: No pectoral and no lateral adenopathy present.       Right: No supraclavicular adenopathy present.        Left: No " "supraclavicular adenopathy present.   No lymphedema in bilateral upper extremities.   Skin: Skin is warm and dry.        INVESTIGATIONS:    Breast MRI (4/2/2019) showed:  Findings: There is no significant change in the right breast cancer since prior 2 exams when it had been described as \"heterogeneous enhancement involving a large part of the upper outer, upper inner and central right breast...\" It again measures 8.9 cm in greatest dimension on axial MIP images.  No suspicious enhancement in the left breast.  Bilateral axillary lymph nodes are symmetric. No internal mammary chain lymphadenopathy identified.  Impression: BI-RADS CATEGORY: 6 - Known Biopsy-Proven Malignancy-Appropriate Action Should Be Taken.    Breast MRI (2/4/2019) showed:  Findings:   Bilateral saline implants. They appear at least partially subpectoral in location superior laterally although there is no discernible thickness of pectoralis muscle over the implants more anteriorly.  Heterogeneous clustered ring enhancement which is multifocal anteriorly and confluent posteriorly in the upper outer and lower outer right breast measures up to 8.9 cm in greatest dimension measured on the axial MIP images. There is closely contiguous or superficial involvement of the posterior areola. Neoplasm posteriorly tracks along the anterior implant scar capsule.   No suspicious enhancement in the left breast. No internal mammary chain or axillary lymphadenopathy.   Impression: BI-RADS CATEGORY: 6 - Known Biopsy-Proven Malignancy-Appropriate Action Should Be Taken.    Contrast-Enhanced Mammogram (1/14/2019) showed:  Findings: Low energy images demonstrate grouped coarse heterogeneous and find pleomorphic calcifications in the upper outer right breast mid to posterior depth measuring 1.7 cm in greatest dimension. Additionally, left breast amorphous calcifications projecting posterior to the implant on the craniocaudal view which may be capsular. Global asymmetry " in the upper outer right breast.  Contrast images demonstrate large area of mass and non-mass enhancement involving the upper outer right breast measuring 7.2 cm in greatest dimension, and corresponding to the global asymmetry in the upper outer right breast. Enhancement extends up to the implant, without evidence of extension to the skin surface or nipple. Enhancement does involve the grouped calcifications described above.   IMPRESSION: BI-RADS CATEGORY: 4 - Suspicious Abnormality-Biopsy Should Be Considered.    Diagnostic Mammogram & Ultrasound (1/8/2019) showed:  BREAST DENSITY: Heterogeneously dense.  Findings:   Mammogram: Bilateral prepectoral saline implants. In the right breast approximately 11:30 position there are grouped coarse heterogeneous calcifications spanning up to 1.3 cm new since 2014. Adjacent to calcifications, 11:00 position posterior depth, is an irregular mass (tomosynthesis CC image 34/56) and in lateral right breast 9:00 position mid-posterior depth, an additional mass with obscured margins (tomosynthesis image 17/56). No significant change left breast.   Ultrasound: Targeted right breast ultrasound was performed by radiologist and technologist. In the area of palpable lump, right breast 11:00 position 6 cm from nipple, is an irregular hypoechoic mass with indistinct margins measuring approximately 1.0 x 0.9 x 1.6 cm. Immediately adjacent to mass, at approximately 11:30 position, are microcalcifications. In the area of second palpable lump, right breast 9:30 position 5 cm from nipple, is an irregular hypoechoic mass measuring 3.2 x 0.5 x 1.3 cm. These findings are felt to account for mammographic findings.   Multiple additional round hypoechoic masses, similar to findings at 9:30 position right breast seen incidentally during real-time and not directly palpable.  For example, right breast 8:00 position 4 cm from nipple measuring 0.8 x 0.6 x 0.6 cm.   Survey of the right axilla demonstrates  "normal appearing lymph nodes.  IMPRESSION: BI-RADS CATEGORY: 4 - Suspicious Abnormality-Biopsy Should Be Considered.    Biopsy (1/14/2019) showed:  A. Right breast 11:00 6 cm from nipple:   Invasive carcinoma involves multiple tissue cores, and is 8 mm in greatest dimension in a single core.  The Hollenberg grade is 2 (tubule formation 3 + nuclear pleomorphism 2 + mitotic activity 2 = Hollenberg score 8).    There are up to 10 mitotic figures in 10 high power fields (field diameter 0.5 mm).   There is a well defined carcinoma in situ component.   E-cadherin shows retained membranous staining in invasive carcinoma and carcinoma in situ, supporting a designation of \"ductal\" for each.   B. Right breast 8:00 4 cm from nipple:   Invasive carcinoma involves multiple tissue cores, and is 6 mm in greatest dimension in a single core.  The Donna grade is 2 (tubule formation 3 + nuclear pleomorphism 2 + mitotic activity 2 = Hollenberg score 8).    There are up to 9 mitotic figures in 10 high power fields (field diameter  0.5 mm).   There is no definite carcinoma in situ.  Focally, carcinoma shows a nearly solid pattern with occasional fibrovascular cores.  While a diagnosis of solid papillary carcinoma was considered for this component, the majority of the carcinoma in this specimen shows an infiltrative pattern.  Chromogranin and synaptophysin immunostains have been ordered, to evaluate for possible neuroendocrine differentiation.  The results will be   reported in an addendum.  ER positive (99%)  IA positive (15%)  HER2/johan negative    ASSESSMENT:  Kathy Lei is a 53 year old woman with right breast cancer, currently undergoing neoadjuvant systemic therapy.    Her stage is:  Cancer Staging  Malignant neoplasm of upper-outer quadrant of right breast in female, estrogen receptor positive (H)  Staging form: Breast, AJCC 8th Edition  - Clinical: Stage IIA (cT3(m), cN0, cM0, G2, ER: Positive, IA: Positive, HER2: " Negative) - Signed by Christian Guzman MD on 1/28/2019    The diagnosis and management of locally advanced breast cancer was discussed with Kathy Lei. After completing the taxol portion of treatment, neoadjuvant AC is planned.  We reviewed that surgical resection is still recommended following completion of neoadjuvant therapy, in the form of either breast conservation (segmental mastectomy plus radiation) or mastectomy.  Kathy Lei IS NOT a candidate for breast conservation therapy owing to the extent of disease at presentation.  Surgery is performed 4-6 weeks following completion of chemotherapy.     We discussed the various types of mastectomy, including total, skin-sparing, and nipple-sparing mastectomy.  We reviewed that the nipple-sparing technique is cosmetic; sensation and contractility will likely be lost.  Kathy Lei is NOT a candidate for nipple-sparing mastectomy owing to the extent of disease at presentation.    The risks of a mastectomy were discussed with the patient and her , including the risks of bleeding, wound infection, wound dehiscence, skin flap/nipple necrosis, and seroma formation.  Her existing implants will be removed at time of surgery.    The option of having immediate versus delayed reconstruction was also discussed.  Kathy Lei was interested in this; a Plastic Surgery consultation was offered and will be arranged. Depending on the margin and cristina status post-mastectomy, radiation may be necessary.    In addition to the surgical management of the breast, her axilla must be addressed.  She was found to be clinically node negative.  She is a candidate for sentinel lymph node biopsy. This is performed with the combination of the radioactive colloid and lymphazurin. The risks of a sentinel lymph node biopsy were discussed with the patient and her , including the risks of lymphedema (5-10%), bleeding, wound infection, wound  dehiscence, seroma formation, and paresthesias. There is also a small risk of anaphylaxis with lymphazurin injection as part of the procedure. There is an approximately 10% false negative rate associated with sentinel lymph node biopsy as published in the literature.  The findings of the sentinel lymph node biopsy may result in the need for further surgery (i.e. Axillary lymph node dissection) versus radiation. There is a 5-10% chance of patients whose sentinel lymph nodes do not map despite dual tracer (radiocolloid and lymphazurin). Should this be the case, we discussed that I would proceed with an axillary lymph node dissection at the index procedure.  The higher risks of an axillary lymph node dissection were also reviewed, including lymphedema (20-30%), bleeding, wound infection, wound dehiscence, seroma formation, nerve injury, limited arm range of motion and paresthesias. We discussed that a drain would be placed intra-operatively should an axillary lymph node dissection be performed.    I reviewed her case with Dr Guzman, who felt that it would be appropriate to remove her port at the time of surgery if she wishes.    All of the above was discussed with Kathy Lei and all questions were answered.  She elected to proceed with BILATERAL skin-sparing mastectomy, RIGHT axillary sentinel lymph node biopsy, possible axillary lymph node dissection approximately 4-6 weeks following completion of neoadjuvant systemic therapy.    Total time spent with the patient was 60 minutes, of which more than half was counseling.     PLAN:  1.  BILATERAL skin-sparing mastectomy, RIGHT axillary sentinel lymph node biopsy, possible axillary lymph node dissection  2. Plastic surgery consultation  3. Plan for surgery 4-6 weeks after completion of neoadjuvant AC  4. Port may be removed at time of surgery if patient wishes    Anne Yousif MD MSc [facility] FACS    Division of Surgical Oncology  [facility]  [place]

## 2019-04-30 NOTE — LETTER
2019      RE: Kathy SUAREZ Quique   [address] [place] [address]     2019    Christian Guzman MD  [address] [facility] [address]  [place] [address]    RE: Kathy SUAREZ Quique  (: 1965)    Dear Dr. Guzman    Your patient was seen for evaluation in my office.  Please find a copy of my notes for your record and review.  If you have any further questions, please feel free to contact my office.   Thank you for your kind referral.    Sincerely,   Anne Yousif MD MSc [facility] FACS    ---       NEW CONSULTATION  2019    Kathy Lei is a 53 year old woman who presents with a right  breast complaint.  She was referred by Dr Guzman.    Treatment to date:  1. Enrollment onto I-SPY2  2. Neoadjuvant durvalumab, taxol, olaparib (2019 to ongoing)    HPI:    She noted right breast pain in 2018 and underwent imaging at that time.  She noted no masses in either breast, axilla, or neck. She denies any nipple discharge or nipple inversion.    Imaging showed heterogeneous enhancement in the outer half of the RIGHT breast, measuring up to 9 cm on breast MRI, without lymphadenopathy.    A biopsy was performed at the 11:00 position 6 cm FN and a clip was placed.  It showed invasive ductal and mucinous carcinoma, grade 2, ER+ NM+ HER2/johan non-amplified.  A biopsy was performed at the 8:00 position 4 cm FN and a clip was placed. It showed invasive ductal and mucinous carcinoma, grade 2, ER+ NM+ HER2/johan non-amplified.    She was evaluated by Dr Guzman in 2019 and enrolled onto the I-SPY 2 clinical trial.  She is receiving durvalumab every 28 days and paclitaxel weekly with olaparib.    BREAST-SPECIFIC HISTORY:  Prior breast surgeries: Yes - bilateral augmentation with saline implants 20 years ago; right lumpectomy 8:00 (, sclerosing papillary lesion without atypia)  Prior radiation history: No  Hormone replacement therapy: No  Bra size: 36B  Dominant hand:  Right    FAMILY HISTORY:  Breast ca: No  Ovarian ca: No  Pancreatic ca: No  Gastric ca: No  Melanoma: No  Colon ca: No  Other cancer: No    Past Medical History:   Diagnosis Date     Asthma      Depressive disorder, not elsewhere classified      Hypertension      Hypothyroidism      Hypothyroidism, unspecified hypothyroidism type      Malignant neoplasm of upper-outer quadrant of right breast in female, estrogen receptor positive (H) 2019     Mild intermittent asthma      Scoliosis (and kyphoscoliosis), idiopathic    No MI, CVA, DM    Past Surgical History:   Procedure Laterality Date     ABDOMEN SURGERY   c section     BACK SURGERY  scoliosis fusion      BIOPSY  breast     BREAST SURGERY  implants      C/SECTION, LOW TRANSVERSE      , Low Transverse     COSMETIC SURGERY  breast implants      INSERT PORT VASCULAR ACCESS Left 3/6/2019    Procedure: Single Lumen Chest Port Placement;  Surgeon: Jerome Dash PA-C;  Location: [facility] OR     IR CHEST PORT PLACEMENT > 5 YRS OF AGE  3/6/2019     SURGICAL HISTORY OF -       spinal fusion- Lacey kim     SURGICAL HISTORY OF -       removal of right breast papilloma   No GA issues    Current Outpatient Medications   Medication Sig Dispense Refill     ADVIL 200 MG OR TABS 1 TABLET EVERY 4 TO 6 HOURS AS NEEDED 0 0     albuterol (VENTOLIN HFA) 108 (90 Base) MCG/ACT Inhaler INHALE 1-2 PUFFS INTO THE LUNGS EVERY 4 HOURS AS NEEDED FOR SHORTNESS OF BREATH OR DIFFICULTY BREATHING. 18 g PRN     ASPIRIN NOT PRESCRIBED (INTENTIONAL) Please choose reason not prescribed, below (Patient not taking: Reported on 2019)       fluticasone (FLOVENT HFA) 110 MCG/ACT inhaler Inhale 2 puffs into the lungs 2 times daily 1 Inhaler PRN     levothyroxine (SYNTHROID) 112 MCG tablet Take 1 tablet (112 mcg) by mouth daily 30 tablet 1     lisinopril (PRINIVIL/ZESTRIL) 5 MG tablet Take 1 tablet (5 mg) by mouth daily 90 tablet 3     LORazepam (ATIVAN) 0.5 MG tablet  "Take 1 tablet (0.5 mg) by mouth every 4 hours as needed (Anxiety, Nausea/Vomiting or Sleep) 30 tablet 2     sertraline (ZOLOFT) 50 MG tablet One daily 90 tablet 1     sodium chloride (OCEAN) 0.65 % nasal spray Spray in both nostrils four times daily 30 mL 3     STATIN NOT PRESCRIBED (INTENTIONAL) Please choose reason not prescribed, below (Patient not taking: Reported on 5/6/2019)       study - olaparib (IDS# [id number]) 100 mg tablet CHEMOTHERAPY Take 1 tablet (100 mg) by mouth every 12 hours Take at approximately the same times each day with an 8 oz (240 ml) glass of water. Tablets should be swallowed whole and not chewed, crushed, dissolved or divided. 60 tablet 0     triamcinolone (ARISTOCORT HP) 0.5 % external cream Apply topically 2 times daily 30 g 3     vitamin B6 (PYRIDOXINE) 100 MG tablet Take 1 tablet (100 mg) by mouth daily (Patient not taking: Reported on 5/6/2019) 30 tablet 3     albuterol (2.5 MG/3ML) 0.083% nebulizer solution Take 3 mLs by nebulization once for 1 dose. 3 mL 0     order for DME Cranial prosthesis (Patient not taking: Reported on 4/30/2019) 1 Units 1           Allergies   Allergen Reactions     Remeron [Mirtazapine]      Dizzy , cant function , balance        SOCIAL HISTORY:  Smokes: No - quit in January 2019  EtOH: Yes occasional  Illicit drugs: No    She works as a hairdresser    ROS:  Easy bruising/bleeding: No    /79   Pulse 85   Temp 98  F (36.7  C) (Oral)   Resp 16   Ht 1.676 m (5' 5.98\")   Wt 69.8 kg (153 lb 12.8 oz)   LMP 11/02/2014   SpO2 97%   BMI 24.84 kg/m      Physical Exam   Constitutional: She appears well-developed and well-nourished.   Pulmonary/Chest: Right breast exhibits no inverted nipple, no mass, no nipple discharge, no skin change and no tenderness. Left breast exhibits no inverted nipple, no mass, no nipple discharge, no skin change and no tenderness. Breasts are symmetrical.   Patient was examined in both supine and upright positions. Bilateral " "implants in place.       Lymphadenopathy:     She has no cervical adenopathy.        Right cervical: No superficial cervical, no deep cervical and no posterior cervical adenopathy present.       Left cervical: No superficial cervical, no deep cervical and no posterior cervical adenopathy present.     She has no axillary adenopathy.        Right axillary: No pectoral and no lateral adenopathy present.        Left axillary: No pectoral and no lateral adenopathy present.       Right: No supraclavicular adenopathy present.        Left: No supraclavicular adenopathy present.   No lymphedema in bilateral upper extremities.   Skin: Skin is warm and dry.        INVESTIGATIONS:    Breast MRI (4/2/2019) showed:  Findings: There is no significant change in the right breast cancer since prior 2 exams when it had been described as \"heterogeneous enhancement involving a large part of the upper outer, upper inner and central right breast...\" It again measures 8.9 cm in greatest dimension on axial MIP images.  No suspicious enhancement in the left breast.  Bilateral axillary lymph nodes are symmetric. No internal mammary chain lymphadenopathy identified.  Impression: BI-RADS CATEGORY: 6 - Known Biopsy-Proven Malignancy-Appropriate Action Should Be Taken.    Breast MRI (2/4/2019) showed:  Findings:   Bilateral saline implants. They appear at least partially subpectoral in location superior laterally although there is no discernible thickness of pectoralis muscle over the implants more anteriorly.  Heterogeneous clustered ring enhancement which is multifocal anteriorly and confluent posteriorly in the upper outer and lower outer right breast measures up to 8.9 cm in greatest dimension measured on the axial MIP images. There is closely contiguous or superficial involvement of the posterior areola. Neoplasm posteriorly tracks along the anterior implant scar capsule.   No suspicious enhancement in the left breast. No internal mammary " chain or axillary lymphadenopathy.   Impression: BI-RADS CATEGORY: 6 - Known Biopsy-Proven Malignancy-Appropriate Action Should Be Taken.    Contrast-Enhanced Mammogram (1/14/2019) showed:  Findings: Low energy images demonstrate grouped coarse heterogeneous and find pleomorphic calcifications in the upper outer right breast mid to posterior depth measuring 1.7 cm in greatest dimension. Additionally, left breast amorphous calcifications projecting posterior to the implant on the craniocaudal view which may be capsular. Global asymmetry in the upper outer right breast.  Contrast images demonstrate large area of mass and non-mass enhancement involving the upper outer right breast measuring 7.2 cm in greatest dimension, and corresponding to the global asymmetry in the upper outer right breast. Enhancement extends up to the implant, without evidence of extension to the skin surface or nipple. Enhancement does involve the grouped calcifications described above.   IMPRESSION: BI-RADS CATEGORY: 4 - Suspicious Abnormality-Biopsy Should Be Considered.    Diagnostic Mammogram & Ultrasound (1/8/2019) showed:  BREAST DENSITY: Heterogeneously dense.  Findings:   Mammogram: Bilateral prepectoral saline implants. In the right breast approximately 11:30 position there are grouped coarse heterogeneous calcifications spanning up to 1.3 cm new since 2014. Adjacent to calcifications, 11:00 position posterior depth, is an irregular mass (tomosynthesis CC image 34/56) and in lateral right breast 9:00 position mid-posterior depth, an additional mass with obscured margins (tomosynthesis image 17/56). No significant change left breast.   Ultrasound: Targeted right breast ultrasound was performed by radiologist and technologist. In the area of palpable lump, right breast 11:00 position 6 cm from nipple, is an irregular hypoechoic mass with indistinct margins measuring approximately 1.0 x 0.9 x 1.6 cm. Immediately adjacent to mass, at  "approximately 11:30 position, are microcalcifications. In the area of second palpable lump, right breast 9:30 position 5 cm from nipple, is an irregular hypoechoic mass measuring 3.2 x 0.5 x 1.3 cm. These findings are felt to account for mammographic findings.   Multiple additional round hypoechoic masses, similar to findings at 9:30 position right breast seen incidentally during real-time and not directly palpable.  For example, right breast 8:00 position 4 cm from nipple measuring 0.8 x 0.6 x 0.6 cm.   Survey of the right axilla demonstrates normal appearing lymph nodes.  IMPRESSION: BI-RADS CATEGORY: 4 - Suspicious Abnormality-Biopsy Should Be Considered.    Biopsy (1/14/2019) showed:  A. Right breast 11:00 6 cm from nipple:   Invasive carcinoma involves multiple tissue cores, and is 8 mm in greatest dimension in a single core.  The Donna grade is 2 (tubule formation 3 + nuclear pleomorphism 2 + mitotic activity 2 = Kendall score 8).    There are up to 10 mitotic figures in 10 high power fields (field diameter 0.5 mm).   There is a well defined carcinoma in situ component.   E-cadherin shows retained membranous staining in invasive carcinoma and carcinoma in situ, supporting a designation of \"ductal\" for each.   B. Right breast 8:00 4 cm from nipple:   Invasive carcinoma involves multiple tissue cores, and is 6 mm in greatest dimension in a single core.  The Kendall grade is 2 (tubule formation 3 + nuclear pleomorphism 2 + mitotic activity 2 = Kendall score 8).    There are up to 9 mitotic figures in 10 high power fields (field diameter  0.5 mm).   There is no definite carcinoma in situ.  Focally, carcinoma shows a nearly solid pattern with occasional fibrovascular cores.  While a diagnosis of solid papillary carcinoma was considered for this component, the majority of the carcinoma in this specimen shows an infiltrative pattern.  Chromogranin and synaptophysin immunostains have been ordered, to " evaluate for possible neuroendocrine differentiation.  The results will be   reported in an addendum.  ER positive (99%)  MS positive (15%)  HER2/johan negative    ASSESSMENT:  Kathy Lei is a 53 year old woman with right breast cancer, currently undergoing neoadjuvant systemic therapy.    Her stage is:  Cancer Staging  Malignant neoplasm of upper-outer quadrant of right breast in female, estrogen receptor positive (H)  Staging form: Breast, AJCC 8th Edition  - Clinical: Stage IIA (cT3(m), cN0, cM0, G2, ER: Positive, MS: Positive, HER2: Negative) - Signed by Christian Guzman MD on 1/28/2019    The diagnosis and management of locally advanced breast cancer was discussed with Kathy Lei. After completing the taxol portion of treatment, neoadjuvant AC is planned.  We reviewed that surgical resection is still recommended following completion of neoadjuvant therapy, in the form of either breast conservation (segmental mastectomy plus radiation) or mastectomy.  Kathy Lei IS NOT a candidate for breast conservation therapy owing to the extent of disease at presentation.  Surgery is performed 4-6 weeks following completion of chemotherapy.     We discussed the various types of mastectomy, including total, skin-sparing, and nipple-sparing mastectomy.  We reviewed that the nipple-sparing technique is cosmetic; sensation and contractility will likely be lost.  Kathy Lei is NOT a candidate for nipple-sparing mastectomy owing to the extent of disease at presentation.    The risks of a mastectomy were discussed with the patient and her , including the risks of bleeding, wound infection, wound dehiscence, skin flap/nipple necrosis, and seroma formation.  Her existing implants will be removed at time of surgery.    The option of having immediate versus delayed reconstruction was also discussed.  Kathy Lei was interested in this; a Plastic Surgery consultation was  offered and will be arranged. Depending on the margin and cristina status post-mastectomy, radiation may be necessary.    In addition to the surgical management of the breast, her axilla must be addressed.  She was found to be clinically node negative.  She is a candidate for sentinel lymph node biopsy. This is performed with the combination of the radioactive colloid and lymphazurin. The risks of a sentinel lymph node biopsy were discussed with the patient and her , including the risks of lymphedema (5-10%), bleeding, wound infection, wound dehiscence, seroma formation, and paresthesias. There is also a small risk of anaphylaxis with lymphazurin injection as part of the procedure. There is an approximately 10% false negative rate associated with sentinel lymph node biopsy as published in the literature.  The findings of the sentinel lymph node biopsy may result in the need for further surgery (i.e. Axillary lymph node dissection) versus radiation. There is a 5-10% chance of patients whose sentinel lymph nodes do not map despite dual tracer (radiocolloid and lymphazurin). Should this be the case, we discussed that I would proceed with an axillary lymph node dissection at the index procedure.  The higher risks of an axillary lymph node dissection were also reviewed, including lymphedema (20-30%), bleeding, wound infection, wound dehiscence, seroma formation, nerve injury, limited arm range of motion and paresthesias. We discussed that a drain would be placed intra-operatively should an axillary lymph node dissection be performed.    I reviewed her case with Dr Guzman, who felt that it would be appropriate to remove her port at the time of surgery if she wishes.    All of the above was discussed with Kathy Lei and all questions were answered.  She elected to proceed with BILATERAL skin-sparing mastectomy, RIGHT axillary sentinel lymph node biopsy, possible axillary lymph node dissection approximately  4-6 weeks following completion of neoadjuvant systemic therapy.    Total time spent with the patient was 60 minutes, of which more than half was counseling.     PLAN:  1.  BILATERAL skin-sparing mastectomy, RIGHT axillary sentinel lymph node biopsy, possible axillary lymph node dissection  2. Plastic surgery consultation  3. Plan for surgery 4-6 weeks after completion of neoadjuvant AC  4. Port may be removed at time of surgery if patient wishes    Anne Yousif MD MSc [facility]    Division of Surgical Oncology  [facility]

## 2019-05-05 NOTE — PROGRESS NOTES
CARDIOLOGY CLINIC NOTE  05/06/2019    HPI:  The patient is a 53-year-old female with a past medical history significant for asthma, depression, hypothyroidism, and hypertension (recently started on amlodipine), and newly diagnosed ER/NM positive, HER-2 negative breast cancer who presented originally for evaluation of an abnormal echocardiogram. The patient was diagnosed breast cancer and for her initial visit a echocardiogram was ordered and was found to have abnormal strain imaging at 16%, an ejection fraction of 50-55%, and mild left ventricular hypertrophy.  The plan was to undergo neoadjuvant chemotherapy with Adriamycin followed by surgery and cardiology was consulted for evaluation of this in the setting of possible I-SPY enrollment. She has no personal history of atrial fibrillation or coronary artery disease.      She reports that she previously smoked socially.  She smoked approximately a pack per week for 30 years.  She quit following her diagnosis.  She drinks approximately 2-3 fifths of vodka per month.    Interval History:  The patient reports that she has started working part-time due to the chemotherapy.  She is currently 11 cycles into her Taxol treatment.  She feels significantly fatigued however otherwise is doing well.  She reports no exertional chest pain, no orthopnea, no PND, no lower extremity swelling.  She is attempting to stay as active as possible.  She continues to be free of smoking.  Her blood pressure is also well controlled on the lisinopril and she has had no side effect to it.    ROS:  A complete 10-point ROS was negative except as above.    PAST MEDICAL HISTORY:  Past Medical History:   Diagnosis Date     Depressive disorder, not elsewhere classified      Hypothyroidism      Hypothyroidism, unspecified hypothyroidism type      Malignant neoplasm of upper-outer quadrant of right breast in female, estrogen receptor positive (H) 1/28/2019     Mild intermittent asthma      Scoliosis  (and kyphoscoliosis), idiopathic        PAST SURGICAL HISTORY:  Past Surgical History:   Procedure Laterality Date     ABDOMEN SURGERY   c section     BACK SURGERY  scoliosis fusion      BIOPSY  breast     BREAST SURGERY  implants      C/SECTION, LOW TRANSVERSE      , Low Transverse     COSMETIC SURGERY  breast implants      INSERT PORT VASCULAR ACCESS Left 3/6/2019    Procedure: Single Lumen Chest Port Placement;  Surgeon: Jerome Dash PA-C;  Location: [facility] OR     IR CHEST PORT PLACEMENT > 5 YRS OF AGE  3/6/2019     SURGICAL HISTORY OF -       spinal fusion- Lacey kim     SURGICAL HISTORY OF -       removal of right breast papilloma       FAMILY HISTORY:  Family History   Problem Relation Age of Onset     Hypertension Mother      Thyroid Disease Mother         on meds     Psychotic Disorder Mother         anxiety     Cerebrovascular Disease Father         minor     Hypertension Father      Osteoporosis Maternal Grandmother      Asthma Maternal Grandfather      Thyroid Disease Sister         x2-on meds     Psychotic Disorder Sister         problems making decisions     Asthma Other      C.A.D. No family hx of      Diabetes No family hx of      Breast Cancer No family hx of      Cancer - colorectal No family hx of        SOCIAL HISTORY:  Social History     Socioeconomic History     Marital status:      Spouse name: Not on file     Number of children: Not on file     Years of education: Not on file     Highest education level: Not on file   Social Needs     Financial resource strain: Not on file     Food insecurity - worry: Not on file     Food insecurity - inability: Not on file     Transportation needs - medical: Not on file     Transportation needs - non-medical: Not on file   Occupational History     Not on file   Tobacco Use     Smoking status: Former Smoker     Packs/day: 0.50     Years: 30.00     Pack years: 15.00     Types: Cigarettes     Start date: 1980      Last attempt to quit: 2011     Years since quittin.6     Smokeless tobacco: Never Used     Tobacco comment: social smoker at times   Substance and Sexual Activity     Alcohol use: Yes     Comment: on weekends if going out     Drug use: No     Sexual activity: Not Currently     Partners: Male     Birth control/protection: Post-menopausal     Comment:    Other Topics Concern     Parent/sibling w/ CABG, MI or angioplasty before 65F 55M? No   Social History Narrative    Dairy/d 1 servings/d.     Caffeine 2-3 servings/d    Exercise 0 x week    Sunscreen used - Yes    Seatbelts used - Yes    Working smoke/CO detectors in the home - Yes    Guns stored in the home - Yes,hunting guns/rifles in gun cabinet    Self Breast Exams - No due to implants    Self Testicular Exam - NA    Eye Exam up to date - No    Dental Exam up to date - No    Pap Smear up to date - No    Mammogram up to date - Not sure how long ago,hx implants    PSA up to date - NA    Dexa Scan up to date - NA    Flex Sig / Colonoscopy up to date - NA    Immunizations up to date - Yes-Td     Abuse: Current or Past(Physical, Sexual or Emotional)- No    Do you feel safe in your environment - Yes       MEDICATIONS:  Current Outpatient Medications   Medication     ADVIL 200 MG OR TABS     albuterol (VENTOLIN HFA) 108 (90 Base) MCG/ACT Inhaler     fluticasone (FLOVENT HFA) 110 MCG/ACT inhaler     levothyroxine (SYNTHROID) 112 MCG tablet     lisinopril (PRINIVIL/ZESTRIL) 5 MG tablet     LORazepam (ATIVAN) 0.5 MG tablet     sertraline (ZOLOFT) 50 MG tablet     sodium chloride (OCEAN) 0.65 % nasal spray     study - olaparib (IDS# 3903) 100 mg tablet CHEMOTHERAPY     study - olaparib (IDS# 3903) 100 mg tablet CHEMOTHERAPY     study - olaparib (IDS# 3903) 100 mg tablet CHEMOTHERAPY     triamcinolone (ARISTOCORT HP) 0.5 % external cream     albuterol (2.5 MG/3ML) 0.083% nebulizer solution     ASPIRIN NOT PRESCRIBED (INTENTIONAL)     lidocaine-prilocaine  "(EMLA) 2.5-2.5 % external cream     omeprazole (PRILOSEC) 20 MG  capsule     order for DME     prochlorperazine (COMPAZINE) 10 MG tablet     STATIN NOT PRESCRIBED (INTENTIONAL)     vitamin B6 (PYRIDOXINE) 100 MG tablet     No current facility-administered medications for this visit.        ALLERGIES:     Allergies   Allergen Reactions     Remeron [Mirtazapine]      Dizzy , cant function , balance       PHYSICAL EXAM:  /80 (BP Location: Left arm, Patient Position: Chair, Cuff Size: Adult Regular)   Pulse 83   Ht 1.676 m (5' 6\")   Wt 68.9 kg (152 lb)   LMP 11/02/2014   SpO2 98%   BMI 24.53 kg/m    Gen: alert, interactive, NAD  HEENT: atraumatic, EOMI, MMM  Neck: supple, no JVD  CV: RRR, no m/r/g  Chest: CTAB, no wheezes or crackles  Abd: soft, NT, ND  Ext: no LE edema, 2+ peripheral pulses  Skin: warm and dry, no rashes on exposed surfaces  Neuro: alert and oriented, no focal deficits  Endocrine: no thyromegaly  Musculoskeletal: no joint swelling or tenderness  Psych: pleasant and conversant      LABS:    Chemistry panel:   Recent Labs   Lab Test 02/05/19  1038 07/16/18  0959     --    POTASSIUM 3.4  --    CHLORIDE 105  --    CO2 27  --    ANIONGAP 6  --    GLC 86 96   BUN 16  --    CR 0.60  --    BRYANT 8.4*  --    MAG 2.0  --    GFRESTIMATED >90  --    AST 9  --    ALT 18  --        CBC:   Recent Labs   Lab Test 02/05/19  1038 01/26/15  1603   WBC 12.2*  --    RBC 4.64  --    HGB 13.1 13.8   HCT 40.7  --    MCV 88  --    MCH 28.2  --    MCHC 32.2  --    RDW 12.7  --      --        Lipid Panel:  Recent Labs   Lab Test 07/16/18  0959   CHOL 214*   HDL 66   *   TRIG 86       Thyroid:   TSH   Date Value Ref Range Status   04/22/2019 14.76 (H) 0.40 - 4.00 mU/L Final     T4 Free   Date Value Ref Range Status   04/22/2019 1.00 0.76 - 1.46 ng/dL Final     Hemoglobin A1C   Date Value Ref Range Status   02/05/2019 5.6 0 - 5.6 % Final     Comment:     Normal <5.7% Prediabetes 5.7-6.4%  Diabetes " 6.5% or higher - adopted from ADA   consensus guidelines.       INR   Date Value Ref Range Status   02/05/2019 1.02 0.86 - 1.14 Final         CARDIAC DATA:  ECG 5/6/19: inferior Q waves, NSR          TTE 5/6/19  Interpretation Summary  Global and regional left ventricular function is normal with an EF of 55-60%.  Global peak LV longitudinal strain is averaged at -18.6%. This is within  reported normal limits (normal <-18%).  Right ventricular function, chamber size, wall motion, and thickness are  normal.  No pericardial effusion is present.    TTE 2/5/19  Interpretation Summary  Borderline (EF 50-55%) reduced left ventricular function is present.Mild concentric wall thickening consistent with left ventricular hypertrophy is present.  Global right ventricular function is normal.  Mild sclerosis of the aortic valve and mitral annulus present.  The inferior vena cava was normal in size with preserved respiratory variability.  No pericardial effusion is present.      ASSESSMENT/PLAN:  In summary, this is a 53-year-old female with a past medical history notable for anxiety, hypothyroidism, asthma, newly diagnosed hypertension, and newly diagnosed breast cancer who presents for evaluation prior to undergoing Adriamycin neoadjuvant chemotherapy.    1.  Abnormal echocardiogram: The patient's February echocardiogram had both low strain and low normal ejection fraction.  Both of these tests were very borderline, and she has no symptoms of heart failure.  She was placed on lisinopril 5 mg for hypertension and repeated echocardiogram was obtained today and is normal.  Additionally, she has no symptoms of heart failure or cardiac cardiomyopathy.  Recommended the patient continue on lisinopril for hypertension and be followed with serial echocardiograms.  Should her echocardiograms change, we can see her sooner.  Otherwise, return to clinic in 6 months.    2.  Hypertension, essential: Controlled.  Continue home lisinopril 5 mg  daily.    Pt seen and discussed with Dr. Alanis.    Ji Quarles MD  Cardiology Fellow, PGY-4  [phone]      ATTENDING ATTESTATION:  This patient has been seen and examined by me May 6, 2019 with Ji Quarles MD, cardiology fellow. I have reviewed the vitals, laboratory and imaging data relevant to this patient's care. I have edited this note to reflect our joint assessment and plan, and discussed the plan with the patient.    Mily is a 53-year-old woman who was recently diagnosed with early stage breast cancer and mild essential hypertension.  She has no known history of coronary disease heart failure or significant valvular disease.  She underwent an echocardiogram prior to starting chemotherapy which is notable for low normal left ventricular systolic function, ejection fraction 53% and a mildly reduced global longitudinal strain of -16%. She also has a history of alcohol use and tobacco use.  There is no family history of premature cardiac disease. She was started on lisinopril for cardioprotection.     Mily is undergoing weekly Taxol infusions and will start DD AC therapy next week. She denies any anginal heart failure symptoms.  She is fairly active and reports an exercise tolerance of greater than 4 METs.  On exam she is euvolemic.  Her blood pressure is in normal range.  ECG is notable for sinus rhythm with minimal Q waves inferiorly.  Echocardiogram done today is notable for normal biventricular function normal.      I would recommend a limited echo after completion of dose dense AC therapy and a follow up with me in 6 months.    Mary Alanis MD, MS  Staff Cardiologist  Pager: [phone]

## 2019-05-06 ENCOUNTER — APPOINTMENT (OUTPATIENT)
Dept: LAB | Facility: CLINIC | Age: 54
End: 2019-05-06
Attending: PHYSICIAN ASSISTANT
Payer: COMMERCIAL

## 2019-05-06 ENCOUNTER — ONCOLOGY VISIT (OUTPATIENT)
Dept: ONCOLOGY | Facility: CLINIC | Age: 54
End: 2019-05-06
Attending: PHYSICIAN ASSISTANT
Payer: COMMERCIAL

## 2019-05-06 ENCOUNTER — ANCILLARY PROCEDURE (OUTPATIENT)
Dept: CARDIOLOGY | Facility: CLINIC | Age: 54
End: 2019-05-06
Attending: INTERNAL MEDICINE
Payer: COMMERCIAL

## 2019-05-06 ENCOUNTER — RESEARCH ENCOUNTER (OUTPATIENT)
Dept: ONCOLOGY | Facility: CLINIC | Age: 54
End: 2019-05-06

## 2019-05-06 ENCOUNTER — INFUSION THERAPY VISIT (OUTPATIENT)
Dept: ONCOLOGY | Facility: CLINIC | Age: 54
End: 2019-05-06
Attending: INTERNAL MEDICINE
Payer: COMMERCIAL

## 2019-05-06 VITALS
BODY MASS INDEX: 24.43 KG/M2 | HEIGHT: 66 IN | DIASTOLIC BLOOD PRESSURE: 80 MMHG | HEART RATE: 83 BPM | WEIGHT: 152 LBS | SYSTOLIC BLOOD PRESSURE: 115 MMHG | OXYGEN SATURATION: 98 %

## 2019-05-06 VITALS
HEART RATE: 81 BPM | OXYGEN SATURATION: 98 % | WEIGHT: 153 LBS | SYSTOLIC BLOOD PRESSURE: 132 MMHG | DIASTOLIC BLOOD PRESSURE: 78 MMHG | TEMPERATURE: 98.2 F | BODY MASS INDEX: 24.69 KG/M2 | RESPIRATION RATE: 18 BRPM

## 2019-05-06 DIAGNOSIS — C50.411 MALIGNANT NEOPLASM OF UPPER-OUTER QUADRANT OF RIGHT BREAST IN FEMALE, ESTROGEN RECEPTOR POSITIVE (H): Primary | ICD-10-CM

## 2019-05-06 DIAGNOSIS — I42.9 CARDIOMYOPATHY, UNSPECIFIED TYPE (H): ICD-10-CM

## 2019-05-06 DIAGNOSIS — Z17.0 MALIGNANT NEOPLASM OF UPPER-OUTER QUADRANT OF RIGHT BREAST IN FEMALE, ESTROGEN RECEPTOR POSITIVE (H): Primary | ICD-10-CM

## 2019-05-06 DIAGNOSIS — Z91.89 AT RISK FOR CARDIOMYOPATHY: ICD-10-CM

## 2019-05-06 DIAGNOSIS — E03.2 HYPOTHYROIDISM DUE TO MEDICATION: ICD-10-CM

## 2019-05-06 LAB
ALBUMIN SERPL-MCNC: 3.9 G/DL (ref 3.4–5)
ALP SERPL-CCNC: 55 U/L (ref 40–150)
ALT SERPL W P-5'-P-CCNC: 44 U/L (ref 0–50)
ANION GAP SERPL CALCULATED.3IONS-SCNC: 6 MMOL/L (ref 3–14)
AST SERPL W P-5'-P-CCNC: 25 U/L (ref 0–45)
BASOPHILS # BLD AUTO: 0.1 10E9/L (ref 0–0.2)
BASOPHILS NFR BLD AUTO: 1.4 %
BILIRUB SERPL-MCNC: 0.3 MG/DL (ref 0.2–1.3)
BUN SERPL-MCNC: 8 MG/DL (ref 7–30)
CALCIUM SERPL-MCNC: 8.9 MG/DL (ref 8.5–10.1)
CHLORIDE SERPL-SCNC: 106 MMOL/L (ref 94–109)
CO2 SERPL-SCNC: 25 MMOL/L (ref 20–32)
CREAT SERPL-MCNC: 0.61 MG/DL (ref 0.52–1.04)
DIFFERENTIAL METHOD BLD: NORMAL
EOSINOPHIL # BLD AUTO: 0.1 10E9/L (ref 0–0.7)
EOSINOPHIL NFR BLD AUTO: 2.7 %
ERYTHROCYTE [DISTWIDTH] IN BLOOD BY AUTOMATED COUNT: 14.6 % (ref 10–15)
GFR SERPL CREATININE-BSD FRML MDRD: >90 ML/MIN/{1.73_M2}
GLUCOSE SERPL-MCNC: 108 MG/DL (ref 70–99)
HCT VFR BLD AUTO: 37.7 % (ref 35–47)
HGB BLD-MCNC: 12.7 G/DL (ref 11.7–15.7)
IMM GRANULOCYTES # BLD: 0 10E9/L (ref 0–0.4)
IMM GRANULOCYTES NFR BLD: 0.5 %
LYMPHOCYTES # BLD AUTO: 1.4 10E9/L (ref 0.8–5.3)
LYMPHOCYTES NFR BLD AUTO: 31.2 %
MCH RBC QN AUTO: 30.1 PG (ref 26.5–33)
MCHC RBC AUTO-ENTMCNC: 33.7 G/DL (ref 31.5–36.5)
MCV RBC AUTO: 89 FL (ref 78–100)
MONOCYTES # BLD AUTO: 0.3 10E9/L (ref 0–1.3)
MONOCYTES NFR BLD AUTO: 7 %
NEUTROPHILS # BLD AUTO: 2.5 10E9/L (ref 1.6–8.3)
NEUTROPHILS NFR BLD AUTO: 57.2 %
NRBC # BLD AUTO: 0 10*3/UL
NRBC BLD AUTO-RTO: 0 /100
PLATELET # BLD AUTO: 301 10E9/L (ref 150–450)
POTASSIUM SERPL-SCNC: 3.8 MMOL/L (ref 3.4–5.3)
PROT SERPL-MCNC: 7.5 G/DL (ref 6.8–8.8)
RBC # BLD AUTO: 4.22 10E12/L (ref 3.8–5.2)
SODIUM SERPL-SCNC: 137 MMOL/L (ref 133–144)
T4 FREE SERPL-MCNC: 0.86 NG/DL (ref 0.76–1.46)
TSH SERPL DL<=0.005 MIU/L-ACNC: 10.87 MU/L (ref 0.4–4)
WBC # BLD AUTO: 4.4 10E9/L (ref 4–11)

## 2019-05-06 PROCEDURE — 84439 ASSAY OF FREE THYROXINE: CPT | Performed by: PHYSICIAN ASSISTANT

## 2019-05-06 PROCEDURE — 93005 ELECTROCARDIOGRAM TRACING: CPT | Mod: ZF

## 2019-05-06 PROCEDURE — 25000128 H RX IP 250 OP 636: Mod: ZF | Performed by: PHYSICIAN ASSISTANT

## 2019-05-06 PROCEDURE — 99215 OFFICE O/P EST HI 40 MIN: CPT | Mod: ZP | Performed by: PHYSICIAN ASSISTANT

## 2019-05-06 PROCEDURE — 84443 ASSAY THYROID STIM HORMONE: CPT | Performed by: PHYSICIAN ASSISTANT

## 2019-05-06 PROCEDURE — 25800030 ZZH RX IP 258 OP 636: Mod: ZF | Performed by: PHYSICIAN ASSISTANT

## 2019-05-06 PROCEDURE — 80053 COMPREHEN METABOLIC PANEL: CPT | Performed by: PHYSICIAN ASSISTANT

## 2019-05-06 PROCEDURE — 93010 ELECTROCARDIOGRAM REPORT: CPT | Mod: ZP | Performed by: INTERNAL MEDICINE

## 2019-05-06 PROCEDURE — 99214 OFFICE O/P EST MOD 30 MIN: CPT | Mod: GC | Performed by: INTERNAL MEDICINE

## 2019-05-06 PROCEDURE — 96375 TX/PRO/DX INJ NEW DRUG ADDON: CPT

## 2019-05-06 PROCEDURE — 85025 COMPLETE CBC W/AUTO DIFF WBC: CPT | Performed by: PHYSICIAN ASSISTANT

## 2019-05-06 PROCEDURE — 96413 CHEMO IV INFUSION 1 HR: CPT

## 2019-05-06 PROCEDURE — G0463 HOSPITAL OUTPT CLINIC VISIT: HCPCS | Mod: 25,ZF

## 2019-05-06 RX ORDER — SODIUM CHLORIDE 9 MG/ML
1000 INJECTION, SOLUTION INTRAVENOUS CONTINUOUS PRN
Status: CANCELLED
Start: 2019-05-06

## 2019-05-06 RX ORDER — EPINEPHRINE 1 MG/ML
0.3 INJECTION, SOLUTION INTRAMUSCULAR; SUBCUTANEOUS EVERY 5 MIN PRN
Status: CANCELLED | OUTPATIENT
Start: 2019-05-06

## 2019-05-06 RX ORDER — LORAZEPAM 2 MG/ML
0.5 INJECTION INTRAMUSCULAR EVERY 4 HOURS PRN
Status: CANCELLED
Start: 2019-05-06

## 2019-05-06 RX ORDER — ALBUTEROL SULFATE 0.83 MG/ML
2.5 SOLUTION RESPIRATORY (INHALATION)
Status: CANCELLED | OUTPATIENT
Start: 2019-05-06

## 2019-05-06 RX ORDER — EPINEPHRINE 0.3 MG/.3ML
0.3 INJECTION SUBCUTANEOUS EVERY 5 MIN PRN
Status: CANCELLED | OUTPATIENT
Start: 2019-05-06

## 2019-05-06 RX ORDER — DEXAMETHASONE SODIUM PHOSPHATE 10 MG/ML
10 INJECTION, SOLUTION INTRAMUSCULAR; INTRAVENOUS
Status: CANCELLED | OUTPATIENT
Start: 2019-05-06

## 2019-05-06 RX ORDER — METHYLPREDNISOLONE SODIUM SUCCINATE 125 MG/2ML
125 INJECTION, POWDER, LYOPHILIZED, FOR SOLUTION INTRAMUSCULAR; INTRAVENOUS
Status: CANCELLED
Start: 2019-05-06

## 2019-05-06 RX ORDER — ALBUTEROL SULFATE 90 UG/1
1-2 AEROSOL, METERED RESPIRATORY (INHALATION)
Status: CANCELLED
Start: 2019-05-06

## 2019-05-06 RX ORDER — HEPARIN SODIUM (PORCINE) LOCK FLUSH IV SOLN 100 UNIT/ML 100 UNIT/ML
5 SOLUTION INTRAVENOUS EVERY 8 HOURS
Status: DISCONTINUED | OUTPATIENT
Start: 2019-05-06 | End: 2019-05-06 | Stop reason: HOSPADM

## 2019-05-06 RX ORDER — MEPERIDINE HYDROCHLORIDE 25 MG/ML
25 INJECTION INTRAMUSCULAR; INTRAVENOUS; SUBCUTANEOUS EVERY 30 MIN PRN
Status: CANCELLED | OUTPATIENT
Start: 2019-05-06

## 2019-05-06 RX ORDER — HEPARIN SODIUM (PORCINE) LOCK FLUSH IV SOLN 100 UNIT/ML 100 UNIT/ML
5 SOLUTION INTRAVENOUS ONCE
Status: CANCELLED
Start: 2019-05-06

## 2019-05-06 RX ORDER — DIPHENHYDRAMINE HYDROCHLORIDE 50 MG/ML
50 INJECTION INTRAMUSCULAR; INTRAVENOUS
Status: CANCELLED
Start: 2019-05-06

## 2019-05-06 RX ORDER — HEPARIN SODIUM (PORCINE) LOCK FLUSH IV SOLN 100 UNIT/ML 100 UNIT/ML
5 SOLUTION INTRAVENOUS ONCE
Status: COMPLETED | OUTPATIENT
Start: 2019-05-06 | End: 2019-05-06

## 2019-05-06 RX ADMIN — SODIUM CHLORIDE 250 ML: 9 INJECTION, SOLUTION INTRAVENOUS at 14:38

## 2019-05-06 RX ADMIN — HEPARIN SODIUM (PORCINE) LOCK FLUSH IV SOLN 100 UNIT/ML 5 ML: 100 SOLUTION at 12:48

## 2019-05-06 RX ADMIN — PACLITAXEL 142 MG: 6 INJECTION, SOLUTION INTRAVENOUS at 14:55

## 2019-05-06 RX ADMIN — HEPARIN SODIUM (PORCINE) LOCK FLUSH IV SOLN 100 UNIT/ML 5 ML: 100 SOLUTION at 16:03

## 2019-05-06 RX ADMIN — SODIUM CHLORIDE 8 MG: 9 INJECTION, SOLUTION INTRAVENOUS at 14:38

## 2019-05-06 ASSESSMENT — PAIN SCALES - GENERAL
PAINLEVEL: NO PAIN (0)
PAINLEVEL: NO PAIN (0)

## 2019-05-06 ASSESSMENT — MIFFLIN-ST. JEOR: SCORE: 1301.22

## 2019-05-06 NOTE — PROGRESS NOTES
Oncology/Hematology Visit Note  May 6, 2019    Reason for Visit: follow up of right breast cancer    History of Present Illness: Kathy Lei is a 53 year old female with recent diagnosis of ER/LA positive, HER2 negative, grade 2 breast cancer. Mily presented between Christmas and New Years of 2018 with new sharp pains in the upper outer quadrant of the right breast.  Diagnostic mammogram on 1/8/19 with grouped coarse heterogeneous calcifications at 11:30 position, irregular mass at 9:00 position. US with irregular mass at 11:00 position, 1.0 x 0.9 x 1.6cm. At 9:30 position, irregular mass, 3.2 x 0.5, x 1.3 cm. Contrast mammogram was subsequently performed and the contrast mammogram showed a large area of mass and non-mass-like enhancement in the upper outer right breast measuring 7.2 cm in greatest dimension.      The pathology showed part A, breast needle biopsy 11 o'clock, 6 cm from the nipple, invasive mammary carcinoma, no special type, ductal (and mucinous) Donna grade 2.  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  Calcifications were associated with the DCIS.  There was a focus suspicious for lymphovascular invasion.  Invasive carcinoma was estrogen receptor positive and progesterone receptor negative by immunohistochemistry.  In part B, breast right, 8 o'clock, 4 cm from the nipple, ultrasound-guided core biopsy, invasive mammary carcinoma, no special type, ductal (and mucinous) Donna grade 2, occasional calcifications were noted.  Invasive carcinoma was estrogen receptor positive and progesterone receptor negative by immunohistochemistry.  On quantitation of the ER and LA, ER was positive 99% of the cells staining with strong intensity.  LA was subsequently noted to be positive with 15% of the cells staining with moderate intensity.       There was also a HER2 FISH performed, and the HER2 FISH showed average number of HER2 signals per nucleus  2.6.  Average number of CEN17 signals 1.7 with a ratio of 1.6, VASILIY negative for biopsy A.  For biopsy B, the HER2 signals per nucleus 2.9.  Average number CEN17 signals per nucleus 1.7 with a ratio of 1.7, VASILIY negative.  Overall, by 2018 ASCO/CAP guidelines, this tumor is HER2 negative.     She was enrolled in I-SPY2 to Durvulumab, Taxol and Olaparib arm. Please see Dr. Guzman's previous notes for further details on the patient's history. She is here today for routine follow up prior to week 11.    Interval History:  Mily is here for follow up.  Patient reports that she feels tired and has been going to bed earlier.  She is sleeping about 9 to 10 hours at night and feels her sleep quality is pretty good.  She is continuing to work part-time as a [email or web address] about 20 hours/week.  She has occasional headaches that improved with either Advil or Tylenol.  She has intermittent blurred vision.  She continues to have intermittent sensation of needles in her toes.  She took vitamin B6 but then felt poorly afterwards so stopped it.  She occasionally feels some twitches in her right breast, but denies any significant pain.  She reports the rash on her legs is improving.  She continues to use triamcinolone cream as needed.  She had a dry nose with some nosebleeds that has improved with nasal saline spray.  She denies other concerns.    Current Outpatient Medications   Medication Sig Dispense Refill     ADVIL 200 MG OR TABS 1 TABLET EVERY 4 TO 6 HOURS AS NEEDED 0 0     albuterol (2.5 MG/3ML) 0.083% nebulizer solution Take 3 mLs by nebulization once for 1 dose. 3 mL 0     albuterol (VENTOLIN HFA) 108 (90 Base) MCG/ACT Inhaler INHALE 1-2 PUFFS INTO THE LUNGS EVERY 4 HOURS AS NEEDED FOR SHORTNESS OF BREATH OR DIFFICULTY BREATHING. 18 g PRN     ASPIRIN NOT PRESCRIBED (INTENTIONAL) Please choose reason not prescribed, below (Patient not taking: Reported on 5/6/2019)       fluticasone (FLOVENT HFA) 110 MCG/ACT inhaler Inhale 2 puffs  into the lungs 2 times daily 1 Inhaler PRN     levothyroxine (SYNTHROID) 112 MCG tablet Take 1 tablet (112 mcg) by mouth daily 30 tablet 1     lisinopril (PRINIVIL/ZESTRIL) 5 MG tablet Take 1 tablet (5 mg) by mouth daily 90 tablet 3     LORazepam (ATIVAN) 0.5 MG tablet Take 1 tablet (0.5 mg) by mouth every 4 hours as needed (Anxiety, Nausea/Vomiting or Sleep) 30 tablet 2     order for DME Cranial prosthesis (Patient not taking: Reported on 4/30/2019) 1 Units 1     sertraline (ZOLOFT) 50 MG tablet One daily 90 tablet 1     sodium chloride (OCEAN) 0.65 % nasal spray Spray in both nostrils four times daily 30 mL 3     STATIN NOT PRESCRIBED (INTENTIONAL) Please choose reason not prescribed, below (Patient not taking: Reported on 5/6/2019)       study - olaparib (IDS# 3903) 100 mg tablet CHEMOTHERAPY Take 1 tablet (100 mg) by mouth every 12 hours Take at approximately the same times each day with an 8 oz (240 ml) glass of water. Tablets should be swallowed whole and not chewed, crushed, dissolved or divided. 60 tablet 0     study - olaparib (IDS# 3903) 100 mg tablet CHEMOTHERAPY Take 1 tablet (100 mg) by mouth every 12 hours Take at approximately the same times each day with an 8 oz (240 ml) glass of water. Tablets should be swallowed whole and not chewed, crushed, dissolved or divided. 60 tablet 0     study - olaparib (IDS# 3903) 100 mg tablet CHEMOTHERAPY Take 1 tablet (100 mg) by mouth every 12 hours Take at approximately the same times each day with an 8 oz (240 ml) glass of water. Tablets should be swallowed whole and not chewed, crushed, dissolved or divided. 60 tablet 0     triamcinolone (ARISTOCORT HP) 0.5 % external cream Apply topically 2 times daily 30 g 3     vitamin B6 (PYRIDOXINE) 100 MG tablet Take 1 tablet (100 mg) by mouth daily (Patient not taking: Reported on 5/6/2019) 30 tablet 3     Physical Examination:  General: The patient is a pleasant female in no acute distress. ECOG 1  /78 (BP Location:  Left arm, Patient Position: Sitting, Cuff Size: Adult Regular)   Pulse 81   Temp 98.2  F (36.8  C) (Oral)   Resp 18   Wt 69.4 kg (153 lb)   LMP 11/02/2014   SpO2 98%   BMI 24.69 kg/m    Wt Readings from Last 10 Encounters:   05/06/19 69.4 kg (153 lb)   05/06/19 68.9 kg (152 lb)   04/30/19 69.8 kg (153 lb 12.8 oz)   04/29/19 69.1 kg (152 lb 4.8 oz)   04/22/19 70.4 kg (155 lb 4.8 oz)   04/16/19 70.8 kg (156 lb)   04/08/19 69.5 kg (153 lb 3.2 oz)   04/01/19 70.5 kg (155 lb 6.4 oz)   03/26/19 70.1 kg (154 lb 9.6 oz)   03/18/19 69.8 kg (153 lb 12.8 oz)   HEENT: EOMI, PERRL. Sclerae are anicteric. Oral mucosa is pink and moist with no lesions or thrush.   Lymph: No palpable cervical, supraclavicular, or axillary lymphadenopathy.  Heart: Regular rate and rhythm.   Lungs: Clear to auscultation bilaterally.   Breast: Right breast with 2 x 2 cm mass in upper outer quadrant.  Abdomen: Bowel sounds present, soft, nontender with no palpable hepatosplenomegaly or masses.   Extremities: No lower extremity edema noted bilaterally.   Neuro: Cranial nerves II through XII are grossly intact.  Skin: Maculopapular rash of bilateral LE is resolving, minimally present now. No other rashes, petechiae, or bruising noted on exposed skin.     Laboratory Data:   5/6/2019 12:56   Sodium 137   Potassium 3.8   Chloride 106   Carbon Dioxide 25   Urea Nitrogen 8   Creatinine 0.61   GFR Estimate >90   GFR Estimate If Black >90   Calcium 8.9   Anion Gap 6   Albumin 3.9   Protein Total 7.5   Bilirubin Total 0.3   Alkaline Phosphatase 55   ALT 44   AST 25   Glucose 108 (H)   WBC 4.4   Hemoglobin 12.7   Hematocrit 37.7   Platelet Count 301   RBC Count 4.22   MCV 89   MCH 30.1   MCHC 33.7   RDW 14.6   Diff Method Automated Method   % Neutrophils 57.2   % Lymphocytes 31.2   % Monocytes 7.0   % Eosinophils 2.7   % Basophils 1.4   % Immature Granulocytes 0.5   Nucleated RBCs 0   Absolute Neutrophil 2.5   Absolute Lymphocytes 1.4   Absolute Monocytes  0.3   Absolute Eosinophils 0.1   Absolute Basophils 0.1   Abs Immature Granulocytes 0.0   Absolute Nucleated RBC 0.0      5/6/2019 12:56   T4 Free 0.86   TSH 10.87 (H)     Assessment and Plan:  Kathy Lei is a 53 year old female with clinical stage II, T3N0MX, invasive ductal breast cancer in the upper outer quadrant of right breast, multifocal, measuring 8.9 cm on MRI. ER positive and HER2 negative. She is enrolled in I-SPY 2 and on durvalumab every 28 days, weekly Taxol, and olaparib.      ECOG 1 (due to changing her work schedule due to fatigue)    Right breast cancer, ER/OK positive, HER2 negative: She was enrolled in I-SPY2 and started cycle 1 Durvulumab, Taxol and Olaparib on 2/25/19. Tolerating well aside from some grade 1 fatigue, nausea and now brief episode of neuropathy for the past 2 weeks. She is here for C11.  --Labs reviewed. Will proceed with C11.    --Continues on olaparib.   --She will follow up with Dr. Guzman next week.  --She will see me in 2 weeks with starting cycle 1 AC. She will have a breast MRI and biopsy prior to starting on AC.    Abnormal echocardiogram: She saw Dr. Alanis today, 5/6/19, and Echo showed improvement. She remains on lisinopril 5 mg daily.   Neuropathy: Occurring intermittently in fingers/toes last 2 weeks. Continue to monitor. Discussed B6 unlikely the reason she felt poorly after taking it. Discussed okay to resume vitamin B6.  Maculopapular rash of BLE, grade 1: Resolving. Continue triamcinolone on affected areas and emollient cream  Nasal mucositis, grade 1: Improved with saline nasal spray.   Hx depression: On Zoloft. No concerns today.  Hx asthma: On flovent QDAY and albuterol prn  Hx hypothyroidism: Levothyroxine increased from 88mcg daily to 112mcg daily by Dr. Guzman on 4/16/18 due to TSH elevation and symptomatic. Patient requested recheck of TSH. Will recheck today. Discussed if down trending, will not yet change dose, as has not yet been 6 weeks since  last dose adjustment.   Blurred vision: Occurs intermittently. Recommend eye exam at completion of chemotherapy. She denies dry eyes.    Nasreen Grady PA-C  [facility]  [address], [place] [id number] [phone]    Addendum: TSH is mildly improved. Free T4 remains normal. No dose adjustment needed yet. If TSH still greater than 10 after 5/28/19 (6 weeks on current dose), then would increase levothyroxine dose.

## 2019-05-06 NOTE — LETTER
5/6/2019      RE: Kathy Lei  [address] [place] [address]       Oncology/Hematology Visit Note  May 6, 2019    Reason for Visit: follow up of right breast cancer    History of Present Illness: Kathy Lei is a 53 year old female with recent diagnosis of ER/KS positive, HER2 negative, grade 2 breast cancer. Mily presented between Christmas and New Years of 2018 with new sharp pains in the upper outer quadrant of the right breast.  Diagnostic mammogram on 1/8/19 with grouped coarse heterogeneous calcifications at 11:30 position, irregular mass at 9:00 position. US with irregular mass at 11:00 position, 1.0 x 0.9 x 1.6cm. At 9:30 position, irregular mass, 3.2 x 0.5, x 1.3 cm. Contrast mammogram was subsequently performed and the contrast mammogram showed a large area of mass and non-mass-like enhancement in the upper outer right breast measuring 7.2 cm in greatest dimension.      The pathology showed part A, breast needle biopsy 11 o'clock, 6 cm from the nipple, invasive mammary carcinoma, no special type, ductal (and mucinous) Donna grade 2.  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  Calcifications were associated with the DCIS.  There was a focus suspicious for lymphovascular invasion.  Invasive carcinoma was estrogen receptor positive and progesterone receptor negative by immunohistochemistry.  In part B, breast right, 8 o'clock, 4 cm from the nipple, ultrasound-guided core biopsy, invasive mammary carcinoma, no special type, ductal (and mucinous) Donna grade 2, occasional calcifications were noted.  Invasive carcinoma was estrogen receptor positive and progesterone receptor negative by immunohistochemistry.  On quantitation of the ER and KS, ER was positive 99% of the cells staining with strong intensity.  KS was subsequently noted to be positive with 15% of the cells staining with moderate intensity.       There was also a  HER2 FISH performed, and the HER2 FISH showed average number of HER2 signals per nucleus 2.6.  Average number of CEN17 signals 1.7 with a ratio of 1.6, VASILIY negative for biopsy A.  For biopsy B, the HER2 signals per nucleus 2.9.  Average number CEN17 signals per nucleus 1.7 with a ratio of 1.7, VASILIY negative.  Overall, by 2018 ASCO/CAP guidelines, this tumor is HER2 negative.     She was enrolled in I-SPY2 to Durvulumab, Taxol and Olaparib arm. Please see Dr. Guzman's previous notes for further details on the patient's history. She is here today for routine follow up prior to week 11.    Interval History:  Mily is here for follow up.  Patient reports that she feels tired and has been going to bed earlier.  She is sleeping about 9 to 10 hours at night and feels her sleep quality is pretty good.  She is continuing to work part-time as a [employer] about 20 hours/week.  She has occasional headaches that improved with either Advil or Tylenol.  She has intermittent blurred vision.  She continues to have intermittent sensation of needles in her toes.  She took vitamin B6 but then felt poorly afterwards so stopped it.  She occasionally feels some twitches in her right breast, but denies any significant pain.  She reports the rash on her legs is improving.  She continues to use triamcinolone cream as needed.  She had a dry nose with some nosebleeds that has improved with nasal saline spray.  She denies other concerns.    Current Outpatient Medications   Medication Sig Dispense Refill     ADVIL 200 MG OR TABS 1 TABLET EVERY 4 TO 6 HOURS AS NEEDED 0 0     albuterol (2.5 MG/3ML) 0.083% nebulizer solution Take 3 mLs by nebulization once for 1 dose. 3 mL 0     albuterol (VENTOLIN HFA) 108 (90 Base) MCG/ACT Inhaler INHALE 1-2 PUFFS INTO THE LUNGS EVERY 4 HOURS AS NEEDED FOR SHORTNESS OF BREATH OR DIFFICULTY BREATHING. 18 g PRN     ASPIRIN NOT PRESCRIBED (INTENTIONAL) Please choose reason not prescribed, below (Patient not taking:  Reported on 5/6/2019)       fluticasone (FLOVENT HFA) 110 MCG/ACT inhaler Inhale 2 puffs into the lungs 2 times daily 1 Inhaler PRN     levothyroxine (SYNTHROID) 112 MCG tablet Take 1 tablet (112 mcg) by mouth daily 30 tablet 1     lisinopril (PRINIVIL/ZESTRIL) 5 MG tablet Take 1 tablet (5 mg) by mouth daily 90 tablet 3     LORazepam (ATIVAN) 0.5 MG tablet Take 1 tablet (0.5 mg) by mouth every 4 hours as needed (Anxiety, Nausea/Vomiting or Sleep) 30 tablet 2     order for DME Cranial prosthesis (Patient not taking: Reported on 4/30/2019) 1 Units 1     sertraline (ZOLOFT) 50 MG tablet One daily 90 tablet 1     sodium chloride (OCEAN) 0.65 % nasal spray Spray in both nostrils four times daily 30 mL 3     STATIN NOT PRESCRIBED (INTENTIONAL) Please choose reason not prescribed, below (Patient not taking: Reported on 5/6/2019)       study - olaparib (IDS# 3903) 100 mg tablet CHEMOTHERAPY Take 1 tablet (100 mg) by mouth every 12 hours Take at approximately the same times each day with an 8 oz (240 ml) glass of water. Tablets should be swallowed whole and not chewed, crushed, dissolved or divided. 60 tablet 0     study - olaparib (IDS# 3903) 100 mg tablet CHEMOTHERAPY Take 1 tablet (100 mg) by mouth every 12 hours Take at approximately the same times each day with an 8 oz (240 ml) glass of water. Tablets should be swallowed whole and not chewed, crushed, dissolved or divided. 60 tablet 0     study - olaparib (IDS# 3903) 100 mg tablet CHEMOTHERAPY Take 1 tablet (100 mg) by mouth every 12 hours Take at approximately the same times each day with an 8 oz (240 ml) glass of water. Tablets should be swallowed whole and not chewed, crushed, dissolved or divided. 60 tablet 0     triamcinolone (ARISTOCORT HP) 0.5 % external cream Apply topically 2 times daily 30 g 3     vitamin B6 (PYRIDOXINE) 100 MG tablet Take 1 tablet (100 mg) by mouth daily (Patient not taking: Reported on 5/6/2019) 30 tablet 3     Physical  Examination:  General: The patient is a pleasant female in no acute distress. ECOG 1  /78 (BP Location: Left arm, Patient Position: Sitting, Cuff Size: Adult Regular)   Pulse 81   Temp 98.2  F (36.8  C) (Oral)   Resp 18   Wt 69.4 kg (153 lb)   LMP 11/02/2014   SpO2 98%   BMI 24.69 kg/m     Wt Readings from Last 10 Encounters:   05/06/19 69.4 kg (153 lb)   05/06/19 68.9 kg (152 lb)   04/30/19 69.8 kg (153 lb 12.8 oz)   04/29/19 69.1 kg (152 lb 4.8 oz)   04/22/19 70.4 kg (155 lb 4.8 oz)   04/16/19 70.8 kg (156 lb)   04/08/19 69.5 kg (153 lb 3.2 oz)   04/01/19 70.5 kg (155 lb 6.4 oz)   03/26/19 70.1 kg (154 lb 9.6 oz)   03/18/19 69.8 kg (153 lb 12.8 oz)   HEENT: EOMI, PERRL. Sclerae are anicteric. Oral mucosa is pink and moist with no lesions or thrush.   Lymph: No palpable cervical, supraclavicular, or axillary lymphadenopathy.  Heart: Regular rate and rhythm.   Lungs: Clear to auscultation bilaterally.   Breast: Right breast with 2 x 2 cm mass in upper outer quadrant.  Abdomen: Bowel sounds present, soft, nontender with no palpable hepatosplenomegaly or masses.   Extremities: No lower extremity edema noted bilaterally.   Neuro: Cranial nerves II through XII are grossly intact.  Skin: Maculopapular rash of bilateral LE is resolving, minimally present now. No other rashes, petechiae, or bruising noted on exposed skin.     Laboratory Data:   5/6/2019 12:56   Sodium 137   Potassium 3.8   Chloride 106   Carbon Dioxide 25   Urea Nitrogen 8   Creatinine 0.61   GFR Estimate >90   GFR Estimate If Black >90   Calcium 8.9   Anion Gap 6   Albumin 3.9   Protein Total 7.5   Bilirubin Total 0.3   Alkaline Phosphatase 55   ALT 44   AST 25   Glucose 108 (H)   WBC 4.4   Hemoglobin 12.7   Hematocrit 37.7   Platelet Count 301   RBC Count 4.22   MCV 89   MCH 30.1   MCHC 33.7   RDW 14.6   Diff Method Automated Method   % Neutrophils 57.2   % Lymphocytes 31.2   % Monocytes 7.0   % Eosinophils 2.7   % Basophils 1.4   % Immature  Granulocytes 0.5   Nucleated RBCs 0   Absolute Neutrophil 2.5   Absolute Lymphocytes 1.4   Absolute Monocytes 0.3   Absolute Eosinophils 0.1   Absolute Basophils 0.1   Abs Immature Granulocytes 0.0   Absolute Nucleated RBC 0.0      5/6/2019 12:56   T4 Free 0.86   TSH 10.87 (H)     Assessment and Plan:  Kathy Lei is a 53 year old female with clinical stage II, T3N0MX, invasive ductal breast cancer in the upper outer quadrant of right breast, multifocal, measuring 8.9 cm on MRI. ER positive and HER2 negative. She is enrolled in I-SPY 2 and on durvalumab every 28 days, weekly Taxol, and olaparib.      ECOG 1 (due to changing her work schedule due to fatigue)    Right breast cancer, ER/OK positive, HER2 negative: She was enrolled in I-SPY2 and started cycle 1 Durvulumab, Taxol and Olaparib on 2/25/19. Tolerating well aside from some grade 1 fatigue, nausea and now brief episode of neuropathy for the past 2 weeks. She is here for C11.  --Labs reviewed. Will proceed with C11.    --Continues on olaparib.   --She will follow up with Dr. Guzman next week.  --She will see me in 2 weeks with starting cycle 1 AC. She will have a breast MRI and biopsy prior to starting on AC.    Abnormal echocardiogram:  She saw Dr. Alanis today, 5/6/19, and Echo showed improvement. She remains on lisinopril 5 mg daily.   Neuropathy: Occurring intermittently in fingers/toes last 2 weeks. Continue to monitor. Discussed B6 unlikely the reason she felt poorly after taking it. Discussed okay to resume vitamin B6.  Maculopapular rash of BLE, grade 1: Resolving. Continue triamcinolone on affected areas and emollient cream  Nasal mucositis, grade 1: Improved with saline nasal spray.   Hx depression: On Zoloft. No concerns today.  Hx asthma: On flovent QDAY and albuterol prn  Hx hypothyroidism: Levothyroxine increased from 88mcg daily to 112mcg daily by Dr. Guzman on 4/16/18 due to TSH elevation and symptomatic. Patient requested recheck of  TSH. Will recheck today. Discussed if down trending, will not yet change dose, as has not yet been 6 weeks since last dose adjustment.   Blurred vision: Occurs intermittently. Recommend eye exam at completion of chemotherapy. She denies dry eyes.    Nasreen Grady PA-C  [facility] [id number] [place] [address]  [phone]    Addendum: TSH is mildly improved. Free T4 remains normal. No dose adjustment needed yet. If TSH still greater than 10 after 5/28/19 (6 weeks on current dose), then would increase levothyroxine dose.     Nasreen Grady PA-C

## 2019-05-06 NOTE — NURSING NOTE
Chief Complaint   Patient presents with     Port Draw     Labs drawn via PORT by RN in lab. Line flushed with saline and heparin . VS taken.      Ross Borja RN

## 2019-05-06 NOTE — LETTER
5/6/2019      RE: Kathy Lei  [address] [place] [address]       Dear Colleague,    Thank you for the opportunity to participate in the care of your patient, Kathy Lei, at the [facility]. Please see a copy of my visit note below.    CARDIOLOGY CLINIC NOTE  05/06/2019    HPI:  The patient is a 53-year-old female with a past medical history significant for asthma, depression, hypothyroidism, and hypertension (recently started on amlodipine), and newly diagnosed ER/AR positive, HER-2 negative breast cancer who presented originally for evaluation of an abnormal echocardiogram. The patient was diagnosed breast cancer and for her initial visit a echocardiogram was ordered and was found to have abnormal strain imaging at 16%, an ejection fraction of 50-55%, and mild left ventricular hypertrophy.  The plan was to undergo neoadjuvant chemotherapy with Adriamycin followed by surgery and cardiology was consulted for evaluation of this in the setting of possible I-SPY enrollment. She has no personal history of atrial fibrillation or coronary artery disease.      She reports that she previously smoked socially.  She smoked approximately a pack per week for 30 years.  She quit following her diagnosis.  She drinks approximately 2-3 fifths of vodka per month.    Interval History:  The patient reports that she has started working part-time due to the chemotherapy.  She is currently 11 cycles into her Taxol treatment.  She feels significantly fatigued however otherwise is doing well.  She reports no exertional chest pain, no orthopnea, no PND, no lower extremity swelling.  She is attempting to stay as active as possible.  She continues to be free of smoking.  Her blood pressure is also well controlled on the lisinopril and she has had no side effect to it.    ROS:  A complete 10-point ROS was negative except as above.    PAST MEDICAL  HISTORY:  Past Medical History:   Diagnosis Date     Depressive disorder, not elsewhere classified      Hypothyroidism      Hypothyroidism, unspecified hypothyroidism type      Malignant neoplasm of upper-outer quadrant of right breast in female, estrogen receptor positive (H) 2019     Mild intermittent asthma      Scoliosis (and kyphoscoliosis), idiopathic        PAST SURGICAL HISTORY:  Past Surgical History:   Procedure Laterality Date     ABDOMEN SURGERY   c section     BACK SURGERY  scoliosis fusion      BIOPSY  breast     BREAST SURGERY  implants      C/SECTION, LOW TRANSVERSE      , Low Transverse     COSMETIC SURGERY  breast implants      INSERT PORT VASCULAR ACCESS Left 3/6/2019    Procedure: Single Lumen Chest Port Placement;  Surgeon: Jerome Dash PA-C;  Location: [facility] OR     IR CHEST PORT PLACEMENT > 5 YRS OF AGE  3/6/2019     SURGICAL HISTORY OF -       spinal fusion- Lacey kim     SURGICAL HISTORY OF -       removal of right breast papilloma       FAMILY HISTORY:  Family History   Problem Relation Age of Onset     Hypertension Mother      Thyroid Disease Mother         on meds     Psychotic Disorder Mother         anxiety     Cerebrovascular Disease Father         minor     Hypertension Father      Osteoporosis Maternal Grandmother      Asthma Maternal Grandfather      Thyroid Disease Sister         x2-on meds     Psychotic Disorder Sister         problems making decisions     Asthma Other      C.A.D. No family hx of      Diabetes No family hx of      Breast Cancer No family hx of      Cancer - colorectal No family hx of        SOCIAL HISTORY:  Social History     Socioeconomic History     Marital status:      Spouse name: Not on file     Number of children: Not on file     Years of education: Not on file     Highest education level: Not on file   Social Needs     Financial resource strain: Not on file     Food insecurity - worry: Not on file     Food  insecurity - inability: Not on file     Transportation needs - medical: Not on file     Transportation needs - non-medical: Not on file   Occupational History     Not on file   Tobacco Use     Smoking status: Former Smoker     Packs/day: 0.50     Years: 30.00     Pack years: 15.00     Types: Cigarettes     Start date: 1980     Last attempt to quit: 2011     Years since quittin.6     Smokeless tobacco: Never Used     Tobacco comment: social smoker at times   Substance and Sexual Activity     Alcohol use: Yes     Comment: on weekends if going out     Drug use: No     Sexual activity: Not Currently     Partners: Male     Birth control/protection: Post-menopausal     Comment:    Other Topics Concern     Parent/sibling w/ CABG, MI or angioplasty before 65F 55M? No   Social History Narrative    Dairy/d 1 servings/d.     Caffeine 2-3 servings/d    Exercise 0 x week    Sunscreen used - Yes    Seatbelts used - Yes    Working smoke/CO detectors in the home - Yes    Guns stored in the home - Yes,hunting guns/rifles in gun cabinet    Self Breast Exams - No due to implants    Self Testicular Exam - NA    Eye Exam up to date - No    Dental Exam up to date - No    Pap Smear up to date - No    Mammogram up to date - Not sure how long ago,hx implants    PSA up to date - NA    Dexa Scan up to date - NA    Flex Sig / Colonoscopy up to date - NA    Immunizations up to date - Yes-Td     Abuse: Current or Past(Physical, Sexual or Emotional)- No    Do you feel safe in your environment - Yes       MEDICATIONS:  Current Outpatient Medications   Medication     ADVIL 200 MG OR TABS     albuterol (VENTOLIN HFA) 108 (90 Base) MCG/ACT Inhaler     fluticasone (FLOVENT HFA) 110 MCG/ACT inhaler     levothyroxine (SYNTHROID) 112 MCG tablet     lisinopril (PRINIVIL/ZESTRIL) 5 MG tablet     LORazepam (ATIVAN) 0.5 MG tablet     sertraline (ZOLOFT) 50 MG tablet     sodium chloride (OCEAN) 0.65 % nasal spray     study - olaparib  "(IDS# 3903) 100 mg tablet CHEMOTHERAPY     study - olaparib (IDS# 3903) 100 mg tablet CHEMOTHERAPY     study - olaparib (IDS# 3903) 100 mg tablet CHEMOTHERAPY     triamcinolone (ARISTOCORT HP) 0.5 % external cream     albuterol (2.5 MG/3ML) 0.083% nebulizer solution     ASPIRIN NOT PRESCRIBED (INTENTIONAL)     lidocaine-prilocaine (EMLA) 2.5-2.5 % external cream     omeprazole (PRILOSEC) 20 MG  capsule     order for DME     prochlorperazine (COMPAZINE) 10 MG tablet     STATIN NOT PRESCRIBED (INTENTIONAL)     vitamin B6 (PYRIDOXINE) 100 MG tablet     No current facility-administered medications for this visit.        ALLERGIES:     Allergies   Allergen Reactions     Remeron [Mirtazapine]      Dizzy , cant function , balance       PHYSICAL EXAM:  /80 (BP Location: Left arm, Patient Position: Chair, Cuff Size: Adult Regular)   Pulse 83   Ht 1.676 m (5' 6\")   Wt 68.9 kg (152 lb)   LMP 11/02/2014   SpO2 98%   BMI 24.53 kg/m     Gen: alert, interactive, NAD  HEENT: atraumatic, EOMI, MMM  Neck: supple, no JVD  CV: RRR, no m/r/g  Chest: CTAB, no wheezes or crackles  Abd: soft, NT, ND  Ext: no LE edema, 2+ peripheral pulses  Skin: warm and dry, no rashes on exposed surfaces  Neuro: alert and oriented, no focal deficits  Endocrine: no thyromegaly  Musculoskeletal: no joint swelling or tenderness  Psych: pleasant and conversant      LABS:    Chemistry panel:   Recent Labs   Lab Test 02/05/19  1038 07/16/18  0959     --    POTASSIUM 3.4  --    CHLORIDE 105  --    CO2 27  --    ANIONGAP 6  --    GLC 86 96   BUN 16  --    CR 0.60  --    BRYANT 8.4*  --    MAG 2.0  --    GFRESTIMATED >90  --    AST 9  --    ALT 18  --        CBC:   Recent Labs   Lab Test 02/05/19  1038 01/26/15  1603   WBC 12.2*  --    RBC 4.64  --    HGB 13.1 13.8   HCT 40.7  --    MCV 88  --    MCH 28.2  --    MCHC 32.2  --    RDW 12.7  --      --        Lipid Panel:  Recent Labs   Lab Test 07/16/18  0959   CHOL 214*   HDL 66   * "   TRIG 86       Thyroid:   TSH   Date Value Ref Range Status   04/22/2019 14.76 (H) 0.40 - 4.00 mU/L Final     T4 Free   Date Value Ref Range Status   04/22/2019 1.00 0.76 - 1.46 ng/dL Final     Hemoglobin A1C   Date Value Ref Range Status   02/05/2019 5.6 0 - 5.6 % Final     Comment:     Normal <5.7% Prediabetes 5.7-6.4%  Diabetes 6.5% or higher - adopted from ADA   consensus guidelines.       INR   Date Value Ref Range Status   02/05/2019 1.02 0.86 - 1.14 Final         CARDIAC DATA:  ECG 5/6/19: inferior Q waves, NSR          TTE 5/6/19  Interpretation Summary  Global and regional left ventricular function is normal with an EF of 55-60%.  Global peak LV longitudinal strain is averaged at -18.6%. This is within  reported normal limits (normal <-18%).  Right ventricular function, chamber size, wall motion, and thickness are  normal.  No pericardial effusion is present.    TTE 2/5/19  Interpretation Summary  Borderline (EF 50-55%) reduced left ventricular function is present.Mild concentric wall thickening consistent with left ventricular hypertrophy is present.  Global right ventricular function is normal.  Mild sclerosis of the aortic valve and mitral annulus present.  The inferior vena cava was normal in size with preserved respiratory variability.  No pericardial effusion is present.      ASSESSMENT/PLAN:  In summary, this is a 53-year-old female with a past medical history notable for anxiety, hypothyroidism, asthma, newly diagnosed hypertension, and newly diagnosed breast cancer who presents for evaluation prior to undergoing Adriamycin neoadjuvant chemotherapy.    1.  Abnormal echocardiogram: The patient's February echocardiogram had both low strain and low normal ejection fraction.  Both of these tests were very borderline, and she has no symptoms of heart failure.  She was placed on lisinopril 5 mg for hypertension and repeated echocardiogram was obtained today and is normal.  Additionally, she has no  symptoms of heart failure or cardiac cardiomyopathy.  Recommended the patient continue on lisinopril for hypertension and be followed with serial echocardiograms.  Should her echocardiograms change, we can see her sooner.  Otherwise, return to clinic in 6 months.    2.  Hypertension, essential: Controlled.  Continue home lisinopril 5 mg daily.    Pt seen and discussed with Dr. Alanis.    Ji Quarles MD  Cardiology Fellow, PGY-4  [phone]      ATTENDING ATTESTATION:  This patient has been seen and examined by me May 6, 2019 with Ji Quarles MD, cardiology fellow. I have reviewed the vitals, laboratory and imaging data relevant to this patient's care. I have edited this note to reflect our joint assessment and plan, and discussed the plan with the patient.    Mily is a 53-year-old woman who was recently diagnosed with early stage breast cancer and mild essential hypertension.  She has no known history of coronary disease heart failure or significant valvular disease.  She underwent an echocardiogram prior to starting chemotherapy which is notable for low normal left ventricular systolic function, ejection fraction 53% and a mildly reduced global longitudinal strain of -16%. She also has a history of alcohol use and tobacco use.  There is no family history of premature cardiac disease. She was started on lisinopril for cardioprotection.     Mily is undergoing weekly Taxol infusions and will start DD AC therapy next week. She denies any anginal heart failure symptoms.  She is fairly active and reports an exercise tolerance of greater than 4 METs.  On exam she is euvolemic.  Her blood pressure is in normal range.  ECG is notable for sinus rhythm with minimal Q waves inferiorly.  Echocardiogram done today is notable for normal biventricular function normal.      I would recommend a limited echo after completion of dose dense AC therapy and a follow up with me in 6 months.    Mary Alanis MD, MS  Staff  Cardiologist  Pager: [phone]

## 2019-05-06 NOTE — NURSING NOTE
"Oncology Rooming Note    May 6, 2019 1:14 PM   Kathy Lei is a 53 year old female who presents for:    Chief Complaint   Patient presents with     Port Draw     Labs drawn via PORT by RN in lab. Line flushed with saline and heparin . VS taken.      Initial Vitals: Blood Pressure 132/78 (BP Location: Left arm, Patient Position: Sitting, Cuff Size: Adult Regular)   Pulse 81   Temperature 98.2  F (36.8  C) (Oral)   Respiration 18   Weight 69.4 kg (153 lb)   Last Menstrual Period 11/02/2014   Oxygen Saturation 98%   Body Mass Index 24.69 kg/m   Estimated body mass index is 24.69 kg/m  as calculated from the following:    Height as of an earlier encounter on 5/6/19: 1.676 m (5' 6\").    Weight as of this encounter: 69.4 kg (153 lb). Body surface area is 1.8 meters squared.  No Pain (0) Comment: Data Unavailable   Patient's last menstrual period was 11/02/2014.  Allergies reviewed: Yes  Medications reviewed: Yes    Medications: Medication refills not needed today.  Pharmacy name entered into [facility]:    [id number] - [facility] [address] [place] [address] [id number] - [place], [place] - [address] AT [facility] [address]    Clinical concerns: NONE       Elaine Shaver MA              "

## 2019-05-06 NOTE — PATIENT INSTRUCTIONS
Contact Numbers  [facility]: [phone]    After Hours:  [phone]  Triage: [phone]    Please call the [facility] Triage line if you experience a temperature greater than or equal to 100.5, shaking chills, have uncontrolled nausea, vomiting and/or diarrhea, dizziness, shortness of breath, chest pain, bleeding, unexplained bruising, or if you have any other new/concerning symptoms, questions or concerns.     If it is after hours, weekends, or holidays, please call the main hospital  at  [phone] and ask to speak to the Oncology doctor on call.     If you are having any concerning symptoms or wish to speak to a provider before your next infusion visit, please call your care coordinator or triage to notify them so we can adequately serve you.     If you need a refill on a narcotic prescription or other medication, please call triage before your infusion appointment.       May 2019      Ortega Monday Tuesday Wednesday Thursday Friday Saturday                  1     2     3     4       5     6    ECHO LIMITED  10:30 AM   (60 min.)   [email or web address]   [facility] Cardiac Services    [facility] RETURN  11:15 AM   (30 min.)   Mary Alanis MD   [facility] LAB DRAW  12:30 PM   (15 min.)   [facility] DRAW   [facility] Lab Draw    [facility] RETURN   1:05 PM   (50 min.)   Nasreen Grady PA-C   [facility] ONC INFUSION 120   2:00 PM   (120 min.)    ONCOLOGY INFUSION   [facility] 7     8     9     10     11       12     13     14    [facility] DRAW  12:00 PM   (15 min.)    [facility] LAB DRAW   [facility] Lab Draw    [facility] RETURN  12:15 PM   (30 min.)   Christian Guzman MD   [facility] ONC INFUSION 120   2:30 PM   (120 min.)    ONCOLOGY INFUSION   [facility] 15     16     17     18       19     20    [facility] DRAW   9:00 AM   (15 min.)    [facility] LAB DRAW     [facility] Lab Draw    [facility] RETURN   9:15 AM   (50 min.)   Nasreen Grady PA-C   [facility] ONC INFUSION 120  11:00 AM   (120 min.)    ONCOLOGY INFUSION   [facility] 21    MR BREAST BILATERAL W   8:00 AM   (60 min.)   [email or web address]   [facility] MRI 22     23     24     25       26     27     28    UM NEW  10:15 AM   (30 min.)   LALY Ramos MD   [facility] 29 30 31 June 2019 Sunday Monday Tuesday Wednesday Thursday Friday Saturday                                 1       2     3     4    [facility] LAB DRAW   1:00 PM   (15 min.)    [facility] LAB DRAW   [facility] Lab Draw    [facility] RETURN   1:15 PM   (30 min.)   Christian Guzman MD   [facility] ONC INFUSION 120   2:00 PM   (120 min.)    ONCOLOGY INFUSION   [facility] 5     6     7     8       9     10     11     12     13     14     15       16     17     18     19     20     21     22       23     24     25     26     27     28     29       30                                                   Lab Results:  Recent Results (from the past 12 hour(s))   EKG 12-lead, tracing only (Same Day)    Collection Time: 05/06/19 10:26 AM   Result Value Ref Range    Interpretation ECG Click View Image link to view waveform and result    CBC with platelets differential    Collection Time: 05/06/19 12:56 PM   Result Value Ref Range    WBC 4.4 4.0 - 11.0 10e9/L    RBC Count 4.22 3.8 - 5.2 10e12/L    Hemoglobin 12.7 11.7 - 15.7 g/dL    Hematocrit 37.7 35.0 - 47.0 %    MCV 89 78 - 100 fl    MCH 30.1 26.5 - 33.0 pg    MCHC 33.7 31.5 - 36.5 g/dL    RDW 14.6 10.0 - 15.0 %    Platelet Count 301 150 - 450 10e9/L    Diff Method Automated Method     % Neutrophils 57.2 %    % Lymphocytes 31.2 %    % Monocytes 7.0 %    % Eosinophils 2.7 %    % Basophils 1.4 %    % Immature Granulocytes 0.5 %    Nucleated RBCs 0 0 /100     Absolute Neutrophil 2.5 1.6 - 8.3 10e9/L    Absolute Lymphocytes 1.4 0.8 - 5.3 10e9/L    Absolute Monocytes 0.3 0.0 - 1.3 10e9/L    Absolute Eosinophils 0.1 0.0 - 0.7 10e9/L    Absolute Basophils 0.1 0.0 - 0.2 10e9/L    Abs Immature Granulocytes 0.0 0 - 0.4 10e9/L    Absolute Nucleated RBC 0.0    Comprehensive metabolic panel    Collection Time: 05/06/19 12:56 PM   Result Value Ref Range    Sodium 137 133 - 144 mmol/L    Potassium 3.8 3.4 - 5.3 mmol/L    Chloride 106 94 - 109 mmol/L    Carbon Dioxide 25 20 - 32 mmol/L    Anion Gap 6 3 - 14 mmol/L    Glucose 108 (H) 70 - 99 mg/dL    Urea Nitrogen 8 7 - 30 mg/dL    Creatinine 0.61 0.52 - 1.04 mg/dL    GFR Estimate >90 >60 mL/min/[1.73_m2]    GFR Estimate If Black >90 >60 mL/min/[1.73_m2]    Calcium 8.9 8.5 - 10.1 mg/dL    Bilirubin Total 0.3 0.2 - 1.3 mg/dL    Albumin 3.9 3.4 - 5.0 g/dL    Protein Total 7.5 6.8 - 8.8 g/dL    Alkaline Phosphatase 55 40 - 150 U/L    ALT 44 0 - 50 U/L    AST 25 0 - 45 U/L

## 2019-05-06 NOTE — PROGRESS NOTES
Infusion Nursing Note:  Kathymartin Lei presents today for Cycle 11 Day 1 of Taxol.    Patient seen by provider today: Yes: LIMA Toscano   present during visit today: Not Applicable.    Note: N/A.    Intravenous Access:  Implanted Port.    Treatment Conditions:  Lab Results   Component Value Date    HGB 12.7 05/06/2019     Lab Results   Component Value Date    WBC 4.4 05/06/2019      Lab Results   Component Value Date    ANEU 2.5 05/06/2019     Lab Results   Component Value Date     05/06/2019      Lab Results   Component Value Date     05/06/2019                   Lab Results   Component Value Date    POTASSIUM 3.8 05/06/2019           Lab Results   Component Value Date    MAG 2.0 02/05/2019            Lab Results   Component Value Date    CR 0.61 05/06/2019                   Lab Results   Component Value Date    BRYANT 8.9 05/06/2019                Lab Results   Component Value Date    BILITOTAL 0.3 05/06/2019           Lab Results   Component Value Date    ALBUMIN 3.9 05/06/2019                    Lab Results   Component Value Date    ALT 44 05/06/2019           Lab Results   Component Value Date    AST 25 05/06/2019       Results reviewed, labs MET treatment parameters, ok to proceed with treatment.      Post Infusion Assessment:  Patient tolerated infusion without incident.  Blood return noted pre and post infusion.  Site patent and intact, free from redness, edema or discomfort.  No evidence of extravasations.  Access discontinued per protocol.       Discharge Plan:   Patient declined prescription refills.  Discharge instructions reviewed with: Patient.  Patient and/or family verbalized understanding of discharge instructions and all questions answered.  AVS to patient via [employer].  Patient will return 5/14/19 for next appointment.   Patient discharged in stable condition accompanied by: self.  Departure Mode: Ambulatory.    Tanya Davila RN

## 2019-05-07 LAB — INTERPRETATION ECG - MUSE: NORMAL

## 2019-05-08 NOTE — NURSING NOTE
[id number]: Study Visit Note   Subject name: Kathy Lei     Visit: C11    Did the study visit occur within the appropriate window allowed by the protocol? yes    Since the last study visit, She has been doing okay. Patient reports blurry vision and dry nasal mucosa. Otherwise, no new symptoms since most recent treatment.    I have personally interviewed Kathy Lei and reviewed her medical record for adverse events and concomitant medications and these have been recorded on the corresponding logs in Kathy Lei's research file.     Kathy Lei was given the opportunity to ask any trial related questions.  Please see provider progress note for physical exam and other clinical information. Labs were reviewed - any significant lab values were addressed and reviewed.    Lorne Leos RN     Pager: [phone]

## 2019-05-13 NOTE — PROGRESS NOTES
Rachel Arauz NP   [facility]    [address], [place] [address]      RE:       Kathy Lei   MRN:   [id number]   :    1965      Dear Ms. Arauz:   Thank you for referring Kathy Lei to our clinic for a new diagnosis of an estrogen-receptor positive, NV-positive, HER2-negative breast cancer, grade 2, in the upper outer quadrant of the right breast.      Mily presented between  and New Years of 2018 with new sharp pains in the upper outer quadrant of the right breast.  She underwent mammographic screening.       IMAGING:  Mammography and ultrasound:  She had a diagnostic mammogram which showed bilateral prepectoral saline implants.  On the right breast at 11:30 position, there were grouped coarse heterogeneous calcifications spanning 1.3 cm, new since , adjacent calcifications 11-o'clock position posterior depth.  There was an irregular mass in the lateral right breast 9-o'clock position mid-posterior depth, and an additional mass with obscured margins.  No significant changes in the left breast.  Right breast ultrasound was performed.  In the area of the palpable lump in the right breast, 11-o'clock position, 6 cm from the nipple, there is an irregular hypoechoic mass with indistinct margins measuring approximately 1.0 x 0.9 x 1.6 cm in size.  Immediately adjacent to the mass, 11:30 position, are microcalcifications.  In the second area of palpable lump right breast, 9:30 position, 5 cm from the nipple, there was an irregular hypoechoic mass measuring 3.2 x 0.5 x 1.3 cm in size felt to account for the mammographic findings.  There were multiple additional round hypoechoic masses similar to the findings at 9:30 position seen incidentally during real time and not directly palpable.  For example, in the right breast at 8-o'clock position, 4 cm from the nipple, there was a mass measuring 0.8 x 0.6 x 0.6 cm, BI-RADS 4.       Contrast mammogram was  subsequently performed and the contrast mammogram showed a large area of mass and non-mass-like enhancement in the upper outer right breast measuring 7.2 cm in greatest dimension, corresponding global asymmetry in the upper outer right breast.  Enhancement extended to the implant without evidence of extension to the skin surface or nipple, and the calcifications were noted as previously found.      PATHOLOGY:  The pathology showed part A, breast needle biopsy 11 o'clock, 6 cm from the nipple, invasive mammary carcinoma, no special type, ductal (and mucinous) Donna grade 2.  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  Calcifications were associated with the DCIS.  There was a focus suspicious for lymphovascular invasion.  Invasive carcinoma was estrogen receptor positive and progesterone receptor negative by immunohistochemistry.  In part B, breast right, 8 o'clock, 4 cm from the nipple, ultrasound-guided core biopsy, invasive mammary carcinoma, no special type, ductal (and mucinous) [place] grade 2, occasional calcifications were noted.  Invasive carcinoma was estrogen receptor positive and progesterone receptor negative by immunohistochemistry.  On quantitation of the ER and AK, ER was positive 99% of the cells staining with strong intensity.  AK was subsequently noted to be positive with 15% of the cells staining with moderate intensity.       There was also a HER2 FISH performed, and the HER2 FISH showed average number of HER2 signals per nucleus 2.6.  Average number of CEN17 signals 1.7 with a ratio of 1.6, VASILIY negative for biopsy A.  For biopsy B, the HER2 signals per nucleus 2.9.  Average number CEN17 signals per nucleus 1.7 with a ratio of 1.7, VASILIY negative.  Overall, by 2018 ASCO/CAP guidelines, this tumor is HER2 negative.      Mily now comes to clinic with her , Neri, and her son, Clay, for recommendations on how to proceed.  I did discuss the  I-SPY 2 clinical trial as a potential option.  This discussion is detailed below.      Overall, Mily has been doing quite well.  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  She is able to perform all of her household activities and housework.  Overall, she has an ECOG 0 performance status.  She does complain of stabbing pain in the upper outer quadrant of the right breast which continues and it happens several times a day.  She rates the pain as a 2/10.  She has no fatigue, depression treated with Zoloft and no anxiety, although she did have a panic attack the other day.      She has no weight loss.  Diet has not changed.  No loss of energy.  She does not sleep during the day.      She has a mass in the right breast, which was self-palpated.  No masses in the left breast and has noted no nipple discharge, skin changes, breast or nipple redness.  She has had some increase in right breast size.  No pain and no changes in the nipple, and no dimpling of the breast.         PAST MEDICAL HISTORY:  In terms of her breast history, she does have a history of a smaller right breast which was treated with implants 19 years ago.  She has a history of 2 papillomas in the right breast, 1 removed at the 6-o'clock position 5 years ago, another removed at the 6-o'clock position approximately 10 years ago.  These incisions are approximately at the 5:30 to 6-o'clock position.  She has no history of radiation therapy.  No history of breast cancer of any type.  No history of tumor of any kind.  No history of heart problems, heart attack, breathing problems, blood clots, seizures, stroke, arthritis, peptic ulcer disease or osteoporosis.  No history of bone fractures.  She is not currently participating in a clinical trial. She does have a history of asthma and a history of hypothyroidism.      The remainder of a 10-point review of systems is negative.      FAMILY HISTORY:  Entirely negative for breast cancer or ovarian  cancer or breast cancer in a male relative.      ALLERGIES:  No known drug allergies.  No allergy to seafood, iodine or contrast dye.  She does not take aspirin.      PAST MENSTRUAL HISTORY:  First period was at age 11.  First and only pregnancy was at age 26.  No miscarriages or abortions.  Occasional use of oral contraceptives in her 20s.  No history of hormone replacement therapy.  Last period was 3 years ago.      SOCIAL HISTORY:  Tobacco history was from age 15 to present, smoking a pack of cigarettes a week.  She is now trying to stop cigarettes.      In terms of alcohol use, in her early teens and early 20s, she drank approximately 10 drinks on the weekend and has tapered off drinking since then.      INTERVAL HISTORY:    Mily returns to clinic for cycle 12/day 1 of weekly paclitaxel.  She has no pain.  She has moderate fatigue at work, where she works as a hairdresser.  No depression.  Significant anxiety, as she occasionally wakes up at night with anxiety.  Mirtazapine did not work for her because she became hung over in the mornings, feeling very, very fatigued, and she decided to discontinue the mirtazapine.  She has an elevated TSH.  She continues on Synthroid 112 mcg daily.  The rash on her legs has nearly resolved with triamcinolone cream treatment.  She has occasional nausea about twice a week, which responds well to Compazine.  She is taking vitamin D.  She gets calcium in her diet.  Heartburn has resolved.  She has a grade 1 bloody nose when she blows her nose.  She has been eating a healthful diet.  She has been exercising 150 minutes per week on an exercise bike.  She consumes no alcohol.      PHYSICAL EXAMINATION:   VITAL SIGNS:  Blood pressure 129/83, temperature 98.5, pulse 90, respirations 16, weight 69.5 kg.   GENERAL:  Mily appears generally well.     HEENT:  She has alopecia and is wearing a wig.  No lesions in the oropharynx.   LYMPH:  There is no palpable cervical, supraclavicular,  subclavicular or axillary lymphadenopathy.   BREASTS:  Examination of the right breast reveals a 4 x 4 cm mass at the 12 o'clock position just above and adjacent to the nipple areolar complex and a 2 x 2 cm mass at the 8 o'clock position, again adjacent to the nipple areolar complex.  Both of these masses are on top of an underlying breast implant.  No other masses in the right breast.  Left breast is without masses.   LUNGS:  Clear to percussion and auscultation.   HEART:  Regular rate and rhythm, S1, S2.   ABDOMEN:  Soft and nontender without hepatosplenomegaly.   EXTREMITIES:  Without edema.   PSYCHIATRIC:  Mood and affect are normal.   SKIN:  The rash on her legs is essentially resolved with only a couple of erythematous macular lesions still noticeable.      LABORATORY DATA:  The CBC and CMP were within normal limits.  TSH came back at 10.61.  The echocardiogram performed 05/06/2019 showed an ejection fraction of 55%-60%.  The ECG performed on the same day showed a sinus rhythm, possible inferior infarct previously cited 02/11/2019.  No significant change in ECG compared with 02/11.      ASSESSMENT AND PLAN:     1.  Mily Lei is a 53-year-old woman with a recent diagnosis of a clinical stage II, T3N0MX invasive ductal carcinoma of the upper outer quadrant of the right breast, which was multifocal, largest area of involvement measuring 8.9 cm in largest dimension on the MRI.  The mass was easily palpable in the past and is now not as easily palpable.  There is a 4 x 4 area of firmness at the 12 o'clock position 2 cm from the nipple areolar complex.  She also has a 2 x 2 cm area of firmness at the 8 o'clock position, adjacent to the nipple areolar complex.  She is here for cycle 12 of paclitaxel on the durvalumab, paclitaxel and olaparib arm.  She has had grade 1 nausea with the olaparib responsive to Compazine.  She has grade 1 fatigue.   2.  Rash on lower legs is related to the durvalumab and is now  resolved except for 1-2 scattered, erythematous macular lesions.  She has triamcinolone cream to treat this problem.   3.  Hypothyroidism, grade 1, being treated with levothyroxine 112 mcg daily.   4.  Asthma has been treated with Flovent.   5.  Occasional anxiety has been responsive to lorazepam.  She has not been able to tolerate the mirtazapine.   6.  Followup.  All of Mily's questions were answered.  We will see her in followup in our clinic in 1 week to begin dose-dense AC.  Teaching will be done by Mily Mortensen. Follow up with Kerry Norwood on 5-20 for C1D1 of dose dense AC. Neulasta on 5-21. CBC, CMP with visit. Follow up with me June 4 with C2D1 of ddAC.     Thank you for allowing us to continue to participate in Mily Quique's care.      Christian Guzman MD       [facility]        I spent 40 minutes with the patient more than 50% of which was in counseling and coordination of care.

## 2019-05-14 ENCOUNTER — INFUSION THERAPY VISIT (OUTPATIENT)
Dept: ONCOLOGY | Facility: CLINIC | Age: 54
End: 2019-05-14
Attending: INTERNAL MEDICINE
Payer: COMMERCIAL

## 2019-05-14 ENCOUNTER — RESEARCH ENCOUNTER (OUTPATIENT)
Dept: ONCOLOGY | Facility: CLINIC | Age: 54
End: 2019-05-14

## 2019-05-14 ENCOUNTER — APPOINTMENT (OUTPATIENT)
Dept: LAB | Facility: CLINIC | Age: 54
End: 2019-05-14
Attending: INTERNAL MEDICINE
Payer: COMMERCIAL

## 2019-05-14 VITALS
OXYGEN SATURATION: 95 % | HEART RATE: 90 BPM | RESPIRATION RATE: 16 BRPM | DIASTOLIC BLOOD PRESSURE: 83 MMHG | BODY MASS INDEX: 24.64 KG/M2 | HEIGHT: 66 IN | WEIGHT: 153.3 LBS | TEMPERATURE: 98.5 F | SYSTOLIC BLOOD PRESSURE: 129 MMHG

## 2019-05-14 DIAGNOSIS — Z17.0 MALIGNANT NEOPLASM OF UPPER-OUTER QUADRANT OF RIGHT BREAST IN FEMALE, ESTROGEN RECEPTOR POSITIVE (H): Primary | ICD-10-CM

## 2019-05-14 DIAGNOSIS — Z17.0 MALIGNANT NEOPLASM OF UPPER-OUTER QUADRANT OF RIGHT BREAST IN FEMALE, ESTROGEN RECEPTOR POSITIVE (H): ICD-10-CM

## 2019-05-14 DIAGNOSIS — E03.9 HYPOTHYROIDISM, UNSPECIFIED TYPE: ICD-10-CM

## 2019-05-14 DIAGNOSIS — C50.411 MALIGNANT NEOPLASM OF UPPER-OUTER QUADRANT OF RIGHT BREAST IN FEMALE, ESTROGEN RECEPTOR POSITIVE (H): Primary | ICD-10-CM

## 2019-05-14 DIAGNOSIS — C50.411 MALIGNANT NEOPLASM OF UPPER-OUTER QUADRANT OF RIGHT BREAST IN FEMALE, ESTROGEN RECEPTOR POSITIVE (H): ICD-10-CM

## 2019-05-14 LAB
ALBUMIN SERPL-MCNC: 4 G/DL (ref 3.4–5)
ALP SERPL-CCNC: 56 U/L (ref 40–150)
ALT SERPL W P-5'-P-CCNC: 39 U/L (ref 0–50)
ANION GAP SERPL CALCULATED.3IONS-SCNC: 8 MMOL/L (ref 3–14)
AST SERPL W P-5'-P-CCNC: 22 U/L (ref 0–45)
BASOPHILS # BLD AUTO: 0.1 10E9/L (ref 0–0.2)
BASOPHILS NFR BLD AUTO: 1 %
BILIRUB SERPL-MCNC: 0.3 MG/DL (ref 0.2–1.3)
BUN SERPL-MCNC: 8 MG/DL (ref 7–30)
CALCIUM SERPL-MCNC: 9.2 MG/DL (ref 8.5–10.1)
CHLORIDE SERPL-SCNC: 105 MMOL/L (ref 94–109)
CO2 SERPL-SCNC: 25 MMOL/L (ref 20–32)
CREAT SERPL-MCNC: 0.6 MG/DL (ref 0.52–1.04)
DIFFERENTIAL METHOD BLD: NORMAL
EOSINOPHIL # BLD AUTO: 0.1 10E9/L (ref 0–0.7)
EOSINOPHIL NFR BLD AUTO: 2.6 %
ERYTHROCYTE [DISTWIDTH] IN BLOOD BY AUTOMATED COUNT: 14.8 % (ref 10–15)
GFR SERPL CREATININE-BSD FRML MDRD: >90 ML/MIN/{1.73_M2}
GLUCOSE SERPL-MCNC: 102 MG/DL (ref 70–99)
HCT VFR BLD AUTO: 38.4 % (ref 35–47)
HGB BLD-MCNC: 12.9 G/DL (ref 11.7–15.7)
IMM GRANULOCYTES # BLD: 0 10E9/L (ref 0–0.4)
IMM GRANULOCYTES NFR BLD: 0.4 %
LYMPHOCYTES # BLD AUTO: 1.4 10E9/L (ref 0.8–5.3)
LYMPHOCYTES NFR BLD AUTO: 27.7 %
MCH RBC QN AUTO: 30.3 PG (ref 26.5–33)
MCHC RBC AUTO-ENTMCNC: 33.6 G/DL (ref 31.5–36.5)
MCV RBC AUTO: 90 FL (ref 78–100)
MONOCYTES # BLD AUTO: 0.3 10E9/L (ref 0–1.3)
MONOCYTES NFR BLD AUTO: 6.7 %
NEUTROPHILS # BLD AUTO: 3.1 10E9/L (ref 1.6–8.3)
NEUTROPHILS NFR BLD AUTO: 61.6 %
NRBC # BLD AUTO: 0 10*3/UL
NRBC BLD AUTO-RTO: 0 /100
PLATELET # BLD AUTO: 320 10E9/L (ref 150–450)
POTASSIUM SERPL-SCNC: 3.9 MMOL/L (ref 3.4–5.3)
PROT SERPL-MCNC: 7.1 G/DL (ref 6.8–8.8)
RBC # BLD AUTO: 4.26 10E12/L (ref 3.8–5.2)
SODIUM SERPL-SCNC: 138 MMOL/L (ref 133–144)
T4 FREE SERPL-MCNC: 1.01 NG/DL (ref 0.76–1.46)
TSH SERPL DL<=0.005 MIU/L-ACNC: 10.61 MU/L (ref 0.4–4)
WBC # BLD AUTO: 5.1 10E9/L (ref 4–11)

## 2019-05-14 PROCEDURE — 25800030 ZZH RX IP 258 OP 636: Mod: ZF | Performed by: INTERNAL MEDICINE

## 2019-05-14 PROCEDURE — 96413 CHEMO IV INFUSION 1 HR: CPT

## 2019-05-14 PROCEDURE — 84439 ASSAY OF FREE THYROXINE: CPT | Performed by: INTERNAL MEDICINE

## 2019-05-14 PROCEDURE — 25000128 H RX IP 250 OP 636: Mod: ZF | Performed by: INTERNAL MEDICINE

## 2019-05-14 PROCEDURE — 80053 COMPREHEN METABOLIC PANEL: CPT | Performed by: INTERNAL MEDICINE

## 2019-05-14 PROCEDURE — 84443 ASSAY THYROID STIM HORMONE: CPT | Performed by: INTERNAL MEDICINE

## 2019-05-14 PROCEDURE — 85025 COMPLETE CBC W/AUTO DIFF WBC: CPT | Performed by: INTERNAL MEDICINE

## 2019-05-14 PROCEDURE — 96375 TX/PRO/DX INJ NEW DRUG ADDON: CPT

## 2019-05-14 PROCEDURE — G0463 HOSPITAL OUTPT CLINIC VISIT: HCPCS | Mod: ZF

## 2019-05-14 PROCEDURE — 99214 OFFICE O/P EST MOD 30 MIN: CPT | Mod: ZP | Performed by: INTERNAL MEDICINE

## 2019-05-14 RX ORDER — METHYLPREDNISOLONE SODIUM SUCCINATE 125 MG/2ML
125 INJECTION, POWDER, LYOPHILIZED, FOR SOLUTION INTRAMUSCULAR; INTRAVENOUS
Status: CANCELLED
Start: 2019-05-14

## 2019-05-14 RX ORDER — HEPARIN SODIUM (PORCINE) LOCK FLUSH IV SOLN 100 UNIT/ML 100 UNIT/ML
5 SOLUTION INTRAVENOUS ONCE
Status: COMPLETED | OUTPATIENT
Start: 2019-05-14 | End: 2019-05-14

## 2019-05-14 RX ORDER — MEPERIDINE HYDROCHLORIDE 25 MG/ML
25 INJECTION INTRAMUSCULAR; INTRAVENOUS; SUBCUTANEOUS EVERY 30 MIN PRN
Status: CANCELLED | OUTPATIENT
Start: 2019-05-14

## 2019-05-14 RX ORDER — LORAZEPAM 2 MG/ML
0.5 INJECTION INTRAMUSCULAR EVERY 4 HOURS PRN
Status: CANCELLED
Start: 2019-05-14

## 2019-05-14 RX ORDER — DEXAMETHASONE SODIUM PHOSPHATE 10 MG/ML
10 INJECTION, SOLUTION INTRAMUSCULAR; INTRAVENOUS
Status: CANCELLED | OUTPATIENT
Start: 2019-05-14

## 2019-05-14 RX ORDER — HEPARIN SODIUM (PORCINE) LOCK FLUSH IV SOLN 100 UNIT/ML 100 UNIT/ML
5 SOLUTION INTRAVENOUS DAILY PRN
Status: DISCONTINUED | OUTPATIENT
Start: 2019-05-14 | End: 2019-05-18 | Stop reason: HOSPADM

## 2019-05-14 RX ORDER — PROCHLORPERAZINE MALEATE 10 MG
10 TABLET ORAL PRN
Refills: 0 | COMMUNITY
Start: 2019-04-29 | End: 2019-06-04

## 2019-05-14 RX ORDER — DIPHENHYDRAMINE HYDROCHLORIDE 50 MG/ML
50 INJECTION INTRAMUSCULAR; INTRAVENOUS
Status: CANCELLED
Start: 2019-05-14

## 2019-05-14 RX ORDER — LORAZEPAM 0.5 MG/1
0.5 TABLET ORAL EVERY 4 HOURS PRN
Qty: 10 TABLET | Refills: 2 | Status: SHIPPED | OUTPATIENT
Start: 2019-05-14 | End: 2019-05-18

## 2019-05-14 RX ORDER — SODIUM CHLORIDE 9 MG/ML
1000 INJECTION, SOLUTION INTRAVENOUS CONTINUOUS PRN
Status: CANCELLED
Start: 2019-05-14

## 2019-05-14 RX ORDER — EPINEPHRINE 0.3 MG/.3ML
0.3 INJECTION SUBCUTANEOUS EVERY 5 MIN PRN
Status: CANCELLED | OUTPATIENT
Start: 2019-05-14

## 2019-05-14 RX ORDER — HEPARIN SODIUM (PORCINE) LOCK FLUSH IV SOLN 100 UNIT/ML 100 UNIT/ML
5 SOLUTION INTRAVENOUS ONCE
Status: CANCELLED
Start: 2019-05-14

## 2019-05-14 RX ORDER — LEVOTHYROXINE SODIUM 112 UG/1
112 TABLET ORAL DAILY
Qty: 30 TABLET | Refills: 1 | Status: SHIPPED | OUTPATIENT
Start: 2019-05-14 | End: 2019-07-02

## 2019-05-14 RX ORDER — ONDANSETRON 2 MG/ML
8 INJECTION INTRAMUSCULAR; INTRAVENOUS ONCE
Status: COMPLETED | OUTPATIENT
Start: 2019-05-14 | End: 2019-05-14

## 2019-05-14 RX ORDER — HEPARIN SODIUM (PORCINE) LOCK FLUSH IV SOLN 100 UNIT/ML 100 UNIT/ML
5 SOLUTION INTRAVENOUS ONCE
Status: CANCELLED
Start: 2019-05-17

## 2019-05-14 RX ORDER — EPINEPHRINE 1 MG/ML
0.3 INJECTION, SOLUTION INTRAMUSCULAR; SUBCUTANEOUS EVERY 5 MIN PRN
Status: CANCELLED | OUTPATIENT
Start: 2019-05-14

## 2019-05-14 RX ORDER — ALBUTEROL SULFATE 90 UG/1
1-2 AEROSOL, METERED RESPIRATORY (INHALATION)
Status: CANCELLED
Start: 2019-05-14

## 2019-05-14 RX ORDER — ALBUTEROL SULFATE 0.83 MG/ML
2.5 SOLUTION RESPIRATORY (INHALATION)
Status: CANCELLED | OUTPATIENT
Start: 2019-05-14

## 2019-05-14 RX ADMIN — HEPARIN 5 ML: 100 SYRINGE at 15:21

## 2019-05-14 RX ADMIN — HEPARIN SODIUM (PORCINE) LOCK FLUSH IV SOLN 100 UNIT/ML 5 ML: 100 SOLUTION at 12:09

## 2019-05-14 RX ADMIN — PACLITAXEL 142 MG: 6 INJECTION, SOLUTION INTRAVENOUS at 14:09

## 2019-05-14 RX ADMIN — ONDANSETRON 8 MG: 2 INJECTION INTRAMUSCULAR; INTRAVENOUS at 13:42

## 2019-05-14 RX ADMIN — SODIUM CHLORIDE 250 ML: 9 INJECTION, SOLUTION INTRAVENOUS at 13:41

## 2019-05-14 ASSESSMENT — PAIN SCALES - GENERAL: PAINLEVEL: NO PAIN (0)

## 2019-05-14 ASSESSMENT — MIFFLIN-ST. JEOR: SCORE: 1307.11

## 2019-05-14 NOTE — PROGRESS NOTES
Infusion Nursing Note:    Patient presents today for Cycle 12 Taxol.   Patient met with Dr. Guzman prior to infusion.    Lab Results   Component Value Date    HGB 12.9 05/14/2019     Lab Results   Component Value Date    WBC 5.1 05/14/2019      Lab Results   Component Value Date    ANEU 3.1 05/14/2019     Lab Results   Component Value Date     05/14/2019      Lab Results   Component Value Date     05/14/2019                   Lab Results   Component Value Date    POTASSIUM 3.9 05/14/2019           Lab Results   Component Value Date    MAG 2.0 02/05/2019            Lab Results   Component Value Date    CR 0.60 05/14/2019                   Lab Results   Component Value Date    BRYANT 9.2 05/14/2019                Lab Results   Component Value Date    BILITOTAL 0.3 05/14/2019           Lab Results   Component Value Date    ALBUMIN 4.0 05/14/2019                    Lab Results   Component Value Date    ALT 39 05/14/2019           Lab Results   Component Value Date    AST 22 05/14/2019       Results reviewed, labs MET treatment parameters, ok to proceed with treatment.    Note: N/A.    Intravenous Access:  Implanted Port.    Post Infusion Assessment:  Patient tolerated infusion without incident.  Blood return noted pre and post infusion.  Access discontinued per protocol.    Discharge Plan:   Prescription refills given for synthoid and ativan.  AVS to patient via [employer].  Patient will return 5/20 for next appointment.   Patient discharged in stable condition accompanied by: self.  Departure Mode: Ambulatory.

## 2019-05-14 NOTE — NURSING NOTE
Chief Complaint   Patient presents with     Port Draw     Labs drawn via port by RN in lab. VS taken.      Labs drawn via Port accessed using 20g gripper needle. Line flushed and Heparin locked. Vital signs taken. Checked into next appointment.       Lakeshia Nash RN

## 2019-05-14 NOTE — PATIENT INSTRUCTIONS
Contact Numbers    [facility] Main Line: [phone]  [facility] Triage and after hours / weekends / holidays:  [phone]      Please call the triage or after hours line if you experience a temperature greater than or equal to 100.5, shaking chills, have uncontrolled nausea, vomiting and/or diarrhea, dizziness, shortness of breath, chest pain, bleeding, unexplained bruising, or if you have any other new/concerning symptoms, questions or concerns.      If you are having any concerning symptoms or wish to speak to a provider before your next infusion visit, please call your care coordinator or triage to notify them so we can adequately serve you.     If you need a refill on a narcotic prescription or other medication, please call before your infusion appointment.                 May 2019      [place] Monday Tuesday Wednesday Thursday Friday Saturday                  1     2     3     4       5     6    ECHO LIMITED  10:30 AM   (60 min.)   [address] Cardiac Services    [facility] RETURN  11:15 AM   (30 min.)   Mary Alanis MD   [facility]ONIC LAB DRAW  12:30 PM   (15 min.)   [facility] DRAW   [facility] Lab Draw    [facility] RETURN   1:05 PM   (50 min.)   Nasreen Grady PA-C   [facility] ONC INFUSION 120   2:00 PM   (120 min.)    ONCOLOGY INFUSION   [facility] 7     8     9     10     11       12     13     14    [facility] LAB DRAW  12:00 PM   (15 min.)    [facility] LAB DRAW   [facility]    [facility] RETURN  12:15 PM   (30 min.)   Christian Gzuman MD   [facility] ONC INFUSION 120   2:30 PM   (120 min.)    ONCOLOGY INFUSION   [facility] 15     16     17     18    MR BREAST BILATERAL W   7:45 AM   (45 min.)   [address] [facility] [place] MRI   19     20    [facility] LAB DRAW   7:45 AM   (15 min.)    M[facility]IC LAB DRAW   [facility] Lab Draw    US BREAST BX CORE NEEDLE  RIGHT   8:30 AM   (60 min.)   [id number]   [facility] Imaging    [facility] RETURN   9:15 AM   (50 min.)   Nasreen Grady [facility]-C   [facility]  11:00 AM   (120 min.)   [facility] ONCOLOGY INFUSION   [facility] 21     22     23     24     25       26     27     28    [facility] NEW  10:15 AM   (30 min.)   LALY Ramos MD   [facility] 29 30 31 June 2019 Sunday Monday Tuesday Wednesday Thursday Friday Saturday                                 1       2     3     4    P [facility] LAB DRAW   1:00 PM   (15 min.)   [facility] DRAW   [facility] Lab Draw    [facility] RETURN   1:15 PM   (30 min.)   Christian Guzman MD   [facility]    [facility] ONC INFUSION 120   2:00 PM   (120 min.)   UC ONCOLOGY INFUSION   [facility] 5     6     7     8       9     10     11     12     13     14     15       16     17     18    [facility] LAB DRAW   1:00 PM   (15 min.)   [facility] DRAW   [facility] Lab Draw    [facility] RETURN   1:15 PM   (30 min.)   Christian Guzman MD   [facility]    [facility] ONC INFUSION 120   2:00 PM   (120 min.)   [facility] ONCOLOGY INFUSION   [facility] 19     20     21     22       23     24     25     26     27     28     29       30                                                  Recent Results (from the past 24 hour(s))   TSH with free T4 reflex    Collection Time: 05/14/19 12:16 PM   Result Value Ref Range    TSH 10.61 (H) 0.40 - 4.00 mU/L   CBC with platelets differential    Collection Time: 05/14/19 12:16 PM   Result Value Ref Range    WBC 5.1 4.0 - 11.0 10e9/L    RBC Count 4.26 3.8 - 5.2 10e12/L    Hemoglobin 12.9 11.7 - 15.7 g/dL    Hematocrit 38.4 35.0 - 47.0 %    MCV 90 78 - 100 fl    MCH 30.3 26.5 - 33.0 pg    MCHC 33.6 31.5 - 36.5 g/dL    RDW 14.8 10.0 - 15.0 %    Platelet Count 320 150 - 450 10e9/L    Diff  Method Automated Method     % Neutrophils 61.6 %    % Lymphocytes 27.7 %    % Monocytes 6.7 %    % Eosinophils 2.6 %    % Basophils 1.0 %    % Immature Granulocytes 0.4 %    Nucleated RBCs 0 0 /100    Absolute Neutrophil 3.1 1.6 - 8.3 10e9/L    Absolute Lymphocytes 1.4 0.8 - 5.3 10e9/L    Absolute Monocytes 0.3 0.0 - 1.3 10e9/L    Absolute Eosinophils 0.1 0.0 - 0.7 10e9/L    Absolute Basophils 0.1 0.0 - 0.2 10e9/L    Abs Immature Granulocytes 0.0 0 - 0.4 10e9/L    Absolute Nucleated RBC 0.0    Comprehensive metabolic panel    Collection Time: 05/14/19 12:16 PM   Result Value Ref Range    Sodium 138 133 - 144 mmol/L    Potassium 3.9 3.4 - 5.3 mmol/L    Chloride 105 94 - 109 mmol/L    Carbon Dioxide 25 20 - 32 mmol/L    Anion Gap 8 3 - 14 mmol/L    Glucose 102 (H) 70 - 99 mg/dL    Urea Nitrogen 8 7 - 30 mg/dL    Creatinine 0.60 0.52 - 1.04 mg/dL    GFR Estimate >90 >60 mL/min/[1.73_m2]    GFR Estimate If Black >90 >60 mL/min/[1.73_m2]    Calcium 9.2 8.5 - 10.1 mg/dL    Bilirubin Total 0.3 0.2 - 1.3 mg/dL    Albumin 4.0 3.4 - 5.0 g/dL    Protein Total 7.1 6.8 - 8.8 g/dL    Alkaline Phosphatase 56 40 - 150 U/L    ALT 39 0 - 50 U/L    AST 22 0 - 45 U/L   T4 free    Collection Time: 05/14/19 12:16 PM   Result Value Ref Range    T4 Free 1.01 0.76 - 1.46 ng/dL

## 2019-05-14 NOTE — LETTER
2019       RE: Kathy Lei   [address] [place] [address]     Dear Colleague,    Thank you for referring your patient, Kathy Lei, to the [facility]. Please see a copy of my visit note below.    Rachel Arauz NP   [facility]    [address], [place] [address]      RE:       Kathy Lei   MRN:   [id number]   :    1965      Dear Ms. Arauz:   Thank you for referring Kathy Lei to our clinic for a new diagnosis of an estrogen-receptor positive, OH-positive, HER2-negative breast cancer, grade 2, in the upper outer quadrant of the right breast.      Mily presented between  and New Years of 2018 with new sharp pains in the upper outer quadrant of the right breast.  She underwent mammographic screening.       IMAGING:  Mammography and ultrasound:  She had a diagnostic mammogram which showed bilateral prepectoral saline implants.  On the right breast at 11:30 position, there were grouped coarse heterogeneous calcifications spanning 1.3 cm, new since , adjacent calcifications 11-o'clock position posterior depth.  There was an irregular mass in the lateral right breast 9-o'clock position mid-posterior depth, and an additional mass with obscured margins.  No significant changes in the left breast.  Right breast ultrasound was performed.  In the area of the palpable lump in the right breast, 11-o'clock position, 6 cm from the nipple, there is an irregular hypoechoic mass with indistinct margins measuring approximately 1.0 x 0.9 x 1.6 cm in size.  Immediately adjacent to the mass, 11:30 position, are microcalcifications.  In the second area of palpable lump right breast, 9:30 position, 5 cm from the nipple, there was an irregular hypoechoic mass measuring 3.2 x 0.5 x 1.3 cm in size felt to account for the mammographic findings.  There were multiple additional round hypoechoic masses similar to the  findings at 9:30 position seen incidentally during real time and not directly palpable.  For example, in the right breast at 8-o'clock position, 4 cm from the nipple, there was a mass measuring 0.8 x 0.6 x 0.6 cm, BI-RADS 4.       Contrast mammogram was subsequently performed and the contrast mammogram showed a large area of mass and non-mass-like enhancement in the upper outer right breast measuring 7.2 cm in greatest dimension, corresponding global asymmetry in the upper outer right breast.  Enhancement extended to the implant without evidence of extension to the skin surface or nipple, and the calcifications were noted as previously found.      PATHOLOGY:  The pathology showed part A, breast needle biopsy 11 o'clock, 6 cm from the nipple, invasive mammary carcinoma, no special type, ductal (and mucinous) Donna grade 2.  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  Calcifications were associated with the DCIS.  There was a focus suspicious for lymphovascular invasion.  Invasive carcinoma was estrogen receptor positive and progesterone receptor negative by immunohistochemistry.  In part B, breast right, 8 o'clock, 4 cm from the nipple, ultrasound-guided core biopsy, invasive mammary carcinoma, no special type, ductal (and mucinous) Donna grade 2, occasional calcifications were noted.  Invasive carcinoma was estrogen receptor positive and progesterone receptor negative by immunohistochemistry.  On quantitation of the ER and MN, ER was positive 99% of the cells staining with strong intensity.  MN was subsequently noted to be positive with 15% of the cells staining with moderate intensity.       There was also a HER2 FISH performed, and the HER2 FISH showed average number of HER2 signals per nucleus 2.6.  Average number of CEN17 signals 1.7 with a ratio of 1.6, VASILIY negative for biopsy A.  For biopsy B, the HER2 signals per nucleus 2.9.  Average number CEN17 signals  per nucleus 1.7 with a ratio of 1.7, VASILIY negative.  Overall, by 2018 ASCO/CAP guidelines, this tumor is HER2 negative.      Mily now comes to clinic with her , Neri, and her son, Clay, for recommendations on how to proceed.  I did discuss the I-SPY 2 clinical trial as a potential option.  This discussion is detailed below.      Overall, Mily has been doing quite well.  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  She is able to perform all of her household activities and housework.  Overall, she has an ECOG 0 performance status.  She does complain of stabbing pain in the upper outer quadrant of the right breast which continues and it happens several times a day.  She rates the pain as a 2/10.  She has no fatigue, depression treated with Zoloft and no anxiety, although she did have a panic attack the other day.      She has no weight loss.  Diet has not changed.  No loss of energy.  She does not sleep during the day.      She has a mass in the right breast, which was self-palpated.  No masses in the left breast and has noted no nipple discharge, skin changes, breast or nipple redness.  She has had some increase in right breast size.  No pain and no changes in the nipple, and no dimpling of the breast.         PAST MEDICAL HISTORY:  In terms of her breast history, she does have a history of a smaller right breast which was treated with implants 19 years ago.  She has a history of 2 papillomas in the right breast, 1 removed at the 6-o'clock position 5 years ago, another removed at the 6-o'clock position approximately 10 years ago.  These incisions are approximately at the 5:30 to 6-o'clock position.  She has no history of radiation therapy.  No history of breast cancer of any type.  No history of tumor of any kind.  No history of heart problems, heart attack, breathing problems, blood clots, seizures, stroke, arthritis, peptic ulcer disease or osteoporosis.  No history of bone fractures.  She  is not currently participating in a clinical trial. She does have a history of asthma and a history of hypothyroidism.      The remainder of a 10-point review of systems is negative.      FAMILY HISTORY:  Entirely negative for breast cancer or ovarian cancer or breast cancer in a male relative.      ALLERGIES:  No known drug allergies.  No allergy to seafood, iodine or contrast dye.  She does not take aspirin.      PAST MENSTRUAL HISTORY:  First period was at age 11.  First and only pregnancy was at age 26.  No miscarriages or abortions.  Occasional use of oral contraceptives in her 20s.  No history of hormone replacement therapy.  Last period was 3 years ago.      SOCIAL HISTORY:  Tobacco history was from age 15 to present, smoking a pack of cigarettes a week.  She is now trying to stop cigarettes.      In terms of alcohol use, in her early teens and early 20s, she drank approximately 10 drinks on the weekend and has tapered off drinking since then.      INTERVAL HISTORY:    Mily returns to clinic for cycle 12/day 1 of weekly paclitaxel.  She has no pain.  She has moderate fatigue at work, where she works as a hairdresser.  No depression.  Significant anxiety, as she occasionally wakes up at night with anxiety.  Mirtazapine did not work for her because she became hung over in the mornings, feeling very, very fatigued, and she decided to discontinue the mirtazapine.  She has an elevated TSH.  She continues on Synthroid 112 mcg daily.  The rash on her legs has nearly resolved with triamcinolone cream treatment.  She has occasional nausea about twice a week, which responds well to Compazine.  She is taking vitamin D.  She gets calcium in her diet.  Heartburn has resolved.  She has a grade 1 bloody nose when she blows her nose.  She has been eating a healthful diet.  She has been exercising 150 minutes per week on an exercise bike.  She consumes no alcohol.      PHYSICAL EXAMINATION:   VITAL SIGNS:  Blood pressure  129/83, temperature 98.5, pulse 90, respirations 16, weight 69.5 kg.   GENERAL:  Mily appears generally well.     HEENT:  She has alopecia and is wearing a wig.  No lesions in the oropharynx.   LYMPH:  There is no palpable cervical, supraclavicular, subclavicular or axillary lymphadenopathy.   BREASTS:  Examination of the right breast reveals a 4 x 4 cm mass at the 12 o'clock position just above and adjacent to the nipple areolar complex and a 2 x 2 cm mass at the 8 o'clock position, again adjacent to the nipple areolar complex.  Both of these masses are on top of an underlying breast implant.  No other masses in the right breast.  Left breast is without masses.   LUNGS:  Clear to percussion and auscultation.   HEART:  Regular rate and rhythm, S1, S2.   ABDOMEN:  Soft and nontender without hepatosplenomegaly.   EXTREMITIES:  Without edema.   PSYCHIATRIC:  Mood and affect are normal.   SKIN:  The rash on her legs is essentially resolved with only a couple of erythematous macular lesions still noticeable.      LABORATORY DATA:  The CBC and CMP were within normal limits.  TSH came back at 10.61.  The echocardiogram performed 05/06/2019 showed an ejection fraction of 55%-60%.  The ECG performed on the same day showed a sinus rhythm, possible inferior infarct previously cited 02/11/2019.  No significant change in ECG compared with 02/11.      ASSESSMENT AND PLAN:     1.  Mily Lei is a 53-year-old woman with a recent diagnosis of a clinical stage II, T3N0MX invasive ductal carcinoma of the upper outer quadrant of the right breast, which was multifocal, largest area of involvement measuring 8.9 cm in largest dimension on the MRI.  The mass was easily palpable in the past and is now not as easily palpable.  There is a 4 x 4 area of firmness at the 12 o'clock position 2 cm from the nipple areolar complex.  She also has a 2 x 2 cm area of firmness at the 8 o'clock position, adjacent to the nipple areolar complex.  She  is here for cycle 12 of paclitaxel on the durvalumab, paclitaxel and olaparib arm.  She has had grade 1 nausea with the olaparib responsive to Compazine.  She has grade 1 fatigue.   2.  Rash on lower legs is related to the durvalumab and is now resolved except for 1-2 scattered, erythematous macular lesions.  She has triamcinolone cream to treat this problem.   3.  Hypothyroidism, grade 1, being treated with levothyroxine 112 mcg daily.   4.  Asthma has been treated with Flovent.   5.  Occasional anxiety has been responsive to lorazepam.  She has not been able to tolerate the mirtazapine.   6.  Followup.  All of Mily's questions were answered.  We will see her in followup in our clinic in 1 week to begin dose-dense AC.  Teaching will be done by Mily Mortensen. Follow up with Kerry Norwood on 5-20 for C1D1 of dose dense AC. Neulasta on 5-21. CBC, CMP with visit. Follow up with me June 4 with C2D1 of ddAC.     Thank you for allowing us to continue to participate in Mily Quique's care.      Christian Guzman MD       [facility]        I spent 40 minutes with the patient more than 50% of which was in counseling and coordination of care.       Again, thank you for allowing me to participate in the care of your patient.      Sincerely,    Christian Guzman MD

## 2019-05-14 NOTE — NURSING NOTE
[id number]: Informed Consent Note     The updated treatment consent form, including purpose, risks and benefits, was reviewed with Kathy Lei, and all questions were answered before she signed the consent form. The patient understands that the study involves an active treatment phase as well as a post-treatment follow up phase.     Present during the discussion was Mily. A copy of the signed form was provided to the patient. No procedures specific to this study were performed prior to the patient signing the consent form.    Consent Version Date: 3/5/2019  Consent obtained by: Lorne Leos    Date: 5/14/2019  Form 503.03.01 (Version 2)     Effective date: 01AUG2018     Next Review Date: 01AUG2020 [id number]: Study Visit Note   Subject name: Kathy Lei     Visit: C12    Did the study visit occur within the appropriate window allowed by the protocol? yes    Since the last study visit, She has been doing well. Rash has resolved. Fatigue still present. No new complaints since most recent study visit.    I have personally interviewed Kathy Lei and reviewed her medical record for adverse events and concomitant medications and these have been recorded on the corresponding logs in Kathy Lei's research file.     Kathy Lei was given the opportunity to ask any trial related questions.  Please see provider progress note for physical exam and other clinical information. Labs were reviewed - any significant lab values were addressed and reviewed.      [id number]: Medication Count/IDS Note      Kathy Lei is enrolled on the trial number listed above. The patient presented for her C12 day visit.   IDS number: [id number]  Drug name: Olaparib  Number of bottles returned: 1  Lot number(s): [id number]  Number of pills returned: 20    Drug diary returned? yes    Are there any discrepancies between the amount of medication the patient was instructed to take and the amount  recorded as taken in the patient s drug diary?  yes    If yes, provide amount and reason for the discrepancy.  Patient forgot on evening dose on cycle 11 day 3 and 4.  Are there any discrepancies between the amount of medication the patient has recorded as taken in the drug diary and the amount that would be expected to be returned based on the amount recorded as taken?  no    Lorne Leos RN     Pager: [phone]    Form 504.00.01 (Version 1)     Effective date: 01JUL2018     Next Review Date: 01JUL2020

## 2019-05-14 NOTE — NURSING NOTE
"Oncology Rooming Note    May 14, 2019 12:43 PM   Kathy Lei is a 53 year old female who presents for:    Chief Complaint   Patient presents with     Port Draw     Labs drawn via port by RN in lab. VS taken.      Oncology Clinic Visit     RETURN VISIT; BREAST CA FOLLOW UP      Initial Vitals: /83 (BP Location: Right arm, Patient Position: Sitting, Cuff Size: Adult Regular)   Pulse 90   Temp 98.5  F (36.9  C) (Oral)   Resp 16   Ht 1.676 m (5' 6\")   Wt 69.5 kg (153 lb 4.8 oz)   LMP 11/02/2014   SpO2 95%   BMI 24.74 kg/m   Estimated body mass index is 24.74 kg/m  as calculated from the following:    Height as of this encounter: 1.676 m (5' 6\").    Weight as of this encounter: 69.5 kg (153 lb 4.8 oz). Body surface area is 1.8 meters squared.  No Pain (0) Comment: Data Unavailable   Patient's last menstrual period was 11/02/2014.  Allergies reviewed: Yes  Medications reviewed: Yes    Medications: MEDICATION REFILLS NEEDED TODAY. Provider was notified. Patient needs a refill on her Ativan and Levothyroxine 112 MCG tablet.     Pharmacy name entered into [facility]:    [id number] - [facility] [address] [place] [address] [id number] - [place], [place] - [address] [facility] [address]    Clinical concerns: No new concerns today  Dr Guzman was notified.      Shweta Ibrahim              "

## 2019-05-18 ENCOUNTER — ANCILLARY PROCEDURE (OUTPATIENT)
Dept: MRI IMAGING | Facility: CLINIC | Age: 54
End: 2019-05-18
Attending: INTERNAL MEDICINE
Payer: COMMERCIAL

## 2019-05-18 DIAGNOSIS — Z17.0 MALIGNANT NEOPLASM OF UPPER-OUTER QUADRANT OF RIGHT BREAST IN FEMALE, ESTROGEN RECEPTOR POSITIVE (H): ICD-10-CM

## 2019-05-18 DIAGNOSIS — C50.411 MALIGNANT NEOPLASM OF UPPER-OUTER QUADRANT OF RIGHT BREAST IN FEMALE, ESTROGEN RECEPTOR POSITIVE (H): ICD-10-CM

## 2019-05-18 RX ORDER — DIPHENHYDRAMINE HYDROCHLORIDE 50 MG/ML
50 INJECTION INTRAMUSCULAR; INTRAVENOUS
Status: CANCELLED
Start: 2019-05-20

## 2019-05-18 RX ORDER — ALBUTEROL SULFATE 0.83 MG/ML
2.5 SOLUTION RESPIRATORY (INHALATION)
Status: CANCELLED | OUTPATIENT
Start: 2019-05-20

## 2019-05-18 RX ORDER — ALBUTEROL SULFATE 90 UG/1
1-2 AEROSOL, METERED RESPIRATORY (INHALATION)
Status: CANCELLED
Start: 2019-05-20

## 2019-05-18 RX ORDER — METHYLPREDNISOLONE SODIUM SUCCINATE 125 MG/2ML
125 INJECTION, POWDER, LYOPHILIZED, FOR SOLUTION INTRAMUSCULAR; INTRAVENOUS
Status: CANCELLED
Start: 2019-05-20

## 2019-05-18 RX ORDER — EPINEPHRINE 1 MG/ML
0.3 INJECTION, SOLUTION INTRAMUSCULAR; SUBCUTANEOUS EVERY 5 MIN PRN
Status: CANCELLED | OUTPATIENT
Start: 2019-05-20

## 2019-05-18 RX ORDER — EPINEPHRINE 0.3 MG/.3ML
0.3 INJECTION SUBCUTANEOUS EVERY 5 MIN PRN
Status: CANCELLED | OUTPATIENT
Start: 2019-05-20

## 2019-05-18 RX ORDER — DOXORUBICIN HYDROCHLORIDE 2 MG/ML
60 INJECTION, SOLUTION INTRAVENOUS ONCE
Status: CANCELLED | OUTPATIENT
Start: 2019-05-20

## 2019-05-18 RX ORDER — PALONOSETRON 0.05 MG/ML
0.25 INJECTION, SOLUTION INTRAVENOUS ONCE
Status: CANCELLED
Start: 2019-05-20

## 2019-05-18 RX ORDER — LORAZEPAM 2 MG/ML
0.5 INJECTION INTRAMUSCULAR EVERY 4 HOURS PRN
Status: CANCELLED
Start: 2019-05-20

## 2019-05-18 RX ORDER — SODIUM CHLORIDE 9 MG/ML
1000 INJECTION, SOLUTION INTRAVENOUS CONTINUOUS PRN
Status: CANCELLED
Start: 2019-05-20

## 2019-05-18 RX ORDER — MEPERIDINE HYDROCHLORIDE 25 MG/ML
25 INJECTION INTRAMUSCULAR; INTRAVENOUS; SUBCUTANEOUS EVERY 30 MIN PRN
Status: CANCELLED | OUTPATIENT
Start: 2019-05-20

## 2019-05-18 RX ORDER — GADOBUTROL 604.72 MG/ML
7.5 INJECTION INTRAVENOUS ONCE
Status: COMPLETED | OUTPATIENT
Start: 2019-05-18 | End: 2019-05-18

## 2019-05-18 RX ADMIN — GADOBUTROL 7 ML: 604.72 INJECTION INTRAVENOUS at 08:39

## 2019-05-18 NOTE — DISCHARGE INSTRUCTIONS
MRI Contrast Discharge Instructions    The IV contrast you received today will pass out of your body in your  urine. This will happen in the next 24 hours. You will not feel this process.  Your urine will not change color.    Drink at least 4 extra glasses of water or juice today (unless your doctor  has restricted your fluids). This reduces the stress on your kidneys.  You may take your regular medicines.    If you are on dialysis: It is best to have dialysis today.    If you have a reaction: Most reactions happen right away. If you have  any new symptoms after leaving the hospital (such as hives or swelling),  call your hospital at the correct number below. Or call your family doctor.  If you have breathing distress or wheezing, call 911.    Special instructions: ***    I have read and understand the above information.    Signature:______________________________________ Date:___________    Staff:__________________________________________ Date:___________     Time:__________    [facility] Departments:    ___[place]: [phone]  ___[facility]: [phone]  ___[place]: [phone] ___[facility]: [phone]  ___[facility]: [phone]  ___[facility]: [phone]  ___[phone]  ___[facility]: [phone]  ___[phone]

## 2019-05-20 ENCOUNTER — APPOINTMENT (OUTPATIENT)
Dept: LAB | Facility: CLINIC | Age: 54
End: 2019-05-20
Attending: INTERNAL MEDICINE
Payer: COMMERCIAL

## 2019-05-20 ENCOUNTER — ANCILLARY PROCEDURE (OUTPATIENT)
Dept: MAMMOGRAPHY | Facility: CLINIC | Age: 54
End: 2019-05-20
Attending: INTERNAL MEDICINE
Payer: COMMERCIAL

## 2019-05-20 ENCOUNTER — RESEARCH ENCOUNTER (OUTPATIENT)
Dept: ONCOLOGY | Facility: CLINIC | Age: 54
End: 2019-05-20

## 2019-05-20 ENCOUNTER — INFUSION THERAPY VISIT (OUTPATIENT)
Dept: ONCOLOGY | Facility: CLINIC | Age: 54
End: 2019-05-20
Attending: INTERNAL MEDICINE
Payer: COMMERCIAL

## 2019-05-20 ENCOUNTER — ONCOLOGY VISIT (OUTPATIENT)
Dept: ONCOLOGY | Facility: CLINIC | Age: 54
End: 2019-05-20
Attending: PHYSICIAN ASSISTANT
Payer: COMMERCIAL

## 2019-05-20 VITALS
HEIGHT: 66 IN | TEMPERATURE: 98.6 F | RESPIRATION RATE: 18 BRPM | DIASTOLIC BLOOD PRESSURE: 76 MMHG | HEART RATE: 100 BPM | BODY MASS INDEX: 24.75 KG/M2 | OXYGEN SATURATION: 98 % | SYSTOLIC BLOOD PRESSURE: 116 MMHG | WEIGHT: 154 LBS

## 2019-05-20 DIAGNOSIS — C50.411 MALIGNANT NEOPLASM OF UPPER-OUTER QUADRANT OF RIGHT BREAST IN FEMALE, ESTROGEN RECEPTOR POSITIVE (H): ICD-10-CM

## 2019-05-20 DIAGNOSIS — Z17.0 MALIGNANT NEOPLASM OF UPPER-OUTER QUADRANT OF RIGHT BREAST IN FEMALE, ESTROGEN RECEPTOR POSITIVE (H): ICD-10-CM

## 2019-05-20 DIAGNOSIS — Z17.0 MALIGNANT NEOPLASM OF UPPER-OUTER QUADRANT OF RIGHT BREAST IN FEMALE, ESTROGEN RECEPTOR POSITIVE (H): Primary | ICD-10-CM

## 2019-05-20 DIAGNOSIS — C50.411 MALIGNANT NEOPLASM OF UPPER-OUTER QUADRANT OF RIGHT BREAST IN FEMALE, ESTROGEN RECEPTOR POSITIVE (H): Primary | ICD-10-CM

## 2019-05-20 LAB
ALBUMIN SERPL-MCNC: 3.9 G/DL (ref 3.4–5)
ALP SERPL-CCNC: 56 U/L (ref 40–150)
ALT SERPL W P-5'-P-CCNC: 35 U/L (ref 0–50)
ANION GAP SERPL CALCULATED.3IONS-SCNC: 5 MMOL/L (ref 3–14)
AST SERPL W P-5'-P-CCNC: 20 U/L (ref 0–45)
BASOPHILS # BLD AUTO: 0.1 10E9/L (ref 0–0.2)
BASOPHILS NFR BLD AUTO: 1.3 %
BILIRUB SERPL-MCNC: 0.3 MG/DL (ref 0.2–1.3)
BUN SERPL-MCNC: 12 MG/DL (ref 7–30)
CALCIUM SERPL-MCNC: 9.1 MG/DL (ref 8.5–10.1)
CHLORIDE SERPL-SCNC: 107 MMOL/L (ref 94–109)
CO2 SERPL-SCNC: 26 MMOL/L (ref 20–32)
CREAT SERPL-MCNC: 0.54 MG/DL (ref 0.52–1.04)
DIFFERENTIAL METHOD BLD: NORMAL
EOSINOPHIL # BLD AUTO: 0.1 10E9/L (ref 0–0.7)
EOSINOPHIL NFR BLD AUTO: 2.7 %
ERYTHROCYTE [DISTWIDTH] IN BLOOD BY AUTOMATED COUNT: 14.9 % (ref 10–15)
GFR SERPL CREATININE-BSD FRML MDRD: >90 ML/MIN/{1.73_M2}
GLUCOSE SERPL-MCNC: 101 MG/DL (ref 70–99)
HCT VFR BLD AUTO: 38.4 % (ref 35–47)
HGB BLD-MCNC: 12.9 G/DL (ref 11.7–15.7)
IMM GRANULOCYTES # BLD: 0 10E9/L (ref 0–0.4)
IMM GRANULOCYTES NFR BLD: 0 %
LYMPHOCYTES # BLD AUTO: 1.1 10E9/L (ref 0.8–5.3)
LYMPHOCYTES NFR BLD AUTO: 22.3 %
MCH RBC QN AUTO: 30.4 PG (ref 26.5–33)
MCHC RBC AUTO-ENTMCNC: 33.6 G/DL (ref 31.5–36.5)
MCV RBC AUTO: 91 FL (ref 78–100)
MONOCYTES # BLD AUTO: 0.3 10E9/L (ref 0–1.3)
MONOCYTES NFR BLD AUTO: 5.9 %
NEUTROPHILS # BLD AUTO: 3.2 10E9/L (ref 1.6–8.3)
NEUTROPHILS NFR BLD AUTO: 67.8 %
NRBC # BLD AUTO: 0 10*3/UL
NRBC BLD AUTO-RTO: 0 /100
PLATELET # BLD AUTO: 288 10E9/L (ref 150–450)
POTASSIUM SERPL-SCNC: 4.1 MMOL/L (ref 3.4–5.3)
PROT SERPL-MCNC: 7 G/DL (ref 6.8–8.8)
RBC # BLD AUTO: 4.24 10E12/L (ref 3.8–5.2)
SODIUM SERPL-SCNC: 139 MMOL/L (ref 133–144)
T4 FREE SERPL-MCNC: 0.93 NG/DL (ref 0.76–1.46)
TSH SERPL DL<=0.005 MIU/L-ACNC: 13.51 MU/L (ref 0.4–4)
WBC # BLD AUTO: 4.8 10E9/L (ref 4–11)

## 2019-05-20 PROCEDURE — 80053 COMPREHEN METABOLIC PANEL: CPT | Performed by: INTERNAL MEDICINE

## 2019-05-20 PROCEDURE — 96411 CHEMO IV PUSH ADDL DRUG: CPT

## 2019-05-20 PROCEDURE — 84439 ASSAY OF FREE THYROXINE: CPT | Performed by: INTERNAL MEDICINE

## 2019-05-20 PROCEDURE — 25000128 H RX IP 250 OP 636: Mod: ZF | Performed by: PHYSICIAN ASSISTANT

## 2019-05-20 PROCEDURE — 85025 COMPLETE CBC W/AUTO DIFF WBC: CPT | Performed by: INTERNAL MEDICINE

## 2019-05-20 PROCEDURE — 25800030 ZZH RX IP 258 OP 636: Mod: ZF | Performed by: INTERNAL MEDICINE

## 2019-05-20 PROCEDURE — 40000114 ZZH STATISTIC NO CHARGE CLINIC VISIT

## 2019-05-20 PROCEDURE — 96377 APPLICATON ON-BODY INJECTOR: CPT | Mod: 59

## 2019-05-20 PROCEDURE — 25000128 H RX IP 250 OP 636: Mod: ZF | Performed by: INTERNAL MEDICINE

## 2019-05-20 PROCEDURE — 84443 ASSAY THYROID STIM HORMONE: CPT | Performed by: INTERNAL MEDICINE

## 2019-05-20 PROCEDURE — 96367 TX/PROPH/DG ADDL SEQ IV INF: CPT

## 2019-05-20 PROCEDURE — 96413 CHEMO IV INFUSION 1 HR: CPT

## 2019-05-20 PROCEDURE — 96375 TX/PRO/DX INJ NEW DRUG ADDON: CPT

## 2019-05-20 PROCEDURE — 99215 OFFICE O/P EST HI 40 MIN: CPT | Mod: ZP | Performed by: PHYSICIAN ASSISTANT

## 2019-05-20 PROCEDURE — G0463 HOSPITAL OUTPT CLINIC VISIT: HCPCS | Mod: ZF

## 2019-05-20 RX ORDER — PALONOSETRON 0.05 MG/ML
0.25 INJECTION, SOLUTION INTRAVENOUS ONCE
Status: COMPLETED | OUTPATIENT
Start: 2019-05-20 | End: 2019-05-20

## 2019-05-20 RX ORDER — LORAZEPAM 0.5 MG/1
0.5 TABLET ORAL EVERY 4 HOURS PRN
Qty: 30 TABLET | Refills: 3 | Status: SHIPPED | OUTPATIENT
Start: 2019-05-20 | End: 2019-06-10

## 2019-05-20 RX ORDER — HEPARIN SODIUM (PORCINE) LOCK FLUSH IV SOLN 100 UNIT/ML 100 UNIT/ML
5 SOLUTION INTRAVENOUS EVERY 8 HOURS
Status: DISCONTINUED | OUTPATIENT
Start: 2019-05-20 | End: 2019-05-20 | Stop reason: HOSPADM

## 2019-05-20 RX ORDER — PROCHLORPERAZINE MALEATE 10 MG
10 TABLET ORAL EVERY 6 HOURS PRN
Qty: 30 TABLET | Refills: 3 | Status: SHIPPED | OUTPATIENT
Start: 2019-05-20 | End: 2019-06-10

## 2019-05-20 RX ORDER — LORAZEPAM 0.5 MG/1
TABLET ORAL
Refills: 0 | COMMUNITY
Start: 2019-05-14 | End: 2019-06-10

## 2019-05-20 RX ORDER — DOXORUBICIN HYDROCHLORIDE 2 MG/ML
60 INJECTION, SOLUTION INTRAVENOUS ONCE
Status: COMPLETED | OUTPATIENT
Start: 2019-05-20 | End: 2019-05-20

## 2019-05-20 RX ORDER — HEPARIN SODIUM (PORCINE) LOCK FLUSH IV SOLN 100 UNIT/ML 100 UNIT/ML
500 SOLUTION INTRAVENOUS ONCE
Status: COMPLETED | OUTPATIENT
Start: 2019-05-20 | End: 2019-05-20

## 2019-05-20 RX ORDER — DEXAMETHASONE 4 MG/1
8 TABLET ORAL DAILY
Qty: 6 TABLET | Refills: 3 | Status: ON HOLD | OUTPATIENT
Start: 2019-05-20 | End: 2019-07-11

## 2019-05-20 RX ORDER — LIDOCAINE HYDROCHLORIDE 10 MG/ML
10 INJECTION, SOLUTION EPIDURAL; INFILTRATION; INTRACAUDAL; PERINEURAL ONCE
Status: COMPLETED | OUTPATIENT
Start: 2019-05-20 | End: 2019-05-20

## 2019-05-20 RX ADMIN — CYCLOPHOSPHAMIDE 1080 MG: 1 INJECTION, POWDER, FOR SOLUTION INTRAVENOUS; ORAL at 11:55

## 2019-05-20 RX ADMIN — HEPARIN 500 UNITS: 100 SYRINGE at 12:33

## 2019-05-20 RX ADMIN — DEXAMETHASONE SODIUM PHOSPHATE: 10 INJECTION, SOLUTION INTRAMUSCULAR; INTRAVENOUS at 11:19

## 2019-05-20 RX ADMIN — SODIUM CHLORIDE 250 ML: 9 INJECTION, SOLUTION INTRAVENOUS at 10:54

## 2019-05-20 RX ADMIN — DOXORUBICIN HYDROCHLORIDE 108 MG: 2 INJECTION, SOLUTION INTRAVENOUS at 11:42

## 2019-05-20 RX ADMIN — PALONOSETRON HYDROCHLORIDE 0.25 MG: 0.25 INJECTION, SOLUTION INTRAVENOUS at 10:55

## 2019-05-20 RX ADMIN — HEPARIN 5 ML: 100 SYRINGE at 08:06

## 2019-05-20 RX ADMIN — PEGFILGRASTIM 6 MG: KIT SUBCUTANEOUS at 12:34

## 2019-05-20 RX ADMIN — LIDOCAINE HYDROCHLORIDE 10 ML: 10 INJECTION, SOLUTION EPIDURAL; INFILTRATION; INTRACAUDAL; PERINEURAL at 08:42

## 2019-05-20 ASSESSMENT — PAIN SCALES - GENERAL: PAINLEVEL: NO PAIN (0)

## 2019-05-20 ASSESSMENT — MIFFLIN-ST. JEOR: SCORE: 1310.04

## 2019-05-20 NOTE — LETTER
5/20/2019      RE: Kathy Lei  [address] [place] [address]       Oncology/Hematology Visit Note  May 20, 2019    Reason for Visit: follow up of right breast cancer    History of Present Illness: Kathy Lei is a 53 year old female with recent diagnosis of ER/RI positive, HER2 negative, grade 2 breast cancer. Mily presented between Christmas and New Years of 2018 with new sharp pains in the upper outer quadrant of the right breast.  Diagnostic mammogram on 1/8/19 with grouped coarse heterogeneous calcifications at 11:30 position, irregular mass at 9:00 position. US with irregular mass at 11:00 position, 1.0 x 0.9 x 1.6cm. At 9:30 position, irregular mass, 3.2 x 0.5, x 1.3 cm. Contrast mammogram was subsequently performed and the contrast mammogram showed a large area of mass and non-mass-like enhancement in the upper outer right breast measuring 7.2 cm in greatest dimension.      The pathology showed part A, breast needle biopsy 11 o'clock, 6 cm from the nipple, invasive mammary carcinoma, no special type, ductal (and mucinous) [place] grade 2.  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  Calcifications were associated with the DCIS.  There was a focus suspicious for lymphovascular invasion.  Invasive carcinoma was estrogen receptor positive and progesterone receptor negative by immunohistochemistry.  In part B, breast right, 8 o'clock, 4 cm from the nipple, ultrasound-guided core biopsy, invasive mammary carcinoma, no special type, ductal (and mucinous) [place] grade 2, occasional calcifications were noted.  Invasive carcinoma was estrogen receptor positive and progesterone receptor negative by immunohistochemistry.  On quantitation of the ER and RI, ER was positive 99% of the cells staining with strong intensity.  RI was subsequently noted to be positive with 15% of the cells staining with moderate intensity.       There was also  a HER2 FISH performed, and the HER2 FISH showed average number of HER2 signals per nucleus 2.6.  Average number of CEN17 signals 1.7 with a ratio of 1.6, VASILIY negative for biopsy A.  For biopsy B, the HER2 signals per nucleus 2.9.  Average number CEN17 signals per nucleus 1.7 with a ratio of 1.7, VASILIY negative.  Overall, by 2018 ASCO/CAP guidelines, this tumor is HER2 negative.     She was enrolled in I-SPY2 to Durvulumab, Taxol and Olaparib arm. She completed 12 cycles of weekly Taxol. Please see Dr. Guzman's previous notes for further details on the patient's history. She is here today for routine follow up prior to starting cycle 1 of adriamycin and cyclophosphamide (AC).    Interval History:  Mily is here for follow up.  Patient reports that overall she is been doing well.  She had her last day of work on Friday and is looking forward to having a break from work.  She has had some nausea which she manages with Compazine.  She reports that her bowels continue to be variable.  She reports resolution in the neuropathy in her feet.  She is taking lorazepam for sleep about every other night.  She understands the need to be cautious with this medication.  She denies other concerns.    Review of Systems:  Patient denies any of the following except if noted above: fevers, chills, difficulty with energy, vision or hearing changes, chest pain, dyspnea, abdominal pain, vomiting, urinary concerns, headaches, numbness, tingling, or issues with mood.     Current Outpatient Medications   Medication Sig Dispense Refill     ADVIL 200 MG OR TABS 1 TABLET EVERY 4 TO 6 HOURS AS NEEDED 0 0     albuterol (VENTOLIN HFA) 108 (90 Base) MCG/ACT Inhaler INHALE 1-2 PUFFS INTO THE LUNGS EVERY 4 HOURS AS NEEDED FOR SHORTNESS OF BREATH OR DIFFICULTY BREATHING. 18 g PRN     fluticasone (FLOVENT HFA) 110 MCG/ACT inhaler Inhale 2 puffs into the lungs 2 times daily 1 Inhaler PRN     levothyroxine (SYNTHROID) 112 MCG tablet Take 1 tablet (112 mcg)  "by mouth daily 30 tablet 1     lisinopril (PRINIVIL/ZESTRIL) 5 MG tablet Take 1 tablet (5 mg) by mouth daily 90 tablet 3     LORazepam (ATIVAN) 0.5 MG tablet   0     order for DME Cranial prosthesis 1 Units 1     prochlorperazine (COMPAZINE) 10 MG tablet Take 10 mg by mouth as needed  0     sertraline (ZOLOFT) 50 MG tablet One daily 90 tablet 1     sodium chloride (OCEAN) 0.65 % nasal spray Spray in both nostrils four times daily 30 mL 3     triamcinolone (ARISTOCORT HP) 0.5 % external cream Apply topically 2 times daily 30 g 3     albuterol (2.5 MG/3ML) 0.083% nebulizer solution Take 3 mLs by nebulization once for 1 dose. 3 mL 0     dexamethasone (DECADRON) 4 MG tablet Take 8 mg by mouth daily for 3 days Start on Day 2.. 6 tablet 3     LORazepam (ATIVAN) 0.5 MG tablet Take 1 tablet (0.5 mg) by mouth every 4 hours as needed (Anxiety, Nausea/Vomiting or Sleep) 30 tablet 3     prochlorperazine (COMPAZINE) 10 MG tablet Take 1 tablet (10 mg) by mouth every 6 hours as needed (Nausea/Vomiting) 30 tablet 3     STATIN NOT PRESCRIBED (INTENTIONAL) Please choose reason not prescribed, below (Patient not taking: Reported on 5/20/2019)       vitamin B6 (PYRIDOXINE) 100 MG tablet Take 1 tablet (100 mg) by mouth daily (Patient not taking: Reported on 5/6/2019) 30 tablet 3     Physical Examination:  General: The patient is a pleasant female in no acute distress. ECOG 1  /76 (BP Location: Right arm, Patient Position: Sitting, Cuff Size: Adult Regular)   Pulse 100   Temp 98.6  F (37  C) (Oral)   Resp 18   Ht 1.676 m (5' 5.98\")   Wt 69.9 kg (154 lb)   LMP 11/02/2014   SpO2 98%   Breastfeeding? No   BMI 24.87 kg/m     Wt Readings from Last 10 Encounters:   05/20/19 69.9 kg (154 lb)   05/14/19 69.5 kg (153 lb 4.8 oz)   05/06/19 69.4 kg (153 lb)   05/06/19 68.9 kg (152 lb)   04/30/19 69.8 kg (153 lb 12.8 oz)   04/29/19 69.1 kg (152 lb 4.8 oz)   04/22/19 70.4 kg (155 lb 4.8 oz)   04/16/19 70.8 kg (156 lb)   04/08/19 69.5 " kg (153 lb 3.2 oz)   04/01/19 70.5 kg (155 lb 6.4 oz)   HEENT: EOMI, PERRL. Sclerae are anicteric. Oral mucosa is pink and moist with no lesions or thrush.   Lymph: No palpable cervical, supraclavicular, or axillary lymphadenopathy.  Heart: Regular rate and rhythm.   Lungs: Clear to auscultation bilaterally.   Breast:  Examination of the right breast reveals a 4 x 4 cm mass at the 12 o'clock position just above and adjacent to the nipple areolar complex and a 2 x 2 cm mass at the 8 o'clock position, again adjacent to the nipple areolar complex.  Second mass is more difficult to palpate in the setting of recent biopsy this morning. Both of these masses are on top of an underlying breast implant.  No other masses in the right breast.   Abdomen: Bowel sounds present, soft, nontender with no palpable hepatosplenomegaly or masses.   Extremities: No lower extremity edema noted bilaterally.   Neuro: Cranial nerves II through XII are grossly intact.  Skin: No rashes, petechiae, or bruising noted on exposed skin.     Laboratory Data:   5/20/2019 08:06   Sodium 139   Potassium 4.1   Chloride 107   Carbon Dioxide 26   Urea Nitrogen 12   Creatinine 0.54   GFR Estimate >90   GFR Estimate If Black >90   Calcium 9.1   Anion Gap 5   Albumin 3.9   Protein Total 7.0   Bilirubin Total 0.3   Alkaline Phosphatase 56   ALT 35   AST 20   T4 Free 0.93   TSH 13.51 (H)   Glucose 101 (H)   WBC 4.8   Hemoglobin 12.9   Hematocrit 38.4   Platelet Count 288   RBC Count 4.24   MCV 91   MCH 30.4   MCHC 33.6   RDW 14.9   Diff Method Automated Method   % Neutrophils 67.8   % Lymphocytes 22.3   % Monocytes 5.9   % Eosinophils 2.7   % Basophils 1.3   % Immature Granulocytes 0.0   Nucleated RBCs 0   Absolute Neutrophil 3.2   Absolute Lymphocytes 1.1   Absolute Monocytes 0.3   Absolute Eosinophils 0.1   Absolute Basophils 0.1   Abs Immature Granulocytes 0.0   Absolute Nucleated RBC 0.0     Assessment and Plan:  Kathy Lei is a 53 year old  female with clinical stage II, T3N0MX, invasive ductal breast cancer in the upper outer quadrant of right breast, multifocal, measuring 8.9 cm on MRI. ER positive and HER2 negative. She is enrolled in I-SPY 2 and on durvalumab every 28 days, weekly Taxol, and olaparib.      ECOG 1 (due to changing her work schedule due to fatigue)    Right breast cancer, ER/PA positive, HER2 negative: She was enrolled in I-SPY2 and started cycle 1 Durvalumab, Taxol and Olaparib on 2/25/19. She completed 12 weeks of treatment and now will proceed with AC with Neulasta support. We discussed the use of Claritin to minimize body aches from Neulasta. We reviewed common side effects and their management. She will meet with Mily Mortensen RN care coordinator, to review this in more detail.   --She will follow up with Dr. Guzman in 2 weeks.  --She underwent a breast MRI and biopsy this morning, of which the results are pending.    Abnormal echocardiogram: She saw Dr. Alanis today, 5/6/19, and Echo showed improvement. She remains on lisinopril 5 mg daily.   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 Now resolved. Was taking vitamin B6.  Maculopapular rash of BLE, grade 1: Resolved. Continue triamcinolone as needed.  Hx depression: On Zoloft. No concerns today.  Hx asthma: On flovent QDAY and albuterol prn  Hx hypothyroidism: Levothyroxine increased from 88mcg daily to 112mcg daily by Dr. Guzman on 4/16/18 due to TSH elevation and symptomatic. Recheck of TSH today still elevated, but has only been 5 weeks. If TSH is still greater than 10 in 2 weeks, would recommend increase to 125 mcg daily. Reviewed this is secondary to the durvalumab.     Nasreen Grady PA-C  [facility]  [address] [place] [address] [phone]      Nasreen Grady PA-C

## 2019-05-20 NOTE — PROGRESS NOTES
Infusion Nursing Note:  Kathy Lei presents today for C1 D1 Adriamycin/Cytoxan and Neulasta On-Pro Onbody.    Patient seen by provider today: Yes: LIMA Toscano   present during visit today: Not Applicable.    Note: Patient receiving Adriamycin/Cytoxan for the first time. Pt is not new to infusion, as she completed the I-SPY study.  Chemotherapy done prior to infusion with reinforcement completed by Mily Mortensen RN during infusion.  Writer reinforced/started teaching of chemotherapy teaching/side effects and schedule during infusion visit prior to Mily ZIMMER arriving. Teaching done on administration of Neulasta On-Pro.  All questions answered. Patient has no complaints of pain or other acute concerns at this time. Accompanied by her .    Copy of AVS reviewed with patient. Pt instructed to call care coordinator, triage (or MD on call if after hours/weekends) with chills/temp >=100.4, questions/concerns. Pt stated understanding of plan.     Neulasta Onpro On-Body injector applied to RUE at 1234 with light facing down.  Writer discussed Neulasta injection would start tomorrow 5/21 at 1540, approximately 27 hours after application applied today.  Written and Verbal instruction reviewed with patient.  Pt instructed when the dose delivery starts, it will take about 45 minutes to complete.  Pt aware Neulasta Onpro On-Body should have green flashing light and to call triage or on-call MD if injector flashes red or appears to be leaking. Pt aware to keep Onpro On-Body Neulasta 4 inches away from electrical equipment and to avoid showering 4 hours prior to injection.   Neulasta Onpro Lot number: [id number]      Intravenous Access:  Implanted Port.    Treatment Conditions:  Lab Results   Component Value Date    HGB 12.9 05/20/2019     Lab Results   Component Value Date    WBC 4.8 05/20/2019      Lab Results   Component Value Date    ANEU 3.2 05/20/2019     Lab Results   Component Value Date      05/20/2019      Lab Results   Component Value Date     05/20/2019                   Lab Results   Component Value Date    POTASSIUM 4.1 05/20/2019           Lab Results   Component Value Date    MAG 2.0 02/05/2019            Lab Results   Component Value Date    CR 0.54 05/20/2019                   Lab Results   Component Value Date    BRYANT 9.1 05/20/2019                Lab Results   Component Value Date    BILITOTAL 0.3 05/20/2019           Lab Results   Component Value Date    ALBUMIN 3.9 05/20/2019                    Lab Results   Component Value Date    ALT 35 05/20/2019           Lab Results   Component Value Date    AST 20 05/20/2019       Results reviewed, labs MET treatment parameters, ok to proceed with treatment.  ECHO/MUGA completed 5/6/19  EF 55-60%.      Post Infusion Assessment:  Patient tolerated infusion without incident.  Patient tolerated Adriamycin IVP injection without incident. Administered in 3 syringes.  Blood return noted pre and post infusion.  Blood return noted during administration every 2-3 cc.  Site patent and intact, free from redness, edema or discomfort.  No evidence of extravasations.  Access discontinued per protocol.       Discharge Plan:   Prescription refills given for Zofran, Ativan, and Compazine. All medications brought to patient by pharmacy with teaching.  Discharge instructions reviewed with: Patient and .  Patient and/or family verbalized understanding of discharge instructions and all questions answered.  Copy of AVS reviewed with patient and/or family.  Patient will return 6/4 for next appointment.  Patient discharged in stable condition accompanied by: .  Departure Mode: Ambulatory.  Face to Face time: 5 minutes.    Melissa Rodriguez RN

## 2019-05-20 NOTE — PATIENT INSTRUCTIONS
Contact Numbers    [facility] Main Line: [phone]  [facility] Triage and After Hours Nurse Line:  [phone]    Please call the [facility] nurse triage or the after hours nurse line if you experience a temperature greater than or equal to 100.5, shaking chills, have uncontrolled nausea, vomiting and/or diarrhea, dizziness, lightheadedness, shortness of breath, chest pain, bleeding, unexplained bruising, or if you have any other new/concerning symptoms, questions or concerns.     If you are having any concerning symptoms or wish to speak to a provider before your next infusion visit, please call your care coordinator or triage to notify them so we can adequately serve you.     If you need a refill on a narcotic prescription or other medication, please call triage before your infusion appointment.       May 2019      [place] Monday Tuesday Wednesday Thursday Friday Saturday                  1     2     3     4       5     6    ECHO LIMITED  10:30 AM   (60 min.)   [address] Cardiac Services    [facility] RETURN  11:15 AM   (30 min.)   Mary Alanis MD   [facility] LAB DRAW  12:30 PM   (15 min.)   [facility]   [facility] Lab Draw    [facility] RETURN   1:05 PM   (50 min.)   Nasreen Grady PA-C   [facility] ONC INFUSION 120   2:00 PM   (120 min.)    ONCOLOGY INFUSION   [facility] ED 7     8     9     10     11       12     13     14    [facility] DRAW  12:00 PM   (15 min.)    [facility]    [facility] RETURN  12:15 PM   (30 min.)   Christian Guzman MD   [facility] ONC INFUSION 120   2:30 PM   (120 min.)    ONCOLOGY INFUSION   [facility] ED 15     16     17     18    MR BREAST BILATERAL W   7:45 AM   (45 min.)   [address] MRI   19     20    P [facility] DRAW   7:45 AM   (15 min.)    [facility]RAW   [facility]    US  BREAST BX CORE NEEDLE RIGHT   8:30 AM   (60 min.)   [id number]   [facility] Imaging    [facility] RETURN   9:15 AM   (50 min.)   Nasreen Grady PA-C   [facility]  11:00 AM   (120 min.)   [facility] ONCOLOGY INFUSION   [facility] 21     22     23     24     25       26     27     28    [facility] NEW  10:15 AM   (30 min.)   LALY Ramos MD   [facility] 29 30 31 June 2019 Sunday Monday Tuesday Wednesday Thursday Friday Saturday                                 1       2     3     4    P [facility] DRAW   1:00 PM   (15 min.)    [facility] LAB DRAW   [facility] Draw    [facility] RETURN   1:15 PM   (30 min.)   Christian Guzman MD   [facility]    [facility] ONC INFUSION 120   2:00 PM   (120 min.)   [facility] ONCOLOGY INFUSION   [facility] 5     6     7     8       9     10     11     12     13     14     15       16     17     18    [facility] DRAW   1:00 PM   (15 min.)   [facility] DRAW   [facility] Draw    [facility] RETURN   1:15 PM   (30 min.)   Christian Guzman MD   [facility] 120   2:00 PM   (120 min.)   [facility] ONCOLOGY INFUSION   [facility] 19     20     21     22       23     24     25     26     27     28     29       30                                                   Lab Results:  Recent Results (from the past 12 hour(s))   Comprehensive metabolic panel    Collection Time: 05/20/19  8:06 AM   Result Value Ref Range    Sodium 139 133 - 144 mmol/L    Potassium 4.1 3.4 - 5.3 mmol/L    Chloride 107 94 - 109 mmol/L    Carbon Dioxide 26 20 - 32 mmol/L    Anion Gap 5 3 - 14 mmol/L    Glucose 101 (H) 70 - 99 mg/dL    Urea Nitrogen 12 7 - 30 mg/dL    Creatinine 0.54 0.52 - 1.04 mg/dL    GFR Estimate >90 >60 mL/min/[1.73_m2]    GFR Estimate If Black >90 >60 mL/min/[1.73_m2]    Calcium 9.1 8.5 -  10.1 mg/dL    Bilirubin Total 0.3 0.2 - 1.3 mg/dL    Albumin 3.9 3.4 - 5.0 g/dL    Protein Total 7.0 6.8 - 8.8 g/dL    Alkaline Phosphatase 56 40 - 150 U/L    ALT 35 0 - 50 U/L    AST 20 0 - 45 U/L   CBC with platelets differential    Collection Time: 05/20/19  8:06 AM   Result Value Ref Range    WBC 4.8 4.0 - 11.0 10e9/L    RBC Count 4.24 3.8 - 5.2 10e12/L    Hemoglobin 12.9 11.7 - 15.7 g/dL    Hematocrit 38.4 35.0 - 47.0 %    MCV 91 78 - 100 fl    MCH 30.4 26.5 - 33.0 pg    MCHC 33.6 31.5 - 36.5 g/dL    RDW 14.9 10.0 - 15.0 %    Platelet Count 288 150 - 450 10e9/L    Diff Method Automated Method     % Neutrophils 67.8 %    % Lymphocytes 22.3 %    % Monocytes 5.9 %    % Eosinophils 2.7 %    % Basophils 1.3 %    % Immature Granulocytes 0.0 %    Nucleated RBCs 0 0 /100    Absolute Neutrophil 3.2 1.6 - 8.3 10e9/L    Absolute Lymphocytes 1.1 0.8 - 5.3 10e9/L    Absolute Monocytes 0.3 0.0 - 1.3 10e9/L    Absolute Eosinophils 0.1 0.0 - 0.7 10e9/L    Absolute Basophils 0.1 0.0 - 0.2 10e9/L    Abs Immature Granulocytes 0.0 0 - 0.4 10e9/L    Absolute Nucleated RBC 0.0    TSH with free T4 reflex    Collection Time: 05/20/19  8:06 AM   Result Value Ref Range    TSH 13.51 (H) 0.40 - 4.00 mU/L   T4 free    Collection Time: 05/20/19  8:06 AM   Result Value Ref Range    T4 Free 0.93 0.76 - 1.46 ng/dL

## 2019-05-20 NOTE — PROGRESS NOTES
Oncology/Hematology Visit Note  May 20, 2019    Reason for Visit: follow up of right breast cancer    History of Present Illness: Kathy Lei is a 53 year old female with recent diagnosis of ER/NM positive, HER2 negative, grade 2 breast cancer. Mily presented between Christmas and New Years of 2018 with new sharp pains in the upper outer quadrant of the right breast.  Diagnostic mammogram on 1/8/19 with grouped coarse heterogeneous calcifications at 11:30 position, irregular mass at 9:00 position. US with irregular mass at 11:00 position, 1.0 x 0.9 x 1.6cm. At 9:30 position, irregular mass, 3.2 x 0.5, x 1.3 cm. Contrast mammogram was subsequently performed and the contrast mammogram showed a large area of mass and non-mass-like enhancement in the upper outer right breast measuring 7.2 cm in greatest dimension.      The pathology showed part A, breast needle biopsy 11 o'clock, 6 cm from the nipple, invasive mammary carcinoma, no special type, ductal (and mucinous) Donna grade 2.  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  Calcifications were associated with the DCIS.  There was a focus suspicious for lymphovascular invasion.  Invasive carcinoma was estrogen receptor positive and progesterone receptor negative by immunohistochemistry.  In part B, breast right, 8 o'clock, 4 cm from the nipple, ultrasound-guided core biopsy, invasive mammary carcinoma, no special type, ductal (and mucinous) Donna grade 2, occasional calcifications were noted.  Invasive carcinoma was estrogen receptor positive and progesterone receptor negative by immunohistochemistry.  On quantitation of the ER and NM, ER was positive 99% of the cells staining with strong intensity.  NM was subsequently noted to be positive with 15% of the cells staining with moderate intensity.       There was also a HER2 FISH performed, and the HER2 FISH showed average number of HER2 signals per nucleus  2.6.  Average number of CEN17 signals 1.7 with a ratio of 1.6, VASILIY negative for biopsy A.  For biopsy B, the HER2 signals per nucleus 2.9.  Average number CEN17 signals per nucleus 1.7 with a ratio of 1.7, VASILIY negative.  Overall, by 2018 ASCO/CAP guidelines, this tumor is HER2 negative.     She was enrolled in I-SPY2 to Durvulumab, Taxol and Olaparib arm. She completed 12 cycles of weekly Taxol. Please see Dr. Guzman's previous notes for further details on the patient's history. She is here today for routine follow up prior to starting cycle 1 of adriamycin and cyclophosphamide (AC).    Interval History:  Mily is here for follow up.  Patient reports that overall she is been doing well.  She had her last day of work on Friday and is looking forward to having a break from work.  She has had some nausea which she manages with Compazine.  She reports that her bowels continue to be variable.  She reports resolution in the neuropathy in her feet.  She is taking lorazepam for sleep about every other night.  She understands the need to be cautious with this medication.  She denies other concerns.    Review of Systems:  Patient denies any of the following except if noted above: fevers, chills, difficulty with energy, vision or hearing changes, chest pain, dyspnea, abdominal pain, vomiting, urinary concerns, headaches, numbness, tingling, or issues with mood.     Current Outpatient Medications   Medication Sig Dispense Refill     ADVIL 200 MG OR TABS 1 TABLET EVERY 4 TO 6 HOURS AS NEEDED 0 0     albuterol (VENTOLIN HFA) 108 (90 Base) MCG/ACT Inhaler INHALE 1-2 PUFFS INTO THE LUNGS EVERY 4 HOURS AS NEEDED FOR SHORTNESS OF BREATH OR DIFFICULTY BREATHING. 18 g PRN     fluticasone (FLOVENT HFA) 110 MCG/ACT inhaler Inhale 2 puffs into the lungs 2 times daily 1 Inhaler PRN     levothyroxine (SYNTHROID) 112 MCG tablet Take 1 tablet (112 mcg) by mouth daily 30 tablet 1     lisinopril (PRINIVIL/ZESTRIL) 5 MG tablet Take 1 tablet (5  "mg) by mouth daily 90 tablet 3     LORazepam (ATIVAN) 0.5 MG tablet   0     order for DME Cranial prosthesis 1 Units 1     prochlorperazine (COMPAZINE) 10 MG tablet Take 10 mg by mouth as needed  0     sertraline (ZOLOFT) 50 MG tablet One daily 90 tablet 1     sodium chloride (OCEAN) 0.65 % nasal spray Spray in both nostrils four times daily 30 mL 3     triamcinolone (ARISTOCORT HP) 0.5 % external cream Apply topically 2 times daily 30 g 3     albuterol (2.5 MG/3ML) 0.083% nebulizer solution Take 3 mLs by nebulization once for 1 dose. 3 mL 0     dexamethasone (DECADRON) 4 MG tablet Take 8 mg by mouth daily for 3 days Start on Day 2.. 6 tablet 3     LORazepam (ATIVAN) 0.5 MG tablet Take 1 tablet (0.5 mg) by mouth every 4 hours as needed (Anxiety, Nausea/Vomiting or Sleep) 30 tablet 3     prochlorperazine (COMPAZINE) 10 MG tablet Take 1 tablet (10 mg) by mouth every 6 hours as needed (Nausea/Vomiting) 30 tablet 3     STATIN NOT PRESCRIBED (INTENTIONAL) Please choose reason not prescribed, below (Patient not taking: Reported on 5/20/2019)       vitamin B6 (PYRIDOXINE) 100 MG tablet Take 1 tablet (100 mg) by mouth daily (Patient not taking: Reported on 5/6/2019) 30 tablet 3     Physical Examination:  General: The patient is a pleasant female in no acute distress. ECOG 1  /76 (BP Location: Right arm, Patient Position: Sitting, Cuff Size: Adult Regular)   Pulse 100   Temp 98.6  F (37  C) (Oral)   Resp 18   Ht 1.676 m (5' 5.98\")   Wt 69.9 kg (154 lb)   LMP 11/02/2014   SpO2 98%   Breastfeeding? No   BMI 24.87 kg/m    Wt Readings from Last 10 Encounters:   05/20/19 69.9 kg (154 lb)   05/14/19 69.5 kg (153 lb 4.8 oz)   05/06/19 69.4 kg (153 lb)   05/06/19 68.9 kg (152 lb)   04/30/19 69.8 kg (153 lb 12.8 oz)   04/29/19 69.1 kg (152 lb 4.8 oz)   04/22/19 70.4 kg (155 lb 4.8 oz)   04/16/19 70.8 kg (156 lb)   04/08/19 69.5 kg (153 lb 3.2 oz)   04/01/19 70.5 kg (155 lb 6.4 oz)   HEENT: EOMI, PERRL. Sclerae are " anicteric. Oral mucosa is pink and moist with no lesions or thrush.   Lymph: No palpable cervical, supraclavicular, or axillary lymphadenopathy.  Heart: Regular rate and rhythm.   Lungs: Clear to auscultation bilaterally.   Breast:  Examination of the right breast reveals a 4 x 4 cm mass at the 12 o'clock position just above and adjacent to the nipple areolar complex and a 2 x 2 cm mass at the 8 o'clock position, again adjacent to the nipple areolar complex.  Second mass is more difficult to palpate in the setting of recent biopsy this morning. Both of these masses are on top of an underlying breast implant.  No other masses in the right breast.   Abdomen: Bowel sounds present, soft, nontender with no palpable hepatosplenomegaly or masses.   Extremities: No lower extremity edema noted bilaterally.   Neuro: Cranial nerves II through XII are grossly intact.  Skin: No rashes, petechiae, or bruising noted on exposed skin.     Laboratory Data:   5/20/2019 08:06   Sodium 139   Potassium 4.1   Chloride 107   Carbon Dioxide 26   Urea Nitrogen 12   Creatinine 0.54   GFR Estimate >90   GFR Estimate If Black >90   Calcium 9.1   Anion Gap 5   Albumin 3.9   Protein Total 7.0   Bilirubin Total 0.3   Alkaline Phosphatase 56   ALT 35   AST 20   T4 Free 0.93   TSH 13.51 (H)   Glucose 101 (H)   WBC 4.8   Hemoglobin 12.9   Hematocrit 38.4   Platelet Count 288   RBC Count 4.24   MCV 91   MCH 30.4   MCHC 33.6   RDW 14.9   Diff Method Automated Method   % Neutrophils 67.8   % Lymphocytes 22.3   % Monocytes 5.9   % Eosinophils 2.7   % Basophils 1.3   % Immature Granulocytes 0.0   Nucleated RBCs 0   Absolute Neutrophil 3.2   Absolute Lymphocytes 1.1   Absolute Monocytes 0.3   Absolute Eosinophils 0.1   Absolute Basophils 0.1   Abs Immature Granulocytes 0.0   Absolute Nucleated RBC 0.0     Assessment and Plan:  Kathy Lei is a 53 year old female with clinical stage II, T3N0MX, invasive ductal breast cancer in the upper outer  quadrant of right breast, multifocal, measuring 8.9 cm on MRI. ER positive and HER2 negative. She is enrolled in I-SPY 2 and on durvalumab every 28 days, weekly Taxol, and olaparib.      ECOG 1 (due to changing her work schedule due to fatigue)    Right breast cancer, ER/MD positive, HER2 negative: She was enrolled in I-SPY2 and started cycle 1 Durvalumab, Taxol and Olaparib on 2/25/19. She completed 12 weeks of treatment and now will proceed with AC with Neulasta support. We discussed the use of Claritin to minimize body aches from Neulasta. We reviewed common side effects and their management. She will meet with Mily Mortensen, RN care coordinator, to review this in more detail.   --She will follow up with Dr. Guzman in 2 weeks.  --She underwent a breast MRI and biopsy this morning, of which the results are pending.    Abnormal echocardiogram: She saw Dr. Alanis today, 5/6/19, and Echo showed improvement. She remains on lisinopril 5 mg daily.   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 Now resolved. Was taking vitamin B6.  Maculopapular rash of BLE, grade 1: Resolved. Continue triamcinolone as needed.  Hx depression: On Zoloft. No concerns today.  Hx asthma: On flovent QDAY and albuterol prn  Hx hypothyroidism: Levothyroxine increased from 88mcg daily to 112mcg daily by Dr. Guzman on 4/16/18 due to TSH elevation and symptomatic. Recheck of TSH today still elevated, but has only been 5 weeks. If TSH is still greater than 10 in 2 weeks, would recommend increase to 125 mcg daily. Reviewed this is secondary to the durvalumab.     Nasreen Grady PA-C  [facility]  [address] [place] [address] [phone]

## 2019-05-20 NOTE — NURSING NOTE
[id number]: Study Visit Note   Subject name: Kathy Lei     Visit: AC#1    Did the study visit occur within the appropriate window allowed by the protocol? yes    Since the last study visit, She has been doing well. Reports that fatigue has improved but not resolved since stopping olaparib. No new complaints since most recent study visit.    I have personally interviewed Kathy Lei and reviewed her medical record for adverse events and concomitant medications and these have been recorded on the corresponding logs in Kathy Lei's research file.     Kathy Lei was given the opportunity to ask any trial related questions.  Please see provider progress note for physical exam and other clinical information. Labs were reviewed - any significant lab values were addressed and reviewed.    Lorne Leos RN     Pager: [phone]

## 2019-05-21 ENCOUNTER — CARE COORDINATION (OUTPATIENT)
Dept: ONCOLOGY | Facility: CLINIC | Age: 54
End: 2019-05-21

## 2019-05-21 NOTE — PROGRESS NOTES
Spoke to patient post C1 AC yesterday. Patient doing fairly well slight nausea last night and took a Compazine that relieved the nausea. Patient eating small frequent meals and drinking plenty of water as instructed.  Discussed Breast MRI done showing decrease in size as Dr Guzman recommended. Answered all patient's questions and verbalized understanding. Mily Mortensen RN, BSN.

## 2019-05-28 ENCOUNTER — CARE COORDINATION (OUTPATIENT)
Dept: ONCOLOGY | Facility: CLINIC | Age: 54
End: 2019-05-28

## 2019-05-28 NOTE — PROGRESS NOTES
"Spoke to patient with symptoms of \"anxiety, restless legs and muscles spasms\" causing her to cancel her appointment today with plastic surgery. Spoke to Kerry Norwood and will refill Ativan 0.5 mg #30 x1 refill to the [facility] on [address]. Reviewed medication list and Dr Guzman prescribed Ativan 0.5 mg #30 on 5/18/19 to the [facility] and verified with Pharmacist patient was given #10 and have a bottle of #30 ready to be picked up. Pharmacist will contact patient to have couriered to her home today. Spoke to patient with seeing  Dr Portia Banks and if wants an appointment with the Palliative Care Social Workers  For counseling with anxiety. Patient will decline at this time with Palliative Care and will agree to see Dr Banks in the future. Will return call with any further recommendations from Dr Guzman.  Answered all patient's questions and verbalized understanding. Mily Mortensen RN, BSN.      "

## 2019-05-29 ENCOUNTER — CARE COORDINATION (OUTPATIENT)
Dept: ONCOLOGY | Facility: CLINIC | Age: 54
End: 2019-05-29

## 2019-05-29 DIAGNOSIS — F41.9 ANXIETY: Primary | ICD-10-CM

## 2019-05-29 DIAGNOSIS — E03.9 HYPOTHYROIDISM, UNSPECIFIED TYPE: Primary | ICD-10-CM

## 2019-05-29 RX ORDER — BUSPIRONE HYDROCHLORIDE 5 MG/1
5 TABLET ORAL 3 TIMES DAILY
Qty: 90 TABLET | Refills: 0 | Status: SHIPPED | OUTPATIENT
Start: 2019-05-29 | End: 2019-06-04

## 2019-05-29 NOTE — PROGRESS NOTES
Called patient's cell phone and left a VM with Kerry Norwood's recommendations to start Buspar 5 mg TID called to her local pharmacy. Instructed patient to return call with any questions. Mily Mortensen RN, BSN  [facility] Nurse Coordinator

## 2019-05-31 DIAGNOSIS — C50.411 MALIGNANT NEOPLASM OF UPPER-OUTER QUADRANT OF RIGHT BREAST IN FEMALE, ESTROGEN RECEPTOR POSITIVE (H): Primary | ICD-10-CM

## 2019-05-31 DIAGNOSIS — Z17.0 MALIGNANT NEOPLASM OF UPPER-OUTER QUADRANT OF RIGHT BREAST IN FEMALE, ESTROGEN RECEPTOR POSITIVE (H): Primary | ICD-10-CM

## 2019-06-04 ENCOUNTER — ONCOLOGY VISIT (OUTPATIENT)
Dept: ONCOLOGY | Facility: CLINIC | Age: 54
End: 2019-06-04
Attending: INTERNAL MEDICINE
Payer: COMMERCIAL

## 2019-06-04 ENCOUNTER — APPOINTMENT (OUTPATIENT)
Dept: LAB | Facility: CLINIC | Age: 54
End: 2019-06-04
Attending: INTERNAL MEDICINE
Payer: COMMERCIAL

## 2019-06-04 ENCOUNTER — RESEARCH ENCOUNTER (OUTPATIENT)
Dept: ONCOLOGY | Facility: CLINIC | Age: 54
End: 2019-06-04

## 2019-06-04 VITALS
WEIGHT: 147.9 LBS | TEMPERATURE: 97 F | OXYGEN SATURATION: 96 % | BODY MASS INDEX: 23.88 KG/M2 | HEART RATE: 94 BPM | SYSTOLIC BLOOD PRESSURE: 129 MMHG | DIASTOLIC BLOOD PRESSURE: 79 MMHG | RESPIRATION RATE: 16 BRPM

## 2019-06-04 DIAGNOSIS — C50.411 MALIGNANT NEOPLASM OF UPPER-OUTER QUADRANT OF RIGHT BREAST IN FEMALE, ESTROGEN RECEPTOR POSITIVE (H): Primary | ICD-10-CM

## 2019-06-04 DIAGNOSIS — Z17.0 MALIGNANT NEOPLASM OF UPPER-OUTER QUADRANT OF RIGHT BREAST IN FEMALE, ESTROGEN RECEPTOR POSITIVE (H): Primary | ICD-10-CM

## 2019-06-04 DIAGNOSIS — C50.411 MALIGNANT NEOPLASM OF UPPER-OUTER QUADRANT OF RIGHT BREAST IN FEMALE, ESTROGEN RECEPTOR POSITIVE (H): ICD-10-CM

## 2019-06-04 DIAGNOSIS — Z17.0 MALIGNANT NEOPLASM OF UPPER-OUTER QUADRANT OF RIGHT BREAST IN FEMALE, ESTROGEN RECEPTOR POSITIVE (H): ICD-10-CM

## 2019-06-04 DIAGNOSIS — F32.A DEPRESSION, UNSPECIFIED DEPRESSION TYPE: ICD-10-CM

## 2019-06-04 DIAGNOSIS — G25.81 RESTLESS LEG SYNDROME: Primary | ICD-10-CM

## 2019-06-04 LAB
ALBUMIN SERPL-MCNC: 3.8 G/DL (ref 3.4–5)
ALP SERPL-CCNC: 100 U/L (ref 40–150)
ALT SERPL W P-5'-P-CCNC: 32 U/L (ref 0–50)
ANION GAP SERPL CALCULATED.3IONS-SCNC: 7 MMOL/L (ref 3–14)
AST SERPL W P-5'-P-CCNC: 20 U/L (ref 0–45)
BASOPHILS # BLD AUTO: 0.1 10E9/L (ref 0–0.2)
BASOPHILS NFR BLD AUTO: 1 %
BILIRUB SERPL-MCNC: 0.2 MG/DL (ref 0.2–1.3)
BUN SERPL-MCNC: 9 MG/DL (ref 7–30)
CALCIUM SERPL-MCNC: 9 MG/DL (ref 8.5–10.1)
CHLORIDE SERPL-SCNC: 102 MMOL/L (ref 94–109)
CO2 SERPL-SCNC: 24 MMOL/L (ref 20–32)
CREAT SERPL-MCNC: 0.5 MG/DL (ref 0.52–1.04)
DIFFERENTIAL METHOD BLD: ABNORMAL
EOSINOPHIL # BLD AUTO: 0.1 10E9/L (ref 0–0.7)
EOSINOPHIL NFR BLD AUTO: 0.5 %
ERYTHROCYTE [DISTWIDTH] IN BLOOD BY AUTOMATED COUNT: 14.6 % (ref 10–15)
GFR SERPL CREATININE-BSD FRML MDRD: >90 ML/MIN/{1.73_M2}
GLUCOSE SERPL-MCNC: 130 MG/DL (ref 70–99)
HCT VFR BLD AUTO: 36.8 % (ref 35–47)
HGB BLD-MCNC: 12.8 G/DL (ref 11.7–15.7)
IMM GRANULOCYTES # BLD: 0.5 10E9/L (ref 0–0.4)
IMM GRANULOCYTES NFR BLD: 4.6 %
LYMPHOCYTES # BLD AUTO: 1.5 10E9/L (ref 0.8–5.3)
LYMPHOCYTES NFR BLD AUTO: 14.9 %
MCH RBC QN AUTO: 31.2 PG (ref 26.5–33)
MCHC RBC AUTO-ENTMCNC: 34.8 G/DL (ref 31.5–36.5)
MCV RBC AUTO: 90 FL (ref 78–100)
MONOCYTES # BLD AUTO: 0.8 10E9/L (ref 0–1.3)
MONOCYTES NFR BLD AUTO: 7.5 %
NEUTROPHILS # BLD AUTO: 7.2 10E9/L (ref 1.6–8.3)
NEUTROPHILS NFR BLD AUTO: 71.5 %
NRBC # BLD AUTO: 0 10*3/UL
NRBC BLD AUTO-RTO: 0 /100
PLATELET # BLD AUTO: 222 10E9/L (ref 150–450)
POTASSIUM SERPL-SCNC: 3.9 MMOL/L (ref 3.4–5.3)
PROT SERPL-MCNC: 7.3 G/DL (ref 6.8–8.8)
RBC # BLD AUTO: 4.1 10E12/L (ref 3.8–5.2)
SODIUM SERPL-SCNC: 133 MMOL/L (ref 133–144)
T4 FREE SERPL-MCNC: 1.12 NG/DL (ref 0.76–1.46)
TSH SERPL DL<=0.005 MIU/L-ACNC: 4.66 MU/L (ref 0.4–4)
WBC # BLD AUTO: 10.1 10E9/L (ref 4–11)

## 2019-06-04 PROCEDURE — 84443 ASSAY THYROID STIM HORMONE: CPT | Performed by: INTERNAL MEDICINE

## 2019-06-04 PROCEDURE — 84439 ASSAY OF FREE THYROXINE: CPT | Performed by: INTERNAL MEDICINE

## 2019-06-04 PROCEDURE — G0463 HOSPITAL OUTPT CLINIC VISIT: HCPCS | Mod: ZF

## 2019-06-04 PROCEDURE — 96411 CHEMO IV PUSH ADDL DRUG: CPT

## 2019-06-04 PROCEDURE — 25800030 ZZH RX IP 258 OP 636: Mod: ZF | Performed by: INTERNAL MEDICINE

## 2019-06-04 PROCEDURE — 80053 COMPREHEN METABOLIC PANEL: CPT | Performed by: INTERNAL MEDICINE

## 2019-06-04 PROCEDURE — 96367 TX/PROPH/DG ADDL SEQ IV INF: CPT

## 2019-06-04 PROCEDURE — 96413 CHEMO IV INFUSION 1 HR: CPT

## 2019-06-04 PROCEDURE — 96375 TX/PRO/DX INJ NEW DRUG ADDON: CPT

## 2019-06-04 PROCEDURE — 99215 OFFICE O/P EST HI 40 MIN: CPT | Mod: ZP | Performed by: INTERNAL MEDICINE

## 2019-06-04 PROCEDURE — 25000128 H RX IP 250 OP 636: Mod: ZF | Performed by: INTERNAL MEDICINE

## 2019-06-04 PROCEDURE — 85025 COMPLETE CBC W/AUTO DIFF WBC: CPT | Performed by: INTERNAL MEDICINE

## 2019-06-04 PROCEDURE — 96377 APPLICATON ON-BODY INJECTOR: CPT | Mod: 59

## 2019-06-04 RX ORDER — ALBUTEROL SULFATE 0.83 MG/ML
2.5 SOLUTION RESPIRATORY (INHALATION)
Status: CANCELLED | OUTPATIENT
Start: 2019-06-04

## 2019-06-04 RX ORDER — METHYLPREDNISOLONE SODIUM SUCCINATE 125 MG/2ML
125 INJECTION, POWDER, LYOPHILIZED, FOR SOLUTION INTRAMUSCULAR; INTRAVENOUS
Status: CANCELLED
Start: 2019-06-04

## 2019-06-04 RX ORDER — HEPARIN SODIUM (PORCINE) LOCK FLUSH IV SOLN 100 UNIT/ML 100 UNIT/ML
500 SOLUTION INTRAVENOUS ONCE
Status: COMPLETED | OUTPATIENT
Start: 2019-06-04 | End: 2019-06-04

## 2019-06-04 RX ORDER — ALBUTEROL SULFATE 90 UG/1
1-2 AEROSOL, METERED RESPIRATORY (INHALATION)
Status: CANCELLED
Start: 2019-06-04

## 2019-06-04 RX ORDER — EPINEPHRINE 0.3 MG/.3ML
0.3 INJECTION SUBCUTANEOUS EVERY 5 MIN PRN
Status: CANCELLED | OUTPATIENT
Start: 2019-06-04

## 2019-06-04 RX ORDER — PROCHLORPERAZINE MALEATE 10 MG
10 TABLET ORAL PRN
Qty: 20 TABLET | Refills: 3 | Status: SHIPPED | OUTPATIENT
Start: 2019-06-04 | End: 2019-06-10

## 2019-06-04 RX ORDER — DIPHENHYDRAMINE HYDROCHLORIDE 50 MG/ML
50 INJECTION INTRAMUSCULAR; INTRAVENOUS
Status: CANCELLED
Start: 2019-06-04

## 2019-06-04 RX ORDER — EPINEPHRINE 1 MG/ML
0.3 INJECTION, SOLUTION INTRAMUSCULAR; SUBCUTANEOUS EVERY 5 MIN PRN
Status: CANCELLED | OUTPATIENT
Start: 2019-06-04

## 2019-06-04 RX ORDER — HEPARIN SODIUM (PORCINE) LOCK FLUSH IV SOLN 100 UNIT/ML 100 UNIT/ML
500 SOLUTION INTRAVENOUS ONCE
Status: CANCELLED
Start: 2019-06-05

## 2019-06-04 RX ORDER — LORAZEPAM 2 MG/ML
0.5 INJECTION INTRAMUSCULAR EVERY 4 HOURS PRN
Status: CANCELLED
Start: 2019-06-04

## 2019-06-04 RX ORDER — SODIUM CHLORIDE 9 MG/ML
1000 INJECTION, SOLUTION INTRAVENOUS CONTINUOUS PRN
Status: CANCELLED
Start: 2019-06-04

## 2019-06-04 RX ORDER — PALONOSETRON 0.05 MG/ML
0.25 INJECTION, SOLUTION INTRAVENOUS ONCE
Status: CANCELLED
Start: 2019-06-04

## 2019-06-04 RX ORDER — HEPARIN SODIUM (PORCINE) LOCK FLUSH IV SOLN 100 UNIT/ML 100 UNIT/ML
500 SOLUTION INTRAVENOUS ONCE
Status: CANCELLED
Start: 2019-06-04

## 2019-06-04 RX ORDER — HEPARIN SODIUM (PORCINE) LOCK FLUSH IV SOLN 100 UNIT/ML 100 UNIT/ML
5 SOLUTION INTRAVENOUS ONCE
Status: COMPLETED | OUTPATIENT
Start: 2019-06-04 | End: 2019-06-04

## 2019-06-04 RX ORDER — SERTRALINE HYDROCHLORIDE 100 MG/1
100 TABLET, FILM COATED ORAL DAILY
Qty: 30 TABLET | Refills: 1 | Status: SHIPPED | OUTPATIENT
Start: 2019-06-04 | End: 2019-07-02

## 2019-06-04 RX ORDER — PALONOSETRON 0.05 MG/ML
0.25 INJECTION, SOLUTION INTRAVENOUS ONCE
Status: COMPLETED | OUTPATIENT
Start: 2019-06-04 | End: 2019-06-04

## 2019-06-04 RX ORDER — DOXORUBICIN HYDROCHLORIDE 2 MG/ML
60 INJECTION, SOLUTION INTRAVENOUS ONCE
Status: CANCELLED | OUTPATIENT
Start: 2019-06-04

## 2019-06-04 RX ORDER — DOXORUBICIN HYDROCHLORIDE 2 MG/ML
60 INJECTION, SOLUTION INTRAVENOUS ONCE
Status: COMPLETED | OUTPATIENT
Start: 2019-06-04 | End: 2019-06-04

## 2019-06-04 RX ORDER — MEPERIDINE HYDROCHLORIDE 25 MG/ML
25 INJECTION INTRAMUSCULAR; INTRAVENOUS; SUBCUTANEOUS EVERY 30 MIN PRN
Status: CANCELLED | OUTPATIENT
Start: 2019-06-04

## 2019-06-04 RX ADMIN — PEGFILGRASTIM 6 MG: KIT SUBCUTANEOUS at 16:19

## 2019-06-04 RX ADMIN — DEXAMETHASONE SODIUM PHOSPHATE: 10 INJECTION, SOLUTION INTRAMUSCULAR; INTRAVENOUS at 15:28

## 2019-06-04 RX ADMIN — HEPARIN 500 UNITS: 100 SYRINGE at 16:49

## 2019-06-04 RX ADMIN — PALONOSETRON HYDROCHLORIDE 0.25 MG: 0.25 INJECTION, SOLUTION INTRAVENOUS at 15:28

## 2019-06-04 RX ADMIN — DOXORUBICIN HYDROCHLORIDE 108 MG: 2 INJECTION, SOLUTION INTRAVENOUS at 16:02

## 2019-06-04 RX ADMIN — CYCLOPHOSPHAMIDE 1080 MG: 1 INJECTION, POWDER, FOR SOLUTION INTRAVENOUS; ORAL at 16:14

## 2019-06-04 RX ADMIN — HEPARIN SODIUM (PORCINE) LOCK FLUSH IV SOLN 100 UNIT/ML 5 ML: 100 SOLUTION at 13:17

## 2019-06-04 RX ADMIN — SODIUM CHLORIDE 250 ML: 9 INJECTION, SOLUTION INTRAVENOUS at 15:25

## 2019-06-04 ASSESSMENT — PAIN SCALES - GENERAL: PAINLEVEL: NO PAIN (0)

## 2019-06-04 NOTE — PROGRESS NOTES
Infusion Nursing Note:  Kathy Lei presents today for Cycle 2 Day 1 Doxorubicin, Cytoxan, Neulasta Onpro.    Patient seen by provider today: Yes: Dr. Guzman    Note: Ok to treat today per Lorne Leos Research RN. Patient verbalized understanding of how to take dexamethasone.     Intravenous Access:  Implanted Port.    Treatment Conditions:  Lab Results   Component Value Date    HGB 12.8 06/04/2019     Lab Results   Component Value Date    WBC 10.1 06/04/2019      Lab Results   Component Value Date    ANEU 7.2 06/04/2019     Lab Results   Component Value Date     06/04/2019      Lab Results   Component Value Date     06/04/2019                   Lab Results   Component Value Date    POTASSIUM 3.9 06/04/2019           Lab Results   Component Value Date    MAG 2.0 02/05/2019            Lab Results   Component Value Date    CR 0.50 06/04/2019                   Lab Results   Component Value Date    BRYANT 9.0 06/04/2019                Lab Results   Component Value Date    BILITOTAL 0.2 06/04/2019           Lab Results   Component Value Date    ALBUMIN 3.8 06/04/2019                    Lab Results   Component Value Date    ALT 32 06/04/2019           Lab Results   Component Value Date    AST 20 06/04/2019     Results reviewed, labs MET treatment parameters, ok to proceed with treatment.  ECHO/MUGA completed 5/6/19  EF 55-60%.      Post Infusion Assessment:  Patient tolerated infusion without incident.  Blood return noted pre and post infusion.  Blood return noted during Doxorubicin administration every 2 cc.  No evidence of extravasations.  Access discontinued per protocol.     Neulasta Onpro On-Body injector applied to Right Arm at 4:20pm with light facing down.  Writer discussed Neulasta injection would start tomorrow 6/5/19 at 7:20pm, approximately 27 hours after application applied today.  Written and Verbal instruction reviewed with patient.  Pt instructed when the dose delivery  starts, it will take about 45 minutes to complete.  Pt aware Neulasta Onpro On-Body should have green flashing light and to call triage or on-call MD if injector flashes red or appears to be leaking. Pt aware to keep Onpro On-Body Neulasta 4 inches away from electrical equipment and to avoid showering 4 hours prior to injection.   Neulasta Onpro Lot number: [id number]    Discharge Plan:   Prescription refills given for Dexamethasone, Compazine, Zoloft.  Discharge instructions reviewed with: Patient.  Patient verbalized understanding of discharge instructions and all questions answered.  AVS to patient via [employer].  Patient will return 6/12 for Breast MRI and 6/18/19 for next infusion appointment.   Patient discharged in stable condition accompanied by: son.  Face to Face time: 0.    ARVIND CHAPARRO RN

## 2019-06-04 NOTE — NURSING NOTE
"Oncology Rooming Note    June 4, 2019 1:51 PM   Kathy Lei is a 53 year old female who presents for:    Chief Complaint   Patient presents with     Port Draw     Labs drawn via port by RN in lab. VS taken. Pt checked in for next appt     Oncology Clinic Visit     [facility] RETURN- BREAST CA     Initial Vitals: /79 (BP Location: Right arm, Patient Position: Sitting, Cuff Size: Adult Regular)   Pulse 94   Temp 97  F (36.1  C) (Oral)   Resp 16   Wt 67.1 kg (147 lb 14.4 oz)   LMP 11/02/2014   SpO2 96%   BMI 23.88 kg/m   Estimated body mass index is 23.88 kg/m  as calculated from the following:    Height as of 5/20/19: 1.676 m (5' 5.98\").    Weight as of this encounter: 67.1 kg (147 lb 14.4 oz). Body surface area is 1.77 meters squared.  No Pain (0) Comment: Data Unavailable   Patient's last menstrual period was 11/02/2014.  Allergies reviewed: Yes  Medications reviewed: Yes    Medications: Medication refills not needed today.  Pharmacy name entered into Dormify:    [id number] - [place], [place] - [address] [id number] - [place], [place] - [address] [facility] [address]    Clinical concerns: No new concerns. Thomas was notified.      Shakir Skaggs LPN            "

## 2019-06-04 NOTE — PROGRESS NOTES
Medical oncology follow-up note    HPI: Kathy Lei is a 52 yo female with a new diagnosis of an estrogen-receptor positive, RI-positive, HER2-negative breast cancer, grade 2, in the upper outer quadrant of the right breast.      Mily presented between Christmas and New Years of 2018 with new sharp pains in the upper outer quadrant of the right breast.  She underwent mammographic screening.       IMAGING:  Mammography and ultrasound:  She had a diagnostic mammogram which showed bilateral prepectoral saline implants.  On the right breast at 11:30 position, there were grouped coarse heterogeneous calcifications spanning 1.3 cm, new since 2014, adjacent calcifications 11-o'clock position posterior depth.  There was an irregular mass in the lateral right breast 9-o'clock position mid-posterior depth, and an additional mass with obscured margins.  No significant changes in the left breast.  Right breast ultrasound was performed.  In the area of the palpable lump in the right breast, 11-o'clock position, 6 cm from the nipple, there is an irregular hypoechoic mass with indistinct margins measuring approximately 1.0 x 0.9 x 1.6 cm in size.  Immediately adjacent to the mass, 11:30 position, are microcalcifications.  In the second area of palpable lump right breast, 9:30 position, 5 cm from the nipple, there was an irregular hypoechoic mass measuring 3.2 x 0.5 x 1.3 cm in size felt to account for the mammographic findings.  There were multiple additional round hypoechoic masses similar to the findings at 9:30 position seen incidentally during real time and not directly palpable.  For example, in the right breast at 8-o'clock position, 4 cm from the nipple, there was a mass measuring 0.8 x 0.6 x 0.6 cm, BI-RADS 4.       Contrast mammogram was subsequently performed and the contrast mammogram showed a large area of mass and non-mass-like enhancement in the upper outer right breast measuring 7.2 cm in greatest  dimension, corresponding global asymmetry in the upper outer right breast.  Enhancement extended to the implant without evidence of extension to the skin surface or nipple, and the calcifications were noted as previously found.      PATHOLOGY:  The pathology showed part A, breast needle biopsy 11 o'clock, 6 cm from the nipple, invasive mammary carcinoma, no special type, ductal (and mucinous) Cut Off grade 2.  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  Calcifications were associated with the DCIS.  There was a focus suspicious for lymphovascular invasion.  Invasive carcinoma was estrogen receptor positive and progesterone receptor negative by immunohistochemistry.  In part B, breast right, 8 o'clock, 4 cm from the nipple, ultrasound-guided core biopsy, invasive mammary carcinoma, no special type, ductal (and mucinous) Cut Off grade 2, occasional calcifications were noted.  Invasive carcinoma was estrogen receptor positive and progesterone receptor negative by immunohistochemistry.  On quantitation of the ER and AR, ER was positive 99% of the cells staining with strong intensity.  AR was subsequently noted to be positive with 15% of the cells staining with moderate intensity.       There was also a HER2 FISH performed, and the HER2 FISH showed average number of HER2 signals per nucleus 2.6.  Average number of CEN17 signals 1.7 with a ratio of 1.6, VASILIY negative for biopsy A.  For biopsy B, the HER2 signals per nucleus 2.9.  Average number CEN17 signals per nucleus 1.7 with a ratio of 1.7, VASILIY negative.  Overall, by 2018 ASCO/CAP guidelines, this tumor is HER2 negative.    She was enrolled in I-SPY2 trial to Durvulumab, Taxol and Olaparib arm. She completed 12 cycles of weekly Taxol. She is s/p cycle 1 of adriamycin and cyclophosphamide (AC).     PAST MEDICAL HISTORY:  In terms of her breast history, she does have a history of a smaller right breast which was treated  with implants 19 years ago.  She has a history of 2 papillomas in the right breast, 1 removed at the 6-o'clock position 5 years ago, another removed at the 6-o'clock position approximately 10 years ago.  These incisions are approximately at the 5:30 to 6-o'clock position.  She has no history of radiation therapy.  No history of breast cancer of any type.  No history of tumor of any kind.  No history of heart problems, heart attack, breathing problems, blood clots, seizures, stroke, arthritis, peptic ulcer disease or osteoporosis.  No history of bone fractures.  She is not currently participating in a clinical trial. She does have a history of asthma and a history of hypothyroidism.      FAMILY HISTORY:  Entirely negative for breast cancer or ovarian cancer or breast cancer in a male relative.      ALLERGIES:  No known drug allergies.  No allergy to seafood, iodine or contrast dye.  She does not take aspirin.      PAST MENSTRUAL HISTORY:  First period was at age 11.  First and only pregnancy was at age 26.  No miscarriages or abortions.  Occasional use of oral contraceptives in her 20s.  No history of hormone replacement therapy.  Last period was 3 years ago.      SOCIAL HISTORY:  Tobacco history was from age 15 to present, smoking a pack of cigarettes a week.  She is now trying to stop cigarettes.      In terms of alcohol use, in her early teens and early 20s, she drank approximately 10 drinks on the weekend and has tapered off drinking since then.      INTERVAL HISTORY:  Mily returns to clinic for follow-up of breast cancer and consideration of cycle #2 ddAC. She has had worsening fatigue after cycle #1 ddAC. She had decreased appetite and a metallic taste in her mouth. She lost about 6 lbs. She was started on Buspar for anxiety. She only took 2 doses of Buspar after which she vomited. She decided to stop Buspar. Otherwise her nausea was controlled with Compazine. She takes lorazepam PRN for anxiety. She noticed  bright red blood in her stool on Friday, which has not recurred since. She had more than 6 loose stools on the same day, for which she took Imodium with good response. She has not had loose stools in the past 3 days. She denies fever, chills, shortness of breath or chest pain.    ROS: The remainder of a 10-point review of systems is negative.      PHYSICAL EXAMINATION:   /79 (BP Location: Right arm, Patient Position: Sitting, Cuff Size: Adult Regular)   Pulse 94   Temp 97  F (36.1  C) (Oral)   Resp 16   Wt 67.1 kg (147 lb 14.4 oz)   LMP 11/02/2014   SpO2 96%   BMI 23.88 kg/m    GENERAL:  Mily appears generally well.     HEENT:  She has alopecia and is wearing a wig.  No lesions in the oropharynx.   LYMPH:  There is no palpable cervical, supraclavicular, subclavicular or axillary lymphadenopathy.   BREASTS:  Examination of the right breast reveals a 7 x 6 cm mass at the 12 o'clock position just above and adjacent to the nipple areolar complex on top of an underlying breast implant.  The edges of the mass were indistinct.  Left breast is without masses.   LUNGS:  Clear to percussion and auscultation.  HEART:  Regular rate and rhythm, S1, S2.   ABDOMEN:  Soft and nontender without hepatosplenomegaly.   EXTREMITIES:  Without edema.  PSYCHIATRIC:  Mood and affect are normal.   SKIN:  No rash noticed    LABORATORY DATA:  The CBC and CMP were within normal limits.  TSH 4.66 improved from 10.61.     ASSESSMENT AND PLAN:     1.  Mily Lei is a 53-year-old woman with a recent diagnosis of a clinical stage II, T3N0MX invasive ductal carcinoma of the upper outer quadrant of the right breast, which was multifocal, largest area of involvement measuring 8.9 cm in largest dimension on the MRI.   She is on the I-SPY2 trial on the durvalumab, paclitaxel and olaparib arm.  She has grade 2 fatigue. A 7 x 6 cm mass was palpated on today's exam. The edges were indistinct.  We would not change therapy based on tape  measure measurements particularly because the mass is curvilinear, on an implant and soft and difficult to measure.  She has an MRI scheduled for 6-12-19.  I discussed this plan with Dr. Leidy Linn, head of [address] and she is in agreement with the plan.   2.  Rash on lower legs was related to the durvalumab and is now resolved.  She has triamcinolone cream to treat this problem.   3.  Hypothyroidism, grade 1, being treated with levothyroxine 112 mcg daily.   4.  Asthma has been treated with Flovent.   5.  Occasional anxiety has been responsive to lorazepam.  She has not been able to tolerate the mirtazapine. Tried Buspar and was not able to tolerate as well. Will increase Zoloft to 100 mg daily.  6.  Followup.  Follow up ddAC C3 D1 with Dr. Guzman 6-18 and C4 D1 on 7-2 with Kerry and CBC, CMP both visits.  Breast MRI 6-12-19.  Breast MRI 6-12-19.  We will base plan to continue on the breast MRI.     Thank you for allowing us to continue to participate in Mily Lei's care. The patient was seen and evaluated by me.  I discussed the patient with the fellow and agree with the findings and plan in the note, which was edited by me.    Sincerely,     Christian Guzman MD    [facility]  [phone].         Staffed with Dr. Thomas Baker MD, PhD  Hem/Onc Fellow    I spent 60 minutes with the patient more than 50% of which was in counseling and coordination of care.

## 2019-06-04 NOTE — LETTER
6/4/2019       RE: Kathy Lei  [address] [place] [address]     Dear Colleague,    Thank you for referring your patient, Kathy Lei, to the [facility]. Please see a copy of my visit note below.    Medical oncology follow-up note    HPI: Kathy Lei is a 54 yo female with a new diagnosis of an estrogen-receptor positive, IL-positive, HER2-negative breast cancer, grade 2, in the upper outer quadrant of the right breast.      Mily presented between Christmas and New Years of 2018 with new sharp pains in the upper outer quadrant of the right breast.  She underwent mammographic screening.       IMAGING:  Mammography and ultrasound:  She had a diagnostic mammogram which showed bilateral prepectoral saline implants.  On the right breast at 11:30 position, there were grouped coarse heterogeneous calcifications spanning 1.3 cm, new since 2014, adjacent calcifications 11-o'clock position posterior depth.  There was an irregular mass in the lateral right breast 9-o'clock position mid-posterior depth, and an additional mass with obscured margins.  No significant changes in the left breast.  Right breast ultrasound was performed.  In the area of the palpable lump in the right breast, 11-o'clock position, 6 cm from the nipple, there is an irregular hypoechoic mass with indistinct margins measuring approximately 1.0 x 0.9 x 1.6 cm in size.  Immediately adjacent to the mass, 11:30 position, are microcalcifications.  In the second area of palpable lump right breast, 9:30 position, 5 cm from the nipple, there was an irregular hypoechoic mass measuring 3.2 x 0.5 x 1.3 cm in size felt to account for the mammographic findings.  There were multiple additional round hypoechoic masses similar to the findings at 9:30 position seen incidentally during real time and not directly palpable.  For example, in the right breast at 8-o'clock position, 4 cm from the nipple, there was a  mass measuring 0.8 x 0.6 x 0.6 cm, BI-RADS 4.       Contrast mammogram was subsequently performed and the contrast mammogram showed a large area of mass and non-mass-like enhancement in the upper outer right breast measuring 7.2 cm in greatest dimension, corresponding global asymmetry in the upper outer right breast.  Enhancement extended to the implant without evidence of extension to the skin surface or nipple, and the calcifications were noted as previously found.      PATHOLOGY:  The pathology showed part A, breast needle biopsy 11 o'clock, 6 cm from the nipple, invasive mammary carcinoma, no special type, ductal (and mucinous) [place] grade 2.  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  Calcifications were associated with the DCIS.  There was a focus suspicious for lymphovascular invasion.  Invasive carcinoma was estrogen receptor positive and progesterone receptor negative by immunohistochemistry.  In part B, breast right, 8 o'clock, 4 cm from the nipple, ultrasound-guided core biopsy, invasive mammary carcinoma, no special type, ductal (and mucinous) Donna grade 2, occasional calcifications were noted.  Invasive carcinoma was estrogen receptor positive and progesterone receptor negative by immunohistochemistry.  On quantitation of the ER and MA, ER was positive 99% of the cells staining with strong intensity.  MA was subsequently noted to be positive with 15% of the cells staining with moderate intensity.       There was also a HER2 FISH performed, and the HER2 FISH showed average number of HER2 signals per nucleus 2.6.  Average number of CEN17 signals 1.7 with a ratio of 1.6, VASILIY negative for biopsy A.  For biopsy B, the HER2 signals per nucleus 2.9.  Average number CEN17 signals per nucleus 1.7 with a ratio of 1.7, VASILIY negative.  Overall, by 2018 ASCO/CAP guidelines, this tumor is HER2 negative.    She was enrolled in I-SPY2 trial to Durvulumab, Taxol and  Olaparib arm. She completed 12 cycles of weekly Taxol. She is s/p cycle 1 of adriamycin and cyclophosphamide (AC).     PAST MEDICAL HISTORY:  In terms of her breast history, she does have a history of a smaller right breast which was treated with implants 19 years ago.  She has a history of 2 papillomas in the right breast, 1 removed at the 6-o'clock position 5 years ago, another removed at the 6-o'clock position approximately 10 years ago.  These incisions are approximately at the 5:30 to 6-o'clock position.  She has no history of radiation therapy.  No history of breast cancer of any type.  No history of tumor of any kind.  No history of heart problems, heart attack, breathing problems, blood clots, seizures, stroke, arthritis, peptic ulcer disease or osteoporosis.  No history of bone fractures.  She is not currently participating in a clinical trial. She does have a history of asthma and a history of hypothyroidism.      FAMILY HISTORY:  Entirely negative for breast cancer or ovarian cancer or breast cancer in a male relative.      ALLERGIES:  No known drug allergies.  No allergy to seafood, iodine or contrast dye.  She does not take aspirin.      PAST MENSTRUAL HISTORY:  First period was at age 11.  First and only pregnancy was at age 26.  No miscarriages or abortions.  Occasional use of oral contraceptives in her 20s.  No history of hormone replacement therapy.  Last period was 3 years ago.      SOCIAL HISTORY:  Tobacco history was from age 15 to present, smoking a pack of cigarettes a week.  She is now trying to stop cigarettes.      In terms of alcohol use, in her early teens and early 20s, she drank approximately 10 drinks on the weekend and has tapered off drinking since then.      INTERVAL HISTORY:  Mily returns to clinic for follow-up of breast cancer and consideration of cycle #2 ddAC. She has had worsening fatigue after cycle #1 ddAC. She had decreased appetite and a metallic taste in her mouth. She  lost about 6 lbs. She was started on Buspar for anxiety. She only took 2 doses of Buspar after which she vomited. She decided to stop Buspar. Otherwise her nausea was controlled with Compazine. She takes lorazepam PRN for anxiety. She noticed bright red blood in her stool on Friday, which has not recurred since. She had more than 6 loose stools on the same day, for which she took Imodium with good response. She has not had loose stools in the past 3 days. She denies fever, chills, shortness of breath or chest pain.    ROS: The remainder of a 10-point review of systems is negative.      PHYSICAL EXAMINATION:   /79 (BP Location: Right arm, Patient Position: Sitting, Cuff Size: Adult Regular)   Pulse 94   Temp 97  F (36.1  C) (Oral)   Resp 16   Wt 67.1 kg (147 lb 14.4 oz)   LMP 11/02/2014   SpO2 96%   BMI 23.88 kg/m     GENERAL:  Mily appears generally well.     HEENT:  She has alopecia and is wearing a wig.  No lesions in the oropharynx.   LYMPH:  There is no palpable cervical, supraclavicular, subclavicular or axillary lymphadenopathy.   BREASTS:  Examination of the right breast reveals a 7 x 6 cm mass at the 12 o'clock position just above and adjacent to the nipple areolar complex on top of an underlying breast implant.  The edges of the mass were indistinct.  Left breast is without masses.   LUNGS:  Clear to percussion and auscultation.  HEART:  Regular rate and rhythm, S1, S2.   ABDOMEN:  Soft and nontender without hepatosplenomegaly.   EXTREMITIES:  Without edema.  PSYCHIATRIC:  Mood and affect are normal.   SKIN:  No rash noticed    LABORATORY DATA:  The CBC and CMP were within normal limits.  TSH 4.66 improved from 10.61.     ASSESSMENT AND PLAN:     1.  Mily Lei is a 53-year-old woman with a recent diagnosis of a clinical stage II, T3N0MX invasive ductal carcinoma of the upper outer quadrant of the right breast, which was multifocal, largest area of involvement measuring 8.9 cm in largest  dimension on the MRI.   She is on the I-SPY2 trial on the durvalumab, paclitaxel and olaparib arm.  She has grade 2 fatigue. A 7 x 6 cm mass was palpated on today's exam. The edges were indistinct.  We would not change therapy based on tape measure measurements particularly because the mass is curvilinear, on an implant and soft and difficult to measure.  She has an MRI scheduled for 6-12-19.  I discussed this plan with Dr. Leidy Linn, head of [facility] safety and she is in agreement with the plan.   2.  Rash on lower legs was related to the durvalumab and is now resolved.  She has triamcinolone cream to treat this problem.   3.  Hypothyroidism, grade 1, being treated with levothyroxine 112 mcg daily.   4.  Asthma has been treated with Flovent.   5.  Occasional anxiety has been responsive to lorazepam.  She has not been able to tolerate the mirtazapine. Tried Buspar and was not able to tolerate as well. Will increase Zoloft to 100 mg daily.  6.  Followup.  Follow up ddAC C3 D1 with Dr. Guzman 6-18 and C4 D1 on 7-2 with Kerry and CBC, CMP both visits.  Breast MRI 6-12-19.  Breast MRI 6-12-19.  We will base plan to continue on the breast MRI.     Thank you for allowing us to continue to participate in Mily Lei's care. The patient was seen and evaluated by me.  I discussed the patient with the fellow and agree with the findings and plan in the note, which was edited by me.    Sincerely,     Christian Guzman MD    [facility]  [phone].         Staffed with Dr. Thomas Baker MD, PhD  Hem/Onc Fellow    I spent 60 minutes with the patient more than 50% of which was in counseling and coordination of care.

## 2019-06-04 NOTE — NURSING NOTE
[id number]: Study Visit Note   Subject name: Kathy Lei     Visit: AC#2    Did the study visit occur within the appropriate window allowed by the protocol? yes    Since the last study visit, She has been doing worse. Patient reports vomiting after taking buspirone twice then discontinuing buspirone. Reports increased nausea, fatigue, new dysgeusia and anorexia. Had diarrhea that resolved with imodium. Had jalil red blood in stool x1 last week and has not had any subsequent similar episodes since.     I have personally interviewed Kathy Lei and reviewed her medical record for adverse events and concomitant medications and these have been recorded on the corresponding logs in Kathy Lei's research file.     Kathy Lei was given the opportunity to ask any trial related questions.  Please see provider progress note for physical exam and other clinical information. Labs were reviewed - any significant lab values were addressed and reviewed.    Lorne Leos RN     Pager: [phone]

## 2019-06-04 NOTE — PATIENT INSTRUCTIONS
Contact Numbers    [facility] Main Line: [phone]  [facility] Triage and after hours / weekends / holidays:  [phone]      Please call the triage or after hours line if you experience a temperature greater than or equal to 100.5, shaking chills, have uncontrolled nausea, vomiting and/or diarrhea, dizziness, shortness of breath, chest pain, bleeding, unexplained bruising, or if you have any other new/concerning symptoms, questions or concerns.      If you are having any concerning symptoms or wish to speak to a provider before your next infusion visit, please call your care coordinator or triage to notify them so we can adequately serve you.     If you need a refill on a narcotic prescription or other medication, please call before your infusion appointment.          Recent Results (from the past 24 hour(s))   TSH with free T4 reflex    Collection Time: 06/04/19  1:21 PM   Result Value Ref Range    TSH 4.66 (H) 0.40 - 4.00 mU/L   Comprehensive metabolic panel    Collection Time: 06/04/19  1:21 PM   Result Value Ref Range    Sodium 133 133 - 144 mmol/L    Potassium 3.9 3.4 - 5.3 mmol/L    Chloride 102 94 - 109 mmol/L    Carbon Dioxide 24 20 - 32 mmol/L    Anion Gap 7 3 - 14 mmol/L    Glucose 130 (H) 70 - 99 mg/dL    Urea Nitrogen 9 7 - 30 mg/dL    Creatinine 0.50 (L) 0.52 - 1.04 mg/dL    GFR Estimate >90 >60 mL/min/[1.73_m2]    GFR Estimate If Black >90 >60 mL/min/[1.73_m2]    Calcium 9.0 8.5 - 10.1 mg/dL    Bilirubin Total 0.2 0.2 - 1.3 mg/dL    Albumin 3.8 3.4 - 5.0 g/dL    Protein Total 7.3 6.8 - 8.8 g/dL    Alkaline Phosphatase 100 40 - 150 U/L    ALT 32 0 - 50 U/L    AST 20 0 - 45 U/L   CBC with platelets differential    Collection Time: 06/04/19  1:21 PM   Result Value Ref Range    WBC 10.1 4.0 - 11.0 10e9/L    RBC Count 4.10 3.8 - 5.2 10e12/L    Hemoglobin 12.8 11.7 - 15.7 g/dL    Hematocrit 36.8 35.0 - 47.0 %    MCV 90 78 - 100 fl    MCH 31.2 26.5 - 33.0 pg    MCHC 34.8 31.5 - 36.5 g/dL    RDW 14.6 10.0 - 15.0 %     Platelet Count 222 150 - 450 10e9/L    Diff Method Automated Method     % Neutrophils 71.5 %    % Lymphocytes 14.9 %    % Monocytes 7.5 %    % Eosinophils 0.5 %    % Basophils 1.0 %    % Immature Granulocytes 4.6 %    Nucleated RBCs 0 0 /100    Absolute Neutrophil 7.2 1.6 - 8.3 10e9/L    Absolute Lymphocytes 1.5 0.8 - 5.3 10e9/L    Absolute Monocytes 0.8 0.0 - 1.3 10e9/L    Absolute Eosinophils 0.1 0.0 - 0.7 10e9/L    Absolute Basophils 0.1 0.0 - 0.2 10e9/L    Abs Immature Granulocytes 0.5 (H) 0 - 0.4 10e9/L    Absolute Nucleated RBC 0.0    T4 free    Collection Time: 06/04/19  1:21 PM   Result Value Ref Range    T4 Free 1.12 0.76 - 1.46 ng/dL

## 2019-06-10 ENCOUNTER — INFUSION THERAPY VISIT (OUTPATIENT)
Dept: ONCOLOGY | Facility: CLINIC | Age: 54
End: 2019-06-10
Attending: INTERNAL MEDICINE
Payer: COMMERCIAL

## 2019-06-10 ENCOUNTER — APPOINTMENT (OUTPATIENT)
Dept: LAB | Facility: CLINIC | Age: 54
End: 2019-06-10
Attending: INTERNAL MEDICINE
Payer: COMMERCIAL

## 2019-06-10 ENCOUNTER — ONCOLOGY VISIT (OUTPATIENT)
Dept: ONCOLOGY | Facility: CLINIC | Age: 54
End: 2019-06-10
Attending: PHYSICIAN ASSISTANT
Payer: COMMERCIAL

## 2019-06-10 ENCOUNTER — NURSE TRIAGE (OUTPATIENT)
Dept: NURSING | Facility: CLINIC | Age: 54
End: 2019-06-10

## 2019-06-10 ENCOUNTER — CARE COORDINATION (OUTPATIENT)
Dept: ONCOLOGY | Facility: CLINIC | Age: 54
End: 2019-06-10

## 2019-06-10 VITALS
RESPIRATION RATE: 16 BRPM | SYSTOLIC BLOOD PRESSURE: 111 MMHG | OXYGEN SATURATION: 95 % | WEIGHT: 142.9 LBS | DIASTOLIC BLOOD PRESSURE: 85 MMHG | HEART RATE: 115 BPM | TEMPERATURE: 97.8 F | BODY MASS INDEX: 23.08 KG/M2

## 2019-06-10 DIAGNOSIS — C50.411 MALIGNANT NEOPLASM OF UPPER-OUTER QUADRANT OF RIGHT BREAST IN FEMALE, ESTROGEN RECEPTOR POSITIVE (H): ICD-10-CM

## 2019-06-10 DIAGNOSIS — Z17.0 MALIGNANT NEOPLASM OF UPPER-OUTER QUADRANT OF RIGHT BREAST IN FEMALE, ESTROGEN RECEPTOR POSITIVE (H): ICD-10-CM

## 2019-06-10 DIAGNOSIS — R11.2 CHEMOTHERAPY INDUCED NAUSEA AND VOMITING: Primary | ICD-10-CM

## 2019-06-10 DIAGNOSIS — T45.1X5A CHEMOTHERAPY INDUCED NAUSEA AND VOMITING: Primary | ICD-10-CM

## 2019-06-10 DIAGNOSIS — E03.9 HYPOTHYROIDISM, UNSPECIFIED TYPE: ICD-10-CM

## 2019-06-10 LAB
ALBUMIN SERPL-MCNC: 3.9 G/DL (ref 3.4–5)
ALP SERPL-CCNC: 133 U/L (ref 40–150)
ALT SERPL W P-5'-P-CCNC: 17 U/L (ref 0–50)
ANION GAP SERPL CALCULATED.3IONS-SCNC: 7 MMOL/L (ref 3–14)
AST SERPL W P-5'-P-CCNC: 12 U/L (ref 0–45)
BASOPHILS # BLD AUTO: 0.1 10E9/L (ref 0–0.2)
BASOPHILS NFR BLD AUTO: 1.7 %
BILIRUB SERPL-MCNC: 0.8 MG/DL (ref 0.2–1.3)
BUN SERPL-MCNC: 9 MG/DL (ref 7–30)
CALCIUM SERPL-MCNC: 9 MG/DL (ref 8.5–10.1)
CHLORIDE SERPL-SCNC: 99 MMOL/L (ref 94–109)
CO2 SERPL-SCNC: 24 MMOL/L (ref 20–32)
CREAT SERPL-MCNC: 0.42 MG/DL (ref 0.52–1.04)
DIFFERENTIAL METHOD BLD: NORMAL
EOSINOPHIL # BLD AUTO: 0 10E9/L (ref 0–0.7)
EOSINOPHIL NFR BLD AUTO: 0 %
ERYTHROCYTE [DISTWIDTH] IN BLOOD BY AUTOMATED COUNT: 14.2 % (ref 10–15)
GFR SERPL CREATININE-BSD FRML MDRD: >90 ML/MIN/{1.73_M2}
GLUCOSE SERPL-MCNC: 101 MG/DL (ref 70–99)
HCT VFR BLD AUTO: 38.7 % (ref 35–47)
HGB BLD-MCNC: 13 G/DL (ref 11.7–15.7)
LYMPHOCYTES # BLD AUTO: 0.9 10E9/L (ref 0.8–5.3)
LYMPHOCYTES NFR BLD AUTO: 20 %
MAGNESIUM SERPL-MCNC: 2.1 MG/DL (ref 1.6–2.3)
MCH RBC QN AUTO: 30.6 PG (ref 26.5–33)
MCHC RBC AUTO-ENTMCNC: 33.6 G/DL (ref 31.5–36.5)
MCV RBC AUTO: 91 FL (ref 78–100)
MONOCYTES # BLD AUTO: 0 10E9/L (ref 0–1.3)
MONOCYTES NFR BLD AUTO: 0.9 %
NEUTROPHILS # BLD AUTO: 3.4 10E9/L (ref 1.6–8.3)
NEUTROPHILS NFR BLD AUTO: 77.4 %
PHOSPHATE SERPL-MCNC: 3 MG/DL (ref 2.5–4.5)
PLATELET # BLD AUTO: 257 10E9/L (ref 150–450)
PLATELET # BLD EST: NORMAL 10*3/UL
POTASSIUM SERPL-SCNC: 3.7 MMOL/L (ref 3.4–5.3)
PROT SERPL-MCNC: 7.2 G/DL (ref 6.8–8.8)
RBC # BLD AUTO: 4.25 10E12/L (ref 3.8–5.2)
RBC MORPH BLD: NORMAL
SODIUM SERPL-SCNC: 131 MMOL/L (ref 133–144)
T4 FREE SERPL-MCNC: 0.98 NG/DL (ref 0.76–1.46)
TSH SERPL DL<=0.005 MIU/L-ACNC: 11.83 MU/L (ref 0.4–4)
WBC # BLD AUTO: 4.4 10E9/L (ref 4–11)

## 2019-06-10 PROCEDURE — 99214 OFFICE O/P EST MOD 30 MIN: CPT | Mod: ZP | Performed by: PHYSICIAN ASSISTANT

## 2019-06-10 PROCEDURE — 85025 COMPLETE CBC W/AUTO DIFF WBC: CPT | Performed by: PHYSICIAN ASSISTANT

## 2019-06-10 PROCEDURE — 83735 ASSAY OF MAGNESIUM: CPT | Performed by: PHYSICIAN ASSISTANT

## 2019-06-10 PROCEDURE — 84439 ASSAY OF FREE THYROXINE: CPT | Performed by: PHYSICIAN ASSISTANT

## 2019-06-10 PROCEDURE — 25000128 H RX IP 250 OP 636: Mod: ZF | Performed by: PHYSICIAN ASSISTANT

## 2019-06-10 PROCEDURE — 96361 HYDRATE IV INFUSION ADD-ON: CPT

## 2019-06-10 PROCEDURE — 80053 COMPREHEN METABOLIC PANEL: CPT | Performed by: PHYSICIAN ASSISTANT

## 2019-06-10 PROCEDURE — 84100 ASSAY OF PHOSPHORUS: CPT | Performed by: PHYSICIAN ASSISTANT

## 2019-06-10 PROCEDURE — 96375 TX/PRO/DX INJ NEW DRUG ADDON: CPT

## 2019-06-10 PROCEDURE — 25000125 ZZHC RX 250: Mod: ZF | Performed by: PHYSICIAN ASSISTANT

## 2019-06-10 PROCEDURE — 84443 ASSAY THYROID STIM HORMONE: CPT | Performed by: PHYSICIAN ASSISTANT

## 2019-06-10 PROCEDURE — 96365 THER/PROPH/DIAG IV INF INIT: CPT

## 2019-06-10 PROCEDURE — 25000128 H RX IP 250 OP 636: Mod: ZF | Performed by: INTERNAL MEDICINE

## 2019-06-10 PROCEDURE — 25800030 ZZH RX IP 258 OP 636: Mod: ZF | Performed by: PHYSICIAN ASSISTANT

## 2019-06-10 PROCEDURE — 93010 ELECTROCARDIOGRAM REPORT: CPT | Performed by: INTERNAL MEDICINE

## 2019-06-10 RX ORDER — ONDANSETRON 2 MG/ML
8 INJECTION INTRAMUSCULAR; INTRAVENOUS ONCE
Status: CANCELLED
Start: 2019-06-10

## 2019-06-10 RX ORDER — ONDANSETRON 8 MG/1
8 TABLET, FILM COATED ORAL EVERY 8 HOURS PRN
Qty: 60 TABLET | Refills: 3 | Status: SHIPPED | OUTPATIENT
Start: 2019-06-10 | End: 2019-07-02

## 2019-06-10 RX ORDER — PROCHLORPERAZINE MALEATE 10 MG
10 TABLET ORAL EVERY 6 HOURS PRN
Qty: 30 TABLET | Refills: 3 | Status: SHIPPED | OUTPATIENT
Start: 2019-06-10 | End: 2019-06-10

## 2019-06-10 RX ORDER — DEXAMETHASONE SODIUM PHOSPHATE 4 MG/ML
10 INJECTION, SOLUTION INTRA-ARTICULAR; INTRALESIONAL; INTRAMUSCULAR; INTRAVENOUS; SOFT TISSUE ONCE
Status: CANCELLED
Start: 2019-06-10

## 2019-06-10 RX ORDER — OLANZAPINE 5 MG/1
5 TABLET ORAL AT BEDTIME
Qty: 30 TABLET | Refills: 3 | Status: SHIPPED | OUTPATIENT
Start: 2019-06-10 | End: 2019-07-02

## 2019-06-10 RX ORDER — HEPARIN SODIUM (PORCINE) LOCK FLUSH IV SOLN 100 UNIT/ML 100 UNIT/ML
5 SOLUTION INTRAVENOUS DAILY PRN
Status: DISCONTINUED | OUTPATIENT
Start: 2019-06-10 | End: 2019-06-10 | Stop reason: HOSPADM

## 2019-06-10 RX ORDER — ONDANSETRON 2 MG/ML
8 INJECTION INTRAMUSCULAR; INTRAVENOUS ONCE
Status: COMPLETED | OUTPATIENT
Start: 2019-06-10 | End: 2019-06-10

## 2019-06-10 RX ORDER — PROCHLORPERAZINE MALEATE 10 MG
10 TABLET ORAL EVERY 6 HOURS PRN
Qty: 60 TABLET | Refills: 3 | Status: SHIPPED | OUTPATIENT
Start: 2019-06-10 | End: 2019-10-06

## 2019-06-10 RX ORDER — HEPARIN SODIUM (PORCINE) LOCK FLUSH IV SOLN 100 UNIT/ML 100 UNIT/ML
5 SOLUTION INTRAVENOUS ONCE
Status: COMPLETED | OUTPATIENT
Start: 2019-06-10 | End: 2019-06-10

## 2019-06-10 RX ORDER — LORAZEPAM 0.5 MG/1
0.5 TABLET ORAL EVERY 4 HOURS PRN
Qty: 30 TABLET | Refills: 3 | Status: SHIPPED | OUTPATIENT
Start: 2019-06-10 | End: 2019-09-24

## 2019-06-10 RX ORDER — DEXAMETHASONE SODIUM PHOSPHATE 4 MG/ML
10 INJECTION, SOLUTION INTRA-ARTICULAR; INTRALESIONAL; INTRAMUSCULAR; INTRAVENOUS; SOFT TISSUE ONCE
Status: DISCONTINUED | OUTPATIENT
Start: 2019-06-10 | End: 2019-06-10 | Stop reason: CLARIF

## 2019-06-10 RX ADMIN — HEPARIN 5 ML: 100 SYRINGE at 12:56

## 2019-06-10 RX ADMIN — FAMOTIDINE 20 MG: 10 INJECTION INTRAVENOUS at 14:30

## 2019-06-10 RX ADMIN — ONDANSETRON 8 MG: 2 INJECTION INTRAMUSCULAR; INTRAVENOUS at 13:31

## 2019-06-10 RX ADMIN — HEPARIN SODIUM (PORCINE) LOCK FLUSH IV SOLN 100 UNIT/ML 5 ML: 100 SOLUTION at 15:41

## 2019-06-10 RX ADMIN — SODIUM CHLORIDE 1000 ML: 9 INJECTION, SOLUTION INTRAVENOUS at 13:21

## 2019-06-10 RX ADMIN — DEXAMETHASONE SODIUM PHOSPHATE 150 MG: 10 INJECTION, SOLUTION INTRAMUSCULAR; INTRAVENOUS at 13:52

## 2019-06-10 ASSESSMENT — PAIN SCALES - GENERAL: PAINLEVEL: NO PAIN (0)

## 2019-06-10 NOTE — TELEPHONE ENCOUNTER
"[facility] spouse calling reporting patient is on second round of chemotherapy. Last infusion Tuesday 6/3/19. Patient is very nauseated. Decreased intake. Afebrile. Patient is taking loraz and compazine. Reporting lorazapam lasting 2 hours.  Diarrhea 4 episodes yesterday. X 1 formed stool this morning. Patient is reporting 7 lb weight loss in past week. Voiding in past 8 hours. Taking water and \"very little\" solid intake.    Patient stating she was advised she could come into infusion center for steroids if symptoms worsened.  Paged on call for Dr Guzman through [facility] at 740 a.m. To call patient at Phone . Advised patient to call back if no return call with in 20 minutes.    Caller verbalized understanding. Denies further questions.    Nasreen Hartman RN  [facility] [employer]        Reason for Disposition    Weight loss > 5 lbs (2.3 kg) in one week (or 5 pounds under target weight)    Additional Information    Negative: Shock suspected (e.g., cold/pale/clammy skin, too weak to stand, low BP, rapid pulse)    Negative: Difficult to awaken or acting confused (e.g., disoriented, slurred speech)    Negative: Sounds like a life-threatening emergency to the triager    Negative: Chest pain    Negative: Vomiting occurs only while coughing    Negative: Constipation is main symptom    Negative: Diarrhea is main symptom    Negative: [1] Vomiting AND [2] contains red blood or black (\"coffee ground\") material  (Exception: few red streaks in vomit that only happened once)    Negative: [1] Vomiting AND [2] recent head injury (within last 3 days)    Negative: [1] Vomiting AND [2] recent abdominal injury (within last 3 days)    Negative: [1] Vomiting AND [2] insulin-dependent diabetes (Type I) AND [3] glucose > 400 mg/dl (22 mmol/l)    Negative: [1] Neutropenia known or suspected (e.g., recent cancer chemotherapy) AND [2] fever > 100.4 F (38.0 C)    Negative: [1] Neutropenia known or suspected (e.g., " recent cancer chemotherapy) AND [2] vomiting    Negative: SEVERE vomiting (e.g., 6 or more times / day)    Negative: [1] MODERATE vomiting (e.g., 3 - 5 times/day) AND [2] age > 60    Negative: [1] Vomiting AND [2] severe headache (e.g., excruciating) (Exception: same as previous migraines)    Negative: [1] Drinking very little AND [2] dehydration suspected (e.g., no urine > 12 hours, very dry mouth, very lightheaded)    Protocols used: CANCER - NAUSEA AND VOMITING-A-AH

## 2019-06-10 NOTE — PROGRESS NOTES
Oncology/Hematology Visit Note  Greg 10, 2019    Reason for Visit: Add on N/V    History of Present Illness: Kathy Lei is a 53 year old female with stage II T3N0Mx invasive ductal carcinoma of the upper out quadrant of the right breast. She is on ISPY2, randomized to Durvalumab, Paclitaxel, Olaparib arm which she completed 5/14/19. She is now on ddAC, cycle 1 5/20 and cycle 2 6/4. Please see Dr. Guzman prior notes for full history.    She called in today with nausea, vomiting, diarrhea, poor intake/dehydration which is why I am seeing her today.    Interval History:  Mily returns to clinic today with her . She states that she started feeling poorly Thursday last week and it has persisted through today. She has constant nausea that is making it hard to eat, though she has been drinking fluids and denies any vomiting. She feels dizzy and weak from not eating. She has been taking Compazine and Lorazepam as prescribed with minimal relief. She denies abdominal pain. She had diarrhea yesterday but now this is resolved and before that she was having constipation. She had a small normal BM today. No further blood in her stools. She does get mild heartburn pending on what she eats.     She denies fevers, chest pain, SOB, or urinary issues. No concerns with the Neulasta. She hasn't been sleeping well with how poorly she feels and she is emotional thinking about getting through the next two treatments.     Current Outpatient Medications   Medication Sig Dispense Refill     ADVIL 200 MG OR TABS 1 TABLET EVERY 4 TO 6 HOURS AS NEEDED 0 0     albuterol (2.5 MG/3ML) 0.083% nebulizer solution Take 3 mLs by nebulization once for 1 dose. 3 mL 0     albuterol (VENTOLIN HFA) 108 (90 Base) MCG/ACT Inhaler INHALE 1-2 PUFFS INTO THE LUNGS EVERY 4 HOURS AS NEEDED FOR SHORTNESS OF BREATH OR DIFFICULTY BREATHING. 18 g PRN     dexamethasone (DECADRON) 4 MG tablet Take 8 mg by mouth daily for 3 days Start on Day 2.. 6 tablet 3      fluticasone (FLOVENT HFA) 110 MCG/ACT inhaler Inhale 2 puffs into the lungs 2 times daily 1 Inhaler PRN     levothyroxine (SYNTHROID) 112 MCG tablet Take 1 tablet (112 mcg) by mouth daily 30 tablet 1     lisinopril (PRINIVIL/ZESTRIL) 5 MG tablet Take 1 tablet (5 mg) by mouth daily 90 tablet 3     LORazepam (ATIVAN) 0.5 MG tablet   0     LORazepam (ATIVAN) 0.5 MG tablet Take 1 tablet (0.5 mg) by mouth every 4 hours as needed (Anxiety, Nausea/Vomiting or Sleep) (Patient not taking: Reported on 6/4/2019) 30 tablet 3     order for DME Cranial prosthesis 1 Units 1     prochlorperazine (COMPAZINE) 10 MG tablet Take 1 tablet (10 mg) by mouth every 6 hours as needed (Nausea/Vomiting) 30 tablet 3     prochlorperazine (COMPAZINE) 10 MG tablet Take 1 tablet (10 mg) by mouth as needed 20 tablet 3     sertraline (ZOLOFT) 100 MG tablet Take 1 tablet (100 mg) by mouth daily 30 tablet 1     sodium chloride (OCEAN) 0.65 % nasal spray Spray in both nostrils four times daily 30 mL 3     STATIN NOT PRESCRIBED (INTENTIONAL) Please choose reason not prescribed, below (Patient not taking: Reported on 5/20/2019)       triamcinolone (ARISTOCORT HP) 0.5 % external cream Apply topically 2 times daily 30 g 3     vitamin B6 (PYRIDOXINE) 100 MG tablet Take 1 tablet (100 mg) by mouth daily (Patient not taking: Reported on 5/6/2019) 30 tablet 3       Physical Examination:  /85 (BP Location: Right arm, Patient Position: Sitting, Cuff Size: Adult Regular)   Pulse 115   Temp 97.8  F (36.6  C) (Oral)   Resp 16   Wt 64.8 kg (142 lb 14.4 oz)   LMP 11/02/2014   SpO2 95%   BMI 23.08 kg/m    Wt Readings from Last 10 Encounters:   06/10/19 64.8 kg (142 lb 14.4 oz)   06/04/19 67.1 kg (147 lb 14.4 oz)   05/20/19 69.9 kg (154 lb)   05/14/19 69.5 kg (153 lb 4.8 oz)   05/06/19 69.4 kg (153 lb)   05/06/19 68.9 kg (152 lb)   04/30/19 69.8 kg (153 lb 12.8 oz)   04/29/19 69.1 kg (152 lb 4.8 oz)   04/22/19 70.4 kg (155 lb 4.8 oz)   04/16/19 70.8 kg  (156 lb)     Constitutional: Well-appearing female in no acute distress. Tearful at times.  Eyes: EOMI, PERRL. No scleral icterus.  ENT: Oral mucosa is moist without lesions or thrush.   Lymphatic: Neck is supple without cervical or supraclavicular lymphadenopathy.   Cardiovascular: Regular rate and rhythm. No murmurs, gallops, or rubs. No peripheral edema.  Respiratory: Clear to auscultation bilaterally. No wheezes or crackles.  Gastrointestinal: Bowel sounds present. Abdomen soft, non-tender. No palpable hepatosplenomegaly or masses.   Neurologic: Cranial nerves II through XII are grossly intact.  Skin: No rashes, petechiae, or bruising noted on exposed skin.    Laboratory Data:  Results for GALA ROLAND (MRN [id number]) as of 6/10/2019 13:53   6/10/2019 13:06   Sodium 131 (L)   Potassium 3.7   Chloride 99   Carbon Dioxide 24   Urea Nitrogen 9   Creatinine 0.42 (L)   GFR Estimate >90   GFR Estimate If Black >90   Calcium 9.0   Anion Gap 7   Magnesium 2.1   Phosphorus 3.0   Albumin 3.9   Protein Total 7.2   Bilirubin Total 0.8   Alkaline Phosphatase 133   ALT 17   AST 12   TSH 11.83 (H)   Glucose 101 (H)   WBC 4.4   Hemoglobin 13.0   Hematocrit 38.7   Platelet Count 257   RBC Count 4.25   MCV 91   MCH 30.6   MCHC 33.6   RDW 14.2   Diff Method Manual Differential   % Neutrophils 77.4   % Lymphocytes 20.0   % Monocytes 0.9   % Eosinophils 0.0   % Basophils 1.7   Absolute Neutrophil 3.4   Absolute Lymphocytes 0.9   Absolute Monocytes 0.0   Absolute Eosinophils 0.0   Absolute Basophils 0.1   RBC Morphology Normal   Platelet Estimate Confirming automa...       Assessment and Plan:  1. Chemotherapy Induced Nausea and Vomiting  Presents today with nausea and vomiting after cycle 2 AC. Isolated diarrhea resolved and no other symptoms to suggest other causes of N/V. Vitals overall stable today other than mild tachycardia and weight loss. Labs all stable except for slight hyponatremia, likely from poor intake.    Will  do IVF, IV Zofran, IV Emend, IV Dex, IV Pepcid today. She felt much better after this and was able to eat and drink in infusion. Was looking forward to eating and mood much brighter.     Did check EKG and QTc OK at 469.    Home antiemetics adjusted to be Zyprexa 5mg at bedtime, Zofran 8mg every 8 hours PRN, Compazine every 6 hour PRN, Lorazepam every 4 hours PRN. Will have care coordinator call her in a few days to see how she is doing.    Will also message Dr. Guzman about scheduling IVF with her next AC.     Will not adjust Synthroid dosing in setting of acute illness. Will recheck at future visit.    Greater than 25 min was spent with the patient with >50% of the time spent in counseling and coordinating care.     Zeeshan Whitfield PA-C  [facility]  [address] [place] [address]  [phone]

## 2019-06-10 NOTE — PROGRESS NOTES
Infusion Nursing Note:  Kathy Lei presents today for IV fluids and anti-emetics.    Patient seen by provider today: Yes: LIMA Murcia during infusion visit   present during visit today: Not Applicable.    Note: Patient reports to infusion today complaining of nausea x4 days, decreased appetite, and diarrhea. IV fluids and anti-emetics administered per therapy plan. Pt met with PA during infusion visit and plans to adjust her take home medications as well. Pt reported feeling much better after fluids and anti-emetics. She was able to tolerate food, and felt comfortable to discharge home. Pt instructed to contact triage if symptoms worsen again.     Intravenous Access:  Implanted Port.    Treatment Conditions:  Lab Results   Component Value Date    HGB 13.0 06/10/2019     Lab Results   Component Value Date    WBC 4.4 06/10/2019      Lab Results   Component Value Date    ANEU 3.4 06/10/2019     Lab Results   Component Value Date     06/10/2019      Lab Results   Component Value Date     06/10/2019                   Lab Results   Component Value Date    POTASSIUM 3.7 06/10/2019           Lab Results   Component Value Date    MAG 2.1 06/10/2019            Lab Results   Component Value Date    CR 0.42 06/10/2019                   Lab Results   Component Value Date    BRYANT 9.0 06/10/2019                Lab Results   Component Value Date    BILITOTAL 0.8 06/10/2019           Lab Results   Component Value Date    ALBUMIN 3.9 06/10/2019                    Lab Results   Component Value Date    ALT 17 06/10/2019           Lab Results   Component Value Date    AST 12 06/10/2019           Post Infusion Assessment:  Patient tolerated infusion without incident.  Blood return noted pre and post infusion.  Site patent and intact, free from redness, edema or discomfort.  No evidence of extravasations.  Access discontinued per protocol.       Discharge Plan:   Patient declined prescription  refills.  Discharge instructions reviewed with: Patient and Family.  Patient and/or family verbalized understanding of discharge instructions and all questions answered.  AVS to patient via [employer].  Patient will return 6/18/2019 for next MD visit and infusion appointment.   Patient discharged in stable condition accompanied by: .  Departure Mode: Wheelchair.    Laverne Richey RN

## 2019-06-10 NOTE — PROGRESS NOTES
"Patient called writer with c/o nausea, diarrhea, and dizziness today. Unable to eat, but has had two 12 ounce glasses of water today and is dizzy. Patient had four loose stools yesterday, but has not had diarrhea today at this point. Took lorazepam at 06:30 this AM that patient states \"works the best\" and writer instructed patient to take a compazine at 09:30 and keep taking scheduled every six hours. Verified patient did not take any meds home for nausea after C2 AC and does not have Zofran at home either. Only has approximately four tablets left of Compazine and Lorazepam that Zeeshan will refill when here today. Appointment for lab work at 1:00, infusion at 1:30 and Zeeshan will see her in infusion at 2:10. Patient aware of the appointment details.  Answered all patient's questions and verbalized understanding. Mily Mortensen RN, BSN.    "

## 2019-06-10 NOTE — PATIENT INSTRUCTIONS
-Take Olanzapine (Zyprexa) 5mg at bedtime  -Take Ondansetron (Zofran) 8mg every AM and repeat every 8 hours as needed  -Take Prochlorperazine (Compazine) 10mg in between Zofran if still nauseous  -Take Lorazepam (Ativan) 0.5mg as a third line option if still nauseous. Do not take at the same time as Olanzapine as they both make you tired  -Keep pushing fluids  -Eat smaller meals more frequently. [place] foods are usually better tolerated. Add in protein shakes as needed  -Call if having recurrent issues with bowels

## 2019-06-10 NOTE — LETTER
6/10/2019      RE: Kathy Lei  [address] [place] [address]       Oncology/Hematology Visit Note  Greg 10, 2019    Reason for Visit: Add on N/V    History of Present Illness: Kathy Lei is a 53 year old female with stage II T3N0Mx invasive ductal carcinoma of the upper out quadrant of the right breast. She is on ISPY2, randomized to Durvalumab, Paclitaxel, Olaparib arm which she completed 5/14/19. She is now on ddAC, cycle 1 5/20 and cycle 2 6/4. Please see Dr. Guzman prior notes for full history.    She called in today with nausea, vomiting, diarrhea, poor intake/dehydration which is why I am seeing her today.    Interval History:  Mily returns to clinic today with her . She states that she started feeling poorly Thursday last week and it has persisted through today. She has constant nausea that is making it hard to eat, though she has been drinking fluids and denies any vomiting. She feels dizzy and weak from not eating. She has been taking Compazine and Lorazepam as prescribed with minimal relief. She denies abdominal pain. She had diarrhea yesterday but now this is resolved and before that she was having constipation. She had a small normal BM today. No further blood in her stools. She does get mild heartburn pending on what she eats.     She denies fevers, chest pain, SOB, or urinary issues. No concerns with the Neulasta. She hasn't been sleeping well with how poorly she feels and she is emotional thinking about getting through the next two treatments.     Current Outpatient Medications   Medication Sig Dispense Refill     ADVIL 200 MG OR TABS 1 TABLET EVERY 4 TO 6 HOURS AS NEEDED 0 0     albuterol (2.5 MG/3ML) 0.083% nebulizer solution Take 3 mLs by nebulization once for 1 dose. 3 mL 0     albuterol (VENTOLIN HFA) 108 (90 Base) MCG/ACT Inhaler INHALE 1-2 PUFFS INTO THE LUNGS EVERY 4 HOURS AS NEEDED FOR SHORTNESS OF BREATH OR DIFFICULTY BREATHING. 18 g PRN      dexamethasone (DECADRON) 4 MG tablet Take 8 mg by mouth daily for 3 days Start on Day 2.. 6 tablet 3     fluticasone (FLOVENT HFA) 110 MCG/ACT inhaler Inhale 2 puffs into the lungs 2 times daily 1 Inhaler PRN     levothyroxine (SYNTHROID) 112 MCG tablet Take 1 tablet (112 mcg) by mouth daily 30 tablet 1     lisinopril (PRINIVIL/ZESTRIL) 5 MG tablet Take 1 tablet (5 mg) by mouth daily 90 tablet 3     LORazepam (ATIVAN) 0.5 MG tablet   0     LORazepam (ATIVAN) 0.5 MG tablet Take 1 tablet (0.5 mg) by mouth every 4 hours as needed (Anxiety, Nausea/Vomiting or Sleep) (Patient not taking: Reported on 6/4/2019) 30 tablet 3     order for DME Cranial prosthesis 1 Units 1     prochlorperazine (COMPAZINE) 10 MG tablet Take 1 tablet (10 mg) by mouth every 6 hours as needed (Nausea/Vomiting) 30 tablet 3     prochlorperazine (COMPAZINE) 10 MG tablet Take 1 tablet (10 mg) by mouth as needed 20 tablet 3     sertraline (ZOLOFT) 100 MG tablet Take 1 tablet (100 mg) by mouth daily 30 tablet 1     sodium chloride (OCEAN) 0.65 % nasal spray Spray in both nostrils four times daily 30 mL 3     STATIN NOT PRESCRIBED (INTENTIONAL) Please choose reason not prescribed, below (Patient not taking: Reported on 5/20/2019)       triamcinolone (ARISTOCORT HP) 0.5 % external cream Apply topically 2 times daily 30 g 3     vitamin B6 (PYRIDOXINE) 100 MG tablet Take 1 tablet (100 mg) by mouth daily (Patient not taking: Reported on 5/6/2019) 30 tablet 3       Physical Examination:  /85 (BP Location: Right arm, Patient Position: Sitting, Cuff Size: Adult Regular)   Pulse 115   Temp 97.8  F (36.6  C) (Oral)   Resp 16   Wt 64.8 kg (142 lb 14.4 oz)   LMP 11/02/2014   SpO2 95%   BMI 23.08 kg/m     Wt Readings from Last 10 Encounters:   06/10/19 64.8 kg (142 lb 14.4 oz)   06/04/19 67.1 kg (147 lb 14.4 oz)   05/20/19 69.9 kg (154 lb)   05/14/19 69.5 kg (153 lb 4.8 oz)   05/06/19 69.4 kg (153 lb)   05/06/19 68.9 kg (152 lb)   04/30/19 69.8 kg (153  lb 12.8 oz)   04/29/19 69.1 kg (152 lb 4.8 oz)   04/22/19 70.4 kg (155 lb 4.8 oz)   04/16/19 70.8 kg (156 lb)     Constitutional: Well-appearing female in no acute distress. Tearful at times.  Eyes: EOMI, PERRL. No scleral icterus.  ENT: Oral mucosa is moist without lesions or thrush.   Lymphatic: Neck is supple without cervical or supraclavicular lymphadenopathy.   Cardiovascular: Regular rate and rhythm. No murmurs, gallops, or rubs. No peripheral edema.  Respiratory: Clear to auscultation bilaterally. No wheezes or crackles.  Gastrointestinal: Bowel sounds present. Abdomen soft, non-tender. No palpable hepatosplenomegaly or masses.   Neurologic: Cranial nerves II through XII are grossly intact.  Skin: No rashes, petechiae, or bruising noted on exposed skin.    Laboratory Data:  Results for GALA ROLAND (MRN [id number]) as of 6/10/2019 13:53   6/10/2019 13:06   Sodium 131 (L)   Potassium 3.7   Chloride 99   Carbon Dioxide 24   Urea Nitrogen 9   Creatinine 0.42 (L)   GFR Estimate >90   GFR Estimate If Black >90   Calcium 9.0   Anion Gap 7   Magnesium 2.1   Phosphorus 3.0   Albumin 3.9   Protein Total 7.2   Bilirubin Total 0.8   Alkaline Phosphatase 133   ALT 17   AST 12   TSH 11.83 (H)   Glucose 101 (H)   WBC 4.4   Hemoglobin 13.0   Hematocrit 38.7   Platelet Count 257   RBC Count 4.25   MCV 91   MCH 30.6   MCHC 33.6   RDW 14.2   Diff Method Manual Differential   % Neutrophils 77.4   % Lymphocytes 20.0   % Monocytes 0.9   % Eosinophils 0.0   % Basophils 1.7   Absolute Neutrophil 3.4   Absolute Lymphocytes 0.9   Absolute Monocytes 0.0   Absolute Eosinophils 0.0   Absolute Basophils 0.1   RBC Morphology Normal   Platelet Estimate Confirming automa...       Assessment and Plan:  1. Chemotherapy Induced Nausea and Vomiting  Presents today with nausea and vomiting after cycle 2 AC. Isolated diarrhea resolved and no other symptoms to suggest other causes of N/V. Vitals overall stable today other than mild  tachycardia and weight loss. Labs all stable except for slight hyponatremia, likely from poor intake.    Will do IVF, IV Zofran, IV Emend, IV Dex, IV Pepcid today. She felt much better after this and was able to eat and drink in infusion. Was looking forward to eating and mood much brighter.     Did check EKG and QTc OK at 469.    Home antiemetics adjusted to be Zyprexa 5mg at bedtime, Zofran 8mg every 8 hours PRN, Compazine every 6 hour PRN, Lorazepam every 4 hours PRN. Will have care coordinator call her in a few days to see how she is doing.    Will also message Dr. Guzman about scheduling IVF with her next AC.     Will not adjust Synthroid dosing in setting of acute illness. Will recheck at future visit.    Greater than 25 min was spent with the patient with >50% of the time spent in counseling and coordinating care.     Zeeshan Whitfield PA-C  [facility]  [address], [place] [id number] [phone]

## 2019-06-11 ENCOUNTER — CARE COORDINATION (OUTPATIENT)
Dept: ONCOLOGY | Facility: CLINIC | Age: 54
End: 2019-06-11

## 2019-06-11 LAB — INTERPRETATION ECG - MUSE: NORMAL

## 2019-06-11 NOTE — PROGRESS NOTES
Spoke to patient today in follow-up after visit yesterday for IV fluids and adding Zyprexa and Zoloft to existing plan. Patient feels great today, no nausea and ate half a bagel with jelly today. Drinking plenty of fluids today and will  Zofran at the pharmacy as her insurance will cover that medication.  Answered all patient's questions and verbalized understanding. Mily Mortensen RN, BSN.

## 2019-06-12 ENCOUNTER — ANCILLARY PROCEDURE (OUTPATIENT)
Dept: MRI IMAGING | Facility: CLINIC | Age: 54
End: 2019-06-12
Attending: INTERNAL MEDICINE
Payer: COMMERCIAL

## 2019-06-12 DIAGNOSIS — C50.411 MALIGNANT NEOPLASM OF UPPER-OUTER QUADRANT OF RIGHT BREAST IN FEMALE, ESTROGEN RECEPTOR POSITIVE (H): ICD-10-CM

## 2019-06-12 DIAGNOSIS — Z17.0 MALIGNANT NEOPLASM OF UPPER-OUTER QUADRANT OF RIGHT BREAST IN FEMALE, ESTROGEN RECEPTOR POSITIVE (H): ICD-10-CM

## 2019-06-12 RX ORDER — GADOBUTROL 604.72 MG/ML
7.5 INJECTION INTRAVENOUS ONCE
Status: COMPLETED | OUTPATIENT
Start: 2019-06-12 | End: 2019-06-12

## 2019-06-12 RX ADMIN — GADOBUTROL 6.5 ML: 604.72 INJECTION INTRAVENOUS at 17:29

## 2019-06-12 NOTE — DISCHARGE INSTRUCTIONS
MRI Contrast Discharge Instructions    The IV contrast you received today will pass out of your body in your  urine. This will happen in the next 24 hours. You will not feel this process.  Your urine will not change color.    Drink at least 4 extra glasses of water or juice today (unless your doctor  has restricted your fluids). This reduces the stress on your kidneys.  You may take your regular medicines.    If you are on dialysis: It is best to have dialysis today.    If you have a reaction: Most reactions happen right away. If you have  any new symptoms after leaving the hospital (such as hives or swelling),  call your hospital at the correct number below. Or call your family doctor.  If you have breathing distress or wheezing, call 911.    Special instructions: ***    I have read and understand the above information.    Signature:______________________________________ Date:___________    Staff:__________________________________________ Date:___________     Time:__________    [place] [facility] Departments:    ___[place]: [phone]  ___[facility]: [phone]  ___[place]: [phone] ___[facility]: [phone]  ___[facility]: [phone]  ___[facility]: [phone]  ___[phone]  ___[facility]: [phone]  ___[phone]

## 2019-06-17 NOTE — PROGRESS NOTES
Medical oncology follow-up note     HPI: Kathy Lei is a 54 yo female with a new diagnosis of an estrogen-receptor positive, OH-positive, HER2-negative breast cancer, grade 2, in the upper outer quadrant of the right breast.      Mily presented between Christmas and New Years of 2018 with new sharp pains in the upper outer quadrant of the right breast.  She underwent mammographic screening.       IMAGING:  Mammography and ultrasound:  She had a diagnostic mammogram which showed bilateral prepectoral saline implants.  On the right breast at 11:30 position, there were grouped coarse heterogeneous calcifications spanning 1.3 cm, new since 2014, adjacent calcifications 11-o'clock position posterior depth.  There was an irregular mass in the lateral right breast 9-o'clock position mid-posterior depth, and an additional mass with obscured margins.  No significant changes in the left breast.  Right breast ultrasound was performed.  In the area of the palpable lump in the right breast, 11-o'clock position, 6 cm from the nipple, there is an irregular hypoechoic mass with indistinct margins measuring approximately 1.0 x 0.9 x 1.6 cm in size.  Immediately adjacent to the mass, 11:30 position, are microcalcifications.  In the second area of palpable lump right breast, 9:30 position, 5 cm from the nipple, there was an irregular hypoechoic mass measuring 3.2 x 0.5 x 1.3 cm in size felt to account for the mammographic findings.  There were multiple additional round hypoechoic masses similar to the findings at 9:30 position seen incidentally during real time and not directly palpable.  For example, in the right breast at 8-o'clock position, 4 cm from the nipple, there was a mass measuring 0.8 x 0.6 x 0.6 cm, BI-RADS 4.       Contrast mammogram was subsequently performed and the contrast mammogram showed a large area of mass and non-mass-like enhancement in the upper outer right breast measuring 7.2 cm in greatest  dimension, corresponding global asymmetry in the upper outer right breast.  Enhancement extended to the implant without evidence of extension to the skin surface or nipple, and the calcifications were noted as previously found.      PATHOLOGY:  The pathology showed part A, breast needle biopsy 11 o'clock, 6 cm from the nipple, invasive mammary carcinoma, no special type, ductal (and mucinous) Acworth grade 2.  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  Calcifications were associated with the DCIS.  There was a focus suspicious for lymphovascular invasion.  Invasive carcinoma was estrogen receptor positive and progesterone receptor negative by immunohistochemistry.  In part B, breast right, 8 o'clock, 4 cm from the nipple, ultrasound-guided core biopsy, invasive mammary carcinoma, no special type, ductal (and mucinous) Acworth grade 2, occasional calcifications were noted.  Invasive carcinoma was estrogen receptor positive and progesterone receptor negative by immunohistochemistry.  On quantitation of the ER and OH, ER was positive 99% of the cells staining with strong intensity.  OH was subsequently noted to be positive with 15% of the cells staining with moderate intensity.       There was also a HER2 FISH performed, and the HER2 FISH showed average number of HER2 signals per nucleus 2.6.  Average number of CEN17 signals 1.7 with a ratio of 1.6, VASILIY negative for biopsy A.  For biopsy B, the HER2 signals per nucleus 2.9.  Average number CEN17 signals per nucleus 1.7 with a ratio of 1.7, VASILIY negative.  Overall, by 2018 ASCO/CAP guidelines, this tumor is HER2 negative.     She was enrolled in I-SPY2 trial to Durvulumab, Taxol and Olaparib arm. She completed 12 cycles of weekly Taxol. She is s/p cycle 1 of adriamycin and cyclophosphamide (AC).     PAST MEDICAL HISTORY:  In terms of her breast history, she does have a history of a smaller right breast which was treated  with implants 19 years ago.  She has a history of 2 papillomas in the right breast, 1 removed at the 6-o'clock position 5 years ago, another removed at the 6-o'clock position approximately 10 years ago.  These incisions are approximately at the 5:30 to 6-o'clock position.  She has no history of radiation therapy.  No history of breast cancer of any type.  No history of tumor of any kind.  No history of heart problems, heart attack, breathing problems, blood clots, seizures, stroke, arthritis, peptic ulcer disease or osteoporosis.  No history of bone fractures.  She is not currently participating in a clinical trial. She does have a history of asthma and a history of hypothyroidism.      FAMILY HISTORY:  Entirely negative for breast cancer or ovarian cancer or breast cancer in a male relative.      ALLERGIES:  No known drug allergies.  No allergy to seafood, iodine or contrast dye.  She does not take aspirin.      PAST MENSTRUAL HISTORY:  First period was at age 11.  First and only pregnancy was at age 26.  No miscarriages or abortions.  Occasional use of oral contraceptives in her 20s.  No history of hormone replacement therapy.  Last period was 3 years ago.      SOCIAL HISTORY:  Tobacco history was from age 15 to present, smoking a pack of cigarettes a week.  She is now trying to stop cigarettes.      In terms of alcohol use, in her early teens and early 20s, she drank approximately 10 drinks on the weekend and has tapered off drinking since then.     INTERVAL HISTORY:  Mily returns to clinic for cycle 3 of dose-dense AC.  She is doing quite well today.  She did have problems with severe nausea and vomiting on her second cycle of dose-dense AC, requiring Zyprexa, Zofran, IV fluids on Friday.  Our plan is to add aprepitant on days 4 and 5, and I gave her a prescription for that.  She has no pain, no fatigue, no depression, no anxiety.      REVIEW OF SYSTEMS:  She denies fevers or chills, cough, chest pain,  shortness of breath, nausea, vomiting, constipation, diarrhea, bone pain, back pain or headache.  The remainder of a 10-point review of systems is negative.  She did have alternating constipation and diarrhea, but none today.  I did discuss MiraLax as a potential approach for dealing with constipation.  She is eating a healthful diet.  She is able to do household chores, but she is off work on disability.  She is not taking vitamin D or calcium at this time.      PHYSICAL EXAMINATION:   VITAL SIGNS:  Blood pressure 118/72, temperature 98.2, pulse 92, respirations 18, O2 sat 97% on room air, height 1.7 meters and weight 65.6 kg.   GENERAL:  Mily appeared generally well.  She has alopecia and is wearing a wig.   HEENT:  There are no lesions in the oropharynx.   LYMPH:  There is no palpable cervical, supraclavicular, subclavicular or axillary lymphadenopathy.   BREASTS:  Exam of the right breast reveals an implant in place and a breast mass underneath the nipple-areolar complex overlying the implant measuring about 3.5 cm in transverse dimension, 3.5 cm in vertical dimension extending from the 10 o'clock to 2 o'clock position.  There are no masses in the left breast.  Implant in place.   LUNGS:  Clear to percussion and auscultation.   HEART:  There is a regular rate and rhythm, S1, S2.   ABDOMEN:  Soft and nontender, without hepatosplenomegaly.   EXTREMITIES:  Without edema.   PSYCHIATRIC:  Mood and affect were normal.      LABORATORY DATA:  The CMP was within normal limits except for a low albumin of 3, calcium 8.3.  ALT and AST were normal.  TSH 6.39.  CBC was within normal limits and the neutrophil count was 6900.      IMAGING:  MRI of the breasts bilaterally shows in the right breast, non-mass enhancement has decreased in size since 05/18/2019 measuring closer to 6.2 cm in greatest dimension with overall significant decreased extent of tumor volume.  Previously, 7.9 cm in greatest dimension on 05/18/2009.   Enhancement extends from the area of the implant capsule anterior involving the upper outer and central right breast, similar to prior MRI.  No abnormal enhancement seen in the left breast.      ASSESSMENT AND PLAN:   1.  Mily Lei is a 53-year-old woman with a recent diagnosis of a clinical stage II, ER+HER2- T2N0MX, invasive ductal carcinoma of the upper outer quadrant of the right breast, which was multifocal, largest area of involvement measuring 8.9 cm in largest dimension on the original MRI.  She is on the I-SPY 2 clinical trial and received durvalumab, paclitaxel and olaparib.  She had grade 2 fatigue.  The mass has decreased in size based on MRI.  The edges are indistinct.  Our plan is to continue with the last 2 cycles of dose-dense AC, cycle 3 being given today.   2.  Significant nausea with the last cycle of dose-dense AC.  Our plan is to add aprepitant 80 mg on days 3 and 4.  We will also give Zyprexa for delayed nausea as well as Zofran and she has scheduled if she needs.   3.  Followup.  We will see Mily in followup in our clinic in 2 weeks for C4D1.    All of her questions were answered, and all of her family's questions were answered including her son's questions. She is scheduled to see Kerry Gomez2 for C4D1 of ddAC.      Thank you for allowing us to continue to participate in Mily Lei's care.      Christian Guzman MD      [facility]       I spent 45 minutes with the patient more than 50% of which was in counseling and coordination of care.

## 2019-06-18 ENCOUNTER — INFUSION THERAPY VISIT (OUTPATIENT)
Dept: ONCOLOGY | Facility: CLINIC | Age: 54
End: 2019-06-18
Attending: INTERNAL MEDICINE
Payer: COMMERCIAL

## 2019-06-18 ENCOUNTER — APPOINTMENT (OUTPATIENT)
Dept: LAB | Facility: CLINIC | Age: 54
End: 2019-06-18
Attending: INTERNAL MEDICINE
Payer: COMMERCIAL

## 2019-06-18 ENCOUNTER — RESEARCH ENCOUNTER (OUTPATIENT)
Dept: ONCOLOGY | Facility: CLINIC | Age: 54
End: 2019-06-18

## 2019-06-18 VITALS
TEMPERATURE: 98.2 F | DIASTOLIC BLOOD PRESSURE: 72 MMHG | OXYGEN SATURATION: 97 % | SYSTOLIC BLOOD PRESSURE: 118 MMHG | HEIGHT: 66 IN | HEART RATE: 92 BPM | WEIGHT: 144.7 LBS | RESPIRATION RATE: 18 BRPM | BODY MASS INDEX: 23.25 KG/M2

## 2019-06-18 DIAGNOSIS — C50.411 MALIGNANT NEOPLASM OF UPPER-OUTER QUADRANT OF RIGHT BREAST IN FEMALE, ESTROGEN RECEPTOR POSITIVE (H): Primary | ICD-10-CM

## 2019-06-18 DIAGNOSIS — R11.2 NAUSEA AND VOMITING, INTRACTABILITY OF VOMITING NOT SPECIFIED, UNSPECIFIED VOMITING TYPE: ICD-10-CM

## 2019-06-18 DIAGNOSIS — Z17.0 MALIGNANT NEOPLASM OF UPPER-OUTER QUADRANT OF RIGHT BREAST IN FEMALE, ESTROGEN RECEPTOR POSITIVE (H): Primary | ICD-10-CM

## 2019-06-18 LAB
ALBUMIN SERPL-MCNC: 3.6 G/DL (ref 3.4–5)
ALP SERPL-CCNC: 88 U/L (ref 40–150)
ALT SERPL W P-5'-P-CCNC: 33 U/L (ref 0–50)
ANION GAP SERPL CALCULATED.3IONS-SCNC: 7 MMOL/L (ref 3–14)
AST SERPL W P-5'-P-CCNC: 28 U/L (ref 0–45)
BASOPHILS # BLD AUTO: 0.1 10E9/L (ref 0–0.2)
BASOPHILS NFR BLD AUTO: 1 %
BILIRUB SERPL-MCNC: 0.2 MG/DL (ref 0.2–1.3)
BUN SERPL-MCNC: 3 MG/DL (ref 7–30)
CALCIUM SERPL-MCNC: 8.3 MG/DL (ref 8.5–10.1)
CHLORIDE SERPL-SCNC: 105 MMOL/L (ref 94–109)
CO2 SERPL-SCNC: 25 MMOL/L (ref 20–32)
CREAT SERPL-MCNC: 0.52 MG/DL (ref 0.52–1.04)
DIFFERENTIAL METHOD BLD: ABNORMAL
EOSINOPHIL # BLD AUTO: 0.1 10E9/L (ref 0–0.7)
EOSINOPHIL NFR BLD AUTO: 1 %
ERYTHROCYTE [DISTWIDTH] IN BLOOD BY AUTOMATED COUNT: 14.5 % (ref 10–15)
GFR SERPL CREATININE-BSD FRML MDRD: >90 ML/MIN/{1.73_M2}
GLUCOSE SERPL-MCNC: 107 MG/DL (ref 70–99)
HCT VFR BLD AUTO: 36.2 % (ref 35–47)
HGB BLD-MCNC: 12.1 G/DL (ref 11.7–15.7)
LYMPHOCYTES # BLD AUTO: 1.1 10E9/L (ref 0.8–5.3)
LYMPHOCYTES NFR BLD AUTO: 12 %
MCH RBC QN AUTO: 31 PG (ref 26.5–33)
MCHC RBC AUTO-ENTMCNC: 33.4 G/DL (ref 31.5–36.5)
MCV RBC AUTO: 93 FL (ref 78–100)
METAMYELOCYTES # BLD: 0.4 10E9/L
METAMYELOCYTES NFR BLD MANUAL: 4 %
MONOCYTES # BLD AUTO: 0.7 10E9/L (ref 0–1.3)
MONOCYTES NFR BLD AUTO: 7 %
MYELOCYTES # BLD: 0.2 10E9/L
MYELOCYTES NFR BLD MANUAL: 2 %
NEUTROPHILS # BLD AUTO: 6.9 10E9/L (ref 1.6–8.3)
NEUTROPHILS NFR BLD AUTO: 73 %
PLATELET # BLD AUTO: 239 10E9/L (ref 150–450)
PLATELET # BLD EST: ABNORMAL 10*3/UL
POTASSIUM SERPL-SCNC: 3.9 MMOL/L (ref 3.4–5.3)
PROT SERPL-MCNC: 6.7 G/DL (ref 6.8–8.8)
RBC # BLD AUTO: 3.9 10E12/L (ref 3.8–5.2)
RBC MORPH BLD: NORMAL
SODIUM SERPL-SCNC: 137 MMOL/L (ref 133–144)
T4 FREE SERPL-MCNC: 1.08 NG/DL (ref 0.76–1.46)
TSH SERPL DL<=0.005 MIU/L-ACNC: 6.39 MU/L (ref 0.4–4)
WBC # BLD AUTO: 9.5 10E9/L (ref 4–11)

## 2019-06-18 PROCEDURE — 85025 COMPLETE CBC W/AUTO DIFF WBC: CPT | Performed by: INTERNAL MEDICINE

## 2019-06-18 PROCEDURE — 96411 CHEMO IV PUSH ADDL DRUG: CPT

## 2019-06-18 PROCEDURE — 96377 APPLICATON ON-BODY INJECTOR: CPT | Mod: 59

## 2019-06-18 PROCEDURE — 84439 ASSAY OF FREE THYROXINE: CPT | Performed by: INTERNAL MEDICINE

## 2019-06-18 PROCEDURE — 25000128 H RX IP 250 OP 636: Mod: ZF | Performed by: INTERNAL MEDICINE

## 2019-06-18 PROCEDURE — 96375 TX/PRO/DX INJ NEW DRUG ADDON: CPT

## 2019-06-18 PROCEDURE — 80053 COMPREHEN METABOLIC PANEL: CPT | Performed by: INTERNAL MEDICINE

## 2019-06-18 PROCEDURE — 99215 OFFICE O/P EST HI 40 MIN: CPT | Mod: ZP | Performed by: INTERNAL MEDICINE

## 2019-06-18 PROCEDURE — 96367 TX/PROPH/DG ADDL SEQ IV INF: CPT

## 2019-06-18 PROCEDURE — 25800030 ZZH RX IP 258 OP 636: Mod: ZF | Performed by: INTERNAL MEDICINE

## 2019-06-18 PROCEDURE — 96413 CHEMO IV INFUSION 1 HR: CPT

## 2019-06-18 PROCEDURE — G0463 HOSPITAL OUTPT CLINIC VISIT: HCPCS | Mod: 25

## 2019-06-18 PROCEDURE — 84443 ASSAY THYROID STIM HORMONE: CPT | Performed by: INTERNAL MEDICINE

## 2019-06-18 RX ORDER — LORAZEPAM 2 MG/ML
0.5 INJECTION INTRAMUSCULAR EVERY 4 HOURS PRN
Status: CANCELLED
Start: 2019-06-18

## 2019-06-18 RX ORDER — DOXORUBICIN HYDROCHLORIDE 2 MG/ML
60 INJECTION, SOLUTION INTRAVENOUS ONCE
Status: CANCELLED | OUTPATIENT
Start: 2019-06-18

## 2019-06-18 RX ORDER — METHYLPREDNISOLONE SODIUM SUCCINATE 125 MG/2ML
125 INJECTION, POWDER, LYOPHILIZED, FOR SOLUTION INTRAMUSCULAR; INTRAVENOUS
Status: CANCELLED
Start: 2019-06-18

## 2019-06-18 RX ORDER — DIPHENHYDRAMINE HYDROCHLORIDE 50 MG/ML
50 INJECTION INTRAMUSCULAR; INTRAVENOUS
Status: CANCELLED
Start: 2019-06-18

## 2019-06-18 RX ORDER — HEPARIN SODIUM (PORCINE) LOCK FLUSH IV SOLN 100 UNIT/ML 100 UNIT/ML
500 SOLUTION INTRAVENOUS ONCE
Status: CANCELLED
Start: 2019-06-18

## 2019-06-18 RX ORDER — PALONOSETRON 0.05 MG/ML
0.25 INJECTION, SOLUTION INTRAVENOUS ONCE
Status: CANCELLED
Start: 2019-06-18

## 2019-06-18 RX ORDER — HEPARIN SODIUM (PORCINE) LOCK FLUSH IV SOLN 100 UNIT/ML 100 UNIT/ML
5 SOLUTION INTRAVENOUS EVERY 8 HOURS PRN
Status: DISCONTINUED | OUTPATIENT
Start: 2019-06-18 | End: 2019-06-28 | Stop reason: HOSPADM

## 2019-06-18 RX ORDER — EPINEPHRINE 0.3 MG/.3ML
0.3 INJECTION SUBCUTANEOUS EVERY 5 MIN PRN
Status: CANCELLED | OUTPATIENT
Start: 2019-06-18

## 2019-06-18 RX ORDER — APREPITANT 80 MG/1
80 CAPSULE ORAL DAILY
Qty: 2 CAPSULE | Refills: 1 | Status: SHIPPED | OUTPATIENT
Start: 2019-06-18 | End: 2019-07-02

## 2019-06-18 RX ORDER — DOXORUBICIN HYDROCHLORIDE 2 MG/ML
60 INJECTION, SOLUTION INTRAVENOUS ONCE
Status: COMPLETED | OUTPATIENT
Start: 2019-06-18 | End: 2019-06-18

## 2019-06-18 RX ORDER — HEPARIN SODIUM (PORCINE) LOCK FLUSH IV SOLN 100 UNIT/ML 100 UNIT/ML
500 SOLUTION INTRAVENOUS ONCE
Status: COMPLETED | OUTPATIENT
Start: 2019-06-18 | End: 2019-06-18

## 2019-06-18 RX ORDER — EPINEPHRINE 1 MG/ML
0.3 INJECTION, SOLUTION INTRAMUSCULAR; SUBCUTANEOUS EVERY 5 MIN PRN
Status: CANCELLED | OUTPATIENT
Start: 2019-06-18

## 2019-06-18 RX ORDER — SODIUM CHLORIDE 9 MG/ML
1000 INJECTION, SOLUTION INTRAVENOUS CONTINUOUS PRN
Status: CANCELLED
Start: 2019-06-18

## 2019-06-18 RX ORDER — MEPERIDINE HYDROCHLORIDE 25 MG/ML
25 INJECTION INTRAMUSCULAR; INTRAVENOUS; SUBCUTANEOUS EVERY 30 MIN PRN
Status: CANCELLED | OUTPATIENT
Start: 2019-06-18

## 2019-06-18 RX ORDER — ALBUTEROL SULFATE 90 UG/1
1-2 AEROSOL, METERED RESPIRATORY (INHALATION)
Status: CANCELLED
Start: 2019-06-18

## 2019-06-18 RX ORDER — ALBUTEROL SULFATE 0.83 MG/ML
2.5 SOLUTION RESPIRATORY (INHALATION)
Status: CANCELLED | OUTPATIENT
Start: 2019-06-18

## 2019-06-18 RX ORDER — PALONOSETRON 0.05 MG/ML
0.25 INJECTION, SOLUTION INTRAVENOUS ONCE
Status: COMPLETED | OUTPATIENT
Start: 2019-06-18 | End: 2019-06-18

## 2019-06-18 RX ADMIN — HEPARIN 500 UNITS: 100 SYRINGE at 16:38

## 2019-06-18 RX ADMIN — PEGFILGRASTIM 6 MG: KIT SUBCUTANEOUS at 16:34

## 2019-06-18 RX ADMIN — PALONOSETRON HYDROCHLORIDE 0.25 MG: 0.25 INJECTION, SOLUTION INTRAVENOUS at 15:23

## 2019-06-18 RX ADMIN — CYCLOPHOSPHAMIDE 1080 MG: 1 INJECTION, POWDER, FOR SOLUTION INTRAVENOUS; ORAL at 16:02

## 2019-06-18 RX ADMIN — DEXAMETHASONE SODIUM PHOSPHATE: 10 INJECTION, SOLUTION INTRAMUSCULAR; INTRAVENOUS at 15:23

## 2019-06-18 RX ADMIN — SODIUM CHLORIDE 250 ML: 9 INJECTION, SOLUTION INTRAVENOUS at 15:23

## 2019-06-18 RX ADMIN — HEPARIN 5 ML: 100 SYRINGE at 16:39

## 2019-06-18 RX ADMIN — HEPARIN 5 ML: 100 SYRINGE at 13:00

## 2019-06-18 RX ADMIN — DOXORUBICIN HYDROCHLORIDE 108 MG: 2 INJECTION, SOLUTION INTRAVENOUS at 15:54

## 2019-06-18 ASSESSMENT — PAIN SCALES - GENERAL: PAINLEVEL: NO PAIN (0)

## 2019-06-18 ASSESSMENT — MIFFLIN-ST. JEOR: SCORE: 1267.78

## 2019-06-18 NOTE — NURSING NOTE
Endocrine/General Surgery  [address] [place] [address]  Phone: [phone]  Fax: [phone] April 4, 2018         Charline Joe MD  [address]  [facility] [address]    Patient: Jenny Julien   YOB: 1981   Date of Visit: 3/29/2018     Dear Dr. Joe:    I saw Ms. Julien in clinic. Attached you will find a copy of my note.    As you know, she is a 36 y.o. female who presented with a small palpable lymph node. I was able to discuss the options for treatment in this patient. The current plan includes FNA biopsy if not resolved over the next 6-8 weeks.    If you have any questions or concerns, please don't hesitate to contact my office. Again, thank you kindly for this referral.    Regards,      Coco Schofield MD          [id number]: Study Visit Note   Subject name: Kathy eLi     Visit: AC#3    Did the study visit occur within the appropriate window allowed by the protocol? yes    Since the last study visit, She has been doing poorly. Patient reported significant nausea, fatigue, dizziness, weakness, dehydration; came in for fluids and antiemetics with significant relief. Also reported a day with some diarrhea, but has been feeling constipated since then.      I have personally interviewed Kathy Lei and reviewed her medical record for adverse events and concomitant medications and these have been recorded on the corresponding logs in Kathy Lei's research file.     Kathy Lei was given the opportunity to ask any trial related questions.  Please see provider progress note for physical exam and other clinical information. Labs were reviewed - any significant lab values were addressed and reviewed.    Lorne Leos RN     Pager: [phone]

## 2019-06-18 NOTE — PATIENT INSTRUCTIONS
Contact Numbers    [facility] Main Line: [phone]  [facility] Triage and after hours / weekends / holidays:  [phone]      Please call the triage or after hours line if you experience a temperature greater than or equal to 100.5, shaking chills, have uncontrolled nausea, vomiting and/or diarrhea, dizziness, shortness of breath, chest pain, bleeding, unexplained bruising, or if you have any other new/concerning symptoms, questions or concerns.      If you are having any concerning symptoms or wish to speak to a provider before your next infusion visit, please call your care coordinator or triage to notify them so we can adequately serve you.     If you need a refill on a narcotic prescription or other medication, please call before your infusion appointment.         June 2019 Sunday Monday Tuesday Wednesday Thursday Friday Saturday                                 1       2     3     4    [facility] D[facility]   1:00 PM   (15 min.)    [facility] D[facility]   [facility] Lab Draw    [facility] RETURN   1:15 PM   (30 min.)   Christian Guzman MD   [facility]   2:00 PM   (120 min.)    ONCOLOGY INFUSION   [facility] 5     6     7     8       9     10    [facility]RAW   1:00 PM   (15 min.)    [facility] DRAW   [facility] Lab Draw    [facility] ONC INFUSION 120   1:30 PM   (120 min.)    ONCOLOGY INFUSION   [facility]    [facility] RETURN   1:55 PM   (50 min.)   Zeeshan Whitfield PA   [facility] 11     12    MR BREAST BILATERAL WWO   5:00 PM   (45 min.)   [address] MRI 13     14     15       16     17     18    [facility] DRAW   1:00 PM   (15 min.)    [facility] DRAW   [facility] Lab Draw    [facility] RETURN   1:15 PM   (30 min.)   Christian Guzman MD   [facility]    [facility] ONC INFUSION 120   2:00 PM   (120 min.)    ONCOLOGY INFUSION   [facility]  Cancer [facility] 19     20     21    [facility] ONC INFUSION 120  12:00 PM   (120 min.)   [facility]FUSION   [facility] 22       23     24     25     26     27     28     29       30 July 2019 Sunday Monday Tuesday Wednesday Thursday Friday Saturday        1     2    [facility] DRAW   9:00 AM   (15 min.)    [facility] DRAW   [facility] Lab Draw    [facility] RETURN   9:25 AM   (50 min.)   Kerry Norwood PA   [facility]    [facility] ONC INFUSION 120  10:30 AM   (120 min.)   [facility] ONCOL[facility] INFUSION   [facility] 3     4     5    [facility] ONC INFUSION 120   2:30 PM   (120 min.)    ONCOLOGY INFUSION   [facility] 6       7     8     9     10     11     12     13       14     15     16     17     18     19     20       21     22     23     24     25     26     27       28     29     30     31                                   Recent Results (from the past 24 hour(s))   CBC with platelets differential    Collection Time: 06/18/19  1:09 PM   Result Value Ref Range    WBC 9.5 4.0 - 11.0 10e9/L    RBC Count 3.90 3.8 - 5.2 10e12/L    Hemoglobin 12.1 11.7 - 15.7 g/dL    Hematocrit 36.2 35.0 - 47.0 %    MCV 93 78 - 100 fl    MCH 31.0 26.5 - 33.0 pg    MCHC 33.4 31.5 - 36.5 g/dL    RDW 14.5 10.0 - 15.0 %    Platelet Count 239 150 - 450 10e9/L    Diff Method Manual Differential     % Neutrophils 73.0 %    % Lymphocytes 12.0 %    % Monocytes 7.0 %    % Eosinophils 1.0 %    % Basophils 1.0 %    % Metamyelocytes 4.0 %    % Myelocytes 2.0 %    Absolute Neutrophil 6.9 1.6 - 8.3 10e9/L    Absolute Lymphocytes 1.1 0.8 - 5.3 10e9/L    Absolute Monocytes 0.7 0.0 - 1.3 10e9/L    Absolute Eosinophils 0.1 0.0 - 0.7 10e9/L    Absolute Basophils 0.1 0.0 - 0.2 10e9/L    Absolute Metamyelocytes 0.4 (H) 0 10e9/L    Absolute Myelocytes 0.2 (H) 0 10e9/L    RBC Morphology Normal     Platelet Estimate Confirming automated cell  count    Comprehensive metabolic panel    Collection Time: 06/18/19  1:09 PM   Result Value Ref Range    Sodium 137 133 - 144 mmol/L    Potassium 3.9 3.4 - 5.3 mmol/L    Chloride 105 94 - 109 mmol/L    Carbon Dioxide 25 20 - 32 mmol/L    Anion Gap 7 3 - 14 mmol/L    Glucose 107 (H) 70 - 99 mg/dL    Urea Nitrogen 3 (L) 7 - 30 mg/dL    Creatinine 0.52 0.52 - 1.04 mg/dL    GFR Estimate >90 >60 mL/min/[1.73_m2]    GFR Estimate If Black >90 >60 mL/min/[1.73_m2]    Calcium 8.3 (L) 8.5 - 10.1 mg/dL    Bilirubin Total 0.2 0.2 - 1.3 mg/dL    Albumin 3.6 3.4 - 5.0 g/dL    Protein Total 6.7 (L) 6.8 - 8.8 g/dL    Alkaline Phosphatase 88 40 - 150 U/L    ALT 33 0 - 50 U/L    AST 28 0 - 45 U/L   TSH with free T4 reflex    Collection Time: 06/18/19  1:09 PM   Result Value Ref Range    TSH 6.39 (H) 0.40 - 4.00 mU/L   T4 free    Collection Time: 06/18/19  1:09 PM   Result Value Ref Range    T4 Free 1.08 0.76 - 1.46 ng/dL

## 2019-06-18 NOTE — NURSING NOTE
"Oncology Rooming Note    June 18, 2019 1:48 PM   Kathy Lei is a 53 year old female who presents for:    Chief Complaint   Patient presents with     Port Draw     Labs drawn via port by RN in lab. Line flushed and hep locked. VS taken.     Oncology Clinic Visit     RETURN VISIT; BREAST CA FOLLOW UP      Initial Vitals: /72 (BP Location: Right arm, Patient Position: Sitting, Cuff Size: Adult Regular)   Pulse 92   Temp 98.2  F (36.8  C) (Oral)   Resp 18   Ht 1.676 m (5' 5.98\")   Wt 65.6 kg (144 lb 11.2 oz)   LMP 11/02/2014   SpO2 97%   BMI 23.37 kg/m   Estimated body mass index is 23.37 kg/m  as calculated from the following:    Height as of this encounter: 1.676 m (5' 5.98\").    Weight as of this encounter: 65.6 kg (144 lb 11.2 oz). Body surface area is 1.75 meters squared.  No Pain (0) Comment: Data Unavailable   Patient's last menstrual period was 11/02/2014.  Allergies reviewed: Yes  Medications reviewed: Yes    Medications: Medication refills not needed today.  Pharmacy name entered into [facility]:    [id number] - [address] [facility] [address] - [place], [place] - [address] AT [facility] [address]    Clinical concerns: No new concerns today  Dr. Guzman was notified.      Shweta Ibrahim              "

## 2019-06-18 NOTE — LETTER
6/18/2019       RE: Kathy Lei  [address] [place] [address]     Dear Colleague,    Thank you for referring your patient, Kathy Lei, to the [facility]. Please see a copy of my visit note below.    Medical oncology follow-up note     HPI: Kathy Lei is a 54 yo female with a new diagnosis of an estrogen-receptor positive, AK-positive, HER2-negative breast cancer, grade 2, in the upper outer quadrant of the right breast.      Mily presented between Christmas and New Years of 2018 with new sharp pains in the upper outer quadrant of the right breast.  She underwent mammographic screening.       IMAGING:  Mammography and ultrasound:  She had a diagnostic mammogram which showed bilateral prepectoral saline implants.  On the right breast at 11:30 position, there were grouped coarse heterogeneous calcifications spanning 1.3 cm, new since 2014, adjacent calcifications 11-o'clock position posterior depth.  There was an irregular mass in the lateral right breast 9-o'clock position mid-posterior depth, and an additional mass with obscured margins.  No significant changes in the left breast.  Right breast ultrasound was performed.  In the area of the palpable lump in the right breast, 11-o'clock position, 6 cm from the nipple, there is an irregular hypoechoic mass with indistinct margins measuring approximately 1.0 x 0.9 x 1.6 cm in size.  Immediately adjacent to the mass, 11:30 position, are microcalcifications.  In the second area of palpable lump right breast, 9:30 position, 5 cm from the nipple, there was an irregular hypoechoic mass measuring 3.2 x 0.5 x 1.3 cm in size felt to account for the mammographic findings.  There were multiple additional round hypoechoic masses similar to the findings at 9:30 position seen incidentally during real time and not directly palpable.  For example, in the right breast at 8-o'clock position, 4 cm from the nipple, there was a  mass measuring 0.8 x 0.6 x 0.6 cm, BI-RADS 4.       Contrast mammogram was subsequently performed and the contrast mammogram showed a large area of mass and non-mass-like enhancement in the upper outer right breast measuring 7.2 cm in greatest dimension, corresponding global asymmetry in the upper outer right breast.  Enhancement extended to the implant without evidence of extension to the skin surface or nipple, and the calcifications were noted as previously found.      PATHOLOGY:  The pathology showed part A, breast needle biopsy 11 o'clock, 6 cm from the nipple, invasive mammary carcinoma, no special type, ductal (and mucinous) Concord grade 2.  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  Calcifications were associated with the DCIS.  There was a focus suspicious for lymphovascular invasion.  Invasive carcinoma was estrogen receptor positive and progesterone receptor negative by immunohistochemistry.  In part B, breast right, 8 o'clock, 4 cm from the nipple, ultrasound-guided core biopsy, invasive mammary carcinoma, no special type, ductal (and mucinous) Donna grade 2, occasional calcifications were noted.  Invasive carcinoma was estrogen receptor positive and progesterone receptor negative by immunohistochemistry.  On quantitation of the ER and ND, ER was positive 99% of the cells staining with strong intensity.  ND was subsequently noted to be positive with 15% of the cells staining with moderate intensity.       There was also a HER2 FISH performed, and the HER2 FISH showed average number of HER2 signals per nucleus 2.6.  Average number of CEN17 signals 1.7 with a ratio of 1.6, VASILIY negative for biopsy A.  For biopsy B, the HER2 signals per nucleus 2.9.  Average number CEN17 signals per nucleus 1.7 with a ratio of 1.7, VASILIY negative.  Overall, by 2018 ASCO/CAP guidelines, this tumor is HER2 negative.     She was enrolled in I-SPY2 trial to Durvulumab, Taxol and  Olaparib arm. She completed 12 cycles of weekly Taxol. She is s/p cycle 1 of adriamycin and cyclophosphamide (AC).     PAST MEDICAL HISTORY:  In terms of her breast history, she does have a history of a smaller right breast which was treated with implants 19 years ago.  She has a history of 2 papillomas in the right breast, 1 removed at the 6-o'clock position 5 years ago, another removed at the 6-o'clock position approximately 10 years ago.  These incisions are approximately at the 5:30 to 6-o'clock position.  She has no history of radiation therapy.  No history of breast cancer of any type.  No history of tumor of any kind.  No history of heart problems, heart attack, breathing problems, blood clots, seizures, stroke, arthritis, peptic ulcer disease or osteoporosis.  No history of bone fractures.  She is not currently participating in a clinical trial. She does have a history of asthma and a history of hypothyroidism.      FAMILY HISTORY:  Entirely negative for breast cancer or ovarian cancer or breast cancer in a male relative.      ALLERGIES:  No known drug allergies.  No allergy to seafood, iodine or contrast dye.  She does not take aspirin.      PAST MENSTRUAL HISTORY:  First period was at age 11.  First and only pregnancy was at age 26.  No miscarriages or abortions.  Occasional use of oral contraceptives in her 20s.  No history of hormone replacement therapy.  Last period was 3 years ago.      SOCIAL HISTORY:  Tobacco history was from age 15 to present, smoking a pack of cigarettes a week.  She is now trying to stop cigarettes.      In terms of alcohol use, in her early teens and early 20s, she drank approximately 10 drinks on the weekend and has tapered off drinking since then.     INTERVAL HISTORY:  Mily returns to clinic for cycle 3 of dose-dense AC.  She is doing quite well today.  She did have problems with severe nausea and vomiting on her second cycle of dose-dense AC, requiring Zyprexa, Zofran, IV  fluids on Friday.  Our plan is to add aprepitant on days 4 and 5, and I gave her a prescription for that.  She has no pain, no fatigue, no depression, no anxiety.      REVIEW OF SYSTEMS:  She denies fevers or chills, cough, chest pain, shortness of breath, nausea, vomiting, constipation, diarrhea, bone pain, back pain or headache.  The remainder of a 10-point review of systems is negative.  She did have alternating constipation and diarrhea, but none today.  I did discuss MiraLax as a potential approach for dealing with constipation.  She is eating a healthful diet.  She is able to do household chores, but she is off work on disability.  She is not taking vitamin D or calcium at this time.      PHYSICAL EXAMINATION:   VITAL SIGNS:  Blood pressure 118/72, temperature 98.2, pulse 92, respirations 18, O2 sat 97% on room air, height 1.7 meters and weight 65.6 kg.   GENERAL:  Mily appeared generally well.  She has alopecia and is wearing a wig.   HEENT:  There are no lesions in the oropharynx.   LYMPH:  There is no palpable cervical, supraclavicular, subclavicular or axillary lymphadenopathy.   BREASTS:  Exam of the right breast reveals an implant in place and a breast mass underneath the nipple-areolar complex overlying the implant measuring about 3.5 cm in transverse dimension, 3.5 cm in vertical dimension extending from the 10 o'clock to 2 o'clock position.  There are no masses in the left breast.  Implant in place.   LUNGS:  Clear to percussion and auscultation.   HEART:  There is a regular rate and rhythm, S1, S2.   ABDOMEN:  Soft and nontender, without hepatosplenomegaly.   EXTREMITIES:  Without edema.   PSYCHIATRIC:  Mood and affect were normal.      LABORATORY DATA:  The CMP was within normal limits except for a low albumin of 3, calcium 8.3.  ALT and AST were normal.  TSH 6.39.  CBC was within normal limits and the neutrophil count was 6900.      IMAGING:  MRI of the breasts bilaterally shows in the right  breast, non-mass enhancement has decreased in size since 05/18/2019 measuring closer to 6.2 cm in greatest dimension with overall significant decreased extent of tumor volume.  Previously, 7.9 cm in greatest dimension on 05/18/2009.  Enhancement extends from the area of the implant capsule anterior involving the upper outer and central right breast, similar to prior MRI.  No abnormal enhancement seen in the left breast.      ASSESSMENT AND PLAN:   1.  Mily Lei is a 53-year-old woman with a recent diagnosis of a clinical stage II, ER+HER2- T2N0MX, invasive ductal carcinoma of the upper outer quadrant of the right breast, which was multifocal, largest area of involvement measuring 8.9 cm in largest dimension on the original MRI.  She is on the I-SPY 2 clinical trial and received nivolumab, paclitaxel and olaparib.  She had grade 2 fatigue.  The mass has decreased in size based on MRI.  The edges are indistinct.  Our plan is to continue with the last 2 cycles of dose-dense AC, cycle 3 being given today.   2.  Significant nausea with the last cycle of dose-dense AC.  Our plan is to add aprepitant 80 mg on days 3 and 4.  We will also give Zyprexa for delayed nausea as well as Zofran and she has scheduled if she needs.   3.  Followup.  We will see Mily in followup in our clinic in 2 weeks for C4D1.    All of her questions were answered, and all of her family's questions were answered including her son's questions. She is scheduled to see Kerry Naylor for C4D1 of ddAC.      Thank you for allowing us to continue to participate in Mily Lei's care.      Christian Guzman MD      [facility]

## 2019-06-18 NOTE — PROGRESS NOTES
Infusion Nursing Note:  Kathy SUAREZ Quique presents today for Cycle 3 Day 1 Adriamycin, Cytoxan.    Patient seen by provider today: Yes: Dr. Guzman      Treatment Conditions:  Lab Results   Component Value Date    HGB 12.1 06/18/2019     Lab Results   Component Value Date    WBC 9.5 06/18/2019      Lab Results   Component Value Date    ANEU 6.9 06/18/2019     Lab Results   Component Value Date     06/18/2019      Lab Results   Component Value Date     06/18/2019                   Lab Results   Component Value Date    POTASSIUM 3.9 06/18/2019           Lab Results   Component Value Date    MAG 2.1 06/10/2019            Lab Results   Component Value Date    CR 0.52 06/18/2019                   Lab Results   Component Value Date    BRYANT 8.3 06/18/2019                Lab Results   Component Value Date    BILITOTAL 0.2 06/18/2019           Lab Results   Component Value Date    ALBUMIN 3.6 06/18/2019                    Lab Results   Component Value Date    ALT 33 06/18/2019           Lab Results   Component Value Date    AST 28 06/18/2019       Results reviewed, labs MET treatment parameters, ok to proceed with treatment.      Note: Echo 5/6/19 LV ejection fraction: 55-60%.    Neulasta Onpro On-Body injector applied to Right arm at 1635 with light facing downward.  Writer discussed Neulasta injection would start tomorrow 6/19/19 at 1935, approximately 27 hours after application applied today.    Written and Verbal instruction reviewed with patient.  Pt instructed when the dose delivery starts, it will take about 45 minutes to complete.  Pt aware Neulasta Onpro On-Body should have green flashing light and to call triage or on-call MD if injector flashes red or appears to be leaking.   Pt aware to keep Onpro On-Body Neulasta 4 inches away from electrical equipment and to avoid showering 4 hours prior to injection.   Neulasta Onpro Lot number: [id number]    Intravenous Access:  Implanted Port.    Post Infusion  Assessment:  Patient tolerated infusion without incident.  Blood return noted during administration every 5 cc.  Site patent and intact, free from redness, edema or discomfort.  No evidence of extravasations.  Access discontinued per protocol.    Discharge Plan:   Prescription refills given for Decadron and Emend PO (to take on Day 4 and Day 5)  Discharge instructions reviewed with: Patient.  Patient and/or family verbalized understanding of discharge instructions and all questions answered.  Copy of AVS reviewed with patient and/or family.  Patient will return 6/21/19 for IV fluids and 7/2/19 for next chemotherapy appointment.   Patient discharged in stable condition accompanied by: family.  Departure Mode: Ambulatory.  Face to Face time: 0.    Sunita Griffin RN

## 2019-06-21 ENCOUNTER — INFUSION THERAPY VISIT (OUTPATIENT)
Dept: ONCOLOGY | Facility: CLINIC | Age: 54
End: 2019-06-21
Attending: INTERNAL MEDICINE
Payer: COMMERCIAL

## 2019-06-21 VITALS
TEMPERATURE: 98 F | RESPIRATION RATE: 16 BRPM | OXYGEN SATURATION: 95 % | DIASTOLIC BLOOD PRESSURE: 74 MMHG | SYSTOLIC BLOOD PRESSURE: 113 MMHG | HEART RATE: 92 BPM

## 2019-06-21 DIAGNOSIS — C50.411 MALIGNANT NEOPLASM OF UPPER-OUTER QUADRANT OF RIGHT BREAST IN FEMALE, ESTROGEN RECEPTOR POSITIVE (H): ICD-10-CM

## 2019-06-21 DIAGNOSIS — R11.2 CHEMOTHERAPY INDUCED NAUSEA AND VOMITING: Primary | ICD-10-CM

## 2019-06-21 DIAGNOSIS — Z17.0 MALIGNANT NEOPLASM OF UPPER-OUTER QUADRANT OF RIGHT BREAST IN FEMALE, ESTROGEN RECEPTOR POSITIVE (H): ICD-10-CM

## 2019-06-21 DIAGNOSIS — T45.1X5A CHEMOTHERAPY INDUCED NAUSEA AND VOMITING: Primary | ICD-10-CM

## 2019-06-21 PROCEDURE — 96374 THER/PROPH/DIAG INJ IV PUSH: CPT

## 2019-06-21 PROCEDURE — 25000128 H RX IP 250 OP 636: Mod: ZF | Performed by: INTERNAL MEDICINE

## 2019-06-21 PROCEDURE — 25800030 ZZH RX IP 258 OP 636: Mod: ZF | Performed by: INTERNAL MEDICINE

## 2019-06-21 PROCEDURE — 25000128 H RX IP 250 OP 636: Mod: ZF | Performed by: PHYSICIAN ASSISTANT

## 2019-06-21 PROCEDURE — 96361 HYDRATE IV INFUSION ADD-ON: CPT

## 2019-06-21 PROCEDURE — 25000125 ZZHC RX 250: Mod: ZF | Performed by: PHYSICIAN ASSISTANT

## 2019-06-21 PROCEDURE — 96375 TX/PRO/DX INJ NEW DRUG ADDON: CPT

## 2019-06-21 RX ORDER — DEXAMETHASONE SODIUM PHOSPHATE 4 MG/ML
10 INJECTION, SOLUTION INTRA-ARTICULAR; INTRALESIONAL; INTRAMUSCULAR; INTRAVENOUS; SOFT TISSUE ONCE
Status: CANCELLED
Start: 2019-06-21

## 2019-06-21 RX ORDER — HEPARIN SODIUM (PORCINE) LOCK FLUSH IV SOLN 100 UNIT/ML 100 UNIT/ML
500 SOLUTION INTRAVENOUS ONCE
Status: COMPLETED | OUTPATIENT
Start: 2019-06-21 | End: 2019-06-21

## 2019-06-21 RX ORDER — ONDANSETRON 2 MG/ML
8 INJECTION INTRAMUSCULAR; INTRAVENOUS ONCE
Status: COMPLETED | OUTPATIENT
Start: 2019-06-21 | End: 2019-06-21

## 2019-06-21 RX ORDER — ONDANSETRON 2 MG/ML
8 INJECTION INTRAMUSCULAR; INTRAVENOUS ONCE
Status: CANCELLED
Start: 2019-06-21

## 2019-06-21 RX ORDER — DEXAMETHASONE SODIUM PHOSPHATE 4 MG/ML
10 INJECTION, SOLUTION INTRA-ARTICULAR; INTRALESIONAL; INTRAMUSCULAR; INTRAVENOUS; SOFT TISSUE ONCE
Status: DISCONTINUED | OUTPATIENT
Start: 2019-06-21 | End: 2019-06-21

## 2019-06-21 RX ADMIN — DEXAMETHASONE SODIUM PHOSPHATE 10 MG: 10 INJECTION, SOLUTION INTRAMUSCULAR; INTRAVENOUS at 12:48

## 2019-06-21 RX ADMIN — FAMOTIDINE 20 MG: 10 INJECTION INTRAVENOUS at 12:32

## 2019-06-21 RX ADMIN — ONDANSETRON 8 MG: 2 INJECTION INTRAMUSCULAR; INTRAVENOUS at 12:35

## 2019-06-21 RX ADMIN — HEPARIN 500 UNITS: 100 SYRINGE at 13:58

## 2019-06-21 RX ADMIN — SODIUM CHLORIDE 1000 ML: 9 INJECTION, SOLUTION INTRAVENOUS at 12:27

## 2019-06-21 ASSESSMENT — PAIN SCALES - GENERAL: PAINLEVEL: NO PAIN (0)

## 2019-06-21 NOTE — PATIENT INSTRUCTIONS
Contact Numbers  [facility] Main Line: [phone]  [facility] NurseTriage and After Hours:  [phone]    Call triage with chills and/or temperature greater than or equal to 100.5, uncontrolled nausea/vomiting, diarrhea, constipation, dizziness, shortness of breath, chest pain, bleeding, unexplained bruising, or any new/concerning symptoms, questions/concerns.     If after hours, weekends, or holidays, call the nurse triage number. Calls may be forwarded to the hospital , please ask for the adult oncology doctor on call.     If you are having any concerning symptoms or wish to speak to a provider before your next infusion visit, please call your care coordinator or triage to notify them so we can adequately serve you.     If you need a refill on a narcotic prescription, please call triage or your care coordinator before your infusion appointment.           June 2019 Sunday Monday Tuesday Wednesday Thursday Friday Saturday                                 1       2     3     4    [facility] DRAW   1:00 PM   (15 min.)    [facility] DRAW   [facility] Lab Draw    [facility] RETURN   1:15 PM   (30 min.)   Christian Guzman MD   [facility]   2:00 PM   (120 min.)    ONCOLOGY INFUSION   [facility] 5     6     7     8       9     10    [facility] DRAW   1:00 PM   (15 min.)    [facility] DRAW   [facility] Lab Draw    [facility] ONC INFUSION 120   1:30 PM   (120 min.)    ONCOLOGY INFUSION   [facility]    [facility] RETURN   1:55 PM   (50 min.)   Zeeshan Whitfield PA   [facility] 11     12    MR BREAST BILATERAL WWO   5:00 PM   (45 min.)   [address] MRI 13     14     15       16     17     18    [facility] DRAW   1:00 PM   (15 min.)    [facility] DRAW   [facility] Lab Draw    [facility] RETURN   1:15 PM   (30 min.)   Christian Guzman MD   [facility]   2:00 PM   (120 min.)    ONCOLOGY INFUSION   [facility] 19     20     21    [facility] ONC INFUSION 120  12:00 PM   (120 min.)    ONCOLOGY INFUSION   [facility] 22       23     24     25     26     27     28     29       30                                               July 2019 Sunday Monday Tuesday Wednesday Thursday Friday Saturday        1     2    [facility] DRAW   9:00 AM   (15 min.)   [facility] LAB DRAW   [facility] Lab Draw    [facility] RETURN   9:25 AM   (50 min.)   Kerry Norwood PA   [facility]    [facility] ONC INFUSION 120  10:30 AM   (120 min.)    ONCOLOGY INFUSION   [facility] 3     4     5    [facility] ONC INFUSION 120   2:30 PM   (120 min.)    ONCOLOGY INFUSION   [facility] 6       7     8     9     10     11     12     13       14     15     16     17     18     19     20       21     22     23     24     25     26     27       28     29     30     31                                    Lab Results:  No results found for this or any previous visit (from the past 12 hour(s)).

## 2019-06-21 NOTE — PROGRESS NOTES
Infusion Nursing Note:  Kathy Lei presents today for IVF and antiemetics.    Patient seen by provider today: No   present during visit today: Not Applicable.    Note: Mily arrives to infusion today doing well. She started to have some mild nausea last evening and took Compazine with improvement. She also took po Emend this morning and will take an additional dose tomorrow. She requests Zofran, Decadron and Pepcid IV today along with her IV fluids. She did experience mild constipation but improved with Miralax.     Intravenous Access:  Implanted Port.    Post Infusion Assessment:  Patient tolerated infusion without incident.  Blood return noted pre and post infusion.  Access discontinued per protocol.     Discharge Plan:   [facility]  Patient will return 07/02 for next appointment.   Patient discharged in stable condition accompanied by: sister-in-law.  Departure Mode: Ambulatory.    Bettie Marcelo RN

## 2019-06-30 NOTE — PROGRESS NOTES
Oncology/Hematology Visit Note  Jul 2, 2019    Reason for Visit: follow up of right breast cancer    History of Present Illness: Kathy Lei is a 53 year old female with recent diagnosis of ER/GA positive, HER2 negative, grade 2 breast cancer. Mily presented between Christmas and New Years of 2018 with new sharp pains in the upper outer quadrant of the right breast.  Diagnostic mammogram on 1/8/19 with grouped coarse heterogeneous calcifications at 11:30 position, irregular mass at 9:00 position. US with irregular mass at 11:00 position, 1.0 x 0.9 x 1.6cm. At 9:30 position, irregular mass, 3.2 x 0.5, x 1.3 cm. Contrast mammogram was subsequently performed and the contrast mammogram showed a large area of mass and non-mass-like enhancement in the upper outer right breast measuring 7.2 cm in greatest dimension.      The pathology showed part A, breast needle biopsy 11 o'clock, 6 cm from the nipple, invasive mammary carcinoma, no special type, ductal (and mucinous) Donna grade 2.  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  Calcifications were associated with the DCIS.  There was a focus suspicious for lymphovascular invasion.  Invasive carcinoma was estrogen receptor positive and progesterone receptor negative by immunohistochemistry.  In part B, breast right, 8 o'clock, 4 cm from the nipple, ultrasound-guided core biopsy, invasive mammary carcinoma, no special type, ductal (and mucinous) Donna grade 2, occasional calcifications were noted.  Invasive carcinoma was estrogen receptor positive and progesterone receptor negative by immunohistochemistry.  On quantitation of the ER and GA, ER was positive 99% of the cells staining with strong intensity.  GA was subsequently noted to be positive with 15% of the cells staining with moderate intensity.       There was also a HER2 FISH performed, and the HER2 FISH showed average number of HER2 signals per nucleus  2.6.  Average number of CEN17 signals 1.7 with a ratio of 1.6, VASILIY negative for biopsy A.  For biopsy B, the HER2 signals per nucleus 2.9.  Average number CEN17 signals per nucleus 1.7 with a ratio of 1.7, VASILIY negative.  Overall, by 2018 ASCO/CAP guidelines, this tumor is HER2 negative.     She was enrolled in I-SPY2 to Durvulumab, Taxol and Olaparib arm. Please see Dr. Guzman's previous notes for further details on the patient's history. She started ddAC on 5/20. Presents today for C4.    Interval History:  Mily is here for follow up.  No vomiting this past cycle. She is taking Zyprexa at bedtime. She is also taking Zofran every 8 hours starting day 4. She is not needing any additional antiemetics aside from Emend on day 4th. Appetite is low for first few days then improves. She has a lot of fatigue first few days as well. No mouth sores. She takes zantac for heartburn. She is having some constipation but takes miralax which is managing. No headaches.     Denies any dizziness, dyspnea, chest pain. No wheezing. Uses flovent occasionally for asthma. No neuropathy. No leg swelling. Denies pain. Denies any fevers. She felt some mild urinary discomfort, but drank cranberry juice and this resolved. Not working while on AC. Able to do some light housework. Does rest/sleep more during the first few days but then energy improves.     She has no pain, no fatigue, no depression, no anxiety.     Current Outpatient Medications   Medication Sig Dispense Refill     ADVIL 200 MG OR TABS 1 TABLET EVERY 4 TO 6 HOURS AS NEEDED 0 0     albuterol (2.5 MG/3ML) 0.083% nebulizer solution Take 3 mLs by nebulization once for 1 dose. 3 mL 0     albuterol (VENTOLIN HFA) 108 (90 Base) MCG/ACT Inhaler INHALE 1-2 PUFFS INTO THE LUNGS EVERY 4 HOURS AS NEEDED FOR SHORTNESS OF BREATH OR DIFFICULTY BREATHING. 18 g PRN     aprepitant (EMEND) 80 MG capsule Take 1 capsule (80 mg) by mouth daily Days 4 and 5 of each cycle of AC. 2 capsule 1      dexamethasone (DECADRON) 4 MG tablet Take 8 mg by mouth daily for 3 days Start on Day 2.. 6 tablet 3     fluticasone (FLOVENT HFA) 110 MCG/ACT inhaler Inhale 2 puffs into the lungs 2 times daily 1 Inhaler PRN     levothyroxine (SYNTHROID) 112 MCG tablet Take 1 tablet (112 mcg) by mouth daily 30 tablet 1     lisinopril (PRINIVIL/ZESTRIL) 5 MG tablet Take 1 tablet (5 mg) by mouth daily 90 tablet 3     LORazepam (ATIVAN) 0.5 MG tablet Take 1 tablet (0.5 mg) by mouth every 4 hours as needed (Anxiety, Nausea/Vomiting or Sleep) 30 tablet 3     OLANZapine (ZYPREXA) 5 MG tablet Take 1 tablet (5 mg) by mouth At Bedtime 30 tablet 3     ondansetron (ZOFRAN) 8 MG tablet Take 1 tablet (8 mg) by mouth every 8 hours as needed for nausea 60 tablet 3     order for DME Cranial prosthesis 1 Units 1     prochlorperazine (COMPAZINE) 10 MG tablet Take 1 tablet (10 mg) by mouth every 6 hours as needed (Nausea/Vomiting) 60 tablet 3     sertraline (ZOLOFT) 100 MG tablet Take 1 tablet (100 mg) by mouth daily 30 tablet 1     sodium chloride (OCEAN) 0.65 % nasal spray Spray in both nostrils four times daily 30 mL 3     STATIN NOT PRESCRIBED (INTENTIONAL) Please choose reason not prescribed, below       triamcinolone (ARISTOCORT HP) 0.5 % external cream Apply topically 2 times daily 30 g 3     vitamin B6 (PYRIDOXINE) 100 MG tablet Take 1 tablet (100 mg) by mouth daily 30 tablet 3     Physical Examination:  General: The patient is a pleasant female in no acute distress. ECOG 1  LMP 11/02/2014   Wt Readings from Last 10 Encounters:   06/18/19 65.6 kg (144 lb 11.2 oz)   06/10/19 64.8 kg (142 lb 14.4 oz)   06/04/19 67.1 kg (147 lb 14.4 oz)   05/20/19 69.9 kg (154 lb)   05/14/19 69.5 kg (153 lb 4.8 oz)   05/06/19 69.4 kg (153 lb)   05/06/19 68.9 kg (152 lb)   04/30/19 69.8 kg (153 lb 12.8 oz)   04/29/19 69.1 kg (152 lb 4.8 oz)   04/22/19 70.4 kg (155 lb 4.8 oz)   HEENT: EOMI, PERRL. Sclerae are anicteric. Oral mucosa is pink and moist with no  lesions or thrush.   Lymph: No palpable cervical, supraclavicular, or axillary lymphadenopathy.  Heart: Regular rate and rhythm.   Lungs: Clear to auscultation bilaterally.   Breast: Right breast with 2 x 2 cm mass in upper outer quadrant.  Abdomen: Bowel sounds present, soft, nontender with no palpable hepatosplenomegaly or masses.   Extremities: No lower extremity edema noted bilaterally.   Neuro: Cranial nerves II through XII are grossly intact.  Skin: Maculopapular rash of bilateral LE is resolving, minimally present now. No other rashes, petechiae, or bruising noted on exposed skin.   Laboratory Data:  Results for GALA ROLAND (MRN [id number]) as of 7/2/2019 10:19   7/2/2019 09:22   Sodium 137   Potassium 3.9   Chloride 105   Carbon Dioxide 27   Urea Nitrogen 4 (L)   Creatinine 0.59   GFR Estimate >90   GFR Estimate If Black >90   Calcium 8.5   Anion Gap 6   Albumin 3.3 (L)   Protein Total 6.5 (L)   Bilirubin Total 0.2   Alkaline Phosphatase 92   ALT 26   AST 20   Glucose 111 (H)   WBC 11.1 (H)   Hemoglobin 10.9 (L)   Hematocrit 33.7 (L)   Platelet Count 262   RBC Count 3.59 (L)   MCV 94   MCH 30.4   MCHC 32.3   RDW 14.1   Diff Method Manual Differential   % Neutrophils 80.7   % Lymphocytes 7.0   % Monocytes 6.1   % Eosinophils 0.9   % Basophils 0.9   % Metamyelocytes 4.4   Absolute Neutrophil 9.0 (H)   Absolute Lymphocytes 0.8   Absolute Monocytes 0.7   Absolute Eosinophils 0.1   Absolute Basophils 0.1   Absolute Metamyelocytes 0.5 (H)       Assessment and Plan:  Kathy Roland is a 53 year old female with clinical stage II, T3N0MX, invasive ductal breast cancer in the upper outer quadrant of right breast, multifocal, measuring 8.9 cm on MRI. ER positive and HER2 negative. She is enrolled in I-SPY 2 and on durvalumab every 28 days, weekly Taxol, and olaparib arm. Started AC on 5/20/2019     ECOG 1 (due to changing her work schedule due to fatigue)    Right breast cancer, ER/AZ positive, HER2  negative: She was enrolled in I-SPY2 and started cycle 1 Durvulumab, Taxol and Olaparib on 2/25/19. Completed 12 weeks and started AC on 5/20. Had N/V with cycle w requiring adjustments to anti-emetics and IVF. Here for C4.  Home antiemetics adjusted to be Zyprexa 5mg at bedtime, Zofran 8mg every 8 hours starting D4, Compazine every 6 hour PRN, Lorazepam every 4 hours PRN. Arepitant 80 mg po on D4, 5  --Tolerating much better on current anti-emetic regimen. Will return for IVF and anti-emetics on 7/5  --Labs reviewed. Leukocytosis 2/2 to neulasta. hgb trending down, 2/2 chemotherapy.  --Proceed with C4  --Spoke with Jena Berman about setting up [facility] to see Dr. Yousif again.    Abnormal echocardiogram: She saw Dr. Alanis today, 5/6/19, and Echo showed improvement. She remains on lisinopril 5 mg daily.   Neuropathy: Resolved  Maculopapular rash of BLE, grade 1: Resolving. Continue triamcinolone on affected areas and emollient cream  Nasal mucositis, grade 1: Resolved  Hx depression: On Zoloft. No concerns today. Refill provided  Hx asthma: On flovent QDAY and albuterol prn  Hx hypothyroidism: Levothyroxine increased from 88mcg daily to 112mcg daily by Dr. Guzman on 4/16/18 due to TSH elevation and symptomatic. Last TSH 6.39 on 6/18, decreased from 11.83 on 6/10. T4 WNL at 1.08. Should be rechecked around end of July. Continue current dose. Refill provided today.    Kerry Norwood PA-C  [facility]  [address] [place] [address] [phone]

## 2019-07-02 ENCOUNTER — APPOINTMENT (OUTPATIENT)
Dept: LAB | Facility: CLINIC | Age: 54
End: 2019-07-02
Attending: INTERNAL MEDICINE
Payer: COMMERCIAL

## 2019-07-02 ENCOUNTER — INFUSION THERAPY VISIT (OUTPATIENT)
Dept: ONCOLOGY | Facility: CLINIC | Age: 54
End: 2019-07-02
Attending: INTERNAL MEDICINE
Payer: COMMERCIAL

## 2019-07-02 ENCOUNTER — ONCOLOGY VISIT (OUTPATIENT)
Dept: ONCOLOGY | Facility: CLINIC | Age: 54
End: 2019-07-02
Attending: PHYSICIAN ASSISTANT
Payer: COMMERCIAL

## 2019-07-02 ENCOUNTER — RESEARCH ENCOUNTER (OUTPATIENT)
Dept: ONCOLOGY | Facility: CLINIC | Age: 54
End: 2019-07-02

## 2019-07-02 VITALS
SYSTOLIC BLOOD PRESSURE: 123 MMHG | DIASTOLIC BLOOD PRESSURE: 72 MMHG | OXYGEN SATURATION: 97 % | WEIGHT: 144.4 LBS | HEIGHT: 66 IN | RESPIRATION RATE: 18 BRPM | TEMPERATURE: 98.4 F | HEART RATE: 93 BPM | BODY MASS INDEX: 23.21 KG/M2

## 2019-07-02 DIAGNOSIS — E03.9 HYPOTHYROIDISM, UNSPECIFIED TYPE: ICD-10-CM

## 2019-07-02 DIAGNOSIS — T45.1X5A CHEMOTHERAPY INDUCED NAUSEA AND VOMITING: ICD-10-CM

## 2019-07-02 DIAGNOSIS — R11.2 CHEMOTHERAPY INDUCED NAUSEA AND VOMITING: ICD-10-CM

## 2019-07-02 DIAGNOSIS — F32.A DEPRESSION, UNSPECIFIED DEPRESSION TYPE: ICD-10-CM

## 2019-07-02 DIAGNOSIS — Z17.0 MALIGNANT NEOPLASM OF UPPER-OUTER QUADRANT OF RIGHT BREAST IN FEMALE, ESTROGEN RECEPTOR POSITIVE (H): Primary | ICD-10-CM

## 2019-07-02 DIAGNOSIS — C50.411 MALIGNANT NEOPLASM OF UPPER-OUTER QUADRANT OF RIGHT BREAST IN FEMALE, ESTROGEN RECEPTOR POSITIVE (H): Primary | ICD-10-CM

## 2019-07-02 DIAGNOSIS — C50.411 MALIGNANT NEOPLASM OF UPPER-OUTER QUADRANT OF RIGHT BREAST IN FEMALE, ESTROGEN RECEPTOR POSITIVE (H): ICD-10-CM

## 2019-07-02 DIAGNOSIS — Z17.0 MALIGNANT NEOPLASM OF UPPER-OUTER QUADRANT OF RIGHT BREAST IN FEMALE, ESTROGEN RECEPTOR POSITIVE (H): ICD-10-CM

## 2019-07-02 LAB
ALBUMIN SERPL-MCNC: 3.3 G/DL (ref 3.4–5)
ALP SERPL-CCNC: 92 U/L (ref 40–150)
ALT SERPL W P-5'-P-CCNC: 26 U/L (ref 0–50)
ANION GAP SERPL CALCULATED.3IONS-SCNC: 6 MMOL/L (ref 3–14)
AST SERPL W P-5'-P-CCNC: 20 U/L (ref 0–45)
BASOPHILS # BLD AUTO: 0.1 10E9/L (ref 0–0.2)
BASOPHILS NFR BLD AUTO: 0.9 %
BILIRUB SERPL-MCNC: 0.2 MG/DL (ref 0.2–1.3)
BUN SERPL-MCNC: 4 MG/DL (ref 7–30)
CALCIUM SERPL-MCNC: 8.5 MG/DL (ref 8.5–10.1)
CHLORIDE SERPL-SCNC: 105 MMOL/L (ref 94–109)
CO2 SERPL-SCNC: 27 MMOL/L (ref 20–32)
CREAT SERPL-MCNC: 0.59 MG/DL (ref 0.52–1.04)
DIFFERENTIAL METHOD BLD: ABNORMAL
EOSINOPHIL # BLD AUTO: 0.1 10E9/L (ref 0–0.7)
EOSINOPHIL NFR BLD AUTO: 0.9 %
ERYTHROCYTE [DISTWIDTH] IN BLOOD BY AUTOMATED COUNT: 14.1 % (ref 10–15)
GFR SERPL CREATININE-BSD FRML MDRD: >90 ML/MIN/{1.73_M2}
GLUCOSE SERPL-MCNC: 111 MG/DL (ref 70–99)
HCT VFR BLD AUTO: 33.7 % (ref 35–47)
HGB BLD-MCNC: 10.9 G/DL (ref 11.7–15.7)
LYMPHOCYTES # BLD AUTO: 0.8 10E9/L (ref 0.8–5.3)
LYMPHOCYTES NFR BLD AUTO: 7 %
MCH RBC QN AUTO: 30.4 PG (ref 26.5–33)
MCHC RBC AUTO-ENTMCNC: 32.3 G/DL (ref 31.5–36.5)
MCV RBC AUTO: 94 FL (ref 78–100)
METAMYELOCYTES # BLD: 0.5 10E9/L
METAMYELOCYTES NFR BLD MANUAL: 4.4 %
MONOCYTES # BLD AUTO: 0.7 10E9/L (ref 0–1.3)
MONOCYTES NFR BLD AUTO: 6.1 %
NEUTROPHILS # BLD AUTO: 9 10E9/L (ref 1.6–8.3)
NEUTROPHILS NFR BLD AUTO: 80.7 %
PLATELET # BLD AUTO: 262 10E9/L (ref 150–450)
PLATELET # BLD EST: ABNORMAL 10*3/UL
POTASSIUM SERPL-SCNC: 3.9 MMOL/L (ref 3.4–5.3)
PROT SERPL-MCNC: 6.5 G/DL (ref 6.8–8.8)
RBC # BLD AUTO: 3.59 10E12/L (ref 3.8–5.2)
RBC MORPH BLD: NORMAL
SODIUM SERPL-SCNC: 137 MMOL/L (ref 133–144)
WBC # BLD AUTO: 11.1 10E9/L (ref 4–11)

## 2019-07-02 PROCEDURE — 85025 COMPLETE CBC W/AUTO DIFF WBC: CPT | Performed by: INTERNAL MEDICINE

## 2019-07-02 PROCEDURE — 96377 APPLICATON ON-BODY INJECTOR: CPT | Mod: 59

## 2019-07-02 PROCEDURE — 96411 CHEMO IV PUSH ADDL DRUG: CPT

## 2019-07-02 PROCEDURE — 25000128 H RX IP 250 OP 636: Mod: ZF | Performed by: PHYSICIAN ASSISTANT

## 2019-07-02 PROCEDURE — 80053 COMPREHEN METABOLIC PANEL: CPT | Performed by: INTERNAL MEDICINE

## 2019-07-02 PROCEDURE — 96413 CHEMO IV INFUSION 1 HR: CPT

## 2019-07-02 PROCEDURE — 99214 OFFICE O/P EST MOD 30 MIN: CPT | Mod: ZP | Performed by: PHYSICIAN ASSISTANT

## 2019-07-02 PROCEDURE — 96367 TX/PROPH/DG ADDL SEQ IV INF: CPT

## 2019-07-02 PROCEDURE — 25800030 ZZH RX IP 258 OP 636: Mod: ZF | Performed by: PHYSICIAN ASSISTANT

## 2019-07-02 PROCEDURE — 96375 TX/PRO/DX INJ NEW DRUG ADDON: CPT

## 2019-07-02 PROCEDURE — G0463 HOSPITAL OUTPT CLINIC VISIT: HCPCS | Mod: ZF

## 2019-07-02 RX ORDER — LORAZEPAM 2 MG/ML
0.5 INJECTION INTRAMUSCULAR EVERY 4 HOURS PRN
Status: CANCELLED
Start: 2019-07-02

## 2019-07-02 RX ORDER — OLANZAPINE 5 MG/1
5 TABLET ORAL AT BEDTIME
Qty: 30 TABLET | Refills: 0 | Status: SHIPPED | OUTPATIENT
Start: 2019-07-02 | End: 2019-08-13

## 2019-07-02 RX ORDER — PALONOSETRON 0.05 MG/ML
0.25 INJECTION, SOLUTION INTRAVENOUS ONCE
Status: COMPLETED | OUTPATIENT
Start: 2019-07-02 | End: 2019-07-02

## 2019-07-02 RX ORDER — SERTRALINE HYDROCHLORIDE 100 MG/1
100 TABLET, FILM COATED ORAL DAILY
Qty: 30 TABLET | Refills: 1 | Status: SHIPPED | OUTPATIENT
Start: 2019-07-02 | End: 2019-09-24

## 2019-07-02 RX ORDER — ONDANSETRON 8 MG/1
8 TABLET, FILM COATED ORAL EVERY 8 HOURS PRN
Qty: 60 TABLET | Refills: 3 | Status: SHIPPED | OUTPATIENT
Start: 2019-07-02 | End: 2020-10-26

## 2019-07-02 RX ORDER — HEPARIN SODIUM (PORCINE) LOCK FLUSH IV SOLN 100 UNIT/ML 100 UNIT/ML
500 SOLUTION INTRAVENOUS ONCE
Status: CANCELLED
Start: 2019-07-02

## 2019-07-02 RX ORDER — PALONOSETRON 0.05 MG/ML
0.25 INJECTION, SOLUTION INTRAVENOUS ONCE
Status: CANCELLED
Start: 2019-07-02

## 2019-07-02 RX ORDER — DOXORUBICIN HYDROCHLORIDE 2 MG/ML
60 INJECTION, SOLUTION INTRAVENOUS ONCE
Status: COMPLETED | OUTPATIENT
Start: 2019-07-02 | End: 2019-07-02

## 2019-07-02 RX ORDER — EPINEPHRINE 0.3 MG/.3ML
0.3 INJECTION SUBCUTANEOUS EVERY 5 MIN PRN
Status: CANCELLED | OUTPATIENT
Start: 2019-07-02

## 2019-07-02 RX ORDER — ALBUTEROL SULFATE 0.83 MG/ML
2.5 SOLUTION RESPIRATORY (INHALATION)
Status: CANCELLED | OUTPATIENT
Start: 2019-07-02

## 2019-07-02 RX ORDER — HEPARIN SODIUM (PORCINE) LOCK FLUSH IV SOLN 100 UNIT/ML 100 UNIT/ML
5 SOLUTION INTRAVENOUS EVERY 8 HOURS PRN
Status: DISCONTINUED | OUTPATIENT
Start: 2019-07-02 | End: 2019-07-08 | Stop reason: HOSPADM

## 2019-07-02 RX ORDER — APREPITANT 80 MG/1
80 CAPSULE ORAL DAILY
Qty: 2 CAPSULE | Refills: 0 | Status: ON HOLD | OUTPATIENT
Start: 2019-07-02 | End: 2019-07-11

## 2019-07-02 RX ORDER — ALBUTEROL SULFATE 90 UG/1
1-2 AEROSOL, METERED RESPIRATORY (INHALATION)
Status: CANCELLED
Start: 2019-07-02

## 2019-07-02 RX ORDER — EPINEPHRINE 1 MG/ML
0.3 INJECTION, SOLUTION INTRAMUSCULAR; SUBCUTANEOUS EVERY 5 MIN PRN
Status: CANCELLED | OUTPATIENT
Start: 2019-07-02

## 2019-07-02 RX ORDER — LEVOTHYROXINE SODIUM 112 UG/1
112 TABLET ORAL DAILY
Qty: 30 TABLET | Refills: 1 | Status: SHIPPED | OUTPATIENT
Start: 2019-07-02 | End: 2019-09-10

## 2019-07-02 RX ORDER — DIPHENHYDRAMINE HYDROCHLORIDE 50 MG/ML
50 INJECTION INTRAMUSCULAR; INTRAVENOUS
Status: CANCELLED
Start: 2019-07-02

## 2019-07-02 RX ORDER — DOXORUBICIN HYDROCHLORIDE 2 MG/ML
60 INJECTION, SOLUTION INTRAVENOUS ONCE
Status: CANCELLED | OUTPATIENT
Start: 2019-07-02

## 2019-07-02 RX ORDER — HEPARIN SODIUM (PORCINE) LOCK FLUSH IV SOLN 100 UNIT/ML 100 UNIT/ML
500 SOLUTION INTRAVENOUS ONCE
Status: COMPLETED | OUTPATIENT
Start: 2019-07-02 | End: 2019-07-02

## 2019-07-02 RX ORDER — METHYLPREDNISOLONE SODIUM SUCCINATE 125 MG/2ML
125 INJECTION, POWDER, LYOPHILIZED, FOR SOLUTION INTRAMUSCULAR; INTRAVENOUS
Status: CANCELLED
Start: 2019-07-02

## 2019-07-02 RX ORDER — HEPARIN SODIUM (PORCINE) LOCK FLUSH IV SOLN 100 UNIT/ML 100 UNIT/ML
500 SOLUTION INTRAVENOUS ONCE
Status: CANCELLED
Start: 2019-07-03

## 2019-07-02 RX ORDER — MEPERIDINE HYDROCHLORIDE 25 MG/ML
25 INJECTION INTRAMUSCULAR; INTRAVENOUS; SUBCUTANEOUS EVERY 30 MIN PRN
Status: CANCELLED | OUTPATIENT
Start: 2019-07-02

## 2019-07-02 RX ORDER — SODIUM CHLORIDE 9 MG/ML
1000 INJECTION, SOLUTION INTRAVENOUS CONTINUOUS PRN
Status: CANCELLED
Start: 2019-07-02

## 2019-07-02 RX ADMIN — DOXORUBICIN HYDROCHLORIDE 108 MG: 2 INJECTION, SOLUTION INTRAVENOUS at 11:10

## 2019-07-02 RX ADMIN — PALONOSETRON HYDROCHLORIDE 0.25 MG: 0.25 INJECTION, SOLUTION INTRAVENOUS at 10:45

## 2019-07-02 RX ADMIN — HEPARIN SODIUM (PORCINE) LOCK FLUSH IV SOLN 100 UNIT/ML 500 UNITS: 100 SOLUTION at 11:52

## 2019-07-02 RX ADMIN — PEGFILGRASTIM 6 MG: KIT SUBCUTANEOUS at 11:42

## 2019-07-02 RX ADMIN — DEXAMETHASONE SODIUM PHOSPHATE: 10 INJECTION, SOLUTION INTRAMUSCULAR; INTRAVENOUS at 10:48

## 2019-07-02 RX ADMIN — CYCLOPHOSPHAMIDE 1080 MG: 1 INJECTION, POWDER, FOR SOLUTION INTRAVENOUS; ORAL at 11:22

## 2019-07-02 RX ADMIN — HEPARIN 5 ML: 100 SYRINGE at 09:16

## 2019-07-02 RX ADMIN — SODIUM CHLORIDE 250 ML: 9 INJECTION, SOLUTION INTRAVENOUS at 10:44

## 2019-07-02 ASSESSMENT — MIFFLIN-ST. JEOR: SCORE: 1266.49

## 2019-07-02 ASSESSMENT — PAIN SCALES - GENERAL: PAINLEVEL: NO PAIN (0)

## 2019-07-02 NOTE — PROGRESS NOTES
Infusion Nursing Note:  Kathy Lei presents today for Cycle 4 Day 1 Adriamycin/Cytoxan, Neulasta Onpro.    Patient seen by provider today: Yes: LIMA Cantor   present during visit today: Not Applicable.    Note: N/A.    Intravenous Access:  Implanted Port.    Treatment Conditions:  Lab Results   Component Value Date    HGB 10.9 07/02/2019     Lab Results   Component Value Date    WBC 11.1 07/02/2019      Lab Results   Component Value Date    ANEU 9.0 07/02/2019     Lab Results   Component Value Date     07/02/2019      Lab Results   Component Value Date     07/02/2019                   Lab Results   Component Value Date    POTASSIUM 3.9 07/02/2019           Lab Results                       Lab Results   Component Value Date    CR 0.59 07/02/2019                   Lab Results   Component Value Date    BRYANT 8.5 07/02/2019                Lab Results   Component Value Date    BILITOTAL 0.2 07/02/2019           Lab Results   Component Value Date    ALBUMIN 3.3 07/02/2019                    Lab Results   Component Value Date    ALT 26 07/02/2019           Lab Results   Component Value Date    AST 20 07/02/2019       Results reviewed, labs MET treatment parameters, ok to proceed with treatment.  ECHO/MUGA completed 5/6/19  EF 55-60%.      Post Infusion Assessment:  Patient tolerated infusion without incident.  Blood return noted pre and post infusion.  Blood return noted during administration of Adriamycin every 2cc.  Site patent and intact, free from redness, edema or discomfort.  No evidence of extravasations.  Access discontinued per protocol.     Neulasta Onpro On-Body injector applied to right arm at 1145 with light facing down.  Writer discussed Neulasta injection would start tomorrow 7/3 at 1445, approximately 27 hours after application applied today.  Written and Verbal instruction reviewed with patient.  Pt instructed when the dose delivery starts, it will take about 45  minutes to complete.  Pt aware Neulasta Onpro On-Body should have green flashing light and to call triage or on-call MD if injector flashes red or appears to be leaking. Pt aware to keep Onpro On-Body Neulasta 4 inches away from electrical equipment and to avoid showering 4 hours prior to injection.   Neulasta Onpro Lot number: [id number]      Discharge Plan:   Prescription refills given for Zoloft, Zofran, Zyprexa, levothyroxine, Emend, Decadron.  AVS to patient via "[employer]HART.  Patient will return 7/5 for scheduled IV fluids/antiemetics. This is pt's final planned chemotherapy. Pt will go to surgery next (being arranged per Mily Mortensen, GORAN[facility]).    Patient discharged in stable condition accompanied by: family.  Departure Mode: Ambulatory.    ANTHONY LOZANO RN

## 2019-07-02 NOTE — PATIENT INSTRUCTIONS
Contact Numbers    [facility] Main Line: [phone]  [facility] Triage and after hours / weekends / holidays:  [phone]      Please call the triage or after hours line if you experience a temperature greater than or equal to 100.5, shaking chills, have uncontrolled nausea, vomiting and/or diarrhea, dizziness, shortness of breath, chest pain, bleeding, unexplained bruising, or if you have any other new/concerning symptoms, questions or concerns.      If you are having any concerning symptoms or wish to speak to a provider before your next infusion visit, please call your care coordinator or triage to notify them so we can adequately serve you.     If you need a refill on a narcotic prescription or other medication, please call before your infusion appointment.                 July 2019 Sunday Monday Tuesday Wednesday Thursday Friday Saturday        1     2    [facility] DRAW   9:00 AM   (15 min.)    [facility] DRAW   [facility] Lab Draw    [facility] RETURN   9:25 AM   (50 min.)   Kerry Norwood PA   [facility]  10:30 AM   (120 min.)    ONCOLOGY INFUSION   [facility] 3     4     5    [facility] ONC INFUSION 120   2:30 PM   (120 min.)    ONCOLOGY INFUSION   [facility] 6       7     8     9     10     11     12     13       14     15     16     17     18     19     20       21     22     23     24     25     26     27       28     29     30     31                                August 2019 Sunday Monday Tuesday Wednesday Thursday Friday Saturday                       1     2     3       4     5     6     7     8     9     10       11     12     13     14     15     16     17       18     19     20     21     22     23     24       25     26     27     28     29     30     31                    Recent Results (from the past 24 hour(s))   Comprehensive metabolic panel    Collection Time: 07/02/19  9:22 AM   Result Value Ref  Range    Sodium 137 133 - 144 mmol/L    Potassium 3.9 3.4 - 5.3 mmol/L    Chloride 105 94 - 109 mmol/L    Carbon Dioxide 27 20 - 32 mmol/L    Anion Gap 6 3 - 14 mmol/L    Glucose 111 (H) 70 - 99 mg/dL    Urea Nitrogen 4 (L) 7 - 30 mg/dL    Creatinine 0.59 0.52 - 1.04 mg/dL    GFR Estimate >90 >60 mL/min/[1.73_m2]    GFR Estimate If Black >90 >60 mL/min/[1.73_m2]    Calcium 8.5 8.5 - 10.1 mg/dL    Bilirubin Total 0.2 0.2 - 1.3 mg/dL    Albumin 3.3 (L) 3.4 - 5.0 g/dL    Protein Total 6.5 (L) 6.8 - 8.8 g/dL    Alkaline Phosphatase 92 40 - 150 U/L    ALT 26 0 - 50 U/L    AST 20 0 - 45 U/L   CBC with platelets differential    Collection Time: 07/02/19  9:22 AM   Result Value Ref Range    WBC 11.1 (H) 4.0 - 11.0 10e9/L    RBC Count 3.59 (L) 3.8 - 5.2 10e12/L    Hemoglobin 10.9 (L) 11.7 - 15.7 g/dL    Hematocrit 33.7 (L) 35.0 - 47.0 %    MCV 94 78 - 100 fl    MCH 30.4 26.5 - 33.0 pg    MCHC 32.3 31.5 - 36.5 g/dL    RDW 14.1 10.0 - 15.0 %    Platelet Count 262 150 - 450 10e9/L    Diff Method Manual Differential     % Neutrophils 80.7 %    % Lymphocytes 7.0 %    % Monocytes 6.1 %    % Eosinophils 0.9 %    % Basophils 0.9 %    % Metamyelocytes 4.4 %    Absolute Neutrophil 9.0 (H) 1.6 - 8.3 10e9/L    Absolute Lymphocytes 0.8 0.8 - 5.3 10e9/L    Absolute Monocytes 0.7 0.0 - 1.3 10e9/L    Absolute Eosinophils 0.1 0.0 - 0.7 10e9/L    Absolute Basophils 0.1 0.0 - 0.2 10e9/L    Absolute Metamyelocytes 0.5 (H) 0 10e9/L    RBC Morphology Normal     Platelet Estimate Confirming automated cell count

## 2019-07-02 NOTE — NURSING NOTE
"Oncology Rooming Note    July 2, 2019 9:39 AM   Kathy Lei is a 53 year old female who presents for:    Chief Complaint   Patient presents with     Port Draw     Labs drawn via port by RN in lab. Line flushed and hep locked. VS taken.     Oncology Clinic Visit     Return visit related to Breast Cancer     Initial Vitals: /72 (BP Location: Right arm, Patient Position: Sitting, Cuff Size: Adult Regular)   Pulse 93   Temp 98.4  F (36.9  C) (Oral)   Resp 18   Ht 1.676 m (5' 5.98\")   Wt 65.5 kg (144 lb 6.4 oz)   LMP 11/02/2014   SpO2 97%   BMI 23.32 kg/m   Estimated body mass index is 23.32 kg/m  as calculated from the following:    Height as of this encounter: 1.676 m (5' 5.98\").    Weight as of this encounter: 65.5 kg (144 lb 6.4 oz). Body surface area is 1.75 meters squared.  No Pain (0) Comment: Data Unavailable   Patient's last menstrual period was 11/02/2014.  Allergies reviewed: Yes  Medications reviewed: Yes    Medications: MEDICATION REFILLS NEEDED TODAY. Provider was notified.  Pharmacy name entered into [facility]:    [id number] [address] [facility] - [place], [place] - [address] AT [facility] [address]    Clinical concerns: Refills needed today. Provider was notified.      Marlin Hernandez LPN            "

## 2019-07-02 NOTE — PROGRESS NOTES
"SPIRITUAL HEALTH SERVICES  SPIRITUAL ASSESSMENT Progress Note  [facility]    REFERRAL SOURCE: Follow Up      Mily shared that she was doing well today. She had trouble with her 2nd AC treatment \"but they figured out how to minimize the side effects so the 3rd one was really easy. This is my last treatment so I feel great.\"    Mily talked about her upcoming surgery in August and her hope that she won't have to go through radiation.    We talked about her son's wedding that is in September in [place]. \"Fun stuff coming up. I am really looking forward to getting back to my life.\"    PLAN: [facility] will continue to support Mily.     Adali Sorianolain Resident      "

## 2019-07-02 NOTE — LETTER
7/2/2019      RE: Kathy Lei  [address] [place] [address]       Oncology/Hematology Visit Note  Jul 2, 2019    Reason for Visit: follow up of right breast cancer    History of Present Illness: Kathy Lei is a 53 year old female with recent diagnosis of ER/DC positive, HER2 negative, grade 2 breast cancer. Mily presented between Christmas and New Years of 2018 with new sharp pains in the upper outer quadrant of the right breast.  Diagnostic mammogram on 1/8/19 with grouped coarse heterogeneous calcifications at 11:30 position, irregular mass at 9:00 position. US with irregular mass at 11:00 position, 1.0 x 0.9 x 1.6cm. At 9:30 position, irregular mass, 3.2 x 0.5, x 1.3 cm. Contrast mammogram was subsequently performed and the contrast mammogram showed a large area of mass and non-mass-like enhancement in the upper outer right breast measuring 7.2 cm in greatest dimension.      The pathology showed part A, breast needle biopsy 11 o'clock, 6 cm from the nipple, invasive mammary carcinoma, no special type, ductal (and mucinous) [place] grade 2.  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  Calcifications were associated with the DCIS.  There was a focus suspicious for lymphovascular invasion.  Invasive carcinoma was estrogen receptor positive and progesterone receptor negative by immunohistochemistry.  In part B, breast right, 8 o'clock, 4 cm from the nipple, ultrasound-guided core biopsy, invasive mammary carcinoma, no special type, ductal (and mucinous) Donna grade 2, occasional calcifications were noted.  Invasive carcinoma was estrogen receptor positive and progesterone receptor negative by immunohistochemistry.  On quantitation of the ER and DC, ER was positive 99% of the cells staining with strong intensity.  DC was subsequently noted to be positive with 15% of the cells staining with moderate intensity.       There was also a  HER2 FISH performed, and the HER2 FISH showed average number of HER2 signals per nucleus 2.6.  Average number of CEN17 signals 1.7 with a ratio of 1.6, VASILIY negative for biopsy A.  For biopsy B, the HER2 signals per nucleus 2.9.  Average number CEN17 signals per nucleus 1.7 with a ratio of 1.7, VASILIY negative.  Overall, by 2018 ASCO/CAP guidelines, this tumor is HER2 negative.     She was enrolled in I-SPY2 to Durvulumab, Taxol and Olaparib arm. Please see Dr. Guzman's previous notes for further details on the patient's history. She started ddAC on 5/20. Presents today for C4.    Interval History:  Mily is here for follow up.  No vomiting this past cycle. She is taking Zyprexa at bedtime. She is also taking Zofran every 8 hours starting day 4. She is not needing any additional antiemetics aside from Emend on day 4th. Appetite is low for first few days then improves. She has a lot of fatigue first few days as well. No mouth sores. She takes zantac for heartburn. She is having some constipation but takes miralax which is managing. No headaches.     Denies any dizziness, dyspnea, chest pain. No wheezing. Uses flovent occasionally for asthma. No neuropathy. No leg swelling. Denies pain. Denies any fevers. She felt some mild urinary discomfort, but drank cranberry juice and this resolved. Not working while on AC. Able to do some light housework. Does rest/sleep more during the first few days but then energy improves.     She has no pain, no fatigue, no depression, no anxiety.     Current Outpatient Medications   Medication Sig Dispense Refill     ADVIL 200 MG OR TABS 1 TABLET EVERY 4 TO 6 HOURS AS NEEDED 0 0     albuterol (2.5 MG/3ML) 0.083% nebulizer solution Take 3 mLs by nebulization once for 1 dose. 3 mL 0     albuterol (VENTOLIN HFA) 108 (90 Base) MCG/ACT Inhaler INHALE 1-2 PUFFS INTO THE LUNGS EVERY 4 HOURS AS NEEDED FOR SHORTNESS OF BREATH OR DIFFICULTY BREATHING. 18 g PRN     aprepitant (EMEND) 80 MG capsule Take 1  capsule (80 mg) by mouth daily Days 4 and 5 of each cycle of AC. 2 capsule 1     dexamethasone (DECADRON) 4 MG tablet Take 8 mg by mouth daily for 3 days Start on Day 2.. 6 tablet 3     fluticasone (FLOVENT HFA) 110 MCG/ACT inhaler Inhale 2 puffs into the lungs 2 times daily 1 Inhaler PRN     levothyroxine (SYNTHROID) 112 MCG tablet Take 1 tablet (112 mcg) by mouth daily 30 tablet 1     lisinopril (PRINIVIL/ZESTRIL) 5 MG tablet Take 1 tablet (5 mg) by mouth daily 90 tablet 3     LORazepam (ATIVAN) 0.5 MG tablet Take 1 tablet (0.5 mg) by mouth every 4 hours as needed (Anxiety, Nausea/Vomiting or Sleep) 30 tablet 3     OLANZapine (ZYPREXA) 5 MG tablet Take 1 tablet (5 mg) by mouth At Bedtime 30 tablet 3     ondansetron (ZOFRAN) 8 MG tablet Take 1 tablet (8 mg) by mouth every 8 hours as needed for nausea 60 tablet 3     order for DME Cranial prosthesis 1 Units 1     prochlorperazine (COMPAZINE) 10 MG tablet Take 1 tablet (10 mg) by mouth every 6 hours as needed (Nausea/Vomiting) 60 tablet 3     sertraline (ZOLOFT) 100 MG tablet Take 1 tablet (100 mg) by mouth daily 30 tablet 1     sodium chloride (OCEAN) 0.65 % nasal spray Spray in both nostrils four times daily 30 mL 3     STATIN NOT PRESCRIBED (INTENTIONAL) Please choose reason not prescribed, below       triamcinolone (ARISTOCORT HP) 0.5 % external cream Apply topically 2 times daily 30 g 3     vitamin B6 (PYRIDOXINE) 100 MG tablet Take 1 tablet (100 mg) by mouth daily 30 tablet 3     Physical Examination:  General: The patient is a pleasant female in no acute distress. ECOG 1  LMP 11/02/2014   Wt Readings from Last 10 Encounters:   06/18/19 65.6 kg (144 lb 11.2 oz)   06/10/19 64.8 kg (142 lb 14.4 oz)   06/04/19 67.1 kg (147 lb 14.4 oz)   05/20/19 69.9 kg (154 lb)   05/14/19 69.5 kg (153 lb 4.8 oz)   05/06/19 69.4 kg (153 lb)   05/06/19 68.9 kg (152 lb)   04/30/19 69.8 kg (153 lb 12.8 oz)   04/29/19 69.1 kg (152 lb 4.8 oz)   04/22/19 70.4 kg (155 lb 4.8 oz)   HEENT:  EOMI, PERRL. Sclerae are anicteric. Oral mucosa is pink and moist with no lesions or thrush.   Lymph: No palpable cervical, supraclavicular, or axillary lymphadenopathy.  Heart: Regular rate and rhythm.   Lungs: Clear to auscultation bilaterally.   Breast: Right breast with 2 x 2 cm mass in upper outer quadrant.  Abdomen: Bowel sounds present, soft, nontender with no palpable hepatosplenomegaly or masses.   Extremities: No lower extremity edema noted bilaterally.   Neuro: Cranial nerves II through XII are grossly intact.  Skin: Maculopapular rash of bilateral LE is resolving, minimally present now. No other rashes, petechiae, or bruising noted on exposed skin.   Laboratory Data:  Results for GALA ROLAND (MRN [id number]) as of 7/2/2019 10:19   7/2/2019 09:22   Sodium 137   Potassium 3.9   Chloride 105   Carbon Dioxide 27   Urea Nitrogen 4 (L)   Creatinine 0.59   GFR Estimate >90   GFR Estimate If Black >90   Calcium 8.5   Anion Gap 6   Albumin 3.3 (L)   Protein Total 6.5 (L)   Bilirubin Total 0.2   Alkaline Phosphatase 92   ALT 26   AST 20   Glucose 111 (H)   WBC 11.1 (H)   Hemoglobin 10.9 (L)   Hematocrit 33.7 (L)   Platelet Count 262   RBC Count 3.59 (L)   MCV 94   MCH 30.4   MCHC 32.3   RDW 14.1   Diff Method Manual Differential   % Neutrophils 80.7   % Lymphocytes 7.0   % Monocytes 6.1   % Eosinophils 0.9   % Basophils 0.9   % Metamyelocytes 4.4   Absolute Neutrophil 9.0 (H)   Absolute Lymphocytes 0.8   Absolute Monocytes 0.7   Absolute Eosinophils 0.1   Absolute Basophils 0.1   Absolute Metamyelocytes 0.5 (H)       Assessment and Plan:  Kathy Roland is a 53 year old female with clinical stage II, T3N0MX, invasive ductal breast cancer in the upper outer quadrant of right breast, multifocal, measuring 8.9 cm on MRI. ER positive and HER2 negative. She is enrolled in I-SPY 2 and on durvalumab every 28 days, weekly Taxol, and olaparib arm. Started AC on 5/20/2019     ECOG 1 (due to changing her work  schedule due to fatigue)    Right breast cancer, ER/TX positive, HER2 negative: She was enrolled in I-SPY2 and started cycle 1 Durvulumab, Taxol and Olaparib on 2/25/19. Completed 12 weeks and started AC on 5/20. Had N/V with cycle w requiring adjustments to anti-emetics and IVF. Here for C4.  Home antiemetics adjusted to be Zyprexa 5mg at bedtime, Zofran 8mg every 8 hours starting D4, Compazine every 6 hour PRN, Lorazepam every 4 hours PRN.  Arepitant 80 mg po on D4, 5  --Tolerating much better on current anti-emetic regimen. Will return for IVF and anti-emetics on 7/5  --Labs reviewed. Leukocytosis 2/2 to neulasta. hgb trending down, 2/2 chemotherapy.  --Proceed with C4  --Spoke with Jena Berman about setting up [facility] to see Dr. Yousif again.    Abnormal echocardiogram: She saw Dr. Alanis today, 5/6/19, and Echo showed improvement. She remains on lisinopril 5 mg daily.   Neuropathy: Resolved  Maculopapular rash of BLE, grade 1: Resolving. Continue triamcinolone on affected areas and emollient cream  Nasal mucositis, grade 1: Resolved  Hx depression: On Zoloft. No concerns today. Refill provided  Hx asthma: On flovent QDAY and albuterol prn  Hx hypothyroidism: Levothyroxine increased from 88mcg daily to 112mcg daily by Dr. Guzman on 4/16/18 due to TSH elevation and symptomatic. Last TSH 6.39 on 6/18, decreased from 11.83 on 6/10. T4 WNL at 1.08. Should be rechecked around end of July. Continue current dose. Refill provided today.    Kerry Norwood PA-C  [facility]  [address] [place] [address] [phone]

## 2019-07-03 ENCOUNTER — CARE COORDINATION (OUTPATIENT)
Dept: ONCOLOGY | Facility: CLINIC | Age: 54
End: 2019-07-03

## 2019-07-03 ENCOUNTER — PATIENT OUTREACH (OUTPATIENT)
Dept: ONCOLOGY | Facility: CLINIC | Age: 54
End: 2019-07-03

## 2019-07-03 NOTE — PROGRESS NOTES
Spoke to patient post C4 AC and is doing well no c/o nausea and has taken steroids and resting on couch watching TV. Patient is able to drink and eat w/o difficulty. Has IVF scheduled this Friday and instructed patient to return call to Nurse Triage for any issues.  Answered all patient's questions and verbalized understanding. Mily Mortensen RN, BSN.

## 2019-07-03 NOTE — NURSING NOTE
[id number]: Informed Consent Note   This patient was reconsented due to activation of new amendment.    The Olaparib/Durvalumab treatment consent form, including purpose, risks and benefits, was reviewed with Kathy Lei, and all questions were answered before she signed the consent form. The patient understands that the study involves an active treatment phase as well as a post-treatment follow up phase.     Present during the discussion was Kathy and  Vincenzo. A copy of the signed form was provided to the patient. No procedures specific to this study were performed prior to the patient signing the consent form.    Consent Version Date: 5/15/2019  Consent obtained by: Lorne Leos    Date: 7/2/2019  Form 503.03.01 (Version 2)     Effective date: 01AUG2018     Next Review Date: 01AUG2020 [id number]: Study Visit Note   Subject name: Kathy Lei     Visit: AC#4    Did the study visit occur within the appropriate window allowed by the protocol? yes    Since the last study visit, She has been doing okay. Nausea has been under control, no vomiting this cycle. Significant fatigue for the first few days that improves. Reports some mild urinary discomfort that resolves. Otherwise, no new complaints since most recent study visit.    I have personally interviewed Kathy Lei and reviewed her medical record for adverse events and concomitant medications and these have been recorded on the corresponding logs in Kathy Lei's research file.     Kathy Lei was given the opportunity to ask any trial related questions.  Please see provider progress note for physical exam and other clinical information. Labs were reviewed - any significant lab values were addressed and reviewed.    Lorne Leos RN     Pager: [phone]

## 2019-07-05 ENCOUNTER — INFUSION THERAPY VISIT (OUTPATIENT)
Dept: ONCOLOGY | Facility: CLINIC | Age: 54
End: 2019-07-05
Attending: INTERNAL MEDICINE
Payer: COMMERCIAL

## 2019-07-05 VITALS
RESPIRATION RATE: 18 BRPM | OXYGEN SATURATION: 98 % | TEMPERATURE: 98.5 F | HEART RATE: 89 BPM | DIASTOLIC BLOOD PRESSURE: 68 MMHG | SYSTOLIC BLOOD PRESSURE: 128 MMHG

## 2019-07-05 DIAGNOSIS — R11.2 CHEMOTHERAPY INDUCED NAUSEA AND VOMITING: ICD-10-CM

## 2019-07-05 DIAGNOSIS — Z17.0 MALIGNANT NEOPLASM OF UPPER-OUTER QUADRANT OF RIGHT BREAST IN FEMALE, ESTROGEN RECEPTOR POSITIVE (H): Primary | ICD-10-CM

## 2019-07-05 DIAGNOSIS — C50.411 MALIGNANT NEOPLASM OF UPPER-OUTER QUADRANT OF RIGHT BREAST IN FEMALE, ESTROGEN RECEPTOR POSITIVE (H): Primary | ICD-10-CM

## 2019-07-05 DIAGNOSIS — T45.1X5A CHEMOTHERAPY INDUCED NAUSEA AND VOMITING: ICD-10-CM

## 2019-07-05 PROCEDURE — 96361 HYDRATE IV INFUSION ADD-ON: CPT

## 2019-07-05 PROCEDURE — 96375 TX/PRO/DX INJ NEW DRUG ADDON: CPT

## 2019-07-05 PROCEDURE — 96365 THER/PROPH/DIAG IV INF INIT: CPT

## 2019-07-05 PROCEDURE — 25000128 H RX IP 250 OP 636: Mod: ZF | Performed by: PHYSICIAN ASSISTANT

## 2019-07-05 RX ORDER — ONDANSETRON 2 MG/ML
8 INJECTION INTRAMUSCULAR; INTRAVENOUS ONCE
Status: COMPLETED | OUTPATIENT
Start: 2019-07-05 | End: 2019-07-05

## 2019-07-05 RX ORDER — DEXAMETHASONE SODIUM PHOSPHATE 4 MG/ML
10 INJECTION, SOLUTION INTRA-ARTICULAR; INTRALESIONAL; INTRAMUSCULAR; INTRAVENOUS; SOFT TISSUE ONCE
Status: CANCELLED
Start: 2019-07-05

## 2019-07-05 RX ORDER — HEPARIN SODIUM (PORCINE) LOCK FLUSH IV SOLN 100 UNIT/ML 100 UNIT/ML
5 SOLUTION INTRAVENOUS ONCE
Status: DISCONTINUED | OUTPATIENT
Start: 2019-07-05 | End: 2019-07-05 | Stop reason: HOSPADM

## 2019-07-05 RX ORDER — ONDANSETRON 2 MG/ML
8 INJECTION INTRAMUSCULAR; INTRAVENOUS ONCE
Status: CANCELLED
Start: 2019-07-05

## 2019-07-05 RX ADMIN — DEXAMETHASONE SODIUM PHOSPHATE: 10 INJECTION, SOLUTION INTRAMUSCULAR; INTRAVENOUS at 15:23

## 2019-07-05 RX ADMIN — SODIUM CHLORIDE 1000 ML: 9 INJECTION, SOLUTION INTRAVENOUS at 14:47

## 2019-07-05 RX ADMIN — ONDANSETRON 8 MG: 2 INJECTION INTRAMUSCULAR; INTRAVENOUS at 15:24

## 2019-07-05 NOTE — PATIENT INSTRUCTIONS
Contact Numbers    [facility] Main Line: [phone]  [facility] Triage and after hours / weekends / holidays:  [phone]      Please call the triage or after hours line if you experience a temperature greater than or equal to 100.5, shaking chills, have uncontrolled nausea, vomiting and/or diarrhea, dizziness, shortness of breath, chest pain, bleeding, unexplained bruising, or if you have any other new/concerning symptoms, questions or concerns.      If you are having any concerning symptoms or wish to speak to a provider before your next infusion visit, please call your care coordinator or triage to notify them so we can adequately serve you.     If you need a refill on a narcotic prescription or other medication, please call before your infusion appointment.         July 2019 Sunday Monday Tuesday Wednesday Thursday Friday Saturday        1     2    [facility] DRAW   9:00 AM   (15 min.)    [facility] DRAW   [facility] Lab Draw    [facility] RETURN   9:25 AM   (50 min.)   Kerry Norwood PA   [facility]  10:30 AM   (120 min.)    ONCOLOGY INFUSION   [facility] 3     4     5    [facility] ONC INFUSION 120   2:30 PM   (120 min.)    ONCOLOGY INFUSION   [facility] 6       7     8     9     10     11     12     13       14     15     16     17     18     19     20       21     22     23     24     25     26     27       28     29     30     31                                August 2019 Sunday Monday Tuesday Wednesday Thursday Friday Saturday                       1     2     3       4     5     6     7     8     9     10       11     12     13     14     15     16     17       18     19     20     21     22     23     24       25     26     27     28     29     30     31                     Lab Results:  No results found for this or any previous visit (from the past 12 hour(s)).

## 2019-07-05 NOTE — PROGRESS NOTES
Infusion Nursing Note:  Kathy Lei presents today for IVF, and antiemetics.    Patient seen by provider today: No   present during visit today: Not Applicable.    Note: Patient reports feeling well today. Only complaint is constipation for 2 days. The patient reports that she plans to start Miralax today. Patient reports that she took oral emend today, and does not need IV emend.    Intravenous Access:  Implanted Port.    Treatment Conditions:  Not Applicable.      Post Infusion Assessment:  Patient tolerated infusion without incident. Patient given 0.9NS 1000ml bolus, zofran and IV decadron per patient request and per standing therapy plan.  Blood return noted pre and post infusion.  Site patent and intact, free from redness, edema or discomfort.  No evidence of extravasations.  Access discontinued per protocol.       Discharge Plan:   Patient declined prescription refills.  Discharge instructions reviewed with: Patient and Family.  Patient and/or family verbalized understanding of discharge instructions and all questions answered.  AVS to patient via [employer].  Patient's chemotherapy regimen is complete, patient to follow up with plastic surgeon regarding surgery.  Patient discharged in stable condition accompanied by: self and .  Departure Mode: Ambulatory.  Face to Face time: 0 min.    Soila Mckeon RN

## 2019-07-08 ENCOUNTER — APPOINTMENT (OUTPATIENT)
Dept: LAB | Facility: CLINIC | Age: 54
End: 2019-07-08
Attending: INTERNAL MEDICINE
Payer: COMMERCIAL

## 2019-07-08 ENCOUNTER — NURSE TRIAGE (OUTPATIENT)
Dept: NURSING | Facility: CLINIC | Age: 54
End: 2019-07-08

## 2019-07-08 ENCOUNTER — ONCOLOGY VISIT (OUTPATIENT)
Dept: ONCOLOGY | Facility: CLINIC | Age: 54
End: 2019-07-08
Attending: INTERNAL MEDICINE
Payer: COMMERCIAL

## 2019-07-08 ENCOUNTER — TELEPHONE (OUTPATIENT)
Dept: ONCOLOGY | Facility: CLINIC | Age: 54
End: 2019-07-08

## 2019-07-08 VITALS
SYSTOLIC BLOOD PRESSURE: 111 MMHG | OXYGEN SATURATION: 95 % | TEMPERATURE: 98.3 F | HEART RATE: 106 BPM | DIASTOLIC BLOOD PRESSURE: 69 MMHG | RESPIRATION RATE: 16 BRPM | HEIGHT: 66 IN | WEIGHT: 139.4 LBS | BODY MASS INDEX: 22.4 KG/M2

## 2019-07-08 DIAGNOSIS — Z17.0 MALIGNANT NEOPLASM OF UPPER-OUTER QUADRANT OF RIGHT BREAST IN FEMALE, ESTROGEN RECEPTOR POSITIVE (H): Primary | ICD-10-CM

## 2019-07-08 DIAGNOSIS — Z17.0 MALIGNANT NEOPLASM OF UPPER-OUTER QUADRANT OF RIGHT BREAST IN FEMALE, ESTROGEN RECEPTOR POSITIVE (H): ICD-10-CM

## 2019-07-08 DIAGNOSIS — R50.9 FEVER, UNSPECIFIED FEVER CAUSE: ICD-10-CM

## 2019-07-08 DIAGNOSIS — C50.411 MALIGNANT NEOPLASM OF UPPER-OUTER QUADRANT OF RIGHT BREAST IN FEMALE, ESTROGEN RECEPTOR POSITIVE (H): ICD-10-CM

## 2019-07-08 DIAGNOSIS — C50.411 MALIGNANT NEOPLASM OF UPPER-OUTER QUADRANT OF RIGHT BREAST IN FEMALE, ESTROGEN RECEPTOR POSITIVE (H): Primary | ICD-10-CM

## 2019-07-08 DIAGNOSIS — R50.9 FEVER, UNSPECIFIED FEVER CAUSE: Primary | ICD-10-CM

## 2019-07-08 LAB
ALBUMIN SERPL-MCNC: 3.5 G/DL (ref 3.4–5)
ALBUMIN UR-MCNC: NEGATIVE MG/DL
ALP SERPL-CCNC: 106 U/L (ref 40–150)
ALT SERPL W P-5'-P-CCNC: 14 U/L (ref 0–50)
ANION GAP SERPL CALCULATED.3IONS-SCNC: 5 MMOL/L (ref 3–14)
APPEARANCE UR: CLEAR
AST SERPL W P-5'-P-CCNC: 10 U/L (ref 0–45)
BASOPHILS # BLD AUTO: 0 10E9/L (ref 0–0.2)
BASOPHILS NFR BLD AUTO: 0 %
BILIRUB SERPL-MCNC: 0.5 MG/DL (ref 0.2–1.3)
BILIRUB UR QL STRIP: NEGATIVE
BUN SERPL-MCNC: 10 MG/DL (ref 7–30)
CALCIUM SERPL-MCNC: 8.8 MG/DL (ref 8.5–10.1)
CHLORIDE SERPL-SCNC: 104 MMOL/L (ref 94–109)
CO2 SERPL-SCNC: 27 MMOL/L (ref 20–32)
COLOR UR AUTO: YELLOW
CREAT SERPL-MCNC: 0.52 MG/DL (ref 0.52–1.04)
DIFFERENTIAL METHOD BLD: ABNORMAL
EOSINOPHIL # BLD AUTO: 0 10E9/L (ref 0–0.7)
EOSINOPHIL NFR BLD AUTO: 0 %
ERYTHROCYTE [DISTWIDTH] IN BLOOD BY AUTOMATED COUNT: 13.8 % (ref 10–15)
GFR SERPL CREATININE-BSD FRML MDRD: >90 ML/MIN/{1.73_M2}
GLUCOSE SERPL-MCNC: 98 MG/DL (ref 70–99)
GLUCOSE UR STRIP-MCNC: NEGATIVE MG/DL
HCT VFR BLD AUTO: 32.7 % (ref 35–47)
HGB BLD-MCNC: 10.9 G/DL (ref 11.7–15.7)
HGB UR QL STRIP: ABNORMAL
KETONES UR STRIP-MCNC: NEGATIVE MG/DL
LEUKOCYTE ESTERASE UR QL STRIP: NEGATIVE
LYMPHOCYTES # BLD AUTO: 0.4 10E9/L (ref 0.8–5.3)
LYMPHOCYTES NFR BLD AUTO: 15.8 %
MCH RBC QN AUTO: 30.8 PG (ref 26.5–33)
MCHC RBC AUTO-ENTMCNC: 33.3 G/DL (ref 31.5–36.5)
MCV RBC AUTO: 92 FL (ref 78–100)
MONOCYTES # BLD AUTO: 0.1 10E9/L (ref 0–1.3)
MONOCYTES NFR BLD AUTO: 2.6 %
MUCOUS THREADS #/AREA URNS LPF: PRESENT /LPF
NEUTROPHILS # BLD AUTO: 2 10E9/L (ref 1.6–8.3)
NEUTROPHILS NFR BLD AUTO: 81.6 %
NITRATE UR QL: NEGATIVE
PH UR STRIP: 6 PH (ref 5–7)
PLATELET # BLD AUTO: 170 10E9/L (ref 150–450)
PLATELET # BLD EST: ABNORMAL 10*3/UL
POTASSIUM SERPL-SCNC: 3.8 MMOL/L (ref 3.4–5.3)
PROT SERPL-MCNC: 6.6 G/DL (ref 6.8–8.8)
RBC # BLD AUTO: 3.54 10E12/L (ref 3.8–5.2)
RBC #/AREA URNS AUTO: 4 /HPF (ref 0–2)
RBC MORPH BLD: NORMAL
SODIUM SERPL-SCNC: 136 MMOL/L (ref 133–144)
SOURCE: ABNORMAL
SP GR UR STRIP: 1.01 (ref 1–1.03)
SQUAMOUS #/AREA URNS AUTO: <1 /HPF (ref 0–1)
UROBILINOGEN UR STRIP-MCNC: 0 MG/DL (ref 0–2)
WBC # BLD AUTO: 2.5 10E9/L (ref 4–11)
WBC #/AREA URNS AUTO: 2 /HPF (ref 0–5)

## 2019-07-08 PROCEDURE — 25000128 H RX IP 250 OP 636: Mod: ZF | Performed by: PHYSICIAN ASSISTANT

## 2019-07-08 PROCEDURE — 80053 COMPREHEN METABOLIC PANEL: CPT | Performed by: PHYSICIAN ASSISTANT

## 2019-07-08 PROCEDURE — G0463 HOSPITAL OUTPT CLINIC VISIT: HCPCS | Mod: ZF

## 2019-07-08 PROCEDURE — 81001 URINALYSIS AUTO W/SCOPE: CPT | Performed by: PHYSICIAN ASSISTANT

## 2019-07-08 PROCEDURE — 85025 COMPLETE CBC W/AUTO DIFF WBC: CPT | Performed by: PHYSICIAN ASSISTANT

## 2019-07-08 PROCEDURE — 99214 OFFICE O/P EST MOD 30 MIN: CPT | Mod: ZP | Performed by: PHYSICIAN ASSISTANT

## 2019-07-08 PROCEDURE — 87040 BLOOD CULTURE FOR BACTERIA: CPT | Performed by: PHYSICIAN ASSISTANT

## 2019-07-08 RX ORDER — HEPARIN SODIUM (PORCINE) LOCK FLUSH IV SOLN 100 UNIT/ML 100 UNIT/ML
5 SOLUTION INTRAVENOUS
Status: COMPLETED | OUTPATIENT
Start: 2019-07-08 | End: 2019-07-08

## 2019-07-08 RX ADMIN — HEPARIN SODIUM (PORCINE) LOCK FLUSH IV SOLN 100 UNIT/ML 5 ML: 100 SOLUTION at 11:26

## 2019-07-08 ASSESSMENT — PAIN SCALES - GENERAL: PAINLEVEL: NO PAIN (0)

## 2019-07-08 ASSESSMENT — MIFFLIN-ST. JEOR: SCORE: 1243.74

## 2019-07-08 NOTE — TELEPHONE ENCOUNTER
Mily called in to report that she has been febrile. She last checked her temp this morning, which was 100.8. She took advil this morning and is now afebrile.     Per Kerry, she should be seen today with labs. Mily can get here by 1130am. Request sent to scheduling.    Lore Mooney RN

## 2019-07-08 NOTE — PROGRESS NOTES
Oncology/Hematology Visit Note  Jul 8, 2019    Reason for Visit: follow up of right breast cancer. Add on visit for fever    History of Present Illness: Kathy Lei is a 53 year old female with recent diagnosis of ER/CO positive, HER2 negative, grade 2 breast cancer. Mily presented between Christmas and New Years of 2018 with new sharp pains in the upper outer quadrant of the right breast.  Diagnostic mammogram on 1/8/19 with grouped coarse heterogeneous calcifications at 11:30 position, irregular mass at 9:00 position. US with irregular mass at 11:00 position, 1.0 x 0.9 x 1.6cm. At 9:30 position, irregular mass, 3.2 x 0.5, x 1.3 cm. Contrast mammogram was subsequently performed and the contrast mammogram showed a large area of mass and non-mass-like enhancement in the upper outer right breast measuring 7.2 cm in greatest dimension.       The pathology showed part A, breast needle biopsy 11 o'clock, 6 cm from the nipple, invasive mammary carcinoma, no special type, ductal (and mucinous) [place] grade 2.  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  Calcifications were associated with the DCIS.  There was a focus suspicious for lymphovascular invasion.  Invasive carcinoma was estrogen receptor positive and progesterone receptor negative by immunohistochemistry.  In part B, breast right, 8 o'clock, 4 cm from the nipple, ultrasound-guided core biopsy, invasive mammary carcinoma, no special type, ductal (and mucinous) Donna grade 2, occasional calcifications were noted.  Invasive carcinoma was estrogen receptor positive and progesterone receptor negative by immunohistochemistry.  On quantitation of the ER and CO, ER was positive 99% of the cells staining with strong intensity.  CO was subsequently noted to be positive with 15% of the cells staining with moderate intensity.       There was also a HER2 FISH performed, and the HER2 FISH showed average number of  "HER2 signals per nucleus 2.6.  Average number of CEN17 signals 1.7 with a ratio of 1.6, VASILIY negative for biopsy A.  For biopsy B, the HER2 signals per nucleus 2.9.  Average number CEN17 signals per nucleus 1.7 with a ratio of 1.7, VASILIY negative.  Overall, by 2018 ASCO/CAP guidelines, this tumor is HER2 negative.      She was enrolled in I-SPY2 to Durvulumab, Taxol and Olaparib arm. Please see Dr. Guzman's previous notes for further details on the patient's history. She started ddAC on 5/20. S/p C4 on 7/2. Called triage today with report of temp 100.8F.     Interval History:  She had a temp last night of 100.4F. She took Advil and fever resolved. She then had a temp again of 100.8F this morning with some sweats but no chills. She took another Advil and fever resolved. She feels some intermittent \"presssure\" sensation in bladder, but no dysuria. No cough or sinus symptoms. She denies any mucositis, abdominal pain, diarrhea. She is taking zofran and has not had any significant nausea. Denies any rashes or redness around port. Feeling some fatigue after AC, but otherwise nothing unusual. Denies any dizziness, dyspnea.     Current Outpatient Medications   Medication Sig Dispense Refill     ADVIL 200 MG OR TABS 1 TABLET EVERY 4 TO 6 HOURS AS NEEDED 0 0     albuterol (2.5 MG/3ML) 0.083% nebulizer solution Take 3 mLs by nebulization once for 1 dose. 3 mL 0     albuterol (VENTOLIN HFA) 108 (90 Base) MCG/ACT Inhaler INHALE 1-2 PUFFS INTO THE LUNGS EVERY 4 HOURS AS NEEDED FOR SHORTNESS OF BREATH OR DIFFICULTY BREATHING. 18 g PRN     aprepitant (EMEND) 80 MG capsule Take 1 capsule (80 mg) by mouth daily Days 4 and 5 of each cycle of AC. 2 capsule 0     dexamethasone (DECADRON) 4 MG tablet Take 8 mg by mouth daily for 3 days Start on Day 2.. 6 tablet 3     fluticasone (FLOVENT HFA) 110 MCG/ACT inhaler Inhale 2 puffs into the lungs 2 times daily 1 Inhaler PRN     levothyroxine (SYNTHROID) 112 MCG tablet Take 1 tablet (112 mcg) by " mouth daily 30 tablet 1     lisinopril (PRINIVIL/ZESTRIL) 5 MG tablet Take 1 tablet (5 mg) by mouth daily 90 tablet 3     LORazepam (ATIVAN) 0.5 MG tablet Take 1 tablet (0.5 mg) by mouth every 4 hours as needed (Anxiety, Nausea/Vomiting or Sleep) 30 tablet 3     OLANZapine (ZYPREXA) 5 MG tablet Take 1 tablet (5 mg) by mouth At Bedtime 30 tablet 0     ondansetron (ZOFRAN) 8 MG tablet Take 1 tablet (8 mg) by mouth every 8 hours as needed for nausea 60 tablet 3     order for DME Cranial prosthesis 1 Units 1     prochlorperazine (COMPAZINE) 10 MG tablet Take 1 tablet (10 mg) by mouth every 6 hours as needed (Nausea/Vomiting) 60 tablet 3     sertraline (ZOLOFT) 100 MG tablet Take 1 tablet (100 mg) by mouth daily 30 tablet 1     sodium chloride (OCEAN) 0.65 % nasal spray Spray in both nostrils four times daily 30 mL 3     STATIN NOT PRESCRIBED (INTENTIONAL) Please choose reason not prescribed, below       triamcinolone (ARISTOCORT HP) 0.5 % external cream Apply topically 2 times daily (Patient not taking: Reported on 7/2/2019) 30 g 3     vitamin B6 (PYRIDOXINE) 100 MG tablet Take 1 tablet (100 mg) by mouth daily (Patient not taking: Reported on 7/2/2019) 30 tablet 3       Physical Examination:  General: The patient is a pleasant female in no acute distress.  LMP 11/02/2014   Wt Readings from Last 10 Encounters:   07/02/19 65.5 kg (144 lb 6.4 oz)   06/18/19 65.6 kg (144 lb 11.2 oz)   06/10/19 64.8 kg (142 lb 14.4 oz)   06/04/19 67.1 kg (147 lb 14.4 oz)   05/20/19 69.9 kg (154 lb)   05/14/19 69.5 kg (153 lb 4.8 oz)   05/06/19 69.4 kg (153 lb)   05/06/19 68.9 kg (152 lb)   04/30/19 69.8 kg (153 lb 12.8 oz)   04/29/19 69.1 kg (152 lb 4.8 oz)     HEENT: EOMI, PERRL. Sclerae are anicteric. Oral mucosa is pink and moist with no lesions or thrush.   Lymph: Neck is supple with no lymphadenopathy in the cervical or supraclavicular areas.   Heart: Regular rate and rhythm.   Lungs: Clear to auscultation bilaterally.   Abdomen: Bowel  sounds present, soft, nontender, nondistended  Extremities: No lower extremity edema noted bilaterally.   Neuro: Cranial nerves II through XII are grossly intact.  Skin: No rashes, petechiae, or bruising noted on exposed skin.    Laboratory Data:  Results for GALA ROLAND (MRN [id number]) as of 7/8/2019 14:08   7/8/2019 11:33   Sodium 136   Potassium 3.8   Chloride 104   Carbon Dioxide 27   Urea Nitrogen 10   Creatinine 0.52   GFR Estimate >90   GFR Estimate If Black >90   Calcium 8.8   Anion Gap 5   Albumin 3.5   Protein Total 6.6 (L)   Bilirubin Total 0.5   Alkaline Phosphatase 106   ALT 14   AST 10   Glucose 98   WBC 2.5 (L)   Hemoglobin 10.9 (L)   Hematocrit 32.7 (L)   Platelet Count 170   RBC Count 3.54 (L)   MCV 92   MCH 30.8   MCHC 33.3   RDW 13.8   Diff Method Manual Differential   % Neutrophils 81.6   % Lymphocytes 15.8   % Monocytes 2.6   % Eosinophils 0.0   % Basophils 0.0   Absolute Neutrophil 2.0   Absolute Lymphocytes 0.4 (L)   Absolute Monocytes 0.1   Absolute Eosinophils 0.0   Absolute Basophils 0.0      7/8/2019 11:36   Color Urine Yellow   Appearance Urine Clear   Glucose Urine Negative   Bilirubin Urine Negative   Ketones Urine Negative   Specific Gravity Urine 1.015   pH Urine 6.0   Protein Albumin Urine Negative   Urobilinogen mg/dL 0.0   Nitrite Urine Negative   Blood Urine Moderate (A)   Leukocyte Esterase Urine Negative   Source Midstream Urine   WBC Urine 2   RBC Urine 4 (H)   Squamous Epithelial /HPF Urine <1   Mucous Urine Present (A)     Assessment and Plan:  Kathy Roland is a 53 year old female with clinical stage II, T3N0MX, invasive ductal breast cancer in the upper outer quadrant of right breast, multifocal, measuring 8.9 cm on MRI. ER positive and HER2 negative. She is enrolled in I-SPY 2 and on durvalumab every 28 days, weekly Taxol, and olaparib arm. Started AC on 5/20/2019. S/p C4 on 7/2. Called triage today with report of temp 100.8F.     Fever: Temp up to 100.8F  measured at home. Unclear etiology. No localizing symptoms other than some bladder discomfort, but UA negative for infection. Afebrile in clinic. Non-neutropenic. ANC 2.0. She is day 7 of AC with neulasta.   --BC from port drawn. Will monitor  --Supportive care for now. Recommend she check temp before taking tylenol or advil. Call if she is still spiking temp>100.4F in next 48 hours and we will recheck labs and consider further evaluation. She should also call if she starts experiencing weakness, dizziness, cough, nausea/vomiting, diarrhea, urinary symptoms or other concerns.      Right breast cancer, ER/AK positive, HER2 negative: She was enrolled in I-SPY2 and started cycle 1 Durvulumab, Taxol and Olaparib on 2/25/19. Completed 12 weeks and completed 4 cycles of AC.  --follow up with Dr. Lewis and labs on July 16th.   --follow up with Dr. Nica Norwood PA-C  [facility]  [address], [place] [id number] [phone]

## 2019-07-08 NOTE — LETTER
7/8/2019      RE: Kathy Lei  [address] [place] [address]       Oncology/Hematology Visit Note  Jul 8, 2019    Reason for Visit: follow up of right breast cancer. Add on visit for fever    History of Present Illness: Kathy Lei is a 53 year old female with recent diagnosis of ER/OR positive, HER2 negative, grade 2 breast cancer. Mily presented between Christmas and New Years of 2018 with new sharp pains in the upper outer quadrant of the right breast.  Diagnostic mammogram on 1/8/19 with grouped coarse heterogeneous calcifications at 11:30 position, irregular mass at 9:00 position. US with irregular mass at 11:00 position, 1.0 x 0.9 x 1.6cm. At 9:30 position, irregular mass, 3.2 x 0.5, x 1.3 cm. Contrast mammogram was subsequently performed and the contrast mammogram showed a large area of mass and non-mass-like enhancement in the upper outer right breast measuring 7.2 cm in greatest dimension.       The pathology showed part A, breast needle biopsy 11 o'clock, 6 cm from the nipple, invasive mammary carcinoma, no special type, ductal (and mucinous) [place] grade 2.  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  Calcifications were associated with the DCIS.  There was a focus suspicious for lymphovascular invasion.  Invasive carcinoma was estrogen receptor positive and progesterone receptor negative by immunohistochemistry.  In part B, breast right, 8 o'clock, 4 cm from the nipple, ultrasound-guided core biopsy, invasive mammary carcinoma, no special type, ductal (and mucinous) Donna grade 2, occasional calcifications were noted.  Invasive carcinoma was estrogen receptor positive and progesterone receptor negative by immunohistochemistry.  On quantitation of the ER and OR, ER was positive 99% of the cells staining with strong intensity.  OR was subsequently noted to be positive with 15% of the cells staining with moderate  "intensity.       There was also a HER2 FISH performed, and the HER2 FISH showed average number of HER2 signals per nucleus 2.6.  Average number of CEN17 signals 1.7 with a ratio of 1.6, VASILIY negative for biopsy A.  For biopsy B, the HER2 signals per nucleus 2.9.  Average number CEN17 signals per nucleus 1.7 with a ratio of 1.7, VASILIY negative.  Overall, by 2018 ASCO/CAP guidelines, this tumor is HER2 negative.      She was enrolled in I-SPY2 to Durvulumab, Taxol and Olaparib arm. Please see Dr. Guzman's previous notes for further details on the patient's history. She started ddAC on 5/20. S/p C4 on 7/2. Called triage today with report of temp 100.8F.     Interval History:  She had a temp last night of 100.4F. She took Advil and fever resolved. She then had a temp again of 100.8F this morning with some sweats but no chills. She took another Advil and fever resolved. She feels some intermittent \"presssure\" sensation in bladder, but no dysuria. No cough or sinus symptoms. She denies any mucositis, abdominal pain, diarrhea. She is taking zofran and has not had any significant nausea. Denies any rashes or redness around port. Feeling some fatigue after AC, but otherwise nothing unusual. Denies any dizziness, dyspnea.     Current Outpatient Medications   Medication Sig Dispense Refill     ADVIL 200 MG OR TABS 1 TABLET EVERY 4 TO 6 HOURS AS NEEDED 0 0     albuterol (2.5 MG/3ML) 0.083% nebulizer solution Take 3 mLs by nebulization once for 1 dose. 3 mL 0     albuterol (VENTOLIN HFA) 108 (90 Base) MCG/ACT Inhaler INHALE 1-2 PUFFS INTO THE LUNGS EVERY 4 HOURS AS NEEDED FOR SHORTNESS OF BREATH OR DIFFICULTY BREATHING. 18 g PRN     aprepitant (EMEND) 80 MG capsule Take 1 capsule (80 mg) by mouth daily Days 4 and 5 of each cycle of AC. 2 capsule 0     dexamethasone (DECADRON) 4 MG tablet Take 8 mg by mouth daily for 3 days Start on Day 2.. 6 tablet 3     fluticasone (FLOVENT HFA) 110 MCG/ACT inhaler Inhale 2 puffs into the lungs 2 " times daily 1 Inhaler PRN     levothyroxine (SYNTHROID) 112 MCG tablet Take 1 tablet (112 mcg) by mouth daily 30 tablet 1     lisinopril (PRINIVIL/ZESTRIL) 5 MG tablet Take 1 tablet (5 mg) by mouth daily 90 tablet 3     LORazepam (ATIVAN) 0.5 MG tablet Take 1 tablet (0.5 mg) by mouth every 4 hours as needed (Anxiety, Nausea/Vomiting or Sleep) 30 tablet 3     OLANZapine (ZYPREXA) 5 MG tablet Take 1 tablet (5 mg) by mouth At Bedtime 30 tablet 0     ondansetron (ZOFRAN) 8 MG tablet Take 1 tablet (8 mg) by mouth every 8 hours as needed for nausea 60 tablet 3     order for DME Cranial prosthesis 1 Units 1     prochlorperazine (COMPAZINE) 10 MG tablet Take 1 tablet (10 mg) by mouth every 6 hours as needed (Nausea/Vomiting) 60 tablet 3     sertraline (ZOLOFT) 100 MG tablet Take 1 tablet (100 mg) by mouth daily 30 tablet 1     sodium chloride (OCEAN) 0.65 % nasal spray Spray in both nostrils four times daily 30 mL 3     STATIN NOT PRESCRIBED (INTENTIONAL) Please choose reason not prescribed, below       triamcinolone (ARISTOCORT HP) 0.5 % external cream Apply topically 2 times daily (Patient not taking: Reported on 7/2/2019) 30 g 3     vitamin B6 (PYRIDOXINE) 100 MG tablet Take 1 tablet (100 mg) by mouth daily (Patient not taking: Reported on 7/2/2019) 30 tablet 3       Physical Examination:  General: The patient is a pleasant female in no acute distress.  LMP 11/02/2014   Wt Readings from Last 10 Encounters:   07/02/19 65.5 kg (144 lb 6.4 oz)   06/18/19 65.6 kg (144 lb 11.2 oz)   06/10/19 64.8 kg (142 lb 14.4 oz)   06/04/19 67.1 kg (147 lb 14.4 oz)   05/20/19 69.9 kg (154 lb)   05/14/19 69.5 kg (153 lb 4.8 oz)   05/06/19 69.4 kg (153 lb)   05/06/19 68.9 kg (152 lb)   04/30/19 69.8 kg (153 lb 12.8 oz)   04/29/19 69.1 kg (152 lb 4.8 oz)     HEENT: EOMI, PERRL. Sclerae are anicteric. Oral mucosa is pink and moist with no lesions or thrush.   Lymph: Neck is supple with no lymphadenopathy in the cervical or supraclavicular  areas.   Heart: Regular rate and rhythm.   Lungs: Clear to auscultation bilaterally.   Abdomen: Bowel sounds present, soft, nontender, nondistended  Extremities: No lower extremity edema noted bilaterally.   Neuro: Cranial nerves II through XII are grossly intact.  Skin: No rashes, petechiae, or bruising noted on exposed skin.    Laboratory Data:  Results for GALA ROLAND (MRN [id number]) as of 7/8/2019 14:08   7/8/2019 11:33   Sodium 136   Potassium 3.8   Chloride 104   Carbon Dioxide 27   Urea Nitrogen 10   Creatinine 0.52   GFR Estimate >90   GFR Estimate If Black >90   Calcium 8.8   Anion Gap 5   Albumin 3.5   Protein Total 6.6 (L)   Bilirubin Total 0.5   Alkaline Phosphatase 106   ALT 14   AST 10   Glucose 98   WBC 2.5 (L)   Hemoglobin 10.9 (L)   Hematocrit 32.7 (L)   Platelet Count 170   RBC Count 3.54 (L)   MCV 92   MCH 30.8   MCHC 33.3   RDW 13.8   Diff Method Manual Differential   % Neutrophils 81.6   % Lymphocytes 15.8   % Monocytes 2.6   % Eosinophils 0.0   % Basophils 0.0   Absolute Neutrophil 2.0   Absolute Lymphocytes 0.4 (L)   Absolute Monocytes 0.1   Absolute Eosinophils 0.0   Absolute Basophils 0.0      7/8/2019 11:36   Color Urine Yellow   Appearance Urine Clear   Glucose Urine Negative   Bilirubin Urine Negative   Ketones Urine Negative   Specific Gravity Urine 1.015   pH Urine 6.0   Protein Albumin Urine Negative   Urobilinogen mg/dL 0.0   Nitrite Urine Negative   Blood Urine Moderate (A)   Leukocyte Esterase Urine Negative   Source Midstream Urine   WBC Urine 2   RBC Urine 4 (H)   Squamous Epithelial /HPF Urine <1   Mucous Urine Present (A)     Assessment and Plan:  Kathy Roland is a 53 year old female with clinical stage II, T3N0MX, invasive ductal breast cancer in the upper outer quadrant of right breast, multifocal, measuring 8.9 cm on MRI. ER positive and HER2 negative. She is enrolled in I-SPY 2 and on durvalumab every 28 days, weekly Taxol, and olaparib arm. Started AC on  5/20/2019. S/p C4 on 7/2. Called triage today with report of temp 100.8F.     Fever: Temp up to 100.8F measured at home. Unclear etiology. No localizing symptoms other than some bladder discomfort, but UA negative for infection. Afebrile in clinic. Non-neutropenic. ANC 2.0. She is day 7 of AC with neulasta.   --BC from port drawn. Will monitor  --Supportive care for now. Recommend she check temp before taking tylenol or advil. Call if she is still spiking temp>100.4F in next 48 hours and we will recheck labs and consider further evaluation. She should also call if she starts experiencing weakness, dizziness, cough, nausea/vomiting, diarrhea, urinary symptoms or other concerns.      Right breast cancer, ER/FL positive, HER2 negative: She was enrolled in I-SPY2 and started cycle 1 Durvulumab, Taxol and Olaparib on 2/25/19. Completed 12 weeks and completed 4 cycles of AC.  --follow up with Dr. Lewis and labs on July 16th.   --follow up with Dr. Niac Norwood PA-C  [facility]  [address] [place] [address] [phone]

## 2019-07-08 NOTE — TELEPHONE ENCOUNTER
Clinic Action Needed:  Please contact patient at [phone].  Reason for Call:  Patient states she developed a fever yesterday.  This morning at 5AM oral temperature was 100.8 and she took Advil and fever broke.  Would like to know if she needs to be seen at clinic.   Routed to:  Provider Pool    Please close encounter when completed.

## 2019-07-08 NOTE — NURSING NOTE
"Oncology Rooming Note    July 8, 2019 11:46 AM   Kathy Lei is a 53 year old female who presents for:    Chief Complaint   Patient presents with     Port Draw     labs drawn from port by rn.  vs taken     Oncology Clinic Visit     Return: Breast CA     Initial Vitals: /69 (BP Location: Right arm, Patient Position: Sitting, Cuff Size: Adult Regular)   Pulse 106   Temp 98.3  F (36.8  C) (Oral)   Resp 16   Ht 1.676 m (5' 5.98\")   Wt 63.2 kg (139 lb 6.4 oz)   LMP 11/02/2014   SpO2 95%   BMI 22.51 kg/m   Estimated body mass index is 22.51 kg/m  as calculated from the following:    Height as of this encounter: 1.676 m (5' 5.98\").    Weight as of this encounter: 63.2 kg (139 lb 6.4 oz). Body surface area is 1.72 meters squared.  No Pain (0) Comment: Data Unavailable   Patient's last menstrual period was 11/02/2014.  Allergies reviewed: Yes  Medications reviewed: Yes    Medications: Medication refills not needed today.  Pharmacy name entered into [employer]:    [id number] - [place], [place] - [address] [id number] - [place], [place] - [address] AT [facility] [address]    Clinical concerns: Pt complains of intermittent fevers.  Kerry FLORES was notified.      Aide Villanueva CMA              "

## 2019-07-08 NOTE — TELEPHONE ENCOUNTER
Patient states she developed a fever yesterday.  This morning at 5AM oral temperature was 100.8 and she took Advil and fever broke.  Would like to know if she needs to be seen at clinic.  Routed to Provider Pool.

## 2019-07-08 NOTE — NURSING NOTE
"Chief Complaint   Patient presents with     Port Draw     labs drawn from port by rn.  vs taken     Port accessed with 20 gauge 3/4\" gripper needle and labs drawn by rn; urine collected and sent as well.  Port flushed with NS and heparin.  Pt tolerated well.  VS taken.  Pt checked in for next appt.    Katy Huizar RN    "

## 2019-07-09 DIAGNOSIS — Z17.0 MALIGNANT NEOPLASM OF UPPER-OUTER QUADRANT OF RIGHT BREAST IN FEMALE, ESTROGEN RECEPTOR POSITIVE (H): Primary | ICD-10-CM

## 2019-07-09 DIAGNOSIS — C50.411 MALIGNANT NEOPLASM OF UPPER-OUTER QUADRANT OF RIGHT BREAST IN FEMALE, ESTROGEN RECEPTOR POSITIVE (H): Primary | ICD-10-CM

## 2019-07-09 RX ORDER — CEFAZOLIN SODIUM 2 G/50ML
2 SOLUTION INTRAVENOUS
Status: CANCELLED | OUTPATIENT
Start: 2019-07-09

## 2019-07-09 RX ORDER — ACETAMINOPHEN 325 MG/1
975 TABLET ORAL ONCE
Status: CANCELLED | OUTPATIENT
Start: 2019-07-09 | End: 2019-07-09

## 2019-07-09 RX ORDER — CEFAZOLIN SODIUM 1 G/50ML
1 INJECTION, SOLUTION INTRAVENOUS SEE ADMIN INSTRUCTIONS
Status: CANCELLED | OUTPATIENT
Start: 2019-07-09

## 2019-07-10 ENCOUNTER — ANCILLARY PROCEDURE (OUTPATIENT)
Dept: GENERAL RADIOLOGY | Facility: CLINIC | Age: 54
End: 2019-07-10
Attending: PHYSICIAN ASSISTANT
Payer: COMMERCIAL

## 2019-07-10 ENCOUNTER — ONCOLOGY VISIT (OUTPATIENT)
Dept: ONCOLOGY | Facility: CLINIC | Age: 54
End: 2019-07-10
Attending: PHYSICIAN ASSISTANT
Payer: COMMERCIAL

## 2019-07-10 ENCOUNTER — HOSPITAL ENCOUNTER (INPATIENT)
Facility: CLINIC | Age: 54
LOS: 1 days | Discharge: HOME | End: 2019-07-11
Attending: INTERNAL MEDICINE | Admitting: INTERNAL MEDICINE
Payer: COMMERCIAL

## 2019-07-10 ENCOUNTER — INFUSION THERAPY VISIT (OUTPATIENT)
Dept: ONCOLOGY | Facility: CLINIC | Age: 54
End: 2019-07-10
Attending: PLASTIC SURGERY
Payer: COMMERCIAL

## 2019-07-10 ENCOUNTER — APPOINTMENT (OUTPATIENT)
Dept: LAB | Facility: CLINIC | Age: 54
End: 2019-07-10
Attending: INTERNAL MEDICINE
Payer: COMMERCIAL

## 2019-07-10 ENCOUNTER — TELEPHONE (OUTPATIENT)
Dept: ONCOLOGY | Facility: CLINIC | Age: 54
End: 2019-07-10

## 2019-07-10 VITALS
BODY MASS INDEX: 22.48 KG/M2 | SYSTOLIC BLOOD PRESSURE: 106 MMHG | DIASTOLIC BLOOD PRESSURE: 64 MMHG | HEART RATE: 88 BPM | TEMPERATURE: 98.4 F | WEIGHT: 139.9 LBS | HEIGHT: 66 IN | RESPIRATION RATE: 14 BRPM | OXYGEN SATURATION: 98 %

## 2019-07-10 VITALS — TEMPERATURE: 99.3 F

## 2019-07-10 DIAGNOSIS — C50.411 MALIGNANT NEOPLASM OF UPPER-OUTER QUADRANT OF RIGHT BREAST IN FEMALE, ESTROGEN RECEPTOR POSITIVE (H): ICD-10-CM

## 2019-07-10 DIAGNOSIS — R50.81 NEUTROPENIC FEVER (H): ICD-10-CM

## 2019-07-10 DIAGNOSIS — Z17.0 MALIGNANT NEOPLASM OF UPPER-OUTER QUADRANT OF RIGHT BREAST IN FEMALE, ESTROGEN RECEPTOR POSITIVE (H): ICD-10-CM

## 2019-07-10 DIAGNOSIS — D70.9 NEUTROPENIC FEVER (H): ICD-10-CM

## 2019-07-10 DIAGNOSIS — R50.9 FEVER, UNSPECIFIED FEVER CAUSE: Primary | ICD-10-CM

## 2019-07-10 DIAGNOSIS — R11.2 CHEMOTHERAPY INDUCED NAUSEA AND VOMITING: Primary | ICD-10-CM

## 2019-07-10 DIAGNOSIS — Z17.0 MALIGNANT NEOPLASM OF UPPER-OUTER QUADRANT OF RIGHT BREAST IN FEMALE, ESTROGEN RECEPTOR POSITIVE (H): Primary | ICD-10-CM

## 2019-07-10 DIAGNOSIS — C50.411 MALIGNANT NEOPLASM OF UPPER-OUTER QUADRANT OF RIGHT BREAST IN FEMALE, ESTROGEN RECEPTOR POSITIVE (H): Primary | ICD-10-CM

## 2019-07-10 DIAGNOSIS — T45.1X5A CHEMOTHERAPY INDUCED NAUSEA AND VOMITING: Primary | ICD-10-CM

## 2019-07-10 LAB
ALBUMIN SERPL-MCNC: 3.4 G/DL (ref 3.4–5)
ALBUMIN UR-MCNC: NEGATIVE MG/DL
ALP SERPL-CCNC: 102 U/L (ref 40–150)
ALT SERPL W P-5'-P-CCNC: 17 U/L (ref 0–50)
ANION GAP SERPL CALCULATED.3IONS-SCNC: 5 MMOL/L (ref 3–14)
APPEARANCE UR: CLEAR
AST SERPL W P-5'-P-CCNC: 13 U/L (ref 0–45)
BASOPHILS # BLD AUTO: 0.1 10E9/L (ref 0–0.2)
BASOPHILS NFR BLD AUTO: 6.2 %
BILIRUB SERPL-MCNC: 0.3 MG/DL (ref 0.2–1.3)
BILIRUB UR QL STRIP: NEGATIVE
BUN SERPL-MCNC: 8 MG/DL (ref 7–30)
CALCIUM SERPL-MCNC: 8.7 MG/DL (ref 8.5–10.1)
CHLORIDE SERPL-SCNC: 103 MMOL/L (ref 94–109)
CO2 SERPL-SCNC: 26 MMOL/L (ref 20–32)
COLOR UR AUTO: ABNORMAL
CREAT SERPL-MCNC: 0.54 MG/DL (ref 0.52–1.04)
DIFFERENTIAL METHOD BLD: ABNORMAL
EOSINOPHIL # BLD AUTO: 0 10E9/L (ref 0–0.7)
EOSINOPHIL NFR BLD AUTO: 2.7 %
ERYTHROCYTE [DISTWIDTH] IN BLOOD BY AUTOMATED COUNT: 13.4 % (ref 10–15)
GFR SERPL CREATININE-BSD FRML MDRD: >90 ML/MIN/{1.73_M2}
GLUCOSE SERPL-MCNC: 92 MG/DL (ref 70–99)
GLUCOSE UR STRIP-MCNC: NEGATIVE MG/DL
HCT VFR BLD AUTO: 32.1 % (ref 35–47)
HGB BLD-MCNC: 10.6 G/DL (ref 11.7–15.7)
HGB UR QL STRIP: ABNORMAL
KETONES UR STRIP-MCNC: NEGATIVE MG/DL
LEUKOCYTE ESTERASE UR QL STRIP: NEGATIVE
LYMPHOCYTES # BLD AUTO: 0.4 10E9/L (ref 0.8–5.3)
LYMPHOCYTES NFR BLD AUTO: 38.9 %
MCH RBC QN AUTO: 30.3 PG (ref 26.5–33)
MCHC RBC AUTO-ENTMCNC: 33 G/DL (ref 31.5–36.5)
MCV RBC AUTO: 92 FL (ref 78–100)
MONOCYTES # BLD AUTO: 0.3 10E9/L (ref 0–1.3)
MONOCYTES NFR BLD AUTO: 26.5 %
MUCOUS THREADS #/AREA URNS LPF: PRESENT /LPF
NEUTROPHILS # BLD AUTO: 0.3 10E9/L (ref 1.6–8.3)
NEUTROPHILS NFR BLD AUTO: 25.7 %
NITRATE UR QL: NEGATIVE
NRBC # BLD AUTO: 0 10*3/UL
NRBC BLD AUTO-RTO: 2 /100
PH UR STRIP: 6 PH (ref 5–7)
PLATELET # BLD AUTO: 175 10E9/L (ref 150–450)
PLATELET # BLD EST: ABNORMAL 10*3/UL
POTASSIUM SERPL-SCNC: 4 MMOL/L (ref 3.4–5.3)
PROT SERPL-MCNC: 6.8 G/DL (ref 6.8–8.8)
RBC # BLD AUTO: 3.5 10E12/L (ref 3.8–5.2)
RBC #/AREA URNS AUTO: <1 /HPF (ref 0–2)
RBC MORPH BLD: NORMAL
SODIUM SERPL-SCNC: 134 MMOL/L (ref 133–144)
SOURCE: ABNORMAL
SP GR UR STRIP: 1 (ref 1–1.03)
UROBILINOGEN UR STRIP-MCNC: 0 MG/DL (ref 0–2)
WBC # BLD AUTO: 1 10E9/L (ref 4–11)
WBC #/AREA URNS AUTO: <1 /HPF (ref 0–5)

## 2019-07-10 PROCEDURE — 25000132 ZZH RX MED GY IP 250 OP 250 PS 637: Performed by: PHYSICIAN ASSISTANT

## 2019-07-10 PROCEDURE — 81001 URINALYSIS AUTO W/SCOPE: CPT | Performed by: PHYSICIAN ASSISTANT

## 2019-07-10 PROCEDURE — 85025 COMPLETE CBC W/AUTO DIFF WBC: CPT | Performed by: INTERNAL MEDICINE

## 2019-07-10 PROCEDURE — 80053 COMPREHEN METABOLIC PANEL: CPT | Performed by: INTERNAL MEDICINE

## 2019-07-10 PROCEDURE — 25000128 H RX IP 250 OP 636: Mod: ZF | Performed by: PHYSICIAN ASSISTANT

## 2019-07-10 PROCEDURE — 25000131 ZZH RX MED GY IP 250 OP 636 PS 637: Performed by: PHYSICIAN ASSISTANT

## 2019-07-10 PROCEDURE — 87040 BLOOD CULTURE FOR BACTERIA: CPT | Performed by: PHYSICIAN ASSISTANT

## 2019-07-10 PROCEDURE — 99207 ZZC CHGS TRANSFERRED TO HOSPITAL: CPT | Mod: ZP | Performed by: PHYSICIAN ASSISTANT

## 2019-07-10 PROCEDURE — 99222 1ST HOSP IP/OBS MODERATE 55: CPT | Mod: AI | Performed by: INTERNAL MEDICINE

## 2019-07-10 PROCEDURE — 25000125 ZZHC RX 250: Mod: ZF | Performed by: PHYSICIAN ASSISTANT

## 2019-07-10 PROCEDURE — 12000001 ZZH R&B MED SURG/OB UMMC

## 2019-07-10 PROCEDURE — 25000128 H RX IP 250 OP 636: Performed by: PHYSICIAN ASSISTANT

## 2019-07-10 PROCEDURE — 25000132 ZZH RX MED GY IP 250 OP 250 PS 637: Performed by: INTERNAL MEDICINE

## 2019-07-10 PROCEDURE — 25800030 ZZH RX IP 258 OP 636: Performed by: PHYSICIAN ASSISTANT

## 2019-07-10 PROCEDURE — G0463 HOSPITAL OUTPT CLINIC VISIT: HCPCS | Mod: ZF

## 2019-07-10 RX ORDER — AMOXICILLIN 250 MG
1 CAPSULE ORAL 2 TIMES DAILY
Status: DISCONTINUED | OUTPATIENT
Start: 2019-07-10 | End: 2019-07-11 | Stop reason: HOSPADM

## 2019-07-10 RX ORDER — POTASSIUM CHLORIDE 750 MG/1
20-40 TABLET, EXTENDED RELEASE ORAL
Status: DISCONTINUED | OUTPATIENT
Start: 2019-07-10 | End: 2019-07-11 | Stop reason: HOSPADM

## 2019-07-10 RX ORDER — CEFEPIME HYDROCHLORIDE 2 G/1
2 INJECTION, POWDER, FOR SOLUTION INTRAVENOUS ONCE
Status: COMPLETED | OUTPATIENT
Start: 2019-07-10 | End: 2019-07-10

## 2019-07-10 RX ORDER — FLUTICASONE PROPIONATE 110 UG/1
2 AEROSOL, METERED RESPIRATORY (INHALATION) 2 TIMES DAILY
Status: DISCONTINUED | OUTPATIENT
Start: 2019-07-10 | End: 2019-07-10

## 2019-07-10 RX ORDER — ACETAMINOPHEN 325 MG/1
650 TABLET ORAL EVERY 4 HOURS PRN
Status: DISCONTINUED | OUTPATIENT
Start: 2019-07-10 | End: 2019-07-11 | Stop reason: HOSPADM

## 2019-07-10 RX ORDER — LORAZEPAM 0.5 MG/1
0.5 TABLET ORAL EVERY 4 HOURS PRN
Status: DISCONTINUED | OUTPATIENT
Start: 2019-07-10 | End: 2019-07-11 | Stop reason: HOSPADM

## 2019-07-10 RX ORDER — SERTRALINE HYDROCHLORIDE 100 MG/1
100 TABLET, FILM COATED ORAL DAILY
Status: DISCONTINUED | OUTPATIENT
Start: 2019-07-11 | End: 2019-07-11 | Stop reason: HOSPADM

## 2019-07-10 RX ORDER — POTASSIUM CHLORIDE 7.45 MG/ML
10 INJECTION INTRAVENOUS
Status: DISCONTINUED | OUTPATIENT
Start: 2019-07-10 | End: 2019-07-11 | Stop reason: HOSPADM

## 2019-07-10 RX ORDER — ONDANSETRON 2 MG/ML
8 INJECTION INTRAMUSCULAR; INTRAVENOUS ONCE
Status: CANCELLED
Start: 2019-07-10

## 2019-07-10 RX ORDER — LIDOCAINE 40 MG/G
CREAM TOPICAL
Status: DISCONTINUED | OUTPATIENT
Start: 2019-07-10 | End: 2019-07-11 | Stop reason: HOSPADM

## 2019-07-10 RX ORDER — HEPARIN SODIUM (PORCINE) LOCK FLUSH IV SOLN 100 UNIT/ML 100 UNIT/ML
5 SOLUTION INTRAVENOUS ONCE
Status: COMPLETED | OUTPATIENT
Start: 2019-07-10 | End: 2019-07-10

## 2019-07-10 RX ORDER — OLANZAPINE 5 MG/1
5 TABLET ORAL AT BEDTIME
Status: DISCONTINUED | OUTPATIENT
Start: 2019-07-10 | End: 2019-07-11 | Stop reason: HOSPADM

## 2019-07-10 RX ORDER — SODIUM CHLORIDE 9 MG/ML
INJECTION, SOLUTION INTRAVENOUS CONTINUOUS
Status: DISCONTINUED | OUTPATIENT
Start: 2019-07-10 | End: 2019-07-11 | Stop reason: HOSPADM

## 2019-07-10 RX ORDER — LEVOTHYROXINE SODIUM 112 UG/1
112 TABLET ORAL DAILY
Status: DISCONTINUED | OUTPATIENT
Start: 2019-07-11 | End: 2019-07-11 | Stop reason: HOSPADM

## 2019-07-10 RX ORDER — HEPARIN SODIUM (PORCINE) LOCK FLUSH IV SOLN 100 UNIT/ML 100 UNIT/ML
5 SOLUTION INTRAVENOUS
Status: DISCONTINUED | OUTPATIENT
Start: 2019-07-10 | End: 2019-07-11 | Stop reason: HOSPADM

## 2019-07-10 RX ORDER — POTASSIUM CHLORIDE 29.8 MG/ML
20 INJECTION INTRAVENOUS
Status: DISCONTINUED | OUTPATIENT
Start: 2019-07-10 | End: 2019-07-11 | Stop reason: HOSPADM

## 2019-07-10 RX ORDER — PHENAZOPYRIDINE HYDROCHLORIDE 100 MG/1
100 TABLET, FILM COATED ORAL 3 TIMES DAILY PRN
Status: DISCONTINUED | OUTPATIENT
Start: 2019-07-10 | End: 2019-07-11 | Stop reason: HOSPADM

## 2019-07-10 RX ORDER — ONDANSETRON 8 MG/1
8 TABLET, FILM COATED ORAL EVERY 8 HOURS PRN
Status: DISCONTINUED | OUTPATIENT
Start: 2019-07-10 | End: 2019-07-11 | Stop reason: HOSPADM

## 2019-07-10 RX ORDER — MAGNESIUM SULFATE HEPTAHYDRATE 40 MG/ML
4 INJECTION, SOLUTION INTRAVENOUS EVERY 4 HOURS PRN
Status: DISCONTINUED | OUTPATIENT
Start: 2019-07-10 | End: 2019-07-11 | Stop reason: HOSPADM

## 2019-07-10 RX ORDER — PROCHLORPERAZINE MALEATE 10 MG
10 TABLET ORAL EVERY 6 HOURS PRN
Status: DISCONTINUED | OUTPATIENT
Start: 2019-07-10 | End: 2019-07-11 | Stop reason: HOSPADM

## 2019-07-10 RX ORDER — HEPARIN SODIUM,PORCINE 10 UNIT/ML
5-10 VIAL (ML) INTRAVENOUS
Status: DISCONTINUED | OUTPATIENT
Start: 2019-07-10 | End: 2019-07-11 | Stop reason: HOSPADM

## 2019-07-10 RX ORDER — HEPARIN SODIUM,PORCINE 10 UNIT/ML
5 VIAL (ML) INTRAVENOUS ONCE
Status: DISCONTINUED | OUTPATIENT
Start: 2019-07-10 | End: 2019-07-18 | Stop reason: HOSPADM

## 2019-07-10 RX ORDER — HYDRALAZINE HYDROCHLORIDE 20 MG/ML
10 INJECTION INTRAMUSCULAR; INTRAVENOUS EVERY 6 HOURS PRN
Status: DISCONTINUED | OUTPATIENT
Start: 2019-07-10 | End: 2019-07-11 | Stop reason: HOSPADM

## 2019-07-10 RX ORDER — ALBUTEROL SULFATE 90 UG/1
1 AEROSOL, METERED RESPIRATORY (INHALATION) EVERY 4 HOURS PRN
Status: DISCONTINUED | OUTPATIENT
Start: 2019-07-10 | End: 2019-07-11 | Stop reason: HOSPADM

## 2019-07-10 RX ORDER — POTASSIUM CHLORIDE 1.5 G/1.58G
20-40 POWDER, FOR SOLUTION ORAL
Status: DISCONTINUED | OUTPATIENT
Start: 2019-07-10 | End: 2019-07-11 | Stop reason: HOSPADM

## 2019-07-10 RX ORDER — CEFEPIME HYDROCHLORIDE 2 G/1
2 INJECTION, POWDER, FOR SOLUTION INTRAVENOUS ONCE
Status: CANCELLED
Start: 2019-07-10

## 2019-07-10 RX ORDER — POTASSIUM CL/LIDO/0.9 % NACL 10MEQ/0.1L
10 INTRAVENOUS SOLUTION, PIGGYBACK (ML) INTRAVENOUS
Status: DISCONTINUED | OUTPATIENT
Start: 2019-07-10 | End: 2019-07-11 | Stop reason: HOSPADM

## 2019-07-10 RX ORDER — AMOXICILLIN 250 MG
2 CAPSULE ORAL 2 TIMES DAILY
Status: DISCONTINUED | OUTPATIENT
Start: 2019-07-10 | End: 2019-07-11 | Stop reason: HOSPADM

## 2019-07-10 RX ORDER — HEPARIN SODIUM,PORCINE 10 UNIT/ML
5-10 VIAL (ML) INTRAVENOUS EVERY 24 HOURS
Status: DISCONTINUED | OUTPATIENT
Start: 2019-07-10 | End: 2019-07-11 | Stop reason: HOSPADM

## 2019-07-10 RX ORDER — DEXAMETHASONE SODIUM PHOSPHATE 4 MG/ML
10 INJECTION, SOLUTION INTRA-ARTICULAR; INTRALESIONAL; INTRAMUSCULAR; INTRAVENOUS; SOFT TISSUE ONCE
Status: CANCELLED
Start: 2019-07-10

## 2019-07-10 RX ADMIN — OLANZAPINE 5 MG: 5 TABLET, FILM COATED ORAL at 21:58

## 2019-07-10 RX ADMIN — CEFEPIME 2 G: 2 INJECTION, POWDER, FOR SOLUTION INTRAVENOUS at 13:45

## 2019-07-10 RX ADMIN — SODIUM CHLORIDE 1000 ML: 9 INJECTION, SOLUTION INTRAVENOUS at 13:16

## 2019-07-10 RX ADMIN — RANITIDINE 150 MG: 150 TABLET, FILM COATED ORAL at 21:58

## 2019-07-10 RX ADMIN — ONDANSETRON HYDROCHLORIDE 8 MG: 8 TABLET, FILM COATED ORAL at 20:22

## 2019-07-10 RX ADMIN — CEFEPIME 2 G: 2 INJECTION, POWDER, FOR SOLUTION INTRAVENOUS at 21:58

## 2019-07-10 RX ADMIN — SODIUM CHLORIDE: 9 INJECTION, SOLUTION INTRAVENOUS at 19:21

## 2019-07-10 RX ADMIN — HEPARIN 5 ML: 100 SYRINGE at 08:55

## 2019-07-10 ASSESSMENT — MIFFLIN-ST. JEOR
SCORE: 1246.08
SCORE: 1249.51

## 2019-07-10 ASSESSMENT — ACTIVITIES OF DAILY LIVING (ADL): ADLS_ACUITY_SCORE: 11

## 2019-07-10 ASSESSMENT — PAIN SCALES - GENERAL: PAINLEVEL: NO PAIN (0)

## 2019-07-10 NOTE — PATIENT INSTRUCTIONS
Contact Numbers  [facility]: [phone] (for scheduling changes)  Triage: [phone] (for symptoms)    Please call the [facility] Triage line if you experience a temperature greater than or equal to 100.4, shaking chills, have uncontrolled nausea, vomiting and/or diarrhea, dizziness, shortness of breath, chest pain, bleeding, unexplained bruising, or if you have any other new/concerning symptoms, questions or concerns.     If you are having any concerning symptoms or wish to speak to a provider before your next infusion visit, please call your care coordinator or triage to notify them so we can adequately serve you.     If you need a refill on a narcotic prescription or other medication, please call triage before your infusion appointment.

## 2019-07-10 NOTE — PROGRESS NOTES
Infusion Nursing Note:  Kathy Lei presents today for IV fluids and antibiotics.    Patient seen by provider today: Yes: LIMA Cantor   present during visit today: Not Applicable.    Note: Patient to be admitted for neutropenic fever.  Patient to receive IV fluids and IV antibiotics while she waits for a bed at the hospital.    LIMA Cantor gave report to GORAN Dominique on 7D.    Intravenous Access:  Implanted Port.    Treatment Conditions:  Lab Results   Component Value Date    HGB 10.6 07/10/2019     Lab Results   Component Value Date    WBC 1.0 07/10/2019      Lab Results   Component Value Date    ANEU 0.3 07/10/2019     Lab Results   Component Value Date     07/10/2019      Lab Results   Component Value Date     07/10/2019                   Lab Results   Component Value Date    POTASSIUM 4.0 07/10/2019             Lab Results   Component Value Date    CR 0.54 07/10/2019                   Lab Results   Component Value Date    BRYANT 8.7 07/10/2019                Lab Results   Component Value Date    BILITOTAL 0.3 07/10/2019           Lab Results   Component Value Date    ALBUMIN 3.4 07/10/2019                    Lab Results   Component Value Date    ALT 17 07/10/2019           Lab Results   Component Value Date    AST 13 07/10/2019           Post Infusion Assessment:  Patient tolerated infusion without incident.  Blood return noted pre and post infusion.  Site patent and intact, free from redness, edema or discomfort.  No evidence of extravasations.  Port left intact for hospital admission.       Discharge Plan:   Patient admitted to 7D.  Patient to go with  over to the hospital.  Per Lionel, EMT OK for patient to use shuttle for transport to hospital because she will have a care giver with her.    Elena Fry RN

## 2019-07-10 NOTE — LETTER
7/10/2019      RE: Kathy Lei  [address] [place] [address]       Oncology/Hematology Visit Note  Jul 10, 2019    Reason for Visit: follow up of right breast cancer. Add on visit for fever    History of Present Illness: Kathy Lei is a 53 year old female with recent diagnosis of ER/NV positive, HER2 negative, grade 2 breast cancer. Mily presented between Christmas and New Years of 2018 with new sharp pains in the upper outer quadrant of the right breast.  Diagnostic mammogram on 1/8/19 with grouped coarse heterogeneous calcifications at 11:30 position, irregular mass at 9:00 position. US with irregular mass at 11:00 position, 1.0 x 0.9 x 1.6cm. At 9:30 position, irregular mass, 3.2 x 0.5, x 1.3 cm. Contrast mammogram was subsequently performed and the contrast mammogram showed a large area of mass and non-mass-like enhancement in the upper outer right breast measuring 7.2 cm in greatest dimension.       The pathology showed part A, breast needle biopsy 11 o'clock, 6 cm from the nipple, invasive mammary carcinoma, no special type, ductal (and mucinous) [place] grade 2.  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  Calcifications were associated with the DCIS.  There was a focus suspicious for lymphovascular invasion.  Invasive carcinoma was estrogen receptor positive and progesterone receptor negative by immunohistochemistry.  In part B, breast right, 8 o'clock, 4 cm from the nipple, ultrasound-guided core biopsy, invasive mammary carcinoma, no special type, ductal (and mucinous) [place] grade 2, occasional calcifications were noted.  Invasive carcinoma was estrogen receptor positive and progesterone receptor negative by immunohistochemistry.  On quantitation of the ER and NV, ER was positive 99% of the cells staining with strong intensity.  NV was subsequently noted to be positive with 15% of the cells staining with moderate  intensity.       There was also a HER2 FISH performed, and the HER2 FISH showed average number of HER2 signals per nucleus 2.6.  Average number of CEN17 signals 1.7 with a ratio of 1.6, VASILIY negative for biopsy A.  For biopsy B, the HER2 signals per nucleus 2.9.  Average number CEN17 signals per nucleus 1.7 with a ratio of 1.7, VASILIY negative.  Overall, by 2018 ASCO/CAP guidelines, this tumor is HER2 negative.      She was enrolled in I-SPY2 to Durvulumab, Taxol and Olaparib arm. Please see Dr. Guzman's previous notes for further details on the patient's history. She started ddAC on 5/20. S/p C4 on 7/2. Called triage today with report of temp 100.8F.     Interval History:  Mily is here for follow up. She continues to feel intermittently febrile. She took ibuprofen at 3AM for temp of 99.9. She has not taken any ibuprofen since 3AM. She has been taking ibuprofen whenever she feels feverish, about every 5-6 hours. She woke up with profuse sweats this morning. She has been drinking plenty of fluids. Denies any dysuria. Denies any cough, sinus symptoms. Denies any abdominal pain, diarrhea. She is taking Zofran as needed for nausea. Denies any rashes or redness around port. Feeling some fatigue after AC, but otherwise nothing unusual. Denies any dizziness, dyspnea.     Current Outpatient Medications   Medication Sig Dispense Refill     ADVIL 200 MG OR TABS 1 TABLET EVERY 4 TO 6 HOURS AS NEEDED 0 0     albuterol (VENTOLIN HFA) 108 (90 Base) MCG/ACT Inhaler INHALE 1-2 PUFFS INTO THE LUNGS EVERY 4 HOURS AS NEEDED FOR SHORTNESS OF BREATH OR DIFFICULTY BREATHING. 18 g PRN     aprepitant (EMEND) 80 MG capsule Take 1 capsule (80 mg) by mouth daily Days 4 and 5 of each cycle of AC. 2 capsule 0     fluticasone (FLOVENT HFA) 110 MCG/ACT inhaler Inhale 2 puffs into the lungs 2 times daily 1 Inhaler PRN     levothyroxine (SYNTHROID) 112 MCG tablet Take 1 tablet (112 mcg) by mouth daily 30 tablet 1     lisinopril (PRINIVIL/ZESTRIL) 5 MG  "tablet Take 1 tablet (5 mg) by mouth daily 90 tablet 3     LORazepam (ATIVAN) 0.5 MG tablet Take 1 tablet (0.5 mg) by mouth every 4 hours as needed (Anxiety, Nausea/Vomiting or Sleep) 30 tablet 3     OLANZapine (ZYPREXA) 5 MG tablet Take 1 tablet (5 mg) by mouth At Bedtime 30 tablet 0     ondansetron (ZOFRAN) 8 MG tablet Take 1 tablet (8 mg) by mouth every 8 hours as needed for nausea 60 tablet 3     order for DME Cranial prosthesis 1 Units 1     prochlorperazine (COMPAZINE) 10 MG tablet Take 1 tablet (10 mg) by mouth every 6 hours as needed (Nausea/Vomiting) 60 tablet 3     sertraline (ZOLOFT) 100 MG tablet Take 1 tablet (100 mg) by mouth daily 30 tablet 1     sodium chloride (OCEAN) 0.65 % nasal spray Spray in both nostrils four times daily 30 mL 3     STATIN NOT PRESCRIBED (INTENTIONAL) Please choose reason not prescribed, below       albuterol (2.5 MG/3ML) 0.083% nebulizer solution Take 3 mLs by nebulization once for 1 dose. 3 mL 0     dexamethasone (DECADRON) 4 MG tablet Take 8 mg by mouth daily for 3 days Start on Day 2.. 6 tablet 3     triamcinolone (ARISTOCORT HP) 0.5 % external cream Apply topically 2 times daily (Patient not taking: Reported on 7/2/2019) 30 g 3     vitamin B6 (PYRIDOXINE) 100 MG tablet Take 1 tablet (100 mg) by mouth daily (Patient not taking: Reported on 7/2/2019) 30 tablet 3       Physical Examination:  General: The patient is a pleasant female in no acute distress.  /62 (BP Location: Right arm, Patient Position: Sitting, Cuff Size: Adult Regular)   Pulse 97   Temp 98.7  F (37.1  C) (Oral)   Resp 16   Ht 1.676 m (5' 5.98\")   Wt 63.5 kg (139 lb 14.4 oz)   LMP 11/02/2014   SpO2 95%   BMI 22.59 kg/m     Wt Readings from Last 10 Encounters:   07/10/19 63.5 kg (139 lb 14.4 oz)   07/08/19 63.2 kg (139 lb 6.4 oz)   07/02/19 65.5 kg (144 lb 6.4 oz)   06/18/19 65.6 kg (144 lb 11.2 oz)   06/10/19 64.8 kg (142 lb 14.4 oz)   06/04/19 67.1 kg (147 lb 14.4 oz)   05/20/19 69.9 kg (154 lb) "   05/14/19 69.5 kg (153 lb 4.8 oz)   05/06/19 69.4 kg (153 lb)   05/06/19 68.9 kg (152 lb)     HEENT: EOMI, PERRL. Sclerae are anicteric. Oral mucosa is pink and moist with no lesions or thrush.   Lymph: Neck is supple with no lymphadenopathy in the cervical or supraclavicular areas.   Heart: Regular rate and rhythm.   Lungs: Clear to auscultation bilaterally.   Abdomen: Bowel sounds present, soft, nontender, nondistended  Extremities: No lower extremity edema noted bilaterally.   Neuro: Cranial nerves II through XII are grossly intact.  Skin: No rashes, petechiae, or bruising noted on exposed skin.    Laboratory Data:  Results for GALA ROLAND (MRN [id number]) as of 7/10/2019 10:02   7/10/2019 09:04   Sodium 134   Potassium 4.0   Chloride 103   Carbon Dioxide 26   Urea Nitrogen 8   Creatinine 0.54   GFR Estimate >90   GFR Estimate If Black >90   Calcium 8.7   Anion Gap 5   Albumin 3.4   Protein Total 6.8   Bilirubin Total 0.3   Alkaline Phosphatase 102   ALT 17   AST 13   Glucose 92   WBC 1.0 (L)   Hemoglobin 10.6 (L)   Hematocrit 32.1 (L)   Platelet Count 175   RBC Count 3.50 (L)   MCV 92   MCH 30.3   MCHC 33.0   RDW 13.4   Diff Method Manual Differential   % Neutrophils 25.7   % Lymphocytes 38.9   % Monocytes 26.5   % Eosinophils 2.7   % Basophils 6.2   Nucleated RBCs 2 (H)   Absolute Neutrophil 0.3 (LL)   Absolute Lymphocytes 0.4 (L)   Absolute Monocytes 0.3   Absolute Eosinophils 0.0   Absolute Basophils 0.1   Absolute Nucleated RBC 0.0      7/10/2019 10:56   Color Urine Straw   Appearance Urine Clear   Glucose Urine Negative   Bilirubin Urine Negative   Ketones Urine Negative   Specific Gravity Urine 1.003   pH Urine 6.0   Protein Albumin Urine Negative   Urobilinogen mg/dL 0.0   Nitrite Urine Negative   Blood Urine Small (A)   Leukocyte Esterase Urine Negative   Source UA   WBC Urine <1   RBC Urine <1   Mucous Urine Present (A)       Imaging:  Exam: XR CHEST 2 VW, 7/10/2019 10:46 AM     Indication: 53  yo female with neutropenic fever; Malignant neoplasm of  upper-outer quadrant of right breast in female, estrogen receptor  positive (H); Malignant neoplasm of upper-outer quadrant of right  breast in female, estrogen receptor positive (H)     Comparison: CT chest abdomen and pelvis 2/5/2019     Findings:   A single lumen chest port is in place with tip at the atrial caval  junction. The cardiomediastinal silhouette is within normal limits.  There is no pleural effusion or pneumothorax. There is no focal  airspace opacity. There is spinal fusion with a Lacey kim. Right  breast biopsy marker.                                                                      Impression: Left chest wall Port-A-Cath tip at the atriocaval  junction. Clear chest.     I have personally reviewed the examination and initial interpretation  and I agree with the findings.     MARIIA CADET, DO       Assessment and Plan:  Kathy Lei is a 53 year old female with clinical stage II, T3N0MX, invasive ductal breast cancer in the upper outer quadrant of right breast, multifocal, measuring 8.9 cm on MRI. ER positive and HER2 negative. She is enrolled in I-SPY 2 and on durvalumab every 28 days, weekly Taxol, and olaparib arm. Started AC on 5/20/2019. S/p C4 on 7/2.      Neutropenic fever: Temp up to 100.8F measured at home on 7/8, although not neutropenic at that time. No localizing symptoms other than some bladder discomfort, but UA negative for infection. BC NGTD. She continues to have recurrent fevers but takes ibuprofen every 5-6 hours and temp has not gone above 99.9 this morning. She is now neutropenic. Spoke with Dr. Guzman who wants her admitted for further evaluation. She did receive neulasta on 7/2  --UA repeated and negative  --CXR negative  --BC redrawn today  --Will give IVF and Cefepime 2g iv x 1 while awaiting a bed.    Right breast cancer, ER/AL positive, HER2 negative: She was enrolled in I-SPY2 and started cycle 1  Durvulumab, Taxol and Olaparib on 2/25/19. Completed 12 weeks and completed 4 cycles of AC.  --follow up with Dr. Lewis and labs on July 16th.   --Scheduled to see Dr. Ramos on 7/16.    Kerry Norwood PA-C  [facility]  [address], [place] [address] [phone]

## 2019-07-10 NOTE — NURSING NOTE
"Oncology Rooming Note    July 10, 2019 9:31 AM   Kathy Lei is a 53 year old female who presents for:    Chief Complaint   Patient presents with     Port Draw     port accessed and labs drawn by rn.  vital signs taken.     Oncology Clinic Visit     UMP RETURN- BREAST CA     Initial Vitals: /62 (BP Location: Right arm, Patient Position: Sitting, Cuff Size: Adult Regular)   Pulse 97   Temp 98.7  F (37.1  C) (Oral)   Resp 16   Ht 1.676 m (5' 5.98\")   Wt 63.5 kg (139 lb 14.4 oz)   LMP 11/02/2014   SpO2 95%   BMI 22.59 kg/m   Estimated body mass index is 22.59 kg/m  as calculated from the following:    Height as of this encounter: 1.676 m (5' 5.98\").    Weight as of this encounter: 63.5 kg (139 lb 14.4 oz). Body surface area is 1.72 meters squared.  No Pain (0) Comment: Data Unavailable   Patient's last menstrual period was 11/02/2014.  Allergies reviewed: Yes  Medications reviewed: Yes    Medications: Medication refills not needed today.  Pharmacy name entered into [employer]:    [id number] - [place], [place] - [address] [id number] - [place], [place] - [address] AT [facility] [address]    Clinical concerns: none        Mary Lv, COLETTE              "

## 2019-07-10 NOTE — PLAN OF CARE
Nursing Focus: Admission    D: Patient admitted from clinic for neutropenic fevers. Patient has a history of breast cancer.     I: Upon arrival to the unit patient was oriented to room, unit, and call light. Patient s height, weight, and vital signs were obtained. Allergies reviewed and allergy band applied. MD notified of patient s arrival on the unit. Adult AVS completed. Head to toe assessment completed. Full skin assessment completed. Education assessment completed. Care plan initiated.    A: Vital signs stable upon admission. Denies pain. Denies nausea. Patient s skin was CDI. Up ad harpal independently.     P: Continue to monitor patient s fevers and intervene as needed. Continue with plan of care. Notify MD with any concerns or changes in patient status.

## 2019-07-10 NOTE — TELEPHONE ENCOUNTER
Contacted patient regarding surgery.  She has had a fever for a few days but would like to schedule surgery as soon as she is able.  Advised that when she sees Dr Ramos on 7/16, I can help her schedule while in clinic.    Angelique Talavera  Surgical [facility]-Op Coordinator  [phone]

## 2019-07-10 NOTE — PROGRESS NOTES
Oncology/Hematology Visit Note  Jul 10, 2019    Reason for Visit: follow up of right breast cancer. Add on visit for fever    History of Present Illness: Kathy Lei is a 53 year old female with recent diagnosis of ER/DE positive, HER2 negative, grade 2 breast cancer. Mily presented between Christmas and New Years of 2018 with new sharp pains in the upper outer quadrant of the right breast.  Diagnostic mammogram on 1/8/19 with grouped coarse heterogeneous calcifications at 11:30 position, irregular mass at 9:00 position. US with irregular mass at 11:00 position, 1.0 x 0.9 x 1.6cm. At 9:30 position, irregular mass, 3.2 x 0.5, x 1.3 cm. Contrast mammogram was subsequently performed and the contrast mammogram showed a large area of mass and non-mass-like enhancement in the upper outer right breast measuring 7.2 cm in greatest dimension.       The pathology showed part A, breast needle biopsy 11 o'clock, 6 cm from the nipple, invasive mammary carcinoma, no special type, ductal (and mucinous) Donna grade 2.  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  Calcifications were associated with the DCIS.  There was a focus suspicious for lymphovascular invasion.  Invasive carcinoma was estrogen receptor positive and progesterone receptor negative by immunohistochemistry.  In part B, breast right, 8 o'clock, 4 cm from the nipple, ultrasound-guided core biopsy, invasive mammary carcinoma, no special type, ductal (and mucinous) [place] grade 2, occasional calcifications were noted.  Invasive carcinoma was estrogen receptor positive and progesterone receptor negative by immunohistochemistry.  On quantitation of the ER and DE, ER was positive 99% of the cells staining with strong intensity.  DE was subsequently noted to be positive with 15% of the cells staining with moderate intensity.       There was also a HER2 FISH performed, and the HER2 FISH showed average number of  HER2 signals per nucleus 2.6.  Average number of CEN17 signals 1.7 with a ratio of 1.6, VASILIY negative for biopsy A.  For biopsy B, the HER2 signals per nucleus 2.9.  Average number CEN17 signals per nucleus 1.7 with a ratio of 1.7, VASILIY negative.  Overall, by 2018 ASCO/CAP guidelines, this tumor is HER2 negative.      She was enrolled in I-SPY2 to Durvulumab, Taxol and Olaparib arm. Please see Dr. Guzman's previous notes for further details on the patient's history. She started ddAC on 5/20. S/p C4 on 7/2. Called triage today with report of temp 100.8F.     Interval History:  Mily is here for follow up. She continues to feel intermittently febrile. She took ibuprofen at 3AM for temp of 99.9. She has not taken any ibuprofen since 3AM. She has been taking ibuprofen whenever she feels feverish, about every 5-6 hours. She woke up with profuse sweats this morning. She has been drinking plenty of fluids. Denies any dysuria. Denies any cough, sinus symptoms. Denies any abdominal pain, diarrhea. She is taking Zofran as needed for nausea. Denies any rashes or redness around port. Feeling some fatigue after AC, but otherwise nothing unusual. Denies any dizziness, dyspnea.     Current Outpatient Medications   Medication Sig Dispense Refill     ADVIL 200 MG OR TABS 1 TABLET EVERY 4 TO 6 HOURS AS NEEDED 0 0     albuterol (VENTOLIN HFA) 108 (90 Base) MCG/ACT Inhaler INHALE 1-2 PUFFS INTO THE LUNGS EVERY 4 HOURS AS NEEDED FOR SHORTNESS OF BREATH OR DIFFICULTY BREATHING. 18 g PRN     aprepitant (EMEND) 80 MG capsule Take 1 capsule (80 mg) by mouth daily Days 4 and 5 of each cycle of AC. 2 capsule 0     fluticasone (FLOVENT HFA) 110 MCG/ACT inhaler Inhale 2 puffs into the lungs 2 times daily 1 Inhaler PRN     levothyroxine (SYNTHROID) 112 MCG tablet Take 1 tablet (112 mcg) by mouth daily 30 tablet 1     lisinopril (PRINIVIL/ZESTRIL) 5 MG tablet Take 1 tablet (5 mg) by mouth daily 90 tablet 3     LORazepam (ATIVAN) 0.5 MG tablet Take  "1 tablet (0.5 mg) by mouth every 4 hours as needed (Anxiety, Nausea/Vomiting or Sleep) 30 tablet 3     OLANZapine (ZYPREXA) 5 MG tablet Take 1 tablet (5 mg) by mouth At Bedtime 30 tablet 0     ondansetron (ZOFRAN) 8 MG tablet Take 1 tablet (8 mg) by mouth every 8 hours as needed for nausea 60 tablet 3     order for DME Cranial prosthesis 1 Units 1     prochlorperazine (COMPAZINE) 10 MG tablet Take 1 tablet (10 mg) by mouth every 6 hours as needed (Nausea/Vomiting) 60 tablet 3     sertraline (ZOLOFT) 100 MG tablet Take 1 tablet (100 mg) by mouth daily 30 tablet 1     sodium chloride (OCEAN) 0.65 % nasal spray Spray in both nostrils four times daily 30 mL 3     STATIN NOT PRESCRIBED (INTENTIONAL) Please choose reason not prescribed, below       albuterol (2.5 MG/3ML) 0.083% nebulizer solution Take 3 mLs by nebulization once for 1 dose. 3 mL 0     dexamethasone (DECADRON) 4 MG tablet Take 8 mg by mouth daily for 3 days Start on Day 2.. 6 tablet 3     triamcinolone (ARISTOCORT HP) 0.5 % external cream Apply topically 2 times daily (Patient not taking: Reported on 7/2/2019) 30 g 3     vitamin B6 (PYRIDOXINE) 100 MG tablet Take 1 tablet (100 mg) by mouth daily (Patient not taking: Reported on 7/2/2019) 30 tablet 3       Physical Examination:  General: The patient is a pleasant female in no acute distress.  /62 (BP Location: Right arm, Patient Position: Sitting, Cuff Size: Adult Regular)   Pulse 97   Temp 98.7  F (37.1  C) (Oral)   Resp 16   Ht 1.676 m (5' 5.98\")   Wt 63.5 kg (139 lb 14.4 oz)   LMP 11/02/2014   SpO2 95%   BMI 22.59 kg/m    Wt Readings from Last 10 Encounters:   07/10/19 63.5 kg (139 lb 14.4 oz)   07/08/19 63.2 kg (139 lb 6.4 oz)   07/02/19 65.5 kg (144 lb 6.4 oz)   06/18/19 65.6 kg (144 lb 11.2 oz)   06/10/19 64.8 kg (142 lb 14.4 oz)   06/04/19 67.1 kg (147 lb 14.4 oz)   05/20/19 69.9 kg (154 lb)   05/14/19 69.5 kg (153 lb 4.8 oz)   05/06/19 69.4 kg (153 lb)   05/06/19 68.9 kg (152 lb) "     HEENT: EOMI, PERRL. Sclerae are anicteric. Oral mucosa is pink and moist with no lesions or thrush.   Lymph: Neck is supple with no lymphadenopathy in the cervical or supraclavicular areas.   Heart: Regular rate and rhythm.   Lungs: Clear to auscultation bilaterally.   Abdomen: Bowel sounds present, soft, nontender, nondistended  Extremities: No lower extremity edema noted bilaterally.   Neuro: Cranial nerves II through XII are grossly intact.  Skin: No rashes, petechiae, or bruising noted on exposed skin.    Laboratory Data:  Results for GALA ROLAND (MRN [id number]) as of 7/10/2019 10:02   7/10/2019 09:04   Sodium 134   Potassium 4.0   Chloride 103   Carbon Dioxide 26   Urea Nitrogen 8   Creatinine 0.54   GFR Estimate >90   GFR Estimate If Black >90   Calcium 8.7   Anion Gap 5   Albumin 3.4   Protein Total 6.8   Bilirubin Total 0.3   Alkaline Phosphatase 102   ALT 17   AST 13   Glucose 92   WBC 1.0 (L)   Hemoglobin 10.6 (L)   Hematocrit 32.1 (L)   Platelet Count 175   RBC Count 3.50 (L)   MCV 92   MCH 30.3   MCHC 33.0   RDW 13.4   Diff Method Manual Differential   % Neutrophils 25.7   % Lymphocytes 38.9   % Monocytes 26.5   % Eosinophils 2.7   % Basophils 6.2   Nucleated RBCs 2 (H)   Absolute Neutrophil 0.3 (LL)   Absolute Lymphocytes 0.4 (L)   Absolute Monocytes 0.3   Absolute Eosinophils 0.0   Absolute Basophils 0.1   Absolute Nucleated RBC 0.0      7/10/2019 10:56   Color Urine Straw   Appearance Urine Clear   Glucose Urine Negative   Bilirubin Urine Negative   Ketones Urine Negative   Specific Gravity Urine 1.003   pH Urine 6.0   Protein Albumin Urine Negative   Urobilinogen mg/dL 0.0   Nitrite Urine Negative   Blood Urine Small (A)   Leukocyte Esterase Urine Negative   Source UA   WBC Urine <1   RBC Urine <1   Mucous Urine Present (A)       Imaging:  Exam: XR CHEST 2 VW, 7/10/2019 10:46 AM     Indication: 52 yo female with neutropenic fever; Malignant neoplasm of  upper-outer quadrant of right  breast in female, estrogen receptor  positive (H); Malignant neoplasm of upper-outer quadrant of right  breast in female, estrogen receptor positive (H)     Comparison: CT chest abdomen and pelvis 2/5/2019     Findings:   A single lumen chest port is in place with tip at the atrial caval  junction. The cardiomediastinal silhouette is within normal limits.  There is no pleural effusion or pneumothorax. There is no focal  airspace opacity. There is spinal fusion with a Lacey kim. Right  breast biopsy marker.                                                                      Impression: Left chest wall Port-A-Cath tip at the atriocaval  junction. Clear chest.     I have personally reviewed the examination and initial interpretation  and I agree with the findings.     MARIIA CADET, DO       Assessment and Plan:  Kathy Lei is a 53 year old female with clinical stage II, T3N0MX, invasive ductal breast cancer in the upper outer quadrant of right breast, multifocal, measuring 8.9 cm on MRI. ER positive and HER2 negative. She is enrolled in I-SPY 2 and on durvalumab every 28 days, weekly Taxol, and olaparib arm. Started AC on 5/20/2019. S/p C4 on 7/2.      Neutropenic fever: Temp up to 100.8F measured at home on 7/8, although not neutropenic at that time. No localizing symptoms other than some bladder discomfort, but UA negative for infection. BC NGTD. She continues to have recurrent fevers but takes ibuprofen every 5-6 hours and temp has not gone above 99.9 this morning. She is now neutropenic. Spoke with Dr. Guzman who wants her admitted for further evaluation. She did receive neulasta on 7/2  --UA repeated and negative  --CXR negative  --BC redrawn today  --Will give IVF and Cefepime 2g iv x 1 while awaiting a bed.    Right breast cancer, ER/AZ positive, HER2 negative: She was enrolled in I-SPY2 and started cycle 1 Durvulumab, Taxol and Olaparib on 2/25/19. Completed 12 weeks and completed 4 cycles of  AC.  --follow up with Dr. Lewis and labs on July 16th.   --Scheduled to see Dr. Ramos on 7/16.    Kerry Norwood PA-C  [facility] [place] [address] [place] [address] [phone]

## 2019-07-10 NOTE — NURSING NOTE
"Chief Complaint   Patient presents with     Port Draw     port accessed and labs drawn by rn.  vital signs taken.     Port accessed by RN in lab with 20g 3/4\" gripper needle, labs drawn, port flushed with saline and heparin, vitals checked, pt checked in for next appointment.  Mariola Aguilar RN    "

## 2019-07-11 VITALS
SYSTOLIC BLOOD PRESSURE: 100 MMHG | DIASTOLIC BLOOD PRESSURE: 61 MMHG | OXYGEN SATURATION: 94 % | HEIGHT: 66 IN | RESPIRATION RATE: 18 BRPM | BODY MASS INDEX: 22.93 KG/M2 | TEMPERATURE: 98.8 F | WEIGHT: 142.7 LBS | HEART RATE: 100 BPM

## 2019-07-11 LAB
ALBUMIN SERPL-MCNC: 3.1 G/DL (ref 3.4–5)
ALP SERPL-CCNC: 78 U/L (ref 40–150)
ALT SERPL W P-5'-P-CCNC: 16 U/L (ref 0–50)
ANION GAP SERPL CALCULATED.3IONS-SCNC: 4 MMOL/L (ref 3–14)
AST SERPL W P-5'-P-CCNC: 12 U/L (ref 0–45)
BASOPHILS # BLD AUTO: 0.1 10E9/L (ref 0–0.2)
BASOPHILS NFR BLD AUTO: 5.3 %
BILIRUB SERPL-MCNC: 0.3 MG/DL (ref 0.2–1.3)
BUN SERPL-MCNC: 4 MG/DL (ref 7–30)
CALCIUM SERPL-MCNC: 8 MG/DL (ref 8.5–10.1)
CHLORIDE SERPL-SCNC: 109 MMOL/L (ref 94–109)
CO2 SERPL-SCNC: 25 MMOL/L (ref 20–32)
CREAT SERPL-MCNC: 0.52 MG/DL (ref 0.52–1.04)
DIFFERENTIAL METHOD BLD: ABNORMAL
EOSINOPHIL # BLD AUTO: 0 10E9/L (ref 0–0.7)
EOSINOPHIL NFR BLD AUTO: 0 %
ERYTHROCYTE [DISTWIDTH] IN BLOOD BY AUTOMATED COUNT: 13.5 % (ref 10–15)
GFR SERPL CREATININE-BSD FRML MDRD: >90 ML/MIN/{1.73_M2}
GLUCOSE SERPL-MCNC: 97 MG/DL (ref 70–99)
HCT VFR BLD AUTO: 30.2 % (ref 35–47)
HGB BLD-MCNC: 9.4 G/DL (ref 11.7–15.7)
LYMPHOCYTES # BLD AUTO: 0.6 10E9/L (ref 0.8–5.3)
LYMPHOCYTES NFR BLD AUTO: 30.7 %
MCH RBC QN AUTO: 29.3 PG (ref 26.5–33)
MCHC RBC AUTO-ENTMCNC: 31.1 G/DL (ref 31.5–36.5)
MCV RBC AUTO: 94 FL (ref 78–100)
METAMYELOCYTES # BLD: 0.1 10E9/L
METAMYELOCYTES NFR BLD MANUAL: 2.6 %
MONOCYTES # BLD AUTO: 0.4 10E9/L (ref 0–1.3)
MONOCYTES NFR BLD AUTO: 20.2 %
NEUTROPHILS # BLD AUTO: 0.8 10E9/L (ref 1.6–8.3)
NEUTROPHILS NFR BLD AUTO: 36.8 %
NRBC # BLD AUTO: 0 10*3/UL
NRBC BLD AUTO-RTO: 1 /100
PLATELET # BLD AUTO: 185 10E9/L (ref 150–450)
PLATELET # BLD EST: ABNORMAL 10*3/UL
POTASSIUM SERPL-SCNC: 3.6 MMOL/L (ref 3.4–5.3)
PROMYELOCYTES # BLD MANUAL: 0.1 10E9/L
PROMYELOCYTES NFR BLD MANUAL: 4.4 %
PROT SERPL-MCNC: 5.9 G/DL (ref 6.8–8.8)
RBC # BLD AUTO: 3.21 10E12/L (ref 3.8–5.2)
RBC MORPH BLD: NORMAL
SODIUM SERPL-SCNC: 138 MMOL/L (ref 133–144)
WBC # BLD AUTO: 2.1 10E9/L (ref 4–11)

## 2019-07-11 PROCEDURE — 25000132 ZZH RX MED GY IP 250 OP 250 PS 637: Performed by: PHYSICIAN ASSISTANT

## 2019-07-11 PROCEDURE — 99238 HOSP IP/OBS DSCHRG MGMT 30/<: CPT | Performed by: INTERNAL MEDICINE

## 2019-07-11 PROCEDURE — 36592 COLLECT BLOOD FROM PICC: CPT | Performed by: PHYSICIAN ASSISTANT

## 2019-07-11 PROCEDURE — 80053 COMPREHEN METABOLIC PANEL: CPT | Performed by: PHYSICIAN ASSISTANT

## 2019-07-11 PROCEDURE — 25000128 H RX IP 250 OP 636: Performed by: PHYSICIAN ASSISTANT

## 2019-07-11 PROCEDURE — 25000131 ZZH RX MED GY IP 250 OP 636 PS 637: Performed by: PHYSICIAN ASSISTANT

## 2019-07-11 PROCEDURE — 25000132 ZZH RX MED GY IP 250 OP 250 PS 637: Performed by: INTERNAL MEDICINE

## 2019-07-11 PROCEDURE — 85025 COMPLETE CBC W/AUTO DIFF WBC: CPT | Performed by: PHYSICIAN ASSISTANT

## 2019-07-11 PROCEDURE — 25800030 ZZH RX IP 258 OP 636: Performed by: PHYSICIAN ASSISTANT

## 2019-07-11 RX ORDER — IBUPROFEN 400 MG/1
400 TABLET, FILM COATED ORAL ONCE
Status: COMPLETED | OUTPATIENT
Start: 2019-07-11 | End: 2019-07-11

## 2019-07-11 RX ORDER — HEPARIN SODIUM (PORCINE) LOCK FLUSH IV SOLN 100 UNIT/ML 100 UNIT/ML
5 SOLUTION INTRAVENOUS ONCE
Status: DISCONTINUED | OUTPATIENT
Start: 2019-07-11 | End: 2019-07-11 | Stop reason: HOSPADM

## 2019-07-11 RX ORDER — LIDOCAINE 40 MG/G
CREAM TOPICAL
Status: DISCONTINUED | OUTPATIENT
Start: 2019-07-11 | End: 2019-07-11 | Stop reason: HOSPADM

## 2019-07-11 RX ORDER — HEPARIN SODIUM,PORCINE 10 UNIT/ML
5-10 VIAL (ML) INTRAVENOUS
Status: DISCONTINUED | OUTPATIENT
Start: 2019-07-11 | End: 2019-07-11 | Stop reason: HOSPADM

## 2019-07-11 RX ORDER — HEPARIN SODIUM (PORCINE) LOCK FLUSH IV SOLN 100 UNIT/ML 100 UNIT/ML
5 SOLUTION INTRAVENOUS
Status: DISCONTINUED | OUTPATIENT
Start: 2019-07-11 | End: 2019-07-11 | Stop reason: HOSPADM

## 2019-07-11 RX ORDER — HEPARIN SODIUM,PORCINE 10 UNIT/ML
5-10 VIAL (ML) INTRAVENOUS EVERY 24 HOURS
Status: DISCONTINUED | OUTPATIENT
Start: 2019-07-11 | End: 2019-07-11 | Stop reason: HOSPADM

## 2019-07-11 RX ADMIN — IBUPROFEN 400 MG: 400 TABLET ORAL at 05:02

## 2019-07-11 RX ADMIN — LEVOTHYROXINE SODIUM 112 MCG: 112 TABLET ORAL at 08:27

## 2019-07-11 RX ADMIN — Medication 5 ML: at 09:52

## 2019-07-11 RX ADMIN — ONDANSETRON HYDROCHLORIDE 8 MG: 8 TABLET, FILM COATED ORAL at 05:02

## 2019-07-11 RX ADMIN — SODIUM CHLORIDE: 9 INJECTION, SOLUTION INTRAVENOUS at 05:05

## 2019-07-11 RX ADMIN — RANITIDINE 150 MG: 150 TABLET, FILM COATED ORAL at 05:02

## 2019-07-11 RX ADMIN — SERTRALINE HYDROCHLORIDE 100 MG: 100 TABLET ORAL at 08:27

## 2019-07-11 RX ADMIN — CEFEPIME 2 G: 2 INJECTION, POWDER, FOR SOLUTION INTRAVENOUS at 05:05

## 2019-07-11 ASSESSMENT — ACTIVITIES OF DAILY LIVING (ADL)
ADLS_ACUITY_SCORE: 11

## 2019-07-11 ASSESSMENT — MIFFLIN-ST. JEOR: SCORE: 1259.03

## 2019-07-11 NOTE — PLAN OF CARE
VSS, afebrile. Denies pain or nausea. On cefepime. Zofran and zantac given for nausea and heartburn. Port changed to transparent dressing; good blood return. Up independently. Continue with plan of care.

## 2019-07-11 NOTE — PLAN OF CARE
Discharge    D: Orders for discharge and outpatient medications written.    I: Home medications and return to clinic schedule reviewed with patient. Discharge instructions and parameters for calling Health Care Provider reviewed. Patient left at 1030 accompanied by self.     A: Patient/family verbalized understanding and was ready for discharge. VSS. Afebrile. Denied pain, nausea and vomiting. Port-a-cath flushed with 5 mL of Heparin 100 units/mL and then de-accessed without incident.    P: Follow up as scheduled in clinic on July 16.

## 2019-07-11 NOTE — DISCHARGE SUMMARY
[facility], [place]    Discharge Summary  Hematology / Oncology    Date of Admission:  7/10/2019  Date of Discharge:  7/11/2019  Discharging Provider: Nida Jo  Date of Service (when I saw the patient): 07/11/19    Discharge Diagnoses   Malignant neoplasm of upper-outer quadrant of right breast in female, estrogen receptor positive  Neutropenic fever    History of Present Illness   Kathy Lei is a 53 year old female with a history of invasive ductal breast cancer, last cycle of AC on 7/2/19, who presented with a neutropenic fever.     Please see H&P for further detail of history.    Hospital Course   Kathy Lei was admitted on 7/10/2019.  The following problems were addressed during her hospitalization:     #Neutropenic fever   Initially seen in clinic on 7/8 for Tm 100.8, but was not neutropenic at the time. Presented back to clinic on 7/10 for follow-up with reported temperature of 99.9 after taking Ibuprofen and continuing to feel feverish. Noted to be neutropenic with . No localizing symptoms other than some bladder discomfort, but UA was negative for infection on 7/8 and 7/10. CXR neg. Blood cultures NGTD. Received IVF and Cefepime in clinic. Did receive Neulasta OnPro on 7/3. Continued Cefepime from 7/10-7/11. Patient remained afebrile and HD stable throughout hospitalization.  on 7/11. Due to improving ANC, afebrile and no evidence of infection, decision made to not discharge with additional antibiotics.      #Right breast cancer, ER/LA positive, HER2 negative   Follows with Dr. Guzman. Clinical stage II, T3N0MX, invasive ductal breast cancer in the upper outer quadrant of right breast, multifocal, measuring 8.9 cm on MRI. ER positive and HER2 negative. She enrolled in I-SPY 2 and on durvalumab every 28 days, weekly Taxol, and olaparib arm. Started AC on 5/20/2019. S/p C4 on 7/2.   - Follow up with Dr. Guzman and Dr. Ramos on  7/16     #HTN  - Continue PTA Lisinopril 5 mg daily     #Hypothyroidism  - Continue PTA Synthroid 112 mcg daily     #Depression  - Continue PTA Zyprexa 5 mg and Zoloft 100 mg daily     Dispo: Discharged to home on 7/11.  Follow-up: Dr. Guzman and Dr. Ramos on 7/16    Above plan discussed with staff physician, Dr. Anand Jo PA-C  Hematology/Oncology  Pager: [phone]    Significant Results and Procedures   Results for orders placed or performed in visit on 07/10/19   X-ray Chest 2 vws*    Narrative    Exam: XR CHEST 2 VW, 7/10/2019 10:46 AM    Indication: 54 yo female with neutropenic fever; Malignant neoplasm of  upper-outer quadrant of right breast in female, estrogen receptor  positive (H); Malignant neoplasm of upper-outer quadrant of right  breast in female, estrogen receptor positive (H)    Comparison: CT chest abdomen and pelvis 2/5/2019    Findings:   A single lumen chest port is in place with tip at the atrial caval  junction. The cardiomediastinal silhouette is within normal limits.  There is no pleural effusion or pneumothorax. There is no focal  airspace opacity. There is spinal fusion with a Lacey kim. Right  breast biopsy marker.      Impression    Impression: Left chest wall Port-A-Cath tip at the atriocaval  junction. Clear chest.    I have personally reviewed the examination and initial interpretation  and I agree with the findings.    MARIIA CADET DO         Pending Results   These results will be followed up by in clinic on 7/16  Unresulted Labs Ordered in the Past 30 Days of this Admission     Date and Time Order Name Status Description    7/10/2019 1040 BLOOD CULTURE Preliminary     7/8/2019 1208 BLOOD CULTURE Preliminary     6/18/2019 1309 T4 FREE In process     6/10/2019 1306 T4 FREE In process     6/4/2019 1321 T4 FREE In process     5/20/2019 0905 SURGICAL PATHOLOGY EXAM In process     5/20/2019 0806 T4 FREE In process     5/14/2019 1216 T4 FREE In process            Code Status   Full Code    Primary Care Physician   Rachel Arauz    Time Spent on this Encounter   I, Nida Jo, personally saw the patient today and spent greater than 30 minutes discharging this patient.    Discharge Disposition   Discharged to home  Condition at discharge: Stable    Consultations This Hospital Stay   None    Discharge Orders      CBC with platelets differential    Last Lab Result: Hemoglobin (g/dL)       Date                     Value                 07/11/2019               9.4 (L)          ----------     Comprehensive metabolic panel     Reason for your hospital stay    You were admitted with a neutropenic fevers. Your fevers have resolved and your neutrophil count has improved. You are safe to discharge home. Your care team does not feel you need additional antibiotics at this time.     Adult [facility] Follow-up and recommended labs and tests    - You have follow-up with Dr. Guzman and Dr. Ramos on 7/16.    Appointments on [place] and/or [facility] (with [facility] or [facility] provider or service). Call [phone] if you haven't heard regarding these appointments within 7 days of discharge.    Please see appointment details below:     Activity    Your activity upon discharge: Regular activity as tolerated.     When to contact your care team    Call the [facility] 24-hour triage line at [phone] or [phone] for temp >100.4, uncontrolled nausea/vomiting/diarrhea/constipation, unrelieved pain, bleeding not relieved with pressure, dizziness, chest pain, shortness of breath, loss of consciousness, and any new or concerning symptoms.     Call [phone] and ask for the care coordinator that works with your oncologist if you have questions about scans, appointments, hospital follow-up, or other concerns.     Diet    Follow this diet upon discharge: Regular diet as tolerated.     Discharge Medications   Current Discharge Medication List      CONTINUE these  medications which have NOT CHANGED    Details   ADVIL 200 MG OR TABS 1 TABLET EVERY 4 TO 6 HOURS AS NEEDED  Qty: 0, Refills: 0      albuterol (VENTOLIN HFA) 108 (90 Base) MCG/ACT Inhaler INHALE 1-2 PUFFS INTO THE LUNGS EVERY 4 HOURS AS NEEDED FOR SHORTNESS OF BREATH OR DIFFICULTY BREATHING.  Qty: 18 g, Refills: PRN    Associated Diagnoses: Moderate persistent asthma without complication      fluticasone (FLOVENT HFA) 110 MCG/ACT inhaler Inhale 2 puffs into the lungs 2 times daily  Qty: 1 Inhaler, Refills: PRN    Associated Diagnoses: Moderate persistent asthma without complication      levothyroxine (SYNTHROID) 112 MCG tablet Take 1 tablet (112 mcg) by mouth daily  Qty: 30 tablet, Refills: 1    Associated Diagnoses: Hypothyroidism, unspecified type      lisinopril (PRINIVIL/ZESTRIL) 5 MG tablet Take 1 tablet (5 mg) by mouth daily  Qty: 90 tablet, Refills: 3    Associated Diagnoses: Cardiomyopathy, unspecified type (H)      LORazepam (ATIVAN) 0.5 MG tablet Take 1 tablet (0.5 mg) by mouth every 4 hours as needed (Anxiety, Nausea/Vomiting or Sleep)  Qty: 30 tablet, Refills: 3    Associated Diagnoses: Malignant neoplasm of upper-outer quadrant of right breast in female, estrogen receptor positive (H)      OLANZapine (ZYPREXA) 5 MG tablet Take 1 tablet (5 mg) by mouth At Bedtime  Qty: 30 tablet, Refills: 0    Associated Diagnoses: Chemotherapy induced nausea and vomiting; Malignant neoplasm of upper-outer quadrant of right breast in female, estrogen receptor positive (H)      ondansetron (ZOFRAN) 8 MG tablet Take 1 tablet (8 mg) by mouth every 8 hours as needed for nausea  Qty: 60 tablet, Refills: 3    Associated Diagnoses: Chemotherapy induced nausea and vomiting; Malignant neoplasm of upper-outer quadrant of right breast in female, estrogen receptor positive (H)      prochlorperazine (COMPAZINE) 10 MG tablet Take 1 tablet (10 mg) by mouth every 6 hours as needed (Nausea/Vomiting)  Qty: 60 tablet, Refills: 3     Associated Diagnoses: Malignant neoplasm of upper-outer quadrant of right breast in female, estrogen receptor positive (H); Chemotherapy induced nausea and vomiting      sertraline (ZOLOFT) 100 MG tablet Take 1 tablet (100 mg) by mouth daily  Qty: 30 tablet, Refills: 1    Associated Diagnoses: Depression, unspecified depression type      order for DME Cranial prosthesis  Qty: 1 Units, Refills: 1    Associated Diagnoses: Malignant neoplasm of upper-outer quadrant of right breast in female, estrogen receptor positive (H); Alopecia      sodium chloride (OCEAN) 0.65 % nasal spray Spray in both nostrils four times daily  Qty: 30 mL, Refills: 3    Associated Diagnoses: Epistaxis      triamcinolone (ARISTOCORT HP) 0.5 % external cream Apply topically 2 times daily  Qty: 30 g, Refills: 3    Associated Diagnoses: Malignant neoplasm of upper-outer quadrant of right breast in female, estrogen receptor positive (H); Rash      vitamin B6 (PYRIDOXINE) 100 MG tablet Take 1 tablet (100 mg) by mouth daily  Qty: 30 tablet, Refills: 3    Associated Diagnoses: Malignant neoplasm of upper-outer quadrant of right breast in female, estrogen receptor positive (H)         STOP taking these medications       aprepitant (EMEND) 80 MG capsule Comments:   Reason for Stopping:         dexamethasone (DECADRON) 4 MG tablet Comments:   Reason for Stopping:         STATIN NOT PRESCRIBED (INTENTIONAL) Comments:   Reason for Stopping:             Allergies   Allergies   Allergen Reactions     Buspar [Buspirone] Nausea and Vomiting     Remeron [Mirtazapine]      Dizzy , cant function , balance     Data   ROUTINE IP LABS (Last four results)  BMP  Recent Labs   Lab 07/11/19  0535 07/10/19  0904 07/08/19  1133    134 136   POTASSIUM 3.6 4.0 3.8   CHLORIDE 109 103 104   BRYANT 8.0* 8.7 8.8   CO2 25 26 27   BUN 4* 8 10   CR 0.52 0.54 0.52   GLC 97 92 98     CBC  Recent Labs   Lab 07/11/19  0535 07/10/19  0904 07/08/19  1133   WBC 2.1* 1.0* 2.5*   RBC  3.21* 3.50* 3.54*   HGB 9.4* 10.6* 10.9*   HCT 30.2* 32.1* 32.7*   MCV 94 92 92   MCH 29.3 30.3 30.8   MCHC 31.1* 33.0 33.3   RDW 13.5 13.4 13.8    175 170     INRNo lab results found in last 7 days.

## 2019-07-12 ENCOUNTER — CARE COORDINATION (OUTPATIENT)
Dept: ONCOLOGY | Facility: CLINIC | Age: 54
End: 2019-07-12

## 2019-07-12 ENCOUNTER — TELEPHONE (OUTPATIENT)
Dept: FAMILY MEDICINE | Facility: CLINIC | Age: 54
End: 2019-07-12

## 2019-07-12 ASSESSMENT — ENCOUNTER SYMPTOMS
DIARRHEA: 0
POLYPHAGIA: 0
BLOATING: 0
CONSTIPATION: 1
INSOMNIA: 0
ALTERED TEMPERATURE REGULATION: 0
BOWEL INCONTINENCE: 0
DEPRESSION: 0
RECTAL PAIN: 0
POLYDIPSIA: 0
JAUNDICE: 0
NIGHT SWEATS: 0
PANIC: 0
FATIGUE: 1
INCREASED ENERGY: 1
DECREASED APPETITE: 0
DECREASED CONCENTRATION: 0
WEIGHT LOSS: 1
BREAST PAIN: 0
HEARTBURN: 1
CHILLS: 0
WEIGHT GAIN: 0
NERVOUS/ANXIOUS: 1
FEVER: 0
HALLUCINATIONS: 0
BREAST MASS: 1
ABDOMINAL PAIN: 0
NAUSEA: 0
BLOOD IN STOOL: 0
VOMITING: 0

## 2019-07-12 NOTE — TELEPHONE ENCOUNTER
Is following with oncology and has visits set up.    You have follow-up with Dr. Guzman and Dr. Ramos on 7/16.     Appointments on [place] and/or [facility] (with [facility] or [facility] provider or service). Call [phone] if you haven't heard regarding these appointments within 7 days of discharge.

## 2019-07-12 NOTE — PROGRESS NOTES
"Patient post hospital and called before writer could contact her today c/o temperature was 100.6. No c/o UTI symptoms or explanation of being febrile feeling \"chilled.\" Patient took Advil and then called writer. Reviewed blood cultures and remain negative for two days.   Instructed patient to return to the ED to be evaluated, but did not want to go. Mily Mortensen RN, BSN Breast Center Nurse Coordinator  "

## 2019-07-14 LAB
BACTERIA SPEC CULT: NO GROWTH
Lab: NORMAL
SPECIMEN SOURCE: NORMAL

## 2019-07-14 NOTE — PROGRESS NOTES
Medical oncology follow-up note     HPI: Kathy Lei is a 54 yo female with a new diagnosis of an estrogen-receptor positive, [place]-positive, HER2-negative breast cancer, grade 2, in the upper outer quadrant of the right breast.      Mily presented between Christmas and New Years of 2018 with new sharp pains in the upper outer quadrant of the right breast.  She underwent mammographic screening.       IMAGING:  Mammography and ultrasound:  She had a diagnostic mammogram which showed bilateral prepectoral saline implants.  On the right breast at 11:30 position, there were grouped coarse heterogeneous calcifications spanning 1.3 cm, new since 2014, adjacent calcifications 11-o'clock position posterior depth.  There was an irregular mass in the lateral right breast 9-o'clock position mid-posterior depth, and an additional mass with obscured margins.  No significant changes in the left breast.  Right breast ultrasound was performed.  In the area of the palpable lump in the right breast, 11-o'clock position, 6 cm from the nipple, there is an irregular hypoechoic mass with indistinct margins measuring approximately 1.0 x 0.9 x 1.6 cm in size.  Immediately adjacent to the mass, 11:30 position, are microcalcifications.  In the second area of palpable lump right breast, 9:30 position, 5 cm from the nipple, there was an irregular hypoechoic mass measuring 3.2 x 0.5 x 1.3 cm in size felt to account for the mammographic findings.  There were multiple additional round hypoechoic masses similar to the findings at 9:30 position seen incidentally during real time and not directly palpable.  For example, in the right breast at 8-o'clock position, 4 cm from the nipple, there was a mass measuring 0.8 x 0.6 x 0.6 cm, BI-RADS 4.       Contrast mammogram was subsequently performed and the contrast mammogram showed a large area of mass and non-mass-like enhancement in the upper outer right breast measuring 7.2 cm in greatest  dimension, corresponding global asymmetry in the upper outer right breast.  Enhancement extended to the implant without evidence of extension to the skin surface or nipple, and the calcifications were noted as previously found.      PATHOLOGY:  The pathology showed part A, breast needle biopsy 11 o'clock, 6 cm from the nipple, invasive mammary carcinoma, no special type, ductal (and mucinous) [place] grade 2.  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  Calcifications were associated with the DCIS.  There was a focus suspicious for lymphovascular invasion.  Invasive carcinoma was estrogen receptor positive and progesterone receptor negative by immunohistochemistry.  In part B, breast right, 8 o'clock, 4 cm from the nipple, ultrasound-guided core biopsy, invasive mammary carcinoma, no special type, ductal (and mucinous) [place] grade 2, occasional calcifications were noted.  Invasive carcinoma was estrogen receptor positive and progesterone receptor negative by immunohistochemistry.  On quantitation of the ER and GA, ER was positive 99% of the cells staining with strong intensity.  GA was subsequently noted to be positive with 15% of the cells staining with moderate intensity.       There was also a HER2 FISH performed, and the HER2 FISH showed average number of HER2 signals per nucleus 2.6.  Average number of CEN17 signals 1.7 with a ratio of 1.6, VASILIY negative for biopsy A.  For biopsy B, the HER2 signals per nucleus 2.9.  Average number CEN17 signals per nucleus 1.7 with a ratio of 1.7, VASILIY negative.  Overall, by 2018 ASCO/CAP guidelines, this tumor is HER2 negative.     She was enrolled in I-SPY2 trial to Durvulumab, Taxol and Olaparib arm. She completed 12 cycles of weekly Taxol. She is s/p cycle 1 of adriamycin and cyclophosphamide (AC).     PAST MEDICAL HISTORY:  In terms of her breast history, she does have a history of a smaller right breast which was treated  with implants 19 years ago.  She has a history of 2 papillomas in the right breast, 1 removed at the 6-o'clock position 5 years ago, another removed at the 6-o'clock position approximately 10 years ago.  These incisions are approximately at the 5:30 to 6-o'clock position.  She has no history of radiation therapy.  No history of breast cancer of any type.  No history of tumor of any kind.  No history of heart problems, heart attack, breathing problems, blood clots, seizures, stroke, arthritis, peptic ulcer disease or osteoporosis.  No history of bone fractures.  She is not currently participating in a clinical trial. She does have a history of asthma and a history of hypothyroidism.      FAMILY HISTORY:  Entirely negative for breast cancer or ovarian cancer or breast cancer in a male relative.      ALLERGIES:  No known drug allergies.  No allergy to seafood, iodine or contrast dye.  She does not take aspirin.      PAST MENSTRUAL HISTORY:  First period was at age 11.  First and only pregnancy was at age 26.  No miscarriages or abortions.  Occasional use of oral contraceptives in her 20s.  No history of hormone replacement therapy.  Last period was 3 years ago.      SOCIAL HISTORY:  Tobacco history was from age 15 to present, smoking a pack of cigarettes a week.  She is now trying to stop cigarettes.      In terms of alcohol use, in her early teens and early 20s, she drank approximately 10 drinks on the weekend and has tapered off drinking since then.      INTERVAL HISTORY:  Mily returns to clinic.  She has completed cycle 4 of dose-dense AC, which was begun 07/02/2019.  She had an episode of febrile neutropenia and was hospitalized for 24 hours.  After that, she had a vaginal yeast infection, which has persisted, but may be getting slightly better.  She has been also having fevers to 100 degrees with night sweats, and we will obtain blood cultures today.      Overall, she has no pain, fatigue, depression or anxiety.       REVIEW OF SYSTEMS:  She has the previously mentioned fevers and night sweats.  No problems with cough, chest pain, shortness of breath, nausea or vomiting, constipation or diarrhea, bone pain, back pain or headache.  The remainder of a 10 point review of systems is negative.      She is taking calcium and vitamin D.  She is not exercising very much at this time.      PHYSICAL EXAMINATION:   VITAL SIGNS:  Blood pressure 119/79, pulse 105, respirations 16, temperature 99, O2 sat 93% on room air, weight 63 kg and height 1.7 m.   GENERAL:  Mily appears generally well.  She has alopecia and is wearing a wig.   HEENT:  No lesions in the oropharynx.   LYMPH:  There is no palpable cervical, supraclavicular, subclavicular or axillary lymphadenopathy.   BREASTS:  Right breast reveals an implant in place with no palpable masses, which is different compared with prior exam.  Left breast shows an implant in place, no masses.   LUNGS:  Clear to percussion and auscultation.   HEART:  Regular rate and rhythm, S1, S2.   ABDOMEN:  Soft and nontender without hepatosplenomegaly.   EXTREMITIES:  Without edema.   PSYCHIATRIC:  Mood and affect are normal.      LABORATORY DATA:  The CMP is remarkable for a BUN of 2.  TSH is 4.92.  CBC shows a WBC of 9.1, hemoglobin 11.3, platelets 289,000.  Absolute neutrophil count is 6700 with a left shift.  UA negative.      Blood culture from 07/10/2019 was no growth so far.     IMAGING:  Exam: Breast MRI, with and without Contrast, and with color encoding      History/Indication: Right breast cancer. I SPY 2 trial.     Comparison: 5/18/2019, 4/2/2019, 3/12/2019, 2/4/2019     Technique: Precontrast gradient recall axial nonfat sat T1.Precontrast  gradient recall axial fat-sat T1. Precontrast STIR axial fat sat.  Postcontrast gradient recall axial fat-sat T1 with dynamic  postcontrast acquisitions at multiple time points with subtractions.  Delayed high-resolution gradient recall sagittal fat-sat  T1. MIP of  subtractions, axial coronal and sagittal.  Axial diffusion-weighted  with ADC map.  Color encoding of post contrast dynamic acquisitions.  IV contrast: 6.5 mL Gadavist     Breast composition: Heterogeneous fibroglandular tissue     Background parenchymal enhancement 1st post C image: Mild     Findings:   Non-mass enhancement  that correlates with right breast cancer has  decreased since 5/18/2019, measuring closer to 6.2 cm in greatest  dimension with overall significant decreased extent of tumor volume.   Previously 7.9 cm in greatest dimension on 5/18/2019.  Enhancement  extends from the area of the implant capsule anteriorly involving  upper inner, upper outer and central right breast similar to prior,  seen best today in total extent on sagittal images.  Right breast  biopsy clips.     No abnormal enhancement in the left breast. Bilateral prepectoral  saline breast implants.  Left chest wall port-a-catheter is partially  visualized.       No lymphadenopathy.                                                                      Impression: BI-RADS CATEGORY: 6 - Known Biopsy-Proven  Malignancy-Appropriate Action Should Be Taken.     Recommendation: Continued oncologic and surgical follow-up.     BONNIE RAZA MD     ASSESSMENT AND PLAN:     1.  Mily Lei is a 53-year-old woman with a recent diagnosis of clinical stage II, ER-positive, HER-2 negative, T2N0MX, invasive ductal carcinoma in the upper outer quadrant of the right breast, which was multifocal, largest area of involvement measuring 8.9 cm in largest dimension on the original MRI.  She was treated on the I-SPY 2 clinical trial with durvalumab, paclitaxel and olaparib. She had grade 2 fatigue.  She then went on to have response on MRI.  She completed 4 cycles of dose-dense AC.    2.  The last cycle of AC was complicated by febrile neutropenia requiring hospitalization for 1 day.  She now comes to clinic with persistent fevers to 100 degrees  and a vaginal yeast infection, but no dysuria.  Blood cultures, urine culture and GYN evaluation by Dr. Jacinda Fournier have been requested.   3.  Imaging.  Non-mass enhancement  that correlates with right breast cancer has decreased since 5/18/2019, measuring closer to 6.2 cm in greatest dimension with overall significant decreased extent of tumor volume.   Previously 7.9 cm in greatest dimension on 5/18/2019.  Subtraction images show substantial reduction of enhancement in right breast vs. February 2, 2019 MRI.    4.  Followup.  The plan is for Mily to undergo a right mastectomy with immediate reconstruction.  She will have her surgery with Dr. Anne Yousif August 15 and we will have her return to clinic in 3 weeks for followup after surgery.  She also needs evaluation for the vaginal yeast infection and fevers before she can go to surgery.      Thank you for allowing us to continue to participate in Mily Lei's care.      Christian Guzman MD       [facility]           I spent 35 minutes with the patient more than 50% of which was in counseling and coordination of care.

## 2019-07-15 ENCOUNTER — RESEARCH ENCOUNTER (OUTPATIENT)
Dept: ONCOLOGY | Facility: CLINIC | Age: 54
End: 2019-07-15

## 2019-07-16 ENCOUNTER — ONCOLOGY VISIT (OUTPATIENT)
Dept: ONCOLOGY | Facility: CLINIC | Age: 54
End: 2019-07-16
Attending: INTERNAL MEDICINE
Payer: COMMERCIAL

## 2019-07-16 ENCOUNTER — RESEARCH ENCOUNTER (OUTPATIENT)
Dept: ONCOLOGY | Facility: CLINIC | Age: 54
End: 2019-07-16

## 2019-07-16 ENCOUNTER — OFFICE VISIT (OUTPATIENT)
Dept: PLASTIC SURGERY | Facility: CLINIC | Age: 54
End: 2019-07-16
Attending: PLASTIC SURGERY
Payer: COMMERCIAL

## 2019-07-16 ENCOUNTER — APPOINTMENT (OUTPATIENT)
Dept: LAB | Facility: CLINIC | Age: 54
End: 2019-07-16
Attending: INTERNAL MEDICINE
Payer: COMMERCIAL

## 2019-07-16 VITALS
WEIGHT: 139 LBS | OXYGEN SATURATION: 93 % | DIASTOLIC BLOOD PRESSURE: 79 MMHG | SYSTOLIC BLOOD PRESSURE: 119 MMHG | BODY MASS INDEX: 22.34 KG/M2 | TEMPERATURE: 99 F | HEIGHT: 66 IN | HEART RATE: 105 BPM | RESPIRATION RATE: 16 BRPM

## 2019-07-16 VITALS
HEIGHT: 66 IN | DIASTOLIC BLOOD PRESSURE: 79 MMHG | HEART RATE: 105 BPM | TEMPERATURE: 99 F | RESPIRATION RATE: 16 BRPM | BODY MASS INDEX: 22.32 KG/M2 | SYSTOLIC BLOOD PRESSURE: 119 MMHG | WEIGHT: 138.89 LBS | OXYGEN SATURATION: 93 %

## 2019-07-16 DIAGNOSIS — Z17.0 MALIGNANT NEOPLASM OF UPPER-OUTER QUADRANT OF RIGHT BREAST IN FEMALE, ESTROGEN RECEPTOR POSITIVE (H): Primary | ICD-10-CM

## 2019-07-16 DIAGNOSIS — C50.411 MALIGNANT NEOPLASM OF UPPER-OUTER QUADRANT OF RIGHT BREAST IN FEMALE, ESTROGEN RECEPTOR POSITIVE (H): Primary | ICD-10-CM

## 2019-07-16 DIAGNOSIS — R93.89 CHEST X-RAY ABNORMALITY: ICD-10-CM

## 2019-07-16 DIAGNOSIS — N89.8 VAGINAL DISCHARGE: ICD-10-CM

## 2019-07-16 DIAGNOSIS — R50.9 FEVER, UNSPECIFIED FEVER CAUSE: ICD-10-CM

## 2019-07-16 DIAGNOSIS — I42.9 CARDIOMYOPATHY, UNSPECIFIED TYPE (H): ICD-10-CM

## 2019-07-16 LAB
ALBUMIN SERPL-MCNC: 3.5 G/DL (ref 3.4–5)
ALBUMIN UR-MCNC: NEGATIVE MG/DL
ALP SERPL-CCNC: 94 U/L (ref 40–150)
ALT SERPL W P-5'-P-CCNC: 24 U/L (ref 0–50)
ANION GAP SERPL CALCULATED.3IONS-SCNC: 6 MMOL/L (ref 3–14)
APPEARANCE UR: CLEAR
AST SERPL W P-5'-P-CCNC: 18 U/L (ref 0–45)
BACTERIA SPEC CULT: NO GROWTH
BASOPHILS # BLD AUTO: 0.1 10E9/L (ref 0–0.2)
BASOPHILS NFR BLD AUTO: 0.9 %
BILIRUB SERPL-MCNC: 0.2 MG/DL (ref 0.2–1.3)
BILIRUB UR QL STRIP: NEGATIVE
BUN SERPL-MCNC: 2 MG/DL (ref 7–30)
CALCIUM SERPL-MCNC: 9 MG/DL (ref 8.5–10.1)
CHLORIDE SERPL-SCNC: 104 MMOL/L (ref 94–109)
CO2 SERPL-SCNC: 28 MMOL/L (ref 20–32)
COLOR UR AUTO: YELLOW
CORTIS SERPL-MCNC: 17.5 UG/DL (ref 4–22)
CREAT SERPL-MCNC: 0.65 MG/DL (ref 0.52–1.04)
DIFFERENTIAL METHOD BLD: ABNORMAL
EOSINOPHIL # BLD AUTO: 0.1 10E9/L (ref 0–0.7)
EOSINOPHIL NFR BLD AUTO: 0.8 %
ERYTHROCYTE [DISTWIDTH] IN BLOOD BY AUTOMATED COUNT: 14.1 % (ref 10–15)
GFR SERPL CREATININE-BSD FRML MDRD: >90 ML/MIN/{1.73_M2}
GLUCOSE SERPL-MCNC: 92 MG/DL (ref 70–99)
GLUCOSE UR STRIP-MCNC: NEGATIVE MG/DL
HCT VFR BLD AUTO: 34.4 % (ref 35–47)
HGB BLD-MCNC: 11.3 G/DL (ref 11.7–15.7)
HGB UR QL STRIP: NEGATIVE
KETONES UR STRIP-MCNC: NEGATIVE MG/DL
LEUKOCYTE ESTERASE UR QL STRIP: NEGATIVE
LYMPHOCYTES # BLD AUTO: 0.5 10E9/L (ref 0.8–5.3)
LYMPHOCYTES NFR BLD AUTO: 5.2 %
Lab: NORMAL
MCH RBC QN AUTO: 30.2 PG (ref 26.5–33)
MCHC RBC AUTO-ENTMCNC: 32.8 G/DL (ref 31.5–36.5)
MCV RBC AUTO: 92 FL (ref 78–100)
METAMYELOCYTES # BLD: 0.2 10E9/L
METAMYELOCYTES NFR BLD MANUAL: 2.6 %
MONOCYTES # BLD AUTO: 1.2 10E9/L (ref 0–1.3)
MONOCYTES NFR BLD AUTO: 12.9 %
MYELOCYTES # BLD: 0.4 10E9/L
MYELOCYTES NFR BLD MANUAL: 4.3 %
NEUTROPHILS # BLD AUTO: 6.7 10E9/L (ref 1.6–8.3)
NEUTROPHILS NFR BLD AUTO: 73.3 %
NITRATE UR QL: NEGATIVE
PH UR STRIP: 6 PH (ref 5–7)
PLATELET # BLD AUTO: 289 10E9/L (ref 150–450)
PLATELET # BLD EST: ABNORMAL 10*3/UL
POTASSIUM SERPL-SCNC: 3.9 MMOL/L (ref 3.4–5.3)
PROT SERPL-MCNC: 7 G/DL (ref 6.8–8.8)
RBC # BLD AUTO: 3.74 10E12/L (ref 3.8–5.2)
RBC MORPH BLD: NORMAL
SODIUM SERPL-SCNC: 138 MMOL/L (ref 133–144)
SOURCE: NORMAL
SP GR UR STRIP: 1.01 (ref 1–1.03)
SPECIMEN SOURCE: NORMAL
T4 FREE SERPL-MCNC: 1.15 NG/DL (ref 0.76–1.46)
TSH SERPL DL<=0.005 MIU/L-ACNC: 4.92 MU/L (ref 0.4–4)
UROBILINOGEN UR STRIP-MCNC: 0 MG/DL (ref 0–2)
WBC # BLD AUTO: 9.1 10E9/L (ref 4–11)

## 2019-07-16 PROCEDURE — 25000128 H RX IP 250 OP 636: Mod: ZF | Performed by: INTERNAL MEDICINE

## 2019-07-16 PROCEDURE — 80053 COMPREHEN METABOLIC PANEL: CPT | Performed by: PHYSICIAN ASSISTANT

## 2019-07-16 PROCEDURE — 87040 BLOOD CULTURE FOR BACTERIA: CPT | Performed by: INTERNAL MEDICINE

## 2019-07-16 PROCEDURE — 99214 OFFICE O/P EST MOD 30 MIN: CPT | Mod: ZP | Performed by: INTERNAL MEDICINE

## 2019-07-16 PROCEDURE — 84439 ASSAY OF FREE THYROXINE: CPT | Performed by: PHYSICIAN ASSISTANT

## 2019-07-16 PROCEDURE — 81003 URINALYSIS AUTO W/O SCOPE: CPT

## 2019-07-16 PROCEDURE — G0463 HOSPITAL OUTPT CLINIC VISIT: HCPCS | Mod: ZF

## 2019-07-16 PROCEDURE — G0463 HOSPITAL OUTPT CLINIC VISIT: HCPCS

## 2019-07-16 PROCEDURE — 36591 DRAW BLOOD OFF VENOUS DEVICE: CPT

## 2019-07-16 PROCEDURE — 85025 COMPLETE CBC W/AUTO DIFF WBC: CPT | Performed by: PHYSICIAN ASSISTANT

## 2019-07-16 PROCEDURE — 82533 TOTAL CORTISOL: CPT | Performed by: INTERNAL MEDICINE

## 2019-07-16 PROCEDURE — 84443 ASSAY THYROID STIM HORMONE: CPT | Performed by: PHYSICIAN ASSISTANT

## 2019-07-16 RX ORDER — HEPARIN SODIUM (PORCINE) LOCK FLUSH IV SOLN 100 UNIT/ML 100 UNIT/ML
5 SOLUTION INTRAVENOUS DAILY PRN
Status: DISCONTINUED | OUTPATIENT
Start: 2019-07-16 | End: 2019-07-19 | Stop reason: HOSPADM

## 2019-07-16 RX ORDER — CEFAZOLIN SODIUM 2 G/50ML
2 SOLUTION INTRAVENOUS
Status: CANCELLED | OUTPATIENT
Start: 2019-07-16

## 2019-07-16 RX ORDER — CEFAZOLIN SODIUM 1 G/50ML
1 INJECTION, SOLUTION INTRAVENOUS SEE ADMIN INSTRUCTIONS
Status: CANCELLED | OUTPATIENT
Start: 2019-07-16

## 2019-07-16 RX ADMIN — HEPARIN 5 ML: 100 SYRINGE at 07:51

## 2019-07-16 ASSESSMENT — PAIN SCALES - GENERAL
PAINLEVEL: NO PAIN (0)
PAINLEVEL: NO PAIN (0)

## 2019-07-16 ASSESSMENT — MIFFLIN-ST. JEOR
SCORE: 1242
SCORE: 1241.5

## 2019-07-16 NOTE — LETTER
7/16/2019       RE: Kathy Lei  [address] [place] [address]     Dear Colleague,    Thank you for referring your patient, Kathy Lei, to the [facility]. Please see a copy of my visit note below.    Medical oncology follow-up note     HPI: Kathy Lei is a 54 yo female with a new diagnosis of an estrogen-receptor positive, [place]-positive, HER2-negative breast cancer, grade 2, in the upper outer quadrant of the right breast.      Mily presented between Christmas and New Years of 2018 with new sharp pains in the upper outer quadrant of the right breast.  She underwent mammographic screening.       IMAGING:  Mammography and ultrasound:  She had a diagnostic mammogram which showed bilateral prepectoral saline implants.  On the right breast at 11:30 position, there were grouped coarse heterogeneous calcifications spanning 1.3 cm, new since 2014, adjacent calcifications 11-o'clock position posterior depth.  There was an irregular mass in the lateral right breast 9-o'clock position mid-posterior depth, and an additional mass with obscured margins.  No significant changes in the left breast.  Right breast ultrasound was performed.  In the area of the palpable lump in the right breast, 11-o'clock position, 6 cm from the nipple, there is an irregular hypoechoic mass with indistinct margins measuring approximately 1.0 x 0.9 x 1.6 cm in size.  Immediately adjacent to the mass, 11:30 position, are microcalcifications.  In the second area of palpable lump right breast, 9:30 position, 5 cm from the nipple, there was an irregular hypoechoic mass measuring 3.2 x 0.5 x 1.3 cm in size felt to account for the mammographic findings.  There were multiple additional round hypoechoic masses similar to the findings at 9:30 position seen incidentally during real time and not directly palpable.  For example, in the right breast at 8-o'clock position, 4 cm from the nipple, there was a  mass measuring 0.8 x 0.6 x 0.6 cm, BI-RADS 4.       Contrast mammogram was subsequently performed and the contrast mammogram showed a large area of mass and non-mass-like enhancement in the upper outer right breast measuring 7.2 cm in greatest dimension, corresponding global asymmetry in the upper outer right breast.  Enhancement extended to the implant without evidence of extension to the skin surface or nipple, and the calcifications were noted as previously found.      PATHOLOGY:  The pathology showed part A, breast needle biopsy 11 o'clock, 6 cm from the nipple, invasive mammary carcinoma, no special type, ductal (and mucinous) [place] grade 2.  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  Calcifications were associated with the DCIS.  There was a focus suspicious for lymphovascular invasion.  Invasive carcinoma was estrogen receptor positive and progesterone receptor negative by immunohistochemistry.  In part B, breast right, 8 o'clock, 4 cm from the nipple, ultrasound-guided core biopsy, invasive mammary carcinoma, no special type, ductal (and mucinous) [place] grade 2, occasional calcifications were noted.  Invasive carcinoma was estrogen receptor positive and progesterone receptor negative by immunohistochemistry.  On quantitation of the ER and DC, ER was positive 99% of the cells staining with strong intensity.  DC was subsequently noted to be positive with 15% of the cells staining with moderate intensity.       There was also a HER2 FISH performed, and the HER2 FISH showed average number of HER2 signals per nucleus 2.6.  Average number of CEN17 signals 1.7 with a ratio of 1.6, VASILIY negative for biopsy A.  For biopsy B, the HER2 signals per nucleus 2.9.  Average number CEN17 signals per nucleus 1.7 with a ratio of 1.7, VASILIY negative.  Overall, by 2018 ASCO/CAP guidelines, this tumor is HER2 negative.     She was enrolled in I-SPY2 trial to Durvulumab, Taxol and  Olaparib arm. She completed 12 cycles of weekly Taxol. She is s/p cycle 1 of adriamycin and cyclophosphamide (AC).     PAST MEDICAL HISTORY:  In terms of her breast history, she does have a history of a smaller right breast which was treated with implants 19 years ago.  She has a history of 2 papillomas in the right breast, 1 removed at the 6-o'clock position 5 years ago, another removed at the 6-o'clock position approximately 10 years ago.  These incisions are approximately at the 5:30 to 6-o'clock position.  She has no history of radiation therapy.  No history of breast cancer of any type.  No history of tumor of any kind.  No history of heart problems, heart attack, breathing problems, blood clots, seizures, stroke, arthritis, peptic ulcer disease or osteoporosis.  No history of bone fractures.  She is not currently participating in a clinical trial. She does have a history of asthma and a history of hypothyroidism.      FAMILY HISTORY:  Entirely negative for breast cancer or ovarian cancer or breast cancer in a male relative.      ALLERGIES:  No known drug allergies.  No allergy to seafood, iodine or contrast dye.  She does not take aspirin.      PAST MENSTRUAL HISTORY:  First period was at age 11.  First and only pregnancy was at age 26.  No miscarriages or abortions.  Occasional use of oral contraceptives in her 20s.  No history of hormone replacement therapy.  Last period was 3 years ago.      SOCIAL HISTORY:  Tobacco history was from age 15 to present, smoking a pack of cigarettes a week.  She is now trying to stop cigarettes.      In terms of alcohol use, in her early teens and early 20s, she drank approximately 10 drinks on the weekend and has tapered off drinking since then.      INTERVAL HISTORY:  Mily returns to clinic.  She has completed cycle 4 of dose-dense AC, which was begun 07/02/2019.  She had an episode of febrile neutropenia and was hospitalized for 24 hours.  After that, she had a vaginal  yeast infection, which has persisted, but may be getting slightly better.  She has been also having fevers to 100 degrees with night sweats, and we will obtain blood cultures today.      Overall, she has no pain, fatigue, depression or anxiety.      REVIEW OF SYSTEMS:  She has the previously mentioned fevers and night sweats.  No problems with cough, chest pain, shortness of breath, nausea or vomiting, constipation or diarrhea, bone pain, back pain or headache.  The remainder of a 10 point review of systems is negative.      She is taking calcium and vitamin D.  She is not exercising very much at this time.      PHYSICAL EXAMINATION:   VITAL SIGNS:  Blood pressure 119/79, pulse 105, respirations 16, temperature 99, O2 sat 93% on room air, weight 63 kg and height 1.7 m.   GENERAL:  Mily appears generally well.  She has alopecia and is wearing a wig.   HEENT:  No lesions in the oropharynx.   LYMPH:  There is no palpable cervical, supraclavicular, subclavicular or axillary lymphadenopathy.   BREASTS:  Right breast reveals an implant in place with no palpable masses, which is different compared with prior exam.  Left breast shows an implant in place, no masses.   LUNGS:  Clear to percussion and auscultation.   HEART:  Regular rate and rhythm, S1, S2.   ABDOMEN:  Soft and nontender without hepatosplenomegaly.   EXTREMITIES:  Without edema.   PSYCHIATRIC:  Mood and affect are normal.      LABORATORY DATA:  The CMP is remarkable for a BUN of 2.  TSH is 4.92.  CBC shows a WBC of 9.1, hemoglobin 11.3, platelets 289,000.  Absolute neutrophil count is 6700 with a left shift.  UA negative.      Blood culture from 07/10/2019 was no growth so far.     IMAGING:  Exam: Breast MRI, with and without Contrast, and with color encoding      History/Indication: Right breast cancer. I SPY 2 trial.     Comparison: 5/18/2019, 4/2/2019, 3/12/2019, 2/4/2019     Technique: Precontrast gradient recall axial nonfat sat T1.Precontrast  gradient  recall axial fat-sat T1. Precontrast STIR axial fat sat.  Postcontrast gradient recall axial fat-sat T1 with dynamic  postcontrast acquisitions at multiple time points with subtractions.  Delayed high-resolution gradient recall sagittal fat-sat T1. MIP of  subtractions, axial coronal and sagittal.  Axial diffusion-weighted  with ADC map.  Color encoding of post contrast dynamic acquisitions.  IV contrast: 6.5 mL Gadavist     Breast composition: Heterogeneous fibroglandular tissue     Background parenchymal enhancement 1st post C image: Mild     Findings:   Non-mass enhancement  that correlates with right breast cancer has  decreased since 5/18/2019, measuring closer to 6.2 cm in greatest  dimension with overall significant decreased extent of tumor volume.   Previously 7.9 cm in greatest dimension on 5/18/2019.  Enhancement  extends from the area of the implant capsule anteriorly involving  upper inner, upper outer and central right breast similar to prior,  seen best today in total extent on sagittal images.  Right breast  biopsy clips.     No abnormal enhancement in the left breast. Bilateral prepectoral  saline breast implants.  Left chest wall port-a-catheter is partially  visualized.       No lymphadenopathy.                                                                      Impression: BI-RADS CATEGORY: 6 - Known Biopsy-Proven  Malignancy-Appropriate Action Should Be Taken.     Recommendation: Continued oncologic and surgical follow-up.     BONNIE RAZA MD     ASSESSMENT AND PLAN:     1.  Mily Lei is a 53-year-old woman with a recent diagnosis of clinical stage II, ER-positive, HER-2 negative, T2N0MX, invasive ductal carcinoma in the upper outer quadrant of the right breast, which was multifocal, largest area of involvement measuring 8.9 cm in largest dimension on the original MRI.  She was treated on the I-SPY 2 clinical trial with durvalumab, paclitaxel and olaparib. She had grade 2 fatigue.  She  then went on to have response on MRI.  She completed 4 cycles of dose-dense AC.    2.  The last cycle of AC was complicated by febrile neutropenia requiring hospitalization for 1 day.  She now comes to clinic with persistent fevers to 100 degrees and a vaginal yeast infection, but no dysuria.  Blood cultures, urine culture and GYN evaluation by Dr. Jacinda Fournier have been requested.   3.  Imaging.  Non-mass enhancement  that correlates with right breast cancer has decreased since 5/18/2019, measuring closer to 6.2 cm in greatest dimension with overall significant decreased extent of tumor volume.   Previously 7.9 cm in greatest dimension on 5/18/2019.  Subtraction images show substantial reduction of enhancement in right breast vs. February 2, 2019 MRI.    4.  Followup.  The plan is for Mily to undergo a right mastectomy with immediate reconstruction.  She will have her surgery with Dr. Anne Yousif August 15 and we will have her return to clinic in 3 weeks for followup after surgery.  She also needs evaluation for the vaginal yeast infection and fevers before she can go to surgery.      Thank you for allowing us to continue to participate in Mily Lei's care.      Christian Guzman MD       [facility]           I spent 35 minutes with the patient more than 50% of which was in counseling and coordination of care.

## 2019-07-16 NOTE — NURSING NOTE
"Oncology Rooming Note    July 16, 2019 8:18 AM   Kathy Lei is a 53 year old female who presents for:    Chief Complaint   Patient presents with     Port Draw     Port accessed. Labs drawn via  by RN in lab. VS taken.      Oncology Clinic Visit     [facility] RETURN- BREAST CA     Initial Vitals: /79   Pulse 105   Temp 99  F (37.2  C) (Oral)   Resp 16   Ht 1.676 m (5' 5.98\")   Wt 63 kg (139 lb)   LMP 11/02/2014   SpO2 93%   BMI 22.45 kg/m   Estimated body mass index is 22.45 kg/m  as calculated from the following:    Height as of this encounter: 1.676 m (5' 5.98\").    Weight as of this encounter: 63 kg (139 lb). Body surface area is 1.71 meters squared.  No Pain (0) Comment: Data Unavailable   Patient's last menstrual period was 11/02/2014.  Allergies reviewed: Yes  Medications reviewed: Yes    Medications: Medication refills not needed today.  Pharmacy name entered into [facility]:    [id number] - [place], [place] - [address] [place] [address] [id number] - [place], [place] - [address] AT [facility] [address]    Clinical concerns: Patient thinks she has a yeast infection. Thomas was notified.      Shakir Skaggs LPN            "

## 2019-07-16 NOTE — NURSING NOTE
"Oncology Rooming Note    July 16, 2019 9:37 AM   Kathy Lei is a 53 year old female who presents for:    Chief Complaint   Patient presents with     Oncology Clinic Visit     [facility] NEW- BREAST CA     Initial Vitals: /79   Pulse 105   Temp 99  F (37.2  C) (Oral)   Resp 16   Ht 1.676 m (5' 5.98\")   Wt 63 kg (138 lb 14.2 oz)   LMP 11/02/2014   SpO2 93%   BMI 22.43 kg/m   Estimated body mass index is 22.43 kg/m  as calculated from the following:    Height as of this encounter: 1.676 m (5' 5.98\").    Weight as of this encounter: 63 kg (138 lb 14.2 oz). Body surface area is 1.71 meters squared.  No Pain (0) Comment: Data Unavailable   Patient's last menstrual period was 11/02/2014.  Allergies reviewed: Yes  Medications reviewed: Yes    Medications: Medication refills not needed today.  Pharmacy name entered into [facility]:    [facility] - [place], [place] - [address] [place] [address] DRUG STORE [id number] - [place], [place] - [address] AT [facility] [address]    Clinical concerns: No new concerns. Richard was notified.      Shakir Skaggs LPN            "

## 2019-07-16 NOTE — NURSING NOTE
Chief Complaint   Patient presents with     Port Draw     Port accessed. Labs drawn via  by RN in lab. VS taken.      Port accessed using 20g gripper needle. Line flushed and Heparin locked. Labs drawn via  by RN. Vital signs taken. Checked into next appointment.       Lakeshia Nash RN

## 2019-07-16 NOTE — LETTER
2019       RE: Kathy Lei   [address] [place] [address]     Dear Colleague,    Thank you for referring your patient, Kathy Lei, to the [facility] BREAST CENTER at [facility]. Please see a copy of my visit note below.    CONSULT NOTE      REFERRING PROVIDER:  Anne Yousif MD      PRESENTING COMPLAINT:  Consultation for breast reconstruction.      HISTORY OF PRESENTING COMPLAINT:  Ms. Lei is 53 years old.  She has been diagnosed with right-sided breast cancer and is undergoing a bilateral nipple non-sparing mastectomy.  She underwent neoadjuvant chemotherapy that finished 2 weeks ago.  It is unknown whether she will need radiation therapy.  It is important to note the patient 20 years ago had prepectoral bilateral-shaped breast implants.  She has grade 3 capsular contracture.  She wants to be around the same size or slightly smaller, i.e., in the B to C range.      PAST MEDICAL HISTORY:  Hypertension, anxiety, asthma, hypothyroidism.      PAST SURGICAL HISTORY:  , back surgery and papilloma removal.      MEDICATIONS:  Levothyroxine, lisinopril, albuterol, Zoloft.      ALLERGIES:  Buspirone.      SOCIAL HISTORY:  Used to smoke up to a 1/2 pack to a pack a day for 15 years, quit in January.  Drinks socially.  Is a hairdresser.      REVIEW OF SYSTEMS:  Denies chest pain, shortness of breath, MI, CVA, DVT and PE.      PHYSICAL EXAMINATION:  Vital signs stable.  She is afebrile, in no obvious distress.  She is 5 feet 6 inches, 138 pounds, BMI 22 kg/m2.  On examination of her breasts, she has palpable implants, grade 3 capsular contracture.  Left breast is larger than the right.  Left breast is more ptotic over the implant than the right.  She has loose skin in her abdominal wall, a small hernia, pinch thickness about 3-4 cm.  No scars in the back.  She has a Pfannenstiel incision.      ASSESSMENT AND PLAN:  Based on the above  findings, a diagnosis of right breast cancer, undergoing bilateral nipple non-sparing mastectomy, underwent neoadjuvant chemotherapy and is interested in immediate reconstruction was made.  I had a jalil, detailed discussion with the patient about her findings.  I explained to her and her sister about breast reconstruction, the elective nature of breast reconstruction, immediate versus delayed reconstruction, expander-based reconstruction versus autologous reconstruction were all explained in detail.  It is important to note the patient has been getting fevers, low-grade, for the last few weeks.  This is being worked up extensively.  Nothing has been found.  As long as there is no infectious etiology, I think the first stage of reconstruction would be an expander placement after the mastectomy as long as the blood flow to the skin flaps is determined to be adequate.  The second stage would be done thereafter, a few weeks to months later, depending on whether radiation therapy is needed or not.  She is a good candidate for either implant-based or autologous reconstructions.  Both were explained in detail.  The pros and cons of each were explained.  Obviously, if she undergoes radiation therapy, my inclination is towards an autologous reconstruction.  The patient wants to think about it.  She is inclined towards an implant but is open to an autologous as well given the discussions today.  She will let me know in the near future.  Third stage of reconstruction, including touchup surgeries, nipple reconstructions and fat grafting, were also explained.  All questions were answered.  All exam and discussion done in the presence of my nurse.  Consent obtained.  Photographs obtained.  Plan now is to proceed with the first stage of reconstruction.  I look forward to helping her out in the near future.      Total time spent with patient was 30 minutes, more than half was counseling.      cc:   Anne Yousif MD   [facility]    [address] [facility], [facility] [address]   [place]  [address]         Again, thank you for allowing me to participate in the care of your patient.      Sincerely,    LALY Ramos MD

## 2019-07-16 NOTE — PROGRESS NOTES
CONSULT NOTE      REFERRING PROVIDER:  Anne Yousif MD      PRESENTING COMPLAINT:  Consultation for breast reconstruction.      HISTORY OF PRESENTING COMPLAINT:  Ms. Lei is 53 years old.  She has been diagnosed with right-sided breast cancer and is undergoing a bilateral nipple non-sparing mastectomy.  She underwent neoadjuvant chemotherapy that finished 2 weeks ago.  It is unknown whether she will need radiation therapy.  It is important to note the patient 20 years ago had prepectoral bilateral-shaped breast implants.  She has grade 3 capsular contracture.  She wants to be around the same size or slightly smaller, i.e., in the B to C range.      PAST MEDICAL HISTORY:  Hypertension, anxiety, asthma, hypothyroidism.      PAST SURGICAL HISTORY:  , back surgery and papilloma removal.      MEDICATIONS:  Levothyroxine, lisinopril, albuterol, Zoloft.      ALLERGIES:  Buspirone.      SOCIAL HISTORY:  Used to smoke up to a 1/2 pack to a pack a day for 15 years, quit in January.  Drinks socially.  Is a hairdresser.      REVIEW OF SYSTEMS:  Denies chest pain, shortness of breath, MI, CVA, DVT and PE.      PHYSICAL EXAMINATION:  Vital signs stable.  She is afebrile, in no obvious distress.  She is 5 feet 6 inches, 138 pounds, BMI 22 kg/m2.  On examination of her breasts, she has palpable implants, grade 3 capsular contracture.  Left breast is larger than the right.  Left breast is more ptotic over the implant than the right.  She has loose skin in her abdominal wall, a small hernia, pinch thickness about 3-4 cm.  No scars in the back.  She has a Pfannenstiel incision.      ASSESSMENT AND PLAN:  Based on the above findings, a diagnosis of right breast cancer, undergoing bilateral nipple non-sparing mastectomy, underwent neoadjuvant chemotherapy and is interested in immediate reconstruction was made.  I had a jalil, detailed discussion with the patient about her findings.  I explained to her and her sister about  breast reconstruction, the elective nature of breast reconstruction, immediate versus delayed reconstruction, expander-based reconstruction versus autologous reconstruction were all explained in detail.  It is important to note the patient has been getting fevers, low-grade, for the last few weeks.  This is being worked up extensively.  Nothing has been found.  As long as there is no infectious etiology, I think the first stage of reconstruction would be an expander placement after the mastectomy as long as the blood flow to the skin flaps is determined to be adequate.  The second stage would be done thereafter, a few weeks to months later, depending on whether radiation therapy is needed or not.  She is a good candidate for either implant-based or autologous reconstructions.  Both were explained in detail.  The pros and cons of each were explained.  Obviously, if she undergoes radiation therapy, my inclination is towards an autologous reconstruction.  The patient wants to think about it.  She is inclined towards an implant but is open to an autologous as well given the discussions today.  She will let me know in the near future.  Third stage of reconstruction, including touchup surgeries, nipple reconstructions and fat grafting, were also explained.  All questions were answered.  All exam and discussion done in the presence of my nurse.  Consent obtained.  Photographs obtained.  Plan now is to proceed with the first stage of reconstruction.  I look forward to helping her out in the near future.      Total time spent with patient was 30 minutes, more than half was counseling.      cc:   Anne Yousif MD   [facility], [address]  [address]

## 2019-07-16 NOTE — NURSING NOTE
[id number]: Study Visit Note   Subject name: Kathy Lei     Visit: Unscheduled    Since the last study visit, She has been doing okay. Patient reporting intermittent low grade temps/fevers since leaving the hospital. Reports some vaginal discharge that started after leaving the hospital. Otherwise, patient reports no new    Complaints since most recent study visit.    I have personally interviewed Kathy Lei and reviewed her medical record for adverse events and concomitant medications and these have been recorded on the corresponding logs in Kathy Lei's research file.     Kathy Lei was given the opportunity to ask any trial related questions.  Please see provider progress note for physical exam and other clinical information. Labs were reviewed - any significant lab values were addressed and reviewed.    Lorne Leos RN     Pager: [phone]

## 2019-07-17 NOTE — TELEPHONE ENCOUNTER
Contacted patient, she is scheduled for surgery 8/15 starting at 11am    On wait list for first case in another room.

## 2019-07-18 NOTE — TELEPHONE ENCOUNTER
Spoke/left message with: Mily to schedule surgery with Dr Ko Ramos and Dr Anne Yousif     Surgery was scheduled on 8/15 at [facility]    Patient will have pre-surgery visit with PCP -      Nuclear Medicine: confirmed delivery at 1030am    Implants/Special Equipment: emailed to [facility]    Wire Loc: na    -Patient advised H&P is needed or surgery cancellation may occur    Post-Op care appointment scheduled?  YES on 8/27    Patient is aware a / is needed day of surgery.     Surgery packet was delivered to patient via [employer], patient has my direct contact information for any further questions.        Additional comments: on wait list for first start       Angelique Talavera  Surgical [facility]infa-Op Coordinator  [phone]

## 2019-07-22 ENCOUNTER — ONCOLOGY VISIT (OUTPATIENT)
Dept: ONCOLOGY | Facility: CLINIC | Age: 54
End: 2019-07-22
Payer: COMMERCIAL

## 2019-07-22 ENCOUNTER — ANCILLARY PROCEDURE (OUTPATIENT)
Dept: GENERAL RADIOLOGY | Facility: CLINIC | Age: 54
End: 2019-07-22
Attending: PHYSICIAN ASSISTANT
Payer: COMMERCIAL

## 2019-07-22 ENCOUNTER — TELEPHONE (OUTPATIENT)
Dept: ONCOLOGY | Facility: CLINIC | Age: 54
End: 2019-07-22

## 2019-07-22 ENCOUNTER — ONCOLOGY VISIT (OUTPATIENT)
Dept: ONCOLOGY | Facility: CLINIC | Age: 54
End: 2019-07-22
Attending: PHYSICIAN ASSISTANT
Payer: COMMERCIAL

## 2019-07-22 ENCOUNTER — INFUSION THERAPY VISIT (OUTPATIENT)
Dept: ONCOLOGY | Facility: CLINIC | Age: 54
End: 2019-07-22
Attending: INTERNAL MEDICINE
Payer: COMMERCIAL

## 2019-07-22 ENCOUNTER — ANCILLARY PROCEDURE (OUTPATIENT)
Dept: CT IMAGING | Facility: CLINIC | Age: 54
End: 2019-07-22
Attending: PHYSICIAN ASSISTANT
Payer: COMMERCIAL

## 2019-07-22 ENCOUNTER — APPOINTMENT (OUTPATIENT)
Dept: LAB | Facility: CLINIC | Age: 54
End: 2019-07-22
Attending: INTERNAL MEDICINE
Payer: COMMERCIAL

## 2019-07-22 VITALS
HEART RATE: 122 BPM | WEIGHT: 138.45 LBS | SYSTOLIC BLOOD PRESSURE: 125 MMHG | BODY MASS INDEX: 22.36 KG/M2 | TEMPERATURE: 99.3 F | RESPIRATION RATE: 16 BRPM | DIASTOLIC BLOOD PRESSURE: 81 MMHG | OXYGEN SATURATION: 96 %

## 2019-07-22 DIAGNOSIS — R93.89 CHEST X-RAY ABNORMALITY: ICD-10-CM

## 2019-07-22 DIAGNOSIS — R50.9 FEVER, UNSPECIFIED FEVER CAUSE: ICD-10-CM

## 2019-07-22 DIAGNOSIS — Z17.0 MALIGNANT NEOPLASM OF UPPER-OUTER QUADRANT OF RIGHT BREAST IN FEMALE, ESTROGEN RECEPTOR POSITIVE (H): ICD-10-CM

## 2019-07-22 DIAGNOSIS — C50.411 MALIGNANT NEOPLASM OF UPPER-OUTER QUADRANT OF RIGHT BREAST IN FEMALE, ESTROGEN RECEPTOR POSITIVE (H): Primary | ICD-10-CM

## 2019-07-22 DIAGNOSIS — D70.9 NEUTROPENIC FEVER (H): ICD-10-CM

## 2019-07-22 DIAGNOSIS — R50.81 NEUTROPENIC FEVER (H): Primary | ICD-10-CM

## 2019-07-22 DIAGNOSIS — R50.9 FEVER, UNSPECIFIED FEVER CAUSE: Primary | ICD-10-CM

## 2019-07-22 DIAGNOSIS — Z17.0 MALIGNANT NEOPLASM OF UPPER-OUTER QUADRANT OF RIGHT BREAST IN FEMALE, ESTROGEN RECEPTOR POSITIVE (H): Primary | ICD-10-CM

## 2019-07-22 DIAGNOSIS — R50.81 NEUTROPENIC FEVER (H): ICD-10-CM

## 2019-07-22 DIAGNOSIS — I42.9 CARDIOMYOPATHY, UNSPECIFIED TYPE (H): ICD-10-CM

## 2019-07-22 DIAGNOSIS — C50.411 MALIGNANT NEOPLASM OF UPPER-OUTER QUADRANT OF RIGHT BREAST IN FEMALE, ESTROGEN RECEPTOR POSITIVE (H): ICD-10-CM

## 2019-07-22 DIAGNOSIS — D70.9 NEUTROPENIC FEVER (H): Primary | ICD-10-CM

## 2019-07-22 LAB
ALBUMIN SERPL-MCNC: 3.5 G/DL (ref 3.4–5)
ALBUMIN UR-MCNC: NEGATIVE MG/DL
ALP SERPL-CCNC: 84 U/L (ref 40–150)
ALT SERPL W P-5'-P-CCNC: 18 U/L (ref 0–50)
ANION GAP SERPL CALCULATED.3IONS-SCNC: 5 MMOL/L (ref 3–14)
APPEARANCE UR: CLEAR
AST SERPL W P-5'-P-CCNC: 15 U/L (ref 0–45)
BACTERIA SPEC CULT: NO GROWTH
BACTERIA SPEC CULT: NO GROWTH
BASOPHILS # BLD AUTO: 0.1 10E9/L (ref 0–0.2)
BASOPHILS NFR BLD AUTO: 1 %
BILIRUB SERPL-MCNC: 0.2 MG/DL (ref 0.2–1.3)
BILIRUB UR QL STRIP: NEGATIVE
BUN SERPL-MCNC: 12 MG/DL (ref 7–30)
CALCIUM SERPL-MCNC: 8.8 MG/DL (ref 8.5–10.1)
CHLORIDE SERPL-SCNC: 101 MMOL/L (ref 94–109)
CO2 SERPL-SCNC: 26 MMOL/L (ref 20–32)
COLOR UR AUTO: YELLOW
CREAT SERPL-MCNC: 0.54 MG/DL (ref 0.52–1.04)
CRP SERPL-MCNC: 55.3 MG/L (ref 0–8)
DIFFERENTIAL METHOD BLD: ABNORMAL
EOSINOPHIL # BLD AUTO: 0 10E9/L (ref 0–0.7)
EOSINOPHIL NFR BLD AUTO: 0.3 %
ERYTHROCYTE [DISTWIDTH] IN BLOOD BY AUTOMATED COUNT: 13.6 % (ref 10–15)
ERYTHROCYTE [SEDIMENTATION RATE] IN BLOOD BY WESTERGREN METHOD: 61 MM/H (ref 0–30)
GFR SERPL CREATININE-BSD FRML MDRD: >90 ML/MIN/{1.73_M2}
GLUCOSE SERPL-MCNC: 101 MG/DL (ref 70–99)
GLUCOSE UR STRIP-MCNC: NEGATIVE MG/DL
HCT VFR BLD AUTO: 32.4 % (ref 35–47)
HGB BLD-MCNC: 10.9 G/DL (ref 11.7–15.7)
HGB UR QL STRIP: ABNORMAL
IMM GRANULOCYTES # BLD: 0.1 10E9/L (ref 0–0.4)
IMM GRANULOCYTES NFR BLD: 0.7 %
KETONES UR STRIP-MCNC: NEGATIVE MG/DL
LEUKOCYTE ESTERASE UR QL STRIP: NEGATIVE
LYMPHOCYTES # BLD AUTO: 0.7 10E9/L (ref 0.8–5.3)
LYMPHOCYTES NFR BLD AUTO: 9.8 %
Lab: NORMAL
Lab: NORMAL
MCH RBC QN AUTO: 29.8 PG (ref 26.5–33)
MCHC RBC AUTO-ENTMCNC: 33.6 G/DL (ref 31.5–36.5)
MCV RBC AUTO: 89 FL (ref 78–100)
MONOCYTES # BLD AUTO: 0.9 10E9/L (ref 0–1.3)
MONOCYTES NFR BLD AUTO: 12.7 %
MUCOUS THREADS #/AREA URNS LPF: PRESENT /LPF
NEUTROPHILS # BLD AUTO: 5.6 10E9/L (ref 1.6–8.3)
NEUTROPHILS NFR BLD AUTO: 75.5 %
NITRATE UR QL: NEGATIVE
NRBC # BLD AUTO: 0 10*3/UL
NRBC BLD AUTO-RTO: 0 /100
NT-PROBNP SERPL-MCNC: 57 PG/ML (ref 0–125)
PH UR STRIP: 5 PH (ref 5–7)
PLATELET # BLD AUTO: 416 10E9/L (ref 150–450)
POTASSIUM SERPL-SCNC: 3.7 MMOL/L (ref 3.4–5.3)
PROT SERPL-MCNC: 7.2 G/DL (ref 6.8–8.8)
RBC # BLD AUTO: 3.66 10E12/L (ref 3.8–5.2)
RBC #/AREA URNS AUTO: 2 /HPF (ref 0–2)
SODIUM SERPL-SCNC: 132 MMOL/L (ref 133–144)
SOURCE: ABNORMAL
SP GR UR STRIP: 1.01 (ref 1–1.03)
SPECIMEN SOURCE: NORMAL
SPECIMEN SOURCE: NORMAL
SQUAMOUS #/AREA URNS AUTO: <1 /HPF (ref 0–1)
UROBILINOGEN UR STRIP-MCNC: 0 MG/DL (ref 0–2)
WBC # BLD AUTO: 7.3 10E9/L (ref 4–11)
WBC #/AREA URNS AUTO: 4 /HPF (ref 0–5)

## 2019-07-22 PROCEDURE — 87040 BLOOD CULTURE FOR BACTERIA: CPT | Mod: 91 | Performed by: PHYSICIAN ASSISTANT

## 2019-07-22 PROCEDURE — 85652 RBC SED RATE AUTOMATED: CPT | Performed by: PHYSICIAN ASSISTANT

## 2019-07-22 PROCEDURE — 25000128 H RX IP 250 OP 636: Mod: ZF | Performed by: INTERNAL MEDICINE

## 2019-07-22 PROCEDURE — 25000128 H RX IP 250 OP 636: Performed by: PHYSICIAN ASSISTANT

## 2019-07-22 PROCEDURE — 86140 C-REACTIVE PROTEIN: CPT | Performed by: PHYSICIAN ASSISTANT

## 2019-07-22 PROCEDURE — G0463 HOSPITAL OUTPT CLINIC VISIT: HCPCS | Mod: ZF

## 2019-07-22 PROCEDURE — 80053 COMPREHEN METABOLIC PANEL: CPT | Performed by: PHYSICIAN ASSISTANT

## 2019-07-22 PROCEDURE — 25000128 H RX IP 250 OP 636: Mod: ZF | Performed by: PHYSICIAN ASSISTANT

## 2019-07-22 PROCEDURE — 99214 OFFICE O/P EST MOD 30 MIN: CPT | Mod: ZP | Performed by: PHYSICIAN ASSISTANT

## 2019-07-22 PROCEDURE — 85025 COMPLETE CBC W/AUTO DIFF WBC: CPT | Performed by: PHYSICIAN ASSISTANT

## 2019-07-22 PROCEDURE — 81001 URINALYSIS AUTO W/SCOPE: CPT | Performed by: PHYSICIAN ASSISTANT

## 2019-07-22 PROCEDURE — 83880 ASSAY OF NATRIURETIC PEPTIDE: CPT | Performed by: PHYSICIAN ASSISTANT

## 2019-07-22 PROCEDURE — 36593 DECLOT VASCULAR DEVICE: CPT

## 2019-07-22 RX ORDER — HEPARIN SODIUM (PORCINE) LOCK FLUSH IV SOLN 100 UNIT/ML 100 UNIT/ML
5 SOLUTION INTRAVENOUS ONCE
Status: COMPLETED | OUTPATIENT
Start: 2019-07-22 | End: 2019-07-22

## 2019-07-22 RX ORDER — IOPAMIDOL 755 MG/ML
100 INJECTION, SOLUTION INTRAVASCULAR ONCE
Status: COMPLETED | OUTPATIENT
Start: 2019-07-22 | End: 2019-07-22

## 2019-07-22 RX ADMIN — Medication 5 ML: at 16:17

## 2019-07-22 RX ADMIN — HEPARIN SODIUM (PORCINE) LOCK FLUSH IV SOLN 100 UNIT/ML 5 ML: 100 SOLUTION at 11:59

## 2019-07-22 RX ADMIN — IOPAMIDOL 68 ML: 755 INJECTION, SOLUTION INTRAVASCULAR at 16:03

## 2019-07-22 RX ADMIN — ALTEPLASE 2 MG: 2.2 INJECTION, POWDER, LYOPHILIZED, FOR SOLUTION INTRAVENOUS at 13:43

## 2019-07-22 ASSESSMENT — PAIN SCALES - GENERAL
PAINLEVEL: NO PAIN (0)
PAINLEVEL: NO PAIN (0)

## 2019-07-22 NOTE — TELEPHONE ENCOUNTER
[facility] Telephone Triage Note    Assessment: Patient called in to triage reporting the following symptoms: fever of 100.6. Patient reports continued fevers and chills over the weekend. Also reports night sweats. Pt reports she finished AC treatment on 7/2. She later developed neutropenic fevers and was hospitalized overnight 7/9 and given IV ABX. Pt reports her fevers stopped after hospitalization, but returned this weekend reaching a max of 100.6. She took Ibuprofen for fever last night, but fever returned this morning and is currently 100.4. Patient wondering if she should be prescribed ABX? Last labs 7/16 WBC 9.1, ANC 6.7.    Recommendations: Discussed with Dr. Guzman at 0730.  Recommending clinic visit today with ANTOINETTE for assessment. LIMA Gallo is willing to see pt at 1220 today with CXR and labs prior. .       Follow-Up: Order for above labs/procedures/infusion placed, Message sent to schedulers to add pt on to schedule, Informed patient of appointment times, Instructed patient to seek care immediately for worsening symptoms, including: fever, chest pain, shortness of breath, dizziness. Patient voiced understanding of advice and/or instructions given.     Addendum: upon calling patient with her appointment time she reported fever has increased to 101. Also reports she did not blood return on last port draw. Will plan to assess in clinic.     Jennifer Lyman RN   [facility] Triage

## 2019-07-22 NOTE — LETTER
7/22/2019      RE: Kathy Lei  [address] [place] [address]       ONCOLOGY FOLLOWUP VISIT  Jul 22, 2019    CC: ongoing fevers    ONCOLOGY HISTORY:   Kathy Lei is a 52 yo female with a new diagnosis of an estrogen-receptor positive, SC-positive, HER2-negative breast cancer, grade 2, in the upper outer quadrant of the right breast.    She was enrolled in I-SPY2 trial to Durvulumab, Taxol and Olaparib arm. She completed 12 cycles of weekly Taxol and 4 cycles of dd AC (last on 7/2/19).     For a detailed history, please see Dr. Guzman's note.     INTERIM HISTORY:  Mily presents today with her .     She continue to have ongoing fevers. She feels like she is febrile and does not feel well. She is having ongoing fatigue. Denies any SOB at rest or with exertion, CP, cough, dysuria, pain, nasal congestion, sore throat, diarrhea or rash. She is eating and drinking well. No other new or different symptom.     ROS: 10 point ROS neg other than the symptoms noted above in the HPI.    PHYSICAL EXAM:  General: Pleasant woman in no acute distress  HEENT: EOMI, PERRLA, no scleral icterus, mucus membranes moist and no lesions or thrush  Lymph: Neck is supple and shows no nodes in cervical or supraclavicular   Heart:  RRR, no murmur, gallop or rub  Lungs: CTA-B, no wheezes, rhonchi or rales  Abdomen: Normal bowel sounds, soft, non tender, not distended, no rebound or guarding, no palpable masses  Extremities: No pitting edema  Skin: No significant rashes or lesions  Neuro: CN II-XII are intact    LABS/IMAGING:    CXR dated 7/22/19  IMPRESSION:   1. Slight increase of lingular opacification as seen on the lateral  view. Atelectasis versus infection.  2. Evidence for pulmonary vascular congestion with Kerley B-lines,     CT CHEST dated 7/22/19  IMPRESSION:   Subtle areas of groundglass attenuation throughout both lungs, which  can be seen in the setting of viral infection or drug  reaction.    IMPRESSION/PLAN:    Fevers of Unknown Origin  -ongoing low grade fevers since 7/8. Was admitted to the hospital on 7/10 for neutropenic fevers. Was given 1 dose of cefepime. No source was found and she was discharged the next day without antibiotics. Since then she has remained febrile. She was seen in clinic by Dr. Guzman on 7/16 who repeated BCx2 and UA which were negative. Tested TSH and cortisol levels which were not clinically significant.   -today she returns with ongoing fevers (Tmax 101, usually ~100.4) and fatigue without localized symptoms. Repeated UA and BCx2. Obtained CXR to complete fever workup which showed slight increase of lingular opacification. Discussed with Dr. Guzman. Decided to obtain CT Chest today which showed diffuse groundglass opacities throughout b/l lobes. DDx: PCP or other atypical infection vs pneumonitis 2/2 immunotherapy. Will request for bronchoscopy to be done in the next couple days for further investigation and will treat accordingly  -will not start treatment until after bronchoscopy   -no longer neutropenic. Ok to take IBU/APAP to relieve fever. Should call if she were to start to have SOB/CP/cough. Currently asymptomatic     Kerley B lines on XR  -incidentally found. Usually suggest pulmonary vascular congestion. Last ECHO on 5/6/19 showed normal EF, though ECHO in Feb did note reduced function. Added on BNP which was normal    Breast Cancer  -Not discussed today. Completed cycle 4 of AC on 7/2  -scheduled for surgery on 8/15    Followup: Will be dependent on timing of bronchoscopy.    Ashley Baez PA-C  [facility]  [address] [place] [address]  [phone]

## 2019-07-22 NOTE — PROGRESS NOTES
Infusion Nursing Note:  Kathy Lei presents today for TPA.    Patient seen by provider today: Yes: LIMA Gallo   present during visit today: Not Applicable.    Note: Patient presented to infusion to TPA administration. Her port has not had blood return the last couple of times it has been accessed. TPA administered per protocol. After 45 minutes of dwell time positive blood return was noted. Pt has been having fevers for approximately 2.5 weeks.     TORB LIMA Gallo/Laverne Richey RN- Please collect blood cultures from port and peripherally, as well a urine culture.     Blood and urine cultures collected. PA met with pt in infusion and sent pt to CT scan. Pt to follow up with PA in clinic after scan.     Intravenous Access:  Implanted Port.    Treatment Conditions:  Not Applicable.      Post Infusion Assessment:  Blood return noted post infusion.  Access left in tact for CT scan.       Discharge Plan:   Patient declined prescription refills.  Discharge instructions reviewed with: Patient.  Patient and/or family verbalized understanding of discharge instructions and all questions answered.  AVS to patient via [employer].    Patient discharged in stable condition accompanied by: .  Departure Mode: Ambulatory.    Laverne Richey, GORAN

## 2019-07-22 NOTE — TELEPHONE ENCOUNTER
I called Mily and discussed with her that her fevers that she has had daily for the last week or 2 could be related to a process in her lungs.  I discussed that the CT scan we obtained today showed an atypical process consistent with an infection.  CRP and ESR are elevated.  I discussed that we will arrange for a bronchoscopy in the next day or 2 to make a diagnosis.  While she has no cough or shortness of breath on exertion, she has had a low lymphocyte count in the 400 range for the past 2 weeks. All of her questions were answered.      Discussed with pulmonary and they will go ahead with scheduling of the bronchoscopy.    Christian Guzman MD

## 2019-07-22 NOTE — DISCHARGE INSTRUCTIONS

## 2019-07-22 NOTE — LETTER
7/22/2019       RE: Kathy Lei  [address] [place] [address]     Dear Colleague,    Thank you for referring your patient, Kathy Lei, to the [facility]. Please see a copy of my visit note below.    Patient MARY MRN [id number] was seen today with recurrent fever, chills, and night sweats. Temperature to 100.6.  ANC and LFTs normal.  She was admitted for febrile neutropenia during her last cycle of AC. Cultures of blood and urine have been negative. Chest x-ray shows slight lingular opacification -atelectasis versus infection. Evidence for pulmonary vascular congestion with Kerley B lines.  We plan on a CT of the chest today.  I requested an ESR and CRP.  We are culturing the port as well as the peripheral blood, urine.  No signs or symptoms of congestive heart failure, so I think I would hold off on performing a echocardiogram right now. We could perform one before surgery. Overall she feels reasonably well. I would not start antibiotics.\Our plan is to report her as an immune event given she was on the durvalumab arm and continue to monitor.  Will see her next week or sooner as dictated by symptoms.    Christian Guzman MD    ADDENDUM:    Notably, this workout began with fever of unknown origin  with brief hospitalization July 8 with neutropenia after cycle 4 of AC.  CT scan suggests possible early pneumocystis pneumonia.  Oxygen saturation is normal.  No shortness of breath at rest or on exertion. While autoimmune pneumonitis from durvalumab is possible I think PCP is higher on the differential diagnosis.  We will plan for a bronchoscopy for tomorrow or the next day.  This patient finished neoadjuvant dose dense AC and in the last two weeks has significant lymphopenia.  I suspect PCP pneumonia.  She has no respiratory symptoms at this time but emerging atypical infiltrates in both lungs.  She has an unexplained fever and elevated CRP.      Christian Guzman MD

## 2019-07-22 NOTE — PROGRESS NOTES
Patient KB MRN [id number] was seen today with recurrent fever, chills, and night sweats. Temperature to 100.6.  ANC and LFTs normal.  She was admitted for febrile neutropenia during her last cycle of AC. Cultures of blood and urine have been negative. Chest x-ray shows slight lingular opacification -atelectasis versus infection. Evidence for pulmonary vascular congestion with Kerley B lines.  We plan on a CT of the chest today.  I requested an ESR and CRP.  We are culturing the port as well as the peripheral blood, urine.  No signs or symptoms of congestive heart failure, so I think I would hold off on performing a echocardiogram right now. We could perform one before surgery. Overall she feels reasonably well. I would not start antibiotics.\Our plan is to report her as an immune event given she was on the durvalumab arm and continue to monitor.  Will see her next week or sooner as dictated by symptoms.    Christian Guzman MD    ADDENDUM:    Notably, this workout began with fever of unknown origin  with brief hospitalization July 8 with neutropenia after cycle 4 of AC.  CT scan suggests possible early pneumocystis pneumonia.  Oxygen saturation is normal.  No shortness of breath at rest or on exertion. While autoimmune pneumonitis from durvalumab is possible I think PCP is higher on the differential diagnosis.  We will plan for a bronchoscopy for tomorrow or the next day.  This patient finished neoadjuvant dose dense AC and in the last two weeks has significant lymphopenia.  I suspect PCP pneumonia.  She has no respiratory symptoms at this time but emerging atypical infiltrates in both lungs.  She has an unexplained fever and elevated CRP.      Christian Guzman MD

## 2019-07-22 NOTE — NURSING NOTE
Chief Complaint   Patient presents with     Port Draw     Labs drawn via port by RN in lab. VS taken. Pt checked in for next appt     Port accessed with 20g flat needle by RN, labs collected, line flushed with saline and heparin.  Vitals taken. Pt checked in for appointment(s).    Martita WYNN RN PHN BSN  BMT/Oncology Lab

## 2019-07-22 NOTE — NURSING NOTE
"Oncology Rooming Note    July 22, 2019 12:18 PM   Kathy Lei is a 53 year old female who presents for:    Chief Complaint   Patient presents with     Port Draw     Labs drawn via port by RN in lab. VS taken. Pt checked in for next appt     Oncology Clinic Visit     Breast Cancer     Initial Vitals: /81   Pulse 122   Temp 99.2  F (37.3  C) (Tympanic)   Resp 16   Wt 62.8 kg (138 lb 7.2 oz)   LMP 11/02/2014   SpO2 96%   BMI 22.36 kg/m   Estimated body mass index is 22.36 kg/m  as calculated from the following:    Height as of 7/16/19: 1.676 m (5' 5.98\").    Weight as of this encounter: 62.8 kg (138 lb 7.2 oz). Body surface area is 1.71 meters squared.  No Pain (0) Comment: Data Unavailable   Patient's last menstrual period was 11/02/2014.  Allergies reviewed: Yes  Medications reviewed: Yes    Medications: Medication refills not needed today.  Pharmacy name entered into [facility]:    [facility] - [place], [place] - [address] [facility] - [place], [place] - [address] AT [facility] [address]    Clinical concerns: Ongoing fevers on and off since July 4th.       TESFAYE BELTRAN CMA              "

## 2019-07-23 ENCOUNTER — TELEPHONE (OUTPATIENT)
Dept: GASTROENTEROLOGY | Facility: CLINIC | Age: 54
End: 2019-07-23

## 2019-07-23 ENCOUNTER — TELEPHONE (OUTPATIENT)
Dept: PULMONOLOGY | Facility: CLINIC | Age: 54
End: 2019-07-23

## 2019-07-23 NOTE — TELEPHONE ENCOUNTER
Pulmonary Telephone Note:  I received a call from Dr. Christian Guzman regarding this patient Monday evening- in a clinical trial with new fevers and ground glass opacities on cross-sectional imaging. I spoke with her by phone this AM- she's otherwise doing well. The plan is for bronchoscopy with BAL on Thursday in Endoscopy at 8AM. I asked her to arrive for check-in at 7AM and to be NPO at midnight of the procedure. She was in agreement with this plan.     Maksim Claros MD, 7/23/2019, 9:23 AM  Interventional Pulmonology Fellow  Department of Pulmonary, Allergy, Critical Care & Sleep Medicine   Pager: [phone]

## 2019-07-23 NOTE — PROGRESS NOTES
ONCOLOGY FOLLOWUP VISIT  Jul 22, 2019    CC: ongoing fevers    ONCOLOGY HISTORY:   Kathy Lei is a 52 yo female with a new diagnosis of an estrogen-receptor positive, KS-positive, HER2-negative breast cancer, grade 2, in the upper outer quadrant of the right breast.    She was enrolled in I-SPY2 trial to Durvulumab, Taxol and Olaparib arm. She completed 12 cycles of weekly Taxol and 4 cycles of dd AC (last on 7/2/19).     For a detailed history, please see Dr. Guzman's note.     INTERIM HISTORY:  Mily presents today with her .     She continue to have ongoing fevers. She feels like she is febrile and does not feel well. She is having ongoing fatigue. Denies any SOB at rest or with exertion, CP, cough, dysuria, pain, nasal congestion, sore throat, diarrhea or rash. She is eating and drinking well. No other new or different symptom.     ROS: 10 point ROS neg other than the symptoms noted above in the HPI.    PHYSICAL EXAM:  General: Pleasant woman in no acute distress  HEENT: EOMI, PERRLA, no scleral icterus, mucus membranes moist and no lesions or thrush  Lymph: Neck is supple and shows no nodes in cervical or supraclavicular   Heart:  RRR, no murmur, gallop or rub  Lungs: CTA-B, no wheezes, rhonchi or rales  Abdomen: Normal bowel sounds, soft, non tender, not distended, no rebound or guarding, no palpable masses  Extremities: No pitting edema  Skin: No significant rashes or lesions  Neuro: CN II-XII are intact    LABS/IMAGING:    CXR dated 7/22/19  IMPRESSION:   1. Slight increase of lingular opacification as seen on the lateral  view. Atelectasis versus infection.  2. Evidence for pulmonary vascular congestion with Kerley B-lines,     CT CHEST dated 7/22/19  IMPRESSION:   Subtle areas of groundglass attenuation throughout both lungs, which  can be seen in the setting of viral infection or drug reaction.    IMPRESSION/PLAN:    Fevers of Unknown Origin  -ongoing low grade fevers since 7/8. Was  admitted to the hospital on 7/10 for neutropenic fevers. Was given 1 dose of cefepime. No source was found and she was discharged the next day without antibiotics. Since then she has remained febrile. She was seen in clinic by Dr. Guzman on 7/16 who repeated BCx2 and UA which were negative. Tested TSH and cortisol levels which were not clinically significant.   -today she returns with ongoing fevers (Tmax 101, usually ~100.4) and fatigue without localized symptoms. Repeated UA and BCx2. Obtained CXR to complete fever workup which showed slight increase of lingular opacification. Discussed with Dr. Guzman. Decided to obtain CT Chest today which showed diffuse groundglass opacities throughout b/l lobes. DDx: PCP or other atypical infection vs pneumonitis 2/2 immunotherapy. Will request for bronchoscopy to be done in the next couple days for further investigation and will treat accordingly  -will not start treatment until after bronchoscopy   -no longer neutropenic. Ok to take IBU/APAP to relieve fever. Should call if she were to start to have SOB/CP/cough. Currently asymptomatic     Kerley B lines on XR  -incidentally found. Usually suggest pulmonary vascular congestion. Last ECHO on 5/6/19 showed normal EF, though ECHO in Feb did note reduced function. Added on BNP which was normal    Breast Cancer  -Not discussed today. Completed cycle 4 of AC on 7/2  -scheduled for surgery on 8/15    Followup: Will be dependent on timing of bronchoscopy.    Ashley Baez PA-C  [facility]  [address] [place] [address]  [phone]

## 2019-07-24 NOTE — TELEPHONE ENCOUNTER
Patient called and asked if she could wear make up for her bronchoscopy. Writer indicated that it would be ok to wear.

## 2019-07-25 ENCOUNTER — HOSPITAL ENCOUNTER (OUTPATIENT)
Facility: CLINIC | Age: 54
Discharge: HOME | End: 2019-07-25
Attending: PHYSICIAN ASSISTANT | Admitting: PHYSICIAN ASSISTANT
Payer: COMMERCIAL

## 2019-07-25 VITALS
RESPIRATION RATE: 16 BRPM | DIASTOLIC BLOOD PRESSURE: 79 MMHG | SYSTOLIC BLOOD PRESSURE: 112 MMHG | OXYGEN SATURATION: 99 % | HEART RATE: 93 BPM

## 2019-07-25 LAB
APPEARANCE FLD: NORMAL
BRONCHOSCOPY: NORMAL
COLOR FLD: COLORLESS
COPATH REPORT: NORMAL
EOSINOPHIL NFR FLD MANUAL: 1 %
GRAM STN SPEC: NORMAL
LYMPHOCYTES NFR FLD MANUAL: 57 %
NEUTS BAND NFR FLD MANUAL: 3 %
OTHER CELLS FLD MANUAL: 39 %
SPECIMEN SOURCE FLD: NORMAL
SPECIMEN SOURCE: NORMAL
WBC # FLD AUTO: 750 /UL

## 2019-07-25 PROCEDURE — 89051 BODY FLUID CELL COUNT: CPT | Performed by: PHYSICIAN ASSISTANT

## 2019-07-25 PROCEDURE — 87116 MYCOBACTERIA CULTURE: CPT | Performed by: PHYSICIAN ASSISTANT

## 2019-07-25 PROCEDURE — 87015 SPECIMEN INFECT AGNT CONCNTJ: CPT | Mod: 91 | Performed by: PHYSICIAN ASSISTANT

## 2019-07-25 PROCEDURE — G0500 MOD SEDAT ENDO SERVICE >5YRS: HCPCS | Performed by: PHYSICIAN ASSISTANT

## 2019-07-25 PROCEDURE — 88312 SPECIAL STAINS GROUP 1: CPT | Performed by: PHYSICIAN ASSISTANT

## 2019-07-25 PROCEDURE — 87206 SMEAR FLUORESCENT/ACID STAI: CPT | Performed by: PHYSICIAN ASSISTANT

## 2019-07-25 PROCEDURE — 99152 MOD SED SAME PHYS/QHP 5/>YRS: CPT | Performed by: PHYSICIAN ASSISTANT

## 2019-07-25 PROCEDURE — 88108 CYTOPATH CONCENTRATE TECH: CPT | Performed by: PHYSICIAN ASSISTANT

## 2019-07-25 PROCEDURE — 31624 DX BRONCHOSCOPE/LAVAGE: CPT | Performed by: PHYSICIAN ASSISTANT

## 2019-07-25 PROCEDURE — 87015 SPECIMEN INFECT AGNT CONCNTJ: CPT | Performed by: PHYSICIAN ASSISTANT

## 2019-07-25 PROCEDURE — 87633 RESP VIRUS 12-25 TARGETS: CPT | Performed by: PHYSICIAN ASSISTANT

## 2019-07-25 PROCEDURE — 87102 FUNGUS ISOLATION CULTURE: CPT | Performed by: PHYSICIAN ASSISTANT

## 2019-07-25 PROCEDURE — 87205 SMEAR GRAM STAIN: CPT | Performed by: PHYSICIAN ASSISTANT

## 2019-07-25 PROCEDURE — 25000125 ZZHC RX 250: Performed by: PHYSICIAN ASSISTANT

## 2019-07-25 PROCEDURE — 87070 CULTURE OTHR SPECIMN AEROBIC: CPT | Performed by: PHYSICIAN ASSISTANT

## 2019-07-25 PROCEDURE — 87102 FUNGUS ISOLATION CULTURE: CPT | Mod: 91 | Performed by: PHYSICIAN ASSISTANT

## 2019-07-25 PROCEDURE — 25000128 H RX IP 250 OP 636: Performed by: PHYSICIAN ASSISTANT

## 2019-07-25 RX ORDER — ALBUTEROL SULFATE 0.83 MG/ML
SOLUTION RESPIRATORY (INHALATION) PRN
Status: DISCONTINUED | OUTPATIENT
Start: 2019-07-25 | End: 2019-07-25 | Stop reason: HOSPADM

## 2019-07-25 RX ORDER — ONDANSETRON 2 MG/ML
INJECTION INTRAMUSCULAR; INTRAVENOUS PRN
Status: DISCONTINUED | OUTPATIENT
Start: 2019-07-25 | End: 2019-07-25 | Stop reason: HOSPADM

## 2019-07-25 RX ORDER — FENTANYL CITRATE 50 UG/ML
INJECTION, SOLUTION INTRAMUSCULAR; INTRAVENOUS PRN
Status: DISCONTINUED | OUTPATIENT
Start: 2019-07-25 | End: 2019-07-25 | Stop reason: HOSPADM

## 2019-07-25 RX ORDER — LIDOCAINE HYDROCHLORIDE 40 MG/ML
INJECTION, SOLUTION RETROBULBAR PRN
Status: DISCONTINUED | OUTPATIENT
Start: 2019-07-25 | End: 2019-07-25 | Stop reason: HOSPADM

## 2019-07-25 NOTE — DISCHARGE INSTRUCTIONS
Discharge Instructions after Bronchoscopy    Activity  ___ You had medicine to relax and for pain. You may feel dizzy or sleepy.  For 24 hours:    Do not drive or use heavy equipment.    Do not make important decisions.    Do not drink any alcohol.    Diet  ___ When you can swallow easily, you may go back to your regular diet, medicines  and light exercise.    Follow-up  ___ We took small tissue or fluid samples to study. We will call you with the results in about 10 business days.    Call right away if you have:    Unusual chest pain    Temperature above 100.6  F (37.5  C)    Coughing that does not stop.    If you have severe pain, bleeding, or shortness of breath, go to an emergency room.    If you have questions, call:  Monday to Friday, 7 a.m. to 4:30 p.m.  Endoscopy: [phone] (We may have to call you back)    After hours:  Hospital: [phone] (Ask for the pulmonary fellow on call)

## 2019-07-25 NOTE — OR NURSING
Procedure: Flexible bronchoscopy with BAL  Sedation: Conscious sedation.   Specimens: Lt Lower Lobe. See procedure documentation for details.  BAL:  Site: Lt Lower Lobe  120 ml 0.9 % NS irrigation in, 35 ml returned  O2: 2-6 L/min via  throughout procedure. 2 L/min upon transport to Endoscopy post-procedure recovery.  Note: Pt tolerated procedure well.   Transport: Pt transferred to Endo Post-procedure via cart. Siderails elevated gume. Accompanied by RN.  Report:  Bedside report given upon completion of transport to post-procedure room.  Radiology fluro time: N/A    Marlin Rodriguez RN  [facility]

## 2019-07-26 LAB
CMV DNA SPEC NAA+PROBE-ACNC: NORMAL [IU]/ML
CMV DNA SPEC NAA+PROBE-LOG#: NORMAL {LOG_IU}/ML
FLUAV H1 2009 PAND RNA SPEC QL NAA+PROBE: NEGATIVE
FLUAV H1 RNA SPEC QL NAA+PROBE: NEGATIVE
FLUAV H3 RNA SPEC QL NAA+PROBE: NEGATIVE
FLUAV RNA SPEC QL NAA+PROBE: NEGATIVE
FLUBV RNA SPEC QL NAA+PROBE: NEGATIVE
HADV DNA SPEC QL NAA+PROBE: NEGATIVE
HADV DNA SPEC QL NAA+PROBE: NEGATIVE
HMPV RNA SPEC QL NAA+PROBE: NEGATIVE
HPIV1 RNA SPEC QL NAA+PROBE: NEGATIVE
HPIV2 RNA SPEC QL NAA+PROBE: NEGATIVE
HPIV3 RNA SPEC QL NAA+PROBE: NEGATIVE
MICROBIOLOGIST REVIEW: NORMAL
RHINOVIRUS RNA SPEC QL NAA+PROBE: NEGATIVE
RSV RNA SPEC QL NAA+PROBE: NEGATIVE
RSV RNA SPEC QL NAA+PROBE: NEGATIVE
SPECIMEN SOURCE: NORMAL
SPECIMEN SOURCE: NORMAL

## 2019-07-27 LAB
BACTERIA SPEC CULT: NO GROWTH
SPECIMEN SOURCE: NORMAL

## 2019-07-28 ENCOUNTER — TELEPHONE (OUTPATIENT)
Dept: ONCOLOGY | Facility: CLINIC | Age: 54
End: 2019-07-28

## 2019-07-28 LAB
BACTERIA SPEC CULT: NO GROWTH
BACTERIA SPEC CULT: NO GROWTH
Lab: NORMAL
Lab: NORMAL
SPECIMEN SOURCE: NORMAL
SPECIMEN SOURCE: NORMAL

## 2019-07-28 NOTE — TELEPHONE ENCOUNTER
I left a message that all tests are negative so far and that is good.  I discussed to not hesitate to call if she feels she is feeling worse in any way.     Christian Guzman MD

## 2019-07-30 ENCOUNTER — TELEPHONE (OUTPATIENT)
Dept: PULMONOLOGY | Facility: CLINIC | Age: 54
End: 2019-07-30

## 2019-07-30 NOTE — TELEPHONE ENCOUNTER
Pulmonary Provider Telephone Note:  I called the patient on her mobile and discussed the negative results from her bronchoscopy from an ID perspective thus far. Interestingly she did demonstrate 57% lymphocytes on her BAL; I suppose moving forward we could be thoughtful about a drug induced immunologic event, although her cross-sectional imaging was certainly non-specific. She otherwise feels well now and her fevers have spontaneously resolved. She was appreciative of the call.     Maksim Claros MD, 7/30/2019, 3:35 PM  Interventional Pulmonology Fellow  Department of Pulmonary, Allergy, Critical Care & Sleep Medicine   Pager: [phone]

## 2019-07-31 LAB
ACID FAST STN SPEC QL: NORMAL
ACID FAST STN SPEC QL: NORMAL
SPECIMEN SOURCE: NORMAL

## 2019-08-04 ENCOUNTER — ANCILLARY PROCEDURE (OUTPATIENT)
Dept: MRI IMAGING | Facility: CLINIC | Age: 54
End: 2019-08-04
Attending: INTERNAL MEDICINE
Payer: COMMERCIAL

## 2019-08-04 DIAGNOSIS — C50.411 MALIGNANT NEOPLASM OF UPPER-OUTER QUADRANT OF RIGHT BREAST IN FEMALE, ESTROGEN RECEPTOR POSITIVE (H): ICD-10-CM

## 2019-08-04 DIAGNOSIS — Z17.0 MALIGNANT NEOPLASM OF UPPER-OUTER QUADRANT OF RIGHT BREAST IN FEMALE, ESTROGEN RECEPTOR POSITIVE (H): ICD-10-CM

## 2019-08-04 RX ORDER — GADOBUTROL 604.72 MG/ML
7.5 INJECTION INTRAVENOUS ONCE
Status: COMPLETED | OUTPATIENT
Start: 2019-08-04 | End: 2019-08-04

## 2019-08-04 RX ADMIN — GADOBUTROL 6.5 ML: 604.72 INJECTION INTRAVENOUS at 16:26

## 2019-08-09 ENCOUNTER — TELEPHONE (OUTPATIENT)
Dept: ONCOLOGY | Facility: CLINIC | Age: 54
End: 2019-08-09

## 2019-08-09 DIAGNOSIS — Z17.0 MALIGNANT NEOPLASM OF UPPER-OUTER QUADRANT OF RIGHT BREAST IN FEMALE, ESTROGEN RECEPTOR POSITIVE (H): ICD-10-CM

## 2019-08-09 DIAGNOSIS — R11.2 CHEMOTHERAPY INDUCED NAUSEA AND VOMITING: ICD-10-CM

## 2019-08-09 DIAGNOSIS — T45.1X5A CHEMOTHERAPY INDUCED NAUSEA AND VOMITING: ICD-10-CM

## 2019-08-09 DIAGNOSIS — C50.411 MALIGNANT NEOPLASM OF UPPER-OUTER QUADRANT OF RIGHT BREAST IN FEMALE, ESTROGEN RECEPTOR POSITIVE (H): ICD-10-CM

## 2019-08-09 NOTE — TELEPHONE ENCOUNTER
I called Mily and let her know that the breast MRI showed continued response.  We will see her on August 13 for preop before surgery on August 15.     Christian Guzman MD

## 2019-08-12 RX ORDER — OLANZAPINE 5 MG/1
5 TABLET ORAL AT BEDTIME
Qty: 30 TABLET | Refills: 0 | OUTPATIENT
Start: 2019-08-12

## 2019-08-12 NOTE — TELEPHONE ENCOUNTER
"This request for medication came through, chart review shows this note from 7/2 visit with Kerry; \"--Tolerating much better on current anti-emetic regimen.\"  Pt has office visit with Dr Guzman tomorrow, FYI. Thanks!  "

## 2019-08-12 NOTE — PROGRESS NOTES
Medical oncology follow-up note  8/13/2019     HPI: Kathy Lei is a 52 yo female with a new diagnosis of an estrogen-receptor positive, NE-positive, HER2-negative breast cancer, grade 2, in the upper outer quadrant of the right breast since the end of year 2018.      Mily presented between Christmas and New Years of 2018 with new sharp pains in the upper outer quadrant of the right breast.  She underwent mammographic screening.       IMAGING:  Mammography and ultrasound:  She had a diagnostic mammogram which showed bilateral prepectoral saline implants.  On the right breast at 11:30 position, there were grouped coarse heterogeneous calcifications spanning 1.3 cm, new since 2014, adjacent calcifications 11-o'clock position posterior depth.  There was an irregular mass in the lateral right breast 9-o'clock position mid-posterior depth, and an additional mass with obscured margins.  No significant changes in the left breast.  Right breast ultrasound was performed.  In the area of the palpable lump in the right breast, 11-o'clock position, 6 cm from the nipple, there is an irregular hypoechoic mass with indistinct margins measuring approximately 1.0 x 0.9 x 1.6 cm in size.  Immediately adjacent to the mass, 11:30 position, are microcalcifications.  In the second area of palpable lump right breast, 9:30 position, 5 cm from the nipple, there was an irregular hypoechoic mass measuring 3.2 x 0.5 x 1.3 cm in size felt to account for the mammographic findings.  There were multiple additional round hypoechoic masses similar to the findings at 9:30 position seen incidentally during real time and not directly palpable.  For example, in the right breast at 8-o'clock position, 4 cm from the nipple, there was a mass measuring 0.8 x 0.6 x 0.6 cm, BI-RADS 4.       Contrast mammogram was subsequently performed and the contrast mammogram showed a large area of mass and non-mass-like enhancement in the upper outer right breast  measuring 7.2 cm in greatest dimension, corresponding global asymmetry in the upper outer right breast.  Enhancement extended to the implant without evidence of extension to the skin surface or nipple, and the calcifications were noted as previously found.      PATHOLOGY:  The pathology showed part A, breast needle biopsy 11 o'clock, 6 cm from the nipple, invasive mammary carcinoma, no special type, ductal (and mucinous) Donna grade 2.  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  Calcifications were associated with the DCIS.  There was a focus suspicious for lymphovascular invasion.  Invasive carcinoma was estrogen receptor positive and progesterone receptor negative by immunohistochemistry.  In part B, breast right, 8 o'clock, 4 cm from the nipple, ultrasound-guided core biopsy, invasive mammary carcinoma, no special type, ductal (and mucinous) Donna grade 2, occasional calcifications were noted.  Invasive carcinoma was estrogen receptor positive and progesterone receptor negative by immunohistochemistry.  On quantitation of the ER and NJ, ER was positive 99% of the cells staining with strong intensity.  NJ was subsequently noted to be positive with 15% of the cells staining with moderate intensity.       There was also a HER2 FISH performed, and the HER2 FISH showed average number of HER2 signals per nucleus 2.6.  Average number of CEN17 signals 1.7 with a ratio of 1.6, VASILIY negative for biopsy A.  For biopsy B, the HER2 signals per nucleus 2.9.  Average number CEN17 signals per nucleus 1.7 with a ratio of 1.7, VASILIY negative.  Overall, by 2018 ASCO/CAP guidelines, this tumor is HER2 negative.     She was enrolled in I-SPY2 trial to Durvulumab, Taxol and Olaparib arm. She completed 12 cycles of weekly Taxol. She also completed 4 cycles  of adriamycin and cyclophosphamide (AC).     PAST MEDICAL HISTORY:  In terms of her breast history, she does have a history of a  smaller right breast which was treated with implants 19 years ago.  She has a history of 2 papillomas in the right breast, 1 removed at the 6-o'clock position 5 years ago, another removed at the 6-o'clock position approximately 10 years ago.  These incisions are approximately at the 5:30 to 6-o'clock position.  She has no history of radiation therapy.  No history of breast cancer of any type.  No history of tumor of any kind.  No history of heart problems, heart attack, breathing problems, blood clots, seizures, stroke, arthritis, peptic ulcer disease or osteoporosis.  No history of bone fractures.  She is not currently participating in a clinical trial. She does have a history of asthma and a history of hypothyroidism.      FAMILY HISTORY:  Entirely negative for breast cancer or ovarian cancer or breast cancer in a male relative.      ALLERGIES:  No known drug allergies.  No allergy to seafood, iodine or contrast dye.  She does not take aspirin.      PAST MENSTRUAL HISTORY:  First period was at age 11.  First and only pregnancy was at age 26.  No miscarriages or abortions.  Occasional use of oral contraceptives in her 20s.  No history of hormone replacement therapy.  Last period was 3 years ago.      SOCIAL HISTORY:  Tobacco history was from age 15 to present, smoking a pack of cigarettes a week.  She is now trying to stop cigarettes.   In terms of alcohol use, in her early teens and early 20s, she drank approximately 10 drinks on the weekend and has tapered off drinking since then.      INTERVAL HISTORY:  Mily completed neoadjuvant chemotherapy in the beginning of July, her last course of chemotherapy was complicated with neutropenic fever, and she was hospitalized. She was discharged home on 7/11. She reports doing well, no recurrence of fever or chill, no dyspnea, no cough. She had bronchoscopy and BAL on 7/25. Lymphocytes in BAL fluid were high at 57% (on 7/25/2019), which raises the concern of Immune related  "pneumonitis given ground-glass appearance of chest x ray on 7/22. But she denies any symptoms of cough, sputum, no dyspnea, no MUNOZ. She denies vaginal discharges or discomfort.   She reports good appetite, stable weight. She denies headache, no sore throat. No cold or heat intolerance. No n/v/d.     ROS: all systems reviewed, negative unless mentioned above.     PHYSICAL EXAM:   /76 (BP Location: Right arm, Patient Position: Sitting, Cuff Size: Adult Regular)   Pulse 88   Temp 98.3  F (36.8  C) (Oral)   Resp 18   Ht 1.676 m (5' 5.98\")   Wt 62.8 kg (138 lb 7.2 oz)   LMP 11/02/2014   SpO2 98%   BMI 22.36 kg/m    HEENT: EOMI, PERRL. Sclerae are anicteric. Oral mucosa is pink and moist with no lesions or thrush.   Lymph: Neck is supple with no lymphadenopathy in the cervical or supraclavicular areas. No axillary lymphadenopathy bilaterally.   Breasts:  She has a mass from 10 to 12 o'clock measuring 4 cm in transverse dimension and 5 cm in vertical dimension.   Heart: Regular rate and rhythm.   Lungs: Clear to auscultation bilaterally.   Abdomen: Bowel sounds present, soft, nontender, nondistended  Extremities: No lower extremity edema noted bilaterally.   Neuro: Cranial nerves II through XII are grossly intact.  Skin: No rashes, petechiae, or bruising noted on exposed skin.    ECOG performance status: 0-1    Laboratory data reviewed:    CBC RESULTS:   Recent Labs   Lab Test 08/13/19  1023   WBC 5.8   RBC 3.89   HGB 11.1*   HCT 34.6*   MCV 89   MCH 28.5   MCHC 32.1   RDW 13.9        Recent Labs   Lab Test 08/13/19  1023 07/22/19  1211    132*   POTASSIUM 3.8 3.7   CHLORIDE 107 101   CO2 26 26   ANIONGAP 6 5   * 101*   BUN 8 12   CR 0.49* 0.54   BRYANT 8.5 8.8     Image study:   Breast MRI on 8/4/19:   Findings: Continued improvement in nonmass enhancement of the right  breast since comparison 6/12/2019, now measuring up to 5.7 cm, most  recently 6.2 cm and at it's greatest, 8.9 cm. Right " breast biopsy  clips. No suspicious left breast abnormalities. No axillary  adenopathy. Bilateral retropectoral saline breast implants. Left chest  wall Port-A-Cath partially visualized.     ASSESSMENT AND PLAN:     1.  Mily Lei is a 53-year-old woman with a recent diagnosis of clinical stage II, ER-positive, HER-2 negative, T3N0MX, invasive ductal carcinoma in the upper outer quadrant of the right breast, which was multifocal, largest area of involvement measuring 8.9 cm in largest dimension on the original MRI.  She was treated on the I-SPY 2 clinical trial with durvalumab, paclitaxel and olaparib. She had grade 2 fatigue.  She then went on to have response on MRI.  She completed 4 cycles of dose-dense AC.    - The last cycle of AC was complicated by febrile neutropenia requiring hospitalization for 1 day. She was discharged home on 7/11. Her fever has resolved and her WBC has been recovered.   - she had  CT chest on 7/22 showed groundglass attenuation, following bronchoscopy with BAL showed: Lymphocytes in BAL fluid were high at 57% (on 7/25/2019). Concerning for immune mediated pneumonitis given time frame, this was reported to I-SPY2 . Patient has since had completed resolution of her symptoms. Currently she is active without limitation in activity.   - after surgery, will start patient on endocrine therapy with AI, plan for total 10 years of therapy given the size of tumor and high risk feature.   2.  Imaging of breast MRI was performed on 8/4.  Non-mass enhancement  that correlates with right breast cancer has decreased since 5/18/2019 (7.9cm at that time), measuring 5.7cm in greatest dimension with overall significant decreased extent of tumor volume.   3.  There was plan for Gyn referral to evaluate for vaginal yeast infection earlier in July given patient's fever and unknown source of infection after her last chemotherapy. Patient had resolution of fever without any further significant vaginal  discharge or discomfort. At this point, do not feel a Gyn referral and evaluation is urgently indicated before surgery. If patient has recurrence of symptoms in the future, can consider refer to Gyn for evaluation at that time.   4.  Patient is feeling well overall, remains afebrile, with recovery of her counts, normal liver and renal function. ECho on 5/6/19 showed normal LVEF of 55-60%. She is cleared from medicine standpoint to proceed with surgery.   5.  Surgery.  The plan is for Mily to undergo a right mastectomy with immediate reconstruction. Her surgery with Dr. Anne Yousif is scheduled on August 15. Will need to remove her medi-port during the surgery as well   6.  Oncology follow up.  She will have her return to oncology clinic in 3 weeks after surgery for followup.     7.  Based on her Lymph node status, patient likely will need local radiation therapy, consider refer her to radiation oncology after surgery  8.  Follow up  Follow up with me September 3 with CBC, CMP.      Thank you for allowing us to participate in this patient's care.  The patient was seen and evaluated by me.  I discussed the patient with the fellow and agree with the findings and plan in the note, which was edited by me.    Sincerely,     Christian Guzman MD    [facility]  [phone].        Patient was seen, examed and discussed with Dr Guzman.     Antione Fishman MD  Hem/Onc Fellow        I spent 35 minutes with the patient more than 50% of which was in counseling and coordination of care.

## 2019-08-13 ENCOUNTER — APPOINTMENT (OUTPATIENT)
Dept: LAB | Facility: CLINIC | Age: 54
End: 2019-08-13
Attending: INTERNAL MEDICINE
Payer: COMMERCIAL

## 2019-08-13 ENCOUNTER — ONCOLOGY VISIT (OUTPATIENT)
Dept: ONCOLOGY | Facility: CLINIC | Age: 54
End: 2019-08-13
Attending: INTERNAL MEDICINE
Payer: COMMERCIAL

## 2019-08-13 ENCOUNTER — RESEARCH ENCOUNTER (OUTPATIENT)
Dept: ONCOLOGY | Facility: CLINIC | Age: 54
End: 2019-08-13

## 2019-08-13 VITALS
OXYGEN SATURATION: 98 % | WEIGHT: 138.45 LBS | HEIGHT: 66 IN | SYSTOLIC BLOOD PRESSURE: 115 MMHG | HEART RATE: 88 BPM | RESPIRATION RATE: 18 BRPM | TEMPERATURE: 98.3 F | BODY MASS INDEX: 22.25 KG/M2 | DIASTOLIC BLOOD PRESSURE: 76 MMHG

## 2019-08-13 DIAGNOSIS — C50.411 MALIGNANT NEOPLASM OF UPPER-OUTER QUADRANT OF RIGHT BREAST IN FEMALE, ESTROGEN RECEPTOR POSITIVE (H): Primary | ICD-10-CM

## 2019-08-13 DIAGNOSIS — Z17.0 MALIGNANT NEOPLASM OF UPPER-OUTER QUADRANT OF RIGHT BREAST IN FEMALE, ESTROGEN RECEPTOR POSITIVE (H): Primary | ICD-10-CM

## 2019-08-13 LAB
ALBUMIN SERPL-MCNC: 3.4 G/DL (ref 3.4–5)
ALP SERPL-CCNC: 58 U/L (ref 40–150)
ALT SERPL W P-5'-P-CCNC: 40 U/L (ref 0–50)
ANION GAP SERPL CALCULATED.3IONS-SCNC: 6 MMOL/L (ref 3–14)
AST SERPL W P-5'-P-CCNC: 40 U/L (ref 0–45)
BASOPHILS # BLD AUTO: 0.1 10E9/L (ref 0–0.2)
BASOPHILS NFR BLD AUTO: 0.9 %
BILIRUB SERPL-MCNC: 0.3 MG/DL (ref 0.2–1.3)
BUN SERPL-MCNC: 8 MG/DL (ref 7–30)
CALCIUM SERPL-MCNC: 8.5 MG/DL (ref 8.5–10.1)
CHLORIDE SERPL-SCNC: 107 MMOL/L (ref 94–109)
CO2 SERPL-SCNC: 26 MMOL/L (ref 20–32)
CREAT SERPL-MCNC: 0.49 MG/DL (ref 0.52–1.04)
DIFFERENTIAL METHOD BLD: ABNORMAL
EOSINOPHIL # BLD AUTO: 0.4 10E9/L (ref 0–0.7)
EOSINOPHIL NFR BLD AUTO: 7.3 %
ERYTHROCYTE [DISTWIDTH] IN BLOOD BY AUTOMATED COUNT: 13.9 % (ref 10–15)
GFR SERPL CREATININE-BSD FRML MDRD: >90 ML/MIN/{1.73_M2}
GLUCOSE SERPL-MCNC: 102 MG/DL (ref 70–99)
HCT VFR BLD AUTO: 34.6 % (ref 35–47)
HGB BLD-MCNC: 11.1 G/DL (ref 11.7–15.7)
IMM GRANULOCYTES # BLD: 0 10E9/L (ref 0–0.4)
IMM GRANULOCYTES NFR BLD: 0.3 %
LYMPHOCYTES # BLD AUTO: 1 10E9/L (ref 0.8–5.3)
LYMPHOCYTES NFR BLD AUTO: 17.7 %
MCH RBC QN AUTO: 28.5 PG (ref 26.5–33)
MCHC RBC AUTO-ENTMCNC: 32.1 G/DL (ref 31.5–36.5)
MCV RBC AUTO: 89 FL (ref 78–100)
MONOCYTES # BLD AUTO: 0.5 10E9/L (ref 0–1.3)
MONOCYTES NFR BLD AUTO: 8.7 %
NEUTROPHILS # BLD AUTO: 3.8 10E9/L (ref 1.6–8.3)
NEUTROPHILS NFR BLD AUTO: 65.1 %
NRBC # BLD AUTO: 0 10*3/UL
NRBC BLD AUTO-RTO: 0 /100
PLATELET # BLD AUTO: 199 10E9/L (ref 150–450)
POTASSIUM SERPL-SCNC: 3.8 MMOL/L (ref 3.4–5.3)
PROT SERPL-MCNC: 6.7 G/DL (ref 6.8–8.8)
RBC # BLD AUTO: 3.89 10E12/L (ref 3.8–5.2)
SODIUM SERPL-SCNC: 140 MMOL/L (ref 133–144)
TSH SERPL DL<=0.005 MIU/L-ACNC: 1.17 MU/L (ref 0.4–4)
WBC # BLD AUTO: 5.8 10E9/L (ref 4–11)

## 2019-08-13 PROCEDURE — 36591 DRAW BLOOD OFF VENOUS DEVICE: CPT

## 2019-08-13 PROCEDURE — 85025 COMPLETE CBC W/AUTO DIFF WBC: CPT | Performed by: INTERNAL MEDICINE

## 2019-08-13 PROCEDURE — 84443 ASSAY THYROID STIM HORMONE: CPT | Performed by: INTERNAL MEDICINE

## 2019-08-13 PROCEDURE — 25000128 H RX IP 250 OP 636: Mod: ZF | Performed by: INTERNAL MEDICINE

## 2019-08-13 PROCEDURE — 80053 COMPREHEN METABOLIC PANEL: CPT | Performed by: INTERNAL MEDICINE

## 2019-08-13 PROCEDURE — G0463 HOSPITAL OUTPT CLINIC VISIT: HCPCS | Mod: ZF

## 2019-08-13 PROCEDURE — 99214 OFFICE O/P EST MOD 30 MIN: CPT | Mod: GC | Performed by: INTERNAL MEDICINE

## 2019-08-13 RX ORDER — HEPARIN SODIUM (PORCINE) LOCK FLUSH IV SOLN 100 UNIT/ML 100 UNIT/ML
5 SOLUTION INTRAVENOUS EVERY 8 HOURS
Status: DISCONTINUED | OUTPATIENT
Start: 2019-08-13 | End: 2019-08-14 | Stop reason: HOSPADM

## 2019-08-13 RX ADMIN — HEPARIN 5 ML: 100 SYRINGE at 10:17

## 2019-08-13 ASSESSMENT — MIFFLIN-ST. JEOR: SCORE: 1239.5

## 2019-08-13 ASSESSMENT — PAIN SCALES - GENERAL: PAINLEVEL: NO PAIN (0)

## 2019-08-13 NOTE — LETTER
8/13/2019       RE: Kathy Lei  [address] [place] [address]     Dear Colleague,    Thank you for referring your patient, Kathy Lei, to the [facility]. Please see a copy of my visit note below.    Medical oncology follow-up note  8/13/2019     HPI: Kathy Lei is a 52 yo female with a new diagnosis of an estrogen-receptor positive, SD-positive, HER2-negative breast cancer, grade 2, in the upper outer quadrant of the right breast since the end of year 2018.      Mily presented between Christmas and New Years of 2018 with new sharp pains in the upper outer quadrant of the right breast.  She underwent mammographic screening.       IMAGING:  Mammography and ultrasound:  She had a diagnostic mammogram which showed bilateral prepectoral saline implants.  On the right breast at 11:30 position, there were grouped coarse heterogeneous calcifications spanning 1.3 cm, new since 2014, adjacent calcifications 11-o'clock position posterior depth.  There was an irregular mass in the lateral right breast 9-o'clock position mid-posterior depth, and an additional mass with obscured margins.  No significant changes in the left breast.  Right breast ultrasound was performed.  In the area of the palpable lump in the right breast, 11-o'clock position, 6 cm from the nipple, there is an irregular hypoechoic mass with indistinct margins measuring approximately 1.0 x 0.9 x 1.6 cm in size.  Immediately adjacent to the mass, 11:30 position, are microcalcifications.  In the second area of palpable lump right breast, 9:30 position, 5 cm from the nipple, there was an irregular hypoechoic mass measuring 3.2 x 0.5 x 1.3 cm in size felt to account for the mammographic findings.  There were multiple additional round hypoechoic masses similar to the findings at 9:30 position seen incidentally during real time and not directly palpable.  For example, in the right breast at 8-o'clock  position, 4 cm from the nipple, there was a mass measuring 0.8 x 0.6 x 0.6 cm, BI-RADS 4.       Contrast mammogram was subsequently performed and the contrast mammogram showed a large area of mass and non-mass-like enhancement in the upper outer right breast measuring 7.2 cm in greatest dimension, corresponding global asymmetry in the upper outer right breast.  Enhancement extended to the implant without evidence of extension to the skin surface or nipple, and the calcifications were noted as previously found.      PATHOLOGY:  The pathology showed part A, breast needle biopsy 11 o'clock, 6 cm from the nipple, invasive mammary carcinoma, no special type, ductal (and mucinous) Donna grade 2.  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  Calcifications were associated with the DCIS.  There was a focus suspicious for lymphovascular invasion.  Invasive carcinoma was estrogen receptor positive and progesterone receptor negative by immunohistochemistry.  In part B, breast right, 8 o'clock, 4 cm from the nipple, ultrasound-guided core biopsy, invasive mammary carcinoma, no special type, ductal (and mucinous) Donna grade 2, occasional calcifications were noted.  Invasive carcinoma was estrogen receptor positive and progesterone receptor negative by immunohistochemistry.  On quantitation of the ER and KS, ER was positive 99% of the cells staining with strong intensity.  KS was subsequently noted to be positive with 15% of the cells staining with moderate intensity.       There was also a HER2 FISH performed, and the HER2 FISH showed average number of HER2 signals per nucleus 2.6.  Average number of CEN17 signals 1.7 with a ratio of 1.6, VASILIY negative for biopsy A.  For biopsy B, the HER2 signals per nucleus 2.9.  Average number CEN17 signals per nucleus 1.7 with a ratio of 1.7, VASILIY negative.  Overall, by 2018 ASCO/CAP guidelines, this tumor is HER2 negative.     She was  enrolled in I-SPY2 trial to Durvulumab, Taxol and Olaparib arm. She completed 12 cycles of weekly Taxol. She also completed 4 cycles  of adriamycin and cyclophosphamide (AC).     PAST MEDICAL HISTORY:  In terms of her breast history, she does have a history of a smaller right breast which was treated with implants 19 years ago.  She has a history of 2 papillomas in the right breast, 1 removed at the 6-o'clock position 5 years ago, another removed at the 6-o'clock position approximately 10 years ago.  These incisions are approximately at the 5:30 to 6-o'clock position.  She has no history of radiation therapy.  No history of breast cancer of any type.  No history of tumor of any kind.  No history of heart problems, heart attack, breathing problems, blood clots, seizures, stroke, arthritis, peptic ulcer disease or osteoporosis.  No history of bone fractures.  She is not currently participating in a clinical trial. She does have a history of asthma and a history of hypothyroidism.      FAMILY HISTORY:  Entirely negative for breast cancer or ovarian cancer or breast cancer in a male relative.      ALLERGIES:  No known drug allergies.  No allergy to seafood, iodine or contrast dye.  She does not take aspirin.      PAST MENSTRUAL HISTORY:  First period was at age 11.  First and only pregnancy was at age 26.  No miscarriages or abortions.  Occasional use of oral contraceptives in her 20s.  No history of hormone replacement therapy.  Last period was 3 years ago.      SOCIAL HISTORY:  Tobacco history was from age 15 to present, smoking a pack of cigarettes a week.  She is now trying to stop cigarettes.   In terms of alcohol use, in her early teens and early 20s, she drank approximately 10 drinks on the weekend and has tapered off drinking since then.      INTERVAL HISTORY:  Mily completed neoadjuvant chemotherapy in the beginning of July, her last course of chemotherapy was complicated with neutropenic fever, and she was  "hospitalized. She was discharged home on 7/11. She reports doing well, no recurrence of fever or chill, no dyspnea, no cough. She had bronchoscopy and BAL on 7/25. Lymphocytes in BAL fluid were high at 57% (on 7/25/2019), which raises the concern of Immune related pneumonitis given ground-glass appearance of chest x ray on 7/22. But she denies any symptoms of cough, sputum, no dyspnea, no MUNOZ. She denies vaginal discharges or discomfort.   She reports good appetite, stable weight. She denies headache, no sore throat. No cold or heat intolerance. No n/v/d.     ROS: all systems reviewed, negative unless mentioned above.     PHYSICAL EXAM:   /76 (BP Location: Right arm, Patient Position: Sitting, Cuff Size: Adult Regular)   Pulse 88   Temp 98.3  F (36.8  C) (Oral)   Resp 18   Ht 1.676 m (5' 5.98\")   Wt 62.8 kg (138 lb 7.2 oz)   LMP 11/02/2014   SpO2 98%   BMI 22.36 kg/m     HEENT: EOMI, PERRL. Sclerae are anicteric. Oral mucosa is pink and moist with no lesions or thrush.   Lymph: Neck is supple with no lymphadenopathy in the cervical or supraclavicular areas. No axillary lymphadenopathy bilaterally.   Breasts:  She has a mass from 10 to 12 o'clock measuring 4 cm in transverse dimension and 5 cm in vertical dimension.   Heart: Regular rate and rhythm.   Lungs: Clear to auscultation bilaterally.   Abdomen: Bowel sounds present, soft, nontender, nondistended  Extremities: No lower extremity edema noted bilaterally.   Neuro: Cranial nerves II through XII are grossly intact.  Skin: No rashes, petechiae, or bruising noted on exposed skin.    ECOG performance status: 0-1    Laboratory data reviewed:    CBC RESULTS:   Recent Labs   Lab Test 08/13/19  1023   WBC 5.8   RBC 3.89   HGB 11.1*   HCT 34.6*   MCV 89   MCH 28.5   MCHC 32.1   RDW 13.9        Recent Labs   Lab Test 08/13/19  1023 07/22/19  1211    132*   POTASSIUM 3.8 3.7   CHLORIDE 107 101   CO2 26 26   ANIONGAP 6 5   * 101*   BUN 8 12 "   CR 0.49* 0.54   BRYANT 8.5 8.8     Image study:   Breast MRI on 8/4/19:   Findings: Continued improvement in nonmass enhancement of the right  breast since comparison 6/12/2019, now measuring up to 5.7 cm, most  recently 6.2 cm and at it's greatest, 8.9 cm. Right breast biopsy  clips. No suspicious left breast abnormalities. No axillary  adenopathy. Bilateral retropectoral saline breast implants. Left chest  wall Port-A-Cath partially visualized.     ASSESSMENT AND PLAN:     1.  Mily Lei is a 53-year-old woman with a recent diagnosis of clinical stage II, ER-positive, HER-2 negative, T2N0MX, invasive ductal carcinoma in the upper outer quadrant of the right breast, which was multifocal, largest area of involvement measuring 8.9 cm in largest dimension on the original MRI.  She was treated on the I-SPY 2 clinical trial with durvalumab, paclitaxel and olaparib. She had grade 2 fatigue.  She then went on to have response on MRI.  She completed 4 cycles of dose-dense AC.    - The last cycle of AC was complicated by febrile neutropenia requiring hospitalization for 1 day. She was discharged home on 7/11. Her fever has resolved and her WBC has been recovered.   - she had  CT chest on 7/22 showed groundglass attenuation, following bronchoscopy with BAL showed: Lymphocytes in BAL fluid were high at 57% (on 7/25/2019). Concerning for immune mediated pneumonitis given time frame, this was reported to I-SPY2 . Patient has since had completed resolution of her symptoms. Currently she is active without limitation in activity.   - after surgery, will start patient on endocrine therapy with AI, plan for total 10 years of therapy given the size of tumor and high risk feature.   2.  Imaging of breast MRI was performed on 8/4.  Non-mass enhancement  that correlates with right breast cancer has decreased since 5/18/2019 (7.9cm at that time), measuring 5.7cm in greatest dimension with overall significant decreased extent of tumor  volume.   3.  There was plan for Gyn referral to evaluate for vaginal yeast infection earlier in July given patient's fever and unknown source of infection after her last chemotherapy. Patient had resolution of fever without any further significant vaginal discharge or discomfort. At this point, do not feel a Gyn referral and evaluation is urgently indicated before surgery. If patient has recurrence of symptoms in the future, can consider refer to Gyn for evaluation at that time.   4.  Patient is feeling well overall, remains afebrile, with recovery of her counts, normal liver and renal function. ECho on 5/6/19 showed normal LVEF of 55-60%. She is cleared from medicine standpoint to proceed with surgery.   5.  Surgery.  The plan is for Mily to undergo a right mastectomy with immediate reconstruction. Her surgery with Dr. Anne Yousif is scheduled on August 15. Will need to remove her medi-port during the surgery as well   6.  Oncology follow up.  She will have her return to oncology clinic in 3 weeks after surgery for followup.     7.  Based on her Lymph node status, patient likely will need local radiation therapy, consider refer her to radiation oncology after surgery  8.  Follow up  Follow up with me September 3 with CBC, CMP.      Thank you for allowing us to participate in this patient's care.  The patient was seen and evaluated by me.  I discussed the patient with the fellow and agree with the findings and plan in the note, which was edited by me.    Sincerely,     Christian Guzman MD    [facility]  [phone].      Patient was seen, examed and discussed with Dr Guzman.     Antione Fishman MD  Hem/Onc Fellow  I spent 35 minutes with the patient more than 50% of which was in counseling and coordination of care.

## 2019-08-13 NOTE — NURSING NOTE
"Oncology Rooming Note    August 13, 2019 10:37 AM   Kathy Lei is a 53 year old female who presents for:    Chief Complaint   Patient presents with     Port Draw     Labs drawn via PORT by RN in lab. Estephania elfushed with saline and heparin. VS taken.      RECHECK     onc breast ca     Initial Vitals: /76 (BP Location: Right arm, Patient Position: Sitting, Cuff Size: Adult Regular)   Pulse 88   Temp 98.3  F (36.8  C) (Oral)   Resp 18   Ht 1.676 m (5' 5.98\")   Wt 62.8 kg (138 lb 7.2 oz)   LMP 11/02/2014   SpO2 98%   BMI 22.36 kg/m   Estimated body mass index is 22.36 kg/m  as calculated from the following:    Height as of this encounter: 1.676 m (5' 5.98\").    Weight as of this encounter: 62.8 kg (138 lb 7.2 oz). Body surface area is 1.71 meters squared.  No Pain (0) Comment: Data Unavailable   Patient's last menstrual period was 11/02/2014.  Allergies reviewed: Yes  Medications reviewed: Yes    Medications: Medication refills not needed today.  Pharmacy name entered into [employer]:    [id number] - [place], [place] - [address] [place] [address] [id number] - [place], [place] - [address] [facility] [address]    Clinical concerns: none        Mary Lv, COLETTE              "

## 2019-08-14 ENCOUNTER — ANESTHESIA EVENT (OUTPATIENT)
Dept: SURGERY | Facility: CLINIC | Age: 54
End: 2019-08-14
Payer: COMMERCIAL

## 2019-08-14 RX ORDER — CEFAZOLIN SODIUM 1 G/50ML
1 INJECTION, SOLUTION INTRAVENOUS SEE ADMIN INSTRUCTIONS
Status: CANCELLED | OUTPATIENT
Start: 2019-08-14

## 2019-08-14 RX ORDER — CEFAZOLIN SODIUM 2 G/50ML
2 SOLUTION INTRAVENOUS
Status: CANCELLED | OUTPATIENT
Start: 2019-08-14

## 2019-08-14 RX ORDER — ACETAMINOPHEN 325 MG/1
975 TABLET ORAL ONCE
Status: CANCELLED | OUTPATIENT
Start: 2019-08-14 | End: 2019-08-14

## 2019-08-15 ENCOUNTER — HOSPITAL ENCOUNTER (OUTPATIENT)
Dept: NUCLEAR MEDICINE | Facility: CLINIC | Age: 54
Setting detail: NUCLEAR MEDICINE
End: 2019-08-15
Attending: SURGERY
Payer: COMMERCIAL

## 2019-08-15 ENCOUNTER — ANESTHESIA (OUTPATIENT)
Dept: SURGERY | Facility: CLINIC | Age: 54
End: 2019-08-15
Payer: COMMERCIAL

## 2019-08-15 ENCOUNTER — SURGERY (OUTPATIENT)
Age: 54
End: 2019-08-15
Payer: COMMERCIAL

## 2019-08-15 ENCOUNTER — HOSPITAL ENCOUNTER (OUTPATIENT)
Facility: CLINIC | Age: 54
Discharge: HOME | End: 2019-08-16
Attending: SURGERY | Admitting: SURGERY
Payer: COMMERCIAL

## 2019-08-15 DIAGNOSIS — G89.18 POST-OPERATIVE PAIN: Primary | ICD-10-CM

## 2019-08-15 DIAGNOSIS — Z90.13 S/P MASTECTOMY, BILATERAL: ICD-10-CM

## 2019-08-15 DIAGNOSIS — C50.411 MALIGNANT NEOPLASM OF UPPER-OUTER QUADRANT OF RIGHT BREAST IN FEMALE, ESTROGEN RECEPTOR POSITIVE (H): ICD-10-CM

## 2019-08-15 DIAGNOSIS — Z17.0 MALIGNANT NEOPLASM OF UPPER-OUTER QUADRANT OF RIGHT BREAST IN FEMALE, ESTROGEN RECEPTOR POSITIVE (H): ICD-10-CM

## 2019-08-15 LAB
ABO + RH BLD: NORMAL
ABO + RH BLD: NORMAL
BLD GP AB SCN SERPL QL: NORMAL
BLOOD BANK CMNT PATIENT-IMP: NORMAL
GLUCOSE BLDC GLUCOMTR-MCNC: 92 MG/DL (ref 70–99)
SPECIMEN EXP DATE BLD: NORMAL

## 2019-08-15 PROCEDURE — 25000132 ZZH RX MED GY IP 250 OP 250 PS 637: Performed by: SURGERY

## 2019-08-15 PROCEDURE — 37000009 ZZH ANESTHESIA TECHNICAL FEE, EACH ADDTL 15 MIN: Performed by: SURGERY

## 2019-08-15 PROCEDURE — 36590 REMOVAL TUNNELED CV CATH: CPT | Mod: AS | Performed by: PHYSICIAN ASSISTANT

## 2019-08-15 PROCEDURE — 25800030 ZZH RX IP 258 OP 636: Performed by: NURSE ANESTHETIST, CERTIFIED REGISTERED

## 2019-08-15 PROCEDURE — 25000132 ZZH RX MED GY IP 250 OP 250 PS 637: Performed by: STUDENT IN AN ORGANIZED HEALTH CARE EDUCATION/TRAINING PROGRAM

## 2019-08-15 PROCEDURE — 86900 BLOOD TYPING SEROLOGIC ABO: CPT | Performed by: ANESTHESIOLOGY

## 2019-08-15 PROCEDURE — 25000128 H RX IP 250 OP 636: Performed by: ANESTHESIOLOGY

## 2019-08-15 PROCEDURE — 88112 CYTOPATH CELL ENHANCE TECH: CPT | Mod: 91 | Performed by: SURGERY

## 2019-08-15 PROCEDURE — 25000125 ZZHC RX 250: Performed by: NURSE ANESTHETIST, CERTIFIED REGISTERED

## 2019-08-15 PROCEDURE — 25000132 ZZH RX MED GY IP 250 OP 250 PS 637: Performed by: PHYSICIAN ASSISTANT

## 2019-08-15 PROCEDURE — 37000008 ZZH ANESTHESIA TECHNICAL FEE, 1ST 30 MIN: Performed by: SURGERY

## 2019-08-15 PROCEDURE — 25000128 H RX IP 250 OP 636: Performed by: PLASTIC SURGERY

## 2019-08-15 PROCEDURE — 19303 MAST SIMPLE COMPLETE: CPT | Mod: AS | Performed by: PHYSICIAN ASSISTANT

## 2019-08-15 PROCEDURE — 00000155 ZZHCL STATISTIC H-CELL BLOCK W/STAIN: Performed by: SURGERY

## 2019-08-15 PROCEDURE — 25000128 H RX IP 250 OP 636: Performed by: PHYSICIAN ASSISTANT

## 2019-08-15 PROCEDURE — 36000057 ZZH SURGERY LEVEL 3 1ST 30 MIN - UMMC: Performed by: SURGERY

## 2019-08-15 PROCEDURE — 40000171 ZZH STATISTIC PRE-PROCEDURE ASSESSMENT III: Performed by: SURGERY

## 2019-08-15 PROCEDURE — 25000131 ZZH RX MED GY IP 250 OP 636 PS 637: Performed by: STUDENT IN AN ORGANIZED HEALTH CARE EDUCATION/TRAINING PROGRAM

## 2019-08-15 PROCEDURE — 25000128 H RX IP 250 OP 636: Performed by: SURGERY

## 2019-08-15 PROCEDURE — 71000014 ZZH RECOVERY PHASE 1 LEVEL 2 FIRST HR: Performed by: SURGERY

## 2019-08-15 PROCEDURE — 34300033 ZZH RX 343: Performed by: SURGERY

## 2019-08-15 PROCEDURE — 88185 FLOWCYTOMETRY/TC ADD-ON: CPT | Performed by: SURGERY

## 2019-08-15 PROCEDURE — 88300 SURGICAL PATH GROSS: CPT | Performed by: SURGERY

## 2019-08-15 PROCEDURE — 00000102 ZZHCL STATISTIC CYTO WRIGHT STAIN TC: Performed by: SURGERY

## 2019-08-15 PROCEDURE — 88184 FLOWCYTOMETRY/ TC 1 MARKER: CPT | Mod: 91 | Performed by: SURGERY

## 2019-08-15 PROCEDURE — 88305 TISSUE EXAM BY PATHOLOGIST: CPT | Performed by: SURGERY

## 2019-08-15 PROCEDURE — 82962 GLUCOSE BLOOD TEST: CPT

## 2019-08-15 PROCEDURE — 88341 IMHCHEM/IMCYTCHM EA ADD ANTB: CPT | Performed by: SURGERY

## 2019-08-15 PROCEDURE — A9520 TC99 TILMANOCEPT DIAG 0.5MCI: HCPCS | Performed by: SURGERY

## 2019-08-15 PROCEDURE — 86901 BLOOD TYPING SEROLOGIC RH(D): CPT | Performed by: ANESTHESIOLOGY

## 2019-08-15 PROCEDURE — 25000566 ZZH SEVOFLURANE, EA 15 MIN: Performed by: SURGERY

## 2019-08-15 PROCEDURE — 25000128 H RX IP 250 OP 636: Performed by: NURSE ANESTHETIST, CERTIFIED REGISTERED

## 2019-08-15 PROCEDURE — 27210794 ZZH OR GENERAL SUPPLY STERILE: Performed by: SURGERY

## 2019-08-15 PROCEDURE — 88342 IMHCHEM/IMCYTCHM 1ST ANTB: CPT | Performed by: SURGERY

## 2019-08-15 PROCEDURE — 40001004 ZZHCL STATISTIC FLOW INT 9-15 ABY TC 88188: Performed by: SURGERY

## 2019-08-15 PROCEDURE — L8600 IMPLANT BREAST SILICONE/EQ: HCPCS | Performed by: SURGERY

## 2019-08-15 PROCEDURE — 38792 RA TRACER ID OF SENTINL NODE: CPT

## 2019-08-15 PROCEDURE — 36415 COLL VENOUS BLD VENIPUNCTURE: CPT | Performed by: ANESTHESIOLOGY

## 2019-08-15 PROCEDURE — 86850 RBC ANTIBODY SCREEN: CPT | Performed by: ANESTHESIOLOGY

## 2019-08-15 PROCEDURE — 36000059 ZZH SURGERY LEVEL 3 EA 15 ADDTL MIN UMMC: Performed by: SURGERY

## 2019-08-15 PROCEDURE — 88307 TISSUE EXAM BY PATHOLOGIST: CPT | Performed by: SURGERY

## 2019-08-15 DEVICE — IMPLANTABLE DEVICE
Type: IMPLANTABLE DEVICE | Site: BREAST | Status: NON-FUNCTIONAL
Removed: 2019-10-31

## 2019-08-15 RX ORDER — ONDANSETRON 2 MG/ML
4 INJECTION INTRAMUSCULAR; INTRAVENOUS EVERY 6 HOURS PRN
Status: DISCONTINUED | OUTPATIENT
Start: 2019-08-15 | End: 2019-08-16 | Stop reason: HOSPADM

## 2019-08-15 RX ORDER — LABETALOL 20 MG/4 ML (5 MG/ML) INTRAVENOUS SYRINGE
5 EVERY 10 MIN PRN
Status: DISCONTINUED | OUTPATIENT
Start: 2019-08-15 | End: 2019-08-15 | Stop reason: HOSPADM

## 2019-08-15 RX ORDER — NALOXONE HYDROCHLORIDE 0.4 MG/ML
.1-.4 INJECTION, SOLUTION INTRAMUSCULAR; INTRAVENOUS; SUBCUTANEOUS
Status: DISCONTINUED | OUTPATIENT
Start: 2019-08-15 | End: 2019-08-15 | Stop reason: HOSPADM

## 2019-08-15 RX ORDER — LIDOCAINE 40 MG/G
CREAM TOPICAL
Status: DISCONTINUED | OUTPATIENT
Start: 2019-08-15 | End: 2019-08-16 | Stop reason: HOSPADM

## 2019-08-15 RX ORDER — DEXAMETHASONE SODIUM PHOSPHATE 4 MG/ML
INJECTION, SOLUTION INTRA-ARTICULAR; INTRALESIONAL; INTRAMUSCULAR; INTRAVENOUS; SOFT TISSUE PRN
Status: DISCONTINUED | OUTPATIENT
Start: 2019-08-15 | End: 2019-08-15

## 2019-08-15 RX ORDER — HYDRALAZINE HYDROCHLORIDE 20 MG/ML
2.5-5 INJECTION INTRAMUSCULAR; INTRAVENOUS EVERY 10 MIN PRN
Status: DISCONTINUED | OUTPATIENT
Start: 2019-08-15 | End: 2019-08-15 | Stop reason: HOSPADM

## 2019-08-15 RX ORDER — ACETAMINOPHEN 325 MG/1
650 TABLET ORAL EVERY 6 HOURS PRN
Status: DISCONTINUED | OUTPATIENT
Start: 2019-08-15 | End: 2019-08-16 | Stop reason: HOSPADM

## 2019-08-15 RX ORDER — ONDANSETRON 2 MG/ML
4 INJECTION INTRAMUSCULAR; INTRAVENOUS EVERY 30 MIN PRN
Status: DISCONTINUED | OUTPATIENT
Start: 2019-08-15 | End: 2019-08-15 | Stop reason: HOSPADM

## 2019-08-15 RX ORDER — OXYCODONE HYDROCHLORIDE 5 MG/1
5 TABLET ORAL EVERY 4 HOURS PRN
Status: DISCONTINUED | OUTPATIENT
Start: 2019-08-15 | End: 2019-08-16

## 2019-08-15 RX ORDER — HYDROMORPHONE HCL/0.9% NACL/PF 0.2MG/0.2
0.2 SYRINGE (ML) INTRAVENOUS
Status: DISCONTINUED | OUTPATIENT
Start: 2019-08-15 | End: 2019-08-16

## 2019-08-15 RX ORDER — ONDANSETRON 4 MG/1
4 TABLET, ORALLY DISINTEGRATING ORAL EVERY 30 MIN PRN
Status: DISCONTINUED | OUTPATIENT
Start: 2019-08-15 | End: 2019-08-15 | Stop reason: HOSPADM

## 2019-08-15 RX ORDER — PROPOFOL 10 MG/ML
INJECTION, EMULSION INTRAVENOUS PRN
Status: DISCONTINUED | OUTPATIENT
Start: 2019-08-15 | End: 2019-08-15

## 2019-08-15 RX ORDER — FENTANYL CITRATE 50 UG/ML
25-50 INJECTION, SOLUTION INTRAMUSCULAR; INTRAVENOUS
Status: DISCONTINUED | OUTPATIENT
Start: 2019-08-15 | End: 2019-08-15 | Stop reason: HOSPADM

## 2019-08-15 RX ORDER — PROPOFOL 10 MG/ML
INJECTION, EMULSION INTRAVENOUS CONTINUOUS PRN
Status: DISCONTINUED | OUTPATIENT
Start: 2019-08-15 | End: 2019-08-15

## 2019-08-15 RX ORDER — SODIUM CHLORIDE, SODIUM LACTATE, POTASSIUM CHLORIDE, CALCIUM CHLORIDE 600; 310; 30; 20 MG/100ML; MG/100ML; MG/100ML; MG/100ML
INJECTION, SOLUTION INTRAVENOUS CONTINUOUS PRN
Status: DISCONTINUED | OUTPATIENT
Start: 2019-08-15 | End: 2019-08-15

## 2019-08-15 RX ORDER — ONDANSETRON 2 MG/ML
INJECTION INTRAMUSCULAR; INTRAVENOUS PRN
Status: DISCONTINUED | OUTPATIENT
Start: 2019-08-15 | End: 2019-08-15

## 2019-08-15 RX ORDER — ONDANSETRON 4 MG/1
4 TABLET, ORALLY DISINTEGRATING ORAL EVERY 6 HOURS PRN
Status: DISCONTINUED | OUTPATIENT
Start: 2019-08-15 | End: 2019-08-16 | Stop reason: HOSPADM

## 2019-08-15 RX ORDER — SULFAMETHOXAZOLE/TRIMETHOPRIM 800-160 MG
1 TABLET ORAL 2 TIMES DAILY
Qty: 14 TABLET | Refills: 0 | Status: SHIPPED | OUTPATIENT
Start: 2019-08-15 | End: 2019-09-12

## 2019-08-15 RX ORDER — SODIUM CHLORIDE, SODIUM LACTATE, POTASSIUM CHLORIDE, CALCIUM CHLORIDE 600; 310; 30; 20 MG/100ML; MG/100ML; MG/100ML; MG/100ML
INJECTION, SOLUTION INTRAVENOUS CONTINUOUS
Status: DISCONTINUED | OUTPATIENT
Start: 2019-08-15 | End: 2019-08-15 | Stop reason: HOSPADM

## 2019-08-15 RX ORDER — ACETAMINOPHEN 325 MG/1
325-650 TABLET ORAL EVERY 4 HOURS PRN
Qty: 50 TABLET | Refills: 0 | Status: SHIPPED | OUTPATIENT
Start: 2019-08-15 | End: 2020-10-26

## 2019-08-15 RX ORDER — FLUMAZENIL 0.1 MG/ML
0.2 INJECTION, SOLUTION INTRAVENOUS
Status: DISCONTINUED | OUTPATIENT
Start: 2019-08-15 | End: 2019-08-15 | Stop reason: HOSPADM

## 2019-08-15 RX ORDER — LIDOCAINE 40 MG/G
CREAM TOPICAL
Status: DISCONTINUED | OUTPATIENT
Start: 2019-08-15 | End: 2019-08-15 | Stop reason: HOSPADM

## 2019-08-15 RX ORDER — NALOXONE HYDROCHLORIDE 0.4 MG/ML
.1-.4 INJECTION, SOLUTION INTRAMUSCULAR; INTRAVENOUS; SUBCUTANEOUS
Status: DISCONTINUED | OUTPATIENT
Start: 2019-08-15 | End: 2019-08-16 | Stop reason: HOSPADM

## 2019-08-15 RX ORDER — CEFAZOLIN SODIUM 2 G/100ML
2 INJECTION, SOLUTION INTRAVENOUS
Status: COMPLETED | OUTPATIENT
Start: 2019-08-15 | End: 2019-08-15

## 2019-08-15 RX ORDER — HYDROMORPHONE HYDROCHLORIDE 1 MG/ML
.3-.5 INJECTION, SOLUTION INTRAMUSCULAR; INTRAVENOUS; SUBCUTANEOUS EVERY 10 MIN PRN
Status: DISCONTINUED | OUTPATIENT
Start: 2019-08-15 | End: 2019-08-15 | Stop reason: HOSPADM

## 2019-08-15 RX ORDER — LIDOCAINE HYDROCHLORIDE 20 MG/ML
INJECTION, SOLUTION INFILTRATION; PERINEURAL PRN
Status: DISCONTINUED | OUTPATIENT
Start: 2019-08-15 | End: 2019-08-15

## 2019-08-15 RX ORDER — CEFAZOLIN SODIUM 1 G/3ML
1 INJECTION, POWDER, FOR SOLUTION INTRAMUSCULAR; INTRAVENOUS SEE ADMIN INSTRUCTIONS
Status: DISCONTINUED | OUTPATIENT
Start: 2019-08-15 | End: 2019-08-15 | Stop reason: HOSPADM

## 2019-08-15 RX ORDER — OXYCODONE HYDROCHLORIDE 5 MG/1
5-10 TABLET ORAL EVERY 4 HOURS PRN
Qty: 20 TABLET | Refills: 0 | Status: SHIPPED | OUTPATIENT
Start: 2019-08-15 | End: 2019-09-12

## 2019-08-15 RX ORDER — INDOCYANINE GREEN AND WATER 25 MG
KIT INJECTION PRN
Status: DISCONTINUED | OUTPATIENT
Start: 2019-08-15 | End: 2019-08-15

## 2019-08-15 RX ORDER — ACETAMINOPHEN 325 MG/1
975 TABLET ORAL ONCE
Status: COMPLETED | OUTPATIENT
Start: 2019-08-15 | End: 2019-08-15

## 2019-08-15 RX ORDER — FENTANYL CITRATE 50 UG/ML
25-50 INJECTION, SOLUTION INTRAMUSCULAR; INTRAVENOUS EVERY 5 MIN PRN
Status: DISCONTINUED | OUTPATIENT
Start: 2019-08-15 | End: 2019-08-15 | Stop reason: HOSPADM

## 2019-08-15 RX ORDER — FENTANYL CITRATE 50 UG/ML
INJECTION, SOLUTION INTRAMUSCULAR; INTRAVENOUS PRN
Status: DISCONTINUED | OUTPATIENT
Start: 2019-08-15 | End: 2019-08-15

## 2019-08-15 RX ADMIN — FENTANYL CITRATE 50 MCG: 50 INJECTION, SOLUTION INTRAMUSCULAR; INTRAVENOUS at 10:45

## 2019-08-15 RX ADMIN — CEFAZOLIN SODIUM 2 G: 2 INJECTION, SOLUTION INTRAVENOUS at 07:50

## 2019-08-15 RX ADMIN — CEFAZOLIN 1 G: 1 INJECTION, POWDER, FOR SOLUTION INTRAMUSCULAR; INTRAVENOUS at 11:50

## 2019-08-15 RX ADMIN — SODIUM CHLORIDE, POTASSIUM CHLORIDE, SODIUM LACTATE AND CALCIUM CHLORIDE: 600; 310; 30; 20 INJECTION, SOLUTION INTRAVENOUS at 08:26

## 2019-08-15 RX ADMIN — TILMANOCEPT 0.5 MILLICURIE: KIT at 08:50

## 2019-08-15 RX ADMIN — SODIUM CHLORIDE, POTASSIUM CHLORIDE, SODIUM LACTATE AND CALCIUM CHLORIDE: 600; 310; 30; 20 INJECTION, SOLUTION INTRAVENOUS at 12:55

## 2019-08-15 RX ADMIN — ACETAMINOPHEN 975 MG: 325 TABLET, FILM COATED ORAL at 06:07

## 2019-08-15 RX ADMIN — ONDANSETRON 4 MG: 4 TABLET, ORALLY DISINTEGRATING ORAL at 17:44

## 2019-08-15 RX ADMIN — METHYLENE BLUE 2.5 ML: 10 INJECTION INTRAVENOUS at 09:59

## 2019-08-15 RX ADMIN — MIDAZOLAM 1 MG: 1 INJECTION INTRAMUSCULAR; INTRAVENOUS at 06:45

## 2019-08-15 RX ADMIN — ROCURONIUM BROMIDE 60 MG: 10 INJECTION INTRAVENOUS at 07:37

## 2019-08-15 RX ADMIN — INDOCYANINE GREEN 12.5 MG: KIT INTRAVENOUS at 12:20

## 2019-08-15 RX ADMIN — LIDOCAINE HYDROCHLORIDE 100 MG: 20 INJECTION, SOLUTION INFILTRATION; PERINEURAL at 07:37

## 2019-08-15 RX ADMIN — PROPOFOL: 10 INJECTION, EMULSION INTRAVENOUS at 12:34

## 2019-08-15 RX ADMIN — PROPOFOL 100 MCG/KG/MIN: 10 INJECTION, EMULSION INTRAVENOUS at 07:42

## 2019-08-15 RX ADMIN — SODIUM CHLORIDE, POTASSIUM CHLORIDE, SODIUM LACTATE AND CALCIUM CHLORIDE: 600; 310; 30; 20 INJECTION, SOLUTION INTRAVENOUS at 07:26

## 2019-08-15 RX ADMIN — PROPOFOL: 10 INJECTION, EMULSION INTRAVENOUS at 09:51

## 2019-08-15 RX ADMIN — HYDROMORPHONE HYDROCHLORIDE 0.5 MG: 1 INJECTION, SOLUTION INTRAMUSCULAR; INTRAVENOUS; SUBCUTANEOUS at 15:56

## 2019-08-15 RX ADMIN — ACETAMINOPHEN 650 MG: 325 TABLET, FILM COATED ORAL at 17:29

## 2019-08-15 RX ADMIN — Medication 0.2 MG: at 21:37

## 2019-08-15 RX ADMIN — PROPOFOL 30 MG: 10 INJECTION, EMULSION INTRAVENOUS at 10:48

## 2019-08-15 RX ADMIN — FENTANYL CITRATE 50 MCG: 50 INJECTION, SOLUTION INTRAMUSCULAR; INTRAVENOUS at 08:26

## 2019-08-15 RX ADMIN — CEFAZOLIN 1 G: 1 INJECTION, POWDER, FOR SOLUTION INTRAMUSCULAR; INTRAVENOUS at 09:48

## 2019-08-15 RX ADMIN — OXYCODONE HYDROCHLORIDE 5 MG: 5 TABLET ORAL at 19:39

## 2019-08-15 RX ADMIN — ONDANSETRON 4 MG: 2 INJECTION INTRAMUSCULAR; INTRAVENOUS at 07:37

## 2019-08-15 RX ADMIN — MIDAZOLAM 2 MG: 1 INJECTION INTRAMUSCULAR; INTRAVENOUS at 07:37

## 2019-08-15 RX ADMIN — FENTANYL CITRATE 50 MCG: 50 INJECTION INTRAMUSCULAR; INTRAVENOUS at 06:45

## 2019-08-15 RX ADMIN — PHENYLEPHRINE HYDROCHLORIDE 100 MCG: 10 INJECTION INTRAVENOUS at 07:46

## 2019-08-15 RX ADMIN — FENTANYL CITRATE 25 MCG: 50 INJECTION INTRAMUSCULAR; INTRAVENOUS at 14:08

## 2019-08-15 RX ADMIN — PROPOFOL 120 MG: 10 INJECTION, EMULSION INTRAVENOUS at 07:37

## 2019-08-15 RX ADMIN — ROCURONIUM BROMIDE 20 MG: 10 INJECTION INTRAVENOUS at 08:26

## 2019-08-15 RX ADMIN — FENTANYL CITRATE 25 MCG: 50 INJECTION INTRAMUSCULAR; INTRAVENOUS at 13:45

## 2019-08-15 RX ADMIN — PROCHLORPERAZINE EDISYLATE 10 MG: 5 INJECTION INTRAMUSCULAR; INTRAVENOUS at 14:00

## 2019-08-15 RX ADMIN — DEXAMETHASONE SODIUM PHOSPHATE 4 MG: 4 INJECTION, SOLUTION INTRA-ARTICULAR; INTRALESIONAL; INTRAMUSCULAR; INTRAVENOUS; SOFT TISSUE at 07:37

## 2019-08-15 RX ADMIN — ROCURONIUM BROMIDE 20 MG: 10 INJECTION INTRAVENOUS at 11:55

## 2019-08-15 RX ADMIN — HYDROMORPHONE HYDROCHLORIDE 0.5 MG: 1 INJECTION, SOLUTION INTRAMUSCULAR; INTRAVENOUS; SUBCUTANEOUS at 14:14

## 2019-08-15 RX ADMIN — SUGAMMADEX 200 MG: 100 INJECTION, SOLUTION INTRAVENOUS at 13:04

## 2019-08-15 RX ADMIN — FENTANYL CITRATE 200 MCG: 50 INJECTION, SOLUTION INTRAMUSCULAR; INTRAVENOUS at 07:37

## 2019-08-15 RX ADMIN — ROCURONIUM BROMIDE 20 MG: 10 INJECTION INTRAVENOUS at 10:48

## 2019-08-15 ASSESSMENT — MIFFLIN-ST. JEOR: SCORE: 1250.75

## 2019-08-15 ASSESSMENT — LIFESTYLE VARIABLES: TOBACCO_USE: 1

## 2019-08-15 NOTE — ANESTHESIA PREPROCEDURE EVALUATION
Anesthesia Pre-Procedure Evaluation    Patient: Kathy Lei   MRN:     [id number] Gender:   female   Age:    53 year old :      1965        Preoperative Diagnosis: Malignant Neoplasm of Upper Outer Quadrant of Right Female Breast   Procedure(s):  Single Lumen Chest Port Placement     Past Medical History:   Diagnosis Date     Asthma      Depressive disorder, not elsewhere classified      Hypertension      Hypothyroidism      Hypothyroidism, unspecified hypothyroidism type      Malignant neoplasm of upper-outer quadrant of right breast in female, estrogen receptor positive (H) 2019     Mild intermittent asthma      PONV (postoperative nausea and vomiting)      Scoliosis (and kyphoscoliosis), idiopathic       Past Surgical History:   Procedure Laterality Date     ABDOMEN SURGERY   c section     BACK SURGERY  scoliosis fusion      BIOPSY  breast     BREAST SURGERY  implants      BRONCHOSCOPY (RIGID OR FLEXIBLE), DIAGNOSTIC N/A 2019    Procedure: BRONCHOSCOPY, WITH BRONCHOALVEOLAR LAVAGE;  Surgeon: Maksim Claros MD;  Location: [facility]     C/SECTION, LOW TRANSVERSE      , Low Transverse     COSMETIC SURGERY  breast implants      INSERT PORT VASCULAR ACCESS Left 3/6/2019    Procedure: Single Lumen Chest Port Placement;  Surgeon: Jerome Dash PA-C;  Location: [facility] OR     IR CHEST PORT PLACEMENT > 5 YRS OF AGE  3/6/2019     SURGICAL HISTORY OF -       spinal fusion- Lacey kim     SURGICAL HISTORY OF -       removal of right breast papilloma          Anesthesia Evaluation     . Pt has had prior anesthetic.     History of anesthetic complications   - PONV        ROS/MED HX    ENT/Pulmonary:     (+)tobacco use, Past use asthma Treatment: Inhaler prn,  , . .    Neurologic:  - neg neurologic ROS     Cardiovascular:     (+) hypertension----. : . . . :. . Previous cardiac testing Echodate:2019results:Interpretation Summary  Borderline (EF 50-55%) reduced left  ventricular function is present.Mild  concentric wall thickening consistent with left ventricular hypertrophy is  present.  Global right ventricular function is normal.  Mild sclerosis of the aortic valve and mitral annulus present.  The inferior vena cava was normal in size with preserved respiratory  variability.  No pericardial effusion is present.date: results:ECG reviewed date:2019 results:Sinus rhythm date: results:          METS/Exercise Tolerance:  >4 METS   Hematologic:  - neg hematologic  ROS       Musculoskeletal:   (+)  other musculoskeletal- scoliosis      GI/Hepatic:  - neg GI/hepatic ROS       Renal/Genitourinary:         Endo:     (+) thyroid problem hypothyroidism, .      Psychiatric:     (+) psychiatric history depression      Infectious Disease:         Malignancy:   (+) Malignancy History of Breast  Breast CA Active status post.         Other:    (+) no H/O Chronic Pain,                       PHYSICAL EXAM:   Mental Status/Neuro: A/A/O   Airway: Facies: Feasible  Mallampati: II  Mouth/Opening: Full  TM distance: > 6 cm  Neck ROM: Full   Respiratory: Auscultation: CTAB     Resp. Rate: Normal     Resp. Effort: Normal      CV: Rhythm: Regular  Rate: Age appropriate  Heart: Normal Sounds   Comments:                      Lab Results   Component Value Date    WBC 5.8 08/13/2019    HGB 11.1 (L) 08/13/2019    HCT 34.6 (L) 08/13/2019     08/13/2019    CRP 55.3 (H) 07/22/2019    SED 61 (H) 07/22/2019     08/13/2019    POTASSIUM 3.8 08/13/2019    CHLORIDE 107 08/13/2019    CO2 26 08/13/2019    BUN 8 08/13/2019    CR 0.49 (L) 08/13/2019     (H) 08/13/2019    BRYANT 8.5 08/13/2019    PHOS 3.0 06/10/2019    MAG 2.1 06/10/2019    ALBUMIN 3.4 08/13/2019    PROTTOTAL 6.7 (L) 08/13/2019    ALT 40 08/13/2019    AST 40 08/13/2019    ALKPHOS 58 08/13/2019    BILITOTAL 0.3 08/13/2019    PTT 26 02/05/2019    INR 1.02 02/05/2019    FIBR 264 02/05/2019    TSH 1.17 08/13/2019    T4 1.15 07/16/2019    HCG  "(A) 11/20/2002     Canceled, Test credited  CORRECTED ON 11/21 AT 1617: PREVIOUSLY REPORTED AS Negative       Preop Vitals  BP Readings from Last 3 Encounters:   08/15/19 119/80   08/13/19 115/76   07/25/19 112/79    Pulse Readings from Last 3 Encounters:   08/15/19 79   08/13/19 88   07/25/19 93      Resp Readings from Last 3 Encounters:   08/15/19 11   08/13/19 18   07/25/19 16    SpO2 Readings from Last 3 Encounters:   08/15/19 100%   08/13/19 98%   07/25/19 99%      Temp Readings from Last 1 Encounters:   08/15/19 36.7  C (98.1  F) (Oral)    Ht Readings from Last 1 Encounters:   08/15/19 1.676 m (5' 6\")      Wt Readings from Last 1 Encounters:   08/15/19 63.9 kg (140 lb 14 oz)    Estimated body mass index is 22.74 kg/m  as calculated from the following:    Height as of an earlier encounter on 8/15/19: 1.676 m (5' 6\").    Weight as of an earlier encounter on 8/15/19: 63.9 kg (140 lb 14 oz).     LDA:  Peripheral IV 08/15/19 Right Hand (Active)   Number of days: 0       Port A Cath Single 03/06/19 Left Chest wall (Active)   Number of days: 162            Assessment: Elective due to   ASA SCORE: 2    H&P: History and physical reviewed and following examination; no interval change.        - H&P complete; Preop Testing complete; Consents complete  Smoking Status:  NO Active use of Tobacco/UNKNOWN Tobacco use status   NPO Status: > 6 hours since completed Solid Foods     Plan:   Anes. Type:  General   Pre-Medication: None   Induction:  IV (Standard)   Airway: ETT; Oral   Access/Monitoring: PIV   Maintenance: TIVA     Advanced Monitoring: BIS     Postop Plan:   Postop Pain: Opioids  Postop Sedation/Airway: Not planned  Disposition: Outpatient     PONV Management:   Adult Risk Factors: Female, H/o PONV or Motion Sickness, Non-Smoker, Postop Opioids   Prevention: Ondansetron, Dexamethasone, Propofol, No Volatiles     CONSENT: Direct conversation   Plan and risks discussed with: Patient   Blood Products: Consented (ALL " Blood Products)

## 2019-08-15 NOTE — ANESTHESIA CARE TRANSFER NOTE
Patient: Kathy Lei    Procedure(s):  Bilateral Skin Sparing Mastectomy, Right Axillary [place] Lymph Node Biopsy  Remove Vascular Access Port  Bilateral Breast Reconstruction With Expanders and SPY  Removal of bilateral breast implant and Bilateral excision of implant capsules    Diagnosis: Breast Cancer  Diagnosis Additional Information: No value filed.    Anesthesia Type:   General     Note:  Airway :Nasal Cannula  Patient transferred to:PACU  Comments: Pt. Pink and breathing spontaneously.  Vitals stable.  Report given to oncoming nurse.  Transfer of care occurred.Handoff Report: Identifed the Patient, Identified the Reponsible Provider, Reviewed the pertinent medical history, Discussed the surgical course, Reviewed Intra-OP anesthesia mangement and issues during anesthesia, Set expectations for post-procedure period and Allowed opportunity for questions and acknowledgement of understanding      Vitals: (Last set prior to Anesthesia Care Transfer)    CRNA VITALS  8/15/2019 1251 - 8/15/2019 1333      8/15/2019             Resp Rate (observed):  1  (Abnormal)     Resp Rate (set):  10                Electronically Signed By: CECILE Muñoz CRNA  August 15, 2019  1:33 PM

## 2019-08-15 NOTE — PLAN OF CARE
Observation Goals  -Adequate pain control using oral analgesics: Yes, tylenol given x1  -Tolerates oral intake: Yes   - Vital signs at baseline: Yes  -Patient able to ambulate as they were prior to admission or with assist devices provided by therapies during their stay: Yes

## 2019-08-15 NOTE — ANESTHESIA PROCEDURE NOTES
Peripheral Nerve Block Procedure Note    Staff:     Anesthesiologist:  Bacilio Quiroga MD    Resident/CRNA:  [place] Kwan MD    Block performed by resident/CRNA in the presence of a teaching physician    Location: Pre-op  Procedure Start/Stop TImes:      8/15/2019 6:45 AM     8/15/2019 7:00 AM    patient identified, IV checked, site marked, risks and benefits discussed, informed consent, monitors and equipment checked, pre-op evaluation, at physician/surgeon's request and post-op pain management      Correct Patient: Yes      Correct Position: Yes      Correct Site: Yes      Correct Procedure: Yes      Correct Laterality:  Yes    Site Marked:  Yes  Procedure details:     Procedure:  Pectoralis    ASA:  3    Laterality:  Bilateral    Position:  Supine    Sterile Prep: chloraprep, mask and sterile gloves      Local skin infiltration:  None    Needle:  Insulated    Needle gauge:  21    Needle length (inches):  4    Ultrasound: Yes      Ultrasound used to identify targeted nerve, plexus, or vascular structure and placed a needle adjacent to it      Permanent Image entered into patiient's record      Abnormal pain on injection: No      Blood Aspirated: No      Paresthesias:  No    Bleeding at site: No      Bolus via:  Needle    Infusion Method:  Single Shot    Complications:  None

## 2019-08-15 NOTE — DISCHARGE INSTRUCTIONS
TISSUE EXPANSION FOR BREAST RECONSTRUCTION  POST-OPERATIVE INSTRUCTIONS    Instructions     Shower with Bactoshield the night before and morning of each tissue     expansion.     You need someone to drive you to your appointment for the first 3 weeks     post-operatively or as long as you requiring narcotic pain medicine.    Activities     You cannot perform house hold chores or heavy lifting greater than 5 to     10 pounds for 6 weeks post-operatively.     No hot tubs, swimming in lakes until fully healed.  Be Aware:     You cannot have an MRI if you have tissue expanders in place.     The tissue expanders may be detected via the security scanners at the      airport. You will need a note from your Doctor if you plan to travel      indicating you have tissue expanders in place with metal.    Incision Care     You can shower 48 hours after the last drain tube has removed.     Avoid sun exposure to scars for at least 12 months after your last     surgery.     If sun exposure is unavoidable then always use a sun block with 50 SPF     or higher.     Shower regularly to keep the incisions clean and inspect for signs of      Infection.     You can use moisturizer on the incisions and surrounding skin starting 3      Weeks.    What to Expect     You may experience minor discomfort for the following 12 to 24 hours      after each tissue expansion. This discomfort usually subsides 2 to 3 days      after each tissue expansion.     The tissue expander may shift after the 1st or 2 nd expansion.     You may experience more discomfort on one side than the other if you      have bilateral tissue expanders placed.     Your posture may change causing you to have some upper and/or mid      back pain as you re expanded.     Your shoulder range of motion may become stiff requiring continuing to     perform the shoulder exercises during the tissue expansion process to     prevent shoulder stiffness and a frozen shoulder.     You may  find it difficult to sleep because you feel tight across the chest      and shoulders.     You may find it difficult to fit into certain types of tight fitting clothing.      You may need to wear oversized shirts until the final exchange of the      tissue expander (s).     You may feel chest persist tightness or heaviness secondary to being     expanded. This usually subsides with time.     The tissue expanders will remain in place for a minimum of 8 weeks after     completing the last tissue expansion. This will allow for the soft tissues     (i.e. skin and muscle) to heal and recover from being stretched before the     second surgery takes place for the exchange of the tissue expander for a     permanent breast implant.     You ll see your surgeon when we think you have achieve your desired     breast size to determine if you need additional expansions and for     surgical planning.    How can I treat discomfort?     Ibuprofen (or other NSAIDs) 600 mg by mouth every 6 to 8 hours with     food starting the morning of each tissue expansion and continue to take     around the clock for 3 to 5 days as needed for discomfort. You can start     this 2 weeks after surgery.     If you need additional pain control at night, you may take the prescribed      Vicodin or Norco you were prescribed for pain immediately after surgery.     You can take the ibuprofen with the Vicodin or Norco if additional pain      relief is required.    Will I achieve my desired breast size?     This is determined on an individual basis.     There is no scientific way to determine the exact breast size. It will be     determined by a number of different factors i.e.     How well you tolerate each tissue expansion.     How well your skin reacts to the expansions.     The size of the other breast (if a unilateral tissue expander).     Previous surgeries or amount of scarring     We recommend trying on a desired bra size as you approach your  desired      breast size to see how well the bra size fits.    Appearance     While the tissue is being expanded, a bulge will be created. Depending     upon the location of the tissue expander, the bulge may be considered     desirable or unsightly.     Following the exchange of the tissue expander, a more normal and     desirable look should be restored.     After the exchange for permanent implant, the breasts will feel softer     with less fullness in your axilla.    When to Call:     If you have increasing swelling or bruising.     If swelling and redness persist after a few days.     If you have increased redness along the incision.     If you have severe or increased pain not relieved by medication.     If you have any side effects to mediations; such as, rash, nauseas,      headache, vomiting, etc.     If you have an oral temperature of 100.5 degrees or higher.     If you have any yellow or greenish drainage from the incisions or notice a      foul smell.     If you have bleeding from the incisions that is difficult to control with      light pressure.     If you have loss of feeling or motion.    For Medical Questions, Please Call:     [phone], Monday - Friday, 8 a.m. - 4:30 p.m.     After hours and on weekends, call Hospital Paging at [phone] and     ask for the Plastic Surgeon on call.    Please note my office will call you 1-2 business days after your procedure to check up on how you're doing and to schedule your post-operative appointment.

## 2019-08-15 NOTE — OP NOTE
PREOPERATIVE DIAGNOSIS: Status post left breast cancer requiring bilateral nipple-non-sparing mastectomy and immediate reconstruction.     POSTOPERATIVE DIAGNOSIS: Status post left breast cancer requiring bilateral nipple-non-sparing mastectomy and immediate reconstruction.     PROCEDURES:   1. Bilateral immediate breast reconstruction using pre-pectoral expander (CPX4 anatomic [place] breast expander with a base diameter of about 12.5 cm and a total fill volume of 350 mL filled with air without tension for about a 300 gm mastectomy specimen. Medical Indication for Use in the Pre-pectoral plane: Prevent animation defect, decrease seth-operative pain, decrease anatomical destruction/distortion of the pectoral muscle).   2. Intraoperative chemical angiography with injection of ICG dye and interpretation of the angiogram using the SPY Elite System (indication of use was the fact the patient had undergone a nipple-non-sparing mastectomy and we needed to evaluate the blood flow to the skin flaps to decide appropriate reconstruction as well as debridement).    IMPLANTS:     Implant Name Type Inv. Item Serial No.  Lot No. LRB No. Used   DynisOR CPX 4 with Suture Tabs, Tall Height   [id number] [employer] [id number] Right 1   MENTOR CPX 4 with Suture Tabs, Tall Height 350cc   [id number]  [id number] Left 1         SURGEON: Ko Ramos MD     RESIDENT: Tena Mace    ANESTHESIA: General anesthesia with endotracheal intubation.     COMPLICATIONS: Nil.     DRAINS: One 15-[place] channel APOLLO drain on each side.    SPECIMENS: Nil.     BLOOD LOSS: 10 mL      DESCRIPTION OF PROCEDURE: After informed consent was taken from the patient, the proper site and procedure was ascertained with her and she was appropriately marked and taken to the operating room. She was placed in a supine position with her knees comfortably flexed, pillows underneath them and pneumoboots placed and running prior to induction of anesthesia.  Preoperative antibiotics were given in the OR and appropriately redosed during the case. General anesthesia was administered without any complications. A Fink catheter was inserted. Her arms were abducted to about 50 degrees and appropriately padded. Surgical Oncology took over and began the procedure. The bilateral nipple-non sparing mastectomy was completed and I was called to the operating room. She was reprepped and draped and I began the procedure by first analyzing the skin flaps. Clinically everything looked good. I went ahead and carried out the preoperative chemical angiogram using the SPY Elite System and injection of ICG dye. A total of 5 mL was injected at this time and at 30 seconds we evaluated the blood flow to the surrounding skin flaps. Overall both skin flaps had some dark seth-incisional areas but overall the flow was good. These were marked out for later excision. Given the clinical findings, we decided to proceed with the immediate pre-pectoral reconstruction. Identical steps were performed bilaterally.The dead-space in the lateral areas were closed off and the LMF and IMF were recreated with 0-vicryl sutures. Key 0-Vicryl sutures were placed in the MMF and IMF to sew the tabs of the expanders to. No Alloderm/ADM was used.  We measured the base width at about 12.5 cm. The surgical pocket was then irrigated with triple antibiotic solution and betadine. We changed our gloves. The expander was then removed from the package and were placed in the pockets. The medial and inferior tabs were sutured down to the present sutures. Air was removed completely. A 15-[place] channeled APOLLO drain was then placed on each side and brought out through a separate stab incision and sewn into position. The edges of the wounds were refreshened per the SPY and there was bleeding seen at the edges of the wounds and then the skin was closed using 2-0 Monocryl suture in a deep dermal layer. We went ahead and then placed a  3-0 Strattafix suture in a running intracuticular manner followed by Surgical Glue and Tape. Appropriate dressings were placed over the drain sites. The patient was wrapped not tightly. The patient tolerated the procedure well. All counts were correct at the end of the case. The patient was extubated and sent to recovery room in stable condition.

## 2019-08-15 NOTE — PLAN OF CARE
Status: POD 0 Bilateral Mastectomy w/expanders and port removal  VS: VSS on RA  Neuros: A&Ox4. Intact  GI: Tolerating regular diet. Zofran given x1 for prophylaxis   : Voiding w/out difficulty. PVR 23  IV: PIV SL  Activity: Up independently  Pain: Tylenol given x1 for chest soreness. Bilateral pectoral blocks given pre-op  Skin: Abdominal binder on over chest; ABD pads underneath binder; APOLLO x2 from chest, moderate amt of serosang drainage  Social:  at bedside, supportive  Plan of care: Probable discharge home tmrw.

## 2019-08-15 NOTE — BRIEF OP NOTE
[facility], [place]    Brief Operative Note    Pre-operative diagnosis: Breast Cancer  Post-operative diagnosis * No post-op diagnosis entered *  Procedure: Procedure(s):  Bilateral Skin Sparing Mastectomy, Right Axillary [place] Lymph Node Biopsy  Remove Vascular Access Port  Bilateral Breast Reconstruction With Expanders and SPY  Removal of bilateral breast implant and Bilateral excision of implant capsules  Surgeon: Surgeon(s) and Role:  Panel 1:     * Anne Yousif MD - Primary  Panel 2:     * LALY Ramos MD - Primary     * Keisha Badillo DDS - Resident - Assisting  Anesthesia: Combined General with Block   Estimated blood loss: Less than 50 ml  Drains: Adria-Mantilla  Specimens:   ID Type Source Tests Collected by Time Destination   1 : left implant capsule fluid Tissue Other CYTOLOGY NON GYN Anne Yousif MD 8/15/2019  9:14 AM    2 : right implant capsule fluid for lymphoma protocol Tissue Other CYTOLOGY NON GYN Anne Yousif MD 8/15/2019 10:55 AM    3 : right breast, nipple and impant capsule with stitch at axillary tail Tissue Breast, Right OR DOCUMENTATION ONLY Anne Yousif MD 8/15/2019 11:05 AM    A : Left breast implant for gross only Other (specify in comments) Breast, Left SURGICAL PATHOLOGY EXAM Anne Yousif MD 8/15/2019  9:08 AM    B : vascular access port for gross only Other (specify in comments) Other SURGICAL PATHOLOGY EXAM Anne Yousif MD 8/15/2019  9:22 AM    C : left breast,  nipple, implant capsule; stitch adrianna axillary tail. Tissue Breast, Left SURGICAL PATHOLOGY EXAM Anne Yousif MD 8/15/2019  9:27 AM    D : right breast implant for gross only Other (specify in comments) Breast, Right SURGICAL PATHOLOGY EXAM Anne Yousif MD 8/15/2019 10:52 AM    E : right axillary sentinel lymph node #1. Tissue Lymph Node, [place] SURGICAL PATHOLOGY EXAM Anne Yousif MD 8/15/2019 11:27  AM    F : right axillary sentinel lymph node #2 Tissue Lymph Node, [place] SURGICAL PATHOLOGY EXAM Anne Yousif MD 8/15/2019 11:28 AM    G : right breast, nipple and impant capsule with stitch at axillary jemima Tissue Breast, Right SURGICAL PATHOLOGY EXAM Anne Yousif MD 8/15/2019 11:48 AM      Findings:   None.  Complications: None.  Implants:    Implant Name Type Inv. Item Serial No.  Lot No. LRB No. Used   [employer]on[employer] CPX 4 with Suture Tabs, Tall Height   [id number] [employer] [id number] Right 1   MENTOR CPX 4 with Suture Tabs, Tall Height 350cc   [id number]  [id number] Left 1        350 ml TE bilaterally, filled with 120 mL of air

## 2019-08-15 NOTE — OP NOTE
DATE OF SURGERY: August 15, 2019     SURGEON: Anne Yousif MD  ASSISTANT: Yuli Lazo PA-C, no qualified resident was available, thus Ms Lazo's assistance was required    PREOPERATIVE DIAGNOSIS:   1. Breast cancer, right outer breast  2. Prior breast implants    POSTOPERATIVE DIAGNOSIS: same    PROCEDURE:   1. Left prophylactic skin-sparing mastectomy  2. Removal of left breast implant  3. Excision of left breast implant capsule  4. Left vascular access port removal  5. Right skin-sparing mastectomy  6. Removal of right breast implant  7. Excision of right breast implant capsule  8. Right axillary sentinel lymph node mapping and biopsy  9. Immediate breast reconstruction by Dr Ramos    ANESTHESIA: General + Block    CLINICAL NOTE:  Kathy Lei is a 53 year old woman with right breast cancer, s/p neoadjuvant chemotherapy.  She now presents for surgical management.  The risks and benefits of bilateral skin-sparing mastectomy, removal of implants, and axillary cristina staging were discussed with the patient and she elected to proceed with informed consent.    OPERATIVE FINDINGS:  1. Bilateral textured implants identified.  They were intact upon removal bilaterally. There was a small amount of serous fluid in the implant capsule bilaterally. The fluid was sampled and sent for cytology.  During the dissection of the breast, the implant capsule was also excised for pathologic evaluation. A portion of the inferior aspect of the pectoralis major muscle was therefore removed with the implant capsule bilaterally.  Due to the complexity of also excising the implant capsule, the dissection took 25% longer than normal.  2. The vascular access port was removed and was intact  3. There were no grossly palpable masses in either breast.  4. Two sentinel lymph nodes were removed in the right axilla.    OPERATIVE PROCEDURE:  The patient was brought to the operating room and placed in the supine position with appropriate  padding for all of the pressure points. A general anesthetic was administered.   A surgical safety checklist was performed to confirm the patient's identity, the site and laterality of the procedure. Perioperative antibiotics (Ancef) was provided.  VTE prophylaxis was provided with serial compression devices.  A ha catheter was placed.  Technetium-labelled tilmanocept was injected in the right subareolar space. The bilateral chest and right axilla were then prepped and draped in the usual sterile fashion using Chloraprep.     We began on the left (prophylactic side).  A circumareolar incision with a radial extension towards the upper outer quadrant was made in th left breast.  Superficial skin flaps were raised circumferentially, heading superiorly towards the clavicle, medially towards the lateral border of the sternum, inferiorly towards the insertion of the rectus sheath, and laterally to the lateral border of the pectoralis major muscle.  Inferiorly, the implant capsule was entered as the inferior flap was created.  There was a textured implant. It was removed and found to be intact.  It was sent to pathology.  There was some clear fluid noted in the implant cavity.  This was suctioned and saved and sent to cytology for evaluation.  The breast was then dissected off of the pectoralis major muscle, taking its fascia en bloc with the specimen.  Inferiorly, care was taken to remove the implant capsule along with the specimen, due to the fact that a textured implant was previously used and there was some fluid found, to rule out lymphoma.  Because of the extra dissection needed to excise the implant capsule, some inferior aspect of the pectoralis major muscle was removed with the specimen.   The specimen was then marked with a suture for the axillary tail and sent to pathology.  The wound was irrigated and hemostasis was achieved.   The subcutaneous pocket for the vascular access port was then entered and the  port isolated.  The patient was then repositioned in steep Trendelenburg and the port was removed with concurrent pressure held over the internal jugular access point by Anesthesia for 5 minutes.  The patient was then taken out of Trendelenburg.    Next, 2.5 mL of lymphazurin was injected into the right  subareolar space and the right  breast was massaged for 5 minutes.  A circumareolar incision with a radial extension towards the upper outer quadrant was made in the right breast.  Superficial skin flaps were raised circumferentially, heading superiorly towards the clavicle, medially towards the lateral border of the sternum, inferiorly towards the insertion of the rectus sheath, and laterally to the lateral border of the pectoralis major muscle.  Inferiorly, the implant capsule was entered as the inferior flap was created.  There was a textured implant. It was removed and found to be intact.  It was sent to pathology.  There was some clear fluid noted in the implant cavity.  This was suctioned and saved and sent to cytology for evaluation.  The breast was then dissected off of the pectoralis major muscle, taking its fascia en bloc with the specimen.  Inferiorly, care was taken to remove the implant capsule along with the specimen, due to the fact that a textured implant was previously used and there was some fluid found, to rule out lymphoma.  Because of the extra dissection needed to excise the implant capsule, some inferior aspect of the pectoralis major muscle was removed with the specimen.  The specimen was then marked with a suture for the axillary tail and sent to pathology.  The wound was irrigated and hemostasis was achieved.       The Neoprobe was used to identify the sentinel lymph nodes in the right axilla.  [place] lymph node #1 was blue and had an ex vivo count of 682.  [place] lymph node #2 was blue and had an ex vivo count of 5967.  The background count was 31. No other blue or palpable lymph nodes  were found.  Thus no additional sentinel lymph nodes were sought.  All of the lymph nodes were sent to pathology individually.  The wound was irrigated and hemostasis was achieved. The patient tolerated the procedure well thus far. The sponge, needle, instrument counts were correct thus far.  At this point, Dr Ramos and his team took over for the reconstruction portion of the case, which will be dictated separately.       I was present and scrubbed for the entire above procedure.    EBL: 15 mL    SPECIMEN(S):  A. Left breast implant  B. Left breast, implant capsule seroma fluid for cytology and lymphoma protocol  C. Left breast, nipple and implant capsule  D. Vascular access port  E. Right breast implant  F. Right breast, implant capsule seroma fluid for cytology and lymphoma protocol  G. Right breast, nipple and implant capsule  H. Right axillary sentinel lymph node # 1  I. Right axillary sentinel lymph node # 2    Anne Yousif MD MSc [facility] FACS    Division of Surgical Oncology  [facility]

## 2019-08-15 NOTE — ANESTHESIA POSTPROCEDURE EVALUATION
Anesthesia POST Procedure Evaluation    Patient: Kathy Lei   MRN:     [id number] Gender:   female   Age:    53 year old :      1965        Preoperative Diagnosis: Breast Cancer   Procedure(s):  Bilateral Skin Sparing Mastectomy, Right Axillary [place] Lymph Node Biopsy  Remove Vascular Access Port  Bilateral Breast Reconstruction With Expanders and SPY  Removal of bilateral breast implant and Bilateral excision of implant capsules   Postop Comments: No value filed.       Anesthesia Type:  Not documented  General    Reportable Event: NO     PAIN: Uncomplicated   Sign Out status: Comfortable, Well controlled pain     PONV: No PONV   Sign Out status:  No Nausea or Vomiting     Neuro/Psych: Uneventful perioperative course   Sign Out Status: Preoperative baseline; Age appropriate mentation     Airway/Resp.: Uneventful perioperative course   Sign Out Status: Non labored breathing, age appropriate RR; Resp. Status within EXPECTED Parameters     CV: Uneventful perioperative course   Sign Out status: Appropriate BP and perfusion indices; Appropriate HR/Rhythm     Disposition:   Sign Out in:  PACU  Disposition:  Phase II; Home  Recovery Course: Uneventful  Follow-Up: Not required           Last Anesthesia Record Vitals:  CRNA VITALS  8/15/2019 1251 - 8/15/2019 1351      8/15/2019             Resp Rate (observed):  1  (Abnormal)     Resp Rate (set):  10          Last PACU Vitals:  Vitals Value Taken Time   /63 8/15/2019  3:00 PM   Temp 37.1  C (98.7  F) 8/15/2019  2:15 PM   Pulse 89 8/15/2019  3:00 PM   Resp 10 8/15/2019  3:00 PM   SpO2 97 % 8/15/2019  3:07 PM   Temp src     NIBP     Pulse     SpO2     Resp     Temp     Ht Rate     Temp 2     Vitals shown include unvalidated device data.      Electronically Signed By: Shekhar Laureano MD, August 15, 2019, 3:08 PM

## 2019-08-16 VITALS
HEART RATE: 79 BPM | RESPIRATION RATE: 16 BRPM | BODY MASS INDEX: 22.64 KG/M2 | SYSTOLIC BLOOD PRESSURE: 107 MMHG | OXYGEN SATURATION: 93 % | TEMPERATURE: 98.4 F | WEIGHT: 140.87 LBS | HEIGHT: 66 IN | DIASTOLIC BLOOD PRESSURE: 66 MMHG

## 2019-08-16 LAB
COPATH REPORT: NORMAL

## 2019-08-16 PROCEDURE — 25000132 ZZH RX MED GY IP 250 OP 250 PS 637: Performed by: STUDENT IN AN ORGANIZED HEALTH CARE EDUCATION/TRAINING PROGRAM

## 2019-08-16 PROCEDURE — 25000132 ZZH RX MED GY IP 250 OP 250 PS 637: Performed by: PHYSICIAN ASSISTANT

## 2019-08-16 PROCEDURE — 99024 POSTOP FOLLOW-UP VISIT: CPT | Performed by: PHYSICIAN ASSISTANT

## 2019-08-16 RX ORDER — OXYCODONE HYDROCHLORIDE 5 MG/1
5-10 TABLET ORAL EVERY 4 HOURS PRN
Status: DISCONTINUED | OUTPATIENT
Start: 2019-08-16 | End: 2019-08-16 | Stop reason: HOSPADM

## 2019-08-16 RX ADMIN — OXYCODONE HYDROCHLORIDE 5 MG: 5 TABLET ORAL at 05:05

## 2019-08-16 RX ADMIN — OXYCODONE HYDROCHLORIDE 5 MG: 5 TABLET ORAL at 00:29

## 2019-08-16 RX ADMIN — OXYCODONE HYDROCHLORIDE 5 MG: 5 TABLET ORAL at 08:57

## 2019-08-16 ASSESSMENT — ACTIVITIES OF DAILY LIVING (ADL)
TOILETING: 0-->INDEPENDENT
DRESS: 0-->INDEPENDENT
FALL_HISTORY_WITHIN_LAST_SIX_MONTHS: NO
RETIRED_COMMUNICATION: 0-->UNDERSTANDS/COMMUNICATES WITHOUT DIFFICULTY
BATHING: 0-->INDEPENDENT
AMBULATION: 0-->INDEPENDENT
RETIRED_EATING: 0-->INDEPENDENT
SWALLOWING: 0-->SWALLOWS FOODS/LIQUIDS WITHOUT DIFFICULTY
TRANSFERRING: 0-->INDEPENDENT
COGNITION: 0 - NO COGNITION ISSUES REPORTED

## 2019-08-16 NOTE — PROGRESS NOTES
Surgical Oncology Progress Note    Interval History:  No acute events overnight. Sore, but pain controlled. Denies nausea.     Physical Exam:   Temp:  [97.8  F (36.6  C)-98.7  F (37.1  C)] 98.4  F (36.9  C)  Pulse:  [78-99] 79  Heart Rate:  [78-92] 81  Resp:  [10-20] 16  BP: ()/(57-76) 107/66  SpO2:  [93 %-97 %] 93 %  General: Alert, oriented, appears comfortable, NAD.  Respiratory: breathing non labored  Chest incisions intact without sign of ischemia or hematoma. Drains with    Serosanguinous output.     Data:   All laboratory and imaging data in the past 24 hours reviewed  I/O last 3 completed shifts:  In: 2840 [P.O.:840; I.V.:2000]  Out: 390 [Urine:235; Drains:155]  Recent Labs   Lab Test 08/13/19  1023 07/22/19  1211 07/16/19  0807  02/05/19  1038   WBC 5.8 7.3 9.1   < > 12.2*   HGB 11.1* 10.9* 11.3*   < > 13.1    416 289   < > 314   INR  --   --   --   --  1.02    < > = values in this interval not displayed.      Recent Labs   Lab Test 08/13/19  1023 07/22/19  1211 07/16/19  0807    132* 138   POTASSIUM 3.8 3.7 3.9   CHLORIDE 107 101 104   CO2 26 26 28   BUN 8 12 2*   CR 0.49* 0.54 0.65   ANIONGAP 6 5 6   BRYANT 8.5 8.8 9.0   * 101* 92     Recent Labs   Lab Test 08/13/19  1023   PROTTOTAL 6.7*   ALBUMIN 3.4   BILITOTAL 0.3   ALKPHOS 58   AST 40   ALT 40     Assessment and Plan:     53 year old female POD1 s/p b/l mastectomy, right sentinel lymph node biopsy, and reconstruction.     - regular diet  - acetaminophen, oxycodone prn  - good drain teaching  - discharge to home today    Seen with Chief resident who will discuss with Staff.     Yuli Lazo PAGillC   Surgical Oncology

## 2019-08-16 NOTE — PLAN OF CARE
"Outpatient/observation goals:       List all Outpatient in a Bed discharge goals to be met before discharge home:   ~ Patient able to ambulate as they were prior to admission or with assist devices provided by therapies during their stay: met   ~ Vital signs at baseline: met   ~ Able to tolerate oral intake: met   ~ Cleared for discharge per provider: pending     BP 98/66   Pulse 78   Temp 97.9  F (36.6  C) (Oral)   Resp 16   Ht 1.676 m (5' 6\")   Wt 63.9 kg (140 lb 14 oz)   LMP 11/02/2014   SpO2 96%   BMI 22.74 kg/m             "

## 2019-08-16 NOTE — PROGRESS NOTES
"POST OP CHECK  08/15/2019    Kathy Lei is a 53 year old female with h/o L breast cancer now POD #0 s/p bilateral mastectomy with subsequent recontruction by Plastic Surgery.    Pt reports pain is controlled. No new issues.  No CP, SOB, N/V.      BP 98/66   Pulse 78   Temp 97.9  F (36.6  C) (Oral)   Resp 16   Ht 1.676 m (5' 6\")   Wt 63.9 kg (140 lb 14 oz)   LMP 11/02/2014   SpO2 96%   BMI 22.74 kg/m    General: Alert, interactive, & in NAD  Resp: normal work of breathing   Cardiac: Regular rate; extremities warm   Incision: Ace bandage c/d/i without erythema, warmth, or discharge. Underlying dressings and skin likewise appear clean when peeking under dressing (did not remove).    Extremities: No LE edema or obvious joint abnormalities    EBL 10cc      A/P: No acute post-op issues. Continue plan of care per primary team. Please call with questions.     Zenia Warren MD  Surgery Resident PGY-1  Pg [phone]      "

## 2019-08-16 NOTE — PROGRESS NOTES
"Plastic surgery progress note    No acute events overnight. Pain controlled. Tolerating PO. Ambulating. Voiding independently    /66 (BP Location: Right arm)   Pulse 79   Temp 98.4  F (36.9  C) (Oral)   Resp 16   Ht 1.676 m (5' 6\")   Wt 63.9 kg (140 lb 14 oz)   LMP 11/02/2014   SpO2 93%   BMI 22.74 kg/m      A&O, NAD  Unlabored breathing  Mastectomy flaps viable. No hematoma. Inc c/d/i. JPx ssd    I&O  L APOLLO 40/30  R APOLLO 50/35    A/P: 53F POD 1 s/p b/l mastectomy and immediate prepec TE reconstruction    - OK to discharge from plastics perspective  - No pushing, pulling, stretching, reaching overhead  - PO abx while drains are in place  - Ok to shower on POD 3 - cover drain sites with saran wrap. No scrubbing the incisions. Re wrap ACE after shower    Rich Ordaz MD  Plastic surgery resident  "

## 2019-08-16 NOTE — DISCHARGE SUMMARY
[facility] Discharge Summary    Kathy Lei MRN# [id number]   Age: 53 year old YOB: 1965     Date of Admission:  8/15/2019  Date of Discharge::  8/16/2019  Admitting Physician:  Anne Yousif MD  Discharge Physician:  Anne Yousif MD     PCP:  Rachel Arauz    Disposition: Patient discharged to home in stable condition.    Admission Diagnosis:  Right breast cancer  Hypertension  Hypothyroidism  Asthma  Scoliosis     Discharge Diagnosis:  Right breast cancer  Hypertension  Hypothyroidism  Asthma  Scoliosis    Discharge medications  Current Discharge Medication List      START taking these medications    Details   acetaminophen (TYLENOL) 325 MG tablet Take 1-2 tablets (325-650 mg) by mouth every 4 hours as needed for other (mild pain)  Qty: 50 tablet, Refills: 0    Associated Diagnoses: Post-operative pain      oxyCODONE (ROXICODONE) 5 MG tablet Take 1-2 tablets (5-10 mg) by mouth every 4 hours as needed for pain (Moderate to Severe)  Qty: 20 tablet, Refills: 0    Associated Diagnoses: Post-operative pain      sulfamethoxazole-trimethoprim (BACTRIM DS/SEPTRA DS) 800-160 MG tablet Take 1 tablet by mouth 2 times daily  Qty: 14 tablet, Refills: 0    Associated Diagnoses: S/P mastectomy, bilateral         CONTINUE these medications which have NOT CHANGED    Details   albuterol (VENTOLIN HFA) 108 (90 Base) MCG/ACT Inhaler INHALE 1-2 PUFFS INTO THE LUNGS EVERY 4 HOURS AS NEEDED FOR SHORTNESS OF BREATH OR DIFFICULTY BREATHING.  Qty: 18 g, Refills: PRN    Associated Diagnoses: Moderate persistent asthma without complication      fluticasone (FLOVENT HFA) 110 MCG/ACT inhaler Inhale 2 puffs into the lungs 2 times daily  Qty: 1 Inhaler, Refills: PRN    Associated Diagnoses: Moderate persistent asthma without complication      levothyroxine (SYNTHROID) 112 MCG tablet Take 1 tablet (112 mcg) by mouth daily  Qty: 30 tablet, Refills: 1    Associated Diagnoses:  Hypothyroidism, unspecified type      lisinopril (PRINIVIL/ZESTRIL) 5 MG tablet Take 1 tablet (5 mg) by mouth daily  Qty: 90 tablet, Refills: 3    Associated Diagnoses: Cardiomyopathy, unspecified type (H)      LORazepam (ATIVAN) 0.5 MG tablet Take 1 tablet (0.5 mg) by mouth every 4 hours as needed (Anxiety, Nausea/Vomiting or Sleep)  Qty: 30 tablet, Refills: 3    Associated Diagnoses: Malignant neoplasm of upper-outer quadrant of right breast in female, estrogen receptor positive (H)      ondansetron (ZOFRAN) 8 MG tablet Take 1 tablet (8 mg) by mouth every 8 hours as needed for nausea  Qty: 60 tablet, Refills: 3    Associated Diagnoses: Chemotherapy induced nausea and vomiting; Malignant neoplasm of upper-outer quadrant of right breast in female, estrogen receptor positive (H)      prochlorperazine (COMPAZINE) 10 MG tablet Take 1 tablet (10 mg) by mouth every 6 hours as needed (Nausea/Vomiting)  Qty: 60 tablet, Refills: 3    Associated Diagnoses: Malignant neoplasm of upper-outer quadrant of right breast in female, estrogen receptor positive (H); Chemotherapy induced nausea and vomiting      sertraline (ZOLOFT) 100 MG tablet Take 1 tablet (100 mg) by mouth daily  Qty: 30 tablet, Refills: 1    Associated Diagnoses: Depression, unspecified depression type      sodium chloride (OCEAN) 0.65 % nasal spray Spray in both nostrils four times daily  Qty: 30 mL, Refills: 3    Associated Diagnoses: Epistaxis      ADVIL 200 MG OR TABS 1 TABLET EVERY 4 TO 6 HOURS AS NEEDED  Qty: 0, Refills: 0      order for DME Cranial prosthesis  Qty: 1 Units, Refills: 1    Associated Diagnoses: Malignant neoplasm of upper-outer quadrant of right breast in female, estrogen receptor positive (H); Alopecia           Follow up, Special Instructions:  After Care     Future Labs/Procedures    Discharge Instructions     Comments:    From Dr Yousif:    1. Patient to follow up with appointment in 2 weeks with Dr. Yousif. Follow up with Dr. Ramos in 1  "week. Your pathology report will be reviewed with you in person in clinic by Dr Yousif.   2. Do not drive while taking narcotic pain medication (oxycodone).   3. May take plain Tylenol as needed for pain.   4. Avoid non-steroidal anti-inflammatory medications (Advil, Ibuprofen, Naproxen, aspirin, etc) for 5-7 days.   5. Caution with putting ice on the incision as it will be numb you will not realize it if you leave the ice for too long and can damage your skin.  6. If you develop any fever/chills, worsening pain, redness, swelling, or drainage from your wound please call the clinic (Monday through Friday 8:00am-5:00pm [phone] Jena ZIMMER) or on-call surgical oncology resident (nights and weekends [phone] and ask \"I would like to page the Surgical Oncology Resident on call.\")   7. No lifting over 5-10 lbs and no strenuous physical activity for 6 weeks.   8. Keep dressing clean and dry. Please refer to Dr Ramos's wound care instructions.   9. Monitor the drain output. Record the drain output per 24 hours. Your drain will be removed by Dr. Ramos at a follow up appointment.   Drain care:  Please keep drain site clean and dry.   Avoid showering until the drain is removed.  Please call the clinic (see phone numbers above) if:  Your drain falls out.  The stitch that is holding the drain in place at the skin is coming loose or missing.  The drainage fluid is very thick and/or has a bad odor.  The squeeze bulb does not stay collapsed.  The drainage suddenly stops or dramatically decreases when the drain has been having steady amounts of drainage.  Please keep a log of the drainage amounts every time you empty the drain.    Please \"strip\" the drain 3 times per day:  Wash your hands with soap and water and dry.  Gently squeeze the tubing if you see a clot to loosen it up.   and pinch the drain where it comes out of the skin with one hand, to steady it.  With the other hand,  and pinch the drain and slide your " fingers down the tubing, towards the drainage bulb.  Then release your  on the end where the tube comes out of your body, followed by releasing your  at the end of the drainage bulb.    Repeat several times.  It may be easier to 'strip' the drain with an alcohol swab or with hand cleanser on the hand that is moving along the tube.  Wash your hands again.        Procedures:  8/15/19 Dr. Yousif  1. Left prophylactic skin-sparing mastectomy  2. Removal of left breast implant  3. Excision of left breast implant capsule  4. Left vascular access port removal  5. Right skin-sparing mastectomy  6. Removal of right breast implant  7. Excision of right breast implant capsule  8. Right axillary sentinel lymph node mapping and biopsy  9. Immediate breast reconstruction by Dr Richard Ramos  1. Bilateral immediate breast reconstruction using pre-pectoral expander (CPX4 anatomic [place] breast expander with a base diameter of about 12.5 cm and a total fill volume of 350 mL filled with air without tension for about a 300 gm mastectomy specimen. Medical Indication for Use in the Pre-pectoral plane: Prevent animation defect, decrease seth-operative pain, decrease anatomical destruction/distortion of the pectoral muscle).   2. Intraoperative chemical angiography with injection of ICG dye and interpretation of the angiogram using the SPY Elite System (indication of use was the fact the patient had undergone a nipple-non-sparing mastectomy and we needed to evaluate the blood flow to the skin flaps to decide appropriate reconstruction as well as debridement).    Consultations:  None    Brief HPI:  Kathy Lei is a 53 year old woman with right breast cancer, s/p neoadjuvant chemotherapy.      Hospital Course:  The patient was admitted and underwent the above procedure. The patient tolerated the procedure well. The patient recovered well with no post-operative complications. Prior to discharge pain was controlled with  oral pain medication and the patient was able to ambulate and void without difficulty. The patient received appropriate education post operatively including APOLLO drain teaching. On POD #1 the patient was discharged to home.    Surgical pathology  Pending     Yuli Lazo PA-C

## 2019-08-16 NOTE — PLAN OF CARE
DISCHARGE                         8/16/2019 10:05 AM  ----------------------------------------------------------------------------  Discharged to: Home  Via: private transportation  Accompanied by: Family  Discharge Instructions: Reg diet, avoid strenuous activity, medications, follow up appointments, when to call the MD, aftercare incision/APOLLO instructions.  Prescriptions: To be filled by [facility]; medication list reviewed & sent with pt  Follow Up Appointments: arranged; information given  Belongings: All sent with pt  IV: d/c'd    Pt exhibits understanding of above discharge instructions; all questions answered.    Discharge Paperwork: Signed, copied, and sent home with patient.     List all Outpatient in a Bed discharge goals to be met before discharge home:   ~ Patient able to ambulate as they were prior to admission or with assist devices provided by therapies during their stay: Met   ~ Vital signs at baseline: Met   ~ Able to tolerate oral intake: Met   ~ Cleared for discharge per provider: Met

## 2019-08-16 NOTE — PLAN OF CARE
Outpatient/observation goals:       List all Outpatient in a Bed discharge goals to be met before discharge home:   ~ Patient able to ambulate as they were prior to admission or with assist devices provided by therapies during their stay: met   ~ Vital signs at baseline: met   ~ Able to tolerate oral intake: met   ~ Cleared for discharge per provider: pending

## 2019-08-19 ENCOUNTER — TELEPHONE (OUTPATIENT)
Dept: SURGERY | Facility: CLINIC | Age: 54
End: 2019-08-19

## 2019-08-19 DIAGNOSIS — G89.18 POST-OPERATIVE PAIN: Primary | ICD-10-CM

## 2019-08-19 RX ORDER — OXYCODONE HYDROCHLORIDE 5 MG/1
5 TABLET ORAL EVERY 6 HOURS PRN
Qty: 20 TABLET | Refills: 0 | Status: SHIPPED | OUTPATIENT
Start: 2019-08-19 | End: 2019-09-12

## 2019-08-19 NOTE — TELEPHONE ENCOUNTER
POST-OP CALL  Aug 19, 2019    Kathy Lei is a 53 year old female s/p b/l mast, right SLNBx, reconstruction     She reports doing well. The pain is controlled with oxycodone and tylenol. She is taking 325 mg of tylenol every 6 hours. She is taking 5-10 mg of oxycodone every 5 hours. We discussed increasing the tylenol and discussed the max dose of 3000 mg per day. She will also try tapering off the oxycodone. A Rx refill of oxycodone was e-Rx to her local pharmacy.     Fevers/chills: Patient denies fever/chills.  Eating/drinking: Patient is able to eat and drink without any complaints.   Bowel habits: Patient reports having a normal bowel movement. Taking Miralax.   Urine output: Voiding without difficulty.   Drains (APOLLO): site covered with gauze, denies drainage from around site. Drainage:light pink/red, Output: yesterday 2 ounces from each drain. She was asked to record the output in mL.    Incisions: Patient denies any signs and symptoms of infection. No erythema, swelling or drainage  Pain: see details above  Follow up appointment needs to be scheduled. Will message the schedulers  Patient will call with any questions or concerns.    Yuli Lazo PA-C

## 2019-08-20 ASSESSMENT — ENCOUNTER SYMPTOMS
INCREASED ENERGY: 1
FEVER: 0
INCREASED ENERGY: 1
HALLUCINATIONS: 0
BREAST MASS: 0
WEIGHT GAIN: 0
POLYPHAGIA: 0
FATIGUE: 1
POLYDIPSIA: 0
BREAST MASS: 0
POLYDIPSIA: 0
CHILLS: 1
BREAST PAIN: 1
NIGHT SWEATS: 1
POLYPHAGIA: 0
DECREASED APPETITE: 0
DECREASED APPETITE: 0
FATIGUE: 1
HALLUCINATIONS: 0
NIGHT SWEATS: 1
ALTERED TEMPERATURE REGULATION: 1
WEIGHT LOSS: 0
CHILLS: 1
FEVER: 0
WEIGHT GAIN: 0
ALTERED TEMPERATURE REGULATION: 1
WEIGHT LOSS: 0
BREAST PAIN: 1

## 2019-08-22 LAB
BACTERIA SPEC CULT: NORMAL
FUNGUS SPEC CULT: NORMAL
SPECIMEN SOURCE: NORMAL
SPECIMEN SOURCE: NORMAL

## 2019-08-23 DIAGNOSIS — J45.40 MODERATE PERSISTENT ASTHMA WITHOUT COMPLICATION: ICD-10-CM

## 2019-08-23 LAB — COPATH REPORT: NORMAL

## 2019-08-23 RX ORDER — ALBUTEROL SULFATE 90 UG/1
AEROSOL, METERED RESPIRATORY (INHALATION)
Qty: 18 G | Refills: 0 | Status: SHIPPED | OUTPATIENT
Start: 2019-08-23 | End: 2019-09-24

## 2019-08-23 NOTE — TELEPHONE ENCOUNTER
"Requested Prescriptions   Pending Prescriptions Disp Refills     albuterol (VENTOLIN HFA) 108 (90 Base) MCG/ACT inhaler [Pharmacy Med Name: VENTOLIN HFA INH W/DOS CTR 200PUFFS]  Last Written Prescription Date:  7-16-18  Last Fill Quantity: 18 g,  # refills: PRN   Last office visit: 2/6/2019 with prescribing provider:  Kellie Arauz    Future Office Visit:    18 g 0     Sig: INHALE 1 TO 2 PUFFS INTO THE LUNGS EVERY 4 HOURS AS NEEDED FOR SHORTNESS OF BREATH OR DIFFICULTY BREATHING       Asthma Maintenance Inhalers - Anticholinergics Failed - 8/23/2019  8:30 AM        Failed - Asthma control assessment score within normal limits in last 6 months     Please review ACT score.   ACT Total Scores 1/19/2017 7/27/2017 7/16/2018   ACT TOTAL SCORE - - -   ASTHMA ER VISITS - - -   ASTHMA HOSPITALIZATIONS - - -   ACT TOTAL SCORE (Goal Greater than or Equal to 20) 7 13 22   In the past 12 months, how many times did you visit the emergency room for your asthma without being admitted to the hospital? 0 0 0   In the past 12 months, how many times were you hospitalized overnight because of your asthma? 0 0 0            Failed - Recent (6 mo) or future (30 days) visit within the authorizing provider's specialty     Patient had office visit in the last 6 months or has a visit in the next 30 days with authorizing provider or within the authorizing provider's specialty.  See \"Patient Info\" tab in inbasket, or \"Choose Columns\" in Meds & Orders section of the refill encounter.            Passed - Patient is age 12 years or older        Passed - Medication is active on med list         "

## 2019-08-27 ENCOUNTER — OFFICE VISIT (OUTPATIENT)
Dept: PLASTIC SURGERY | Facility: CLINIC | Age: 54
End: 2019-08-27
Attending: PLASTIC SURGERY
Payer: COMMERCIAL

## 2019-08-27 ENCOUNTER — OFFICE VISIT (OUTPATIENT)
Dept: ONCOLOGY | Facility: CLINIC | Age: 54
End: 2019-08-27
Attending: SURGERY
Payer: COMMERCIAL

## 2019-08-27 VITALS
TEMPERATURE: 97.8 F | HEIGHT: 66 IN | WEIGHT: 140.2 LBS | BODY MASS INDEX: 22.53 KG/M2 | HEART RATE: 85 BPM | DIASTOLIC BLOOD PRESSURE: 80 MMHG | SYSTOLIC BLOOD PRESSURE: 120 MMHG | OXYGEN SATURATION: 96 % | RESPIRATION RATE: 14 BRPM

## 2019-08-27 VITALS
SYSTOLIC BLOOD PRESSURE: 120 MMHG | HEART RATE: 85 BPM | WEIGHT: 139.55 LBS | DIASTOLIC BLOOD PRESSURE: 80 MMHG | BODY MASS INDEX: 22.43 KG/M2 | RESPIRATION RATE: 14 BRPM | HEIGHT: 66 IN | TEMPERATURE: 97.8 F | OXYGEN SATURATION: 96 %

## 2019-08-27 DIAGNOSIS — Z98.890 S/P BREAST RECONSTRUCTION, BILATERAL: Primary | ICD-10-CM

## 2019-08-27 DIAGNOSIS — C50.411 MALIGNANT NEOPLASM OF UPPER-OUTER QUADRANT OF RIGHT BREAST IN FEMALE, ESTROGEN RECEPTOR POSITIVE (H): Primary | ICD-10-CM

## 2019-08-27 DIAGNOSIS — Z17.0 MALIGNANT NEOPLASM OF UPPER-OUTER QUADRANT OF RIGHT BREAST IN FEMALE, ESTROGEN RECEPTOR POSITIVE (H): Primary | ICD-10-CM

## 2019-08-27 PROCEDURE — G0463 HOSPITAL OUTPT CLINIC VISIT: HCPCS | Mod: ZF

## 2019-08-27 ASSESSMENT — PAIN SCALES - GENERAL
PAINLEVEL: MODERATE PAIN (4)
PAINLEVEL: MODERATE PAIN (4)

## 2019-08-27 ASSESSMENT — MIFFLIN-ST. JEOR
SCORE: 1247.44
SCORE: 1244.5

## 2019-08-27 NOTE — LETTER
2019      RE: Kathy Lei   [address] [place] [address]     Aug 27, 2019    Rachel Arauz, NP   [address]  [facility] [address]    RE: Kathy Lei  (: 1965)    Dear Rachel Arauz    Your patient was seen for evaluation in my office.  Please find a copy of my notes for your record and review.  If you have any further questions, please feel free to contact my office.   Thank you for your kind referral.    Sincerely,   Anne Yousif MD MSc [facility] FACS    ---   FOLLOW-UP  Aug 27, 2019    Kathy Lei is a 53 year old female who returns for her 1st post-operative follow-up visit.    Cancer Staging  Malignant neoplasm of upper-outer quadrant of right breast in female, estrogen receptor positive (H)  Staging form: Breast, AJCC 8th Edition  - Clinical: Stage IIA (cT3(m), cN0, cM0, G2, ER: Positive, MO: Positive, HER2: Negative) - Signed by Christian Guzman MD on 2019    Treatment to date:  1. Enrollment onto I-SPY2   2. Neoadjuvant durvalumab, taxol, olaparib (2019 to 2019)  3. Neoadjuvant dose dense adriamycin and cyclophosphamide (2019 to 2019)  4. Right skin-sparing mastectomy, right axillary sentinel lymph node biopsy, left prophylactic skin-sparing mastectomy, bilateral removal of breast implants, left vascular access port removal (8/15/2019)    HPI:    She underwent bilateral skin-sparing mastectomy, right axillary sentinel lymph node biopsy, bilateral removal of breast implants on 8/15//2019.  She is currently  2 week(s) post-op.  Final surgical pathology showed residual T3N1mi invasive ductal carcinoma, with uninvolved margins, and 1/2 lymph nodes with micrometastases and 1/2 lymph nodes with isolated tumor cells.  Of note, during the surgery, seromas around her breast implants were seen. The fluid was separately sampled and sent for cytology, and the entire implant capsule was excised bilaterally.    Since the  procedure, she has been doing well. She denies any redness or swelling, or drainage from the incisions, or fever/chills.  She has good range of motion and denies any upper extremity swelling.  Her drains were removed by Dr Ramos today.    LMP 11/02/2014    Physical Exam   Constitutional: She appears well-developed and well-nourished.   Pulmonary/Chest: No respiratory distress.   Bilateral surgical absence of breasts. Incisions well healed. Skin flaps healthy. Drains already removed.   Lymphadenopathy:   No lymphedema in bilateral upper extremities.    Skin: Skin is warm and dry.     INVESTIGATIONS:    Surgical Pathology (8/15/2019):  FINAL DIAGNOSIS:   A. MEDICAL DEVICE, REMOVAL:   - Breast implant, gross examination only (see gross description for details).   B. MEDICAL DEVICE, REMOVAL:   - Vascular access port, gross examination only (see gross description for details).   C. LEFT BREAST AND CAPSULE, SIMPLE MASTECTOMY AND CAPSULECTOMY:   - Benign breast tissue, skeletal muscle, skin and nipple.   - Fibroadenomatoid change.   - Fibrosis consistent with implant capsule.   - Calcifications associated with implant capsule and benign ducts.   D. MEDICAL DEVICE, REMOVAL:   - Breast implant, gross examination only (see gross description for details).   E. SENTINEL LYMPH NODE, RIGHT AXILLARY #1, EXCISION:   - MICROMETASTATIC CARCINOMA in one lymph node (1/1).   - Metastatic carcinoma is 1 mm in greatest dimension and cells appear confined to lymphatic spaces in the node capsule (considered metastatic site in accordance to AJCC guidelines).   - No extranodal extension identified.   F. SENTINEL LYMPH NODE, RIGHT AXILLARY #2, EXCISION:   - ISOLATED TUMOR CELLS (one focus <0.2 mm) in one lymph node (0/1, i+).   - No extranodal extension identified.   G. RIGHT BREAST AND CAPSULE, SIMPLE MASTECTOMY AND CAPSULECTOMY:   - INVASIVE MUCINOUS CARCINOMA, Donna grade 2, spanning at least 10.2 cm in linear extent, residual after  neoadjuvant chemotherapy.   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   - Extensive angiolymphatic invasion present.   - Margins are negative for carcinoma, invasive carcinoma is 1.5 mm from the anterior-superior margin and 6 mm from the anterior-inferior margin.   - Benign skin, nipple and skeletal muscle; focal deep dermal lymphovascular invasion is present.   - Calcifications associated with DCIS and benign ducts.   - Fibrosis consistent with implant capsule.   - Pathologic stage (AJCC 8th Ed.): ypT3 N1mi(sn).   - See synoptic report.   COMMENT:   Invasive carcinoma is present as clusters of tumor cells and single tumor cells, size 10.2 x 9.8 cm.  The invasive carcinoma shows morphology compatible with treatment related changes including cytoplasmic vacuolation.  The residual tumor cellularity is estimated at approx. 20%. DCIS is focal and minimal (1%). One lymph node is involved by metastatic carcinoma, and another lymph node has  isolated tumor cells.   The [facility] residual cancer burden is calculated as 3.425 (class RCB-III).     ASSESSMENT:    Kathy Lei is a 53 year old female with right breast cancer, s/p neoadjuvant chemotherapy, and now surgery.    Her stage is:  Cancer Staging  Malignant neoplasm of upper-outer quadrant of right breast in female, estrogen receptor positive (H)  Staging form: Breast, AJCC 8th Edition  - Clinical: Stage IIA (cT3(m), cN0, cM0, G2, ER: Positive, MT: Positive, HER2: Negative) - Signed by Christian Guzman MD on 1/28/2019  - Pathologic: No Stage Recommended (ypT3, pN1mi(sn), cM0, G2, ER+, MT+, HER2-) - Signed by Anne Yousif MD on 8/27/2019     She is doing well.  Her skin flaps are healthy.  I encouraged her to continue to stretch her shoulders and work on her range of motion.    We reviewed the pathology today and a copy of the report was provided. She does have micrometastasis in her node.  I am recommending  adjuvant radiation therapy. We did review that there is likely no additional survival benefit to a completion lymph node dissection in this setting and I have not recommended this. She will also follow up with Dr Guzman next week to discuss possible adjuvant systemic therapy given the residual disease, whether she should received additional systemic therapy prior to radiation therapy.    All of the above was discussed with the patient and all questions were answered.     PLAN:  1. Work on range of motion  2. Radiation oncology referral  3. Follow up with Dr Guzman next week  4. Follow up with Dr Ramos next week  5. Follow up with me in 3 months    Anne Yousif MD MSc [facility] FACS    Division of Surgical Oncology  [facility]

## 2019-08-27 NOTE — Clinical Note
8/27/2019       RE: Kathy Lei  [address] [place] [address]     Dear Colleague,    Thank you for referring your patient, Kathy Lei, to the [facility] at [facility]. Please see a copy of my visit note below.    FOLLOW-UP  Aug 27, 2019    Kathy Lei is a 53 year old female who returns for her 1st post-operative follow-up visit.    Cancer Staging  Malignant neoplasm of upper-outer quadrant of right breast in female, estrogen receptor positive (H)  Staging form: Breast, AJCC 8th Edition  - Clinical: Stage IIA (cT3(m), cN0, cM0, G2, ER: Positive, AR: Positive, HER2: Negative) - Signed by Christian Guzman MD on 1/28/2019      Treatment to date:  1. Enrollment onto I-SPY2   2. Neoadjuvant durvalumab, taxol, olaparib (2/25/2019 to 5/14/2019)  3. Neoadjuvant dose dense adriamycin and cyclophosphamide (5/20/2019 to 7/2/2019)  4. Right skin-sparing mastectomy, right axillary sentinel lymph node biopsy, left prophylactic skin-sparing mastectomy, bilateral removal of breast implants, left vascular access port removal (8/15/2019)    HPI:    She underwent bilateral skin-sparing mastectomy, right axillary sentinel lymph node biopsy, bilateral removal of breast implants on 8/15//2019.  She is currently  2 week(s) post-op.  Final surgical pathology showed residual T3N1mi invasive ductal carcinoma, with uninvolved margins, and 1/2 lymph nodes with micrometastases and 1/2 lymph nodes with isolated tumor cells.  Of note, during the surgery, seromas around her breast implants were seen. The fluid was separately sampled and sent for cytology, and the entire implant capsule was excised bilaterally.    Since the procedure, she has been doing well. She denies any redness or swelling, or drainage from the incisions, or fever/chills.  She has good range of motion and denies any upper extremity swelling.  Her drains were removed by   Richard today.    LMP 11/02/2014    Physical Exam   Constitutional: She appears well-developed and well-nourished.   Pulmonary/Chest: No respiratory distress.   Bilateral surgical absence of breasts. Incisions well healed. Skin flaps healthy. Drains already removed.   Lymphadenopathy:   No lymphedema in bilateral upper extremities.    Skin: Skin is warm and dry.     INVESTIGATIONS:    Surgical Pathology (8/15/2019):  FINAL DIAGNOSIS:   A. MEDICAL DEVICE, REMOVAL:   - Breast implant, gross examination only (see gross description for details).   B. MEDICAL DEVICE, REMOVAL:   - Vascular access port, gross examination only (see gross description for details).   C. LEFT BREAST AND CAPSULE, SIMPLE MASTECTOMY AND CAPSULECTOMY:   - Benign breast tissue, skeletal muscle, skin and nipple.   - Fibroadenomatoid change.   - Fibrosis consistent with implant capsule.   - Calcifications associated with implant capsule and benign ducts.   D. MEDICAL DEVICE, REMOVAL:   - Breast implant, gross examination only (see gross description for details).   E. SENTINEL LYMPH NODE, RIGHT AXILLARY #1, EXCISION:   - MICROMETASTATIC CARCINOMA in one lymph node (1/1).   - Metastatic carcinoma is 1 mm in greatest dimension and cells appear confined to lymphatic spaces in the node capsule (considered metastatic site in accordance to AJCC guidelines).   - No extranodal extension identified.   F. SENTINEL LYMPH NODE, RIGHT AXILLARY #2, EXCISION:   - ISOLATED TUMOR CELLS (one focus <0.2 mm) in one lymph node (0/1, i+).   - No extranodal extension identified.   G. RIGHT BREAST AND CAPSULE, SIMPLE MASTECTOMY AND CAPSULECTOMY:   - INVASIVE MUCINOUS CARCINOMA, Donna grade 2, spanning at least 10.2 cm in linear extent, residual after neoadjuvant chemotherapy.   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   - Extensive angiolymphatic invasion present.   - Margins are negative for carcinoma, invasive carcinoma is 1.5 mm from  the anterior-superior margin and 6 mm from the anterior-inferior margin.   - Benign skin, nipple and skeletal muscle; focal deep dermal lymphovascular invasion is present.   - Calcifications associated with DCIS and benign ducts.   - Fibrosis consistent with implant capsule.   - Pathologic stage (AJCC 8th Ed.): ypT3 N1mi(sn).   - See synoptic report.   COMMENT:   Invasive carcinoma is present as clusters of tumor cells and single tumor cells, size 10.2 x 9.8 cm.  The invasive carcinoma shows morphology compatible with treatment related changes including cytoplasmic vacuolation.  The residual tumor cellularity is estimated at approx. 20%. DCIS is focal and minimal (1%). One lymph node is involved by metastatic carcinoma, and another lymph node has  isolated tumor cells.   The [facility] residual cancer burden is calculated as 3.425 (class RCB-III).     ASSESSMENT:    Kathy Lei is a 53 year old female with right breast cancer, s/p neoadjuvant chemotherapy, and now surgery.    Her stage is:  Cancer Staging  Malignant neoplasm of upper-outer quadrant of right breast in female, estrogen receptor positive (H)  Staging form: Breast, AJCC 8th Edition  - Clinical: Stage IIA (cT3(m), cN0, cM0, G2, ER: Positive, [place]: Positive, HER2: Negative) - Signed by Christian Guzman MD on 1/28/2019  - Pathologic: No Stage Recommended (ypT3, pN1mi(sn), cM0, G2, ER+, [place]+, HER2-) - Signed by Anne Yousif MD on 8/27/2019     She is doing well.  Her skin flaps are healthy.  I encouraged her to continue to stretch her shoulders and work on her range of motion.    We reviewed the pathology today and a copy of the report was provided. She does have micrometastasis in her node.  I am recommending adjuvant radiation therapy. We did review that there is likely no additional survival benefit to a completion lymph node dissection in this setting and I have not recommended this. She will also follow up with Dr Guzman next  week to discuss possible adjuvant systemic therapy given the residual disease, whether she should received additional systemic therapy prior to radiation therapy.    All of the above was discussed with the patient and all questions were answered.     PLAN:  1. Work on range of motion  2. Radiation oncology referral  3. Follow up with Dr Guzman next week  4. Follow up with Dr Ramos next week  5. Follow up with me in 3 months    Anne Yousif MD MSc [facility]    Division of Surgical Oncology  [facility]     Again, thank you for allowing me to participate in the care of your patient.      Sincerely,    Anne Yousif MD

## 2019-08-27 NOTE — LETTER
2019      RE: Kathy Lei   [address] [place] [address]     Aug 27, 2019      RE: Kathy Lei  (: 1965)    Dear Dr. Padilla:    Thank you for seeing Kathy Lei regarding right breast cancer.  Please find a copy of my notes for your record and review.  If you have any further questions, please feel free to contact my office.      Sincerely,   Anne Yousif MD MSc [facility] FACS    ---    FOLLOW-UP  Aug 27, 2019    Kathy Lei is a 53 year old female who returns for her 1st post-operative follow-up visit.    Cancer Staging  Malignant neoplasm of upper-outer quadrant of right breast in female, estrogen receptor positive (H)  Staging form: Breast, AJCC 8th Edition  - Clinical: Stage IIA (cT3(m), cN0, cM0, G2, ER: Positive, [place]: Positive, HER2: Negative) - Signed by Christian Guzman MD on 2019    Treatment to date:  1. Enrollment onto I-SPY2   2. Neoadjuvant durvalumab, taxol, olaparib (2019 to 2019)  3. Neoadjuvant dose dense adriamycin and cyclophosphamide (2019 to 2019)  4. Right skin-sparing mastectomy, right axillary sentinel lymph node biopsy, left prophylactic skin-sparing mastectomy, bilateral removal of breast implants, left vascular access port removal (8/15/2019)    HPI:    She underwent bilateral skin-sparing mastectomy, right axillary sentinel lymph node biopsy, bilateral removal of breast implants on 8/15//2019.  She is currently  2 week(s) post-op.  Final surgical pathology showed residual T3N1mi invasive ductal carcinoma, with uninvolved margins, and 1/2 lymph nodes with micrometastases and 1/2 lymph nodes with isolated tumor cells.  Of note, during the surgery, seromas around her breast implants were seen. The fluid was separately sampled and sent for cytology, and the entire implant capsule was excised bilaterally.    Since the procedure, she has been doing well. She denies any redness or swelling, or drainage from  the incisions, or fever/chills.  She has good range of motion and denies any upper extremity swelling.  Her drains were removed by Dr Ramos today.    LMP 11/02/2014    Physical Exam   Constitutional: She appears well-developed and well-nourished.   Pulmonary/Chest: No respiratory distress.   Bilateral surgical absence of breasts. Incisions well healed. Skin flaps healthy. Drains already removed.   Lymphadenopathy:   No lymphedema in bilateral upper extremities.    Skin: Skin is warm and dry.     INVESTIGATIONS:    Surgical Pathology (8/15/2019):  FINAL DIAGNOSIS:   A. MEDICAL DEVICE, REMOVAL:   - Breast implant, gross examination only (see gross description for details).   B. MEDICAL DEVICE, REMOVAL:   - Vascular access port, gross examination only (see gross description for details).   C. LEFT BREAST AND CAPSULE, SIMPLE MASTECTOMY AND CAPSULECTOMY:   - Benign breast tissue, skeletal muscle, skin and nipple.   - Fibroadenomatoid change.   - Fibrosis consistent with implant capsule.   - Calcifications associated with implant capsule and benign ducts.   D. MEDICAL DEVICE, REMOVAL:   - Breast implant, gross examination only (see gross description for details).   E. SENTINEL LYMPH NODE, RIGHT AXILLARY #1, EXCISION:   - MICROMETASTATIC CARCINOMA in one lymph node (1/1).   - Metastatic carcinoma is 1 mm in greatest dimension and cells appear confined to lymphatic spaces in the node capsule (considered metastatic site in accordance to AJCC guidelines).   - No extranodal extension identified.   F. SENTINEL LYMPH NODE, RIGHT AXILLARY #2, EXCISION:   - ISOLATED TUMOR CELLS (one focus <0.2 mm) in one lymph node (0/1, i+).   - No extranodal extension identified.   G. RIGHT BREAST AND CAPSULE, SIMPLE MASTECTOMY AND CAPSULECTOMY:   - INVASIVE MUCINOUS CARCINOMA, Donna grade 2, spanning at least 10.2 cm in linear extent, residual after neoadjuvant chemotherapy.   - Focal ductal carcinoma in-situ (DCIS), high nuclear grade,  solid type.   - Extensive angiolymphatic invasion present.   - Margins are negative for carcinoma, invasive carcinoma is 1.5 mm from the anterior-superior margin and 6 mm from the anterior-inferior margin.   - Benign skin, nipple and skeletal muscle; focal deep dermal lymphovascular invasion is present.   - Calcifications associated with DCIS and benign ducts.   - Fibrosis consistent with implant capsule.   - Pathologic stage (AJCC 8th Ed.): ypT3 N1mi(sn).   - See synoptic report.   COMMENT:   Invasive carcinoma is present as clusters of tumor cells and single tumor cells, size 10.2 x 9.8 cm.  The invasive carcinoma shows morphology compatible with treatment related changes including cytoplasmic vacuolation.  The residual tumor cellularity is estimated at approx. 20%. DCIS is focal and minimal (1%). One lymph node is involved by metastatic carcinoma, and another lymph node has  isolated tumor cells.   The [facility] residual cancer burden is calculated as 3.425 (class RCB-III).     ASSESSMENT:    Kathy Lei is a 53 year old female with right breast cancer, s/p neoadjuvant chemotherapy, and now surgery.    Her stage is:  Cancer Staging  Malignant neoplasm of upper-outer quadrant of right breast in female, estrogen receptor positive (H)  Staging form: Breast, AJCC 8th Edition  - Clinical: Stage IIA (cT3(m), cN0, cM0, G2, ER: Positive, [place]: Positive, HER2: Negative) - Signed by Christian Guzman MD on 1/28/2019  - Pathologic: No Stage Recommended (ypT3, pN1mi(sn), cM0, G2, ER+, [place]+, HER2-) - Signed by Anne Yousif MD on 8/27/2019     She is doing well.  Her skin flaps are healthy.  I encouraged her to continue to stretch her shoulders and work on her range of motion.    We reviewed the pathology today and a copy of the report was provided. She does have micrometastasis in her node.  I am recommending adjuvant radiation therapy. We did review that there is likely no additional survival benefit  to a completion lymph node dissection in this setting and I have not recommended this. She will also follow up with Dr Guzman next week to discuss possible adjuvant systemic therapy given the residual disease, whether she should received additional systemic therapy prior to radiation therapy.    All of the above was discussed with the patient and all questions were answered.     PLAN:  1. Work on range of motion  2. Radiation oncology referral  3. Follow up with Dr Guzman next week  4. Follow up with Dr Ramos next week  5. Follow up with me in 3 months    Anne Yousif MD MSc [facility] FACS    Division of Surgical Oncology  [facility]

## 2019-08-27 NOTE — PROGRESS NOTES
FOLLOW-UP  Aug 27, 2019    Kathy Lei is a 53 year old female who returns for her 1st post-operative follow-up visit.    Cancer Staging  Malignant neoplasm of upper-outer quadrant of right breast in female, estrogen receptor positive (H)  Staging form: Breast, AJCC 8th Edition  - Clinical: Stage IIA (cT3(m), cN0, cM0, G2, ER: Positive, [place]: Positive, HER2: Negative) - Signed by Christian Guzman MD on 1/28/2019      Treatment to date:  1. Enrollment onto I-SPY2   2. Neoadjuvant durvalumab, taxol, olaparib (2/25/2019 to 5/14/2019)  3. Neoadjuvant dose dense adriamycin and cyclophosphamide (5/20/2019 to 7/2/2019)  4. Right skin-sparing mastectomy, right axillary sentinel lymph node biopsy, left prophylactic skin-sparing mastectomy, bilateral removal of breast implants, left vascular access port removal (8/15/2019)    HPI:    She underwent bilateral skin-sparing mastectomy, right axillary sentinel lymph node biopsy, bilateral removal of breast implants on 8/15//2019.  She is currently  2 week(s) post-op.  Final surgical pathology showed residual T3N1mi invasive ductal carcinoma, with uninvolved margins, and 1/2 lymph nodes with micrometastases and 1/2 lymph nodes with isolated tumor cells.  Of note, during the surgery, seromas around her breast implants were seen. The fluid was separately sampled and sent for cytology, and the entire implant capsule was excised bilaterally.    Since the procedure, she has been doing well. She denies any redness or swelling, or drainage from the incisions, or fever/chills.  She has good range of motion and denies any upper extremity swelling.  Her drains were removed by Dr Ramos today.    LMP 11/02/2014    Physical Exam   Constitutional: She appears well-developed and well-nourished.   Pulmonary/Chest: No respiratory distress.   Bilateral surgical absence of breasts. Incisions well healed. Skin flaps healthy. Drains already removed.   Lymphadenopathy:   No lymphedema  in bilateral upper extremities.    Skin: Skin is warm and dry.     INVESTIGATIONS:    Surgical Pathology (8/15/2019):  FINAL DIAGNOSIS:   A. MEDICAL DEVICE, REMOVAL:   - Breast implant, gross examination only (see gross description for details).   B. MEDICAL DEVICE, REMOVAL:   - Vascular access port, gross examination only (see gross description for details).   C. LEFT BREAST AND CAPSULE, SIMPLE MASTECTOMY AND CAPSULECTOMY:   - Benign breast tissue, skeletal muscle, skin and nipple.   - Fibroadenomatoid change.   - Fibrosis consistent with implant capsule.   - Calcifications associated with implant capsule and benign ducts.   D. MEDICAL DEVICE, REMOVAL:   - Breast implant, gross examination only (see gross description for details).   E. SENTINEL LYMPH NODE, RIGHT AXILLARY #1, EXCISION:   - MICROMETASTATIC CARCINOMA in one lymph node (1/1).   - Metastatic carcinoma is 1 mm in greatest dimension and cells appear confined to lymphatic spaces in the node capsule (considered metastatic site in accordance to AJCC guidelines).   - No extranodal extension identified.   F. SENTINEL LYMPH NODE, RIGHT AXILLARY #2, EXCISION:   - ISOLATED TUMOR CELLS (one focus <0.2 mm) in one lymph node (0/1, i+).   - No extranodal extension identified.   G. RIGHT BREAST AND CAPSULE, SIMPLE MASTECTOMY AND CAPSULECTOMY:   - INVASIVE MUCINOUS CARCINOMA, [place] grade 2, spanning at least 10.2 cm in linear extent, residual after neoadjuvant chemotherapy.   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   - Extensive angiolymphatic invasion present.   - Margins are negative for carcinoma, invasive carcinoma is 1.5 mm from the anterior-superior margin and 6 mm from the anterior-inferior margin.   - Benign skin, nipple and skeletal muscle; focal deep dermal lymphovascular invasion is present.   - Calcifications associated with DCIS and benign ducts.   - Fibrosis consistent with implant capsule.   - Pathologic stage (AJCC  8th Ed.): ypT3 N1mi(sn).   - See synoptic report.   COMMENT:   Invasive carcinoma is present as clusters of tumor cells and single tumor cells, size 10.2 x 9.8 cm.  The invasive carcinoma shows morphology compatible with treatment related changes including cytoplasmic vacuolation.  The residual tumor cellularity is estimated at approx. 20%. DCIS is focal and minimal (1%). One lymph node is involved by metastatic carcinoma, and another lymph node has  isolated tumor cells.   The [facility] residual cancer burden is calculated as 3.425 (class RCB-III).     ASSESSMENT:    Kathy Lei is a 53 year old female with right breast cancer, s/p neoadjuvant chemotherapy, and now surgery.    Her stage is:  Cancer Staging  Malignant neoplasm of upper-outer quadrant of right breast in female, estrogen receptor positive (H)  Staging form: Breast, AJCC 8th Edition  - Clinical: Stage IIA (cT3(m), cN0, cM0, G2, ER: Positive, [place]: Positive, HER2: Negative) - Signed by Christian Guzman MD on 1/28/2019  - Pathologic: No Stage Recommended (ypT3, pN1mi(sn), cM0, G2, ER+, [place]+, HER2-) - Signed by Anne Yousif MD on 8/27/2019     She is doing well.  Her skin flaps are healthy.  I encouraged her to continue to stretch her shoulders and work on her range of motion.    We reviewed the pathology today and a copy of the report was provided. She does have micrometastasis in her node.  I am recommending adjuvant radiation therapy. We did review that there is likely no additional survival benefit to a completion lymph node dissection in this setting and I have not recommended this. She will also follow up with Dr Guzman next week to discuss possible adjuvant systemic therapy given the residual disease, whether she should received additional systemic therapy prior to radiation therapy.    All of the above was discussed with the patient and all questions were answered.     PLAN:  1. Work on range of motion  2. Radiation  oncology referral  3. Follow up with Dr Guzman next week  4. Follow up with Dr Ramos next week  5. Follow up with me in 3 months    Anne Yousif MD MSc FRCSC FACS    Division of Surgical Oncology  [facility]

## 2019-08-27 NOTE — PROGRESS NOTES
POSTOPERATIVE VISIT NOTE      PRESENTING COMPLAINT:  Postoperative visit status post bilateral breast reconstruction after undergoing bilateral nipple non-sparing mastectomy for left-sided breast cancer with subcutaneous expander placements on 08/15/2019.      HISTORY OF PRESENTING COMPLAINT:  Ms. Lei is 53 years old.  She is about 10 days out from surgery.  Done very well at home.  No major issues.  APOLLO drainage is about 30 mL a day.  She is going to need radiation therapy.      PHYSICAL EXAMINATION:     VITALS SIGNS:  Stable.  She is afebrile, in no obvious distress.   CHEST:  Both breasts are healing in well.  No evidence of infection, seroma or hematoma.      ASSESSMENT AND PLAN:  Based on above findings, a diagnosis of bilateral breast reconstruction for left-sided breast cancer was made.  Plan is to start moisturizing her breasts.  APLOLO drains removed.  Advised aggressive moisturization.  Will see her back weekly.  We will start expansion next week and then hopefully get her expanded prior to radiation therapy and then plan the LIVIER flaps after the radiation therapy.  This was explained.  She understood.  All questions were answered.  She was happy with the visit.

## 2019-08-27 NOTE — LETTER
8/27/2019       RE: Kathy Lei  [address] [place] [address]     Dear Colleague,    Thank you for referring your patient, Kathy Lei, to the [facility] BREAST CENTER at [facility]. Please see a copy of my visit note below.    POSTOPERATIVE VISIT NOTE      PRESENTING COMPLAINT:  Postoperative visit status post bilateral breast reconstruction after undergoing bilateral nipple non-sparing mastectomy for left-sided breast cancer with subcutaneous expander placements on 08/15/2019.      HISTORY OF PRESENTING COMPLAINT:  Ms. Lei is 53 years old.  She is about 10 days out from surgery.  Done very well at home.  No major issues.  APOLLO drainage is about 30 mL a day.  She is going to need radiation therapy.      PHYSICAL EXAMINATION:     VITALS SIGNS:  Stable.  She is afebrile, in no obvious distress.   CHEST:  Both breasts are healing in well.  No evidence of infection, seroma or hematoma.      ASSESSMENT AND PLAN:  Based on above findings, a diagnosis of bilateral breast reconstruction for left-sided breast cancer was made.  Plan is to start moisturizing her breasts.  APOLLO drains removed.  Advised aggressive moisturization.  Will see her back weekly.  We will start expansion next week and then hopefully get her expanded prior to radiation therapy and then plan the LIVIER flaps after the radiation therapy.  This was explained.  She understood.  All questions were answered.  She was happy with the visit.     Again, thank you for allowing me to participate in the care of your patient.      Sincerely,    LALY Ramos MD

## 2019-08-27 NOTE — Clinical Note
8/27/2019      RE: Kathy Lei  [address] [place] [address]       FOLLOW-UP  Aug 27, 2019    Kathy Lei is a 53 year old female who returns for her 1st post-operative follow-up visit.    Cancer Staging  Malignant neoplasm of upper-outer quadrant of right breast in female, estrogen receptor positive (H)  Staging form: Breast, AJCC 8th Edition  - Clinical: Stage IIA (cT3(m), cN0, cM0, G2, ER: Positive, RI: Positive, HER2: Negative) - Signed by Christian Guzman MD on 1/28/2019      Treatment to date:  1. Enrollment onto I-SPY2   2. Neoadjuvant durvalumab, taxol, olaparib (2/25/2019 to 5/14/2019)  3. Neoadjuvant dose dense adriamycin and cyclophosphamide (5/20/2019 to 7/2/2019)  4. Right skin-sparing mastectomy, right axillary sentinel lymph node biopsy, left prophylactic skin-sparing mastectomy, bilateral removal of breast implants, left vascular access port removal (8/15/2019)    HPI:    She underwent bilateral skin-sparing mastectomy, right axillary sentinel lymph node biopsy, bilateral removal of breast implants on 8/15//2019.  She is currently  2 week(s) post-op.  Final surgical pathology showed residual T3N1mi invasive ductal carcinoma, with uninvolved margins, and 1/2 lymph nodes with micrometastases and 1/2 lymph nodes with isolated tumor cells.  Of note, during the surgery, seromas around her breast implants were seen. The fluid was separately sampled and sent for cytology, and the entire implant capsule was excised bilaterally.    Since the procedure, she has been doing well. She denies any redness or swelling, or drainage from the incisions, or fever/chills.  She has good range of motion and denies any upper extremity swelling.  Her drains were removed by Dr Ramos today.    LMP 11/02/2014    Physical Exam   Constitutional: She appears well-developed and well-nourished.   Pulmonary/Chest: No respiratory distress.   Bilateral surgical absence of breasts. Incisions  well healed. Skin flaps healthy. Drains already removed.   Lymphadenopathy:   No lymphedema in bilateral upper extremities.    Skin: Skin is warm and dry.     INVESTIGATIONS:    Surgical Pathology (8/15/2019):  FINAL DIAGNOSIS:   A. MEDICAL DEVICE, REMOVAL:   - Breast implant, gross examination only (see gross description for details).   B. MEDICAL DEVICE, REMOVAL:   - Vascular access port, gross examination only (see gross description for details).   C. LEFT BREAST AND CAPSULE, SIMPLE MASTECTOMY AND CAPSULECTOMY:   - Benign breast tissue, skeletal muscle, skin and nipple.   - Fibroadenomatoid change.   - Fibrosis consistent with implant capsule.   - Calcifications associated with implant capsule and benign ducts.   D. MEDICAL DEVICE, REMOVAL:   - Breast implant, gross examination only (see gross description for details).   E. SENTINEL LYMPH NODE, RIGHT AXILLARY #1, EXCISION:   - MICROMETASTATIC CARCINOMA in one lymph node (1/1).   - Metastatic carcinoma is 1 mm in greatest dimension and cells appear confined to lymphatic spaces in the node capsule (considered metastatic site in accordance to AJCC guidelines).   - No extranodal extension identified.   F. SENTINEL LYMPH NODE, RIGHT AXILLARY #2, EXCISION:   - ISOLATED TUMOR CELLS (one focus <0.2 mm) in one lymph node (0/1, i+).   - No extranodal extension identified.   G. RIGHT BREAST AND CAPSULE, SIMPLE MASTECTOMY AND CAPSULECTOMY:   - INVASIVE MUCINOUS CARCINOMA, Donna grade 2, spanning at least 10.2 cm in linear extent, residual after neoadjuvant chemotherapy.   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   - Extensive angiolymphatic invasion present.   - Margins are negative for carcinoma, invasive carcinoma is 1.5 mm from the anterior-superior margin and 6 mm from the anterior-inferior margin.   - Benign skin, nipple and skeletal muscle; focal deep dermal lymphovascular invasion is present.   - Calcifications associated with DCIS  and benign ducts.   - Fibrosis consistent with implant capsule.   - Pathologic stage (AJCC 8th Ed.): ypT3 N1mi(sn).   - See synoptic report.   COMMENT:   Invasive carcinoma is present as clusters of tumor cells and single tumor cells, size 10.2 x 9.8 cm.  The invasive carcinoma shows morphology compatible with treatment related changes including cytoplasmic vacuolation.  The residual tumor cellularity is estimated at approx. 20%. DCIS is focal and minimal (1%). One lymph node is involved by metastatic carcinoma, and another lymph node has  isolated tumor cells.   The [facility] residual cancer burden is calculated as 3.425 (class RCB-III).     ASSESSMENT:    Kathy Lei is a 53 year old female with right breast cancer, s/p neoadjuvant chemotherapy, and now surgery.    Her stage is:  Cancer Staging  Malignant neoplasm of upper-outer quadrant of right breast in female, estrogen receptor positive (H)  Staging form: Breast, AJCC 8th Edition  - Clinical: Stage IIA (cT3(m), cN0, cM0, G2, ER: Positive, WI: Positive, HER2: Negative) - Signed by Christian Guzman MD on 1/28/2019  - Pathologic: No Stage Recommended (ypT3, pN1mi(sn), cM0, G2, ER+, WI+, HER2-) - Signed by Anne Yousif MD on 8/27/2019     She is doing well.  Her skin flaps are healthy.  I encouraged her to continue to stretch her shoulders and work on her range of motion.    We reviewed the pathology today and a copy of the report was provided. She does have micrometastasis in her node.  I am recommending adjuvant radiation therapy. We did review that there is likely no additional survival benefit to a completion lymph node dissection in this setting and I have not recommended this. She will also follow up with Dr Guzman next week to discuss possible adjuvant systemic therapy given the residual disease, whether she should received additional systemic therapy prior to radiation therapy.    All of the above was discussed with the patient  and all questions were answered.     PLAN:  1. Work on range of motion  2. Radiation oncology referral  3. Follow up with Dr Guzman next week  4. Follow up with Dr Ramos next week  5. Follow up with me in 3 months    Anne Yousif MD MSc [facility] FACS    Division of Surgical Oncology  [facility]     Anne Yousif MD

## 2019-08-27 NOTE — NURSING NOTE
"Oncology Rooming Note    August 27, 2019 12:09 PM   Kathy Lei is a 53 year old female who presents for:    Chief Complaint   Patient presents with     Oncology Clinic Visit     [facility] POST OP - BREAST CA     Initial Vitals: /80 (BP Location: Right arm, Patient Position: Chair, Cuff Size: Adult Regular)   Pulse 85   Temp 97.8  F (36.6  C) (Oral)   Resp 14   Ht 1.676 m (5' 5.98\")   Wt 63.3 kg (139 lb 8.8 oz)   LMP 11/02/2014   SpO2 96%   BMI 22.54 kg/m   Estimated body mass index is 22.54 kg/m  as calculated from the following:    Height as of this encounter: 1.676 m (5' 5.98\").    Weight as of this encounter: 63.3 kg (139 lb 8.8 oz). Body surface area is 1.72 meters squared.  Moderate Pain (4) Comment: Data Unavailable   Patient's last menstrual period was 11/02/2014.  Allergies reviewed: Yes  Medications reviewed: Yes    Medications: Medication refills not needed today.  Pharmacy name entered into [facility]:    [facility] - [place], [place] - [address] [place] [address] [facility]ORE #[id number] - [place], [place] - [address] AT [facility] OF [address]    Clinical concerns: No new concerns. Nica was notified.      Shakir Skaggs LPN            "

## 2019-09-02 NOTE — PROGRESS NOTES
HPI: Kathy Lei is a 52 yo female with a new diagnosis of an estrogen-receptor positive, GA-positive, HER2-negative breast cancer, grade 2, in the upper outer quadrant of the right breast since the end of year 2018.      Mily presented between Christmas and New Years of 2018 with new sharp pains in the upper outer quadrant of the right breast.  She underwent mammographic screening.       IMAGING:  Mammography and ultrasound:  She had a diagnostic mammogram which showed bilateral prepectoral saline implants.  On the right breast at 11:30 position, there were grouped coarse heterogeneous calcifications spanning 1.3 cm, new since 2014, adjacent calcifications 11-o'clock position posterior depth.  There was an irregular mass in the lateral right breast 9-o'clock position mid-posterior depth, and an additional mass with obscured margins.  No significant changes in the left breast.  Right breast ultrasound was performed.  In the area of the palpable lump in the right breast, 11-o'clock position, 6 cm from the nipple, there is an irregular hypoechoic mass with indistinct margins measuring approximately 1.0 x 0.9 x 1.6 cm in size.  Immediately adjacent to the mass, 11:30 position, are microcalcifications.  In the second area of palpable lump right breast, 9:30 position, 5 cm from the nipple, there was an irregular hypoechoic mass measuring 3.2 x 0.5 x 1.3 cm in size felt to account for the mammographic findings.  There were multiple additional round hypoechoic masses similar to the findings at 9:30 position seen incidentally during real time and not directly palpable.  For example, in the right breast at 8-o'clock position, 4 cm from the nipple, there was a mass measuring 0.8 x 0.6 x 0.6 cm, BI-RADS 4.       Contrast mammogram was subsequently performed and the contrast mammogram showed a large area of mass and non-mass-like enhancement in the upper outer right breast measuring 7.2 cm in greatest  dimension, corresponding global asymmetry in the upper outer right breast.  Enhancement extended to the implant without evidence of extension to the skin surface or nipple, and the calcifications were noted as previously found.      PATHOLOGY:  The pathology showed part A, breast needle biopsy 11 o'clock, 6 cm from the nipple, invasive mammary carcinoma, no special type, ductal (and mucinous) Roug[place]ont grade 2.  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  Calcifications were associated with the DCIS.  There was a focus suspicious for lymphovascular invasion.  Invasive carcinoma was estrogen receptor positive and progesterone receptor negative by immunohistochemistry.  In part B, breast right, 8 o'clock, 4 cm from the nipple, ultrasound-guided core biopsy, invasive mammary carcinoma, no special type, ductal (and mucinous) Roug[place]ont grade 2, occasional calcifications were noted.  Invasive carcinoma was estrogen receptor positive and progesterone receptor negative by immunohistochemistry.  On quantitation of the ER and NH, ER was positive 99% of the cells staining with strong intensity.  NH was subsequently noted to be positive with 15% of the cells staining with moderate intensity.       There was also a HER2 FISH performed, and the HER2 FISH showed average number of HER2 signals per nucleus 2.6.  Average number of CEN17 signals 1.7 with a ratio of 1.6, VASILIY negative for biopsy A.  For biopsy B, the HER2 signals per nucleus 2.9.  Average number CEN17 signals per nucleus 1.7 with a ratio of 1.7, VASILIY negative.  Overall, by 2018 ASCO/CAP guidelines, this tumor is HER2 negative.     She was enrolled in I-SPY2 trial to Durvulumab, Taxol and Olaparib arm. She completed 12 cycles of weekly Taxol. She also completed 4 cycles  of adriamycin and cyclophosphamide (AC).     PAST MEDICAL HISTORY:  In terms of her breast history, she does have a history of a smaller right breast which was  treated with implants 19 years ago.  She has a history of 2 papillomas in the right breast, 1 removed at the 6-o'clock position 5 years ago, another removed at the 6-o'clock position approximately 10 years ago.  These incisions are approximately at the 5:30 to 6-o'clock position.  She has no history of radiation therapy.  No history of breast cancer of any type.  No history of tumor of any kind.  No history of heart problems, heart attack, breathing problems, blood clots, seizures, stroke, arthritis, peptic ulcer disease or osteoporosis.  No history of bone fractures.  She is not currently participating in a clinical trial. She does have a history of asthma and a history of hypothyroidism.      FAMILY HISTORY:  Entirely negative for breast cancer or ovarian cancer or breast cancer in a male relative.      ALLERGIES:  No known drug allergies.  No allergy to seafood, iodine or contrast dye.  She does not take aspirin.      PAST MENSTRUAL HISTORY:  First period was at age 11.  First and only pregnancy was at age 26.  No miscarriages or abortions.  Occasional use of oral contraceptives in her 20s.  No history of hormone replacement therapy.  Last period was 3 years ago.      SOCIAL HISTORY:  Tobacco history was from age 15 to present, smoking a pack of cigarettes a week.  She is now trying to stop cigarettes.   In terms of alcohol use, in her early teens and early 20s, she drank approximately 10 drinks on the weekend and has tapered off drinking since then.      INTERVAL HISTORY  Mily Lei returns to clinic with her  to discuss the finding of an RCB3 residual cancer burden of her resected right breast cancer.  The tumor measured at least 10.2 cm in curvilinear shape after neoadjuvant chemotherapy.  The tumor was invasive mucinous carcinoma, Donna grade 2.  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  Extensive angiolymphatic invasion was present.  Margins were negative  for carcinoma.  Invasive carcinoma was 1.5 mm from the anterior superior margin and 6 mm from the anterior inferior margin.  There was benign skin, nipple, and skeletal muscle.  Focal deep dermal lymphovascular invasion was present.  Calcifications were present in the DCIS.  Pathologic stage was [id number] sn.  Overall, the [facility] residual cancer burden was calculated as 3.425 class RCB3.      Mily has recovered from her surgery.  She has bilateral saline expanders in place which are not expanded.  She comes to us to discuss adjuvant treatment options.      REVIEW OF SYSTEMS:  A 10-point review of systems is entirely negative except for some minor discomfort associated with her surgery.      PHYSICAL EXAMINATION:   VITAL SIGNS:  Blood pressure 115/75, pulse 76, respirations 16, temperature 97.4, O2 sat 98% on room air, weight 63.6 kg, height 1.7 meters and pain score moderate around the expanders.   GENERAL:  Mily appeared generally well.  She has alopecia and is wearing a hat.   HEENT:  No lesions in the oropharynx.   LYMPH:  There is no palpable cervical, supraclavicular, subclavicular or axillary lymphadenopathy.   BREASTS:  Examination of the right anterior chest wall reveals a well-healed incision with a Steri-Strip overlying an uninflated expander.  Left chest wall incision is well healed without erythema or masses.  Underlying expander.  Steri-Strip in place.  No masses in the anterior chest wall bilaterally.   LUNGS:  Clear to percussion and auscultation.   HEART:  Regular rate and rhythm, S1, S2.   ABDOMEN:  Soft and nontender, without hepatosplenomegaly.   EXTREMITIES:  Without edema.   PSYCHIATRIC:  Mood and affect were normal.      LABORATORY DATA:  CBC and CMP were entirely normal.      There were leukemia, lymphoma markers which were done from the right implant capsule seroma and showed polytypic B-cells, no aberrant immunophenotype on T-cells.  She also had left implant seroma was tested and  polytypic B-cells were noted.  No aberrant immunophenotype on the T-cells were noted.      Surgical pathology showed in the left implant capsule fluid negative for carcinoma.  The cytology of the right implant capsule fluid was negative for carcinoma.  The surgical pathology result showed RCB3 tumor with extensive lymphovascular invasion.  Margins were negative.  Details are noted above.  The lymph nodes showed 2 sentinel lymph nodes.  One lymph node showed largest metastatic deposit 1 mm.  Extranodal extension was not identified.  A second sentinel node showed micrometastases.  Notably, in terms of treatment effect in the breast, there was probable or definite response to presurgical therapy noted and no definite response to presurgical therapy in the metastatic carcinoma in the lymph nodes.  The margins were uninvolved with the closest margin at 1.5 mm for invasive carcinoma and greater than 10 mm for the DCIS.  The tumor was overall grade 2 RCB3, [id number].      ASSESSMENT AND PLAN:    1.  Mily Lei is a 53-year-old woman with a diagnosis of clinical stage II, ER-positive, HER-2 negative,T3N0MX, invasive ductal carcinoma of the upper outer quadrant of the right breast, which was multifocal.  The largest area of involvement was 8.9 cm on the original MRI.  She was treated on the I-SPY 2 clinical trial with durvalumab,  paclitaxel and olaparib.  She had grade 2 fatigue.  She went on to have a response measured on her MRI.  She then completed 4 cycles of dose-dense AC. She then had bilateral mastectomy.  The margins were negative, but there is an RCB3 result.  I discussed with Mily and her  that this represents a possible 50-55% risk of recurrence by 5 years, and I discussed that I do recommend further adjuvant therapy.    2.  Discussion of adjuvant options.  We discussed the SELIN trial and to Mily's mind and her 's one of the negative aspects of the SELIN trial for her is that she did  not want to be randomized to a control arm, although this is an unblinded trial with a 2:1 randomization to ribociclib and hormonal therapy versus placebo and hormonal therapy.  She did not want to be randomized to the control arm.  3.  I discussed the CREATE-X approach as an alternative with her and gave her a copy of the Josephine et al paper ([facility] 2017).  This paper has some criticism but it may be her best option.  Although patients with hormone receptor positive breast cancer were not shown as a subgroup to have benefit from the CREATE-X approach, there was benefit to the whole population of HER2 negative breast cancer with a significant residual disease burden.  She would prefer to go with adjuvant capecitabine as given on the CREATE-X trial for 18 or 24 weeks.  We would need to talk with Dr. Rut Padilla on how this would be integrated with her postoperative radiation.  Postoperative radiation would be indicated in this setting because of the large size of the primary tumor, but I defer to Dr. Rut Padilla on this regimen.   4.  Discussion of prognosis.  Mily and her  were very disappointed with the RCB3 result, as are we.  I did discuss with them that there is the opportunity of giving further adjuvant treatment and am hopeful that it will be helpful.  I discussed that adjuvant treatment could consist of capecitabine, 10 years of hormonal therapy with an aromatase inhibitor and possibly we could consider zoledronic acid per the ABCSG-12 study.  This was a tearful conversation.    5.  Mily will be returning to clinic on Tuesday to meet with Kerry for an end of I-SPY trial visit, and I will plan on meeting with her at that time as well with a plan after having discussed the treatment plan with Dr. Yousif and Dr. Padilla.   6.  Follow up.  Follow up with Kerry September 10 and with me September 19 with CBC, CMP. Consult with Dr. Rut Padilla this week or next.     Thank you for allowing us to continue to  participate in Mily Lei's care.      Christian Guzman MD      [facility]    I spent 60 minutes with the patient more than 50% of which was in counseling and coordination of care.

## 2019-09-03 ENCOUNTER — APPOINTMENT (OUTPATIENT)
Dept: LAB | Facility: CLINIC | Age: 54
End: 2019-09-03
Attending: INTERNAL MEDICINE
Payer: COMMERCIAL

## 2019-09-03 ENCOUNTER — RESEARCH ENCOUNTER (OUTPATIENT)
Dept: ONCOLOGY | Facility: CLINIC | Age: 54
End: 2019-09-03

## 2019-09-03 ENCOUNTER — ONCOLOGY VISIT (OUTPATIENT)
Dept: ONCOLOGY | Facility: CLINIC | Age: 54
End: 2019-09-03
Attending: INTERNAL MEDICINE
Payer: COMMERCIAL

## 2019-09-03 VITALS
BODY MASS INDEX: 22.53 KG/M2 | DIASTOLIC BLOOD PRESSURE: 75 MMHG | OXYGEN SATURATION: 98 % | SYSTOLIC BLOOD PRESSURE: 115 MMHG | TEMPERATURE: 97.4 F | RESPIRATION RATE: 16 BRPM | WEIGHT: 140.2 LBS | HEART RATE: 76 BPM | HEIGHT: 66 IN

## 2019-09-03 DIAGNOSIS — Z17.0 MALIGNANT NEOPLASM OF UPPER-OUTER QUADRANT OF RIGHT BREAST IN FEMALE, ESTROGEN RECEPTOR POSITIVE (H): ICD-10-CM

## 2019-09-03 DIAGNOSIS — C50.411 MALIGNANT NEOPLASM OF UPPER-OUTER QUADRANT OF RIGHT BREAST IN FEMALE, ESTROGEN RECEPTOR POSITIVE (H): ICD-10-CM

## 2019-09-03 LAB
ALBUMIN SERPL-MCNC: 3.9 G/DL (ref 3.4–5)
ALP SERPL-CCNC: 56 U/L (ref 40–150)
ALT SERPL W P-5'-P-CCNC: 45 U/L (ref 0–50)
ANION GAP SERPL CALCULATED.3IONS-SCNC: 5 MMOL/L (ref 3–14)
AST SERPL W P-5'-P-CCNC: 28 U/L (ref 0–45)
BASOPHILS # BLD AUTO: 0 10E9/L (ref 0–0.2)
BASOPHILS NFR BLD AUTO: 0.9 %
BILIRUB SERPL-MCNC: 0.2 MG/DL (ref 0.2–1.3)
BUN SERPL-MCNC: 7 MG/DL (ref 7–30)
CALCIUM SERPL-MCNC: 9 MG/DL (ref 8.5–10.1)
CHLORIDE SERPL-SCNC: 106 MMOL/L (ref 94–109)
CO2 SERPL-SCNC: 28 MMOL/L (ref 20–32)
CREAT SERPL-MCNC: 0.6 MG/DL (ref 0.52–1.04)
DIFFERENTIAL METHOD BLD: NORMAL
EOSINOPHIL # BLD AUTO: 0.2 10E9/L (ref 0–0.7)
EOSINOPHIL NFR BLD AUTO: 3.3 %
ERYTHROCYTE [DISTWIDTH] IN BLOOD BY AUTOMATED COUNT: 13.7 % (ref 10–15)
GFR SERPL CREATININE-BSD FRML MDRD: >90 ML/MIN/{1.73_M2}
GLUCOSE SERPL-MCNC: 89 MG/DL (ref 70–99)
HCT VFR BLD AUTO: 38.4 % (ref 35–47)
HGB BLD-MCNC: 12.1 G/DL (ref 11.7–15.7)
IMM GRANULOCYTES # BLD: 0 10E9/L (ref 0–0.4)
IMM GRANULOCYTES NFR BLD: 0 %
LYMPHOCYTES # BLD AUTO: 1.2 10E9/L (ref 0.8–5.3)
LYMPHOCYTES NFR BLD AUTO: 25 %
MCH RBC QN AUTO: 28.5 PG (ref 26.5–33)
MCHC RBC AUTO-ENTMCNC: 31.5 G/DL (ref 31.5–36.5)
MCV RBC AUTO: 90 FL (ref 78–100)
MONOCYTES # BLD AUTO: 0.4 10E9/L (ref 0–1.3)
MONOCYTES NFR BLD AUTO: 7.6 %
NEUTROPHILS # BLD AUTO: 2.9 10E9/L (ref 1.6–8.3)
NEUTROPHILS NFR BLD AUTO: 63.2 %
NRBC # BLD AUTO: 0 10*3/UL
NRBC BLD AUTO-RTO: 0 /100
PLATELET # BLD AUTO: 272 10E9/L (ref 150–450)
POTASSIUM SERPL-SCNC: 3.5 MMOL/L (ref 3.4–5.3)
PROT SERPL-MCNC: 7.2 G/DL (ref 6.8–8.8)
RBC # BLD AUTO: 4.25 10E12/L (ref 3.8–5.2)
SODIUM SERPL-SCNC: 138 MMOL/L (ref 133–144)
TSH SERPL DL<=0.005 MIU/L-ACNC: 3.31 MU/L (ref 0.4–4)
WBC # BLD AUTO: 4.6 10E9/L (ref 4–11)

## 2019-09-03 PROCEDURE — 80053 COMPREHEN METABOLIC PANEL: CPT | Performed by: INTERNAL MEDICINE

## 2019-09-03 PROCEDURE — 84443 ASSAY THYROID STIM HORMONE: CPT | Performed by: INTERNAL MEDICINE

## 2019-09-03 PROCEDURE — 36415 COLL VENOUS BLD VENIPUNCTURE: CPT

## 2019-09-03 PROCEDURE — 85025 COMPLETE CBC W/AUTO DIFF WBC: CPT | Performed by: INTERNAL MEDICINE

## 2019-09-03 PROCEDURE — G0463 HOSPITAL OUTPT CLINIC VISIT: HCPCS | Mod: ZF,27

## 2019-09-03 PROCEDURE — 99215 OFFICE O/P EST HI 40 MIN: CPT | Mod: ZP | Performed by: INTERNAL MEDICINE

## 2019-09-03 ASSESSMENT — PAIN SCALES - GENERAL: PAINLEVEL: MODERATE PAIN (4)

## 2019-09-03 ASSESSMENT — MIFFLIN-ST. JEOR: SCORE: 1247.44

## 2019-09-03 NOTE — NURSING NOTE
"Oncology Rooming Note    September 3, 2019 5:18 PM   Kathy Lei is a 53 year old female who presents for:    Chief Complaint   Patient presents with     Blood Draw     labs drawn with vpt by rn.  vs taken     RECHECK     onc breast ca     Initial Vitals: /75 (BP Location: Right arm, Patient Position: Sitting, Cuff Size: Adult Regular)   Pulse 76   Temp 97.4  F (36.3  C) (Oral)   Resp 16   Ht 1.676 m (5' 5.98\")   Wt 63.6 kg (140 lb 3.2 oz)   LMP 11/02/2014   SpO2 98%   BMI 22.64 kg/m   Estimated body mass index is 22.64 kg/m  as calculated from the following:    Height as of this encounter: 1.676 m (5' 5.98\").    Weight as of this encounter: 63.6 kg (140 lb 3.2 oz). Body surface area is 1.72 meters squared.  Moderate Pain (4) Comment: Data Unavailable   Patient's last menstrual period was 11/02/2014.  Allergies reviewed: Yes  Medications reviewed: Yes    Medications: Medication refills not needed today.  Pharmacy name entered into [employer]:    [facility] - [place], [place] - [address] [id number] - [place], [place] - [address] AT [facility] OF [address]    Clinical concerns: none        Mary Lv, COLETTE              "

## 2019-09-03 NOTE — LETTER
9/3/2019       RE: Kathy Lei  [address] [place] [address]     Dear Colleague,    Thank you for referring your patient, Kathy Lei, to the [facility]. Please see a copy of my visit note below.    HPI: Kathy Lei is a 54 yo female with a new diagnosis of an estrogen-receptor positive, ND-positive, HER2-negative breast cancer, grade 2, in the upper outer quadrant of the right breast since the end of year 2018.      Mily presented between Christmas and New Years of 2018 with new sharp pains in the upper outer quadrant of the right breast.  She underwent mammographic screening.       IMAGING:  Mammography and ultrasound:  She had a diagnostic mammogram which showed bilateral prepectoral saline implants.  On the right breast at 11:30 position, there were grouped coarse heterogeneous calcifications spanning 1.3 cm, new since 2014, adjacent calcifications 11-o'clock position posterior depth.  There was an irregular mass in the lateral right breast 9-o'clock position mid-posterior depth, and an additional mass with obscured margins.  No significant changes in the left breast.  Right breast ultrasound was performed.  In the area of the palpable lump in the right breast, 11-o'clock position, 6 cm from the nipple, there is an irregular hypoechoic mass with indistinct margins measuring approximately 1.0 x 0.9 x 1.6 cm in size.  Immediately adjacent to the mass, 11:30 position, are microcalcifications.  In the second area of palpable lump right breast, 9:30 position, 5 cm from the nipple, there was an irregular hypoechoic mass measuring 3.2 x 0.5 x 1.3 cm in size felt to account for the mammographic findings.  There were multiple additional round hypoechoic masses similar to the findings at 9:30 position seen incidentally during real time and not directly palpable.  For example, in the right breast at 8-o'clock position, 4 cm from the nipple, there was a mass  measuring 0.8 x 0.6 x 0.6 cm, BI-RADS 4.       Contrast mammogram was subsequently performed and the contrast mammogram showed a large area of mass and non-mass-like enhancement in the upper outer right breast measuring 7.2 cm in greatest dimension, corresponding global asymmetry in the upper outer right breast.  Enhancement extended to the implant without evidence of extension to the skin surface or nipple, and the calcifications were noted as previously found.      PATHOLOGY:  The pathology showed part A, breast needle biopsy 11 o'clock, 6 cm from the nipple, invasive mammary carcinoma, no special type, ductal (and mucinous) Rawlings grade 2.  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  Calcifications were associated with the DCIS.  There was a focus suspicious for lymphovascular invasion.  Invasive carcinoma was estrogen receptor positive and progesterone receptor negative by immunohistochemistry.  In part B, breast right, 8 o'clock, 4 cm from the nipple, ultrasound-guided core biopsy, invasive mammary carcinoma, no special type, ductal (and mucinous) Rawlings grade 2, occasional calcifications were noted.  Invasive carcinoma was estrogen receptor positive and progesterone receptor negative by immunohistochemistry.  On quantitation of the ER and WA, ER was positive 99% of the cells staining with strong intensity.  WA was subsequently noted to be positive with 15% of the cells staining with moderate intensity.       There was also a HER2 FISH performed, and the HER2 FISH showed average number of HER2 signals per nucleus 2.6.  Average number of CEN17 signals 1.7 with a ratio of 1.6, VASILIY negative for biopsy A.  For biopsy B, the HER2 signals per nucleus 2.9.  Average number CEN17 signals per nucleus 1.7 with a ratio of 1.7, VASILIY negative.  Overall, by 2018 ASCO/CAP guidelines, this tumor is HER2 negative.     She was enrolled in I-SPY2 trial to Durvulumab, Taxol and  Olaparib arm. She completed 12 cycles of weekly Taxol. She also completed 4 cycles  of adriamycin and cyclophosphamide (AC).     PAST MEDICAL HISTORY:  In terms of her breast history, she does have a history of a smaller right breast which was treated with implants 19 years ago.  She has a history of 2 papillomas in the right breast, 1 removed at the 6-o'clock position 5 years ago, another removed at the 6-o'clock position approximately 10 years ago.  These incisions are approximately at the 5:30 to 6-o'clock position.  She has no history of radiation therapy.  No history of breast cancer of any type.  No history of tumor of any kind.  No history of heart problems, heart attack, breathing problems, blood clots, seizures, stroke, arthritis, peptic ulcer disease or osteoporosis.  No history of bone fractures.  She is not currently participating in a clinical trial. She does have a history of asthma and a history of hypothyroidism.      FAMILY HISTORY:  Entirely negative for breast cancer or ovarian cancer or breast cancer in a male relative.      ALLERGIES:  No known drug allergies.  No allergy to seafood, iodine or contrast dye.  She does not take aspirin.      PAST MENSTRUAL HISTORY:  First period was at age 11.  First and only pregnancy was at age 26.  No miscarriages or abortions.  Occasional use of oral contraceptives in her 20s.  No history of hormone replacement therapy.  Last period was 3 years ago.      SOCIAL HISTORY:  Tobacco history was from age 15 to present, smoking a pack of cigarettes a week.  She is now trying to stop cigarettes.   In terms of alcohol use, in her early teens and early 20s, she drank approximately 10 drinks on the weekend and has tapered off drinking since then.      INTERVAL HISTORY  Mily Lei returns to clinic with her  to discuss the finding of an RCB3 residual cancer burden of her resected right breast cancer.  The tumor measured at least 10.2 cm in curvilinear shape  after neoadjuvant chemotherapy.  The tumor was invasive mucinous carcinoma, Goodyear grade 2.  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  Extensive angiolymphatic invasion was present.  Margins were negative for carcinoma.  Invasive carcinoma was 1.5 mm from the anterior superior margin and 6 mm from the anterior inferior margin.  There was benign skin, nipple, and skeletal muscle.  Focal deep dermal lymphovascular invasion was present.  Calcifications were present in the DCIS.  Pathologic stage was [id number].  Overall, the [facility] residual cancer burden was calculated as 3.425 class RCB3.      Mily has recovered from her surgery.  She has bilateral saline expanders in place which are not expanded.  She comes to us to discuss adjuvant treatment options.      REVIEW OF SYSTEMS:  A 10-point review of systems is entirely negative except for some minor discomfort associated with her surgery.      PHYSICAL EXAMINATION:   VITAL SIGNS:  Blood pressure 115/75, pulse 76, respirations 16, temperature 97.4, O2 sat 98% on room air, weight 63.6 kg, height 1.7 meters and pain score moderate around the expanders.   GENERAL:  Mily appeared generally well.  She has alopecia and is wearing a hat.   HEENT:  No lesions in the oropharynx.   LYMPH:  There is no palpable cervical, supraclavicular, subclavicular or axillary lymphadenopathy.   BREASTS:  Examination of the right anterior chest wall reveals a well-healed incision with a Steri-Strip overlying an uninflated expander.  Left chest wall incision is well healed without erythema or masses.  Underlying expander.  Steri-Strip in place.  No masses in the anterior chest wall bilaterally.   LUNGS:  Clear to percussion and auscultation.   HEART:  Regular rate and rhythm, S1, S2.   ABDOMEN:  Soft and nontender, without hepatosplenomegaly.   EXTREMITIES:  Without edema.   PSYCHIATRIC:  Mood and affect were normal.      LABORATORY DATA:  CBC and  CMP were entirely normal.      There were leukemia, lymphoma markers which were done from the right implant capsule seroma and showed polytypic B-cells, no aberrant immunophenotype on T-cells.  She also had left implant seroma was tested and polytypic B-cells were noted.  No aberrant immunophenotype on the T-cells were noted.      Surgical pathology showed in the left implant capsule fluid negative for carcinoma.  The cytology of the right implant capsule fluid was negative for carcinoma.  The surgical pathology result showed RCB3 tumor with extensive lymphovascular invasion.  Margins were negative.  Details are noted above.  The lymph nodes showed 2 sentinel lymph nodes.  One lymph node showed largest metastatic deposit 1 mm.  Extranodal extension was not identified.  A second sentinel node showed micrometastases.  Notably, in terms of treatment effect in the breast, there was probable or definite response to presurgical therapy noted and no definite response to presurgical therapy in the metastatic carcinoma in the lymph nodes.  The margins were uninvolved with the closest margin at 1.5 mm for invasive carcinoma and greater than 10 mm for the DCIS.  The tumor was overall grade 2 RCB3, [id number].      ASSESSMENT AND PLAN:    1.  Mily Lei is a 53-year-old woman with a diagnosis of clinical stage II, ER-positive, HER-2 negative,T3N0MX, invasive ductal carcinoma of the upper outer quadrant of the right breast, which was multifocal.  The largest area of involvement was 8.9 cm on the original MRI.  She was treated on the I-SPY 2 clinical trial with durvalumab,  paclitaxel and olaparib.  She had grade 2 fatigue.  She went on to have a response measured on her MRI.  She then completed 4 cycles of dose-dense AC. She then had bilateral mastectomy.  The margins were negative, but there is an RCB3 result.  I discussed with Mily and her  that this represents a possible 50-55% risk of recurrence by 5 years, and  I discussed that I do recommend further adjuvant therapy.    2.  Discussion of adjuvant options.  We discussed the SELIN trial and to Mily's mind and her 's one of the negative aspects of the SELIN trial for her is that she did not want to be randomized to a control arm, although this is an unblinded trial with a 2:1 randomization to ribociclib and hormonal therapy versus placebo and hormonal therapy.  She did not want to be randomized to the control arm.  3.  I discussed the CREATE-X approach as an alternative with her and gave her a copy of the Josephine et al paper ([facility] 2017).  This paper has some criticism but it may be her best option.  Although patients with hormone receptor positive breast cancer were not shown as a subgroup to have benefit from the CREATE-X approach, there was benefit to the whole population of HER2 negative breast cancer with a significant residual disease burden.  She would prefer to go with adjuvant capecitabine as given on the CREATE-X trial for 18 or 24 weeks.  We would need to talk with Dr. Rut Padilla on how this would be integrated with her postoperative radiation.  Postoperative radiation would be indicated in this setting because of the large size of the primary tumor, but I defer to Dr. Rut Padilla on this regimen.   4.  Discussion of prognosis.  Mily and her  were very disappointed with the RCB3 result, as are we.  I did discuss with them that there is the opportunity of giving further adjuvant treatment and am hopeful that it will be helpful.  I discussed that adjuvant treatment could consist of capecitabine, 10 years of hormonal therapy with an aromatase inhibitor and possibly we could consider zoledronic acid per the ABCSG-12 study.  This was a tearful conversation.    5.  Mily will be returning to clinic on Tuesday to meet with Kerry for an end of I-SPY trial visit, and I will plan on meeting with her at that time as well with a plan after having  discussed the treatment plan with Dr. Yousif and Dr. Padilla.   6.  Follow up.  Follow up with Kerry September 10 and with me September 19 with CBC, CMP. Consult with Dr. Rut Padilla this week or next.     Thank you for allowing us to continue to participate in Mily Lei's care.      Christian Guzman MD      [facility]    I spent 60 minutes with the patient more than 50% of which was in counseling and coordination of care.

## 2019-09-03 NOTE — NURSING NOTE
Chief Complaint   Patient presents with     Blood Draw     labs drawn with vpt by rn.  vs taken     Labs drawn with vpt by rn.  Pt tolerated well.  VS taken.  Pt checked in for next appt.  Katy Huizar RN

## 2019-09-04 NOTE — NURSING NOTE
[id number]: Study Visit Note   Subject name: Kathy Lei     Visit: Unscheduled - post op    Did the study visit occur within the appropriate window allowed by the protocol? yes    Since the last study visit, She has been okay. Patient does not report any new complaints at this time. Dr. Guzman reviewed pathology with patient and they had extensive conversation about treatment plan moving forward; likely will be moving forward with CREATE X treatment.    I have personally interviewed Kathy Lei and reviewed her medical record for adverse events and concomitant medications and these have been recorded on the corresponding logs in Kathy Lei's research file.     Kathy Lei was given the opportunity to ask any trial related questions.  Please see provider progress note for physical exam and other clinical information. Labs were reviewed - any significant lab values were addressed and reviewed.    Lorne Leos RN     Pager: [phone]

## 2019-09-10 ENCOUNTER — OFFICE VISIT (OUTPATIENT)
Dept: PLASTIC SURGERY | Facility: CLINIC | Age: 54
End: 2019-09-10
Attending: PLASTIC SURGERY
Payer: COMMERCIAL

## 2019-09-10 VITALS
TEMPERATURE: 98 F | RESPIRATION RATE: 16 BRPM | OXYGEN SATURATION: 97 % | DIASTOLIC BLOOD PRESSURE: 79 MMHG | WEIGHT: 137.5 LBS | HEIGHT: 66 IN | HEART RATE: 97 BPM | SYSTOLIC BLOOD PRESSURE: 128 MMHG | BODY MASS INDEX: 22.1 KG/M2

## 2019-09-10 DIAGNOSIS — E03.9 HYPOTHYROIDISM, UNSPECIFIED TYPE: ICD-10-CM

## 2019-09-10 DIAGNOSIS — Z98.890 S/P BREAST RECONSTRUCTION, BILATERAL: Primary | ICD-10-CM

## 2019-09-10 PROCEDURE — G0463 HOSPITAL OUTPT CLINIC VISIT: HCPCS | Mod: ZF

## 2019-09-10 RX ORDER — LEVOTHYROXINE SODIUM 112 UG/1
112 TABLET ORAL DAILY
Qty: 30 TABLET | Refills: 1 | Status: SHIPPED | OUTPATIENT
Start: 2019-09-10 | End: 2019-11-13

## 2019-09-10 ASSESSMENT — MIFFLIN-ST. JEOR: SCORE: 1235.2

## 2019-09-10 ASSESSMENT — PAIN SCALES - GENERAL: PAINLEVEL: NO PAIN (0)

## 2019-09-10 NOTE — LETTER
9/10/2019       RE: Kathy Lei  [address] [place] [address]     Dear Colleague,    Thank you for referring your patient, Kathy Lei, to the [place] [facility] at [facility]. Please see a copy of my visit note below.    POSTOPERATIVE VISIT NOTE      PRESENTING COMPLAINT:  Postoperative visit status post bilateral breast reconstruction after undergoing bilateral nipple non-sparing mastectomy for left-sided breast cancer with expanders done 08/15/2019.      HISTORY OF PRESENTING COMPLAINT:  Ms. Lei is 53 years old.  She is almost 4 weeks out from surgery.  Doing well, no major issues.  She is still planning radiation therapy, so exact date is not available.      PHYSICAL EXAMINATION:     VITALS SIGNS:  Stable.  She is afebrile, in no obvious distress.     CHEST:  Both breasts are healed in well.      ASSESSMENT AND PLAN:  Based on above findings, a diagnosis of bilateral breast reconstruction was made.  Under sterile conditions, my nurse injected 180 mL on the left and 120 mL on the right.  She will continue with weekly expansion.  I have advised aggressive moisturization.  All questions were answered.  She was happy with the visit.     Again, thank you for allowing me to participate in the care of your patient.      Sincerely,    LALY Ramos MD

## 2019-09-10 NOTE — NURSING NOTE
"Oncology Rooming Note    September 10, 2019 10:22 AM   Kathy Lei is a 53 year old female who presents for:    Chief Complaint   Patient presents with     Oncology Clinic Visit     [facility] POST OP- BREAST CA     Initial Vitals: /79   Pulse 97   Temp 98  F (36.7  C)   Resp 16   Ht 1.676 m (5' 5.98\")   Wt 62.4 kg (137 lb 8 oz)   LMP 11/02/2014   SpO2 97%   BMI 22.20 kg/m   Estimated body mass index is 22.2 kg/m  as calculated from the following:    Height as of this encounter: 1.676 m (5' 5.98\").    Weight as of this encounter: 62.4 kg (137 lb 8 oz). Body surface area is 1.7 meters squared.  No Pain (0) Comment: Data Unavailable   Patient's last menstrual period was 11/02/2014.  Allergies reviewed: Yes  Medications reviewed: Yes    Medications: Medication refills not needed today.  Pharmacy name entered into [facility]:    [id number] - [place], [place] - [address] [place] [address] [id number] - [place], [place] - [address] AT [facility] [address]    Clinical concerns: No new concerns. Richard was notified.      Shakir Skaggs LPN            "

## 2019-09-10 NOTE — PROGRESS NOTES
POSTOPERATIVE VISIT NOTE      PRESENTING COMPLAINT:  Postoperative visit status post bilateral breast reconstruction after undergoing bilateral nipple non-sparing mastectomy for left-sided breast cancer with expanders done 08/15/2019.      HISTORY OF PRESENTING COMPLAINT:  Ms. Lei is 53 years old.  She is almost 4 weeks out from surgery.  Doing well, no major issues.  She is still planning radiation therapy, so exact date is not available.      PHYSICAL EXAMINATION:     VITALS SIGNS:  Stable.  She is afebrile, in no obvious distress.     CHEST:  Both breasts are healed in well.      ASSESSMENT AND PLAN:  Based on above findings, a diagnosis of bilateral breast reconstruction was made.  Under sterile conditions, my nurse injected 180 mL on the left and 120 mL on the right.  She will continue with weekly expansion.  I have advised aggressive moisturization.  All questions were answered.  She was happy with the visit.

## 2019-09-12 ENCOUNTER — ONCOLOGY VISIT (OUTPATIENT)
Dept: ONCOLOGY | Facility: CLINIC | Age: 54
End: 2019-09-12
Attending: INTERNAL MEDICINE
Payer: COMMERCIAL

## 2019-09-12 ENCOUNTER — OFFICE VISIT (OUTPATIENT)
Dept: RADIATION ONCOLOGY | Facility: CLINIC | Age: 54
End: 2019-09-12
Attending: RADIOLOGY
Payer: COMMERCIAL

## 2019-09-12 ENCOUNTER — RESEARCH ENCOUNTER (OUTPATIENT)
Dept: ONCOLOGY | Facility: CLINIC | Age: 54
End: 2019-09-12

## 2019-09-12 VITALS
BODY MASS INDEX: 22.41 KG/M2 | SYSTOLIC BLOOD PRESSURE: 111 MMHG | DIASTOLIC BLOOD PRESSURE: 64 MMHG | WEIGHT: 138.8 LBS | HEART RATE: 84 BPM | OXYGEN SATURATION: 92 %

## 2019-09-12 VITALS
TEMPERATURE: 97.5 F | SYSTOLIC BLOOD PRESSURE: 121 MMHG | HEART RATE: 86 BPM | DIASTOLIC BLOOD PRESSURE: 68 MMHG | BODY MASS INDEX: 22.29 KG/M2 | OXYGEN SATURATION: 98 % | RESPIRATION RATE: 16 BRPM | WEIGHT: 138.7 LBS | HEIGHT: 66 IN

## 2019-09-12 DIAGNOSIS — C50.411 MALIGNANT NEOPLASM OF UPPER-OUTER QUADRANT OF RIGHT BREAST IN FEMALE, ESTROGEN RECEPTOR POSITIVE (H): Primary | ICD-10-CM

## 2019-09-12 DIAGNOSIS — Z17.0 MALIGNANT NEOPLASM OF UPPER-OUTER QUADRANT OF RIGHT BREAST IN FEMALE, ESTROGEN RECEPTOR POSITIVE (H): ICD-10-CM

## 2019-09-12 DIAGNOSIS — C50.411 MALIGNANT NEOPLASM OF UPPER-OUTER QUADRANT OF RIGHT BREAST IN FEMALE, ESTROGEN RECEPTOR POSITIVE (H): ICD-10-CM

## 2019-09-12 DIAGNOSIS — Z17.0 MALIGNANT NEOPLASM OF UPPER-OUTER QUADRANT OF RIGHT BREAST IN FEMALE, ESTROGEN RECEPTOR POSITIVE (H): Primary | ICD-10-CM

## 2019-09-12 LAB
ALBUMIN SERPL-MCNC: 4.3 G/DL (ref 3.4–5)
ALP SERPL-CCNC: 55 U/L (ref 40–150)
ALT SERPL W P-5'-P-CCNC: 33 U/L (ref 0–50)
ANION GAP SERPL CALCULATED.3IONS-SCNC: 6 MMOL/L (ref 3–14)
AST SERPL W P-5'-P-CCNC: 25 U/L (ref 0–45)
BASOPHILS # BLD AUTO: 0 10E9/L (ref 0–0.2)
BASOPHILS NFR BLD AUTO: 0.8 %
BILIRUB SERPL-MCNC: 0.3 MG/DL (ref 0.2–1.3)
BUN SERPL-MCNC: 4 MG/DL (ref 7–30)
CALCIUM SERPL-MCNC: 9 MG/DL (ref 8.5–10.1)
CHLORIDE SERPL-SCNC: 102 MMOL/L (ref 94–109)
CO2 SERPL-SCNC: 28 MMOL/L (ref 20–32)
CREAT SERPL-MCNC: 0.6 MG/DL (ref 0.52–1.04)
DIFFERENTIAL METHOD BLD: NORMAL
EOSINOPHIL # BLD AUTO: 0.2 10E9/L (ref 0–0.7)
EOSINOPHIL NFR BLD AUTO: 3.9 %
ERYTHROCYTE [DISTWIDTH] IN BLOOD BY AUTOMATED COUNT: 13.2 % (ref 10–15)
GFR SERPL CREATININE-BSD FRML MDRD: >90 ML/MIN/{1.73_M2}
GLUCOSE SERPL-MCNC: 94 MG/DL (ref 70–99)
HCT VFR BLD AUTO: 39.2 % (ref 35–47)
HGB BLD-MCNC: 12.6 G/DL (ref 11.7–15.7)
IMM GRANULOCYTES # BLD: 0 10E9/L (ref 0–0.4)
IMM GRANULOCYTES NFR BLD: 0 %
LYMPHOCYTES # BLD AUTO: 1.4 10E9/L (ref 0.8–5.3)
LYMPHOCYTES NFR BLD AUTO: 29.1 %
MCH RBC QN AUTO: 28.6 PG (ref 26.5–33)
MCHC RBC AUTO-ENTMCNC: 32.1 G/DL (ref 31.5–36.5)
MCV RBC AUTO: 89 FL (ref 78–100)
MONOCYTES # BLD AUTO: 0.4 10E9/L (ref 0–1.3)
MONOCYTES NFR BLD AUTO: 7.1 %
NEUTROPHILS # BLD AUTO: 2.9 10E9/L (ref 1.6–8.3)
NEUTROPHILS NFR BLD AUTO: 59.1 %
NRBC # BLD AUTO: 0 10*3/UL
NRBC BLD AUTO-RTO: 0 /100
PLATELET # BLD AUTO: 250 10E9/L (ref 150–450)
POTASSIUM SERPL-SCNC: 3.4 MMOL/L (ref 3.4–5.3)
PROT SERPL-MCNC: 7.3 G/DL (ref 6.8–8.8)
RBC # BLD AUTO: 4.4 10E12/L (ref 3.8–5.2)
SODIUM SERPL-SCNC: 135 MMOL/L (ref 133–144)
TSH SERPL DL<=0.005 MIU/L-ACNC: 3.27 MU/L (ref 0.4–4)
WBC # BLD AUTO: 4.9 10E9/L (ref 4–11)

## 2019-09-12 PROCEDURE — 80053 COMPREHEN METABOLIC PANEL: CPT | Performed by: INTERNAL MEDICINE

## 2019-09-12 PROCEDURE — 84443 ASSAY THYROID STIM HORMONE: CPT | Performed by: INTERNAL MEDICINE

## 2019-09-12 PROCEDURE — G0463 HOSPITAL OUTPT CLINIC VISIT: HCPCS | Mod: 27

## 2019-09-12 PROCEDURE — 99214 OFFICE O/P EST MOD 30 MIN: CPT | Mod: 24 | Performed by: PHYSICIAN ASSISTANT

## 2019-09-12 PROCEDURE — 85025 COMPLETE CBC W/AUTO DIFF WBC: CPT | Performed by: INTERNAL MEDICINE

## 2019-09-12 PROCEDURE — G0463 HOSPITAL OUTPT CLINIC VISIT: HCPCS | Performed by: RADIOLOGY

## 2019-09-12 PROCEDURE — 36415 COLL VENOUS BLD VENIPUNCTURE: CPT

## 2019-09-12 ASSESSMENT — ENCOUNTER SYMPTOMS
TINGLING: 0
DOUBLE VISION: 0
DIAPHORESIS: 0
BLOOD IN STOOL: 0
DIZZINESS: 0
CHILLS: 0
CONSTIPATION: 0
HEMATURIA: 0
FREQUENCY: 0
NAUSEA: 0
DEPRESSION: 0
NERVOUS/ANXIOUS: 1
EYE PAIN: 0
DIARRHEA: 0
VOMITING: 0
WEIGHT LOSS: 0
INSOMNIA: 0
FEVER: 0
BLURRED VISION: 0
SEIZURES: 0
BRUISES/BLEEDS EASILY: 0
SORE THROAT: 0
NECK PAIN: 0
BACK PAIN: 0
HEADACHES: 0
SHORTNESS OF BREATH: 1
FALLS: 0

## 2019-09-12 ASSESSMENT — PAIN SCALES - GENERAL: PAINLEVEL: SEVERE PAIN (6)

## 2019-09-12 ASSESSMENT — MIFFLIN-ST. JEOR: SCORE: 1240.64

## 2019-09-12 NOTE — LETTER
RE: Kathy Lei  [address] [place] [address]       Oncology/Hematology Visit Note  Sep 12, 2019    Reason for Visit: follow up of right breast cancer.    History of Present Illness: Kathy Lei is a 53 year old female with recent diagnosis of ER/DC positive, HER2 negative, grade 2 breast cancer. Mily presented between Christmas and New Years of 2018 with new sharp pains in the upper outer quadrant of the right breast.  Diagnostic mammogram on 1/8/19 with grouped coarse heterogeneous calcifications at 11:30 position, irregular mass at 9:00 position. US with irregular mass at 11:00 position, 1.0 x 0.9 x 1.6cm. At 9:30 position, irregular mass, 3.2 x 0.5, x 1.3 cm. Contrast mammogram was subsequently performed and the contrast mammogram showed a large area of mass and non-mass-like enhancement in the upper outer right breast measuring 7.2 cm in greatest dimension.       The pathology showed part A, breast needle biopsy 11 o'clock, 6 cm from the nipple, invasive mammary carcinoma, no special type, ductal (and mucinous) [place] grade 2.  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  Calcifications were associated with the DCIS.  There was a focus suspicious for lymphovascular invasion.  Invasive carcinoma was estrogen receptor positive and progesterone receptor negative by immunohistochemistry.  In part B, breast right, 8 o'clock, 4 cm from the nipple, ultrasound-guided core biopsy, invasive mammary carcinoma, no special type, ductal (and mucinous) [place] grade 2, occasional calcifications were noted.  Invasive carcinoma was estrogen receptor positive and progesterone receptor negative by immunohistochemistry.  On quantitation of the ER and DC, ER was positive 99% of the cells staining with strong intensity.  DC was subsequently noted to be positive with 15% of the cells staining with moderate intensity.       There was also a HER2  FISH performed, and the HER2 FISH showed average number of HER2 signals per nucleus 2.6.  Average number of CEN17 signals 1.7 with a ratio of 1.6, VASILIY negative for biopsy A.  For biopsy B, the HER2 signals per nucleus 2.9.  Average number CEN17 signals per nucleus 1.7 with a ratio of 1.7, VASILIY negative.  Overall, by 2018 ASCO/CAP guidelines, this tumor is HER2 negative.      She was enrolled in I-SPY2 to Durvulumab, Taxol and Olaparib arm and started on 2/25/19. She completed 12 weeks. She completed 4 cycles of ddAC on 7/2. She then had bilateral mastectomy, removal of bilateral breast implants and immediate reconstruction along with right SNLB by Dr. Yousif and Dr. Ramos on 8/15/19. Path with RCB3 result.     Interval History:  She met with Dr. Padilla today. Plan to start radiation after she returns from [place] on 9/22. She is going to [place] on 9/17 for her son's wedding. She states Dr. Padilla preferred to start Xeloda after radiation completed. Plan for 6 weeks radiation. She had to lift her left arm for the exam today and now has some pain in left breast. She had saline expansion 2 days ago and will return Monday to do the right side as they had difficulty instilling the saline at that visit. She states she has had no complications with healing. She has had no RUE swelling. She is only taking tylenol and advil for pain currently.     She states her fatigue is mild. Appetite low, but weight overall stable. She thinks low appetite is due to anxiety from wedding preparations, cancer treatment, etc. She denies depression. She feels Zoloft is helping. She does have some occasional problem falling asleep but has trazodone if needed. Sometimes wakes at night but able to fall back to sleep without much difficulty.    She denies fevers. She has had some asthma symptoms such as difficulty exhaling fully, so using flovent and albuterol this past week. Denies wheezing or cough. No neuropathy.     Review of  Systems:  Patient denies any of the following except if noted above: chest pain, palpitations, dizziness, abdominal pain, nausea, vomiting, diarrhea, constipation, urinary concerns. Ocasional headaches but nothing unusual. Denies rashes or skin lesions, bleeding or bruising issues.    Current Outpatient Medications   Medication Sig Dispense Refill     acetaminophen (TYLENOL) 325 MG tablet Take 1-2 tablets (325-650 mg) by mouth every 4 hours as needed for other (mild pain) 50 tablet 0     ADVIL 200 MG OR TABS 1 TABLET EVERY 4 TO 6 HOURS AS NEEDED 0 0     albuterol (VENTOLIN HFA) 108 (90 Base) MCG/ACT inhaler INHALE 1 TO 2 PUFFS INTO THE LUNGS EVERY 4 HOURS AS NEEDED FOR SHORTNESS OF BREATH OR DIFFICULTY BREATHING 18 g 0     fluticasone (FLOVENT HFA) 110 MCG/ACT inhaler Inhale 2 puffs into the lungs 2 times daily 1 Inhaler PRN     levothyroxine (SYNTHROID) 112 MCG tablet Take 1 tablet (112 mcg) by mouth daily 30 tablet 1     lisinopril (PRINIVIL/ZESTRIL) 5 MG tablet Take 1 tablet (5 mg) by mouth daily 90 tablet 3     LORazepam (ATIVAN) 0.5 MG tablet Take 1 tablet (0.5 mg) by mouth every 4 hours as needed (Anxiety, Nausea/Vomiting or Sleep) 30 tablet 3     ondansetron (ZOFRAN) 8 MG tablet Take 1 tablet (8 mg) by mouth every 8 hours as needed for nausea 60 tablet 3     order for DME Cranial prosthesis 1 Units 1     prochlorperazine (COMPAZINE) 10 MG tablet Take 1 tablet (10 mg) by mouth every 6 hours as needed (Nausea/Vomiting) 60 tablet 3     sertraline (ZOLOFT) 100 MG tablet Take 1 tablet (100 mg) by mouth daily 30 tablet 1     sodium chloride (OCEAN) 0.65 % nasal spray Spray in both nostrils four times daily 30 mL 3     Physical Examination:  General: The patient is a pleasant female in no acute distress.  /68 (BP Location: Right arm, Patient Position: Sitting, Cuff Size: Adult Regular)   Pulse 86   Temp 97.5  F (36.4  C) (Oral)   Resp 16   Wt 62.9 kg (138 lb 11.2 oz)   LMP 11/02/2014   SpO2 98%   BMI  22.40 kg/m     Wt Readings from Last 10 Encounters:   09/12/19 62.9 kg (138 lb 11.2 oz)   09/12/19 63 kg (138 lb 12.8 oz)   09/10/19 62.4 kg (137 lb 8 oz)   09/03/19 63.6 kg (140 lb 3.2 oz)   08/27/19 63.3 kg (139 lb 8.8 oz)   08/27/19 63.6 kg (140 lb 3.2 oz)   08/15/19 63.9 kg (140 lb 14 oz)   08/13/19 62.8 kg (138 lb 7.2 oz)   07/22/19 62.8 kg (138 lb 7.2 oz)   07/16/19 63 kg (138 lb 14.2 oz)     HEENT: EOMI, PERRL. Sclerae are anicteric. Oral mucosa is pink and moist with no lesions or thrush.   Lymph: Neck is supple with no lymphadenopathy in the cervical or supraclavicular areas.   Heart: Regular rate and rhythm.   Lungs: Clear to auscultation bilaterally.   Breast: Healing mastectomy incisions with steri strips in place--no dehiscence. Underlying saline expanders appear to be in place and intact.  Abdomen: Bowel sounds present, soft, nontender with no palpable hepatosplenomegaly or masses.   Extremities: No lower extremity edema noted bilaterally.   Neuro: Cranial nerves II through XII are grossly intact.  Skin: No rashes, petechiae, or bruising noted on exposed skin.    Laboratory Data:  Results for orders placed or performed in visit on 09/12/19 (from the past 24 hour(s))   CBC with platelets differential   Result Value Ref Range    WBC 4.9 4.0 - 11.0 10e9/L    RBC Count 4.40 3.8 - 5.2 10e12/L    Hemoglobin 12.6 11.7 - 15.7 g/dL    Hematocrit 39.2 35.0 - 47.0 %    MCV 89 78 - 100 fl    MCH 28.6 26.5 - 33.0 pg    MCHC 32.1 31.5 - 36.5 g/dL    RDW 13.2 10.0 - 15.0 %    Platelet Count 250 150 - 450 10e9/L    Diff Method Automated Method     % Neutrophils 59.1 %    % Lymphocytes 29.1 %    % Monocytes 7.1 %    % Eosinophils 3.9 %    % Basophils 0.8 %    % Immature Granulocytes 0.0 %    Nucleated RBCs 0 0 /100    Absolute Neutrophil 2.9 1.6 - 8.3 10e9/L    Absolute Lymphocytes 1.4 0.8 - 5.3 10e9/L    Absolute Monocytes 0.4 0.0 - 1.3 10e9/L    Absolute Eosinophils 0.2 0.0 - 0.7 10e9/L    Absolute Basophils 0.0 0.0  - 0.2 10e9/L    Abs Immature Granulocytes 0.0 0 - 0.4 10e9/L    Absolute Nucleated RBC 0.0        Assessment and Plan:    Right breast cancer, ER/MA positive, HER2 negative: She was enrolled in I-Y2 and started cycle 1 Durvulumab, Taxol and Olaparib on 2/25/19. Completed 12 weeks and completed 4 cycles of AC. She then had bilateral mastectomy, removal of bilateral breast implants and immediate reconstruction along with right SNLB by Dr. Yousif and Dr. Ramos on 8/15/19. Path with RCB3 result and 1/2 lymph nodes positive. She met with Dr. Padilla from radiation oncology today. She presents today for -Y2 follow up visit. She is overall feeling well. ECOG 1. Having some pain in left breast today after extending left shoulder during radiation oncology exam this morning, but otherwise having no pain. Plan to start radiation once saline expansion is completed and she has returned from [place]. She will see surgery RN on 9/16 for last planned expansion at this time. Follow up with Dr. Guzman scheduled for 9/24/19 with labs. Tentative plan for adjuvant capecitabine per CREATE-X approach. Plan then for 10 years hormonal therapy with an aromatase inhibitor and possibly addition of Zoledronic acid as per ABCSG-12 study.    HTN: Continue lisinopril 5 mg daily    Hypothyroidism: Continue levothyroxine 112 mcg daily.     Asthma: Using flovent. Albuterol prn.    Anxiety/Depression: Continue Zoloft 100 mg daily. She feels she is coping well.    Kerry Norwood PA-C  [facility]  [address] [place] [address] [id number] [phone]

## 2019-09-12 NOTE — NURSING NOTE
"Oncology Rooming Note    September 12, 2019 4:11 PM   Kathy Lei is a 53 year old female who presents for:    Chief Complaint   Patient presents with     Blood Draw     labs drawn via vpt by rn. vs taken      RECHECK     Malignant neoplasm of upper-outer quadrant of right breast in female, estrogen receptor positive      Initial Vitals: /68 (BP Location: Right arm, Patient Position: Sitting, Cuff Size: Adult Regular)   Pulse 86   Temp 97.5  F (36.4  C) (Oral)   Resp 16   Ht 1.676 m (5' 5.98\")   Wt 62.9 kg (138 lb 11.2 oz)   LMP 11/02/2014   SpO2 98%   BMI 22.40 kg/m   Estimated body mass index is 22.4 kg/m  as calculated from the following:    Height as of this encounter: 1.676 m (5' 5.98\").    Weight as of this encounter: 62.9 kg (138 lb 11.2 oz). Body surface area is 1.71 meters squared.  Severe Pain (6) Comment: Data Unavailable   Patient's last menstrual period was 11/02/2014.  Allergies reviewed: Yes  Medications reviewed: Yes    Medications: Medication refills not needed today.  Pharmacy name entered into [facility]:    [id number] - [place], [place] - [address] [place] [address] [id number] - [place], [place] - [address] [facility] [address]    Clinical concerns: none        Mary Lv, COLETTE              "

## 2019-09-12 NOTE — PROGRESS NOTES
Oncology/Hematology Visit Note  Sep 12, 2019    Reason for Visit: follow up of right breast cancer.    History of Present Illness: Kathy Lei is a 53 year old female with recent diagnosis of ER/CO positive, HER2 negative, grade 2 breast cancer. Mily presented between Christmas and New Years of 2018 with new sharp pains in the upper outer quadrant of the right breast.  Diagnostic mammogram on 1/8/19 with grouped coarse heterogeneous calcifications at 11:30 position, irregular mass at 9:00 position. US with irregular mass at 11:00 position, 1.0 x 0.9 x 1.6cm. At 9:30 position, irregular mass, 3.2 x 0.5, x 1.3 cm. Contrast mammogram was subsequently performed and the contrast mammogram showed a large area of mass and non-mass-like enhancement in the upper outer right breast measuring 7.2 cm in greatest dimension.       The pathology showed part A, breast needle biopsy 11 o'clock, 6 cm from the nipple, invasive mammary carcinoma, no special type, ductal (and mucinous) [place] grade 2.  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  Calcifications were associated with the DCIS.  There was a focus suspicious for lymphovascular invasion.  Invasive carcinoma was estrogen receptor positive and progesterone receptor negative by immunohistochemistry.  In part B, breast right, 8 o'clock, 4 cm from the nipple, ultrasound-guided core biopsy, invasive mammary carcinoma, no special type, ductal (and mucinous) [place] grade 2, occasional calcifications were noted.  Invasive carcinoma was estrogen receptor positive and progesterone receptor negative by immunohistochemistry.  On quantitation of the ER and CO, ER was positive 99% of the cells staining with strong intensity.  CO was subsequently noted to be positive with 15% of the cells staining with moderate intensity.       There was also a HER2 FISH performed, and the HER2 FISH showed average number of HER2 signals per nucleus  2.6.  Average number of CEN17 signals 1.7 with a ratio of 1.6, VASILIY negative for biopsy A.  For biopsy B, the HER2 signals per nucleus 2.9.  Average number CEN17 signals per nucleus 1.7 with a ratio of 1.7, VASILIY negative.  Overall, by 2018 ASCO/CAP guidelines, this tumor is HER2 negative.      She was enrolled in I-SPY2 to Durvulumab, Taxol and Olaparib arm and started on 2/25/19. She completed 12 weeks. She completed 4 cycles of ddAC on 7/2. She then had bilateral mastectomy, removal of bilateral breast implants and immediate reconstruction along with right SNLB by Dr. Yousif and Dr. Ramos on 8/15/19. Path with RCB3 result.     Interval History:  She met with Dr. Padilla today. Plan to start radiation after she returns from [place] on 9/22. She is going to [place] on 9/17 for her son's wedding. She states Dr. Padilla preferred to start Xeloda after radiation completed. Plan for 6 weeks radiation. She had to lift her left arm for the exam today and now has some pain in left breast. She had saline expansion 2 days ago and will return Monday to do the right side as they had difficulty instilling the saline at that visit. She states she has had no complications with healing. She has had no RUE swelling. She is only taking tylenol and advil for pain currently.     She states her fatigue is mild. Appetite low, but weight overall stable. She thinks low appetite is due to anxiety from wedding preparations, cancer treatment, etc. She denies depression. She feels Zoloft is helping. She does have some occasional problem falling asleep but has trazodone if needed. Sometimes wakes at night but able to fall back to sleep without much difficulty.    She denies fevers. She has had some asthma symptoms such as difficulty exhaling fully, so using flovent and albuterol this past week. Denies wheezing or cough. No neuropathy.     Review of Systems:  Patient denies any of the following except if noted above: chest pain, palpitations,  dizziness, abdominal pain, nausea, vomiting, diarrhea, constipation, urinary concerns. Ocasional headaches but nothing unusual. Denies rashes or skin lesions, bleeding or bruising issues.    Current Outpatient Medications   Medication Sig Dispense Refill     acetaminophen (TYLENOL) 325 MG tablet Take 1-2 tablets (325-650 mg) by mouth every 4 hours as needed for other (mild pain) 50 tablet 0     ADVIL 200 MG OR TABS 1 TABLET EVERY 4 TO 6 HOURS AS NEEDED 0 0     albuterol (VENTOLIN HFA) 108 (90 Base) MCG/ACT inhaler INHALE 1 TO 2 PUFFS INTO THE LUNGS EVERY 4 HOURS AS NEEDED FOR SHORTNESS OF BREATH OR DIFFICULTY BREATHING 18 g 0     fluticasone (FLOVENT HFA) 110 MCG/ACT inhaler Inhale 2 puffs into the lungs 2 times daily 1 Inhaler PRN     levothyroxine (SYNTHROID) 112 MCG tablet Take 1 tablet (112 mcg) by mouth daily 30 tablet 1     lisinopril (PRINIVIL/ZESTRIL) 5 MG tablet Take 1 tablet (5 mg) by mouth daily 90 tablet 3     LORazepam (ATIVAN) 0.5 MG tablet Take 1 tablet (0.5 mg) by mouth every 4 hours as needed (Anxiety, Nausea/Vomiting or Sleep) 30 tablet 3     ondansetron (ZOFRAN) 8 MG tablet Take 1 tablet (8 mg) by mouth every 8 hours as needed for nausea 60 tablet 3     order for DME Cranial prosthesis 1 Units 1     prochlorperazine (COMPAZINE) 10 MG tablet Take 1 tablet (10 mg) by mouth every 6 hours as needed (Nausea/Vomiting) 60 tablet 3     sertraline (ZOLOFT) 100 MG tablet Take 1 tablet (100 mg) by mouth daily 30 tablet 1     sodium chloride (OCEAN) 0.65 % nasal spray Spray in both nostrils four times daily 30 mL 3       Physical Examination:  General: The patient is a pleasant female in no acute distress.  /68 (BP Location: Right arm, Patient Position: Sitting, Cuff Size: Adult Regular)   Pulse 86   Temp 97.5  F (36.4  C) (Oral)   Resp 16   Wt 62.9 kg (138 lb 11.2 oz)   LMP 11/02/2014   SpO2 98%   BMI 22.40 kg/m    Wt Readings from Last 10 Encounters:   09/12/19 62.9 kg (138 lb 11.2 oz)    09/12/19 63 kg (138 lb 12.8 oz)   09/10/19 62.4 kg (137 lb 8 oz)   09/03/19 63.6 kg (140 lb 3.2 oz)   08/27/19 63.3 kg (139 lb 8.8 oz)   08/27/19 63.6 kg (140 lb 3.2 oz)   08/15/19 63.9 kg (140 lb 14 oz)   08/13/19 62.8 kg (138 lb 7.2 oz)   07/22/19 62.8 kg (138 lb 7.2 oz)   07/16/19 63 kg (138 lb 14.2 oz)     HEENT: EOMI, PERRL. Sclerae are anicteric. Oral mucosa is pink and moist with no lesions or thrush.   Lymph: Neck is supple with no lymphadenopathy in the cervical or supraclavicular areas.   Heart: Regular rate and rhythm.   Lungs: Clear to auscultation bilaterally.   Breast: Healing mastectomy incisions with steri strips in place--no dehiscence. Underlying saline expanders appear to be in place and intact.  Abdomen: Bowel sounds present, soft, nontender with no palpable hepatosplenomegaly or masses.   Extremities: No lower extremity edema noted bilaterally.   Neuro: Cranial nerves II through XII are grossly intact.  Skin: No rashes, petechiae, or bruising noted on exposed skin.    Laboratory Data:  Results for orders placed or performed in visit on 09/12/19 (from the past 24 hour(s))   CBC with platelets differential   Result Value Ref Range    WBC 4.9 4.0 - 11.0 10e9/L    RBC Count 4.40 3.8 - 5.2 10e12/L    Hemoglobin 12.6 11.7 - 15.7 g/dL    Hematocrit 39.2 35.0 - 47.0 %    MCV 89 78 - 100 fl    MCH 28.6 26.5 - 33.0 pg    MCHC 32.1 31.5 - 36.5 g/dL    RDW 13.2 10.0 - 15.0 %    Platelet Count 250 150 - 450 10e9/L    Diff Method Automated Method     % Neutrophils 59.1 %    % Lymphocytes 29.1 %    % Monocytes 7.1 %    % Eosinophils 3.9 %    % Basophils 0.8 %    % Immature Granulocytes 0.0 %    Nucleated RBCs 0 0 /100    Absolute Neutrophil 2.9 1.6 - 8.3 10e9/L    Absolute Lymphocytes 1.4 0.8 - 5.3 10e9/L    Absolute Monocytes 0.4 0.0 - 1.3 10e9/L    Absolute Eosinophils 0.2 0.0 - 0.7 10e9/L    Absolute Basophils 0.0 0.0 - 0.2 10e9/L    Abs Immature Granulocytes 0.0 0 - 0.4 10e9/L    Absolute Nucleated RBC  0.0          Assessment and Plan:    Right breast cancer, ER/AR positive, HER2 negative: She was enrolled in I-SPY2 and started cycle 1 Durvulumab, Taxol and Olaparib on 2/25/19. Completed 12 weeks and completed 4 cycles of AC. She then had bilateral mastectomy, removal of bilateral breast implants and immediate reconstruction along with right SNLB by Dr. Yousif and Dr. Ramos on 8/15/19. Path with RCB3 result and 1/2 lymph nodes positive. She met with Dr. Padilla from radiation oncology today. She presents today for -Y2 follow up visit. She is overall feeling well. ECOG 1. Having some pain in left breast today after extending left shoulder during radiation oncology exam this morning, but otherwise having no pain. Plan to start radiation once saline expansion is completed and she has returned from [place]. She will see surgery RN on 9/16 for last planned expansion at this time. Follow up with Dr. Guzman scheduled for 9/24/19 with labs. Tentative plan for adjuvant capecitabine per CREATE-X approach. Plan then for 10 years hormonal therapy with an aromatase inhibitor and possibly addition of Zoledronic acid as per ABCSG-12 study.    HTN: Continue lisinopril 5 mg daily    Hypothyroidism: Continue levothyroxine 112 mcg daily.     Asthma: Using flovent. Albuterol prn.    Anxiety/Depression: Continue Zoloft 100 mg daily. She feels she is coping well.    Kerry Norwood PA-C  [facility]  [address] [place] [address]  [phone]

## 2019-09-12 NOTE — NURSING NOTE
Chief Complaint   Patient presents with     Blood Draw     labs drawn via vpt by rn. vs taken      Blood drawn via vpt by RN in lab. VS taken. Pt checked into next appointment.   Autumn Mae RN

## 2019-09-12 NOTE — LETTER
9/12/2019       RE: Kathy Lei  [address] [place] [address]     Dear Colleague,    Thank you for referring your patient, Kathy Lei, to the [facility]. Please see a copy of my visit note below.    Falls Risk Screening Questions - Weekly OTV    1. Have you fallen in the past one week?  No.  2. Have you felt unsteady when walking or standing in the past one week?  Yes. and No.      If patient answers yes to one or more of the above questions, identify as Falls Risk.    Completed By: HALEY Gage RN    HPI    INITIAL PATIENT ASSESSMENT    Diagnosis: Breast cancer.  bilateral breast reconstruction after undergoing bilateral nipple non-sparing mastectomy for left-sided breast cancer with expanders done 08/15/2019. Has had 1 session of expansion, next appointment 9/16/19    Prior radiation therapy: None    Prior chemotherapy:   Protocol: taxol and AC  Facility: Dr. Thomas [facility]  Dates: Chemo 2/25/19-7/2/19      Prior hormonal therapy:No    Pain Eval:  Denies    Psychosocial  Living arrangements:   Fall Risk: independent   referral needs: Not needed    Advanced Directive: No  Implantable Cardiac Device? No    Onset of menarche: 11  LMP: Patient's last menstrual period was 11/02/2014.  Onset of menopause: age 50  Abnormal vaginal bleeding/discharge: No  Are you pregnant? No  Reproductive note: 1 child    Review of Systems   Constitutional: Negative for chills, diaphoresis, fever, malaise/fatigue and weight loss.   HENT: Negative for ear pain, hearing loss, nosebleeds and sore throat.    Eyes: Negative for blurred vision, double vision and pain.   Respiratory: Positive for shortness of breath (Pt has asthma and PRN inhalers).    Cardiovascular: Negative for chest pain and leg swelling.   Gastrointestinal: Negative for blood in stool, constipation, diarrhea, nausea and vomiting.   Genitourinary: Negative for frequency, hematuria and urgency.    Musculoskeletal: Negative for back pain, falls, joint pain and neck pain.   Skin: Negative for rash.   Neurological: Negative for dizziness, tingling, seizures and headaches.   Endo/Heme/Allergies: Does not bruise/bleed easily.   Psychiatric/Behavioral: Negative for depression (Zolaft helps with anxiety) and suicidal ideas. The patient is nervous/anxious (Zolaft helps with anxiety). The patient does not have insomnia.          Nurse face-to-face time: Level 4:  15 min face to face time          Dictation #: [id number]    Service Date: 09/12/2019      PROBLEM:  Right breast carcinoma, clinical stage T3N0, status post neoadjuvant chemotherapy, status post bilateral mastectomy, [id number].      Ms. Lei was seen for initial consultation in the department of Radiation Oncology on 09/12/2019 at the request of Dr. Anne Yousif.      HISTORY OF PRESENT ILLNESS:  Ms. Lei is a 53-year-old female with a history of breast augmentation 20 years ago and excision of 2 breast papillomas.  Around Familia last year, she developed a sharp pain in the upper outer quadrant of her right breast.  Diagnostic mammogram and ultrasound on 01/08/2019 revealed multiple masses in the right breast with the largest mass at 9:30 o'clock position 5 cm from the nipple measuring 3.2 cm and second largest mass at 11 o'clock position 6 cm from the nipple measuring 1.6 cm.  A contrast-enhanced mammogram on 01/14/2019 showed global asymmetry in the upper outer right breast.  There was a large area of mass and non-mass-like enhancement measuring 7.2 cm in greatest dimension.  The enhancement was noted to extend up to the implant.  Biopsy from the 2 dominant masses revealed invasive ductal carcinoma with a minor mucinous component, [place] grade 2, with a focus suspicious for lymphovascular space invasion.  Tumor was ER positive, NH positive and HER-2/johan nonamplified by FISH.  There was also DCIS, nuclear grade 3, solid and cribriform types with  comedonecrosis.      Ms. Lei was referred to the [facility] and saw Dr. Christian Guzman on 01/22.  On exam, she was noted to have a 7 x 7 cm mass at the 11 o'clock position adjacent to the nipple areola complex.  Further evaluation with MRI of the breast on 02/04 revealed the enhancement to measure up to 8.9 cm in greatest dimension with contiguous or superficial involvement of the posterior areola.  There was no suspicious axillary or internal mammary chain adenopathy.  CT of the chest, abdomen and pelvis on 02/05 did not reveal evidence of distant metastases.      Ms. Lei enrolled in the I-SPY 2 trial and received weekly Taxol along with durvalumab and olaparib.  This was followed by dose-dense AC x4 cycles.  She met with Dr. Anne Yousif, who felt that she was not a candidate for breast conservation therapy due to the extent of disease.  She also met with Dr. Ramos and was interested in immediate reconstruction.      Ms. Lei completed the last cycle of AC on 07/02/2019.  Serial MRI revealed decreased enhancement of the right breast.  On her last MRI on 08/04, this area measured to 5.7 cm.      On 08/15, Ms. Lei underwent bilateral skin-sparing mastectomy, right axillary sentinel lymph node mapping and biopsy, removal of breast implants and placement of tissue expanders.  Pathology from the mastectomy specimen revealed residual invasive mucinous carcinoma, [place] grade 2, spanning at least 10.2 cm in linear extent with treatment response noted.  There was extensive angiolymphatic invasion and focal deep dermal lymphovascular space invasion. Closest margin was 1.5 mm from the anterior superior margin.  There was focal DCIS, high nuclear grade, solid type, with widely negative margins.  Two sentinel lymph nodes were removed.  One contained 1 mm micrometastasis with no extracapsular extension.  The second sentinel lymph node had an isolated tumor cell.  There was no definite treatment response  noted in the lymph nodes.  Final pathologic stage [id number] (sentinel), RCB class III.      Post surgery, Ms. Lei met with Dr. Guzman.  Given the extent of residual disease, she was recommended adjuvant capecitabine as per the CREATE-X trial.  She also began expansion of her expanders with Dr. Ramos and has undergone 2 expansions on the right side and 3 expansions on the left side.      Ms. Lei is here today with her sister in law to discuss adjuvant radiation therapy.  She is well recovered from the surgery.  She is doing home exercises to improve the range of motion in her right shoulder.  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      PAST MEDICAL HISTORY/PAST SURGICAL HISTORY:   1.  History of papillomas in the right breast.  One was removed at 6 o'clock 5 years ago and the other also at 6 o'clock 10 years ago.   2.  Hypertension.   3.  Anxiety.   4.  Asthma.   5.  Status post C section.   6.  Status post back surgery.      CHEMOTHERAPY HISTORY:  She is on the I-SPY 2 trial.  She received weekly Taxol x12 with durvalumab and olaparib.  She then received dose-dense AC x4 cycles.  Her medical oncologist is Dr. Christian Guzman.      MEDICATIONS:   1.  Tylenol.   2.  Advil.   3.  Ventolin.   4.  Flovent.   5.  Synthroid.   6.  Lisinopril.   7.  Ativan.   8.  Zofran.   9.  Compazine.   10.  Zoloft.      ALLERGIES:     1.  BuSpar:  She develops nausea and vomiting.   2.  Remeron.      FAMILY HISTORY:  There is no family history of malignancy.      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  She is  to her , Neri, and she has 1 son named Clay.  She is a former smoker, about half a pack a day for 30 years; she quit at the beginning of this year.  She drinks alcohol only socially.      REVIEW OF SYSTEMS:  A full review of system was performed by the nursing staff.  Please see their assessment  sheet for details.  Positives include shortness of breath related to asthma and anxiety.      REPRODUCTIVE HISTORY:  Menarche, age 11.  Menopause, age 50.  Her last period was in 11/2014.  She is G1, P1.  She used oral contraceptive pills in her 20s.        PHYSICAL EXAMINATION:   VITAL SIGNS:  Weight 62.9 kg, height 5 feet 5 inches.   GENERAL:  She appears well, is in no acute distress, alert and oriented, speech fluent, interaction appropriate.   HEENT:  Remarkable for alopecia.   NECK:  Supple.  No palpable cervical adenopathy.   AXILLAE:  No palpable axillary adenopathy.   BREASTS:  Bilateral breasts surgically absent.  Expanders in place.  Both mastectomy scars are well healed.  The left expander is a bit more fully expanded compared to the right.  There are no suspicious skin changes.   CARDIOVASCULAR:  Well perfused.   RESPIRATORY:  Breathing comfortably on room air.   ABDOMEN:  Nontender.   EXTREMITIES:  No edema.  Good range of motion at shoulders, although it is slightly impaired in terms of abduction on the right side.   NEUROLOGIC:  Grossly intact.     IMAGING:       ASSESSMENT AND PLAN:  In summary, Ms. Lei is a 53-year-old female with a locally advanced RIGHT breast. This developed in the context of previous breast augmentation.  On presentation, she had multiple masses occupying an area of nearly 9 cm primarily in the upper outer quadrant, but also with extension into the subareolar region.  She did not have any clinically apparent lymph node involvement.  She is status post neoadjuvant chemotherapy on the I-SPY 2 trial.  She then underwent bilateral mastectomy with immediate reconstruction.  Unfortunately, her pathology revealed significant residual disease measuring at least 10.2 cm in greatest extension, although some treatment effect was noted.  There was extensive lymphovascular space invasion and a focus of dermal lymphatic invasion.  Both sentinel lymph nodes were involved.  One had a 1  mm focus of carcinoma.  The other had isolated tumor cells.  Final stage was [id number] (sn).      We discussed the rationale for adjuvant radiation therapy in the management of her breast cancer.  The recommendation is based on her initial advanced age as well as significant residual disease, especially with the presence of residual cristina disease in her axilla.  I explained that the treatment field will include the right chest wall and regional lymphatics, including the axilla and supraclavicular fossa.  Given the presence of disease in the nipple subareolar region, I will include the internal mammary chain in the treatment field.  These areas will be treated to 5040 cGy in 28 daily fractions.  Given the extensive lymphovascular space invasion, I am also inclined to add a boost to the chest wall; however, this will have an adverse effect on the cosmetic outcome of her reconstruction.  I will consult my colleagues regarding the risk/benefit ratio of a chest wall boost before making a final recommendation.      We discussed the logistics as well as the side effects of the radiation therapy.  She agreed to proceed and signed the consent form.      Ms. Roland will undergo another expansion next week on , after which she will fly out to [place] for her son's wedding between -.  She will contact our clinic once she has completed full expansion on the right side, at which time we will schedule a simulation session.      Thank you for allowing us to participate in the care of this patient.  Please do not hesitate to contact us with questions or concerns.         RODRÍGUEZ LR MD             D: 2019   T: 2019   MT: rosaura      Name:     CHRIS ROLAND   MRN:      [id number]        Account:      [id number]   :      1965           Service Date: 2019      Document: [id number]      CC  Patient Care Team:  Rachel Arauz NP as PCP - General  Mily Mortensen RN as Registered  Nurse (Breast Oncology)  Lorne Geller, RN as Research Personnel  TONI PAN

## 2019-09-12 NOTE — PROGRESS NOTES
Falls Risk Screening Questions - Weekly OTV    1. Have you fallen in the past one week?  No.  2. Have you felt unsteady when walking or standing in the past one week?  Yes. and No.      If patient answers yes to one or more of the above questions, identify as Falls Risk.    Completed By: HALEY Gage RN    HPI    INITIAL PATIENT ASSESSMENT    Diagnosis: Breast cancer.  bilateral breast reconstruction after undergoing bilateral nipple non-sparing mastectomy for left-sided breast cancer with expanders done 08/15/2019. Has had 1 session of expansion, next appointment 9/16/19    Prior radiation therapy: None    Prior chemotherapy:   Protocol: taxol and AC  [facility]  Dates: Chemo 2/25/19-7/2/19      Prior hormonal therapy:No    Pain Eval:  Denies    Psychosocial  Living arrangements:   Fall Risk: independent   referral needs: Not needed    Advanced Directive: No  Implantable Cardiac Device? No    Onset of menarche: 11  LMP: Patient's last menstrual period was 11/02/2014.  Onset of menopause: age 50  Abnormal vaginal bleeding/discharge: No  Are you pregnant? No  Reproductive note: 1 child    Review of Systems   Constitutional: Negative for chills, diaphoresis, fever, malaise/fatigue and weight loss.   HENT: Negative for ear pain, hearing loss, nosebleeds and sore throat.    Eyes: Negative for blurred vision, double vision and pain.   Respiratory: Positive for shortness of breath (Pt has asthma and PRN inhalers).    Cardiovascular: Negative for chest pain and leg swelling.   Gastrointestinal: Negative for blood in stool, constipation, diarrhea, nausea and vomiting.   Genitourinary: Negative for frequency, hematuria and urgency.   Musculoskeletal: Negative for back pain, falls, joint pain and neck pain.   Skin: Negative for rash.   Neurological: Negative for dizziness, tingling, seizures and headaches.   Endo/Heme/Allergies: Does not bruise/bleed easily.   Psychiatric/Behavioral: Negative  for depression (Zolaft helps with anxiety) and suicidal ideas. The patient is nervous/anxious (Zolaft helps with anxiety). The patient does not have insomnia.          Nurse face-to-face time: Level 4:  15 min face to face time

## 2019-09-13 NOTE — PROGRESS NOTES
Service Date: 09/12/2019      PROBLEM:  Right breast carcinoma, clinical stage T3N0, status post neoadjuvant chemotherapy, status post bilateral mastectomy, [id number].      Ms. Lei was seen for initial consultation in the department of Radiation Oncology on 09/12/2019 at the request of Dr. Anne Yousif.      HISTORY OF PRESENT ILLNESS:  Ms. Lei is a 53-year-old female with a history of breast augmentation 20 years ago and excision of 2 breast papillomas.  Around [facility]am[facility] last year, she developed a sharp pain in the upper outer quadrant of her right breast.  Diagnostic mammogram and ultrasound on 01/08/2019 revealed multiple masses in the right breast with the largest mass at 9:30 o'clock position 5 cm from the nipple measuring 3.2 cm and second largest mass at 11 o'clock position 6 cm from the nipple measuring 1.6 cm.  A contrast-enhanced mammogram on 01/14/2019 showed global asymmetry in the upper outer right breast.  There was a large area of mass and non-mass-like enhancement measuring 7.2 cm in greatest dimension.  The enhancement was noted to extend up to the implant.  Biopsy from the 2 dominant masses revealed invasive ductal carcinoma with a minor mucinous component, [place] grade 2, with a focus suspicious for lymphovascular space invasion.  Tumor was ER positive, CT positive and HER-2/johan nonamplified by FISH.  There was also DCIS, nuclear grade 3, solid and cribriform types with comedonecrosis.      Ms. Lei was referred to the [facility] and saw Dr. Christian Guzman on 01/22.  On exam, she was noted to have a 7 x 7 cm mass at the 11 o'clock position adjacent to the nipple areola complex.  Further evaluation with MRI of the breast on 02/04 revealed the enhancement to measure up to 8.9 cm in greatest dimension with contiguous or superficial involvement of the posterior areola.  There was no suspicious axillary or internal mammary chain adenopathy.  CT of the chest, abdomen and pelvis on 02/05 did  not reveal evidence of distant metastases.      Ms. Lei enrolled in the I-SPY 2 trial and received weekly Taxol along with durvalumab and olaparib.  This was followed by dose-dense AC x4 cycles.  She met with Dr. Anne Yousif, who felt that she was not a candidate for breast conservation therapy due to the extent of disease.  She also met with Dr. Ramos and was interested in immediate reconstruction.      Ms. Lei completed the last cycle of AC on 07/02/2019.  Serial MRI revealed decreased enhancement of the right breast.  On her last MRI on 08/04, this area measured to 5.7 cm.      On 08/15, Ms. Lei underwent bilateral skin-sparing mastectomy, right axillary sentinel lymph node mapping and biopsy, removal of breast implants and placement of tissue expanders.  Pathology from the mastectomy specimen revealed residual invasive mucinous carcinoma, [place] grade 2, spanning at least 10.2 cm in linear extent with treatment response noted.  There was extensive angiolymphatic invasion and focal deep dermal lymphovascular space invasion. Closest margin was 1.5 mm from the anterior superior margin.  There was focal DCIS, high nuclear grade, solid type, with widely negative margins.  Two sentinel lymph nodes were removed.  One contained 1 mm micrometastasis with no extracapsular extension.  The second sentinel lymph node had an isolated tumor cell.  There was no definite treatment response noted in the lymph nodes.  Final pathologic stage [id number] (sentinel), RCB class III.      Post surgery, Ms. Lei met with Dr. Guzman.  Given the extent of residual disease, she was recommended adjuvant capecitabine as per the CREATE-X trial.  She also began expansion of her expanders with Dr. Ramos and has undergone 2 expansions on the right side and 3 expansions on the left side.      Ms. Lei is here today with her sister in law to discuss adjuvant radiation therapy.  She is well recovered from the surgery.   She is doing home exercises to improve the range of motion in her right shoulder.  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      PAST MEDICAL HISTORY/PAST SURGICAL HISTORY:   1.  History of papillomas in the right breast.  One was removed at 6 o'clock 5 years ago and the other also at 6 o'clock 10 years ago.   2.  Hypertension.   3.  Anxiety.   4.  Asthma.   5.  Status post C section.   6.  Status post back surgery.      CHEMOTHERAPY HISTORY:  She is on the I-SPY 2 trial.  She received weekly Taxol x12 with durvalumab and olaparib.  She then received dose-dense AC x4 cycles.  Her medical oncologist is Dr. Christian Guzman.      MEDICATIONS:   1.  Tylenol.   2.  Advil.   3.  Ventolin.   4.  Flovent.   5.  Synthroid.   6.  Lisinopril.   7.  Ativan.   8.  Zofran.   9.  Compazine.   10.  Zoloft.      ALLERGIES:     1.  BuSpar:  She develops nausea and vomiting.   2.  Remeron.      FAMILY HISTORY:  There is no family history of malignancy.      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  She is  to her , Neri, and she has 1 son named Clay.  She is a former smoker, about half a pack a day for 30 years; she quit at the beginning of this year.  She drinks alcohol only socially.      REVIEW OF SYSTEMS:  A full review of system was performed by the nursing staff.  Please see their assessment sheet for details.  Positives include shortness of breath related to asthma and anxiety.      REPRODUCTIVE HISTORY:  Menarche, age 11.  Menopause, age 50.  Her last period was in 11/2014.  She is G1, P1.  She used oral contraceptive pills in her 20s.        PHYSICAL EXAMINATION:   VITAL SIGNS:  Weight 62.9 kg, height 5 feet 5 inches.   GENERAL:  She appears well, is in no acute distress, alert and oriented, speech fluent, interaction appropriate.   HEENT:  Remarkable for alopecia.   NECK:  Supple.  No palpable cervical  adenopathy.   AXILLAE:  No palpable axillary adenopathy.   BREASTS:  Bilateral breasts surgically absent.  Expanders in place.  Both mastectomy scars are well healed.  The left expander is a bit more fully expanded compared to the right.  There are no suspicious skin changes.   CARDIOVASCULAR:  Well perfused.   RESPIRATORY:  Breathing comfortably on room air.   ABDOMEN:  Nontender.   EXTREMITIES:  No edema.  Good range of motion at shoulders, although it is slightly impaired in terms of abduction on the right side.   NEUROLOGIC:  Grossly intact.     IMAGING:       ASSESSMENT AND PLAN:  In summary, Ms. Lei is a 53-year-old female with a locally advanced RIGHT breast. This developed in the context of previous breast augmentation.  On presentation, she had multiple masses occupying an area of nearly 9 cm primarily in the upper outer quadrant, but also with extension into the subareolar region.  She did not have any clinically apparent lymph node involvement.  She is status post neoadjuvant chemotherapy on the I-SPY 2 trial.  She then underwent bilateral mastectomy with immediate reconstruction.  Unfortunately, her pathology revealed significant residual disease measuring at least 10.2 cm in greatest extension, although some treatment effect was noted.  There was extensive lymphovascular space invasion and a focus of dermal lymphatic invasion.  Both sentinel lymph nodes were involved.  One had a 1 mm focus of carcinoma.  The other had isolated tumor cells.  Final stage was [id number] (sn).      We discussed the rationale for adjuvant radiation therapy in the management of her breast cancer.  The recommendation is based on her initial advanced age as well as significant residual disease, especially with the presence of residual cristina disease in her axilla.  I explained that the treatment field will include the right chest wall and regional lymphatics, including the axilla and supraclavicular fossa.  Given the presence  of disease in the nipple subareolar region, I will include the internal mammary chain in the treatment field.  These areas will be treated to 5040 cGy in 28 daily fractions.  Given the extensive lymphovascular space invasion, I am also inclined to add a boost to the chest wall; however, this will have an adverse effect on the cosmetic outcome of her reconstruction.  I will consult my colleagues regarding the risk/benefit ratio of a chest wall boost before making a final recommendation.      We discussed the logistics as well as the side effects of the radiation therapy.  She agreed to proceed and signed the consent form.      Ms. Roland will undergo another expansion next week on , after which she will fly out to [place] for her son's wedding between -.  She will contact our clinic once she has completed full expansion on the right side, at which time we will schedule a simulation session.      Thank you for allowing us to participate in the care of this patient.  Please do not hesitate to contact us with questions or concerns.         RODRÍGUEZ LR MD             D: 2019   T: 2019   MT: rosaura      Name:     CHRIS ROLAND   MRN:      [id number]        Account:      [id number]   :      1965           Service Date: 2019      Document: [id number]      CC  Patient Care Team:  Rachel Arauz NP as PCP - General  Rachel Arauz NP as Assigned PCP  Mily Mortensen, RN as Registered Nurse (Breast Oncology)  Lorne Geller, RN as Research Personnel  TONI PAN

## 2019-09-16 ENCOUNTER — ALLIED HEALTH/NURSE VISIT (OUTPATIENT)
Dept: SURGERY | Facility: CLINIC | Age: 54
End: 2019-09-16
Payer: COMMERCIAL

## 2019-09-16 DIAGNOSIS — Z98.890 POSTOPERATIVE STATE: Primary | ICD-10-CM

## 2019-09-16 NOTE — PROGRESS NOTES
Pt. comes into clinic today at the request of Dr. Ko Ramos.    This service provided today was under the supervising provider of the FABIOLA Garces, who was available if needed.    Reason for visit: Post op visit.  Pt returns 4 week(s) after Bilateral immediate breast reconstruction using pre-pectoral expander       Under sterile conditions 150 mL on the left and 210 mL on the right injected into expanders.    Pt had significant drainage of seromas bilaterally.    Pt to return 9/24/19.    Amanda Sanchez RN  Care Coordinator

## 2019-09-18 NOTE — NURSING NOTE
[id number]: Study Visit Note   Subject name: Kathy Lei     Visit: 30 day post-op    Did the study visit occur within the appropriate window allowed by the protocol? yes    Since the last study visit, She has been doing okay. Patient reports that fatigue has improved. Reports constipation and alopecia have resolved. Patient still struggling with anorexia, fatigue, and insomnia at this time.    I have personally interviewed Kathy Lei and reviewed her medical record for adverse events and concomitant medications and these have been recorded on the corresponding logs in Kathy Lei's research file.     Kathy Lei was given the opportunity to ask any trial related questions.  Please see provider progress note for physical exam and other clinical information. Labs were reviewed - any significant lab values were addressed and reviewed.    Lorne Leos RN     Pager: [phone]

## 2019-09-23 ENCOUNTER — TELEPHONE (OUTPATIENT)
Dept: FAMILY MEDICINE | Facility: CLINIC | Age: 54
End: 2019-09-23

## 2019-09-23 LAB
MYCOBACTERIUM SPEC CULT: NORMAL
MYCOBACTERIUM SPEC CULT: NORMAL
SPECIMEN SOURCE: NORMAL

## 2019-09-23 NOTE — TELEPHONE ENCOUNTER
Huddled with DANIELA Arauz.  Appt made for 9/24 at 3:15 pm to evaluate asthma flare and need for prednisone.  Using inhaler.  Plan: if sx increase patient is going to let us know or go to .  Allison Beltran RN

## 2019-09-23 NOTE — TELEPHONE ENCOUNTER
Reason for call:  Other   Patient called regarding (reason for call): appointment  Additional comments: appointment for bronchitis and ashma     Phone number to reach patient:  Cell number on file:    Telephone Information:   Mobile [phone]       Best Time:  anytime    Can we leave a detailed message on this number?  YES

## 2019-09-24 ENCOUNTER — OFFICE VISIT (OUTPATIENT)
Dept: FAMILY MEDICINE | Facility: CLINIC | Age: 54
End: 2019-09-24
Payer: COMMERCIAL

## 2019-09-24 VITALS
OXYGEN SATURATION: 96 % | HEIGHT: 66 IN | DIASTOLIC BLOOD PRESSURE: 72 MMHG | TEMPERATURE: 97.6 F | HEART RATE: 100 BPM | BODY MASS INDEX: 21.69 KG/M2 | SYSTOLIC BLOOD PRESSURE: 110 MMHG | RESPIRATION RATE: 16 BRPM | WEIGHT: 135 LBS

## 2019-09-24 DIAGNOSIS — J20.9 ACUTE BRONCHITIS, UNSPECIFIED ORGANISM: ICD-10-CM

## 2019-09-24 DIAGNOSIS — C50.411 MALIGNANT NEOPLASM OF UPPER-OUTER QUADRANT OF RIGHT BREAST IN FEMALE, ESTROGEN RECEPTOR POSITIVE (H): ICD-10-CM

## 2019-09-24 DIAGNOSIS — F32.A DEPRESSION, UNSPECIFIED DEPRESSION TYPE: ICD-10-CM

## 2019-09-24 DIAGNOSIS — J45.41 MODERATE PERSISTENT ASTHMA WITH (ACUTE) EXACERBATION: Primary | ICD-10-CM

## 2019-09-24 DIAGNOSIS — Z17.0 MALIGNANT NEOPLASM OF UPPER-OUTER QUADRANT OF RIGHT BREAST IN FEMALE, ESTROGEN RECEPTOR POSITIVE (H): ICD-10-CM

## 2019-09-24 DIAGNOSIS — J45.40 MODERATE PERSISTENT ASTHMA WITHOUT COMPLICATION: ICD-10-CM

## 2019-09-24 PROCEDURE — 99214 OFFICE O/P EST MOD 30 MIN: CPT | Performed by: NURSE PRACTITIONER

## 2019-09-24 RX ORDER — AZITHROMYCIN 250 MG/1
TABLET, FILM COATED ORAL
Qty: 6 TABLET | Refills: 0 | Status: SHIPPED | OUTPATIENT
Start: 2019-09-24 | End: 2019-10-08

## 2019-09-24 RX ORDER — LORAZEPAM 0.5 MG/1
0.5 TABLET ORAL EVERY 4 HOURS PRN
Qty: 30 TABLET | Refills: 3 | Status: SHIPPED | OUTPATIENT
Start: 2019-09-24 | End: 2019-10-06

## 2019-09-24 RX ORDER — PREDNISONE 20 MG/1
TABLET ORAL
Qty: 18 TABLET | Refills: 0 | Status: SHIPPED | OUTPATIENT
Start: 2019-09-24 | End: 2019-10-08

## 2019-09-24 RX ORDER — ALBUTEROL SULFATE 90 UG/1
AEROSOL, METERED RESPIRATORY (INHALATION)
Qty: 18 G | Status: SHIPPED | OUTPATIENT
Start: 2019-09-24 | End: 2020-07-28

## 2019-09-24 RX ORDER — SERTRALINE HYDROCHLORIDE 100 MG/1
100 TABLET, FILM COATED ORAL DAILY
Qty: 30 TABLET | Status: SHIPPED | OUTPATIENT
Start: 2019-09-24 | End: 2020-09-29

## 2019-09-24 RX ORDER — ALBUTEROL SULFATE 0.83 MG/ML
2.5 SOLUTION RESPIRATORY (INHALATION) EVERY 6 HOURS PRN
Qty: 30 ML | Status: SHIPPED | OUTPATIENT
Start: 2019-09-24 | End: 2020-10-26

## 2019-09-24 ASSESSMENT — ASTHMA QUESTIONNAIRES
QUESTION_3 LAST FOUR WEEKS HOW OFTEN DID YOUR ASTHMA SYMPTOMS (WHEEZING, COUGHING, SHORTNESS OF BREATH, CHEST TIGHTNESS OR PAIN) WAKE YOU UP AT NIGHT OR EARLIER THAN USUAL IN THE MORNING: FOUR OR MORE NIGHTS A WEEK
QUESTION_4 LAST FOUR WEEKS HOW OFTEN HAVE YOU USED YOUR RESCUE INHALER OR NEBULIZER MEDICATION (SUCH AS ALBUTEROL): THREE OR MORE TIMES PER DAY
QUESTION_2 LAST FOUR WEEKS HOW OFTEN HAVE YOU HAD SHORTNESS OF BREATH: MORE THAN ONCE A DAY
QUESTION_5 LAST FOUR WEEKS HOW WOULD YOU RATE YOUR ASTHMA CONTROL: NOT CONTROLLED AT ALL
QUESTION_1 LAST FOUR WEEKS HOW MUCH OF THE TIME DID YOUR ASTHMA KEEP YOU FROM GETTING AS MUCH DONE AT WORK, SCHOOL OR AT HOME: SOME OF THE TIME
ACUTE_EXACERBATION_TODAY: NO
ACT_TOTALSCORE: 7

## 2019-09-24 ASSESSMENT — PATIENT HEALTH QUESTIONNAIRE - PHQ9: SUM OF ALL RESPONSES TO PHQ QUESTIONS 1-9: 0

## 2019-09-24 ASSESSMENT — MIFFLIN-ST. JEOR: SCORE: 1223.86

## 2019-09-24 NOTE — PROGRESS NOTES
Subjective     Kathy Lei is a 53 year old female who presents to clinic today for the following health issues:    HPI   Asthma Follow-Up    Was ACT completed today?    Yes    ACT Total Scores 9/24/2019   ACT TOTAL SCORE -   ASTHMA ER VISITS -   ASTHMA HOSPITALIZATIONS -   ACT TOTAL SCORE (Goal Greater than or Equal to 20) 7   In the past 12 months, how many times did you visit the emergency room for your asthma without being admitted to the hospital? 0   In the past 12 months, how many times were you hospitalized overnight because of your asthma? 0       How many days per week do you miss taking your asthma controller medication?  0    Please describe any recent triggers for your asthma: upper respiratory infections    Have you had any Emergency Room Visits, Urgent Care Visits, or Hospital Admissions since your last office visit?  No    She has had a cough for the past 10 days.  Cough, productive, shortness of breath, wheezing.  No fevers.  Using albuterol inhaler frequently.      She was enrolled in I-SPY2 to Durvulumab, Taxol and Olaparib arm and started on 2/25/19. She completed 12 weeks. She completed 4 cycles of ddAC on 7/2. She then had bilateral mastectomy, removal of bilateral breast implants and immediate reconstruction along with right SNLB by Dr. Yousif and Dr. Ramos on 8/15/19. She has completed will be starting radiation soon, followed by Xeloda.     Her dose of Zoloft was increased to 100 mg recently.  She is having some difficulty with mental exhaustion related to her cancer treatment, but feels that seeing a therapist would be more of a burden time-wise at this point.           Reviewed and updated as needed this visit by Provider         Review of Systems   ROS COMP: CONSTITUTIONAL: NEGATIVE for fever, chills, change in weight  ENT/MOUTH: NEGATIVE for ear, mouth and throat problems  RESP:see HPI  CV: NEGATIVE for chest pain, palpitations or peripheral edema  PSYCHIATRIC: see HPI     "  Objective    /72   Pulse 100   Temp 97.6  F (36.4  C)   Resp 16   Ht 1.676 m (5' 5.98\")   Wt 61.2 kg (135 lb)   LMP 11/02/2014   SpO2 96%   BMI 21.80 kg/m    Body mass index is 21.8 kg/m .  Physical Exam   GENERAL: healthy, alert and no distress  HENT: ear canals and TM's normal, nose and mouth without ulcers or lesions  NECK: no adenopathy, no asymmetry, masses, or scars and thyroid normal to palpation  RESP: expiratory wheezes bilateral and decreased breath sounds bilateral  CV: regular rate and rhythm, normal S1 S2, no S3 or S4, no murmur, click or rub, no peripheral edema and peripheral pulses strong  ABDOMEN: soft, nontender, no hepatosplenomegaly, no masses and bowel sounds normal  PSYCH: mentation appears normal, affect normal            Assessment & Plan     1. Moderate persistent asthma with (acute) exacerbation  Discussed the use and indication of this medication as well as potential side effects.   - predniSONE (DELTASONE) 20 MG tablet; Take 3 tablets daily for 3 days then 2 tablets daily for 3 days then one tablet daily for 3 days  Dispense: 18 tablet; Refill: 0    2. Acute bronchitis, unspecified organism    - azithromycin (ZITHROMAX) 250 MG tablet; two tablets first day and 1 tablet daily for 4 days  Dispense: 6 tablet; Refill: 0  - albuterol (PROVENTIL) (2.5 MG/3ML) 0.083% neb solution; Take 1 vial (2.5 mg) by nebulization every 6 hours as needed for shortness of breath / dyspnea or wheezing  Dispense: 30 mL; Refill: PRN    3. Moderate persistent asthma without complication    - albuterol (VENTOLIN HFA) 108 (90 Base) MCG/ACT inhaler; INHALE 1 TO 2 PUFFS INTO THE LUNGS EVERY 4 HOURS AS NEEDED FOR SHORTNESS OF BREATH OR DIFFICULTY BREATHING  Dispense: 18 g; Refill: PRN  - albuterol (PROVENTIL) (2.5 MG/3ML) 0.083% neb solution; Take 1 vial (2.5 mg) by nebulization every 6 hours as needed for shortness of breath / dyspnea or wheezing  Dispense: 30 mL; Refill: PRN    4. Depression, " unspecified depression type    - sertraline (ZOLOFT) 100 MG tablet; Take 1 tablet (100 mg) by mouth daily  Dispense: 30 tablet; Refill: PRN    5. Malignant neoplasm of upper-outer quadrant of right breast in female, estrogen receptor positive (H)    - LORazepam (ATIVAN) 0.5 MG tablet; Take 1 tablet (0.5 mg) by mouth every 4 hours as needed (Anxiety, Nausea/Vomiting or Sleep)  Dispense: 30 tablet; Refill: 3       Return to clinic if symptoms persist or worsen.     No follow-ups on file.    Rachel Arauz, SALINAS  [facility]

## 2019-09-25 ENCOUNTER — PATIENT OUTREACH (OUTPATIENT)
Dept: ONCOLOGY | Facility: CLINIC | Age: 54
End: 2019-09-25

## 2019-09-25 ASSESSMENT — ASTHMA QUESTIONNAIRES: ACT_TOTALSCORE: 7

## 2019-09-25 NOTE — PROGRESS NOTES
Spoke to patient to re-schedule missed appointment due to illness. Message sent to scheduling for October 8th with Dr Guzman and lab work.  Answered all patient's questions and verbalized understanding. Mily Mortensen RN, BSN.

## 2019-09-27 ASSESSMENT — ENCOUNTER SYMPTOMS
PANIC: 0
SHORTNESS OF BREATH: 1
FEVER: 0
SORE THROAT: 0
FATIGUE: 1
COUGH DISTURBING SLEEP: 1
HOARSE VOICE: 1
SINUS PAIN: 0
POSTURAL DYSPNEA: 0
NIGHT SWEATS: 1
DEPRESSION: 0
NERVOUS/ANXIOUS: 1
SMELL DISTURBANCE: 0
WEIGHT GAIN: 0
SPUTUM PRODUCTION: 1
COUGH: 1
ALTERED TEMPERATURE REGULATION: 0
CHILLS: 0
SINUS CONGESTION: 1
HALLUCINATIONS: 0
DECREASED CONCENTRATION: 0
POLYDIPSIA: 0
SNORES LOUDLY: 0
HEMOPTYSIS: 0
TASTE DISTURBANCE: 0
INSOMNIA: 0
NECK MASS: 0
INCREASED ENERGY: 1
WHEEZING: 1
POLYPHAGIA: 0
WEIGHT LOSS: 0
DECREASED APPETITE: 0
DYSPNEA ON EXERTION: 1
TROUBLE SWALLOWING: 0

## 2019-10-01 ENCOUNTER — OFFICE VISIT (OUTPATIENT)
Dept: PLASTIC SURGERY | Facility: CLINIC | Age: 54
End: 2019-10-01
Attending: PLASTIC SURGERY
Payer: COMMERCIAL

## 2019-10-01 VITALS
BODY MASS INDEX: 21.53 KG/M2 | SYSTOLIC BLOOD PRESSURE: 125 MMHG | DIASTOLIC BLOOD PRESSURE: 75 MMHG | OXYGEN SATURATION: 97 % | TEMPERATURE: 98.1 F | WEIGHT: 134 LBS | HEART RATE: 100 BPM | RESPIRATION RATE: 16 BRPM | HEIGHT: 66 IN

## 2019-10-01 DIAGNOSIS — Z98.890 S/P BREAST RECONSTRUCTION, BILATERAL: Primary | ICD-10-CM

## 2019-10-01 PROCEDURE — G0463 HOSPITAL OUTPT CLINIC VISIT: HCPCS | Mod: ZF

## 2019-10-01 ASSESSMENT — PAIN SCALES - GENERAL: PAINLEVEL: NO PAIN (0)

## 2019-10-01 ASSESSMENT — MIFFLIN-ST. JEOR: SCORE: 1219.32

## 2019-10-01 NOTE — PROGRESS NOTES
POSTOPERATIVE VISIT      PRESENTING COMPLAINT:  Postoperative visit, status post bilateral breast reconstruction after undergoing bilateral nipple non-sparing mastectomy and for left-sided breast cancer with expanders done on 08/15/2019.      HISTORY OF PRESENTING COMPLAINT:  Ms. Lei is 53 years old, here for regular postoperative visit, doing well, no major issues.  She will be getting radiation therapy.  She is here for expansion.      PHYSICAL EXAMINATION:  On exam vital signs are stable.  She is afebrile in no obvious distress.  Everything is healing in well.      ASSESSMENT AND PLAN:  Based on above findings, a diagnosis of bilateral breast reconstruction was made.  Under sterile conditions, total of 40 mL was injected in each expander, which brings the total on the left to 420 mL and on the right to 360 mL.  She will now proceed with radiation.  I will see her back thereafter when we will plan her autologous reconstruction.

## 2019-10-01 NOTE — LETTER
10/1/2019       RE: Kathy Lei  [address] [place] [address]     Dear Colleague,    Thank you for referring your patient, Kathy Lei, to the [facility] at [facility]. Please see a copy of my visit note below.    POSTOPERATIVE VISIT      PRESENTING COMPLAINT:  Postoperative visit, status post bilateral breast reconstruction after undergoing bilateral nipple non-sparing mastectomy and for left-sided breast cancer with expanders done on 08/15/2019.      HISTORY OF PRESENTING COMPLAINT:  Ms. Lei is 53 years old, here for regular postoperative visit, doing well, no major issues.  She will be getting radiation therapy.  She is here for expansion.      PHYSICAL EXAMINATION:  On exam vital signs are stable.  She is afebrile in no obvious distress.  Everything is healing in well.      ASSESSMENT AND PLAN:  Based on above findings, a diagnosis of bilateral breast reconstruction was made.  Under sterile conditions, total of 40 mL was injected in each expander, which brings the total on the left to 420 mL and on the right to 360 mL.  She will now proceed with radiation.  I will see her back thereafter when we will plan her autologous reconstruction.     Again, thank you for allowing me to participate in the care of your patient.      Sincerely,    LALY Ramos MD

## 2019-10-01 NOTE — NURSING NOTE
"Oncology Rooming Note    October 1, 2019 11:25 AM   Kathy Lei is a 53 year old female who presents for:    Chief Complaint   Patient presents with     Oncology Clinic Visit     [facility] RETURN- BREAST CA     Initial Vitals: /75 (BP Location: Right arm, Patient Position: Chair, Cuff Size: Adult Regular)   Pulse 100   Temp 98.1  F (36.7  C)   Resp 16   Ht 1.676 m (5' 5.98\")   Wt 60.8 kg (134 lb)   LMP 11/02/2014   SpO2 97%   BMI 21.64 kg/m   Estimated body mass index is 21.64 kg/m  as calculated from the following:    Height as of this encounter: 1.676 m (5' 5.98\").    Weight as of this encounter: 60.8 kg (134 lb). Body surface area is 1.68 meters squared.  No Pain (0) Comment: Data Unavailable   Patient's last menstrual period was 11/02/2014.  Allergies reviewed: Yes  Medications reviewed: Yes    Medications: Medication refills not needed today.  Pharmacy name entered into [facility]:    [id number] - [place], [place] - [address] [place] [address].  [id number] - [place], [place] - [address] AT [facility] [address]    Clinical concerns: No new concerns. Richard was notified.      Shakir Skaggs LPN            "

## 2019-10-06 RX ORDER — LORAZEPAM 2 MG/ML
0.5 INJECTION INTRAMUSCULAR EVERY 4 HOURS PRN
Status: CANCELLED
Start: 2020-01-14

## 2019-10-06 RX ORDER — SODIUM CHLORIDE 9 MG/ML
1000 INJECTION, SOLUTION INTRAVENOUS CONTINUOUS PRN
Status: CANCELLED
Start: 2020-01-14

## 2019-10-06 RX ORDER — ALBUTEROL SULFATE 0.83 MG/ML
2.5 SOLUTION RESPIRATORY (INHALATION)
Status: CANCELLED | OUTPATIENT
Start: 2020-01-14

## 2019-10-06 RX ORDER — MEPERIDINE HYDROCHLORIDE 25 MG/ML
25 INJECTION INTRAMUSCULAR; INTRAVENOUS; SUBCUTANEOUS EVERY 30 MIN PRN
Status: CANCELLED | OUTPATIENT
Start: 2020-01-14

## 2019-10-06 RX ORDER — ALBUTEROL SULFATE 90 UG/1
1-2 AEROSOL, METERED RESPIRATORY (INHALATION)
Status: CANCELLED
Start: 2020-01-14

## 2019-10-06 RX ORDER — DIPHENHYDRAMINE HCL 25 MG
50 CAPSULE ORAL ONCE
Status: CANCELLED
Start: 2020-01-14

## 2019-10-06 RX ORDER — DIPHENHYDRAMINE HYDROCHLORIDE 50 MG/ML
50 INJECTION INTRAMUSCULAR; INTRAVENOUS
Status: CANCELLED
Start: 2020-01-14

## 2019-10-06 RX ORDER — EPINEPHRINE 1 MG/ML
0.3 INJECTION, SOLUTION INTRAMUSCULAR; SUBCUTANEOUS EVERY 5 MIN PRN
Status: CANCELLED | OUTPATIENT
Start: 2020-01-14

## 2019-10-06 RX ORDER — NALOXONE HYDROCHLORIDE 0.4 MG/ML
.1-.4 INJECTION, SOLUTION INTRAMUSCULAR; INTRAVENOUS; SUBCUTANEOUS
Status: CANCELLED | OUTPATIENT
Start: 2020-01-14

## 2019-10-06 RX ORDER — METHYLPREDNISOLONE SODIUM SUCCINATE 125 MG/2ML
125 INJECTION, POWDER, LYOPHILIZED, FOR SOLUTION INTRAMUSCULAR; INTRAVENOUS
Status: CANCELLED
Start: 2020-01-14

## 2019-10-06 RX ORDER — EPINEPHRINE 0.3 MG/.3ML
0.3 INJECTION SUBCUTANEOUS EVERY 5 MIN PRN
Status: CANCELLED | OUTPATIENT
Start: 2020-01-14

## 2019-10-06 NOTE — PROGRESS NOTES
HPI: Kathy Lei is a 52 yo female with a new diagnosis of an estrogen-receptor positive, NJ-positive, HER2-negative breast cancer, grade 2, in the upper outer quadrant of the right breast since the end of year 2018.      Mily presented between Christmas and New Years of 2018 with new sharp pains in the upper outer quadrant of the right breast.  She underwent mammographic screening.       IMAGING:  Mammography and ultrasound:  She had a diagnostic mammogram which showed bilateral prepectoral saline implants.  On the right breast at 11:30 position, there were grouped coarse heterogeneous calcifications spanning 1.3 cm, new since 2014, adjacent calcifications 11-o'clock position posterior depth.  There was an irregular mass in the lateral right breast 9-o'clock position mid-posterior depth, and an additional mass with obscured margins.  No significant changes in the left breast.  Right breast ultrasound was performed.  In the area of the palpable lump in the right breast, 11-o'clock position, 6 cm from the nipple, there is an irregular hypoechoic mass with indistinct margins measuring approximately 1.0 x 0.9 x 1.6 cm in size.  Immediately adjacent to the mass, 11:30 position, are microcalcifications.  In the second area of palpable lump right breast, 9:30 position, 5 cm from the nipple, there was an irregular hypoechoic mass measuring 3.2 x 0.5 x 1.3 cm in size felt to account for the mammographic findings.  There were multiple additional round hypoechoic masses similar to the findings at 9:30 position seen incidentally during real time and not directly palpable.  For example, in the right breast at 8-o'clock position, 4 cm from the nipple, there was a mass measuring 0.8 x 0.6 x 0.6 cm, BI-RADS 4.       Contrast mammogram was subsequently performed and the contrast mammogram showed a large area of mass and non-mass-like enhancement in the upper outer right breast measuring 7.2 cm in greatest  dimension, corresponding global asymmetry in the upper outer right breast.  Enhancement extended to the implant without evidence of extension to the skin surface or nipple, and the calcifications were noted as previously found.      PATHOLOGY:  The pathology showed part A, breast needle biopsy 11 o'clock, 6 cm from the nipple, invasive mammary carcinoma, no special type, ductal (and mucinous) Pownal grade 2.  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  Calcifications were associated with the DCIS.  There was a focus suspicious for lymphovascular invasion.  Invasive carcinoma was estrogen receptor positive and progesterone receptor negative by immunohistochemistry.  In part B, breast right, 8 o'clock, 4 cm from the nipple, ultrasound-guided core biopsy, invasive mammary carcinoma, no special type, ductal (and mucinous) Pownal grade 2, occasional calcifications were noted.  Invasive carcinoma was estrogen receptor positive and progesterone receptor negative by immunohistochemistry.  On quantitation of the ER and OH, ER was positive 99% of the cells staining with strong intensity.  OH was subsequently noted to be positive with 15% of the cells staining with moderate intensity.       There was also a HER2 FISH performed, and the HER2 FISH showed average number of HER2 signals per nucleus 2.6.  Average number of CEN17 signals 1.7 with a ratio of 1.6, VASILIY negative for biopsy A.  For biopsy B, the HER2 signals per nucleus 2.9.  Average number CEN17 signals per nucleus 1.7 with a ratio of 1.7, VASILIY negative.  Overall, by 2018 ASCO/CAP guidelines, this tumor is HER2 negative.     She was enrolled in I-SPY2 trial to Durvulumab, Taxol and Olaparib arm. She completed 12 cycles of weekly Taxol. She also completed 4 cycles  of adriamycin and cyclophosphamide (AC).     PAST MEDICAL HISTORY:  In terms of her breast history, she does have a history of a smaller right breast which was  treated with implants 19 years ago.  She has a history of 2 papillomas in the right breast, 1 removed at the 6-o'clock position 5 years ago, another removed at the 6-o'clock position approximately 10 years ago.  These incisions are approximately at the 5:30 to 6-o'clock position.  She has no history of radiation therapy.  No history of breast cancer of any type.  No history of tumor of any kind.  No history of heart problems, heart attack, breathing problems, blood clots, seizures, stroke, arthritis, peptic ulcer disease or osteoporosis.  No history of bone fractures.  She is not currently participating in a clinical trial. She does have a history of asthma and a history of hypothyroidism.      FAMILY HISTORY:  Entirely negative for breast cancer or ovarian cancer or breast cancer in a male relative.      ALLERGIES:  No known drug allergies.  No allergy to seafood, iodine or contrast dye.  She does not take aspirin.      PAST MENSTRUAL HISTORY:  First period was at age 11.  First and only pregnancy was at age 26.  No miscarriages or abortions.  Occasional use of oral contraceptives in her 20s.  No history of hormone replacement therapy.  Last period was 3 years ago.      SOCIAL HISTORY:  Tobacco history was from age 15 to present, smoking a pack of cigarettes a week.  She is now trying to stop cigarettes.   In terms of alcohol use, in her early teens and early 20s, she drank approximately 10 drinks on the weekend and has tapered off drinking since then.      INTERVAL HISTORY  Mily Lei returns to clinic with her  to discuss the finding of an RCB3 residual cancer burden of her resected right breast cancer.  The tumor measured at least 10.2 cm in curvilinear shape after neoadjuvant chemotherapy.  The tumor was invasive mucinous carcinoma, Donna grade 2.  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  Extensive angiolymphatic invasion was present.  Margins were negative  for carcinoma.  Invasive carcinoma was 1.5 mm from the anterior superior margin and 6 mm from the anterior inferior margin.  There was benign skin, nipple, and skeletal muscle.  Focal deep dermal lymphovascular invasion was present.  Calcifications were present in the DCIS.  Pathologic stage was [id number] sn.  Overall, the [facility] residual cancer burden was calculated as 3.425 class RCB3.       HISTORY OF PRESENT ILLNESS:  Mily returns to clinic to discuss the overall plan of CREATE-X of adjuvant Xeloda in combination with aromatase inhibitor letrozole.  She has been feeling quite well.  She has recovered from an episode of bronchitis, finished a prednisone taper.  She has no pain, fatigue, depression or anxiety.      REVIEW OF SYSTEMS:  A 10-point review of systems is entirely negative.      Last period was at age 48.      PHYSICAL EXAMINATION:   VITAL SIGNS:  Blood pressure 111/68, temperature 98, pulse 80, respirations 13, O2 sat 97% on room air, height 1.7 meters and weight 62 kg.   GENERAL:  Mily appeared generally well.  She has alopecia, but her hair is growing back.   HEENT:  No lesions in the oropharynx.  On the back of the neck, there is a small, erythematous macular lesion which she says was related to using a clipper on the back of her neck.   LYMPH:  There is no palpable cervical, supraclavicular, subclavicular or axillary lymphadenopathy.   BREASTS:  Right breast reveals a well-healed transverse incision, post saline fill with no masses in the right breast.  She is concerned about an area of slight erythema at the 2-o'clock position measuring approximately 2 fingerbreadths in diameter.  This is a poorly demarcated area of mild blanching erythema.  There are no palpable masses in the skin throughout the breast.  I did examine the area of slight erythema at the 2-o'clock position of the right breast.  This appeared to be a benign finding.  It was also examined by Dr. Ramos who concurred.  Left  breast is without masses, saline expander in place.  Transverse incision is well healed without erythema or masses.   LUNGS:  Clear to percussion and auscultation.   HEART:  Regular rate and rhythm, S1, S2.   ABDOMEN:  Soft and nontender without hepatosplenomegaly.   EXTREMITIES:  Without edema.   PSYCHIATRIC:  Mood and affect were normal.      LABORATORY DATA:  CBC and CMP were within normal limits.      ASSESSMENT AND PLAN:     1.  Mily Lei is a 53-year-old woman with a locally advanced right breast cancer.  This developed in the context of previous breast augmentation.  On presentation, she had multiple masses occupying an area of nearly 9 cm primarily in the upper outer quadrant but also with extension into the subareolar region.  She did not have any clinically apparent lymph node involvement.  She had neoadjuvant chemotherapy on the I-SPY 2 clinical trial and was randomized to the paclitaxel, olaparib and durvalumab arm.  She tolerated the treatment reasonably well but had an RCB-III result with final stage [id number].  I have discussed the CREATE-X approach of adjuvant capecitabine for 24 weeks combined with aromatase inhibitor.   2.  Discussion of the plan of radiation first, then CREATE-X.  I discussed that she will have about 6 weeks of radiation.  We will see her at the completion of that 6-week period and begin treatment with capecitabine per the CREATE-X study.  This study did show an improvement in overall survival in women who had significant residual disease following their primary treatment with neoadjuvant chemotherapy for breast cancer.  HER2-negative breast cancer was included in the study and this included ER-positive, HER2-negative and triple-negative patients.  In the whole study, there was an improvement in overall survival.  I discussed with Mily that this is our best recommendation.  Aromatase inhibitor or other hormonal treatment was continued with the capecitabine.  While this is  not standard, the clinical trial was performed in this fashion.  I think it makes sense to stay with the mode of performance of the clinical trial.  All of Mily's questions were answered.   3. Radiation plan. Ms. Lei will undergo another expansion next week on 09/16, after which she will fly out to [place] for her son's wedding between 09/17-09/22.  She will contact our clinic once she has completed full expansion on the right side, at which time we will schedule a simulation session.   4.  Followup.  We will see Mily in followup in our clinic in 6 weeks.  Follow up with me Tuesday December 3 with CBC, CMP.     Thank you for allowing us to continue to participate in Mily Lei's care.      Christian Guzman MD      [facility]         CREATE-X and AI after XRT.        I spent ... minutes with the patient more than 50% of which was in counseling and coordination of care.

## 2019-10-08 ENCOUNTER — ONCOLOGY VISIT (OUTPATIENT)
Dept: ONCOLOGY | Facility: CLINIC | Age: 54
End: 2019-10-08
Attending: INTERNAL MEDICINE
Payer: COMMERCIAL

## 2019-10-08 ENCOUNTER — APPOINTMENT (OUTPATIENT)
Dept: LAB | Facility: CLINIC | Age: 54
End: 2019-10-08
Attending: INTERNAL MEDICINE
Payer: COMMERCIAL

## 2019-10-08 VITALS
WEIGHT: 136.9 LBS | RESPIRATION RATE: 13 BRPM | TEMPERATURE: 98 F | BODY MASS INDEX: 22 KG/M2 | SYSTOLIC BLOOD PRESSURE: 111 MMHG | HEART RATE: 80 BPM | HEIGHT: 66 IN | OXYGEN SATURATION: 97 % | DIASTOLIC BLOOD PRESSURE: 68 MMHG

## 2019-10-08 DIAGNOSIS — Z17.0 MALIGNANT NEOPLASM OF UPPER-OUTER QUADRANT OF RIGHT BREAST IN FEMALE, ESTROGEN RECEPTOR POSITIVE (H): ICD-10-CM

## 2019-10-08 DIAGNOSIS — C50.411 MALIGNANT NEOPLASM OF UPPER-OUTER QUADRANT OF RIGHT BREAST IN FEMALE, ESTROGEN RECEPTOR POSITIVE (H): ICD-10-CM

## 2019-10-08 LAB
ALBUMIN SERPL-MCNC: 3.6 G/DL (ref 3.4–5)
ALP SERPL-CCNC: 57 U/L (ref 40–150)
ALT SERPL W P-5'-P-CCNC: 26 U/L (ref 0–50)
ANION GAP SERPL CALCULATED.3IONS-SCNC: 5 MMOL/L (ref 3–14)
AST SERPL W P-5'-P-CCNC: 18 U/L (ref 0–45)
BASOPHILS # BLD AUTO: 0 10E9/L (ref 0–0.2)
BASOPHILS NFR BLD AUTO: 0.8 %
BILIRUB SERPL-MCNC: 0.2 MG/DL (ref 0.2–1.3)
BUN SERPL-MCNC: 6 MG/DL (ref 7–30)
CALCIUM SERPL-MCNC: 9 MG/DL (ref 8.5–10.1)
CHLORIDE SERPL-SCNC: 105 MMOL/L (ref 94–109)
CO2 SERPL-SCNC: 28 MMOL/L (ref 20–32)
CREAT SERPL-MCNC: 0.56 MG/DL (ref 0.52–1.04)
DIFFERENTIAL METHOD BLD: NORMAL
EOSINOPHIL # BLD AUTO: 0.3 10E9/L (ref 0–0.7)
EOSINOPHIL NFR BLD AUTO: 6.3 %
ERYTHROCYTE [DISTWIDTH] IN BLOOD BY AUTOMATED COUNT: 13.7 % (ref 10–15)
GFR SERPL CREATININE-BSD FRML MDRD: >90 ML/MIN/{1.73_M2}
GLUCOSE SERPL-MCNC: 87 MG/DL (ref 70–99)
HCT VFR BLD AUTO: 42.3 % (ref 35–47)
HGB BLD-MCNC: 13.7 G/DL (ref 11.7–15.7)
IMM GRANULOCYTES # BLD: 0 10E9/L (ref 0–0.4)
IMM GRANULOCYTES NFR BLD: 0.4 %
LYMPHOCYTES # BLD AUTO: 1.1 10E9/L (ref 0.8–5.3)
LYMPHOCYTES NFR BLD AUTO: 21.7 %
MCH RBC QN AUTO: 28.5 PG (ref 26.5–33)
MCHC RBC AUTO-ENTMCNC: 32.4 G/DL (ref 31.5–36.5)
MCV RBC AUTO: 88 FL (ref 78–100)
MONOCYTES # BLD AUTO: 0.5 10E9/L (ref 0–1.3)
MONOCYTES NFR BLD AUTO: 9 %
NEUTROPHILS # BLD AUTO: 3.3 10E9/L (ref 1.6–8.3)
NEUTROPHILS NFR BLD AUTO: 61.8 %
NRBC # BLD AUTO: 0 10*3/UL
NRBC BLD AUTO-RTO: 0 /100
PLATELET # BLD AUTO: 275 10E9/L (ref 150–450)
POTASSIUM SERPL-SCNC: 3.7 MMOL/L (ref 3.4–5.3)
PROT SERPL-MCNC: 6.9 G/DL (ref 6.8–8.8)
RBC # BLD AUTO: 4.81 10E12/L (ref 3.8–5.2)
SODIUM SERPL-SCNC: 138 MMOL/L (ref 133–144)
WBC # BLD AUTO: 5.3 10E9/L (ref 4–11)

## 2019-10-08 PROCEDURE — 36415 COLL VENOUS BLD VENIPUNCTURE: CPT

## 2019-10-08 PROCEDURE — 99214 OFFICE O/P EST MOD 30 MIN: CPT | Mod: 24 | Performed by: INTERNAL MEDICINE

## 2019-10-08 PROCEDURE — 85025 COMPLETE CBC W/AUTO DIFF WBC: CPT | Performed by: INTERNAL MEDICINE

## 2019-10-08 PROCEDURE — G0463 HOSPITAL OUTPT CLINIC VISIT: HCPCS | Mod: ZF

## 2019-10-08 PROCEDURE — 80053 COMPREHEN METABOLIC PANEL: CPT | Performed by: INTERNAL MEDICINE

## 2019-10-08 ASSESSMENT — MIFFLIN-ST. JEOR: SCORE: 1232.4

## 2019-10-08 ASSESSMENT — PAIN SCALES - GENERAL: PAINLEVEL: NO PAIN (0)

## 2019-10-08 NOTE — NURSING NOTE
Chief Complaint   Patient presents with     Blood Draw     vitals and venipuncture done by COLETTE Gamboa CMA on 10/8/2019 at 8:50 AM

## 2019-10-08 NOTE — LETTER
10/8/2019       RE: Kathy Lei  [address] [place] [address]     Dear Colleague,    Thank you for referring your patient, Kathy Lei, to the [facility]. Please see a copy of my visit note below.    HPI: Kathy Lei is a 54 yo female with a new diagnosis of an estrogen-receptor positive, MI-positive, HER2-negative breast cancer, grade 2, in the upper outer quadrant of the right breast since the end of year 2018.      Mily presented between Christmas and New Years of 2018 with new sharp pains in the upper outer quadrant of the right breast.  She underwent mammographic screening.       IMAGING:  Mammography and ultrasound:  She had a diagnostic mammogram which showed bilateral prepectoral saline implants.  On the right breast at 11:30 position, there were grouped coarse heterogeneous calcifications spanning 1.3 cm, new since 2014, adjacent calcifications 11-o'clock position posterior depth.  There was an irregular mass in the lateral right breast 9-o'clock position mid-posterior depth, and an additional mass with obscured margins.  No significant changes in the left breast.  Right breast ultrasound was performed.  In the area of the palpable lump in the right breast, 11-o'clock position, 6 cm from the nipple, there is an irregular hypoechoic mass with indistinct margins measuring approximately 1.0 x 0.9 x 1.6 cm in size.  Immediately adjacent to the mass, 11:30 position, are microcalcifications.  In the second area of palpable lump right breast, 9:30 position, 5 cm from the nipple, there was an irregular hypoechoic mass measuring 3.2 x 0.5 x 1.3 cm in size felt to account for the mammographic findings.  There were multiple additional round hypoechoic masses similar to the findings at 9:30 position seen incidentally during real time and not directly palpable.  For example, in the right breast at 8-o'clock position, 4 cm from the nipple, there was a mass  measuring 0.8 x 0.6 x 0.6 cm, BI-RADS 4.       Contrast mammogram was subsequently performed and the contrast mammogram showed a large area of mass and non-mass-like enhancement in the upper outer right breast measuring 7.2 cm in greatest dimension, corresponding global asymmetry in the upper outer right breast.  Enhancement extended to the implant without evidence of extension to the skin surface or nipple, and the calcifications were noted as previously found.      PATHOLOGY:  The pathology showed part A, breast needle biopsy 11 o'clock, 6 cm from the nipple, invasive mammary carcinoma, no special type, ductal (and mucinous) Ennis grade 2.  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  Calcifications were associated with the DCIS.  There was a focus suspicious for lymphovascular invasion.  Invasive carcinoma was estrogen receptor positive and progesterone receptor negative by immunohistochemistry.  In part B, breast right, 8 o'clock, 4 cm from the nipple, ultrasound-guided core biopsy, invasive mammary carcinoma, no special type, ductal (and mucinous) Ennis grade 2, occasional calcifications were noted.  Invasive carcinoma was estrogen receptor positive and progesterone receptor negative by immunohistochemistry.  On quantitation of the ER and OR, ER was positive 99% of the cells staining with strong intensity.  OR was subsequently noted to be positive with 15% of the cells staining with moderate intensity.       There was also a HER2 FISH performed, and the HER2 FISH showed average number of HER2 signals per nucleus 2.6.  Average number of CEN17 signals 1.7 with a ratio of 1.6, VASILIY negative for biopsy A.  For biopsy B, the HER2 signals per nucleus 2.9.  Average number CEN17 signals per nucleus 1.7 with a ratio of 1.7, VASILIY negative.  Overall, by 2018 ASCO/CAP guidelines, this tumor is HER2 negative.     She was enrolled in I-SPY2 trial to Durvulumab, Taxol and  Olaparib arm. She completed 12 cycles of weekly Taxol. She also completed 4 cycles  of adriamycin and cyclophosphamide (AC).     PAST MEDICAL HISTORY:  In terms of her breast history, she does have a history of a smaller right breast which was treated with implants 19 years ago.  She has a history of 2 papillomas in the right breast, 1 removed at the 6-o'clock position 5 years ago, another removed at the 6-o'clock position approximately 10 years ago.  These incisions are approximately at the 5:30 to 6-o'clock position.  She has no history of radiation therapy.  No history of breast cancer of any type.  No history of tumor of any kind.  No history of heart problems, heart attack, breathing problems, blood clots, seizures, stroke, arthritis, peptic ulcer disease or osteoporosis.  No history of bone fractures.  She is not currently participating in a clinical trial. She does have a history of asthma and a history of hypothyroidism.      FAMILY HISTORY:  Entirely negative for breast cancer or ovarian cancer or breast cancer in a male relative.      ALLERGIES:  No known drug allergies.  No allergy to seafood, iodine or contrast dye.  She does not take aspirin.      PAST MENSTRUAL HISTORY:  First period was at age 11.  First and only pregnancy was at age 26.  No miscarriages or abortions.  Occasional use of oral contraceptives in her 20s.  No history of hormone replacement therapy.  Last period was 3 years ago.      SOCIAL HISTORY:  Tobacco history was from age 15 to present, smoking a pack of cigarettes a week.  She is now trying to stop cigarettes.   In terms of alcohol use, in her early teens and early 20s, she drank approximately 10 drinks on the weekend and has tapered off drinking since then.      INTERVAL HISTORY  Mily Lei returns to clinic with her  to discuss the finding of an RCB3 residual cancer burden of her resected right breast cancer.  The tumor measured at least 10.2 cm in curvilinear shape  after neoadjuvant chemotherapy.  The tumor was invasive mucinous carcinoma, Delancey grade 2.  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  Extensive angiolymphatic invasion was present.  Margins were negative for carcinoma.  Invasive carcinoma was 1.5 mm from the anterior superior margin and 6 mm from the anterior inferior margin.  There was benign skin, nipple, and skeletal muscle.  Focal deep dermal lymphovascular invasion was present.  Calcifications were present in the DCIS.  Pathologic stage was [id number].  Overall, the [facility] residual cancer burden was calculated as 3.425 class RCB3.       HISTORY OF PRESENT ILLNESS:  Mily returns to clinic to discuss the overall plan of CREATE-X of adjuvant Xeloda in combination with aromatase inhibitor letrozole.  She has been feeling quite well.  She has recovered from an episode of bronchitis, finished a prednisone taper.  She has no pain, fatigue, depression or anxiety.      REVIEW OF SYSTEMS:  A 10-point review of systems is entirely negative.      Last period was at age 48.      PHYSICAL EXAMINATION:   VITAL SIGNS:  Blood pressure 111/68, temperature 98, pulse 80, respirations 13, O2 sat 97% on room air, height 1.7 meters and weight 62 kg.   GENERAL:  Mily appeared generally well.  She has alopecia, but her hair is growing back.   HEENT:  No lesions in the oropharynx.  On the back of the neck, there is a small, erythematous macular lesion which she says was related to using a clipper on the back of her neck.   LYMPH:  There is no palpable cervical, supraclavicular, subclavicular or axillary lymphadenopathy.   BREASTS:  Right breast reveals a well-healed transverse incision, post saline fill with no masses in the right breast.  She is concerned about an area of slight erythema at the 2-o'clock position measuring approximately 2 fingerbreadths in diameter.  This is a poorly demarcated area of mild blanching erythema.  There  are no palpable masses in the skin throughout the breast.  I did examine the area of slight erythema at the 2-o'clock position of the right breast.  This appeared to be a benign finding.  It was also examined by Dr. Ramos who concurred.  Left breast is without masses, saline expander in place.  Transverse incision is well healed without erythema or masses.   LUNGS:  Clear to percussion and auscultation.   HEART:  Regular rate and rhythm, S1, S2.   ABDOMEN:  Soft and nontender without hepatosplenomegaly.   EXTREMITIES:  Without edema.   PSYCHIATRIC:  Mood and affect were normal.      LABORATORY DATA:  CBC and CMP were within normal limits.      ASSESSMENT AND PLAN:     1.  Mily Lei is a 53-year-old woman with a locally advanced right breast cancer.  This developed in the context of previous breast augmentation.  On presentation, she had multiple masses occupying an area of nearly 9 cm primarily in the upper outer quadrant but also with extension into the subareolar region.  She did not have any clinically apparent lymph node involvement.  She had neoadjuvant chemotherapy on the I-SPY 2 clinical trial and was randomized to the paclitaxel, olaparib and durvalumab arm.  She tolerated the treatment reasonably well but had an RCB-III result with final stage [id number].  I have discussed the CREATE-X approach of adjuvant capecitabine for 24 weeks combined with aromatase inhibitor.   2.  Discussion of the plan of radiation first, then CREATE-X.  I discussed that she will have about 6 weeks of radiation.  We will see her at the completion of that 6-week period and begin treatment with capecitabine per the CREATE-X study.  This study did show an improvement in overall survival in women who had significant residual disease following their primary treatment with neoadjuvant chemotherapy for breast cancer.  HER2-negative breast cancer was included in the study and this included ER-positive, HER2-negative and  triple-negative patients.  In the whole study, there was an improvement in overall survival.  I discussed with Mily that this is our best recommendation.  Aromatase inhibitor or other hormonal treatment was continued with the capecitabine.  While this is not standard, the clinical trial was performed in this fashion.  I think it makes sense to stay with the mode of performance of the clinical trial.  All of Mily's questions were answered.   3. Radiation plan. Ms. Lei will undergo another expansion next week on 09/16, after which she will fly out to [place] for her son's wedding between 09/17-09/22.  She will contact our clinic once she has completed full expansion on the right side, at which time we will schedule a simulation session.   4.  Followup.  We will see Mily in followup in our clinic in 6 weeks.  Follow up with me Tuesday December 3 with CBC, CMP.     Thank you for allowing us to continue to participate in Mily Lei's care.      Christian Guzman MD      [facility]     CREATE-X and AI after XRT.

## 2019-10-08 NOTE — NURSING NOTE
"Oncology Rooming Note    October 8, 2019 9:37 AM   Kathy Lei is a 53 year old female who presents for:    Chief Complaint   Patient presents with     Blood Draw     vitals and venipuncture done by [facility]     Oncology Clinic Visit     RETURN VISIT; BREAST CANCER FOLLOW UP      Initial Vitals: /68 (BP Location: Right arm, Patient Position: Sitting, Cuff Size: Adult Regular)   Pulse 80   Temp 98  F (36.7  C) (Oral)   Resp 13   Ht 1.676 m (5' 5.98\")   Wt 62.1 kg (136 lb 14.4 oz)   LMP 11/02/2014   SpO2 97%   BMI 22.11 kg/m   Estimated body mass index is 22.11 kg/m  as calculated from the following:    Height as of this encounter: 1.676 m (5' 5.98\").    Weight as of this encounter: 62.1 kg (136 lb 14.4 oz). Body surface area is 1.7 meters squared.  No Pain (0) Comment: Data Unavailable   Patient's last menstrual period was 11/02/2014.  Allergies reviewed: Yes  Medications reviewed: Yes    Medications: Medication refills not needed today.  Pharmacy name entered into [employer]:    [id number] - [place], [place] - [address] [place] [address] [id number] - [place], [place] - [address] AT [facility] [address]    Clinical concerns: Patient thinks she has an infection in her right breast. Red in color on upper left side of right breast.  Dr Guzman was notified.      Shweta Okeefe              "

## 2019-10-09 ENCOUNTER — MYC MEDICAL ADVICE (OUTPATIENT)
Dept: FAMILY MEDICINE | Facility: CLINIC | Age: 54
End: 2019-10-09

## 2019-10-09 DIAGNOSIS — J45.41 MODERATE PERSISTENT ASTHMA WITH (ACUTE) EXACERBATION: ICD-10-CM

## 2019-10-09 RX ORDER — PREDNISONE 20 MG/1
TABLET ORAL
Qty: 18 TABLET | Refills: 0 | Status: SHIPPED | OUTPATIENT
Start: 2019-10-09 | End: 2019-10-30

## 2019-10-09 NOTE — TELEPHONE ENCOUNTER
LOV: 9/24/2019    Patient requesting another round of Prednisone. Would you like an E-visit or office visit?    Patient has hx of breast cancer.     Mychart sent to clarify continued symptoms.    Thanks! Soila Acosta RN

## 2019-10-10 ENCOUNTER — ALLIED HEALTH/NURSE VISIT (OUTPATIENT)
Dept: RADIATION ONCOLOGY | Facility: CLINIC | Age: 54
End: 2019-10-10
Attending: RADIOLOGY
Payer: COMMERCIAL

## 2019-10-10 DIAGNOSIS — C50.411 MALIGNANT NEOPLASM OF UPPER-OUTER QUADRANT OF RIGHT BREAST IN FEMALE, ESTROGEN RECEPTOR POSITIVE (H): Primary | ICD-10-CM

## 2019-10-10 DIAGNOSIS — Z17.0 MALIGNANT NEOPLASM OF UPPER-OUTER QUADRANT OF RIGHT BREAST IN FEMALE, ESTROGEN RECEPTOR POSITIVE (H): Primary | ICD-10-CM

## 2019-10-10 PROCEDURE — 77334 RADIATION TREATMENT AID(S): CPT | Performed by: RADIOLOGY

## 2019-10-10 PROCEDURE — 77290 THER RAD SIMULAJ FIELD CPLX: CPT | Performed by: RADIOLOGY

## 2019-10-10 NOTE — NURSING NOTE
Radiation Therapy Patient Education    Person involved with teaching: Patient and     Patient educational needs for self management of treatment-related side effects assessment completed.  EPIC Patient Ed tab contains Patient Learning Assessment    Education Materials Given  Skin Care During Radiation Treatment and Sim pamphlet    Educational Topics Discussed  Side effects expected, Pain management, Skin care, Activity, Nutrition and weight loss and When to call MD/RN    Response To Teaching  Verbalizes understanding    Referrals sent: None    Chemotherapy?  No

## 2019-10-14 ASSESSMENT — ENCOUNTER SYMPTOMS
POSTURAL DYSPNEA: 0
SHORTNESS OF BREATH: 0
SNORES LOUDLY: 0
SPUTUM PRODUCTION: 0
HEMOPTYSIS: 0
COUGH: 0
DYSPNEA ON EXERTION: 0
WHEEZING: 1
COUGH DISTURBING SLEEP: 0

## 2019-10-15 ENCOUNTER — OFFICE VISIT (OUTPATIENT)
Dept: PLASTIC SURGERY | Facility: CLINIC | Age: 54
End: 2019-10-15
Attending: PLASTIC SURGERY
Payer: COMMERCIAL

## 2019-10-15 VITALS
BODY MASS INDEX: 22.4 KG/M2 | TEMPERATURE: 98.4 F | DIASTOLIC BLOOD PRESSURE: 80 MMHG | OXYGEN SATURATION: 95 % | HEIGHT: 66 IN | RESPIRATION RATE: 16 BRPM | HEART RATE: 84 BPM | SYSTOLIC BLOOD PRESSURE: 124 MMHG | WEIGHT: 139.4 LBS

## 2019-10-15 DIAGNOSIS — Z98.890 S/P BREAST RECONSTRUCTION, BILATERAL: Primary | ICD-10-CM

## 2019-10-15 PROCEDURE — G0463 HOSPITAL OUTPT CLINIC VISIT: HCPCS | Mod: ZF

## 2019-10-15 ASSESSMENT — PAIN SCALES - GENERAL: PAINLEVEL: NO PAIN (0)

## 2019-10-15 ASSESSMENT — MIFFLIN-ST. JEOR: SCORE: 1243.74

## 2019-10-15 NOTE — LETTER
10/15/2019       RE: Kathy Lei  [address] [place] [address]     Dear Colleague,    Thank you for referring your patient, Kathy Lei, to the [facility] at [facility]. Please see a copy of my visit note below.    PRESENTING COMPLAINT:  Postoperative visit, status post bilateral breast reconstruction with expanders after undergoing bilateral nipple non-sparing mastectomy for left-sided breast cancer done 08/15/2019.      HISTORY OF PRESENTING COMPLAINT:  Ms. Lei is 53 years old.  She is scheduled to undergo radiation therapy to her right breast.  She is here to have fluid removed from her left breast expander in preparation for radiation.      ASSESSMENT AND PLAN:  Based upon the above findings, a diagnosis of bilateral breast reconstruction was made.  Under sterile conditions, the left-sided expander was decreased by 200 cc.  This brings the total on the left side to 220 cc, right side 360 cc.  She will now get radiation to the right side and see me back thereafter.     Again, thank you for allowing me to participate in the care of your patient.      Sincerely,    LALY Ramos MD

## 2019-10-15 NOTE — PROGRESS NOTES
PRESENTING COMPLAINT:  Postoperative visit, status post bilateral breast reconstruction with expanders after undergoing bilateral nipple non-sparing mastectomy for right-sided breast cancer done 08/15/2019.      HISTORY OF PRESENTING COMPLAINT:  Ms. Lei is 53 years old.  She is scheduled to undergo radiation therapy to her right breast.  She is here to have fluid removed from her left breast expander in preparation for radiation.      ASSESSMENT AND PLAN:  Based upon the above findings, a diagnosis of bilateral breast reconstruction was made.  Under sterile conditions, the left-sided expander was decreased by 200 cc.  This brings the total on the left side to 220 cc, right side 360 cc.  She will now get radiation to the right side and see me back thereafter.

## 2019-10-23 ENCOUNTER — APPOINTMENT (OUTPATIENT)
Dept: RADIATION ONCOLOGY | Facility: CLINIC | Age: 54
End: 2019-10-23
Attending: RADIOLOGY
Payer: COMMERCIAL

## 2019-10-25 ENCOUNTER — TELEPHONE (OUTPATIENT)
Dept: RADIATION ONCOLOGY | Facility: CLINIC | Age: 54
End: 2019-10-25

## 2019-10-25 ENCOUNTER — PATIENT OUTREACH (OUTPATIENT)
Dept: PLASTIC SURGERY | Facility: CLINIC | Age: 54
End: 2019-10-25

## 2019-10-25 NOTE — PATIENT INSTRUCTIONS
Spoke with pt regarding need for a follow up appt. Pt states she has been instructed that she will need 50-100cc of fluid removed from the left breast before radiation this coming Tuesday. Pt scheduled for visit on 10/29/19 at 9:30am to have fluid removed from expander. Pt confirms appt date, time, and location. Amanda GIBSON RNCC

## 2019-10-25 NOTE — TELEPHONE ENCOUNTER
RN was asked to call pt about getting another  ml taken out of her Lt breast implant for better treatment position.     Pt was able to get an appointment Tuesday 10/29/19 0930 prior to her first radiation treatment.

## 2019-10-26 ASSESSMENT — ENCOUNTER SYMPTOMS
DYSPNEA ON EXERTION: 0
SHORTNESS OF BREATH: 1
HEMOPTYSIS: 0
WHEEZING: 1
DEPRESSION: 0
PANIC: 0
SPUTUM PRODUCTION: 0
INSOMNIA: 0
NERVOUS/ANXIOUS: 1
COUGH: 0
COUGH DISTURBING SLEEP: 0
POSTURAL DYSPNEA: 0
SNORES LOUDLY: 0
DECREASED CONCENTRATION: 0

## 2019-10-29 ENCOUNTER — ANESTHESIA EVENT (OUTPATIENT)
Dept: SURGERY | Facility: AMBULATORY SURGERY CENTER | Age: 54
End: 2019-10-29

## 2019-10-29 ENCOUNTER — OFFICE VISIT (OUTPATIENT)
Dept: PLASTIC SURGERY | Facility: CLINIC | Age: 54
End: 2019-10-29
Attending: PLASTIC SURGERY
Payer: COMMERCIAL

## 2019-10-29 ENCOUNTER — DOCUMENTATION ONLY (OUTPATIENT)
Dept: PLASTIC SURGERY | Facility: CLINIC | Age: 54
End: 2019-10-29

## 2019-10-29 ENCOUNTER — ANESTHESIA (OUTPATIENT)
Dept: SURGERY | Facility: AMBULATORY SURGERY CENTER | Age: 54
End: 2019-10-29

## 2019-10-29 VITALS
WEIGHT: 135.6 LBS | HEART RATE: 112 BPM | SYSTOLIC BLOOD PRESSURE: 112 MMHG | HEIGHT: 66 IN | OXYGEN SATURATION: 98 % | DIASTOLIC BLOOD PRESSURE: 78 MMHG | TEMPERATURE: 98.6 F | BODY MASS INDEX: 21.79 KG/M2 | RESPIRATION RATE: 16 BRPM

## 2019-10-29 DIAGNOSIS — Z98.890 S/P BREAST RECONSTRUCTION, BILATERAL: Primary | ICD-10-CM

## 2019-10-29 LAB
APPEARANCE FLD: NORMAL
BASOPHILS NFR FLD MANUAL: 1 %
COLOR FLD: YELLOW
EOSINOPHIL NFR FLD MANUAL: 26 %
GRAM STN SPEC: NORMAL
LYMPHOCYTES NFR FLD MANUAL: 27 %
Lab: NORMAL
MONOS+MACROS NFR FLD MANUAL: 23 %
NEUTS BAND NFR FLD MANUAL: 23 %
SPECIMEN SOURCE FLD: NORMAL
SPECIMEN SOURCE: NORMAL
WBC # FLD AUTO: 2259 /UL

## 2019-10-29 PROCEDURE — 87077 CULTURE AEROBIC IDENTIFY: CPT | Performed by: PLASTIC SURGERY

## 2019-10-29 PROCEDURE — G0463 HOSPITAL OUTPT CLINIC VISIT: HCPCS | Mod: ZF

## 2019-10-29 PROCEDURE — 87070 CULTURE OTHR SPECIMN AEROBIC: CPT | Performed by: PLASTIC SURGERY

## 2019-10-29 PROCEDURE — 87075 CULTR BACTERIA EXCEPT BLOOD: CPT | Performed by: PLASTIC SURGERY

## 2019-10-29 PROCEDURE — 89051 BODY FLUID CELL COUNT: CPT | Performed by: PLASTIC SURGERY

## 2019-10-29 PROCEDURE — 87205 SMEAR GRAM STAIN: CPT | Performed by: PLASTIC SURGERY

## 2019-10-29 RX ORDER — CEFAZOLIN SODIUM 1 G/50ML
1 INJECTION, SOLUTION INTRAVENOUS SEE ADMIN INSTRUCTIONS
Status: CANCELLED | OUTPATIENT
Start: 2019-10-29

## 2019-10-29 RX ORDER — CEFAZOLIN SODIUM 2 G/50ML
2 SOLUTION INTRAVENOUS
Status: CANCELLED | OUTPATIENT
Start: 2019-10-29

## 2019-10-29 RX ORDER — SULFAMETHOXAZOLE/TRIMETHOPRIM 800-160 MG
1 TABLET ORAL 2 TIMES DAILY
Qty: 20 TABLET | Refills: 0 | Status: SHIPPED | OUTPATIENT
Start: 2019-10-29 | End: 2019-10-30

## 2019-10-29 ASSESSMENT — LIFESTYLE VARIABLES: TOBACCO_USE: 1

## 2019-10-29 ASSESSMENT — MIFFLIN-ST. JEOR: SCORE: 1226.58

## 2019-10-29 ASSESSMENT — PAIN SCALES - GENERAL: PAINLEVEL: NO PAIN (0)

## 2019-10-29 NOTE — NURSING NOTE
"Oncology Rooming Note    October 29, 2019 9:27 AM   Kathy Lei is a 53 year old female who presents for:    Chief Complaint   Patient presents with     Oncology Clinic Visit     [facility] RETURN- BREAST CA     Initial Vitals: /78 (BP Location: Right arm, Patient Position: Chair, Cuff Size: Adult Regular)   Pulse 112   Temp 98.6  F (37  C) (Oral)   Resp 16   Ht 1.676 m (5' 5.98\")   Wt 61.5 kg (135 lb 9.6 oz)   LMP 11/02/2014   SpO2 98%   BMI 21.90 kg/m   Estimated body mass index is 21.9 kg/m  as calculated from the following:    Height as of this encounter: 1.676 m (5' 5.98\").    Weight as of this encounter: 61.5 kg (135 lb 9.6 oz). Body surface area is 1.69 meters squared.  No Pain (0) Comment: Data Unavailable   Patient's last menstrual period was 11/02/2014.  Allergies reviewed: Yes  Medications reviewed: Yes    Medications: Medication refills not needed today.  Pharmacy name entered into [facility]:    [facility] - [place], [place] - [address] [place] [address].  [id number] - [place], [place] - [address] AT [facility] [address]    Clinical concerns: No new concerns. Richard was notified.      Shakir Skaggs LPN            "

## 2019-10-29 NOTE — PROGRESS NOTES
POSTOPERATIVE VISIT NOTE      PRESENTING COMPLAINT:  Postoperative visit, status post bilateral breast reconstruction with expanders after undergoing bilateral nipple non-sparing mastectomy for right-sided breast cancer done on 08/15/2019.      HISTORY OF PRESENTING COMPLAINT:  Ms. Lei is 53 years old.  She is scheduled to undergo right-sided breast radiation therapy.  Here to have more fluid from the left breast expander removed in preparation for the radiation.  However, she has been complaining of some pain in her right breast and some redness that started over the weekend.  No fevers, feels well.      PHYSICAL EXAMINATION:  Vital signs are stable.  She is afebrile, in no obvious distress.  Left breast is healing well.  No evidence of infection.  Right breast on the other hand is taut.  It has redness to it.  It is a red breast with erythema and warmth consistent with a cellulitis.  There is also some fluid underneath the skin flaps.      ASSESSMENT AND PLAN:  Based on above findings, a diagnosis of bilateral breast reconstruction for right-sided breast cancer was made.  Under sterile conditions, I went ahead and aspirated some of the fluid from around the implant.  It came back as turbid fluid.  We sent it for cell count and Gram stain and cultures.  I talked to Dr. Padilla of Radiation Oncology and we agree that this expander needs to be removed as soon as possible, to treat this infection and then go ahead and start radiation in a couple weeks' time.  I have added her onto the schedule surgery schedule for today.  I explained to the patient why we are doing this and how we will do it.  She understood.  All risks, benefits and alternatives of the removal including pain, infection, bleeding, scarring, asymmetry, seromas, hematomas, wound breakdown, wound dehiscence, delay of treatments, infectious complications, healing complications, DVT, PE, MI, CVA, pneumonia and death.  She understood everything and wants  to proceed.  All questions were answered.  She was happy with the visit.     After reviewing the gram stain (nagative for bacteria) and the cell count (about 2000), plan is to start her on Bactrim and monitor her for 48 hours. If all redness gone by Thursday then will washout and replace the expander, if not then remove. Patient aware.

## 2019-10-29 NOTE — ANESTHESIA PREPROCEDURE EVALUATION
Anesthesia Pre-Procedure Evaluation    Patient: Kathy Lei   MRN:     [id number] Gender:   female   Age:    53 year old :      1965        Preoperative Diagnosis: Malignant Neoplasm of Upper Outer Quadrant of Right Female Breast   Procedure(s):  Single Lumen Chest Port Placement     Past Medical History:   Diagnosis Date     Asthma      Breast cancer (H) 2018     Depressive disorder, not elsewhere classified      Hypertension      Hypothyroidism      Malignant neoplasm of upper-outer quadrant of right breast in female, estrogen receptor positive (H) 2019     Mild intermittent asthma      PONV (postoperative nausea and vomiting)      Scoliosis (and kyphoscoliosis), idiopathic       Past Surgical History:   Procedure Laterality Date     ABDOMEN SURGERY   c section     BACK SURGERY  scoliosis fusion      BIOPSY  breast     BREAST SURGERY  implants      BRONCHOSCOPY (RIGID OR FLEXIBLE), DIAGNOSTIC N/A 2019    Procedure: BRONCHOSCOPY, WITH BRONCHOALVEOLAR LAVAGE;  Surgeon: Maksim Claros MD;  Location: [facility]     C/SECTION, LOW TRANSVERSE      , Low Transverse     COSMETIC SURGERY  breast implants      INSERT PORT VASCULAR ACCESS Left 3/6/2019    Procedure: Single Lumen Chest Port Placement;  Surgeon: Jerome Dash PA-C;  Location: [facility]     IR CHEST PORT PLACEMENT > 5 YRS OF AGE  3/6/2019     MASTECTOMY SIMPLE, SENTINEL NODE, COMBINED N/A 8/15/2019    Procedure: Bilateral Skin Sparing Mastectomy, Right Axillary [place] Lymph Node Biopsy;  Surgeon: Anne Yousif MD;  Location: [facility]     RECONSTRUCT BREAST, INSERT TISSUE EXPANDER, COMBINED Bilateral 8/15/2019    Procedure: Bilateral Breast Reconstruction With Expanders and SPY;  Surgeon: LALY Ramos MD;  Location: [facility]     REMOVE IMPLANT BREAST Bilateral 8/15/2019    Procedure: Removal of bilateral breast implant and Bilateral excision of implant capsules;  Surgeon: Anne Yousif  MD Elizabeth;  Location: [facility]     REMOVE PORT VASCULAR ACCESS N/A 8/15/2019    Procedure: Remove Vascular Access Port;  Surgeon: Anne Yousif MD;  Location: [facility]     SURGICAL HISTORY OF -       spinal fusion- Laecy kim     SURGICAL HISTORY OF -       removal of right breast papilloma          Anesthesia Evaluation     . Pt has had prior anesthetic.     History of anesthetic complications   - PONV        ROS/MED HX    ENT/Pulmonary:     (+)tobacco use, Past use Intermittent asthma Treatment: Inhaler prn,  , . .    Neurologic:  - neg neurologic ROS     Cardiovascular:     (+) hypertension----. : . . . :. . Previous cardiac testing Echodate:2019results:Interpretation Summary  Borderline (EF 50-55%) reduced left ventricular function is present.Mild  concentric wall thickening consistent with left ventricular hypertrophy is  present.  Global right ventricular function is normal.  Mild sclerosis of the aortic valve and mitral annulus present.  The inferior vena cava was normal in size with preserved respiratory  variability.  No pericardial effusion is present.date: results:ECG reviewed date:2019 results:Sinus rhythm date: results:          METS/Exercise Tolerance:  >4 METS   Hematologic:  - neg hematologic  ROS       Musculoskeletal:   (+)  other musculoskeletal- scoliosis      GI/Hepatic:  - neg GI/hepatic ROS       Renal/Genitourinary:         Endo:     (+) thyroid problem hypothyroidism, .      Psychiatric:     (+) psychiatric history depression      Infectious Disease:         Malignancy:   (+) Malignancy History of Breast  Breast CA Active status post.         Other:    (+) no H/O Chronic Pain,                       PHYSICAL EXAM:   Mental Status/Neuro: A/A/O   Airway: Facies: Feasible  Mallampati: II  Mouth/Opening: Full  TM distance: > 6 cm  Neck ROM: Full   Respiratory: Auscultation: CTAB     Resp. Rate: Normal     Resp. Effort: Normal      CV: Rhythm: Regular  Rate: Age appropriate  Heart: Normal  "Sounds   Comments:                      Lab Results   Component Value Date    WBC 5.3 10/08/2019    HGB 13.7 10/08/2019    HCT 42.3 10/08/2019     10/08/2019    CRP 55.3 (H) 07/22/2019    SED 61 (H) 07/22/2019     10/08/2019    POTASSIUM 3.7 10/08/2019    CHLORIDE 105 10/08/2019    CO2 28 10/08/2019    BUN 6 (L) 10/08/2019    CR 0.56 10/08/2019    GLC 87 10/08/2019    BRYANT 9.0 10/08/2019    PHOS 3.0 06/10/2019    MAG 2.1 06/10/2019    ALBUMIN 3.6 10/08/2019    PROTTOTAL 6.9 10/08/2019    ALT 26 10/08/2019    AST 18 10/08/2019    ALKPHOS 57 10/08/2019    BILITOTAL 0.2 10/08/2019    PTT 26 02/05/2019    INR 1.02 02/05/2019    FIBR 264 02/05/2019    TSH 3.27 09/12/2019    T4 1.15 07/16/2019    HCG (A) 11/20/2002     Canceled, Test credited  CORRECTED ON 11/21 AT 1617: PREVIOUSLY REPORTED AS Negative       Preop Vitals  BP Readings from Last 3 Encounters:   10/29/19 112/78   10/15/19 124/80   10/08/19 111/68    Pulse Readings from Last 3 Encounters:   10/29/19 112   10/15/19 84   10/08/19 80      Resp Readings from Last 3 Encounters:   10/29/19 16   10/15/19 16   10/08/19 13    SpO2 Readings from Last 3 Encounters:   10/29/19 98%   10/15/19 95%   10/08/19 97%      Temp Readings from Last 1 Encounters:   10/29/19 37  C (98.6  F) (Oral)    Ht Readings from Last 1 Encounters:   10/29/19 1.676 m (5' 5.98\")      Wt Readings from Last 1 Encounters:   10/29/19 61.5 kg (135 lb 9.6 oz)    Estimated body mass index is 21.9 kg/m  as calculated from the following:    Height as of an earlier encounter on 10/29/19: 1.676 m (5' 5.98\").    Weight as of an earlier encounter on 10/29/19: 61.5 kg (135 lb 9.6 oz).     LDA:  Closed/Suction Drain 1 Left;Anterior Breast Bulb 15 [place] (Active)   Number of days: 75       Closed/Suction Drain 2 Right;Anterior Breast Bulb 15 [place] (Active)   Number of days: 75            Assessment: Elective due to   ASA SCORE: 2    H&P: History and physical reviewed and following examination; no " interval change.        - H&P complete; Preop Testing complete; Consents complete  Smoking Status:  NO Active use of Tobacco/UNKNOWN Tobacco use status   NPO Status: > 6 hours since completed Solid Foods     Plan:   Anes. Type:  General   Pre-Medication: None   Induction:  IV (Standard)   Airway: LMA   Access/Monitoring: PIV   Maintenance: TIVA     Advanced Monitoring: BIS     Postop Plan:   Postop Pain: Opioids  Postop Sedation/Airway: Not planned  Disposition: Outpatient     PONV Management:   Adult Risk Factors: Female, H/o PONV or Motion Sickness, Non-Smoker, Postop Opioids   Prevention: Ondansetron, Dexamethasone, Propofol, No Volatiles     CONSENT: Direct conversation   Plan and risks discussed with: Patient   Blood Products: Consented (ALL Blood Products)       Comments for Plan/Consent:  Plan:  GA with routine monitors    Rico Quispe MD

## 2019-10-29 NOTE — LETTER
10/29/2019       RE: Kathy Lei  [address] [place] [address]     Dear Colleague,    Thank you for referring your patient, Kathy Lei, to the [facility] at [facility]. Please see a copy of my visit note below.    POSTOPERATIVE VISIT NOTE      PRESENTING COMPLAINT:  Postoperative visit, status post bilateral breast reconstruction with expanders after undergoing bilateral nipple non-sparing mastectomy for right-sided breast cancer done on 08/15/2019.      HISTORY OF PRESENTING COMPLAINT:  Ms. Lei is 53 years old.  She is scheduled to undergo right-sided breast radiation therapy.  Here to have more fluid from the left breast expander removed in preparation for the radiation.  However, she has been complaining of some pain in her right breast and some redness that started over the weekend.  No fevers, feels well.      PHYSICAL EXAMINATION:  Vital signs are stable.  She is afebrile, in no obvious distress.  Left breast is healing well.  No evidence of infection.  Right breast on the other hand is taut.  It has redness to it.  It is a red breast with erythema and warmth consistent with a cellulitis.  There is also some fluid underneath the skin flaps.      ASSESSMENT AND PLAN:  Based on above findings, a diagnosis of bilateral breast reconstruction for right-sided breast cancer was made.  Under sterile conditions, I went ahead and aspirated some of the fluid from around the implant.  It came back as turbid fluid.  We sent it for cell count and Gram stain and cultures.  I talked to Dr. Padilla of Radiation Oncology and we agree that this expander needs to be removed as soon as possible, to treat this infection and then go ahead and start radiation in a couple weeks' time.  I have added her onto the schedule surgery schedule for today.  I explained to the patient why we are doing this and how we will do it.  She understood.  All risks,  benefits and alternatives of the removal including pain, infection, bleeding, scarring, asymmetry, seromas, hematomas, wound breakdown, wound dehiscence, delay of treatments, infectious complications, healing complications, DVT, PE, MI, CVA, pneumonia and death.  She understood everything and wants to proceed.  All questions were answered.  She was happy with the visit.     After reviewing the gram stain (nagative for bacteria) and the cell count (about 2000), plan is to start her on Bactrim and monitor her for 48 hours. If all redness gone by Thursday then will washout and replace the expander, if not then remove. Patient aware.     Again, thank you for allowing me to participate in the care of your patient.      Sincerely,    LALY Ramos MD

## 2019-10-30 ENCOUNTER — PATIENT OUTREACH (OUTPATIENT)
Dept: PLASTIC SURGERY | Facility: CLINIC | Age: 54
End: 2019-10-30

## 2019-10-30 RX ORDER — ALCOHOL WIPES 75 ML/100ML
1 CLOTH TOPICAL 2 TIMES DAILY
COMMUNITY
End: 2020-01-14

## 2019-10-30 NOTE — PATIENT INSTRUCTIONS
Spoke with pt regarding plan for surgery tomorrow. Explained if everything is looking like it has improved, no redness or swelling, Dr. Ramos will do a washout of the area and place a new expander. If there is still redness or swelling, he will remove the expander and close the area. Pt states understanding and denies any additional questions or concerns at this time. Amanda GIBSON RNCC

## 2019-10-31 ENCOUNTER — HOSPITAL ENCOUNTER (OUTPATIENT)
Facility: CLINIC | Age: 54
Discharge: HOME | End: 2019-10-31
Attending: PLASTIC SURGERY | Admitting: PLASTIC SURGERY
Payer: COMMERCIAL

## 2019-10-31 ENCOUNTER — ANESTHESIA EVENT (OUTPATIENT)
Dept: SURGERY | Facility: CLINIC | Age: 54
End: 2019-10-31
Payer: COMMERCIAL

## 2019-10-31 ENCOUNTER — ANESTHESIA (OUTPATIENT)
Dept: SURGERY | Facility: CLINIC | Age: 54
End: 2019-10-31
Payer: COMMERCIAL

## 2019-10-31 VITALS
RESPIRATION RATE: 16 BRPM | HEIGHT: 66 IN | SYSTOLIC BLOOD PRESSURE: 108 MMHG | BODY MASS INDEX: 21.9 KG/M2 | TEMPERATURE: 98 F | WEIGHT: 136.24 LBS | OXYGEN SATURATION: 96 % | HEART RATE: 94 BPM | DIASTOLIC BLOOD PRESSURE: 75 MMHG

## 2019-10-31 DIAGNOSIS — Z90.13 S/P MASTECTOMY, BILATERAL: Primary | ICD-10-CM

## 2019-10-31 LAB
GLUCOSE BLDC GLUCOMTR-MCNC: 88 MG/DL (ref 70–99)
GRAM STN SPEC: NORMAL
POTASSIUM SERPL-SCNC: 3.6 MMOL/L (ref 3.4–5.3)
SPECIMEN SOURCE: NORMAL

## 2019-10-31 PROCEDURE — 84132 ASSAY OF SERUM POTASSIUM: CPT | Performed by: ANESTHESIOLOGY

## 2019-10-31 PROCEDURE — 25000128 H RX IP 250 OP 636: Performed by: NURSE ANESTHETIST, CERTIFIED REGISTERED

## 2019-10-31 PROCEDURE — 37000009 ZZH ANESTHESIA TECHNICAL FEE, EACH ADDTL 15 MIN: Performed by: PLASTIC SURGERY

## 2019-10-31 PROCEDURE — 71000014 ZZH RECOVERY PHASE 1 LEVEL 2 FIRST HR: Performed by: PLASTIC SURGERY

## 2019-10-31 PROCEDURE — 25000125 ZZHC RX 250: Performed by: NURSE ANESTHETIST, CERTIFIED REGISTERED

## 2019-10-31 PROCEDURE — 88300 SURGICAL PATH GROSS: CPT | Performed by: PLASTIC SURGERY

## 2019-10-31 PROCEDURE — 87075 CULTR BACTERIA EXCEPT BLOOD: CPT | Performed by: PLASTIC SURGERY

## 2019-10-31 PROCEDURE — 25000132 ZZH RX MED GY IP 250 OP 250 PS 637: Performed by: ANESTHESIOLOGY

## 2019-10-31 PROCEDURE — 87070 CULTURE OTHR SPECIMN AEROBIC: CPT | Mod: 91 | Performed by: PLASTIC SURGERY

## 2019-10-31 PROCEDURE — 25800030 ZZH RX IP 258 OP 636: Performed by: ANESTHESIOLOGY

## 2019-10-31 PROCEDURE — 27210794 ZZH OR GENERAL SUPPLY STERILE: Performed by: PLASTIC SURGERY

## 2019-10-31 PROCEDURE — 25000128 H RX IP 250 OP 636: Performed by: PLASTIC SURGERY

## 2019-10-31 PROCEDURE — 25800030 ZZH RX IP 258 OP 636: Performed by: NURSE ANESTHETIST, CERTIFIED REGISTERED

## 2019-10-31 PROCEDURE — 40000170 ZZH STATISTIC PRE-PROCEDURE ASSESSMENT II: Performed by: PLASTIC SURGERY

## 2019-10-31 PROCEDURE — 87070 CULTURE OTHR SPECIMN AEROBIC: CPT | Performed by: PLASTIC SURGERY

## 2019-10-31 PROCEDURE — 82962 GLUCOSE BLOOD TEST: CPT

## 2019-10-31 PROCEDURE — 87176 TISSUE HOMOGENIZATION CULTR: CPT | Performed by: PLASTIC SURGERY

## 2019-10-31 PROCEDURE — 37000008 ZZH ANESTHESIA TECHNICAL FEE, 1ST 30 MIN: Performed by: PLASTIC SURGERY

## 2019-10-31 PROCEDURE — 25000566 ZZH SEVOFLURANE, EA 15 MIN: Performed by: PLASTIC SURGERY

## 2019-10-31 PROCEDURE — 25000128 H RX IP 250 OP 636: Performed by: ANESTHESIOLOGY

## 2019-10-31 PROCEDURE — 87102 FUNGUS ISOLATION CULTURE: CPT | Performed by: PLASTIC SURGERY

## 2019-10-31 PROCEDURE — 87205 SMEAR GRAM STAIN: CPT | Performed by: PLASTIC SURGERY

## 2019-10-31 PROCEDURE — 36000057 ZZH SURGERY LEVEL 3 1ST 30 MIN - UMMC: Performed by: PLASTIC SURGERY

## 2019-10-31 PROCEDURE — 25000125 ZZHC RX 250: Performed by: PLASTIC SURGERY

## 2019-10-31 PROCEDURE — 36415 COLL VENOUS BLD VENIPUNCTURE: CPT | Performed by: ANESTHESIOLOGY

## 2019-10-31 PROCEDURE — 71000027 ZZH RECOVERY PHASE 2 EACH 15 MINS: Performed by: PLASTIC SURGERY

## 2019-10-31 PROCEDURE — 36000059 ZZH SURGERY LEVEL 3 EA 15 ADDTL MIN UMMC: Performed by: PLASTIC SURGERY

## 2019-10-31 RX ORDER — ONDANSETRON 2 MG/ML
INJECTION INTRAMUSCULAR; INTRAVENOUS PRN
Status: DISCONTINUED | OUTPATIENT
Start: 2019-10-31 | End: 2019-10-31

## 2019-10-31 RX ORDER — OXYCODONE HYDROCHLORIDE 5 MG/1
5 TABLET ORAL EVERY 6 HOURS PRN
Qty: 10 TABLET | Refills: 0 | Status: SHIPPED | OUTPATIENT
Start: 2019-10-31 | End: 2020-01-14

## 2019-10-31 RX ORDER — HYDROMORPHONE HYDROCHLORIDE 1 MG/ML
.3-.5 INJECTION, SOLUTION INTRAMUSCULAR; INTRAVENOUS; SUBCUTANEOUS EVERY 10 MIN PRN
Status: DISCONTINUED | OUTPATIENT
Start: 2019-10-31 | End: 2019-10-31 | Stop reason: HOSPADM

## 2019-10-31 RX ORDER — ACETAMINOPHEN 325 MG/1
650 TABLET ORAL
Status: COMPLETED | OUTPATIENT
Start: 2019-10-31 | End: 2019-10-31

## 2019-10-31 RX ORDER — ONDANSETRON 4 MG/1
4 TABLET, ORALLY DISINTEGRATING ORAL EVERY 30 MIN PRN
Status: DISCONTINUED | OUTPATIENT
Start: 2019-10-31 | End: 2019-10-31 | Stop reason: HOSPADM

## 2019-10-31 RX ORDER — LIDOCAINE HYDROCHLORIDE 20 MG/ML
INJECTION, SOLUTION INFILTRATION; PERINEURAL PRN
Status: DISCONTINUED | OUTPATIENT
Start: 2019-10-31 | End: 2019-10-31

## 2019-10-31 RX ORDER — PROPOFOL 10 MG/ML
INJECTION, EMULSION INTRAVENOUS PRN
Status: DISCONTINUED | OUTPATIENT
Start: 2019-10-31 | End: 2019-10-31

## 2019-10-31 RX ORDER — NALOXONE HYDROCHLORIDE 0.4 MG/ML
.1-.4 INJECTION, SOLUTION INTRAMUSCULAR; INTRAVENOUS; SUBCUTANEOUS
Status: DISCONTINUED | OUTPATIENT
Start: 2019-10-31 | End: 2019-10-31 | Stop reason: HOSPADM

## 2019-10-31 RX ORDER — LIDOCAINE 40 MG/G
CREAM TOPICAL
Status: DISCONTINUED | OUTPATIENT
Start: 2019-10-31 | End: 2019-10-31 | Stop reason: HOSPADM

## 2019-10-31 RX ORDER — ALBUTEROL SULFATE 0.83 MG/ML
2.5 SOLUTION RESPIRATORY (INHALATION)
Status: DISCONTINUED | OUTPATIENT
Start: 2019-10-31 | End: 2019-10-31 | Stop reason: HOSPADM

## 2019-10-31 RX ORDER — FENTANYL CITRATE 50 UG/ML
INJECTION, SOLUTION INTRAMUSCULAR; INTRAVENOUS PRN
Status: DISCONTINUED | OUTPATIENT
Start: 2019-10-31 | End: 2019-10-31

## 2019-10-31 RX ORDER — DEXAMETHASONE SODIUM PHOSPHATE 4 MG/ML
INJECTION, SOLUTION INTRA-ARTICULAR; INTRALESIONAL; INTRAMUSCULAR; INTRAVENOUS; SOFT TISSUE PRN
Status: DISCONTINUED | OUTPATIENT
Start: 2019-10-31 | End: 2019-10-31

## 2019-10-31 RX ORDER — FENTANYL CITRATE 50 UG/ML
25-50 INJECTION, SOLUTION INTRAMUSCULAR; INTRAVENOUS EVERY 5 MIN PRN
Status: DISCONTINUED | OUTPATIENT
Start: 2019-10-31 | End: 2019-10-31 | Stop reason: HOSPADM

## 2019-10-31 RX ORDER — SODIUM CHLORIDE, SODIUM LACTATE, POTASSIUM CHLORIDE, CALCIUM CHLORIDE 600; 310; 30; 20 MG/100ML; MG/100ML; MG/100ML; MG/100ML
INJECTION, SOLUTION INTRAVENOUS CONTINUOUS
Status: DISCONTINUED | OUTPATIENT
Start: 2019-10-31 | End: 2019-10-31 | Stop reason: HOSPADM

## 2019-10-31 RX ORDER — CEFAZOLIN SODIUM 2 G/100ML
2 INJECTION, SOLUTION INTRAVENOUS
Status: COMPLETED | OUTPATIENT
Start: 2019-10-31 | End: 2019-10-31

## 2019-10-31 RX ORDER — PROPOFOL 10 MG/ML
INJECTION, EMULSION INTRAVENOUS CONTINUOUS PRN
Status: DISCONTINUED | OUTPATIENT
Start: 2019-10-31 | End: 2019-10-31

## 2019-10-31 RX ORDER — CEFAZOLIN SODIUM 1 G/3ML
1 INJECTION, POWDER, FOR SOLUTION INTRAMUSCULAR; INTRAVENOUS SEE ADMIN INSTRUCTIONS
Status: DISCONTINUED | OUTPATIENT
Start: 2019-10-31 | End: 2019-10-31 | Stop reason: HOSPADM

## 2019-10-31 RX ORDER — ONDANSETRON 2 MG/ML
4 INJECTION INTRAMUSCULAR; INTRAVENOUS EVERY 30 MIN PRN
Status: DISCONTINUED | OUTPATIENT
Start: 2019-10-31 | End: 2019-10-31 | Stop reason: HOSPADM

## 2019-10-31 RX ORDER — OXYCODONE HYDROCHLORIDE 5 MG/1
5 TABLET ORAL
Status: COMPLETED | OUTPATIENT
Start: 2019-10-31 | End: 2019-10-31

## 2019-10-31 RX ORDER — LORAZEPAM 0.5 MG/1
0.5 TABLET ORAL EVERY 6 HOURS PRN
COMMUNITY
End: 2019-12-30

## 2019-10-31 RX ADMIN — FENTANYL CITRATE 100 MCG: 50 INJECTION, SOLUTION INTRAMUSCULAR; INTRAVENOUS at 15:04

## 2019-10-31 RX ADMIN — MIDAZOLAM 2 MG: 1 INJECTION INTRAMUSCULAR; INTRAVENOUS at 13:51

## 2019-10-31 RX ADMIN — CEFAZOLIN SODIUM 2 G: 2 INJECTION, SOLUTION INTRAVENOUS at 14:10

## 2019-10-31 RX ADMIN — PROPOFOL 50 MG: 10 INJECTION, EMULSION INTRAVENOUS at 14:05

## 2019-10-31 RX ADMIN — PROPOFOL 125 MCG/KG/MIN: 10 INJECTION, EMULSION INTRAVENOUS at 14:07

## 2019-10-31 RX ADMIN — FENTANYL CITRATE 25 MCG: 50 INJECTION, SOLUTION INTRAMUSCULAR; INTRAVENOUS at 15:58

## 2019-10-31 RX ADMIN — FENTANYL CITRATE 150 MCG: 50 INJECTION, SOLUTION INTRAMUSCULAR; INTRAVENOUS at 14:03

## 2019-10-31 RX ADMIN — DEXAMETHASONE SODIUM PHOSPHATE 6 MG: 4 INJECTION, SOLUTION INTRA-ARTICULAR; INTRALESIONAL; INTRAMUSCULAR; INTRAVENOUS; SOFT TISSUE at 14:08

## 2019-10-31 RX ADMIN — PROPOFOL 150 MG: 10 INJECTION, EMULSION INTRAVENOUS at 14:03

## 2019-10-31 RX ADMIN — FENTANYL CITRATE 25 MCG: 50 INJECTION, SOLUTION INTRAMUSCULAR; INTRAVENOUS at 15:53

## 2019-10-31 RX ADMIN — OXYCODONE HYDROCHLORIDE 5 MG: 5 TABLET ORAL at 16:07

## 2019-10-31 RX ADMIN — ACETAMINOPHEN 650 MG: 325 TABLET, FILM COATED ORAL at 16:07

## 2019-10-31 RX ADMIN — ONDANSETRON 4 MG: 2 INJECTION INTRAMUSCULAR; INTRAVENOUS at 14:10

## 2019-10-31 RX ADMIN — SODIUM CHLORIDE, POTASSIUM CHLORIDE, SODIUM LACTATE AND CALCIUM CHLORIDE: 600; 310; 30; 20 INJECTION, SOLUTION INTRAVENOUS at 13:51

## 2019-10-31 RX ADMIN — LIDOCAINE HYDROCHLORIDE 60 MG: 20 INJECTION, SOLUTION INFILTRATION; PERINEURAL at 14:03

## 2019-10-31 RX ADMIN — PHENYLEPHRINE HYDROCHLORIDE 100 MCG: 10 INJECTION INTRAVENOUS at 14:09

## 2019-10-31 ASSESSMENT — MIFFLIN-ST. JEOR: SCORE: 1229.75

## 2019-10-31 ASSESSMENT — LIFESTYLE VARIABLES: TOBACCO_USE: 1

## 2019-10-31 NOTE — BRIEF OP NOTE
[facility] Brief Operative Note    Pre-operative diagnosis: Infection [B99.9]   Post-operative diagnosis As above   Procedure: Procedure(s):  Right Breast Implant Removal   Surgeon(s): Surgeon(s) and Role:     * LALY Ramos MD - Primary   Estimated blood loss: 15 mL    Specimens: ID Type Source Tests Collected by Time Destination   1 : Right Breast Fluid Fluid Breast, Right ANAEROBIC BACTERIAL CULTURE, FLUID CULTURE AEROBIC BACTERIAL, FUNGUS CULTURE, GRAM STAIN LALY Ramos MD 10/31/2019  2:32 PM    2 : Right Breast Tissue Tissue Breast, Right ANAEROBIC BACTERIAL CULTURE, FUNGUS CULTURE, GRAM STAIN, TISSUE CULTURE AEROBIC BACTERIAL LALY Ramos MD 10/31/2019  2:34 PM    3 : Left Breast Fluid Fluid Breast, Left ANAEROBIC BACTERIAL CULTURE, FLUID CULTURE AEROBIC BACTERIAL, FUNGUS CULTURE, GRAM STAIN LALY Ramos MD 10/31/2019  2:42 PM    A : Right Breast Expander Other (specify in comments) Other SURGICAL PATHOLOGY EXAM LALY Ramos MD 10/31/2019  2:39 PM    B : Left Breast Expander Other (specify in comments) Other SURGICAL PATHOLOGY EXAM LALY Ramos MD 10/31/2019  2:47 PM       Findings: x2 15 Fr Drains

## 2019-10-31 NOTE — OR NURSING
[id number] patient arrived to PACU via cart with CRNA and OR Rn at side , nurse hand off report completed ,  Patient  awake / alert, no c/o nausea VSS , chest wraped with ace wrap dry and intact , no drainage present , APOLOL drains present  R/L, will continue t0 monitor MF

## 2019-10-31 NOTE — OR NURSING
1630 nurse hand off report Given to Keisha Rn , page sent out  To Dr aRmos for home going pain script ,  called and made aware of transfer to phase 2 , 1440 patient left for phase 2 via cart with NST . JUAN

## 2019-10-31 NOTE — ANESTHESIA POSTPROCEDURE EVALUATION
Anesthesia POST Procedure Evaluation    Patient: Kathy Lei   MRN:     [id number] Gender:   female   Age:    53 year old :      1965        Preoperative Diagnosis: Infection [B99.9]   Procedure(s):  Right Breast Implant Removal   Postop Comments: No value filed.       Anesthesia Type:  Not documented  General    Reportable Event: NO     PAIN: Uncomplicated   Sign Out status: Comfortable, Well controlled pain     PONV: No PONV   Sign Out status:  No Nausea or Vomiting     Neuro/Psych: Uneventful perioperative course   Sign Out Status: Preoperative baseline; Age appropriate mentation     Airway/Resp.: Uneventful perioperative course   Sign Out Status: Non labored breathing, age appropriate RR; Resp. Status within EXPECTED Parameters     CV: Uneventful perioperative course   Sign Out status: Appropriate BP and perfusion indices; Appropriate HR/Rhythm     Disposition:   Sign Out in:  PACU  Disposition:  Floor  Recovery Course: Uneventful  Follow-Up: Not required           Last Anesthesia Record Vitals:  CRNA VITALS  10/31/2019 1501 - 10/31/2019 1601      10/31/2019             NIBP:  132/79    Pulse:  84    SpO2:  100 %    Resp Rate (observed):  16          Last PACU Vitals:  Vitals Value Taken Time   /77 10/31/2019  4:00 PM   Temp 36.4  C (97.6  F) 10/31/2019  3:35 PM   Pulse 85 10/31/2019  4:00 PM   Resp 15 10/31/2019  4:00 PM   SpO2 98 % 10/31/2019  4:05 PM   Temp src     NIBP 132/79 10/31/2019  3:36 PM   Pulse 84 10/31/2019  3:36 PM   SpO2 100 % 10/31/2019  3:36 PM   Resp     Temp     Ht Rate     Temp 2     Vitals shown include unvalidated device data.      Electronically Signed By: David Bhakta MD, 2019, 4:07 PM

## 2019-10-31 NOTE — ANESTHESIA PREPROCEDURE EVALUATION
Anesthesia Pre-Procedure Evaluation    Patient: Kathy Lei   MRN:     [id number] Gender:   female   Age:    53 year old :      1965        Preoperative Diagnosis: Malignant Neoplasm of Upper Outer Quadrant of Right Female Breast   Procedure(s):  Single Lumen Chest Port Placement     Past Medical History:   Diagnosis Date     Asthma      Breast cancer (H) 2018     Depressive disorder, not elsewhere classified      Hypertension      Hypothyroidism      Malignant neoplasm of upper-outer quadrant of right breast in female, estrogen receptor positive (H) 2019     Mild intermittent asthma      PONV (postoperative nausea and vomiting)      Scoliosis (and kyphoscoliosis), idiopathic       Past Surgical History:   Procedure Laterality Date     ABDOMEN SURGERY   c section     BACK SURGERY  scoliosis fusion      BIOPSY  breast     BREAST SURGERY  implants      BRONCHOSCOPY (RIGID OR FLEXIBLE), DIAGNOSTIC N/A 2019    Procedure: BRONCHOSCOPY, WITH BRONCHOALVEOLAR LAVAGE;  Surgeon: Maksim Claros MD;  Location: [facility]     C/SECTION, LOW TRANSVERSE      , Low Transverse     COSMETIC SURGERY  breast implants      INSERT PORT VASCULAR ACCESS Left 3/6/2019    Procedure: Single Lumen Chest Port Placement;  Surgeon: Jerome Dash PA-C;  [facility]     IR CHEST PORT PLACEMENT > 5 YRS OF AGE  3/6/2019     MASTECTOMY SIMPLE, SENTINEL NODE, COMBINED N/A 8/15/2019    Procedure: Bilateral Skin Sparing Mastectomy, Right Axillary [place] Lymph Node Biopsy;  Surgeon: Anne Yousif MD;  Location: [facility]     RECONSTRUCT BREAST, INSERT TISSUE EXPANDER, COMBINED Bilateral 8/15/2019    Procedure: Bilateral Breast Reconstruction With Expanders and SPY;  Surgeon: LALY Ramos MD;  Location: [facility]     REMOVE IMPLANT BREAST Bilateral 8/15/2019    Procedure: Removal of bilateral breast implant and Bilateral excision of implant capsules;  Surgeon: Anne Yousif  MD Elizabeth;  Location: [facility]     REMOVE PORT VASCULAR ACCESS N/A 8/15/2019    Procedure: Remove Vascular Access Port;  Surgeon: Anne Yousif MD;  Location: [facility]     SURGICAL HISTORY OF -       spinal fusion- Lacey kim     SURGICAL HISTORY OF -       removal of right breast papilloma          Anesthesia Evaluation     . Pt has had prior anesthetic.     History of anesthetic complications   - PONV        ROS/MED HX    ENT/Pulmonary:     (+)tobacco use, Past use Intermittent asthma Treatment: Inhaler prn,  , . .    Neurologic:  - neg neurologic ROS     Cardiovascular:     (+) hypertension----. : . . . :. . Previous cardiac testing Echodate:2019results:Interpretation Summary  Borderline (EF 50-55%) reduced left ventricular function is present.Mild  concentric wall thickening consistent with left ventricular hypertrophy is  present.  Global right ventricular function is normal.  Mild sclerosis of the aortic valve and mitral annulus present.  The inferior vena cava was normal in size with preserved respiratory  variability.  No pericardial effusion is present.date: results:ECG reviewed date:2019 results:Sinus rhythm date: results:          METS/Exercise Tolerance:  >4 METS   Hematologic:  - neg hematologic  ROS       Musculoskeletal:   (+)  other musculoskeletal- scoliosis      GI/Hepatic:  - neg GI/hepatic ROS       Renal/Genitourinary:         Endo:     (+) thyroid problem hypothyroidism, .      Psychiatric:     (+) psychiatric history depression      Infectious Disease:         Malignancy:   (+) Malignancy History of Breast  Breast CA Active status post.         Other:    (+) no H/O Chronic Pain,                       PHYSICAL EXAM:   Mental Status/Neuro: A/A/O   Airway: Facies: Feasible  Mallampati: II  Mouth/Opening: Full  TM distance: > 6 cm  Neck ROM: Full   Respiratory: Auscultation: CTAB     Resp. Rate: Normal     Resp. Effort: Normal      CV: Rhythm: Regular  Rate: Age appropriate  Heart: Normal  "Sounds   Comments:      Dental: Details                  Lab Results   Component Value Date    WBC 5.3 10/08/2019    HGB 13.7 10/08/2019    HCT 42.3 10/08/2019     10/08/2019    CRP 55.3 (H) 07/22/2019    SED 61 (H) 07/22/2019     10/08/2019    POTASSIUM 3.7 10/08/2019    CHLORIDE 105 10/08/2019    CO2 28 10/08/2019    BUN 6 (L) 10/08/2019    CR 0.56 10/08/2019    GLC 87 10/08/2019    BRYANT 9.0 10/08/2019    PHOS 3.0 06/10/2019    MAG 2.1 06/10/2019    ALBUMIN 3.6 10/08/2019    PROTTOTAL 6.9 10/08/2019    ALT 26 10/08/2019    AST 18 10/08/2019    ALKPHOS 57 10/08/2019    BILITOTAL 0.2 10/08/2019    PTT 26 02/05/2019    INR 1.02 02/05/2019    FIBR 264 02/05/2019    TSH 3.27 09/12/2019    T4 1.15 07/16/2019    HCG (A) 11/20/2002     Canceled, Test credited  CORRECTED ON 11/21 AT 1617: PREVIOUSLY REPORTED AS Negative       Preop Vitals  BP Readings from Last 3 Encounters:   10/29/19 112/78   10/15/19 124/80   10/08/19 111/68    Pulse Readings from Last 3 Encounters:   10/29/19 112   10/15/19 84   10/08/19 80      Resp Readings from Last 3 Encounters:   10/29/19 16   10/15/19 16   10/08/19 13    SpO2 Readings from Last 3 Encounters:   10/29/19 98%   10/15/19 95%   10/08/19 97%      Temp Readings from Last 1 Encounters:   10/29/19 37  C (98.6  F) (Oral)    Ht Readings from Last 1 Encounters:   10/29/19 1.676 m (5' 5.98\")      Wt Readings from Last 1 Encounters:   10/29/19 61.5 kg (135 lb 9.6 oz)    Estimated body mass index is 21.9 kg/m  as calculated from the following:    Height as of 10/29/19: 1.676 m (5' 5.98\").    Weight as of 10/29/19: 61.5 kg (135 lb 9.6 oz).     LDA:  Closed/Suction Drain 1 Left;Anterior Breast Bulb 15 [place] (Active)   Number of days: 77       Closed/Suction Drain 2 Right;Anterior Breast Bulb 15 [place] (Active)   Number of days: 77            Assessment: Elective due to   ASA SCORE: 2    H&P: History and physical reviewed and following examination; no interval change.        - H&P " complete; Preop Testing complete; Consents complete  Smoking Status:  NO Active use of Tobacco/UNKNOWN Tobacco use status   NPO Status: > 6 hours since completed Solid Foods     Plan:   Anes. Type:  General   Pre-Medication: None   Induction:  IV (Standard)   Airway: LMA   Access/Monitoring: PIV   Maintenance: TIVA     Advanced Monitoring: BIS     Postop Plan:   Postop Pain: Opioids  Postop Sedation/Airway: Not planned  Disposition: Outpatient     PONV Management:   Adult Risk Factors: Female, H/o PONV or Motion Sickness, Non-Smoker, Postop Opioids   Prevention: Ondansetron, Dexamethasone, Propofol, No Volatiles     CONSENT: Direct conversation   Plan and risks discussed with: Patient   Blood Products: Consented (ALL Blood Products)       Comments for Plan/Consent:  Plan:  GA with routine monitors    Rico Quispe MD

## 2019-10-31 NOTE — ANESTHESIA CARE TRANSFER NOTE
Patient: Kathy Lei    Procedure(s):  Right Breast Implant Removal    Diagnosis: Infection [B99.9]  Diagnosis Additional Information: No value filed.    Anesthesia Type:   General     Note:  Airway :Nasal Cannula  Patient transferred to:PACU  Comments: Pt denies pain or nausea. Report given to RN. Handoff Report: Identifed the Patient, Identified the Reponsible Provider, Reviewed the pertinent medical history, Discussed the surgical course, Reviewed Intra-OP anesthesia mangement and issues during anesthesia, Set expectations for post-procedure period and Allowed opportunity for questions and acknowledgement of understanding      Vitals: (Last set prior to Anesthesia Care Transfer)    CRNA VITALS  10/31/2019 1501 - 10/31/2019 1536      10/31/2019             NIBP:  132/79    Pulse:  84    SpO2:  100 %    Resp Rate (observed):  16                Electronically Signed By: CECILE Ariza CRNA  October 31, 2019  3:36 PM

## 2019-10-31 NOTE — DISCHARGE INSTRUCTIONS
EXPANDER REMOVAL POST-OPERATIVE INSTRUCTIONS    Instructions       Have someone drive you home after surgery and help you at home for 1-2      days.      Get plenty of rest.      Follow balanced diet.      Decreased activity may promote constipation, so you may want to add      more raw fruit to your diet, and be sure to increase fluid intake.      Take pain medication as prescribed. Do not take aspirin or any products      containing aspirin.      Do not drink alcohol when taking pain medications.      Even when not taking pain medications, no alcohol for 3 weeks as it      causes fluid retention.      If you are taking vitamins with iron, resume these as tolerated.      Do not smoke, as smoking delays healing and increases the risk of      complications.    Activities      Do not drive until you are no longer taking any pain medications      (narcotics).      Start walking as soon as possible, this helps to reduce swelling and       lowers the chance of blood clots.      Resume normal activities gradually.      Return to work in 1-2 days, depending upon extent of surgery      No strenuous work or stress on wound for 2-3 weeks.    Incision Care      If steri strips have been used, keep steri-strips on; replace if they come off.      Avoid exposing scars to sun for at least 12 months.      Always use a strong sunblock, if sun exposure is unavoidable (SPF 50 or      greater).      Inspect daily for signs of infection.      No tub soaking, bathing, or swimming while sutures or drains are in place.      Keep area clean and dry for first 24 hours.     What to Expect      Some swelling and bruising.      May have slight bleeding from incision.  Apply 4 x 4 gauze with slight pressure to       control bleeding.      Skin grafts and flaps may take several weeks or months to heal; a support garment or      bandage may be necessary for up to a year.    Appearance      Final results of surgery may take a year or  more.    Follow-Up Care      If external sutures have been used, they will be removed in 5-14 days.    Please note my office will call you 1-2 business days after your procedure to check up on how you're doing and to schedule your post-operative appointment.        When to Call      If you have increased swelling or bruising.      If swelling and redness persist after a few days.      If you have increased redness along the incision.      If you have severe or increased pain not relieved by medication.      If you have any side effects to medications; such as, rash, nausea,      headache, vomiting.      If you have an oral temperature over 100.4 degrees.      If you have any yellowish or greenish drainage from the incisions or      notice a foul odor.      If you have bleeding from the incisions that is difficult to control with      light pressure.      If you have loss of feeling or motion.    For Medical Questions, Please Call:      [phone], Monday - Friday, 8 a.m. - 4:30 p.m.      After hours and on weekends, call Hospital Paging at [phone] and      ask for the Plastic Surgeon on call.      [facility]  Same-Day Surgery   Adult Discharge Orders & Instructions     For 24 hours after surgery    1. Get plenty of rest.  A responsible adult must stay with you for at least 24 hours after you leave the hospital.   2. Do not drive or use heavy equipment.  If you have weakness or tingling, don't drive or use heavy equipment until this feeling goes away.  3. Do not drink alcohol.  4. Avoid strenuous or risky activities.  Ask for help when climbing stairs.   5. You may feel lightheaded.  IF so, sit for a few minutes before standing.  Have someone help you get up.   6. If you have nausea (feel sick to your stomach): Drink only clear liquids such as apple juice, ginger ale, broth or 7-Up.  Rest may also help.  Be sure to drink enough fluids.  Move to a regular diet as you feel  able.  7. You may have a slight fever. Call the doctor if your fever is over 100.4 F (38 C) (taken under the tongue) or lasts longer than 24 hours.  8. You may have a dry mouth, a sore throat, muscle aches or trouble sleeping.  These should go away after 24 hours.  9. Do not make important or legal decisions.   Call your doctor for any of the followin.  Signs of infection (fever, growing tenderness at the surgery site, a large amount of drainage or bleeding, severe pain, foul-smelling drainage, redness, swelling).    2. It has been over 8 to 10 hours since surgery and you are still not able to urinate (pass water).    3.  Headache for over 24 hours.    To contact a doctor, call Dr Ramos's office at [phone] or:        [phone] and ask for the resident on call for Plastic Surgery (answered 24 hours a day)      Emergency Department:    [facility]: [phone]       (TTY for hearing impaired: [phone])

## 2019-11-01 LAB
BACTERIA SPEC CULT: ABNORMAL
COPATH REPORT: NORMAL
Lab: ABNORMAL
SPECIMEN SOURCE: ABNORMAL

## 2019-11-01 ASSESSMENT — ENCOUNTER SYMPTOMS
POLYPHAGIA: 0
DECREASED CONCENTRATION: 0
FATIGUE: 1
CHILLS: 0
POLYDIPSIA: 0
BREAST MASS: 0
WEIGHT LOSS: 0
FEVER: 0
ALTERED TEMPERATURE REGULATION: 0
NERVOUS/ANXIOUS: 1
WEIGHT GAIN: 0
BREAST PAIN: 1
INSOMNIA: 0
NIGHT SWEATS: 0
INCREASED ENERGY: 0
DEPRESSION: 0
DECREASED APPETITE: 0
HALLUCINATIONS: 0
PANIC: 0

## 2019-11-01 NOTE — OP NOTE
Procedure Date: 10/31/2019      PREOPERATIVE DIAGNOSIS:  Status post bilateral breast reconstruction for right-sided breast cancer with expanders with right-sided and recently left-sided infection.      POSTOPERATIVE DIAGNOSIS:  Status post bilateral breast reconstruction for right-sided breast cancer with expanders with right-sided and recently left-sided infection.      PROCEDURE:  Bilateral breast expander removal with washout.      SURGEONS:  Ko Ramos MD, and Sherrie Cavazos MD      ANESTHESIA:  General anesthesia with LMA.      COMPLICATIONS:  Nil.      DRAINS:  A 15-Prydeinig Jas drain.      SPECIMENS:  Multiple tissues as well as fluid for culture from each side.      DESCRIPTION OF PROCEDURE:  After informed consent was taken from the patient, the proper site and procedure was ascertained with her and she was appropriately marked in the operating room.  She was placed in supine position with the knees comfortably flexed, pillows underneath them and Pneumoboots placed and running prior to induction of anesthesia.  Preoperative antibiotics were given in the OR.  All pressure points were appropriately padded.  General anesthesia was administered without any complications.  She was prepped and draped in standard surgical fashion.      Through the original mastectomy scars, incisions were opened.  Dissection carried out down to the pocket.  On both sides turbid fluid was encountered and sent for cultures.  The expanders were removed.  Deep tissue was also sent for cultures.  The pockets were then irrigated with triple antibiotic irrigation.  15-Prydeinig Jas drains were placed and secured.  The skin was trimmed on each side, hemostasis ensured.      Then the wound was closed in layers using 2-0 Monocryl suture in a deep dermal layer followed by 3-0 Stratafix suture in a running intracuticular manner followed by surgical tape and glue and wrap.  The patient tolerated the procedure well.  All counts correct at the  end of the case.  The patient was extubated and sent to recovery room in stable condition.                  LALY BRAVO MD             D: 10/31/2019   T: 10/31/2019   MT:       Name:     CHRIS ROLAND   MRN:      [id number]        Account:        [id number]   :      1965           Procedure Date: 10/31/2019      Document: [id number]

## 2019-11-03 ENCOUNTER — HEALTH MAINTENANCE LETTER (OUTPATIENT)
Age: 54
End: 2019-11-03

## 2019-11-05 ENCOUNTER — OFFICE VISIT (OUTPATIENT)
Dept: PLASTIC SURGERY | Facility: CLINIC | Age: 54
End: 2019-11-05
Attending: PLASTIC SURGERY
Payer: COMMERCIAL

## 2019-11-05 VITALS
WEIGHT: 136.7 LBS | HEIGHT: 66 IN | SYSTOLIC BLOOD PRESSURE: 127 MMHG | TEMPERATURE: 98.3 F | DIASTOLIC BLOOD PRESSURE: 83 MMHG | OXYGEN SATURATION: 96 % | HEART RATE: 81 BPM | RESPIRATION RATE: 16 BRPM | BODY MASS INDEX: 21.97 KG/M2

## 2019-11-05 DIAGNOSIS — Z98.890 S/P BREAST RECONSTRUCTION, BILATERAL: Primary | ICD-10-CM

## 2019-11-05 LAB
BACTERIA SPEC CULT: NO GROWTH
BACTERIA SPEC CULT: NORMAL
Lab: NORMAL
SPECIMEN SOURCE: NORMAL

## 2019-11-05 PROCEDURE — G0463 HOSPITAL OUTPT CLINIC VISIT: HCPCS | Mod: ZF

## 2019-11-05 ASSESSMENT — MIFFLIN-ST. JEOR: SCORE: 1231.82

## 2019-11-05 NOTE — PROGRESS NOTES
POSTOPERATIVE VISIT NOTE      PRESENTING COMPLAINT:  Postoperative visit, status post bilateral breast reconstruction for right-sided breast cancer with expanders followed by removal of both expanders because of infection on 10/31/2019.      HISTORY OF PRESENTING COMPLAINT:  Ms. Lei is 53 years old.  She is just over a week out from her procedure.  Doing well, feels well.  On antibiotics.  APOLLO drainage is less than 15 mL a day on each side.      PHYSICAL EXAMINATION:  Vital signs are stable.  She is afebrile, in no obvious distress.  Everything is healing in well.      ASSESSMENT AND PLAN:  Based on above findings, a diagnosis of bilateral breast reconstruction for breast cancer with removal of expanders for infection was made.  Plan now is to finish her course of antibiotics.  Took out the APOLLO drain.  She will start getting radiation therapy in another week's time.  I will see her back in a week's time to monitor her progress and we will plan autologous reconstruction after radiation.

## 2019-11-05 NOTE — NURSING NOTE
"Oncology Rooming Note    November 5, 2019 10:49 AM   Kathy Lei is a 53 year old female who presents for:    Chief Complaint   Patient presents with     Oncology Clinic Visit     RETURN VISIT; BREAST CANCER; VITALS TAKEN      Initial Vitals: /83   Pulse 81   Temp 98.3  F (36.8  C) (Oral)   Resp 16   Ht 1.676 m (5' 6\")   Wt 62 kg (136 lb 11.2 oz)   LMP 11/02/2014   SpO2 96%   BMI 22.06 kg/m   Estimated body mass index is 22.06 kg/m  as calculated from the following:    Height as of this encounter: 1.676 m (5' 6\").    Weight as of this encounter: 62 kg (136 lb 11.2 oz). Body surface area is 1.7 meters squared.  Data Unavailable Comment: Data Unavailable   Patient's last menstrual period was 11/02/2014.  Allergies reviewed: Yes  Medications reviewed: Yes    Medications: Medication refills not needed today.  Pharmacy name entered into [employer]:    [id number] - [place], [place] - [address] AT [facility] [address]    Clinical concerns: No new concerns today  Dr Ramos was notified.      Shweta Okeefe              "

## 2019-11-05 NOTE — LETTER
11/5/2019       RE: Kathy Lei  [address] [place] [address]     Dear Colleague,    Thank you for referring your patient, Kathy Lei, to the [facility] at [facility]. Please see a copy of my visit note below.    POSTOPERATIVE VISIT NOTE      PRESENTING COMPLAINT:  Postoperative visit, status post bilateral breast reconstruction for right-sided breast cancer with expanders followed by removal of both expanders because of infection on 10/31/2019.      HISTORY OF PRESENTING COMPLAINT:  Ms. Lei is 53 years old.  She is just over a week out from her procedure.  Doing well, feels well.  On antibiotics.  APOLLO drainage is less than 15 mL a day on each side.      PHYSICAL EXAMINATION:  Vital signs are stable.  She is afebrile, in no obvious distress.  Everything is healing in well.      ASSESSMENT AND PLAN:  Based on above findings, a diagnosis of bilateral breast reconstruction for breast cancer with removal of expanders for infection was made.  Plan now is to finish her course of antibiotics.  Took out the APOLLO drain.  She will start getting radiation therapy in another week's time.  I will see her back in a week's time to monitor her progress and we will plan autologous reconstruction after radiation.     Again, thank you for allowing me to participate in the care of your patient.      Sincerely,    LALY Ramos MD

## 2019-11-06 ENCOUNTER — APPOINTMENT (OUTPATIENT)
Dept: RADIATION ONCOLOGY | Facility: CLINIC | Age: 54
End: 2019-11-06
Attending: RADIOLOGY
Payer: COMMERCIAL

## 2019-11-06 PROCEDURE — 77290 THER RAD SIMULAJ FIELD CPLX: CPT | Performed by: RADIOLOGY

## 2019-11-07 ENCOUNTER — TELEPHONE (OUTPATIENT)
Dept: ONCOLOGY | Facility: CLINIC | Age: 54
End: 2019-11-07

## 2019-11-07 LAB
BACTERIA SPEC CULT: NORMAL
Lab: NORMAL
SPECIMEN SOURCE: NORMAL

## 2019-11-07 NOTE — TELEPHONE ENCOUNTER
This writer contacted the patient to notify them of an envelope that will be mailed to them. Envelope contains the ISPY2 reconsent for Amendment 21.1 and a cover letter. Patient was instructed to review the reconsent document and to call the CRC-nurse if they have any questions. If the patient has no questions they have been encouraged to sign the consent forms and mail back to the  using the pre-addressed and postmarked envelope.    Beth Resendez    [phone]

## 2019-11-13 DIAGNOSIS — E03.9 HYPOTHYROIDISM, UNSPECIFIED TYPE: ICD-10-CM

## 2019-11-13 RX ORDER — LEVOTHYROXINE SODIUM 112 UG/1
112 TABLET ORAL DAILY
Qty: 90 TABLET | Refills: 1 | Status: SHIPPED | OUTPATIENT
Start: 2019-11-13 | End: 2019-11-19

## 2019-11-13 NOTE — TELEPHONE ENCOUNTER
Refill request received for Levothyroxine 112mcg. Last filled 9/10/19. TSH 3.27 on 9/12/19. Encounter routed to Dr. Guzman for refill or recommendations.     Jennifer Lyman, AGN, RN, PHN  Triage

## 2019-11-14 ENCOUNTER — APPOINTMENT (OUTPATIENT)
Dept: RADIATION ONCOLOGY | Facility: CLINIC | Age: 54
End: 2019-11-14
Attending: RADIOLOGY
Payer: COMMERCIAL

## 2019-11-14 PROCEDURE — 77280 THER RAD SIMULAJ FIELD SMPL: CPT | Performed by: RADIOLOGY

## 2019-11-14 PROCEDURE — 77332 RADIATION TREATMENT AID(S): CPT | Performed by: RADIOLOGY

## 2019-11-14 PROCEDURE — 77412 RADIATION TX DELIVERY LVL 3: CPT | Performed by: RADIOLOGY

## 2019-11-15 ENCOUNTER — APPOINTMENT (OUTPATIENT)
Dept: RADIATION ONCOLOGY | Facility: CLINIC | Age: 54
End: 2019-11-15
Attending: RADIOLOGY
Payer: COMMERCIAL

## 2019-11-15 PROCEDURE — 77412 RADIATION TX DELIVERY LVL 3: CPT | Performed by: RADIOLOGY

## 2019-11-15 PROCEDURE — 77387 GUIDANCE FOR RADJ TX DLVR: CPT | Performed by: RADIOLOGY

## 2019-11-15 PROCEDURE — 77331 SPECIAL RADIATION DOSIMETRY: CPT | Performed by: RADIOLOGY

## 2019-11-18 ENCOUNTER — OFFICE VISIT (OUTPATIENT)
Dept: RADIATION ONCOLOGY | Facility: CLINIC | Age: 54
End: 2019-11-18
Attending: RADIOLOGY
Payer: COMMERCIAL

## 2019-11-18 VITALS
SYSTOLIC BLOOD PRESSURE: 119 MMHG | HEART RATE: 97 BPM | WEIGHT: 134.1 LBS | BODY MASS INDEX: 21.64 KG/M2 | DIASTOLIC BLOOD PRESSURE: 81 MMHG

## 2019-11-18 DIAGNOSIS — C50.411 MALIGNANT NEOPLASM OF UPPER-OUTER QUADRANT OF RIGHT BREAST IN FEMALE, ESTROGEN RECEPTOR POSITIVE (H): Primary | ICD-10-CM

## 2019-11-18 DIAGNOSIS — Z17.0 MALIGNANT NEOPLASM OF UPPER-OUTER QUADRANT OF RIGHT BREAST IN FEMALE, ESTROGEN RECEPTOR POSITIVE (H): Primary | ICD-10-CM

## 2019-11-18 PROCEDURE — 77412 RADIATION TX DELIVERY LVL 3: CPT | Performed by: RADIOLOGY

## 2019-11-18 NOTE — LETTER
Date:November 19, 2019      Provider requested that no letter be sent. Do not send.       [facility] Health Information

## 2019-11-18 NOTE — LETTER
2019       RE: Kathy Lei   [address] [place] [address]     Dear Colleague,    Thank you for referring your patient, Kathy Lei, to the RADIATION ONCOLOGY CLINIC. Please see a copy of my visit note below.    [facility] PHYSICIANS  SPECIALIZING IN BREAKTHROUGHS  Radiation Oncology    On Treatment Visit Note      Kathy Lei      Date: 2019   MRN: [id number]   : 1965  Diagnosis: Breast cancer      Reason for Visit:  On Radiation Treatment Visit     Treatment Summary to Date  Treatment Site: Rt CW , nodes + SCV Current Dose: 540/5040 cGy Fractions: 3/28      Chemotherapy  Chemo concurrent with radx?: No    Subjective:   Overall doing well. No toxicity at this time. Using aquaphor on skin once per day. Noticing a little fluid accumulation below deflated expander on right.    Nursing ROS:   Nutrition Alteration  Diet Type: Patient's Preference  Skin  Skin Reaction: 0 - No changes  Skin Note: Using aquaphor           Gastrointestinal  GI Note: WNL     Psychosocial  Pyschosocial Note: feeling well  Pain Assessment  0-10 Pain Scale: 0      Objective:   /81   Pulse 97   Wt 60.8 kg (134 lb 1.6 oz)   LMP 2014   BMI 21.64 kg/m     Gen: Appears well, in no acute distress  Skin: No erythema, trace fluid at inferior aspect of deflated expanders bilaterally    Labs:  CBC RESULTS:   Recent Labs   Lab Test 10/08/19  0851   WBC 5.3   RBC 4.81   HGB 13.7   HCT 42.3   MCV 88   MCH 28.5   MCHC 32.4   RDW 13.7        ELECTROLYTES:  Recent Labs   Lab Test 10/31/19  1209 10/08/19  0851   NA  --  138   POTASSIUM 3.6 3.7   CHLORIDE  --  105   BRYANT  --  9.0   CO2  --  28   BUN  --  6*   CR  --  0.56   GLC  --  87       Assessment:    Tolerating radiation therapy well.  All questions and concerns addressed.    Toxicities:  Fatigue: Grade 0: No toxicity  Dermatitis: Grade 0: No toxicity    Plan:   1. Continue current therapy.    2. Reviewed skin  care recommendations. Pt will start applying aquaphor BID      Mosaiq chart and setup information reviewed  MVCT/IGRT images checked    Medication Review  Med Note: No changes per pt    Educational Topic Discussed  Education Instructions: reviewed    The patient was seen and discussed with staff, Dr. Padilla.    Stefan Meyer MD  Resident, PGY-5  Department of Radiation Oncology  [facility]  P: [phone]      I saw and examined the patient with the resident.  I have reviewed and edited the resident's note and agree with the plan of care.      Rut Padilla MD    Please do not send letter to referring physician.        Again, thank you for allowing me to participate in the care of your patient.      Sincerely,    Rut Padilla MD

## 2019-11-18 NOTE — PROGRESS NOTES
[facility] PHYSICIANS  SPECIALIZING IN BREAKTHROUGHS  Radiation Oncology    On Treatment Visit Note      Kathy Lei      Date: 2019   MRN: [id number]   : 1965  Diagnosis: Breast cancer      Reason for Visit:  On Radiation Treatment Visit     Treatment Summary to Date  Treatment Site: Rt CW , nodes + SCV Current Dose: 540/5040 cGy Fractions: 3/28      Chemotherapy  Chemo concurrent with radx?: No    Subjective:   Overall doing well. No toxicity at this time. Using aquaphor on skin once per day. Noticing a little fluid accumulation below deflated expander on right.    Nursing ROS:   Nutrition Alteration  Diet Type: Patient's Preference  Skin  Skin Reaction: 0 - No changes  Skin Note: Using aquaphor           Gastrointestinal  GI Note: WNL     Psychosocial  Pyschosocial Note: feeling well  Pain Assessment  0-10 Pain Scale: 0      Objective:   /81   Pulse 97   Wt 60.8 kg (134 lb 1.6 oz)   LMP 2014   BMI 21.64 kg/m    Gen: Appears well, in no acute distress  Skin: No erythema, trace fluid at inferior aspect of deflated expanders bilaterally    Labs:  CBC RESULTS:   Recent Labs   Lab Test 10/08/19  0851   WBC 5.3   RBC 4.81   HGB 13.7   HCT 42.3   MCV 88   MCH 28.5   MCHC 32.4   RDW 13.7        ELECTROLYTES:  Recent Labs   Lab Test 10/31/19  1209 10/08/19  0851   NA  --  138   POTASSIUM 3.6 3.7   CHLORIDE  --  105   BRYANT  --  9.0   CO2  --  28   BUN  --  6*   CR  --  0.56   GLC  --  87       Assessment:    Tolerating radiation therapy well.  All questions and concerns addressed.    Toxicities:  Fatigue: Grade 0: No toxicity  Dermatitis: Grade 0: No toxicity    Plan:   1. Continue current therapy.    2. Reviewed skin care recommendations. Pt will start applying aquaphor BID      Mosaiq chart and setup information reviewed  MVCT/IGRT images checked    Medication Review  Med Note: No changes per pt    Educational Topic Discussed  Education Instructions:  reviewed    The patient was seen and discussed with staff, Dr. Padilla.    Stefan Meyer MD  Resident, PGY-5  Department of Radiation Oncology  [facility]  P: [phone]      I saw and examined the patient with the resident.  I have reviewed and edited the resident's note and agree with the plan of care.      Rut Padilla MD    Please do not send letter to referring physician.

## 2019-11-19 ENCOUNTER — APPOINTMENT (OUTPATIENT)
Dept: RADIATION ONCOLOGY | Facility: CLINIC | Age: 54
End: 2019-11-19
Attending: RADIOLOGY
Payer: COMMERCIAL

## 2019-11-19 DIAGNOSIS — E03.9 HYPOTHYROIDISM, UNSPECIFIED TYPE: ICD-10-CM

## 2019-11-19 PROCEDURE — 77412 RADIATION TX DELIVERY LVL 3: CPT | Performed by: RADIOLOGY

## 2019-11-19 RX ORDER — LEVOTHYROXINE SODIUM 112 UG/1
112 TABLET ORAL DAILY
Qty: 90 TABLET | Refills: 1 | Status: SHIPPED | OUTPATIENT
Start: 2019-11-19 | End: 2020-02-25

## 2019-11-20 ENCOUNTER — RESEARCH ENCOUNTER (OUTPATIENT)
Dept: ONCOLOGY | Facility: CLINIC | Age: 54
End: 2019-11-20

## 2019-11-20 ENCOUNTER — APPOINTMENT (OUTPATIENT)
Dept: RADIATION ONCOLOGY | Facility: CLINIC | Age: 54
End: 2019-11-20
Attending: RADIOLOGY
Payer: COMMERCIAL

## 2019-11-20 PROCEDURE — 77412 RADIATION TX DELIVERY LVL 3: CPT | Performed by: RADIOLOGY

## 2019-11-20 PROCEDURE — 77336 RADIATION PHYSICS CONSULT: CPT | Performed by: RADIOLOGY

## 2019-11-20 NOTE — NURSING NOTE
[id number]: Re-Consent Note     New information has been added to the consent form. This was explained to the patient via letter and contact information for the clinical research coordinator was provided.The patient signed the informed consent and mailed it back.     Consent Version Date: 07/24/2019  Consent obtained by: Lorne Leos     Date: 11/20/2019    Lorne Leos RN     Pager: [phone]    Form 503.00.02 (Version 1)     Effective date: 01JUL2018     Next Review Date: 01JUL2020

## 2019-11-21 ENCOUNTER — APPOINTMENT (OUTPATIENT)
Dept: RADIATION ONCOLOGY | Facility: CLINIC | Age: 54
End: 2019-11-21
Attending: RADIOLOGY
Payer: COMMERCIAL

## 2019-11-21 PROCEDURE — 77417 THER RADIOLOGY PORT IMAGE(S): CPT | Performed by: RADIOLOGY

## 2019-11-21 PROCEDURE — 77412 RADIATION TX DELIVERY LVL 3: CPT | Performed by: RADIOLOGY

## 2019-11-22 ENCOUNTER — APPOINTMENT (OUTPATIENT)
Dept: RADIATION ONCOLOGY | Facility: CLINIC | Age: 54
End: 2019-11-22
Attending: RADIOLOGY
Payer: COMMERCIAL

## 2019-11-22 PROCEDURE — 77412 RADIATION TX DELIVERY LVL 3: CPT | Performed by: RADIOLOGY

## 2019-11-25 ENCOUNTER — APPOINTMENT (OUTPATIENT)
Dept: RADIATION ONCOLOGY | Facility: CLINIC | Age: 54
End: 2019-11-25
Attending: RADIOLOGY
Payer: COMMERCIAL

## 2019-11-25 VITALS — DIASTOLIC BLOOD PRESSURE: 70 MMHG | BODY MASS INDEX: 21.63 KG/M2 | WEIGHT: 134 LBS | SYSTOLIC BLOOD PRESSURE: 134 MMHG

## 2019-11-25 DIAGNOSIS — C50.411 MALIGNANT NEOPLASM OF UPPER-OUTER QUADRANT OF RIGHT BREAST IN FEMALE, ESTROGEN RECEPTOR POSITIVE (H): Primary | ICD-10-CM

## 2019-11-25 DIAGNOSIS — Z17.0 MALIGNANT NEOPLASM OF UPPER-OUTER QUADRANT OF RIGHT BREAST IN FEMALE, ESTROGEN RECEPTOR POSITIVE (H): Primary | ICD-10-CM

## 2019-11-25 PROCEDURE — 77412 RADIATION TX DELIVERY LVL 3: CPT | Performed by: RADIOLOGY

## 2019-11-25 NOTE — LETTER
Date:November 26, 2019      Provider requested that no letter be sent. Do not send.       [facility] Health Information

## 2019-11-25 NOTE — LETTER
2019       RE: Kathy Lei   [address] [place] [address]     Dear Colleague,    Thank you for referring your patient, Kathy Lei, to the RADIATION ONCOLOGY CLINIC. Please see a copy of my visit note below.    [facility] PHYSICIANS  SPECIALIZING IN BREAKTHROUGHS  Radiation Oncology    On Treatment Visit Note      Kathy Lei      Date: 2019   MRN: [id number]   : 1965  Diagnosis: Breast cancer      Reason for Visit:  On Radiation Treatment Visit     Treatment Summary to Date  Treatment Site: Rt CW , nodes + SCV Current Dose: 1440/5040 cGy Fractions:       Chemotherapy  Chemo concurrent with radx?: No    Subjective:   Overall doing well. Energy is good. Skin is starting to show a little redness.    Nursing ROS:   Nutrition Alteration  Diet Type: Patient's Preference  Skin  Skin Reaction: 0 - No changes  Skin Note: Using aquaphor           Gastrointestinal  GI Note: WNL     Psychosocial  Pyschosocial Note: feeling well  Pain Assessment  0-10 Pain Scale: 0      Objective:   /70   Wt 60.8 kg (134 lb)   LMP 2014   BMI 21.63 kg/m     Gen: Appears well, in no acute distress  Skin: Mild diffuse erythema over treatment field    Labs:  CBC RESULTS:   Recent Labs   Lab Test 10/08/19  0851   WBC 5.3   RBC 4.81   HGB 13.7   HCT 42.3   MCV 88   MCH 28.5   MCHC 32.4   RDW 13.7        ELECTROLYTES:  Recent Labs   Lab Test 10/31/19  1209 10/08/19  0851   NA  --  138   POTASSIUM 3.6 3.7   CHLORIDE  --  105   BRYANT  --  9.0   CO2  --  28   BUN  --  6*   CR  --  0.56   GLC  --  87       Assessment:    Tolerating radiation therapy well.  All questions and concerns addressed.    Toxicities:  Fatigue: Grade 0: No toxicity  Dermatitis: Grade 1: Faint erythema or dry desquamation    Plan:   1. Continue current therapy.    2. Reviewed skin care recommendations      Mosaiq chart and setup information reviewed  MVCT/IGRT images  checked    Medication Review  Med Note: No changes per pt    Educational Topic Discussed  Education Instructions: reviewed    The patient was seen and discussed with staff, Dr. Padilla.    Stefan Meyer MD  Resident, PGY-5  Department of Radiation Oncology  [facility]  P: [phone]    I saw and examined the patient with the resident.  I have reviewed and edited the resident's note and agree with the plan of care.      Rut Padilla MD      Please do not send letter to referring physician.        Again, thank you for allowing me to participate in the care of your patient.      Sincerely,    Rut Padilla MD

## 2019-11-25 NOTE — PROGRESS NOTES
[facility] PHYSICIANS  SPECIALIZING IN BREAKTHROUGHS  Radiation Oncology    On Treatment Visit Note      Kathy Lei      Date: 2019   MRN: [id number]   : 1965  Diagnosis: Breast cancer      Reason for Visit:  On Radiation Treatment Visit     Treatment Summary to Date  Treatment Site: Rt CW , nodes + SCV Current Dose: 1440/5040 cGy Fractions:       Chemotherapy  Chemo concurrent with radx?: No    Subjective:   Overall doing well. Energy is good. Skin is starting to show a little redness.    Nursing ROS:   Nutrition Alteration  Diet Type: Patient's Preference  Skin  Skin Reaction: 0 - No changes  Skin Note: Using aquaphor           Gastrointestinal  GI Note: WNL     Psychosocial  Pyschosocial Note: feeling well  Pain Assessment  0-10 Pain Scale: 0      Objective:   /70   Wt 60.8 kg (134 lb)   LMP 2014   BMI 21.63 kg/m    Gen: Appears well, in no acute distress  Skin: Mild diffuse erythema over treatment field    Labs:  CBC RESULTS:   Recent Labs   Lab Test 10/08/19  0851   WBC 5.3   RBC 4.81   HGB 13.7   HCT 42.3   MCV 88   MCH 28.5   MCHC 32.4   RDW 13.7        ELECTROLYTES:  Recent Labs   Lab Test 10/31/19  1209 10/08/19  0851   NA  --  138   POTASSIUM 3.6 3.7   CHLORIDE  --  105   BRYANT  --  9.0   CO2  --  28   BUN  --  6*   CR  --  0.56   GLC  --  87       Assessment:    Tolerating radiation therapy well.  All questions and concerns addressed.    Toxicities:  Fatigue: Grade 0: No toxicity  Dermatitis: Grade 1: Faint erythema or dry desquamation    Plan:   1. Continue current therapy.    2. Reviewed skin care recommendations      Mosaiq chart and setup information reviewed  MVCT/IGRT images checked    Medication Review  Med Note: No changes per pt    Educational Topic Discussed  Education Instructions: reviewed    The patient was seen and discussed with staff, Dr. Padilla.    Stefan Meyer MD  Resident, PGY-5  Department of Radiation  Oncology  [facility]  P: [phone]    I saw and examined the patient with the resident.  I have reviewed and edited the resident's note and agree with the plan of care.      Rut Padilla MD      Please do not send letter to referring physician.

## 2019-11-28 LAB
FUNGUS SPEC CULT: NORMAL
SPECIMEN SOURCE: NORMAL

## 2019-11-29 ENCOUNTER — APPOINTMENT (OUTPATIENT)
Dept: RADIATION ONCOLOGY | Facility: CLINIC | Age: 54
End: 2019-11-29
Attending: RADIOLOGY
Payer: COMMERCIAL

## 2019-11-29 PROCEDURE — 77412 RADIATION TX DELIVERY LVL 3: CPT | Performed by: RADIOLOGY

## 2019-11-30 ENCOUNTER — APPOINTMENT (OUTPATIENT)
Dept: RADIATION ONCOLOGY | Facility: CLINIC | Age: 54
End: 2019-11-30
Attending: RADIOLOGY
Payer: COMMERCIAL

## 2019-11-30 PROCEDURE — 77336 RADIATION PHYSICS CONSULT: CPT | Performed by: RADIOLOGY

## 2019-11-30 PROCEDURE — 77412 RADIATION TX DELIVERY LVL 3: CPT | Performed by: RADIOLOGY

## 2019-12-02 ENCOUNTER — APPOINTMENT (OUTPATIENT)
Dept: RADIATION ONCOLOGY | Facility: CLINIC | Age: 54
End: 2019-12-02
Attending: RADIOLOGY
Payer: COMMERCIAL

## 2019-12-02 VITALS
BODY MASS INDEX: 22.05 KG/M2 | SYSTOLIC BLOOD PRESSURE: 130 MMHG | HEART RATE: 84 BPM | DIASTOLIC BLOOD PRESSURE: 78 MMHG | WEIGHT: 136.6 LBS

## 2019-12-02 DIAGNOSIS — C50.411 MALIGNANT NEOPLASM OF UPPER-OUTER QUADRANT OF RIGHT BREAST IN FEMALE, ESTROGEN RECEPTOR POSITIVE (H): Primary | ICD-10-CM

## 2019-12-02 DIAGNOSIS — Z17.0 MALIGNANT NEOPLASM OF UPPER-OUTER QUADRANT OF RIGHT BREAST IN FEMALE, ESTROGEN RECEPTOR POSITIVE (H): Primary | ICD-10-CM

## 2019-12-02 PROCEDURE — 77417 THER RADIOLOGY PORT IMAGE(S): CPT | Performed by: RADIOLOGY

## 2019-12-02 PROCEDURE — 77412 RADIATION TX DELIVERY LVL 3: CPT | Performed by: RADIOLOGY

## 2019-12-02 NOTE — LETTER
2019       RE: Kathy Lei   [address] [place] [address]     Dear Colleague,    Thank you for referring your patient, Kathy Lei, to the RADIATION ONCOLOGY CLINIC. Please see a copy of my visit note below.    [facility] PHYSICIANS  SPECIALIZING IN BREAKTHROUGHS  Radiation Oncology    On Treatment Visit Note      Kathy Lei      Date: 2019   MRN: [id number]   : 1965  Diagnosis: Breast cancer      Reason for Visit:  On Radiation Treatment Visit     Treatment Summary to Date  Treatment Site: Rt CW , nodes + SCV Current Dose: 1800/5040 cGy Fractions: 10/28      Chemotherapy  Chemo concurrent with radx?: No    Subjective:   Continues to do well overall. Starting to notice some fatigue in the evenings. Skin is getting more red but no pain.    Nursing ROS:   Nutrition Alteration  Diet Type: Patient's Preference  Skin  Skin Reaction: 1 - Faint erythema or dry desquamation  Skin Note: Using aquaphor           Gastrointestinal  GI Note: WNL     Psychosocial  Pyschosocial Note: feeling well  Pain Assessment  0-10 Pain Scale: 0      Objective:   /78   Pulse 84   Wt 62 kg (136 lb 9.6 oz)   LMP 2014   BMI 22.05 kg/m     Gen: Appears well, in no acute distress  Skin: Mild diffuse erythema over treatment field    Labs:  CBC RESULTS:   Recent Labs   Lab Test 10/08/19  0851   WBC 5.3   RBC 4.81   HGB 13.7   HCT 42.3   MCV 88   MCH 28.5   MCHC 32.4   RDW 13.7        ELECTROLYTES:  Recent Labs   Lab Test 10/31/19  1209 10/08/19  0851   NA  --  138   POTASSIUM 3.6 3.7   CHLORIDE  --  105   BRYANT  --  9.0   CO2  --  28   BUN  --  6*   CR  --  0.56   GLC  --  87       Assessment:    Tolerating radiation therapy well.  All questions and concerns addressed.    Toxicities:  Fatigue: Grade 1: Fatigue relieved by rest  Dermatitis: Grade 1: Faint erythema or dry desquamation    Plan:   1. Continue current therapy.        Mosaiq chart and setup  information reviewed  MVCT/IGRT images checked    Medication Review  Med Note: No changes per pt    Educational Topic Discussed  Education Instructions: reviewed    The patient was seen and discussed with staff, Dr. Padilla.    Stefan Meyer MD  Resident, PGY-5  Department of Radiation Oncology  [facility]  P: [phone]    I saw and examined the patient with the resident.  I have reviewed and edited the resident's note and agree with the plan of care.      Rut Padilla MD      Please do not send letter to referring physician.        Again, thank you for allowing me to participate in the care of your patient.      Sincerely,    Rut Padilla MD

## 2019-12-02 NOTE — PROGRESS NOTES
[facility] PHYSICIANS  SPECIALIZING IN BREAKTHROUGHS  Radiation Oncology    On Treatment Visit Note      Kathy Lei      Date: 2019   MRN: [id number]   : 1965  Diagnosis: Breast cancer      Reason for Visit:  On Radiation Treatment Visit     Treatment Summary to Date  Treatment Site: Rt CW , nodes + SCV Current Dose: 1800/5040 cGy Fractions: 10/28      Chemotherapy  Chemo concurrent with radx?: No    Subjective:   Continues to do well overall. Starting to notice some fatigue in the evenings. Skin is getting more red but no pain.    Nursing ROS:   Nutrition Alteration  Diet Type: Patient's Preference  Skin  Skin Reaction: 1 - Faint erythema or dry desquamation  Skin Note: Using aquaphor           Gastrointestinal  GI Note: WNL     Psychosocial  Pyschosocial Note: feeling well  Pain Assessment  0-10 Pain Scale: 0      Objective:   /78   Pulse 84   Wt 62 kg (136 lb 9.6 oz)   LMP 2014   BMI 22.05 kg/m    Gen: Appears well, in no acute distress  Skin: Mild diffuse erythema over treatment field    Labs:  CBC RESULTS:   Recent Labs   Lab Test 10/08/19  0851   WBC 5.3   RBC 4.81   HGB 13.7   HCT 42.3   MCV 88   MCH 28.5   MCHC 32.4   RDW 13.7        ELECTROLYTES:  Recent Labs   Lab Test 10/31/19  1209 10/08/19  0851   NA  --  138   POTASSIUM 3.6 3.7   CHLORIDE  --  105   BRYANT  --  9.0   CO2  --  28   BUN  --  6*   CR  --  0.56   GLC  --  87       Assessment:    Tolerating radiation therapy well.  All questions and concerns addressed.    Toxicities:  Fatigue: Grade 1: Fatigue relieved by rest  Dermatitis: Grade 1: Faint erythema or dry desquamation    Plan:   1. Continue current therapy.        Mosaiq chart and setup information reviewed  MVCT/IGRT images checked    Medication Review  Med Note: No changes per pt    Educational Topic Discussed  Education Instructions: reviewed    The patient was seen and discussed with staff, Dr. Padilla.    Stefan Meyer,  MD  Resident, PGY-5  Department of Radiation Oncology  [facility]  P: [phone]    I saw and examined the patient with the resident.  I have reviewed and edited the resident's note and agree with the plan of care.      Rut Padilla MD      Please do not send letter to referring physician.

## 2019-12-02 NOTE — LETTER
Date:December 3, 2019      Provider requested that no letter be sent. Do not send.       [facility] Health Information

## 2019-12-03 ENCOUNTER — APPOINTMENT (OUTPATIENT)
Dept: RADIATION ONCOLOGY | Facility: CLINIC | Age: 54
End: 2019-12-03
Attending: RADIOLOGY
Payer: COMMERCIAL

## 2019-12-03 PROCEDURE — 77412 RADIATION TX DELIVERY LVL 3: CPT | Performed by: RADIOLOGY

## 2019-12-04 ENCOUNTER — APPOINTMENT (OUTPATIENT)
Dept: RADIATION ONCOLOGY | Facility: CLINIC | Age: 54
End: 2019-12-04
Attending: RADIOLOGY
Payer: COMMERCIAL

## 2019-12-04 PROCEDURE — 77412 RADIATION TX DELIVERY LVL 3: CPT | Performed by: RADIOLOGY

## 2019-12-05 ENCOUNTER — APPOINTMENT (OUTPATIENT)
Dept: RADIATION ONCOLOGY | Facility: CLINIC | Age: 54
End: 2019-12-05
Attending: RADIOLOGY
Payer: COMMERCIAL

## 2019-12-05 PROCEDURE — 77412 RADIATION TX DELIVERY LVL 3: CPT | Performed by: RADIOLOGY

## 2019-12-06 ENCOUNTER — APPOINTMENT (OUTPATIENT)
Dept: RADIATION ONCOLOGY | Facility: CLINIC | Age: 54
End: 2019-12-06
Attending: RADIOLOGY
Payer: COMMERCIAL

## 2019-12-06 PROCEDURE — 77412 RADIATION TX DELIVERY LVL 3: CPT | Performed by: RADIOLOGY

## 2019-12-06 PROCEDURE — 77336 RADIATION PHYSICS CONSULT: CPT | Performed by: RADIOLOGY

## 2019-12-10 ENCOUNTER — APPOINTMENT (OUTPATIENT)
Dept: RADIATION ONCOLOGY | Facility: CLINIC | Age: 54
End: 2019-12-10
Attending: RADIOLOGY
Payer: COMMERCIAL

## 2019-12-10 DIAGNOSIS — C50.411 MALIGNANT NEOPLASM OF UPPER-OUTER QUADRANT OF RIGHT BREAST IN FEMALE, ESTROGEN RECEPTOR POSITIVE (H): ICD-10-CM

## 2019-12-10 DIAGNOSIS — Z17.0 MALIGNANT NEOPLASM OF UPPER-OUTER QUADRANT OF RIGHT BREAST IN FEMALE, ESTROGEN RECEPTOR POSITIVE (H): ICD-10-CM

## 2019-12-10 DIAGNOSIS — L29.9 ITCHING: Primary | ICD-10-CM

## 2019-12-10 PROCEDURE — 77412 RADIATION TX DELIVERY LVL 3: CPT | Performed by: RADIOLOGY

## 2019-12-10 RX ORDER — HYDROCORTISONE 2.5 %
CREAM (GRAM) TOPICAL 2 TIMES DAILY
Qty: 30 G | Refills: 1 | Status: SHIPPED | OUTPATIENT
Start: 2019-12-10 | End: 2020-10-26

## 2019-12-10 NOTE — PROGRESS NOTES
Pt reports increased itching within treatment field not adequately addressed with OTC 1% hydrocortisone cream. She has no other complaints. Skin appears erythematous and dry. Encouraged aggressive moisturizing and sent Rx for 2.5% hydrocortisone to pharmacy. Also discussed using OTC benadryl as needed for itching before bed.    Stefan Meyer MD  Resident, PGY-5  Department of Radiation Oncology  [facility]  P: [phone]

## 2019-12-12 ENCOUNTER — APPOINTMENT (OUTPATIENT)
Dept: RADIATION ONCOLOGY | Facility: CLINIC | Age: 54
End: 2019-12-12
Attending: RADIOLOGY
Payer: COMMERCIAL

## 2019-12-12 VITALS
DIASTOLIC BLOOD PRESSURE: 80 MMHG | HEART RATE: 103 BPM | OXYGEN SATURATION: 97 % | BODY MASS INDEX: 21.71 KG/M2 | WEIGHT: 134.5 LBS | SYSTOLIC BLOOD PRESSURE: 118 MMHG

## 2019-12-12 DIAGNOSIS — C50.411 MALIGNANT NEOPLASM OF UPPER-OUTER QUADRANT OF RIGHT BREAST IN FEMALE, ESTROGEN RECEPTOR POSITIVE (H): Primary | ICD-10-CM

## 2019-12-12 DIAGNOSIS — Z17.0 MALIGNANT NEOPLASM OF UPPER-OUTER QUADRANT OF RIGHT BREAST IN FEMALE, ESTROGEN RECEPTOR POSITIVE (H): Primary | ICD-10-CM

## 2019-12-12 PROCEDURE — 77412 RADIATION TX DELIVERY LVL 3: CPT | Performed by: RADIOLOGY

## 2019-12-12 NOTE — LETTER
Date:December 13, 2019      Provider requested that no letter be sent. Do not send.       [facility] Health Information

## 2019-12-12 NOTE — LETTER
2019       RE: Kathy Lei   [address] [place] [address]     Dear Colleague,    Thank you for referring your patient, Kathy Lei, to the RADIATION ONCOLOGY CLINIC. Please see a copy of my visit note below.    [facility] PHYSICIANS  SPECIALIZING IN BREAKTHROUGHS  Radiation Oncology    On Treatment Visit Note      Kathy Lei      Date: 2019   MRN: [id number]   : 1965  Diagnosis: Breast cancer      Reason for Visit:  On Radiation Treatment Visit     Treatment Summary to Date  Treatment Site: Rt CW , nodes + SCV Current Dose: 3060/5040 Fractions:       Chemotherapy  Chemo concurrent with radx?: No    Subjective:  Missed treatment on Monday due to car problems and Wednesday due to food poisoning. She says that she had some eggs on Wednesday that triggered some vomiting and diarrhea. She is feeling better today. Skin is getting red and she has noticed a lot of itching. She started using hydrocortisone 2.5% with good relief. Still reporting some fatigue.    Nursing ROS:   Nutrition Alteration  Diet Type: Patient's Preference  Skin  Skin Reaction: 1 - Faint erythema or dry desquamation  Skin Progress: Skin itching, using hydrocortisone 2.5 may try some benedryl at night  Skin Note: Using aquaphor           Gastrointestinal  GI Note: WNL     Psychosocial  Pyschosocial Note: feeling well  Pain Assessment  0-10 Pain Scale: 0      Objective:   /80   Pulse 103   Wt 61 kg (134 lb 8 oz)   LMP 2014   SpO2 97%   BMI 21.71 kg/m     Gen: Appears well, in no acute distress  Skin: Moderate diffuse erythema over treatment field    Labs:  CBC RESULTS:   Recent Labs   Lab Test 10/08/19  0851   WBC 5.3   RBC 4.81   HGB 13.7   HCT 42.3   MCV 88   MCH 28.5   MCHC 32.4   RDW 13.7        ELECTROLYTES:  Recent Labs   Lab Test 10/31/19  1209 10/08/19  0851   NA  --  138   POTASSIUM 3.6 3.7   CHLORIDE  --  105   BRYANT  --  9.0   CO2  --  28    BUN  --  6*   CR  --  0.56   GLC  --  87       Assessment:    Tolerating radiation therapy well.  All questions and concerns addressed.    Toxicities:  Fatigue: Grade 1: Fatigue relieved by rest  Dermatitis: Grade 2: Moderate to brisk erythema; patchy moist desquamation, mostly confined to skin folds and creases; moderate erythema    Plan:   1. Continue current therapy.        Mosaiq chart and setup information reviewed  MVCT/IGRT images checked    Medication Review  Med Note: No changes per pt    Educational Topic Discussed  Education Instructions: reviewed      The patient was seen and discussed with staff, Dr. Padilla.    Stefan Meyer MD  Resident, PGY-5  Department of Radiation Oncology  [facility]  P: [phone]      I saw and examined the patient with the resident.  I have reviewed and edited the resident's note and agree with the plan of care.      Rut Padilla MD      Please do not send letter to referring physician.        Again, thank you for allowing me to participate in the care of your patient.      Sincerely,    Rut Padilla MD

## 2019-12-12 NOTE — PROGRESS NOTES
[facility] PHYSICIANS  SPECIALIZING IN BREAKTHROUGHS  Radiation Oncology    On Treatment Visit Note      Kathy Lei      Date: 2019   MRN: [id number]   : 1965  Diagnosis: Breast cancer      Reason for Visit:  On Radiation Treatment Visit     Treatment Summary to Date  Treatment Site: Rt CW , nodes + SCV Current Dose: 3060/5040 Fractions:       Chemotherapy  Chemo concurrent with radx?: No    Subjective:  Missed treatment on Monday due to car problems and Wednesday due to food poisoning. She says that she had some eggs on Wednesday that triggered some vomiting and diarrhea. She is feeling better today. Skin is getting red and she has noticed a lot of itching. She started using hydrocortisone 2.5% with good relief. Still reporting some fatigue.    Nursing ROS:   Nutrition Alteration  Diet Type: Patient's Preference  Skin  Skin Reaction: 1 - Faint erythema or dry desquamation  Skin Progress: Skin itching, using hydrocortisone 2.5 may try some benedryl at night  Skin Note: Using aquaphor           Gastrointestinal  GI Note: WNL     Psychosocial  Pyschosocial Note: feeling well  Pain Assessment  0-10 Pain Scale: 0      Objective:   /80   Pulse 103   Wt 61 kg (134 lb 8 oz)   LMP 2014   SpO2 97%   BMI 21.71 kg/m    Gen: Appears well, in no acute distress  Skin: Moderate diffuse erythema over treatment field    Labs:  CBC RESULTS:   Recent Labs   Lab Test 10/08/19  0851   WBC 5.3   RBC 4.81   HGB 13.7   HCT 42.3   MCV 88   MCH 28.5   MCHC 32.4   RDW 13.7        ELECTROLYTES:  Recent Labs   Lab Test 10/31/19  1209 10/08/19  0851   NA  --  138   POTASSIUM 3.6 3.7   CHLORIDE  --  105   BRYANT  --  9.0   CO2  --  28   BUN  --  6*   CR  --  0.56   GLC  --  87       Assessment:    Tolerating radiation therapy well.  All questions and concerns addressed.    Toxicities:  Fatigue: Grade 1: Fatigue relieved by rest  Dermatitis: Grade 2: Moderate to brisk erythema; patchy  moist desquamation, mostly confined to skin folds and creases; moderate erythema    Plan:   1. Continue current therapy.        Mosaiq chart and setup information reviewed  MVCT/IGRT images checked    Medication Review  Med Note: No changes per pt    Educational Topic Discussed  Education Instructions: reviewed      The patient was seen and discussed with staff, Dr. Padilla.    Stefan Meyer MD  Resident, PGY-5  Department of Radiation Oncology  [facility]  P: [phone]      I saw and examined the patient with the resident.  I have reviewed and edited the resident's note and agree with the plan of care.      Rut Padilla MD      Please do not send letter to referring physician.

## 2019-12-13 ENCOUNTER — APPOINTMENT (OUTPATIENT)
Dept: RADIATION ONCOLOGY | Facility: CLINIC | Age: 54
End: 2019-12-13
Attending: RADIOLOGY
Payer: COMMERCIAL

## 2019-12-13 PROCEDURE — 77412 RADIATION TX DELIVERY LVL 3: CPT | Performed by: RADIOLOGY

## 2019-12-16 ENCOUNTER — OFFICE VISIT (OUTPATIENT)
Dept: RADIATION ONCOLOGY | Facility: CLINIC | Age: 54
End: 2019-12-16
Attending: RADIOLOGY
Payer: COMMERCIAL

## 2019-12-16 VITALS
DIASTOLIC BLOOD PRESSURE: 76 MMHG | HEART RATE: 88 BPM | BODY MASS INDEX: 22.03 KG/M2 | SYSTOLIC BLOOD PRESSURE: 125 MMHG | WEIGHT: 136.5 LBS

## 2019-12-16 DIAGNOSIS — Z17.0 MALIGNANT NEOPLASM OF UPPER-OUTER QUADRANT OF RIGHT BREAST IN FEMALE, ESTROGEN RECEPTOR POSITIVE (H): Primary | ICD-10-CM

## 2019-12-16 DIAGNOSIS — C50.411 MALIGNANT NEOPLASM OF UPPER-OUTER QUADRANT OF RIGHT BREAST IN FEMALE, ESTROGEN RECEPTOR POSITIVE (H): Primary | ICD-10-CM

## 2019-12-16 PROCEDURE — 77412 RADIATION TX DELIVERY LVL 3: CPT | Performed by: RADIOLOGY

## 2019-12-16 NOTE — LETTER
Date:December 17, 2019      Provider requested that no letter be sent. Do not send.       [facility] Health Information

## 2019-12-16 NOTE — PROGRESS NOTES
[facility] PHYSICIANS  SPECIALIZING IN BREAKTHROUGHS  Radiation Oncology    On Treatment Visit Note      Kathy Lei      Date: 2019   MRN: [id number]   : 1965  Diagnosis: Breast cancer      Reason for Visit:  On Radiation Treatment Visit     Treatment Summary to Date  Treatment Site: Rt CW , nodes + SCV Current Dose: 3420/5040 cGy Fractions:       Chemotherapy  Chemo concurrent with radx?: No    Subjective:   Overall doing well. Skin is more red this week and she has noticed some peeling in the axilla. Still reporting skin itching but ran out of 2.5% hydrocortisone. This had been working well. She tried to refill this but the pharmacy recording told her it was too soon to refill. She is going to continue with the 1% hydrocortisone in the meantime. Still feeling fatigued but in good spirits.    Nursing ROS:   Nutrition Alteration  Diet Type: Patient's Preference  Skin  Skin Reaction: 2 - Moderate to brisk erythema, patchy moist desquamation, mostly confined to skin folds and creases, moderate edema  Skin Progress: Skin itching, using hydrocortisone 2.5 may try some benedryl at night  Skin Note: Using aquaphor           Gastrointestinal  GI Note: WNL     Psychosocial  Pyschosocial Note: feeling well  Pain Assessment  0-10 Pain Scale: 0      Objective:   /76   Pulse 88   Wt 61.9 kg (136 lb 8 oz)   LMP 2014   BMI 22.03 kg/m    Gen: Appears well, in no acute distress  Skin: Moderate diffuse erythema over treatment field with focal dry desquamation in axilla    Labs:  CBC RESULTS:   Recent Labs   Lab Test 10/08/19  0851   WBC 5.3   RBC 4.81   HGB 13.7   HCT 42.3   MCV 88   MCH 28.5   MCHC 32.4   RDW 13.7        ELECTROLYTES:  Recent Labs   Lab Test 10/31/19  1209 10/08/19  0851   NA  --  138   POTASSIUM 3.6 3.7   CHLORIDE  --  105   BRYANT  --  9.0   CO2  --  28   BUN  --  6*   CR  --  0.56   GLC  --  87       Assessment:    Tolerating radiation therapy well.   All questions and concerns addressed.    Toxicities:  Fatigue: Grade 1: Fatigue relieved by rest  Dermatitis: Grade 2: Moderate to brisk erythema; patchy moist desquamation, mostly confined to skin folds and creases; moderate erythema    Plan:   1. Continue current therapy.        Mosaiq chart and setup information reviewed  MVCT/IGRT images checked    Medication Review  Med Note: No changes per pt    Educational Topic Discussed  Education Instructions: reviewed      The patient was seen and discussed with staff, Dr. Padilla.    Stefan Meyer MD  Resident, PGY-5  Department of Radiation Oncology  [facility]  P: [phone]    I saw and examined the patient with the resident.  I have reviewed and edited the resident's note and agree with the plan of care.      Rut Padilla MD      Please do not send letter to referring physician.

## 2019-12-16 NOTE — LETTER
2019       RE: Kathy Lei   [address] [place] [address]     Dear Colleague,    Thank you for referring your patient, Kathy Lei, to the RADIATION ONCOLOGY CLINIC. Please see a copy of my visit note below.    [facility] PHYSICIANS  SPECIALIZING IN BREAKTHROUGHS  Radiation Oncology    On Treatment Visit Note      Kathy Lei      Date: 2019   MRN: [id number]   : 1965  Diagnosis: Breast cancer      Reason for Visit:  On Radiation Treatment Visit     Treatment Summary to Date  Treatment Site: Rt CW , nodes + SCV Current Dose: 3420/5040 cGy Fractions:       Chemotherapy  Chemo concurrent with radx?: No    Subjective:   Overall doing well. Skin is more red this week and she has noticed some peeling in the axilla. Still reporting skin itching but ran out of 2.5% hydrocortisone. This had been working well. She tried to refill this but the pharmacy recording told her it was too soon to refill. She is going to continue with the 1% hydrocortisone in the meantime. Still feeling fatigued but in good spirits.    Nursing ROS:   Nutrition Alteration  Diet Type: Patient's Preference  Skin  Skin Reaction: 2 - Moderate to brisk erythema, patchy moist desquamation, mostly confined to skin folds and creases, moderate edema  Skin Progress: Skin itching, using hydrocortisone 2.5 may try some benedryl at night  Skin Note: Using aquaphor           Gastrointestinal  GI Note: WNL     Psychosocial  Pyschosocial Note: feeling well  Pain Assessment  0-10 Pain Scale: 0      Objective:   /76   Pulse 88   Wt 61.9 kg (136 lb 8 oz)   LMP 2014   BMI 22.03 kg/m     Gen: Appears well, in no acute distress  Skin: Moderate diffuse erythema over treatment field with focal dry desquamation in axilla    Labs:  CBC RESULTS:   Recent Labs   Lab Test 10/08/19  0851   WBC 5.3   RBC 4.81   HGB 13.7   HCT 42.3   MCV 88   MCH 28.5   MCHC 32.4   RDW 13.7         ELECTROLYTES:  Recent Labs   Lab Test 10/31/19  1209 10/08/19  0851   NA  --  138   POTASSIUM 3.6 3.7   CHLORIDE  --  105   BRYANT  --  9.0   CO2  --  28   BUN  --  6*   CR  --  0.56   GLC  --  87       Assessment:    Tolerating radiation therapy well.  All questions and concerns addressed.    Toxicities:  Fatigue: Grade 1: Fatigue relieved by rest  Dermatitis: Grade 2: Moderate to brisk erythema; patchy moist desquamation, mostly confined to skin folds and creases; moderate erythema    Plan:   1. Continue current therapy.        Mosaiq chart and setup information reviewed  MVCT/IGRT images checked    Medication Review  Med Note: No changes per pt    Educational Topic Discussed  Education Instructions: reviewed      The patient was seen and discussed with staff, Dr. Padilla.    Stefan Meyer MD  Resident, PGY-5  Department of Radiation Oncology  [facility]  P: [phone]    I saw and examined the patient with the resident.  I have reviewed and edited the resident's note and agree with the plan of care.      Rut Padilla MD      Please do not send letter to referring physician.        Again, thank you for allowing me to participate in the care of your patient.      Sincerely,    Rut Padilla MD

## 2019-12-17 ENCOUNTER — APPOINTMENT (OUTPATIENT)
Dept: RADIATION ONCOLOGY | Facility: CLINIC | Age: 54
End: 2019-12-17
Attending: RADIOLOGY
Payer: COMMERCIAL

## 2019-12-17 PROCEDURE — 77417 THER RADIOLOGY PORT IMAGE(S): CPT | Performed by: RADIOLOGY

## 2019-12-17 PROCEDURE — 77336 RADIATION PHYSICS CONSULT: CPT | Performed by: RADIOLOGY

## 2019-12-17 PROCEDURE — 77412 RADIATION TX DELIVERY LVL 3: CPT | Performed by: RADIOLOGY

## 2019-12-18 ENCOUNTER — APPOINTMENT (OUTPATIENT)
Dept: RADIATION ONCOLOGY | Facility: CLINIC | Age: 54
End: 2019-12-18
Attending: RADIOLOGY
Payer: COMMERCIAL

## 2019-12-18 PROCEDURE — 77412 RADIATION TX DELIVERY LVL 3: CPT | Performed by: RADIOLOGY

## 2019-12-19 ENCOUNTER — APPOINTMENT (OUTPATIENT)
Dept: RADIATION ONCOLOGY | Facility: CLINIC | Age: 54
End: 2019-12-19
Attending: RADIOLOGY
Payer: COMMERCIAL

## 2019-12-19 PROCEDURE — 77412 RADIATION TX DELIVERY LVL 3: CPT | Performed by: RADIOLOGY

## 2019-12-20 ENCOUNTER — APPOINTMENT (OUTPATIENT)
Dept: RADIATION ONCOLOGY | Facility: CLINIC | Age: 54
End: 2019-12-20
Attending: RADIOLOGY
Payer: COMMERCIAL

## 2019-12-20 DIAGNOSIS — L29.9 ITCHING: Primary | ICD-10-CM

## 2019-12-20 PROCEDURE — 77300 RADIATION THERAPY DOSE PLAN: CPT | Performed by: RADIOLOGY

## 2019-12-20 PROCEDURE — 77412 RADIATION TX DELIVERY LVL 3: CPT | Performed by: RADIOLOGY

## 2019-12-20 PROCEDURE — 77290 THER RAD SIMULAJ FIELD CPLX: CPT | Performed by: RADIOLOGY

## 2019-12-20 PROCEDURE — 77334 RADIATION TREATMENT AID(S): CPT | Performed by: RADIOLOGY

## 2019-12-20 RX ORDER — HYDROCORTISONE 2.5 %
1 CREAM (GRAM) TOPICAL 4 TIMES DAILY
Qty: 90 G | Refills: 1 | Status: SHIPPED | OUTPATIENT
Start: 2019-12-20 | End: 2020-10-26

## 2019-12-21 ENCOUNTER — APPOINTMENT (OUTPATIENT)
Dept: RADIATION ONCOLOGY | Facility: CLINIC | Age: 54
End: 2019-12-21
Attending: RADIOLOGY
Payer: COMMERCIAL

## 2019-12-21 PROCEDURE — 77412 RADIATION TX DELIVERY LVL 3: CPT | Performed by: RADIOLOGY

## 2019-12-23 ENCOUNTER — OFFICE VISIT (OUTPATIENT)
Dept: RADIATION ONCOLOGY | Facility: CLINIC | Age: 54
End: 2019-12-23
Attending: RADIOLOGY
Payer: COMMERCIAL

## 2019-12-23 VITALS
HEART RATE: 92 BPM | DIASTOLIC BLOOD PRESSURE: 78 MMHG | WEIGHT: 136.8 LBS | SYSTOLIC BLOOD PRESSURE: 113 MMHG | BODY MASS INDEX: 22.08 KG/M2

## 2019-12-23 DIAGNOSIS — R52 PAIN: Primary | ICD-10-CM

## 2019-12-23 DIAGNOSIS — R23.4 ACUTE DESQUAMATIVE ERUPTION OF SKIN: ICD-10-CM

## 2019-12-23 PROCEDURE — 77336 RADIATION PHYSICS CONSULT: CPT | Performed by: RADIOLOGY

## 2019-12-23 PROCEDURE — 77412 RADIATION TX DELIVERY LVL 3: CPT | Performed by: RADIOLOGY

## 2019-12-23 RX ORDER — SILVER SULFADIAZINE 10 MG/G
CREAM TOPICAL
Qty: 100 G | Refills: 1 | Status: SHIPPED | OUTPATIENT
Start: 2019-12-23 | End: 2020-01-14

## 2019-12-23 RX ORDER — SILVER SULFADIAZINE 10 MG/G
CREAM TOPICAL
Qty: 100 G | Refills: 1 | Status: SHIPPED | OUTPATIENT
Start: 2019-12-23 | End: 2019-12-23

## 2019-12-23 RX ORDER — OXYCODONE AND ACETAMINOPHEN 5; 325 MG/1; MG/1
1-2 TABLET ORAL EVERY 6 HOURS PRN
Qty: 30 TABLET | Refills: 0 | Status: SHIPPED | OUTPATIENT
Start: 2019-12-23 | End: 2020-01-14

## 2019-12-23 NOTE — LETTER
2019       RE: Kathy Lei   [address] [place] [address]     Dear Colleague,    Thank you for referring your patient, Kathy Lei, to the RADIATION ONCOLOGY CLINIC. Please see a copy of my visit note below.    [facility] PHYSICIANS  SPECIALIZING IN BREAKTHROUGHS  Radiation Oncology    On Treatment Visit Note      Kathy Lei      Date: 2019   MRN: [id number]   : 1965  Diagnosis: Breast cancer      Reason for Visit:  On Radiation Treatment Visit     Treatment Summary to Date  Treatment Site: Rt CW , nodes + SCV Current Dose: 4500/6040(Boost added) cGy Fractions: 25/33(Boost added)      Chemotherapy  Chemo concurrent with radx?: No    Subjective:   Mily has worsening skin reaction this week. She has experienced desquamation in the chest wall, axilla and upper back.  This is associated with tenderness. She also has ongoing skin itching for which she uses 2.5% hydrocortisone cream.      Nursing ROS:   Nutrition Alteration  Diet Type: Patient's Preference  Skin  Skin Reaction: 2 - Moderate to brisk erythema, patchy moist desquamation, mostly confined to skin folds and creases, moderate edema  Skin Progress: Skin itching, using hydrocortisone 2.5 may try some benedryl at night  Skin Note: Using aquaphor           Gastrointestinal  GI Note: WNL     Psychosocial  Pyschosocial Note: fatigue, early to bed  Pain Assessment  0-10 Pain Scale: 0      Objective:   /78   Pulse 92   Wt 62.1 kg (136 lb 12.8 oz)   LMP 2014   BMI 22.08 kg/m     Gen: Appears well, in no acute distress  Skin: Brisk erythema over the entire chest wall, axilla, SCV and upper back.      Labs:  CBC RESULTS:   Recent Labs   Lab Test 10/08/19  0851   WBC 5.3   RBC 4.81   HGB 13.7   HCT 42.3   MCV 88   MCH 28.5   MCHC 32.4   RDW 13.7        ELECTROLYTES:  Recent Labs   Lab Test 10/31/19  1209 10/08/19  0851   NA  --  138   POTASSIUM 3.6 3.7   CHLORIDE  --  105    BRYANT  --  9.0   CO2  --  28   BUN  --  6*   CR  --  0.56   GLC  --  87       Assessment:    Tolerating radiation therapy well.  All questions and concerns addressed.    Toxicities:  Fatigue: Grade 1: Fatigue relieved by rest  Pain: Grade 1: Mild pain  Dermatitis: Grade 2: Moderate to brisk erythema; patchy moist desquamation, mostly confined to skin folds and creases; moderate erythema    Plan:   1. Continue current therapy.    2. Skin: Continue with HC cream and aquaphor for skin care.  Consider Silvadene.   3. Pain: prescribed 30 tablets of Percocet for pain control.       Mosaiq chart and setup information reviewed  Port images checked    Medication Review  Med Note: No changes per pt    Educational Topic Discussed  Education Instructions: reviewed        Rut Padilla MD/PhD  [phone] clinic  Pager [phone]    Please do not send letter to referring physician.      Again, thank you for allowing me to participate in the care of your patient.      Sincerely,    Rut Padilla MD

## 2019-12-23 NOTE — PROGRESS NOTES
[facility] PHYSICIANS  SPECIALIZING IN BREAKTHROUGHS  Radiation Oncology    On Treatment Visit Note      Kathy Lei      Date: 2019   MRN: [id number]   : 1965  Diagnosis: Breast cancer      Reason for Visit:  On Radiation Treatment Visit     Treatment Summary to Date  Treatment Site: Rt CW , nodes + SCV Current Dose: 4500/6040(Boost added) cGy Fractions: 25/33(Boost added)      Chemotherapy  Chemo concurrent with radx?: No    Subjective:   Mily has worsening skin reaction this week. She has experienced desquamation in the chest wall, axilla and upper back.  This is associated with tenderness. She also has ongoing skin itching for which she uses 2.5% hydrocortisone cream.      Nursing ROS:   Nutrition Alteration  Diet Type: Patient's Preference  Skin  Skin Reaction: 2 - Moderate to brisk erythema, patchy moist desquamation, mostly confined to skin folds and creases, moderate edema  Skin Progress: Skin itching, using hydrocortisone 2.5 may try some benedryl at night  Skin Note: Using aquaphor           Gastrointestinal  GI Note: WNL     Psychosocial  Pyschosocial Note: fatigue, early to bed  Pain Assessment  0-10 Pain Scale: 0      Objective:   /78   Pulse 92   Wt 62.1 kg (136 lb 12.8 oz)   LMP 2014   BMI 22.08 kg/m    Gen: Appears well, in no acute distress  Skin: Brisk erythema over the entire chest wall, axilla, SCV and upper back.      Labs:  CBC RESULTS:   Recent Labs   Lab Test 10/08/19  0851   WBC 5.3   RBC 4.81   HGB 13.7   HCT 42.3   MCV 88   MCH 28.5   MCHC 32.4   RDW 13.7        ELECTROLYTES:  Recent Labs   Lab Test 10/31/19  1209 10/08/19  0851   NA  --  138   POTASSIUM 3.6 3.7   CHLORIDE  --  105   BRYANT  --  9.0   CO2  --  28   BUN  --  6*   CR  --  0.56   GLC  --  87       Assessment:    Tolerating radiation therapy well.  All questions and concerns addressed.    Toxicities:  Fatigue: Grade 1: Fatigue relieved by rest  Pain: Grade 1: Mild  pain  Dermatitis: Grade 2: Moderate to brisk erythema; patchy moist desquamation, mostly confined to skin folds and creases; moderate erythema    Plan:   1. Continue current therapy.    2. Skin: Continue with HC cream and aquaphor for skin care.  Consider Silvadene.   3. Pain: prescribed 30 tablets of Percocet for pain control.       Mosaiq chart and setup information reviewed  Port images checked    Medication Review  Med Note: No changes per pt    Educational Topic Discussed  Education Instructions: reviewed        Rut Padilla MD/PhD  [phone] clinic  Pager [phone]    Please do not send letter to referring physician.

## 2019-12-24 ENCOUNTER — APPOINTMENT (OUTPATIENT)
Dept: RADIATION ONCOLOGY | Facility: CLINIC | Age: 54
End: 2019-12-24
Attending: RADIOLOGY
Payer: COMMERCIAL

## 2019-12-24 PROCEDURE — 77412 RADIATION TX DELIVERY LVL 3: CPT | Performed by: RADIOLOGY

## 2019-12-26 ENCOUNTER — APPOINTMENT (OUTPATIENT)
Dept: RADIATION ONCOLOGY | Facility: CLINIC | Age: 54
End: 2019-12-26
Attending: RADIOLOGY
Payer: COMMERCIAL

## 2019-12-26 DIAGNOSIS — F41.9 ANXIETY: Primary | ICD-10-CM

## 2019-12-26 PROCEDURE — 77417 THER RADIOLOGY PORT IMAGE(S): CPT | Performed by: RADIOLOGY

## 2019-12-26 PROCEDURE — 77412 RADIATION TX DELIVERY LVL 3: CPT | Performed by: RADIOLOGY

## 2019-12-26 NOTE — TELEPHONE ENCOUNTER
Routing refill request to provider for review/approval because:  Drug not on the [facility] refill protocol   Medication is reported/historical    Last filled #30   Discontinued None Christian Guzman MD 10/6/19        Last visit 9/24/2019

## 2019-12-27 ENCOUNTER — APPOINTMENT (OUTPATIENT)
Dept: RADIATION ONCOLOGY | Facility: CLINIC | Age: 54
End: 2019-12-27
Attending: RADIOLOGY
Payer: COMMERCIAL

## 2019-12-27 PROCEDURE — 77412 RADIATION TX DELIVERY LVL 3: CPT | Performed by: RADIOLOGY

## 2019-12-30 ENCOUNTER — APPOINTMENT (OUTPATIENT)
Dept: RADIATION ONCOLOGY | Facility: CLINIC | Age: 54
End: 2019-12-30
Attending: RADIOLOGY
Payer: COMMERCIAL

## 2019-12-30 PROCEDURE — 77412 RADIATION TX DELIVERY LVL 3: CPT | Performed by: RADIOLOGY

## 2019-12-30 RX ORDER — LORAZEPAM 0.5 MG/1
TABLET ORAL
Qty: 30 TABLET | Refills: 0 | Status: SHIPPED | OUTPATIENT
Start: 2019-12-30 | End: 2020-01-23

## 2019-12-31 ENCOUNTER — MYC REFILL (OUTPATIENT)
Dept: FAMILY MEDICINE | Facility: CLINIC | Age: 54
End: 2019-12-31

## 2019-12-31 ENCOUNTER — APPOINTMENT (OUTPATIENT)
Dept: RADIATION ONCOLOGY | Facility: CLINIC | Age: 54
End: 2019-12-31
Attending: RADIOLOGY
Payer: COMMERCIAL

## 2019-12-31 ENCOUNTER — E-VISIT (OUTPATIENT)
Dept: FAMILY MEDICINE | Facility: CLINIC | Age: 54
End: 2019-12-31
Payer: COMMERCIAL

## 2019-12-31 VITALS
WEIGHT: 138.6 LBS | BODY MASS INDEX: 22.37 KG/M2 | HEART RATE: 92 BPM | SYSTOLIC BLOOD PRESSURE: 107 MMHG | DIASTOLIC BLOOD PRESSURE: 65 MMHG

## 2019-12-31 DIAGNOSIS — F41.9 ANXIETY: ICD-10-CM

## 2019-12-31 DIAGNOSIS — Z53.9 ERRONEOUS ENCOUNTER--DISREGARD: Primary | ICD-10-CM

## 2019-12-31 DIAGNOSIS — Z17.0 MALIGNANT NEOPLASM OF UPPER-OUTER QUADRANT OF RIGHT BREAST IN FEMALE, ESTROGEN RECEPTOR POSITIVE (H): Primary | ICD-10-CM

## 2019-12-31 DIAGNOSIS — C50.411 MALIGNANT NEOPLASM OF UPPER-OUTER QUADRANT OF RIGHT BREAST IN FEMALE, ESTROGEN RECEPTOR POSITIVE (H): Primary | ICD-10-CM

## 2019-12-31 PROCEDURE — 77336 RADIATION PHYSICS CONSULT: CPT | Performed by: RADIOLOGY

## 2019-12-31 PROCEDURE — 77412 RADIATION TX DELIVERY LVL 3: CPT | Performed by: RADIOLOGY

## 2019-12-31 RX ORDER — LORAZEPAM 0.5 MG/1
TABLET ORAL
Qty: 30 TABLET | Refills: 0 | Status: CANCELLED | OUTPATIENT
Start: 2019-12-31

## 2019-12-31 ASSESSMENT — ANXIETY QUESTIONNAIRES
7. FEELING AFRAID AS IF SOMETHING AWFUL MIGHT HAPPEN: NOT AT ALL
5. BEING SO RESTLESS THAT IT IS HARD TO SIT STILL: NOT AT ALL
1. FEELING NERVOUS, ANXIOUS, OR ON EDGE: SEVERAL DAYS
6. BECOMING EASILY ANNOYED OR IRRITABLE: NOT AT ALL
GAD7 TOTAL SCORE: 4
2. NOT BEING ABLE TO STOP OR CONTROL WORRYING: SEVERAL DAYS
3. WORRYING TOO MUCH ABOUT DIFFERENT THINGS: SEVERAL DAYS
4. TROUBLE RELAXING: SEVERAL DAYS
7. FEELING AFRAID AS IF SOMETHING AWFUL MIGHT HAPPEN: NOT AT ALL
GAD7 TOTAL SCORE: 4

## 2019-12-31 NOTE — LETTER
Date:January 2, 2020      Provider requested that no letter be sent. Do not send.       [facility] Health Information

## 2019-12-31 NOTE — PROGRESS NOTES
[facility] PHYS[facility]IANS  SPECIALIZING IN BREAKTHROUGHS  Radiation Oncology    On Treatment Visit Note      Kathy Lei      Date: 2019   MRN: [id number]   : 1965  Diagnosis: Breast cancer      Reason for Visit:  On Radiation Treatment Visit     Treatment Summary to Date  Treatment Site: Rt CW , nodes + SCV Current Dose: 5440/6040(Boost added) cGy Fractions: 30/33(Boost added)      Chemotherapy  Chemo concurrent with radx?: No    ED Visit/Hosiptal Admission: None    Subjective:   Mily is doing well this week.  She states that Silvadene cream significantly improved moist desquamation.  She no longer has much irritation in her axilla.  She took Percocet only for 2 or 3 nights and is currently pain free.  Her energy level is slightly decreased -- sleeping more than usual.  But otherwise, she has no concerns.      Nursing ROS:   Nutrition Alteration  Diet Type: Patient's Preference  Skin  Skin Reaction: 2 - Moderate to brisk erythema, patchy moist desquamation, mostly confined to skin folds and creases, moderate edema  Skin Note: silverdene           Gastrointestinal  GI Note: WNL     Psychosocial  Pyschosocial Note: fatigue, early to bed  Pain Assessment  0-10 Pain Scale: 0      Objective:   /65   Pulse 92   Wt 62.9 kg (138 lb 9.6 oz)   LMP 2014   BMI 22.37 kg/m    Gen: Appears well, in no acute distress  Skin: Brisk erythema over the right chest wall.  Decreased moist desquamation over center of the field.  Desquamation under the right axilla has resolved.     Labs:  CBC RESULTS:   Recent Labs   Lab Test 10/08/19  0851   WBC 5.3   RBC 4.81   HGB 13.7   HCT 42.3   MCV 88   MCH 28.5   MCHC 32.4   RDW 13.7        ELECTROLYTES:  Recent Labs   Lab Test 10/31/19  1209 10/08/19  0851   NA  --  138   POTASSIUM 3.6 3.7   CHLORIDE  --  105   BRYANT  --  9.0   CO2  --  28   BUN  --  6*   CR  --  0.56   GLC  --  87       Assessment:    Tolerating radiation therapy well.  All  questions and concerns addressed.    Toxicities:  Fatigue: Grade 1: Fatigue relieved by rest  Pain: Grade 1: Mild pain  Dermatitis: Grade 2: Moderate to brisk erythema; patchy moist desquamation, mostly confined to skin folds and creases; moderate erythema    Plan:   1. Continue current therapy.    2. Will complete treatment in 3 more fractions. EOTV on 1/6.       Mosaiq chart and setup information reviewed  Ports checked    Medication Review  Med Note: No changes per pt    Educational Topic Discussed  Education Instructions: reviewed        Rut Padilla MD/PhD  [phone] clinic  Pager [phone]    Please do not send letter to referring physician.

## 2019-12-31 NOTE — LETTER
2019       RE: Kathy Lei   [address] [place] [address]     Dear Colleague,    Thank you for referring your patient, Kathy Lei, to the RADIATION ONCOLOGY CLINIC. Please see a copy of my visit note below.    [facility]YS[facility]ANS  SPECIALIZING IN BREAKTHROUGHS  Radiation Oncology    On Treatment Visit Note      Kathy Lei      Date: 2019   MRN: [id number]   : 1965  Diagnosis: Breast cancer      Reason for Visit:  On Radiation Treatment Visit     Treatment Summary to Date  Treatment Site: Rt CW , nodes + SCV Current Dose: 5440/6040(Boost added) cGy Fractions: 30/33(Boost added)      Chemotherapy  Chemo concurrent with radx?: No    ED Visit/Hosiptal Admission: None    Subjective:   Mily is doing well this week.  She states that Silvadene cream significantly improved moist desquamation.  She no longer has much irritation in her axilla.  She took Percocet only for 2 or 3 nights and is currently pain free.  Her energy level is slightly decreased -- sleeping more than usual.  But otherwise, she has no concerns.      Nursing ROS:   Nutrition Alteration  Diet Type: Patient's Preference  Skin  Skin Reaction: 2 - Moderate to brisk erythema, patchy moist desquamation, mostly confined to skin folds and creases, moderate edema  Skin Note: silverdene           Gastrointestinal  GI Note: WNL     Psychosocial  Pyschosocial Note: fatigue, early to bed  Pain Assessment  0-10 Pain Scale: 0      Objective:   /65   Pulse 92   Wt 62.9 kg (138 lb 9.6 oz)   LMP 2014   BMI 22.37 kg/m     Gen: Appears well, in no acute distress  Skin: Brisk erythema over the right chest wall.  Decreased moist desquamation over center of the field.  Desquamation under the right axilla has resolved.     Labs:  CBC RESULTS:   Recent Labs   Lab Test 10/08/19  0851   WBC 5.3   RBC 4.81   HGB 13.7   HCT 42.3   MCV 88   MCH 28.5   MCHC 32.4   RDW 13.7         ELECTROLYTES:  Recent Labs   Lab Test 10/31/19  1209 10/08/19  0851   NA  --  138   POTASSIUM 3.6 3.7   CHLORIDE  --  105   BRYANT  --  9.0   CO2  --  28   BUN  --  6*   CR  --  0.56   GLC  --  87       Assessment:    Tolerating radiation therapy well.  All questions and concerns addressed.    Toxicities:  Fatigue: Grade 1: Fatigue relieved by rest  Pain: Grade 1: Mild pain  Dermatitis: Grade 2: Moderate to brisk erythema; patchy moist desquamation, mostly confined to skin folds and creases; moderate erythema    Plan:   1. Continue current therapy.    2. Will complete treatment in 3 more fractions. EOTV on 1/6.       Mosaiq chart and setup information reviewed  Ports checked    Medication Review  Med Note: No changes per pt    Educational Topic Discussed  Education Instructions: reviewed        Rut Padilla MD/PhD  [phone] clinic  Pager [phone]    Please do not send letter to referring physician.      Again, thank you for allowing me to participate in the care of your patient.      Sincerely,    Rut Padilla MD

## 2020-01-01 ASSESSMENT — ANXIETY QUESTIONNAIRES: GAD7 TOTAL SCORE: 4

## 2020-01-02 ENCOUNTER — APPOINTMENT (OUTPATIENT)
Dept: RADIATION ONCOLOGY | Facility: CLINIC | Age: 55
End: 2020-01-02
Attending: RADIOLOGY
Payer: COMMERCIAL

## 2020-01-02 PROCEDURE — 77412 RADIATION TX DELIVERY LVL 3: CPT | Performed by: RADIOLOGY

## 2020-01-02 NOTE — TELEPHONE ENCOUNTER
Requested Prescriptions   Pending Prescriptions Disp Refills     LORazepam (ATIVAN) 0.5 MG tablet 30 tablet 0       There is no refill protocol information for this order         duplicate sent mychart update

## 2020-01-03 ENCOUNTER — APPOINTMENT (OUTPATIENT)
Dept: RADIATION ONCOLOGY | Facility: CLINIC | Age: 55
End: 2020-01-03
Attending: RADIOLOGY
Payer: COMMERCIAL

## 2020-01-03 PROCEDURE — 77412 RADIATION TX DELIVERY LVL 3: CPT | Performed by: RADIOLOGY

## 2020-01-06 ENCOUNTER — APPOINTMENT (OUTPATIENT)
Dept: RADIATION ONCOLOGY | Facility: CLINIC | Age: 55
End: 2020-01-06
Attending: RADIOLOGY
Payer: COMMERCIAL

## 2020-01-06 VITALS
DIASTOLIC BLOOD PRESSURE: 71 MMHG | SYSTOLIC BLOOD PRESSURE: 116 MMHG | HEART RATE: 100 BPM | WEIGHT: 135 LBS | BODY MASS INDEX: 21.79 KG/M2

## 2020-01-06 DIAGNOSIS — Z17.0 MALIGNANT NEOPLASM OF UPPER-OUTER QUADRANT OF RIGHT BREAST IN FEMALE, ESTROGEN RECEPTOR POSITIVE (H): Primary | ICD-10-CM

## 2020-01-06 DIAGNOSIS — C50.411 MALIGNANT NEOPLASM OF UPPER-OUTER QUADRANT OF RIGHT BREAST IN FEMALE, ESTROGEN RECEPTOR POSITIVE (H): Primary | ICD-10-CM

## 2020-01-06 PROCEDURE — 77336 RADIATION PHYSICS CONSULT: CPT | Performed by: RADIOLOGY

## 2020-01-06 PROCEDURE — 77412 RADIATION TX DELIVERY LVL 3: CPT | Performed by: RADIOLOGY

## 2020-01-06 NOTE — LETTER
Date:January 7, 2020      Provider requested that no letter be sent. Do not send.       [facility] Health Information

## 2020-01-06 NOTE — PROGRESS NOTES
[facility] PHYSICIANS  SPECIALIZING IN BREAKTHROUGHS  Radiation Oncology    On Treatment Visit Note      Kathy Lei      Date: 2020   MRN: [id number]   : 1965  Diagnosis: Breast cancer      Reason for Visit:  On Radiation Treatment Visit     Treatment Summary to Date  Treatment Site: Rt CW , nodes + SCV Current Dose: 6040/6040 cGy Fractions: 33/33      Chemotherapy  Chemo concurrent with radx?: No      Subjective:   Mily completed radiation therapy today.  She states that her skin continues to improve in the last week.  She feels that her energy level is recovered to the point she is starting to work again with first day being this Thursday.  She is seeing  Dr. Ramos and Dr. Guzman in the next week.     Nursing ROS:   Nutrition Alteration  Diet Type: Patient's Preference  Skin  Skin Reaction: 1 - Faint erythema or dry desquamation  Skin Note: Will continues aquaphor daily until follow up           Gastrointestinal  GI Note: WNL     Psychosocial  Pyschosocial Note: fatigue, early to bed  Pain Assessment  0-10 Pain Scale: 0      Objective:   /71   Pulse 100   Wt 61.2 kg (135 lb)   LMP 2014   BMI 21.79 kg/m    Gen: Appears well, in no acute distress  Skin: Brisk erythema of the right chest wall, but no apparent moist desquamation.      Labs:  CBC RESULTS:   Recent Labs   Lab Test 10/08/19  0851   WBC 5.3   RBC 4.81   HGB 13.7   HCT 42.3   MCV 88   MCH 28.5   MCHC 32.4   RDW 13.7        ELECTROLYTES:  Recent Labs   Lab Test 10/31/19  1209 10/08/19  0851   NA  --  138   POTASSIUM 3.6 3.7   CHLORIDE  --  105   BRYANT  --  9.0   CO2  --  28   BUN  --  6*   CR  --  0.56   GLC  --  87       Assessment:    Tolerating radiation therapy well.  All questions and concerns addressed.    Toxicities:  Fatigue: Grade 0: No toxicity  Pain: Grade 0: No toxicity  Dermatitis: Grade 2: Moderate to brisk erythema; patchy moist desquamation, mostly confined to skin folds and  creases; moderate erythema    Plan:   1. Discussed ongoing skin care.   2. Follow-up in 1 month.  3. Follow-up with medical oncology and plastic surgery as scheduled.       Mosaiq chart and setup information reviewed  Ports checked    Medication Review  Med Note: No changes per pt    Educational Topic Discussed  Additional Instructions: D/C instructions reviewed with pt  Education Instructions: reviewed        Rut Padilla MD/PhD  [phone] clinic  Pager [phone]    Please do not send letter to referring physician.

## 2020-01-06 NOTE — PATIENT INSTRUCTIONS
Continuing Management of the Effects of Radiation Treatment    The side effects of radiation therapy should gradually decrease in 2 to 3 weeks after you have finished radiation.  Some effects take longer to resolve.    Skin reactions:  Skin changes (such as redness or irritation) should begin to get better gradually.  Some people will have a permanent change in skin color.  Their skin may be more pink or  tan  than the untreated skin.  The skin may be thinner or more fragile than before treatment.  Continue to use a gentle moisturizing lotion for several months.  You should always protect the skin in the area that was treated by using sunscreen of spf 30 or higher.      Other skin care instructions:    Fatigue caused by radiation therapy will decrease and your energy will improve.    For concerns or questions call Department of Therapeutic Radiology [phone]

## 2020-01-06 NOTE — LETTER
2020       RE: Kathy Lei   [address] [place] [address]     Dear Colleague,    Thank you for referring your patient, Kathy Lei, to the RADIATION ONCOLOGY CLINIC. Please see a copy of my visit note below.    [facility] PHYSICIANS  SPECIALIZING IN BREAKTHROUGHS  Radiation Oncology    On Treatment Visit Note      Kathy Lei      Date: 2020   MRN: [id number]   : 1965  Diagnosis: Breast cancer      Reason for Visit:  On Radiation Treatment Visit     Treatment Summary to Date  Treatment Site: Rt CW , nodes + SCV Current Dose: 6040/6040 cGy Fractions: 33/33      Chemotherapy  Chemo concurrent with radx?: No      Subjective:   Mily completed radiation therapy today.  She states that her skin continues to improve in the last week.  She feels that her energy level is recovered to the point she is starting to work again with first day being this Thursday.  She is seeing  Dr. Ramos and Dr. Guzman in the next week.     Nursing ROS:   Nutrition Alteration  Diet Type: Patient's Preference  Skin  Skin Reaction: 1 - Faint erythema or dry desquamation  Skin Note: Will continues aquaphor daily until follow up           Gastrointestinal  GI Note: WNL     Psychosocial  Pyschosocial Note: fatigue, early to bed  Pain Assessment  0-10 Pain Scale: 0      Objective:   /71   Pulse 100   Wt 61.2 kg (135 lb)   LMP 2014   BMI 21.79 kg/m     Gen: Appears well, in no acute distress  Skin: Brisk erythema of the right chest wall, but no apparent moist desquamation.      Labs:  CBC RESULTS:   Recent Labs   Lab Test 10/08/19  0851   WBC 5.3   RBC 4.81   HGB 13.7   HCT 42.3   MCV 88   MCH 28.5   MCHC 32.4   RDW 13.7        ELECTROLYTES:  Recent Labs   Lab Test 10/31/19  1209 10/08/19  0851   NA  --  138   POTASSIUM 3.6 3.7   CHLORIDE  --  105   BRYANT  --  9.0   CO2  --  28   BUN  --  6*   CR  --  0.56   GLC  --  87       Assessment:    Tolerating  radiation therapy well.  All questions and concerns addressed.    Toxicities:  Fatigue: Grade 0: No toxicity  Pain: Grade 0: No toxicity  Dermatitis: Grade 2: Moderate to brisk erythema; patchy moist desquamation, mostly confined to skin folds and creases; moderate erythema    Plan:   1. Discussed ongoing skin care.   2. Follow-up in 1 month.  3. Follow-up with medical oncology and plastic surgery as scheduled.       Mosaiq chart and setup information reviewed  Ports checked    Medication Review  Med Note: No changes per pt    Educational Topic Discussed  Additional Instructions: D/C instructions reviewed with pt  Education Instructions: reviewed        Rut Padilla MD/PhD  [phone] clinic  Pager [phone]    Please do not send letter to referring physician.      Again, thank you for allowing me to participate in the care of your patient.      Sincerely,    Rut Padilla MD

## 2020-01-10 NOTE — PROCEDURES
Radiotherapy Treatment Summary          Date of Report: 2020     PATIENT: CHRIS ROLAND  MEDICAL RECORD NO: [id number]  : 1965     DIAGNOSIS: C50.411 Malignant neoplasm of upper-outer quadrant of right female breast  INTENT OF RADIOTHERAPY: Cure  PATHOLOGY:    Invasive ductal                                STAGE: cT3N0 -> ypT3N1  CONCURRENT SYSTEMIC THERAPY:       None              Details of the treatments summarized below are found in records kept in the Department of Radiation Oncology at [facility].     Treatment Summary:  Radiation Oncology - Course: 1 Protocol:   Treatment Site Current Dose Modality From To Elapsed Days Fx.  1 R CW + Nodes  5,040 cGy 06 X 11/14/2019 2019  43 28  1 R CW boost  1,000 cGy e09 2019   7  5  2 R SCV  5,040 cGy 10x/18x 11/14/2019 2019  43 28                Dose per Fraction:     180 cGy/200 cGy   Total Dose:    6040 cGy          COMMENTS:    Ms. Roland is a 53-year-old female with locally advanced right breast cancer. Around 2018, she developed a sharp pain in the upper outer quadrant of her right breast.  Diagnostic mammogram and ultrasound   on 2019 revealed multiple masses in the right breast with the largest mass at 9:30 o'clock position 5 cm from the   nipple measuring 3.2 cm and second largest mass at 11 o'clock position 6 cm from the nipple measuring 1.6 cm.  A   contrast-enhanced mammogram on 2019 a large area of mass and non-mass-like enhancement measuring 7.2 cm in   greatest dimension.  Biopsy from the 2 dominant masses revealed invasive ductal carcinoma with a minor mucinous   component, Donna grade 2, with a focus suspicious for lymphovascular space invasion.  Tumor was ER positive, NH   positive and HER-2/johan nonamplified by FISH.  There was also DCIS, nuclear grade 3, solid and cribriform types with   comedonecrosis.  Further evaluation with MRI of the breast on  revealed the  enhancement to measure up to 8.9 cm   in greatest dimension with contiguous or superficial involvement of the posterior areola.  There was no suspicious axillary   or internal mammary chain adenopathy.  CT of the chest, abdomen and pelvis on 02/05 did not reveal evidence of distant   metastases.       Ms. Lei enrolled in the I-SPY 2 trial and received weekly Taxol along with durvalumab and olaparib.  This was   followed by dose-dense AC x4 cycles. On 08/15, she underwent bilateral skin-sparing mastectomy, right axillary sentinel   lymph node mapping and biopsy, removal of breast implants and placement of tissue expanders.  Pathology from the   mastectomy specimen revealed residual invasive mucinous carcinoma, Donna grade 2, spanning at least 10.2 cm in   linear extent with treatment response noted.  There was extensive angiolymphatic invasion and focal deep dermal   lymphovascular space invasion. Closest margin was 1.5 mm from the anterior superior margin.  There was focal DCIS,   high nuclear grade, solid type, with widely negative margins.  Two sentinel lymph nodes were removed.  One contained 1   mm micrometastasis with no extracapsular extension.  The second sentinel lymph node had an isolated tumor cell.  There   was no definite treatment response noted in the lymph nodes.  Final pathologic stage [id number] (sentinel), RCB class III.      Ms. Lei was then referred to us for adjuvant radiation therapy.  Unfortunately she developed cellulitis, necessitating   the removal of expanders.  This has resulted in some delay of her treatment but she ultimately she started treatment on   11/14.  She developed significant skin reaction, requiring application of Silvadene cream.  She otherwise tolerated the   treatment well.          ED visits/hospitalizations: None      Missed treatments: 11/26 and 11/27 due to machine malfunction; 12/6 and 12/11 due to not feeling well.      Acute Toxicity Profile by CTC  v5.0:   Fatigue: Grade 1: Fatigue relieved by rest  Pain: Grade 1: Mild pain  Dermatitis: Grade 2: Moderate to brisk erythema; patchy moist desquamation, mostly confined to skin folds and creases;   moderate erythema     PAIN MANAGEMENT:   Percocet prn                            FOLLOW UP PLAN:      1. FU in our clinic in 1 month.  2. FU with Dr. Ramos and Dr. Guzman as scheduled.                         Staff Physician: Rut Padilla M.D.  Physicist: Denise Reece  , PhD     CC: MD Rachel Ayala MD Dr. Umar Choudry, MD                                     Radiation Oncology:  [facility] [address] [place] [address]

## 2020-01-11 ASSESSMENT — ENCOUNTER SYMPTOMS
ALTERED TEMPERATURE REGULATION: 0
MUSCLE WEAKNESS: 0
ARTHRALGIAS: 1
POOR WOUND HEALING: 0
POLYDIPSIA: 0
JOINT SWELLING: 0
INCREASED ENERGY: 1
WEIGHT GAIN: 1
POLYPHAGIA: 0
CHILLS: 0
BACK PAIN: 1
MUSCLE CRAMPS: 0
NECK PAIN: 1
MYALGIAS: 1
NAIL CHANGES: 0
NIGHT SWEATS: 0
STIFFNESS: 1
FEVER: 0
SKIN CHANGES: 0
DECREASED APPETITE: 0
FATIGUE: 1
WEIGHT LOSS: 0
HALLUCINATIONS: 0

## 2020-01-12 NOTE — PROGRESS NOTES
HPI: Kathy Lei is a 54 yo female with a new diagnosis of an estrogen-receptor positive, CO-positive, HER2-negative breast cancer, grade 2, in the upper outer quadrant of the right breast since the end of year 2018.      Mily presented between Christmas and New Years of 2018 with new sharp pains in the upper outer quadrant of the right breast.  She underwent mammographic screening.       IMAGING:  Mammography and ultrasound:  She had a diagnostic mammogram which showed bilateral prepectoral saline implants.  On the right breast at 11:30 position, there were grouped coarse heterogeneous calcifications spanning 1.3 cm, new since 2014, adjacent calcifications 11-o'clock position posterior depth.  There was an irregular mass in the lateral right breast 9-o'clock position mid-posterior depth, and an additional mass with obscured margins.  No significant changes in the left breast.  Right breast ultrasound was performed.  In the area of the palpable lump in the right breast, 11-o'clock position, 6 cm from the nipple, there is an irregular hypoechoic mass with indistinct margins measuring approximately 1.0 x 0.9 x 1.6 cm in size.  Immediately adjacent to the mass, 11:30 position, are microcalcifications.  In the second area of palpable lump right breast, 9:30 position, 5 cm from the nipple, there was an irregular hypoechoic mass measuring 3.2 x 0.5 x 1.3 cm in size felt to account for the mammographic findings.  There were multiple additional round hypoechoic masses similar to the findings at 9:30 position seen incidentally during real time and not directly palpable.  For example, in the right breast at 8-o'clock position, 4 cm from the nipple, there was a mass measuring 0.8 x 0.6 x 0.6 cm, BI-RADS 4.       Contrast mammogram was subsequently performed and the contrast mammogram showed a large area of mass and non-mass-like enhancement in the upper outer right breast measuring 7.2 cm in greatest  dimension, corresponding global asymmetry in the upper outer right breast.  Enhancement extended to the implant without evidence of extension to the skin surface or nipple, and the calcifications were noted as previously found.      PATHOLOGY:  The pathology showed part A, breast needle biopsy 11 o'clock, 6 cm from the nipple, invasive mammary carcinoma, no special type, ductal (and mucinous) [place] grade 2.  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  Calcifications were associated with the DCIS.  There was a focus suspicious for lymphovascular invasion.  Invasive carcinoma was estrogen receptor positive and progesterone receptor negative by immunohistochemistry.  In part B, breast right, 8 o'clock, 4 cm from the nipple, ultrasound-guided core biopsy, invasive mammary carcinoma, no special type, ductal (and mucinous) [place] grade 2, occasional calcifications were noted.  Invasive carcinoma was estrogen receptor positive and progesterone receptor negative by immunohistochemistry.  On quantitation of the ER and WY, ER was positive 99% of the cells staining with strong intensity.  WY was subsequently noted to be positive with 15% of the cells staining with moderate intensity.       There was also a HER2 FISH performed, and the HER2 FISH showed average number of HER2 signals per nucleus 2.6.  Average number of CEN17 signals 1.7 with a ratio of 1.6, VASILIY negative for biopsy A.  For biopsy B, the HER2 signals per nucleus 2.9.  Average number CEN17 signals per nucleus 1.7 with a ratio of 1.7, VASILIY negative.  Overall, by 2018 ASCO/CAP guidelines, this tumor is HER2 negative.     She was enrolled in I-SPY2 trial to Durvulumab, Taxol and Olaparib arm. She completed 12 cycles of weekly Taxol. She also completed 4 cycles  of adriamycin and cyclophosphamide (AC).     PAST MEDICAL HISTORY:  In terms of her breast history, she does have a history of a smaller right breast which was  treated with implants 19 years ago.  She has a history of 2 papillomas in the right breast, 1 removed at the 6-o'clock position 5 years ago, another removed at the 6-o'clock position approximately 10 years ago.  These incisions are approximately at the 5:30 to 6-o'clock position.  She has no history of radiation therapy.  No history of breast cancer of any type.  No history of tumor of any kind.  No history of heart problems, heart attack, breathing problems, blood clots, seizures, stroke, arthritis, peptic ulcer disease or osteoporosis.  No history of bone fractures.  She is not currently participating in a clinical trial. She does have a history of asthma and a history of hypothyroidism.      FAMILY HISTORY:  Entirely negative for breast cancer or ovarian cancer or breast cancer in a male relative.      ALLERGIES:  No known drug allergies.  No allergy to seafood, iodine or contrast dye.  She does not take aspirin.      PAST MENSTRUAL HISTORY:  First period was at age 11.  First and only pregnancy was at age 26.  No miscarriages or abortions.  Occasional use of oral contraceptives in her 20s.  No history of hormone replacement therapy.  Last period was 3 years ago.      SOCIAL HISTORY:  Tobacco history was from age 15 to present, smoking a pack of cigarettes a week.  She is now trying to stop cigarettes.   In terms of alcohol use, in her early teens and early 20s, she drank approximately 10 drinks on the weekend and has tapered off drinking since then.     TREATMENT HISTORY:  A.  Neoadjuvant durvalumab, oliparib, paclitaxel on I SPY 2  B  Neoadjuvant ddAC.  C.  Bilateral mastectomy.  RCB 3 result.  D.  Post-surgical radiation.  Completed 1-6-20.  E.  Begin CREATE-X. With letrozole.      INTERVAL HISTORY  Mily Lei returns to clinic with her  to discuss the finding of an RCB3 residual cancer burden of her resected right breast cancer.  The tumor measured at least 10.2 cm in curvilinear shape after  neoadjuvant chemotherapy.  The tumor was invasive mucinous carcinoma, Donna grade 2.  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  Extensive angiolymphatic invasion was present.  Margins were negative for carcinoma.  Invasive carcinoma was 1.5 mm from the anterior superior margin and 6 mm from the anterior inferior margin.  There was benign skin, nipple, and skeletal muscle.  Focal deep dermal lymphovascular invasion was present.  Calcifications were present in the DCIS.  Pathologic stage was [id number].  Overall, the [facility] residual cancer burden was calculated as 3.425 class RCB3.     January 6-2020  Treatment Summary to Date  Treatment Site: Rt CW , nodes + SCV Current Dose: 6040/6040 cGy Fractions: 33/33         Mily returns to clinic after completing radiation therapy on 01/06.  She has had some skin reaction to the radiation that is gradually getting better.  She is using Aquaphor.  She has had no desquamation at this time, but some pigmentation changes in both anterior chest wall sites.  She has no pain, no fatigue, no depression, but some significant anxiety.      REVIEW OF SYSTEMS:  A 10 point review of systems is otherwise negative.  She does feel that her energy is improving.  She did quit tobacco 1 year ago.      She continues on inhalers for her COPD.      PHYSICAL EXAMINATION:   VITAL SIGNS:  Blood pressure 130/77, temperature 97.8, pulse 94, respirations 16, O2 sat 97% on room air, height 1.675 m and weight 62.6 kg.   GENERAL:  Mily appears reasonably well.  She has no alopecia.  Her hair is growing back.   HEENT:  No lesions in the oropharynx.   LYMPH:  There is no palpable cervical, supraclavicular, subclavicular or axillary lymphadenopathy.   BREASTS:  Examination of the anterior chest wall reveals bilateral mastectomy incisions, which are well healed with overlying pigmentation changes and mild skin erythema.  There is a small seroma at the lateral  aspect of the right incision in the anterior axillary line.  There are no masses in the anterior chest wall bilaterally.   LUNGS:  Clear to percussion and auscultation.   HEART:  There is a regular rate and rhythm, S1, S2.   ABDOMEN:  Soft and nontender without hepatosplenomegaly.   EXTREMITIES:  Without edema.   PSYCHIATRIC:  Mood and affect are normal.      LABORATORY DATA:  The CBC and CMP were within normal limits except for a low lymphocyte count of 500.      ASSESSMENT AND PLAN:     1.  Mily Lei is a 54-year-old woman who presented with a locally advanced right breast cancer.  This developed in the context of previous breast augmentation.  On presentation, she had multiple masses occupying an area of approximately 9 cm in the upper outer quadrant, but some extension into the subareolar region.  She did not have any clinically apparent lymph node involvement.  She was treated with neoadjuvant chemotherapy on the I-SPY 2 trial and was randomized to the paclitaxel, olaparib and durvalumab arm followed by dose-dense AC.  She tolerated treatment reasonably well, but had an RCB3 result with a final stage [id number].  We discussed the CREATE-X approach of adjuvant capecitabine for 24 weeks combined with an aromatase inhibitor, which would be continued for 10 years.  She is in agreement with this plan.   2.  Discussion of the CREATE-X plan.  I did discuss with her that the CREATE-X plan showed a trend towards improved outcomes in ER-positive, HER-2 negative patients, but the results felt short of statistical significance.  I discussed that, nonetheless, there is an effect in the direction of reduced distant recurrence and effect in the direction of improved overall survival.  I discussed that the Xeloda is 500 mg tabs 3 twice daily after food 14 days on and 7 days off.  I discussed hand-foot syndrome, difficulty with opening jars and doing up buttons, and bag balm can certainly help with that.  Tanya Pruitt is  doing chemotherapy teaching this morning.  We also discussed hormonal therapy concurrently.   3.  Hormonal therapy.  I discussed letrozole.  I discussed that there is a risk of joint stiffness.  Mily already has some mild joint stiffness unrelated to hormonal therapy.  We do recommend exercise.  We discussed hot flashes, joint stiffness and vaginal dryness as potential side effects.  All of her questions were answered.  Prescription information was given to her.   4.  Antiemetics.  Mily has antiemetics at home if she needs them for the Xeloda.  She knows to call us if she has more than 3 loose bowel movements per day.   5.  Followup.  We will see Mily in followup in our clinic in 3 weeks.  All of her 's questions were answered as well. Follow up with me February 4 with CBC, CMP.       Thank you for allowing us to continue to participate in Mily Lei's care.  We did discuss that the treatment with capecitabine would be for a total of 24 weeks.      Sincerely,    Christian Guzman M.D.      Division of Hematology, Oncology, Transplant   Department of Medicine   [facility]   [phone]         I spent 60 minutes with the patient more than 50% of which was in counseling and coordination of care.

## 2020-01-14 ENCOUNTER — ONCOLOGY VISIT (OUTPATIENT)
Dept: ONCOLOGY | Facility: CLINIC | Age: 55
End: 2020-01-14
Attending: INTERNAL MEDICINE
Payer: COMMERCIAL

## 2020-01-14 ENCOUNTER — ONCOLOGY VISIT (OUTPATIENT)
Dept: ONCOLOGY | Facility: CLINIC | Age: 55
End: 2020-01-14

## 2020-01-14 ENCOUNTER — OFFICE VISIT (OUTPATIENT)
Dept: PLASTIC SURGERY | Facility: CLINIC | Age: 55
End: 2020-01-14
Attending: PLASTIC SURGERY
Payer: COMMERCIAL

## 2020-01-14 ENCOUNTER — TELEPHONE (OUTPATIENT)
Dept: ONCOLOGY | Facility: CLINIC | Age: 55
End: 2020-01-14

## 2020-01-14 VITALS
BODY MASS INDEX: 22.18 KG/M2 | TEMPERATURE: 97.8 F | OXYGEN SATURATION: 97 % | HEART RATE: 94 BPM | HEIGHT: 66 IN | WEIGHT: 138.01 LBS | RESPIRATION RATE: 16 BRPM | SYSTOLIC BLOOD PRESSURE: 130 MMHG | DIASTOLIC BLOOD PRESSURE: 77 MMHG

## 2020-01-14 VITALS
WEIGHT: 138.1 LBS | HEART RATE: 94 BPM | DIASTOLIC BLOOD PRESSURE: 77 MMHG | SYSTOLIC BLOOD PRESSURE: 130 MMHG | TEMPERATURE: 97.8 F | RESPIRATION RATE: 16 BRPM | HEIGHT: 66 IN | BODY MASS INDEX: 22.19 KG/M2 | OXYGEN SATURATION: 97 %

## 2020-01-14 DIAGNOSIS — Z17.0 MALIGNANT NEOPLASM OF UPPER-OUTER QUADRANT OF RIGHT BREAST IN FEMALE, ESTROGEN RECEPTOR POSITIVE (H): Primary | ICD-10-CM

## 2020-01-14 DIAGNOSIS — C50.411 MALIGNANT NEOPLASM OF UPPER-OUTER QUADRANT OF RIGHT BREAST IN FEMALE, ESTROGEN RECEPTOR POSITIVE (H): ICD-10-CM

## 2020-01-14 DIAGNOSIS — C50.411 MALIGNANT NEOPLASM OF UPPER-OUTER QUADRANT OF RIGHT BREAST IN FEMALE, ESTROGEN RECEPTOR POSITIVE (H): Primary | ICD-10-CM

## 2020-01-14 DIAGNOSIS — Z17.0 MALIGNANT NEOPLASM OF UPPER-OUTER QUADRANT OF RIGHT BREAST IN FEMALE, ESTROGEN RECEPTOR POSITIVE (H): ICD-10-CM

## 2020-01-14 DIAGNOSIS — Z98.890 S/P BREAST RECONSTRUCTION, BILATERAL: Primary | ICD-10-CM

## 2020-01-14 LAB
ALBUMIN SERPL-MCNC: 3.7 G/DL (ref 3.4–5)
ALP SERPL-CCNC: 57 U/L (ref 40–150)
ALT SERPL W P-5'-P-CCNC: 26 U/L (ref 0–50)
ANION GAP SERPL CALCULATED.3IONS-SCNC: 4 MMOL/L (ref 3–14)
AST SERPL W P-5'-P-CCNC: 21 U/L (ref 0–45)
BASOPHILS # BLD AUTO: 0 10E9/L (ref 0–0.2)
BASOPHILS NFR BLD AUTO: 1 %
BILIRUB SERPL-MCNC: 0.2 MG/DL (ref 0.2–1.3)
BUN SERPL-MCNC: 15 MG/DL (ref 7–30)
CALCIUM SERPL-MCNC: 8.6 MG/DL (ref 8.5–10.1)
CHLORIDE SERPL-SCNC: 108 MMOL/L (ref 94–109)
CO2 SERPL-SCNC: 26 MMOL/L (ref 20–32)
CREAT SERPL-MCNC: 0.6 MG/DL (ref 0.52–1.04)
DIFFERENTIAL METHOD BLD: ABNORMAL
EOSINOPHIL # BLD AUTO: 0.4 10E9/L (ref 0–0.7)
EOSINOPHIL NFR BLD AUTO: 10.8 %
ERYTHROCYTE [DISTWIDTH] IN BLOOD BY AUTOMATED COUNT: 14.1 % (ref 10–15)
GFR SERPL CREATININE-BSD FRML MDRD: >90 ML/MIN/{1.73_M2}
GLUCOSE SERPL-MCNC: 95 MG/DL (ref 70–99)
HCT VFR BLD AUTO: 38.4 % (ref 35–47)
HGB BLD-MCNC: 12.6 G/DL (ref 11.7–15.7)
IMM GRANULOCYTES # BLD: 0 10E9/L (ref 0–0.4)
IMM GRANULOCYTES NFR BLD: 0.3 %
LYMPHOCYTES # BLD AUTO: 0.5 10E9/L (ref 0.8–5.3)
LYMPHOCYTES NFR BLD AUTO: 13.4 %
MCH RBC QN AUTO: 29 PG (ref 26.5–33)
MCHC RBC AUTO-ENTMCNC: 32.8 G/DL (ref 31.5–36.5)
MCV RBC AUTO: 89 FL (ref 78–100)
MONOCYTES # BLD AUTO: 0.4 10E9/L (ref 0–1.3)
MONOCYTES NFR BLD AUTO: 9 %
NEUTROPHILS # BLD AUTO: 2.5 10E9/L (ref 1.6–8.3)
NEUTROPHILS NFR BLD AUTO: 65.5 %
NRBC # BLD AUTO: 0 10*3/UL
NRBC BLD AUTO-RTO: 0 /100
PLATELET # BLD AUTO: 214 10E9/L (ref 150–450)
POTASSIUM SERPL-SCNC: 3.8 MMOL/L (ref 3.4–5.3)
PROT SERPL-MCNC: 6.8 G/DL (ref 6.8–8.8)
RBC # BLD AUTO: 4.34 10E12/L (ref 3.8–5.2)
SODIUM SERPL-SCNC: 139 MMOL/L (ref 133–144)
WBC # BLD AUTO: 3.9 10E9/L (ref 4–11)

## 2020-01-14 PROCEDURE — 80053 COMPREHEN METABOLIC PANEL: CPT | Performed by: INTERNAL MEDICINE

## 2020-01-14 PROCEDURE — 85025 COMPLETE CBC W/AUTO DIFF WBC: CPT | Performed by: INTERNAL MEDICINE

## 2020-01-14 PROCEDURE — 99215 OFFICE O/P EST HI 40 MIN: CPT | Mod: ZP | Performed by: INTERNAL MEDICINE

## 2020-01-14 PROCEDURE — G0463 HOSPITAL OUTPT CLINIC VISIT: HCPCS | Mod: ZF

## 2020-01-14 PROCEDURE — 36415 COLL VENOUS BLD VENIPUNCTURE: CPT

## 2020-01-14 PROCEDURE — G0463 HOSPITAL OUTPT CLINIC VISIT: HCPCS | Mod: 27

## 2020-01-14 RX ORDER — LETROZOLE 2.5 MG/1
2.5 TABLET, FILM COATED ORAL DAILY
Qty: 30 TABLET | Refills: 3 | Status: SHIPPED | OUTPATIENT
Start: 2020-01-14 | End: 2020-02-04

## 2020-01-14 RX ORDER — CAPECITABINE 500 MG/1
1000 TABLET, FILM COATED ORAL 2 TIMES DAILY
Qty: 84 TABLET | Refills: 1 | Status: SHIPPED | OUTPATIENT
Start: 2020-01-14 | End: 2020-06-30

## 2020-01-14 RX ORDER — CAPECITABINE 500 MG/1
1000 TABLET, FILM COATED ORAL 2 TIMES DAILY
Qty: 112 TABLET | Refills: 1
Start: 2020-01-14 | End: 2020-01-14

## 2020-01-14 ASSESSMENT — MIFFLIN-ST. JEOR
SCORE: 1231.88
SCORE: 1232.3

## 2020-01-14 ASSESSMENT — PAIN SCALES - GENERAL
PAINLEVEL: NO PAIN (0)
PAINLEVEL: NO PAIN (0)

## 2020-01-14 NOTE — LETTER
"1/14/2020       RE: Kathy Lei  [address] [place] [address]     Dear Colleague,    Thank you for referring your patient, Kathy Lei, to the [facility] [place] [facility]. Please see a copy of my visit note below.    Oral Chemotherapy Monitoring Program    Primary Oncologist: Dr Christian Guzman  Primary Oncology Clinic: [facility]  Cancer Diagnosis: Breast cancer    Drug: Xeloda 1500mg ([id number]; ~875mg/m2/dose) PO BID for 14 days on then 7 days off  Start Date: As soon as available  Dose is appropriate for patients: BSA and Renal Function   Expected duration of therapy: Until disease progression or unacceptable toxicity    Drug Interaction Assessment: There were no significant drug interactions identified upon review of medication list.    Lab Monitoring Plan: CMP/CBC Q3 weeks    Subjective/Objective:  Kathy Lei is a 54 year old female seen in clinic for an initial visit for oral chemotherapy education.      ORAL CHEMOTHERAPY 1/14/2020   Drug Name Xeloda (Capecitabine)   Current Dosage 1500mg   Current Schedule BID   Cycle Details 2 weeks on 1 week off       Vitals:  BP:   BP Readings from Last 1 Encounters:   01/14/20 130/77     Wt Readings from Last 1 Encounters:   01/14/20 62.6 kg (138 lb 0.1 oz)     Estimated body surface area is 1.71 meters squared as calculated from the following:    Height as of an earlier encounter on 1/14/20: 1.675 m (5' 5.95\").    Weight as of an earlier encounter on 1/14/20: 62.6 kg (138 lb 0.1 oz).      Labs:  _  Result Component Current Result Ref Range   Sodium 139 (1/14/2020) 133 - 144 mmol/L     _  Result Component Current Result Ref Range   Potassium 3.8 (1/14/2020) 3.4 - 5.3 mmol/L     _  Result Component Current Result Ref Range   Calcium 8.6 (1/14/2020) 8.5 - 10.1 mg/dL     No results found for Mag within last 30 days.     No results found for Phos within last 30 days.     _  Result Component Current Result Ref Range "   Albumin 3.7 (1/14/2020) 3.4 - 5.0 g/dL     _  Result Component Current Result Ref Range   Urea Nitrogen 15 (1/14/2020) 7 - 30 mg/dL     _  Result Component Current Result Ref Range   Creatinine 0.60 (1/14/2020) 0.52 - 1.04 mg/dL       _  Result Component Current Result Ref Range   AST 21 (1/14/2020) 0 - 45 U/L     _  Result Component Current Result Ref Range   ALT 26 (1/14/2020) 0 - 50 U/L     _  Result Component Current Result Ref Range   Bilirubin Total 0.2 (1/14/2020) 0.2 - 1.3 mg/dL       _  Result Component Current Result Ref Range   WBC 3.9 (L) (1/14/2020) 4.0 - 11.0 10e9/L     _  Result Component Current Result Ref Range   Hemoglobin 12.6 (1/14/2020) 11.7 - 15.7 g/dL     _  Result Component Current Result Ref Range   Platelet Count 214 (1/14/2020) 150 - 450 10e9/L     _  Result Component Current Result Ref Range   Absolute Neutrophil 2.5 (1/14/2020) 1.6 - 8.3 10e9/L       Assessment:  Patient is appropriate to start therapy.    Plan:  Basic chemotherapy teaching was reviewed with the patient and the patient's family including indication, start date of therapy, dose, administration, adverse effects, missed doses, food and drug interactions, monitoring, side effect management, office contact information, and safe handling. Written materials were provided and all questions answered.    Follow-Up:  Will call patient about 1 week after starting medication.    Tanya Pruitt, PharmD, BCPS, BCOP  Oncology Clinical Pharmacy Specialist  [facility]/ [facility]  [phone]

## 2020-01-14 NOTE — NURSING NOTE
"Oncology Rooming Note    January 14, 2020 8:51 AM   Kathy Lei is a 54 year old female who presents for:    Chief Complaint   Patient presents with     Blood Draw     Labs drawn via VPT by RN in lab. VS taken. Pt checked in for next appt     Oncology Clinic Visit     [facility] RETURN- BREAST CA     Initial Vitals: /77 (BP Location: Right arm, Patient Position: Sitting, Cuff Size: Adult Regular)   Pulse 94   Temp 97.8  F (36.6  C) (Oral)   Resp 16   Ht 1.675 m (5' 5.95\")   Wt 62.6 kg (138 lb 1.6 oz)   LMP 11/02/2014   SpO2 97%   BMI 22.33 kg/m   Estimated body mass index is 22.33 kg/m  as calculated from the following:    Height as of this encounter: 1.675 m (5' 5.95\").    Weight as of this encounter: 62.6 kg (138 lb 1.6 oz). Body surface area is 1.71 meters squared.  No Pain (0) Comment: Data Unavailable   Patient's last menstrual period was 11/02/2014.  Allergies reviewed: Yes  Medications reviewed: Yes    Medications: Medication refills not needed today.  Pharmacy name entered into ALTO [facility]:    [id number] - [place], [place] - [address] AT [facility] [address]    Clinical concerns: No new concerns. Thomas was notified.      Shakir Skaggs LPN            "

## 2020-01-14 NOTE — TELEPHONE ENCOUNTER
Prior Authorization Approval    Authorization Effective Date: 1/14/2020  Authorization Expiration Date: 1/14/2021  Medication: Capecitabine - APPROVED  Approved Dose/Quantity: 112/21  Reference #: [id number]   Insurance Company: [employer] [place] - Phone [phone] Fax [phone]  Expected CoPay:       CoPay Card Available:      Foundation Assistance Needed:    Which Pharmacy is filling the prescription (Not needed for infusion/clinic administered):    Pharmacy Notified:    Patient Notified:          Amie Mansfield CPhT  [facility]  Oncology Pharmacy Liaison  [email or web address]  Phone: [phone]  Fax: [phone]

## 2020-01-14 NOTE — PROGRESS NOTES
POSTOPERATIVE VISIT       PRESENTING COMPLAINT:  Postoperative visit, status post bilateral breast reconstruction for right-sided breast cancer, requiring removal of expanders for infection on 10/31/2019.      HISTORY OF PRESENTING COMPLAINT:  Ms. Lei is 54 years old.  She is well known to my service.  Completely healed.  Finished her radiation therapy on the right side about a week ago.      PHYSICAL EXAMINATION:  Vital signs are stable.  She is afebrile, in no obvious distress.  Left breast is healed.  Right breast is healed with radiation changes.      ASSESSMENT AND PLAN:  Based on above findings, a diagnosis of bilateral breast reconstruction requiring bilateral expander removal for infection and right-sided radiation, now finished and healed, here for next stage of reconstruction was made.  I had a jalil discussion with the patient that she will require autologous reconstruction.  Given the amount of tissue she has in her abdomen, the LIVIER flaps will not be very large.  She understood.  If she requires further volume enhancement in the future, she may require an implant.  This was discussed in principle with her and she understood.  I will see her back now after she finishes her Xeloda chemotherapy.

## 2020-01-14 NOTE — PROGRESS NOTES
"Oral Chemotherapy Monitoring Program    Primary Oncologist: Dr Christian Guzman  Primary Oncology Clinic: [facility]  Cancer Diagnosis: Breast cancer    Drug: Xeloda 1500mg ([id number]; ~875mg/m2/dose) PO BID for 14 days on then 7 days off  Start Date: As soon as available  Dose is appropriate for patients: BSA and Renal Function   Expected duration of therapy: Until disease progression or unacceptable toxicity    Drug Interaction Assessment: There were no significant drug interactions identified upon review of medication list.      Lab Monitoring Plan: CMP/CBC Q3 weeks    Subjective/Objective:  Kathy Lei is a 54 year old female seen in clinic for an initial visit for oral chemotherapy education.      ORAL CHEMOTHERAPY 1/14/2020   Drug Name Xeloda (Capecitabine)   Current Dosage 1500mg   Current Schedule BID   Cycle Details 2 weeks on 1 week off       Vitals:  BP:   BP Readings from Last 1 Encounters:   01/14/20 130/77     Wt Readings from Last 1 Encounters:   01/14/20 62.6 kg (138 lb 0.1 oz)     Estimated body surface area is 1.71 meters squared as calculated from the following:    Height as of an earlier encounter on 1/14/20: 1.675 m (5' 5.95\").    Weight as of an earlier encounter on 1/14/20: 62.6 kg (138 lb 0.1 oz).      Labs:  _  Result Component Current Result Ref Range   Sodium 139 (1/14/2020) 133 - 144 mmol/L     _  Result Component Current Result Ref Range   Potassium 3.8 (1/14/2020) 3.4 - 5.3 mmol/L     _  Result Component Current Result Ref Range   Calcium 8.6 (1/14/2020) 8.5 - 10.1 mg/dL     No results found for Mag within last 30 days.     No results found for Phos within last 30 days.     _  Result Component Current Result Ref Range   Albumin 3.7 (1/14/2020) 3.4 - 5.0 g/dL     _  Result Component Current Result Ref Range   Urea Nitrogen 15 (1/14/2020) 7 - 30 mg/dL     _  Result Component Current Result Ref Range   Creatinine 0.60 (1/14/2020) 0.52 - 1.04 mg/dL       _  Result Component " Current Result Ref Range   AST 21 (1/14/2020) 0 - 45 U/L     _  Result Component Current Result Ref Range   ALT 26 (1/14/2020) 0 - 50 U/L     _  Result Component Current Result Ref Range   Bilirubin Total 0.2 (1/14/2020) 0.2 - 1.3 mg/dL       _  Result Component Current Result Ref Range   WBC 3.9 (L) (1/14/2020) 4.0 - 11.0 10e9/L     _  Result Component Current Result Ref Range   Hemoglobin 12.6 (1/14/2020) 11.7 - 15.7 g/dL     _  Result Component Current Result Ref Range   Platelet Count 214 (1/14/2020) 150 - 450 10e9/L     _  Result Component Current Result Ref Range   Absolute Neutrophil 2.5 (1/14/2020) 1.6 - 8.3 10e9/L       Assessment:  Patient is appropriate to start therapy.    Plan:  Basic chemotherapy teaching was reviewed with the patient and the patient's family including indication, start date of therapy, dose, administration, adverse effects, missed doses, food and drug interactions, monitoring, side effect management, office contact information, and safe handling. Written materials were provided and all questions answered.    Follow-Up:  Will call patient about 1 week after starting medication.    Tanya Pruitt, PharmD, BCPS, BCOP  Oncology Clinical Pharmacy Specialist  [facility]/ [facility]  [phone]

## 2020-01-14 NOTE — LETTER
1/14/2020       RE: Kathy Lei  [address] [place] [address]     Dear Colleague,    Thank you for referring your patient, Kathy Lei, to the [facility]AST [facility] at [facility]. Please see a copy of my visit note below.    POSTOPERATIVE VISIT       PRESENTING COMPLAINT:  Postoperative visit, status post bilateral breast reconstruction for right-sided breast cancer, requiring removal of expanders for infection on 10/31/2019.      HISTORY OF PRESENTING COMPLAINT:  Ms. Lei is 54 years old.  She is well known to my service.  Completely healed.  Finished her radiation therapy on the right side about a week ago.      PHYSICAL EXAMINATION:  Vital signs are stable.  She is afebrile, in no obvious distress.  Left breast is healed.  Right breast is healed with radiation changes.      ASSESSMENT AND PLAN:  Based on above findings, a diagnosis of bilateral breast reconstruction requiring bilateral expander removal for infection and right-sided radiation, now finished and healed, here for next stage of reconstruction was made.  I had a jalil discussion with the patient that she will require autologous reconstruction.  Given the amount of tissue she has in her abdomen, the LIVIER flaps will not be very large.  She understood.  If she requires further volume enhancement in the future, she may require an implant.  This was discussed in principle with her and she understood.  I will see her back now after she finishes her Xeloda chemotherapy.     Again, thank you for allowing me to participate in the care of your patient.      Sincerely,    LALY Ramos MD

## 2020-01-14 NOTE — NURSING NOTE
Chief Complaint   Patient presents with     Blood Draw     Labs drawn via VPT by RN in lab. VS taken. Pt checked in for next appt     Labs collected from venipuncture by RN. Vitals taken. Checked in for appointment(s).    Martita WYNN RN PHN BSN  BMT/Oncology Lab

## 2020-01-14 NOTE — LETTER
1/14/2020       RE: Kathy Lei  [address] [place] [address]     Dear Colleague,    Thank you for referring your patient, Kathy Lei, to the [facility]. Please see a copy of my visit note below.    HPI: Kathy Lei is a 52 yo female with a new diagnosis of an estrogen-receptor positive, CT-positive, HER2-negative breast cancer, grade 2, in the upper outer quadrant of the right breast since the end of year 2018.      Mily presented between Christmas and New Years of 2018 with new sharp pains in the upper outer quadrant of the right breast.  She underwent mammographic screening.       IMAGING:  Mammography and ultrasound:  She had a diagnostic mammogram which showed bilateral prepectoral saline implants.  On the right breast at 11:30 position, there were grouped coarse heterogeneous calcifications spanning 1.3 cm, new since 2014, adjacent calcifications 11-o'clock position posterior depth.  There was an irregular mass in the lateral right breast 9-o'clock position mid-posterior depth, and an additional mass with obscured margins.  No significant changes in the left breast.  Right breast ultrasound was performed.  In the area of the palpable lump in the right breast, 11-o'clock position, 6 cm from the nipple, there is an irregular hypoechoic mass with indistinct margins measuring approximately 1.0 x 0.9 x 1.6 cm in size.  Immediately adjacent to the mass, 11:30 position, are microcalcifications.  In the second area of palpable lump right breast, 9:30 position, 5 cm from the nipple, there was an irregular hypoechoic mass measuring 3.2 x 0.5 x 1.3 cm in size felt to account for the mammographic findings.  There were multiple additional round hypoechoic masses similar to the findings at 9:30 position seen incidentally during real time and not directly palpable.  For example, in the right breast at 8-o'clock position, 4 cm from the nipple, there was a mass  measuring 0.8 x 0.6 x 0.6 cm, BI-RADS 4.       Contrast mammogram was subsequently performed and the contrast mammogram showed a large area of mass and non-mass-like enhancement in the upper outer right breast measuring 7.2 cm in greatest dimension, corresponding global asymmetry in the upper outer right breast.  Enhancement extended to the implant without evidence of extension to the skin surface or nipple, and the calcifications were noted as previously found.      PATHOLOGY:  The pathology showed part A, breast needle biopsy 11 o'clock, 6 cm from the nipple, invasive mammary carcinoma, no special type, ductal (and mucinous) Ellsworth grade 2.  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  Calcifications were associated with the DCIS.  There was a focus suspicious for lymphovascular invasion.  Invasive carcinoma was estrogen receptor positive and progesterone receptor negative by immunohistochemistry.  In part B, breast right, 8 o'clock, 4 cm from the nipple, ultrasound-guided core biopsy, invasive mammary carcinoma, no special type, ductal (and mucinous) Ellsworth grade 2, occasional calcifications were noted.  Invasive carcinoma was estrogen receptor positive and progesterone receptor negative by immunohistochemistry.  On quantitation of the ER and CA, ER was positive 99% of the cells staining with strong intensity.  CA was subsequently noted to be positive with 15% of the cells staining with moderate intensity.       There was also a HER2 FISH performed, and the HER2 FISH showed average number of HER2 signals per nucleus 2.6.  Average number of CEN17 signals 1.7 with a ratio of 1.6, VASILIY negative for biopsy A.  For biopsy B, the HER2 signals per nucleus 2.9.  Average number CEN17 signals per nucleus 1.7 with a ratio of 1.7, VASILIY negative.  Overall, by 2018 ASCO/CAP guidelines, this tumor is HER2 negative.     She was enrolled in I-SPY2 trial to Durvulumab, Taxol and  Olaparib arm. She completed 12 cycles of weekly Taxol. She also completed 4 cycles  of adriamycin and cyclophosphamide (AC).     PAST MEDICAL HISTORY:  In terms of her breast history, she does have a history of a smaller right breast which was treated with implants 19 years ago.  She has a history of 2 papillomas in the right breast, 1 removed at the 6-o'clock position 5 years ago, another removed at the 6-o'clock position approximately 10 years ago.  These incisions are approximately at the 5:30 to 6-o'clock position.  She has no history of radiation therapy.  No history of breast cancer of any type.  No history of tumor of any kind.  No history of heart problems, heart attack, breathing problems, blood clots, seizures, stroke, arthritis, peptic ulcer disease or osteoporosis.  No history of bone fractures.  She is not currently participating in a clinical trial. She does have a history of asthma and a history of hypothyroidism.      FAMILY HISTORY:  Entirely negative for breast cancer or ovarian cancer or breast cancer in a male relative.      ALLERGIES:  No known drug allergies.  No allergy to seafood, iodine or contrast dye.  She does not take aspirin.      PAST MENSTRUAL HISTORY:  First period was at age 11.  First and only pregnancy was at age 26.  No miscarriages or abortions.  Occasional use of oral contraceptives in her 20s.  No history of hormone replacement therapy.  Last period was 3 years ago.      SOCIAL HISTORY:  Tobacco history was from age 15 to present, smoking a pack of cigarettes a week.  She is now trying to stop cigarettes.   In terms of alcohol use, in her early teens and early 20s, she drank approximately 10 drinks on the weekend and has tapered off drinking since then.     TREATMENT HISTORY:  A.  Neoadjuvant durvalumab, oliparib, paclitaxel on I SPY 2  B  Neoadjuvant ddAC.  C.  Bilateral mastectomy.  RCB 3 result.  D.  Post-surgical radiation.  Completed 1-6-20.  E.  Begin CREATE-X. With  letrozole.      INTERVAL HISTORY  Mily Lei returns to clinic with her  to discuss the finding of an RCB3 residual cancer burden of her resected right breast cancer.  The tumor measured at least 10.2 cm in curvilinear shape after neoadjuvant chemotherapy.  The tumor was invasive mucinous carcinoma, Donna grade 2.  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  Extensive angiolymphatic invasion was present.  Margins were negative for carcinoma.  Invasive carcinoma was 1.5 mm from the anterior superior margin and 6 mm from the anterior inferior margin.  There was benign skin, nipple, and skeletal muscle.  Focal deep dermal lymphovascular invasion was present.  Calcifications were present in the DCIS.  Pathologic stage was [id number] sn.  Overall, the [facility] residual cancer burden was calculated as 3.425 class RCB3.     January 6-2020  Treatment Summary to Date  Treatment Site: Rt CW , nodes + SCV Current Dose: 6040/6040 cGy Fractions: 33/33         Mily returns to clinic after completing radiation therapy on 01/06.  She has had some skin reaction to the radiation that is gradually getting better.  She is using Aquaphor.  She has had no desquamation at this time, but some pigmentation changes in both anterior chest wall sites.  She has no pain, no fatigue, no depression, but some significant anxiety.      REVIEW OF SYSTEMS:  A 10 point review of systems is otherwise negative.  She does feel that her energy is improving.  She did quit tobacco 1 year ago.      She continues on inhalers for her COPD.      PHYSICAL EXAMINATION:   VITAL SIGNS:  Blood pressure 130/77, temperature 97.8, pulse 94, respirations 16, O2 sat 97% on room air, height 1.675 m and weight 62.6 kg.   GENERAL:  Mily appears reasonably well.  She has no alopecia.  Her hair is growing back.   HEENT:  No lesions in the oropharynx.   LYMPH:  There is no palpable cervical, supraclavicular, subclavicular  or axillary lymphadenopathy.   BREASTS:  Examination of the anterior chest wall reveals bilateral mastectomy incisions, which are well healed with overlying pigmentation changes and mild skin erythema.  There is a small seroma at the lateral aspect of the right incision in the anterior axillary line.  There are no masses in the anterior chest wall bilaterally.   LUNGS:  Clear to percussion and auscultation.   HEART:  There is a regular rate and rhythm, S1, S2.   ABDOMEN:  Soft and nontender without hepatosplenomegaly.   EXTREMITIES:  Without edema.   PSYCHIATRIC:  Mood and affect are normal.      LABORATORY DATA:  The CBC and CMP were within normal limits except for a low lymphocyte count of 500.      ASSESSMENT AND PLAN:     1.  Mily Lei is a 54-year-old woman who presented with a locally advanced right breast cancer.  This developed in the context of previous breast augmentation.  On presentation, she had multiple masses occupying an area of approximately 9 cm in the upper outer quadrant, but some extension into the subareolar region.  She did not have any clinically apparent lymph node involvement.  She was treated with neoadjuvant chemotherapy on the I-SPY 2 trial and was randomized to the paclitaxel, olaparib and durvalumab arm followed by dose-dense AC.  She tolerated treatment reasonably well, but had an RCB3 result with a final stage [id number].  We discussed the CREATE-X approach of adjuvant capecitabine for 24 weeks combined with an aromatase inhibitor, which would be continued for 10 years.  She is in agreement with this plan.   2.  Discussion of the CREATE-X plan.  I did discuss with her that the CREATE-X plan showed a trend towards improved outcomes in ER-positive, HER-2 negative patients, but the results felt short of statistical significance.  I discussed that, nonetheless, there is an effect in the direction of reduced distant recurrence and effect in the direction of improved overall survival.   I discussed that the Xeloda is 500 mg tabs 3 twice daily after food 14 days on and 7 days off.  I discussed hand-foot syndrome, difficulty with opening jars and doing up buttons, and bag balm can certainly help with that.  Tanya Pruitt is doing chemotherapy teaching this morning.  We also discussed hormonal therapy concurrently.   3.  Hormonal therapy.  I discussed letrozole.  I discussed that there is a risk of joint stiffness.  Mily already has some mild joint stiffness unrelated to hormonal therapy.  We do recommend exercise.  We discussed hot flashes, joint stiffness and vaginal dryness as potential side effects.  All of her questions were answered.  Prescription information was given to her.   4.  Antiemetics.  Mily has antiemetics at home if she needs them for the Xeloda.  She knows to call us if she has more than 3 loose bowel movements per day.   5.  Followup.  We will see Mily in followup in our clinic in 3 weeks.  All of her 's questions were answered as well. Follow up with me February 4 with CBC, CMP.       Thank you for allowing us to continue to participate in Mily Lei's care.  We did discuss that the treatment with capecitabine would be for a total of 24 weeks.      Sincerely,    Christian Guzman M.D.      Division of Hematology, Oncology, Transplant   Department of Medicine   [employer] [facility] [employer]   [phone]     I spent 60 minutes with the patient more than 50% of which was in counseling and coordination of care.

## 2020-01-14 NOTE — TELEPHONE ENCOUNTER
PA Initiation    Medication: Capecitabine - Submitted  Insurance Company: [employer] [place] - Phone [phone] Fax [phone]  Pharmacy Filling the Rx:    Filling Pharmacy Phone:    Filling Pharmacy Fax:    Start Date: 1/14/2020        Amie Mansfield CPhT  [facility]  Oncology Pharmacy [email or web address]  Phone: [phone]  Fax: [phone]

## 2020-01-14 NOTE — NURSING NOTE
"Oncology Rooming Note    January 14, 2020 8:52 AM   Kathy Lei is a 54 year old female who presents for:    Chief Complaint   Patient presents with     Oncology Clinic Visit     [facility] RETURN- BREAST CA     Initial Vitals: /77   Pulse 94   Temp 97.8  F (36.6  C) (Oral)   Resp 16   Ht 1.675 m (5' 5.95\")   Wt 62.6 kg (138 lb 0.1 oz)   LMP 11/02/2014   SpO2 97%   BMI 22.31 kg/m   Estimated body mass index is 22.31 kg/m  as calculated from the following:    Height as of this encounter: 1.675 m (5' 5.95\").    Weight as of this encounter: 62.6 kg (138 lb 0.1 oz). Body surface area is 1.71 meters squared.  No Pain (0) Comment: Data Unavailable   Patient's last menstrual period was 11/02/2014.  Allergies reviewed: Yes  Medications reviewed: Yes    Medications: Medication refills not needed today.  Pharmacy name entered into Mint:    [id number] - [place], [place] - [address] AT [facility] [address]    Clinical concerns: No new concerns. Dr. Ramos was notified.      Shakir Skaggs LPN            "

## 2020-01-20 ENCOUNTER — TELEPHONE (OUTPATIENT)
Dept: ONCOLOGY | Facility: CLINIC | Age: 55
End: 2020-01-20

## 2020-01-20 NOTE — ORAL ONC MGMT
"Oral Chemotherapy Monitoring Program    Primary Oncologist: Dr. Guzman  Primary Oncology Clinic: [facility]  Cancer Diagnosis: Breast Cancer    Therapy History:  Xeloda (capecitabine) 1500mg twice daily  Take 3 tablets by mouth twice daily, 14 days on, 7 days off.  C1D1= 01/17/2020    Drug Interaction Assessment: No new notable drug interactions were identified at this time  Medications assessed:  Capecitabine-Letrozole-LORazepam-Hydrocortisone (Topical)-Levothyroxine-Albuterol-Sertraline -Acetaminophen-Ondansetron-Lisinopril-Fluticasone (Nasal)-Advil [OTC]    Lab Monitoring Plan:  CBC and CMP Monthly, Labs at follow up appointment with Dr. Guzman in February    Subjective/Objective:  Kathy Lei is a 54 year old female contacted by phone for initial oral chemotherapy follow up. Mily began taking Xeloda on 01/17. She experienced \"waves of nausea\" and contacted Mily Mortensen for a zofran prescription. Since starting the Zofran, she has not had a problem with nausea. She takes one zofran with each of her two doses throughout the day. Mily confirmed her dose of 3 tablets twice daily, and denied missing any doses since starting Xeloda. She denied dryness to her hands and feet, and said that she was sent lotion with her prescription. She denied diarrhea, and described her bowel movements as \"on the hard side\". She insisted she needs to drink more water and eat more fruit. She said she drinks 3 glasses of water a day, but also drinks quite a bit of sparkling water.     ORAL CHEMOTHERAPY 1/14/2020 1/20/2020   Drug Name Xeloda (Capecitabine) Xeloda (Capecitabine)   Current Dosage 1500mg 1500mg   Current Schedule BID BID   Cycle Details 2 weeks on 1 week off 2 weeks on 1 week off   Start Date of Last Cycle - 1/17/2020   Planned next cycle start date - 2/7/2020   Doses missed in last 2 weeks - 0   Adherence Assessment - Adherent   Adverse Effects - Nausea   Pharmacist Intervention(nausea) - No "       Vitals:  BP:   BP Readings from Last 1 Encounters:   01/14/20 130/77     Wt Readings from Last 1 Encounters:   01/14/20 62.6 kg (138 lb 0.1 oz)       Labs:  _  Result Component Current Result Ref Range   Sodium 139 (1/14/2020) 133 - 144 mmol/L     Result Component Current Result Ref Range   Potassium 3.8 (1/14/2020) 3.4 - 5.3 mmol/L     Result Component Current Result Ref Range   Calcium 8.6 (1/14/2020) 8.5 - 10.1 mg/dL     Result Component Current Result Ref Range   Albumin 3.7 (1/14/2020) 3.4 - 5.0 g/dL     Result Component Current Result Ref Range   Urea Nitrogen 15 (1/14/2020) 7 - 30 mg/dL     Result Component Current Result Ref Range   Creatinine 0.60 (1/14/2020) 0.52 - 1.04 mg/dL     Result Component Current Result Ref Range   AST 21 (1/14/2020) 0 - 45 U/L     Result Component Current Result Ref Range   ALT 26 (1/14/2020) 0 - 50 U/L     Result Component Current Result Ref Range   Bilirubin Total 0.2 (1/14/2020) 0.2 - 1.3 mg/dL     Result Component Current Result Ref Range   WBC 3.9 (L) (1/14/2020) 4.0 - 11.0 10e9/L     Result Component Current Result Ref Range   Hemoglobin 12.6 (1/14/2020) 11.7 - 15.7 g/dL     Result Component Current Result Ref Range   Platelet Count 214 (1/14/2020) 150 - 450 10e9/L     Result Component Current Result Ref Range   Absolute Neutrophil 2.5 (1/14/2020) 1.6 - 8.3 10e9/L       Assessment:  Mily is tolerating the Xeloda treatment well. She did have some waves of nausea after her first few doses, but zofran relieved this. She has not had any hand-foot symptoms, and no diarrhea.    Plan:  Breast Cancer: Continue Xeloda as prescribed. Stay hydrated.  Nausea: Continue Zofran    Follow-Up:  Review upcoming labs and appointment with Dr. Guzman on 02/11    Refill Due:  Her last prescription was sent with a refill, so her next prescription is due on 02/28.    Yelena Barrett  Pharmacy Intern  [facility]  [phone]

## 2020-01-23 DIAGNOSIS — F41.9 ANXIETY: ICD-10-CM

## 2020-01-23 NOTE — TELEPHONE ENCOUNTER
Routing refill request to provider for review/approval because:  Med not on nurse refill list    Last filled 12/30/2019 # 30 no refills    Last visit 9/24/2019

## 2020-01-26 ENCOUNTER — MYC REFILL (OUTPATIENT)
Dept: FAMILY MEDICINE | Facility: CLINIC | Age: 55
End: 2020-01-26

## 2020-01-26 DIAGNOSIS — F41.9 ANXIETY: ICD-10-CM

## 2020-01-26 RX ORDER — LORAZEPAM 0.5 MG/1
TABLET ORAL
Qty: 30 TABLET | Refills: 0 | Status: SHIPPED | OUTPATIENT
Start: 2020-01-26 | End: 2020-02-24

## 2020-01-26 RX ORDER — LORAZEPAM 0.5 MG/1
TABLET ORAL
Qty: 30 TABLET | Refills: 0 | Status: CANCELLED | OUTPATIENT
Start: 2020-01-26

## 2020-01-28 NOTE — TELEPHONE ENCOUNTER
Requested Prescriptions   Pending Prescriptions Disp Refills     LORazepam (ATIVAN) 0.5 MG tablet 30 tablet 0       There is no refill protocol information for this order        [facility]

## 2020-02-02 NOTE — PROGRESS NOTES
HPI: Kathy Lei is a 54 yo female with a new diagnosis of an estrogen-receptor positive, [place]-positive, HER2-negative breast cancer, grade 2, in the upper outer quadrant of the right breast since the end of year 2018.      Mily presented between Christmas and New Years of 2018 with new sharp pains in the upper outer quadrant of the right breast.  She underwent mammographic screening.       IMAGING:  Mammography and ultrasound:  She had a diagnostic mammogram which showed bilateral prepectoral saline implants.  On the right breast at 11:30 position, there were grouped coarse heterogeneous calcifications spanning 1.3 cm, new since 2014, adjacent calcifications 11-o'clock position posterior depth.  There was an irregular mass in the lateral right breast 9-o'clock position mid-posterior depth, and an additional mass with obscured margins.  No significant changes in the left breast.  Right breast ultrasound was performed.  In the area of the palpable lump in the right breast, 11-o'clock position, 6 cm from the nipple, there is an irregular hypoechoic mass with indistinct margins measuring approximately 1.0 x 0.9 x 1.6 cm in size.  Immediately adjacent to the mass, 11:30 position, are microcalcifications.  In the second area of palpable lump right breast, 9:30 position, 5 cm from the nipple, there was an irregular hypoechoic mass measuring 3.2 x 0.5 x 1.3 cm in size felt to account for the mammographic findings.  There were multiple additional round hypoechoic masses similar to the findings at 9:30 position seen incidentally during real time and not directly palpable.  For example, in the right breast at 8-o'clock position, 4 cm from the nipple, there was a mass measuring 0.8 x 0.6 x 0.6 cm, BI-RADS 4.       Contrast mammogram was subsequently performed and the contrast mammogram showed a large area of mass and non-mass-like enhancement in the upper outer right breast measuring 7.2 cm in greatest  dimension, corresponding global asymmetry in the upper outer right breast.  Enhancement extended to the implant without evidence of extension to the skin surface or nipple, and the calcifications were noted as previously found.      PATHOLOGY:  The pathology showed part A, breast needle biopsy 11 o'clock, 6 cm from the nipple, invasive mammary carcinoma, no special type, ductal (and mucinous) [place] grade 2.  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  Calcifications were associated with the DCIS.  There was a focus suspicious for lymphovascular invasion.  Invasive carcinoma was estrogen receptor positive and progesterone receptor negative by immunohistochemistry.  In part B, breast right, 8 o'clock, 4 cm from the nipple, ultrasound-guided core biopsy, invasive mammary carcinoma, no special type, ductal (and mucinous) [place] grade 2, occasional calcifications were noted.  Invasive carcinoma was estrogen receptor positive and progesterone receptor negative by immunohistochemistry.  On quantitation of the ER and ID, ER was positive 99% of the cells staining with strong intensity.  ID was subsequently noted to be positive with 15% of the cells staining with moderate intensity.       There was also a HER2 FISH performed, and the HER2 FISH showed average number of HER2 signals per nucleus 2.6.  Average number of CEN17 signals 1.7 with a ratio of 1.6, VASILIY negative for biopsy A.  For biopsy B, the HER2 signals per nucleus 2.9.  Average number CEN17 signals per nucleus 1.7 with a ratio of 1.7, VASILIY negative.  Overall, by 2018 ASCO/CAP guidelines, this tumor is HER2 negative.     She was enrolled in I-SPY2 trial to Durvulumab, Taxol and Olaparib arm. She completed 12 cycles of weekly Taxol. She also completed 4 cycles  of adriamycin and cyclophosphamide (AC).     PAST MEDICAL HISTORY:  In terms of her breast history, she does have a history of a smaller right breast which was  treated with implants 19 years ago.  She has a history of 2 papillomas in the right breast, 1 removed at the 6-o'clock position 5 years ago, another removed at the 6-o'clock position approximately 10 years ago.  These incisions are approximately at the 5:30 to 6-o'clock position.  She has no history of radiation therapy.  No history of breast cancer of any type.  No history of tumor of any kind.  No history of heart problems, heart attack, breathing problems, blood clots, seizures, stroke, arthritis, peptic ulcer disease or osteoporosis.  No history of bone fractures.  She is not currently participating in a clinical trial. She does have a history of asthma and a history of hypothyroidism.      FAMILY HISTORY:  Entirely negative for breast cancer or ovarian cancer or breast cancer in a male relative.      ALLERGIES:  No known drug allergies.  No allergy to seafood, iodine or contrast dye.  She does not take aspirin.      PAST MENSTRUAL HISTORY:  First period was at age 11.  First and only pregnancy was at age 26.  No miscarriages or abortions.  Occasional use of oral contraceptives in her 20s.  No history of hormone replacement therapy.  Last period was 3 years ago.      SOCIAL HISTORY:  Tobacco history was from age 15 to present, smoking a pack of cigarettes a week.  She is now trying to stop cigarettes.   In terms of alcohol use, in her early teens and early 20s, she drank approximately 10 drinks on the weekend and has tapered off drinking since then.      TREATMENT HISTORY:  A.  Neoadjuvant durvalumab, oliparib, paclitaxel on I SPY 2  B  Neoadjuvant ddAC.  C.  Bilateral mastectomy.  RCB 3 result.  D.  Post-surgical radiation.  Completed 1-6-20.  E.  Begin CREATE-X. With letrozole.      INTERVAL HISTORY  Mily Lei returns to clinic with her  to discuss the finding of an RCB3 residual cancer burden of her resected right breast cancer.  The tumor measured at least 10.2 cm in curvilinear shape after  neoadjuvant chemotherapy.  The tumor was invasive mucinous carcinoma, [place] grade 2.  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  Extensive angiolymphatic invasion was present.  Margins were negative for carcinoma.  Invasive carcinoma was 1.5 mm from the anterior superior margin and 6 mm from the anterior inferior margin.  There was benign skin, nipple, and skeletal muscle.  Focal deep dermal lymphovascular invasion was present.  Calcifications were present in the DCIS.  Pathologic stage was [id number] sn.  Overall, the [facility] residual cancer burden was calculated as 3.425 class RCB3.      January 6-2020  Treatment Summary to Date  Treatment Site: Rt CW , nodes + SCV Current Dose: 6040/6040 cGy Fractions: 33/33            Xeloda  Plan 24 weeks.       HISTORY OF PRESENT ILLNESS:  Mily returns to the clinic and is feeling quite well.  She has completed cycle 1 of Xeloda on the CREATE-X study along with letrozole.  She has no pain, mild fatigue, no depression, no anxiety.  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  She will start cycle 2 on Friday.      REVIEW OF SYSTEMS:  She denies fevers or chills, cough, chest pain, shortness of breath, she has had mild nausea, no vomiting, no constipation, no diarrhea.  No bone pain, back pain or headache.  The remainder of a 10-point review of systems is negative.  She does have chronic left hip pain which has been off and on and also pain in her left ankle which is off and on.  She thinks this may be related to her scoliosis treatment and may predate the breast cancer diagnosis.      The remainder of a 10-point review of systems is negative.      PHYSICAL EXAMINATION:   VITAL SIGNS:  Blood pressure is 123/77, pulse 88, respirations 18, temperature 97.8, O2 sat 98% on room air, weight 62.2 kg.  Pain score 0.   GENERAL:  Mily appeared generally well.  She has no alopecia.   HEENT:  Oropharynx  is without lesions.   LYMPH:  There is no palpable cervical, supraclavicular, subclavicular or axillary lymphadenopathy.   LUNGS:  Clear to percussion and auscultation.   HEART:  Regular rate and rhythm, S1, S2.   ABDOMEN:  Soft and nontender without hepatosplenomegaly.   EXTREMITIES:  Without edema.  She has grade 1 pinkness of her hands and feet, but no other evidence of hand-foot syndrome.   BREASTS:  Exam was not performed today.      LABORATORY DATA:  The CBC and CMP were within normal limits.      ASSESSMENT AND PLAN:     1.  Mily Lei is a 54-year-old woman who presented with a locally advanced right breast cancer.  This cancer developed in the context of previous breast augmentation.  On presentation, she had multiple masses occupying an area of approximately 9 cm in the upper outer quadrant with some extension to the subareolar region.  She did not have any clinically apparent lymph node involvement.  She was treated with neoadjuvant chemotherapy on the I-SPY 2 trial and was randomized to paclitaxel, olaparib and durvalumab followed by dose-dense AC.  She tolerated the treatment reasonably well but had an RCB3 result with final stage [id number].  She started on the CREATE-X trial with a plan of having adjuvant capecitabine for 24 weeks combined with an aromatase inhibitor, which will be continued for 10 years.  She is in agreement with this plan.   2.  Tolerance of the CREATE-X plan.  She is tolerating treatment very well.  She has had minimal nausea, minimal hand-foot syndrome.  She is very pleased with this.   3.  Hormone therapy.  She will continue with letrozole.   4.  Antiemetics.  She has Zofran if needed.   5.  Pain in the left hip and left ankle.  This may be related to her prior scoliosis treatment.  She does not want to have an MRI at this time.  We will continue to monitor.   6.  Follow up.  Follow up with ANTOINETTE February 25 and March 17 with CBC, CMP.     Thank you for allowing us to continue  to participate in Mily Lei's care.      Christian Guzman MD      [facility]         I spent 40 minutes with the patient more than 50% of which was in counseling and coordination of care.

## 2020-02-04 ENCOUNTER — APPOINTMENT (OUTPATIENT)
Dept: LAB | Facility: CLINIC | Age: 55
End: 2020-02-04
Attending: INTERNAL MEDICINE
Payer: COMMERCIAL

## 2020-02-04 ENCOUNTER — ONCOLOGY VISIT (OUTPATIENT)
Dept: ONCOLOGY | Facility: CLINIC | Age: 55
End: 2020-02-04
Attending: INTERNAL MEDICINE
Payer: COMMERCIAL

## 2020-02-04 VITALS
DIASTOLIC BLOOD PRESSURE: 77 MMHG | RESPIRATION RATE: 18 BRPM | WEIGHT: 137.2 LBS | BODY MASS INDEX: 22.18 KG/M2 | HEART RATE: 88 BPM | TEMPERATURE: 97.8 F | SYSTOLIC BLOOD PRESSURE: 123 MMHG | OXYGEN SATURATION: 98 %

## 2020-02-04 DIAGNOSIS — C50.411 MALIGNANT NEOPLASM OF UPPER-OUTER QUADRANT OF RIGHT BREAST IN FEMALE, ESTROGEN RECEPTOR POSITIVE (H): ICD-10-CM

## 2020-02-04 DIAGNOSIS — Z17.0 MALIGNANT NEOPLASM OF UPPER-OUTER QUADRANT OF RIGHT BREAST IN FEMALE, ESTROGEN RECEPTOR POSITIVE (H): ICD-10-CM

## 2020-02-04 LAB
ALBUMIN SERPL-MCNC: 3.9 G/DL (ref 3.4–5)
ALP SERPL-CCNC: 67 U/L (ref 40–150)
ALT SERPL W P-5'-P-CCNC: 27 U/L (ref 0–50)
ANION GAP SERPL CALCULATED.3IONS-SCNC: 5 MMOL/L (ref 3–14)
AST SERPL W P-5'-P-CCNC: 22 U/L (ref 0–45)
BASOPHILS # BLD AUTO: 0 10E9/L (ref 0–0.2)
BASOPHILS NFR BLD AUTO: 0.4 %
BILIRUB SERPL-MCNC: 0.3 MG/DL (ref 0.2–1.3)
BUN SERPL-MCNC: 11 MG/DL (ref 7–30)
CALCIUM SERPL-MCNC: 8.9 MG/DL (ref 8.5–10.1)
CHLORIDE SERPL-SCNC: 104 MMOL/L (ref 94–109)
CO2 SERPL-SCNC: 28 MMOL/L (ref 20–32)
CREAT SERPL-MCNC: 0.61 MG/DL (ref 0.52–1.04)
DIFFERENTIAL METHOD BLD: NORMAL
EOSINOPHIL # BLD AUTO: 0.6 10E9/L (ref 0–0.7)
EOSINOPHIL NFR BLD AUTO: 13.7 %
ERYTHROCYTE [DISTWIDTH] IN BLOOD BY AUTOMATED COUNT: 14.6 % (ref 10–15)
GFR SERPL CREATININE-BSD FRML MDRD: >90 ML/MIN/{1.73_M2}
GLUCOSE SERPL-MCNC: 89 MG/DL (ref 70–99)
HCT VFR BLD AUTO: 40.4 % (ref 35–47)
HGB BLD-MCNC: 13.5 G/DL (ref 11.7–15.7)
IMM GRANULOCYTES # BLD: 0 10E9/L (ref 0–0.4)
IMM GRANULOCYTES NFR BLD: 0 %
LYMPHOCYTES # BLD AUTO: 1 10E9/L (ref 0.8–5.3)
LYMPHOCYTES NFR BLD AUTO: 21.4 %
MCH RBC QN AUTO: 29.8 PG (ref 26.5–33)
MCHC RBC AUTO-ENTMCNC: 33.4 G/DL (ref 31.5–36.5)
MCV RBC AUTO: 89 FL (ref 78–100)
MONOCYTES # BLD AUTO: 0.5 10E9/L (ref 0–1.3)
MONOCYTES NFR BLD AUTO: 10.1 %
NEUTROPHILS # BLD AUTO: 2.5 10E9/L (ref 1.6–8.3)
NEUTROPHILS NFR BLD AUTO: 54.4 %
NRBC # BLD AUTO: 0 10*3/UL
NRBC BLD AUTO-RTO: 0 /100
PLATELET # BLD AUTO: 217 10E9/L (ref 150–450)
POTASSIUM SERPL-SCNC: 3.7 MMOL/L (ref 3.4–5.3)
PROT SERPL-MCNC: 6.9 G/DL (ref 6.8–8.8)
RBC # BLD AUTO: 4.53 10E12/L (ref 3.8–5.2)
SODIUM SERPL-SCNC: 137 MMOL/L (ref 133–144)
WBC # BLD AUTO: 4.5 10E9/L (ref 4–11)

## 2020-02-04 PROCEDURE — 85025 COMPLETE CBC W/AUTO DIFF WBC: CPT | Performed by: INTERNAL MEDICINE

## 2020-02-04 PROCEDURE — 36415 COLL VENOUS BLD VENIPUNCTURE: CPT

## 2020-02-04 PROCEDURE — 99215 OFFICE O/P EST HI 40 MIN: CPT | Mod: ZP | Performed by: INTERNAL MEDICINE

## 2020-02-04 PROCEDURE — 80053 COMPREHEN METABOLIC PANEL: CPT | Performed by: INTERNAL MEDICINE

## 2020-02-04 PROCEDURE — G0463 HOSPITAL OUTPT CLINIC VISIT: HCPCS | Mod: ZF

## 2020-02-04 RX ORDER — LETROZOLE 2.5 MG/1
2.5 TABLET, FILM COATED ORAL DAILY
Qty: 90 TABLET | Refills: 3 | Status: SHIPPED | OUTPATIENT
Start: 2020-02-04 | End: 2020-06-30

## 2020-02-04 ASSESSMENT — PAIN SCALES - GENERAL: PAINLEVEL: NO PAIN (0)

## 2020-02-04 NOTE — NURSING NOTE
"Oncology Rooming Note    February 4, 2020 5:30 PM   Kathy Lei is a 54 year old female who presents for:    Chief Complaint   Patient presents with     Blood Draw     Venipuncture labs collected by RN.      Oncology Clinic Visit     Return: Breast CA     Initial Vitals: /77 (BP Location: Right arm, Patient Position: Sitting)   Pulse 88   Temp 97.8  F (36.6  C) (Oral)   Resp 18   Wt 62.2 kg (137 lb 3.2 oz)   LMP 11/02/2014   SpO2 98%   BMI 22.18 kg/m   Estimated body mass index is 22.18 kg/m  as calculated from the following:    Height as of 1/14/20: 1.675 m (5' 5.95\").    Weight as of this encounter: 62.2 kg (137 lb 3.2 oz). Body surface area is 1.7 meters squared.  No Pain (0) Comment: Data Unavailable   Patient's last menstrual period was 11/02/2014.  Allergies reviewed: Yes  Medications reviewed: Yes    Medications: MEDICATION REFILLS NEEDED TODAY. Provider was notified.  Pharmacy name entered into [facility]:    [id number] - [place], [place] - [address] AT [facility] [address]  [facility] - [place], [place] - [address]    Clinical concerns: Letrozole refill needed today, patient would like that refill sent downstairs       Radha Mcnair CMA              "

## 2020-02-04 NOTE — LETTER
2/4/2020       RE: Kathy Lei  [address] [place] [address]     Dear Colleague,    Thank you for referring your patient, Kathy Lei, to the [facility]. Please see a copy of my visit note below.    HPI: Kathy Lei is a 54 yo female with a new diagnosis of an estrogen-receptor positive, [place]-positive, HER2-negative breast cancer, grade 2, in the upper outer quadrant of the right breast since the end of year 2018.      Mily presented between Christmas and New Years of 2018 with new sharp pains in the upper outer quadrant of the right breast.  She underwent mammographic screening.       IMAGING:  Mammography and ultrasound:  She had a diagnostic mammogram which showed bilateral prepectoral saline implants.  On the right breast at 11:30 position, there were grouped coarse heterogeneous calcifications spanning 1.3 cm, new since 2014, adjacent calcifications 11-o'clock position posterior depth.  There was an irregular mass in the lateral right breast 9-o'clock position mid-posterior depth, and an additional mass with obscured margins.  No significant changes in the left breast.  Right breast ultrasound was performed.  In the area of the palpable lump in the right breast, 11-o'clock position, 6 cm from the nipple, there is an irregular hypoechoic mass with indistinct margins measuring approximately 1.0 x 0.9 x 1.6 cm in size.  Immediately adjacent to the mass, 11:30 position, are microcalcifications.  In the second area of palpable lump right breast, 9:30 position, 5 cm from the nipple, there was an irregular hypoechoic mass measuring 3.2 x 0.5 x 1.3 cm in size felt to account for the mammographic findings.  There were multiple additional round hypoechoic masses similar to the findings at 9:30 position seen incidentally during real time and not directly palpable.  For example, in the right breast at 8-o'clock position, 4 cm from the nipple, there was a mass  measuring 0.8 x 0.6 x 0.6 cm, BI-RADS 4.       Contrast mammogram was subsequently performed and the contrast mammogram showed a large area of mass and non-mass-like enhancement in the upper outer right breast measuring 7.2 cm in greatest dimension, corresponding global asymmetry in the upper outer right breast.  Enhancement extended to the implant without evidence of extension to the skin surface or nipple, and the calcifications were noted as previously found.      PATHOLOGY:  The pathology showed part A, breast needle biopsy 11 o'clock, 6 cm from the nipple, invasive mammary carcinoma, no special type, ductal (and mucinous) [place] grade 2.  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  Calcifications were associated with the DCIS.  There was a focus suspicious for lymphovascular invasion.  Invasive carcinoma was estrogen receptor positive and progesterone receptor negative by immunohistochemistry.  In part B, breast right, 8 o'clock, 4 cm from the nipple, ultrasound-guided core biopsy, invasive mammary carcinoma, no special type, ductal (and mucinous) [place] grade 2, occasional calcifications were noted.  Invasive carcinoma was estrogen receptor positive and progesterone receptor negative by immunohistochemistry.  On quantitation of the ER and IN, ER was positive 99% of the cells staining with strong intensity.  IN was subsequently noted to be positive with 15% of the cells staining with moderate intensity.       There was also a HER2 FISH performed, and the HER2 FISH showed average number of HER2 signals per nucleus 2.6.  Average number of CEN17 signals 1.7 with a ratio of 1.6, VASILIY negative for biopsy A.  For biopsy B, the HER2 signals per nucleus 2.9.  Average number CEN17 signals per nucleus 1.7 with a ratio of 1.7, VASILIY negative.  Overall, by 2018 ASCO/CAP guidelines, this tumor is HER2 negative.     She was enrolled in I-SPY2 trial to Durvulumab, Taxol and  Olaparib arm. She completed 12 cycles of weekly Taxol. She also completed 4 cycles  of adriamycin and cyclophosphamide (AC).     PAST MEDICAL HISTORY:  In terms of her breast history, she does have a history of a smaller right breast which was treated with implants 19 years ago.  She has a history of 2 papillomas in the right breast, 1 removed at the 6-o'clock position 5 years ago, another removed at the 6-o'clock position approximately 10 years ago.  These incisions are approximately at the 5:30 to 6-o'clock position.  She has no history of radiation therapy.  No history of breast cancer of any type.  No history of tumor of any kind.  No history of heart problems, heart attack, breathing problems, blood clots, seizures, stroke, arthritis, peptic ulcer disease or osteoporosis.  No history of bone fractures.  She is not currently participating in a clinical trial. She does have a history of asthma and a history of hypothyroidism.      FAMILY HISTORY:  Entirely negative for breast cancer or ovarian cancer or breast cancer in a male relative.      ALLERGIES:  No known drug allergies.  No allergy to seafood, iodine or contrast dye.  She does not take aspirin.      PAST MENSTRUAL HISTORY:  First period was at age 11.  First and only pregnancy was at age 26.  No miscarriages or abortions.  Occasional use of oral contraceptives in her 20s.  No history of hormone replacement therapy.  Last period was 3 years ago.      SOCIAL HISTORY:  Tobacco history was from age 15 to present, smoking a pack of cigarettes a week.  She is now trying to stop cigarettes.   In terms of alcohol use, in her early teens and early 20s, she drank approximately 10 drinks on the weekend and has tapered off drinking since then.      TREATMENT HISTORY:  A.  Neoadjuvant durvalumab, oliparib, paclitaxel on I SPY 2  B  Neoadjuvant ddAC.  C.  Bilateral mastectomy.  RCB 3 result.  D.  Post-surgical radiation.  Completed 1-6-20.  E.  Begin CREATE-X. With  letrozole.      INTERVAL HISTORY  Mily Lei returns to clinic with her  to discuss the finding of an RCB3 residual cancer burden of her resected right breast cancer.  The tumor measured at least 10.2 cm in curvilinear shape after neoadjuvant chemotherapy.  The tumor was invasive mucinous carcinoma, [place] grade 2.  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  Extensive angiolymphatic invasion was present.  Margins were negative for carcinoma.  Invasive carcinoma was 1.5 mm from the anterior superior margin and 6 mm from the anterior inferior margin.  There was benign skin, nipple, and skeletal muscle.  Focal deep dermal lymphovascular invasion was present.  Calcifications were present in the DCIS.  Pathologic stage was [id number] sn.  Overall, the [facility] residual cancer burden was calculated as 3.425 class RCB3.      January 6-2020  Treatment Summary to Date  Treatment Site: Rt CW , nodes + SCV Current Dose: 6040/6040 cGy Fractions: 33/33            Xeloda  Plan 24 weeks.       HISTORY OF PRESENT ILLNESS:  Mily returns to the clinic and is feeling quite well.  She has completed cycle 1 of Xeloda on the CREATE-X study along with letrozole.  She has no pain, mild fatigue, no depression, no anxiety.  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  She will start cycle 2 on Friday.      REVIEW OF SYSTEMS:  She denies fevers or chills, cough, chest pain, shortness of breath, she has had mild nausea, no vomiting, no constipation, no diarrhea.  No bone pain, back pain or headache.  The remainder of a 10-point review of systems is negative.  She does have chronic left hip pain which has been off and on and also pain in her left ankle which is off and on.  She thinks this may be related to her scoliosis treatment and may predate the breast cancer diagnosis.      The remainder of a 10-point review of systems is negative.       PHYSICAL EXAMINATION:   VITAL SIGNS:  Blood pressure is 123/77, pulse 88, respirations 18, temperature 97.8, O2 sat 98% on room air, weight 62.2 kg.  Pain score 0.   GENERAL:  Mily appeared generally well.  She has no alopecia.   HEENT:  Oropharynx is without lesions.   LYMPH:  There is no palpable cervical, supraclavicular, subclavicular or axillary lymphadenopathy.   LUNGS:  Clear to percussion and auscultation.   HEART:  Regular rate and rhythm, S1, S2.   ABDOMEN:  Soft and nontender without hepatosplenomegaly.   EXTREMITIES:  Without edema.  She has grade 1 pinkness of her hands and feet, but no other evidence of hand-foot syndrome.   BREASTS:  Exam was not performed today.      LABORATORY DATA:  The CBC and CMP were within normal limits.      ASSESSMENT AND PLAN:     1.  Mily Lei is a 54-year-old woman who presented with a locally advanced right breast cancer.  This cancer developed in the context of previous breast augmentation.  On presentation, she had multiple masses occupying an area of approximately 9 cm in the upper outer quadrant with some extension to the subareolar region.  She did not have any clinically apparent lymph node involvement.  She was treated with neoadjuvant chemotherapy on the I-SPY 2 trial and was randomized to paclitaxel, olaparib and durvalumab followed by dose-dense AC.  She tolerated the treatment reasonably well but had an RCB3 result with final stage [id number].  She started on the CREATE-X trial with a plan of having adjuvant capecitabine for 24 weeks combined with an aromatase inhibitor, which will be continued for 10 years.  She is in agreement with this plan.   2.  Tolerance of the CREATE-X plan.  She is tolerating treatment very well.  She has had minimal nausea, minimal hand-foot syndrome.  She is very pleased with this.   3.  Hormone therapy.  She will continue with letrozole.   4.  Antiemetics.  She has Zofran if needed.   5.  Pain in the left hip and left ankle.   This may be related to her prior scoliosis treatment.  She does not want to have an MRI at this time.  We will continue to monitor.   6.  Follow up.  Follow up with ANTOINETTE February 25 and March 17 with CBC, CMP.     Thank you for allowing us to continue to participate in Mily Lei's care.      Christian Guzman MD      [facility]     I spent 40 minutes with the patient more than 50% of which was in counseling and coordination of care.

## 2020-02-16 RX ORDER — MEPERIDINE HYDROCHLORIDE 25 MG/ML
25 INJECTION INTRAMUSCULAR; INTRAVENOUS; SUBCUTANEOUS EVERY 30 MIN PRN
Status: CANCELLED | OUTPATIENT
Start: 2020-02-25

## 2020-02-16 RX ORDER — ALBUTEROL SULFATE 90 UG/1
1-2 AEROSOL, METERED RESPIRATORY (INHALATION)
Status: CANCELLED
Start: 2020-02-25

## 2020-02-16 RX ORDER — DIPHENHYDRAMINE HYDROCHLORIDE 50 MG/ML
50 INJECTION INTRAMUSCULAR; INTRAVENOUS
Status: CANCELLED
Start: 2020-02-25

## 2020-02-16 RX ORDER — METHYLPREDNISOLONE SODIUM SUCCINATE 125 MG/2ML
125 INJECTION, POWDER, LYOPHILIZED, FOR SOLUTION INTRAMUSCULAR; INTRAVENOUS
Status: CANCELLED
Start: 2020-02-25

## 2020-02-16 RX ORDER — SODIUM CHLORIDE 9 MG/ML
1000 INJECTION, SOLUTION INTRAVENOUS CONTINUOUS PRN
Status: CANCELLED
Start: 2020-02-25

## 2020-02-16 RX ORDER — EPINEPHRINE 1 MG/ML
0.3 INJECTION, SOLUTION, CONCENTRATE INTRAVENOUS EVERY 5 MIN PRN
Status: CANCELLED | OUTPATIENT
Start: 2020-02-25

## 2020-02-16 RX ORDER — DIPHENHYDRAMINE HCL 50 MG
50 CAPSULE ORAL ONCE
Status: CANCELLED
Start: 2020-02-25

## 2020-02-16 RX ORDER — LORAZEPAM 2 MG/ML
0.5 INJECTION INTRAMUSCULAR EVERY 4 HOURS PRN
Status: CANCELLED
Start: 2020-02-25

## 2020-02-16 RX ORDER — EPINEPHRINE 0.3 MG/.3ML
0.3 INJECTION SUBCUTANEOUS EVERY 5 MIN PRN
Status: CANCELLED | OUTPATIENT
Start: 2020-02-25

## 2020-02-16 RX ORDER — NALOXONE HYDROCHLORIDE 0.4 MG/ML
.1-.4 INJECTION, SOLUTION INTRAMUSCULAR; INTRAVENOUS; SUBCUTANEOUS
Status: CANCELLED | OUTPATIENT
Start: 2020-02-25

## 2020-02-16 RX ORDER — ALBUTEROL SULFATE 0.83 MG/ML
2.5 SOLUTION RESPIRATORY (INHALATION)
Status: CANCELLED | OUTPATIENT
Start: 2020-02-25

## 2020-02-21 DIAGNOSIS — C50.411 MALIGNANT NEOPLASM OF UPPER-OUTER QUADRANT OF RIGHT BREAST IN FEMALE, ESTROGEN RECEPTOR POSITIVE (H): Primary | ICD-10-CM

## 2020-02-21 DIAGNOSIS — Z17.0 MALIGNANT NEOPLASM OF UPPER-OUTER QUADRANT OF RIGHT BREAST IN FEMALE, ESTROGEN RECEPTOR POSITIVE (H): Primary | ICD-10-CM

## 2020-02-21 RX ORDER — CAPECITABINE 500 MG/1
1000 TABLET, FILM COATED ORAL 2 TIMES DAILY
Qty: 84 TABLET | Refills: 1 | Status: SHIPPED | OUTPATIENT
Start: 2020-02-21 | End: 2020-06-30

## 2020-02-23 DIAGNOSIS — F41.9 ANXIETY: ICD-10-CM

## 2020-02-24 RX ORDER — LORAZEPAM 0.5 MG/1
TABLET ORAL
Qty: 30 TABLET | Refills: 0 | Status: SHIPPED | OUTPATIENT
Start: 2020-02-24 | End: 2020-03-20

## 2020-02-24 NOTE — TELEPHONE ENCOUNTER
Requested Prescriptions   Pending Prescriptions Disp Refills     LORazepam (ATIVAN) 0.5 MG tablet [Pharmacy Med Name: LORAZEPAM 0.5MG TABLETS]  Last Written Prescription Date:  1-26-20  Last Fill Quantity: 30 tab,   # refills: 0  Last Office Visit: 9-24-19  Future Office visit:       Routing refill request to provider for review/approval because:  Drug not on the [facility], [facility] or [facility] refill protocol or controlled substance 30 tablet      Sig: TAKE 1 TABLET(0.5 MG) BY MOUTH EVERY 4 HOURS AS NEEDED FOR ANXIETY OR NAUSEA OR VOMITING OR SLEEP       There is no refill protocol information for this order

## 2020-02-25 ENCOUNTER — ONCOLOGY VISIT (OUTPATIENT)
Dept: ONCOLOGY | Facility: CLINIC | Age: 55
End: 2020-02-25
Attending: PHYSICIAN ASSISTANT
Payer: COMMERCIAL

## 2020-02-25 ENCOUNTER — APPOINTMENT (OUTPATIENT)
Dept: LAB | Facility: CLINIC | Age: 55
End: 2020-02-25
Attending: INTERNAL MEDICINE
Payer: COMMERCIAL

## 2020-02-25 VITALS
HEART RATE: 99 BPM | HEIGHT: 66 IN | RESPIRATION RATE: 16 BRPM | TEMPERATURE: 98.5 F | WEIGHT: 134 LBS | SYSTOLIC BLOOD PRESSURE: 125 MMHG | DIASTOLIC BLOOD PRESSURE: 83 MMHG | BODY MASS INDEX: 21.53 KG/M2 | OXYGEN SATURATION: 97 %

## 2020-02-25 DIAGNOSIS — Z17.0 MALIGNANT NEOPLASM OF UPPER-OUTER QUADRANT OF RIGHT BREAST IN FEMALE, ESTROGEN RECEPTOR POSITIVE (H): ICD-10-CM

## 2020-02-25 DIAGNOSIS — E03.9 HYPOTHYROIDISM, UNSPECIFIED TYPE: Primary | ICD-10-CM

## 2020-02-25 DIAGNOSIS — C50.411 MALIGNANT NEOPLASM OF UPPER-OUTER QUADRANT OF RIGHT BREAST IN FEMALE, ESTROGEN RECEPTOR POSITIVE (H): ICD-10-CM

## 2020-02-25 LAB
ALBUMIN SERPL-MCNC: 3.8 G/DL (ref 3.4–5)
ALP SERPL-CCNC: 67 U/L (ref 40–150)
ALT SERPL W P-5'-P-CCNC: 29 U/L (ref 0–50)
ANION GAP SERPL CALCULATED.3IONS-SCNC: 7 MMOL/L (ref 3–14)
AST SERPL W P-5'-P-CCNC: 26 U/L (ref 0–45)
BASOPHILS # BLD AUTO: 0 10E9/L (ref 0–0.2)
BASOPHILS NFR BLD AUTO: 0.6 %
BILIRUB SERPL-MCNC: 0.4 MG/DL (ref 0.2–1.3)
BUN SERPL-MCNC: 7 MG/DL (ref 7–30)
CALCIUM SERPL-MCNC: 9 MG/DL (ref 8.5–10.1)
CHLORIDE SERPL-SCNC: 106 MMOL/L (ref 94–109)
CO2 SERPL-SCNC: 25 MMOL/L (ref 20–32)
CREAT SERPL-MCNC: 0.6 MG/DL (ref 0.52–1.04)
DIFFERENTIAL METHOD BLD: ABNORMAL
EOSINOPHIL # BLD AUTO: 0.3 10E9/L (ref 0–0.7)
EOSINOPHIL NFR BLD AUTO: 10.8 %
ERYTHROCYTE [DISTWIDTH] IN BLOOD BY AUTOMATED COUNT: 14.7 % (ref 10–15)
GFR SERPL CREATININE-BSD FRML MDRD: >90 ML/MIN/{1.73_M2}
GLUCOSE SERPL-MCNC: 97 MG/DL (ref 70–99)
HCT VFR BLD AUTO: 40.3 % (ref 35–47)
HGB BLD-MCNC: 13.9 G/DL (ref 11.7–15.7)
IMM GRANULOCYTES # BLD: 0 10E9/L (ref 0–0.4)
IMM GRANULOCYTES NFR BLD: 0.3 %
LYMPHOCYTES # BLD AUTO: 0.8 10E9/L (ref 0.8–5.3)
LYMPHOCYTES NFR BLD AUTO: 26.3 %
MCH RBC QN AUTO: 30.3 PG (ref 26.5–33)
MCHC RBC AUTO-ENTMCNC: 34.5 G/DL (ref 31.5–36.5)
MCV RBC AUTO: 88 FL (ref 78–100)
MONOCYTES # BLD AUTO: 0.3 10E9/L (ref 0–1.3)
MONOCYTES NFR BLD AUTO: 10.4 %
NEUTROPHILS # BLD AUTO: 1.6 10E9/L (ref 1.6–8.3)
NEUTROPHILS NFR BLD AUTO: 51.6 %
NRBC # BLD AUTO: 0 10*3/UL
NRBC BLD AUTO-RTO: 0 /100
PLATELET # BLD AUTO: 222 10E9/L (ref 150–450)
POTASSIUM SERPL-SCNC: 3.6 MMOL/L (ref 3.4–5.3)
PROT SERPL-MCNC: 7 G/DL (ref 6.8–8.8)
RBC # BLD AUTO: 4.58 10E12/L (ref 3.8–5.2)
SODIUM SERPL-SCNC: 138 MMOL/L (ref 133–144)
T4 FREE SERPL-MCNC: 1.12 NG/DL (ref 0.76–1.46)
TSH SERPL DL<=0.005 MIU/L-ACNC: 5.43 MU/L (ref 0.4–4)
WBC # BLD AUTO: 3.2 10E9/L (ref 4–11)

## 2020-02-25 PROCEDURE — 85025 COMPLETE CBC W/AUTO DIFF WBC: CPT | Performed by: INTERNAL MEDICINE

## 2020-02-25 PROCEDURE — 84443 ASSAY THYROID STIM HORMONE: CPT | Performed by: PHYSICIAN ASSISTANT

## 2020-02-25 PROCEDURE — 36415 COLL VENOUS BLD VENIPUNCTURE: CPT

## 2020-02-25 PROCEDURE — 99214 OFFICE O/P EST MOD 30 MIN: CPT | Mod: ZP | Performed by: PHYSICIAN ASSISTANT

## 2020-02-25 PROCEDURE — 80053 COMPREHEN METABOLIC PANEL: CPT | Performed by: INTERNAL MEDICINE

## 2020-02-25 PROCEDURE — 84439 ASSAY OF FREE THYROXINE: CPT | Performed by: PHYSICIAN ASSISTANT

## 2020-02-25 RX ORDER — LEVOTHYROXINE SODIUM 112 UG/1
112 TABLET ORAL DAILY
Qty: 90 TABLET | Refills: 1 | Status: SHIPPED | OUTPATIENT
Start: 2020-02-25 | End: 2020-03-23

## 2020-02-25 ASSESSMENT — MIFFLIN-ST. JEOR: SCORE: 1213.7

## 2020-02-25 NOTE — LETTER
2/25/2020      RE: Kathy Lei  [address] [place] [address]       [facility] CANCER CLINIC  FOLLOW-UP VISIT NOTE  Date of visit: Feb 25, 2020        REASON FOR VISIT: follow up breast cancer       TREATMENT HISTORY:  A.  Neoadjuvant durvalumab, oliparib, paclitaxel on I SPY 2  B  Neoadjuvant ddAC.  C.  Bilateral mastectomy.  RCB 3 result.  D.  Post-surgical radiation.  Completed 1-6-20.  E.  Begin CREATE-X. With letrozole.     HPI: Kathy Lei is a 54 yo female with a new diagnosis of an estrogen-receptor positive, OK-positive, HER2-negative breast cancer, grade 2, in the upper outer quadrant of the right breast since the end of year 2018.  She had a diagnostic mammogram which showed bilateral prepectoral saline implants.  On the right breast at 11:30 position, there were grouped coarse heterogeneous calcifications spanning 1.3 cm, new since 2014, adjacent calcifications 11-o'clock position posterior depth.  There was an irregular mass in the lateral right breast 9-o'clock position mid-posterior depth, and an additional mass with obscured margins.  No significant changes in the left breast.  Right breast ultrasound was performed.  In the area of the palpable lump in the right breast, 11-o'clock position, 6 cm from the nipple, there is an irregular hypoechoic mass with indistinct margins measuring approximately 1.0 x 0.9 x 1.6 cm in size.  Immediately adjacent to the mass, 11:30 position, are microcalcifications.  In the second area of palpable lump right breast, 9:30 position, 5 cm from the nipple, there was an irregular hypoechoic mass measuring 3.2 x 0.5 x 1.3 cm in size felt to account for the mammographic findings.  There were multiple additional round hypoechoic masses similar to the findings at 9:30 position seen incidentally during real time and not directly palpable.  For example, in the right breast at 8-o'clock position, 4 cm from the nipple, there  was a mass measuring 0.8 x 0.6 x 0.6 cm, BI-RADS 4.       The pathology showed part A, breast needle biopsy 11 o'clock, 6 cm from the nipple, invasive mammary carcinoma, no special type, ductal (and mucinous) Donna grade 2.  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  Calcifications were associated with the DCIS.  There was a focus suspicious for lymphovascular invasion.  Invasive carcinoma was estrogen receptor positive and progesterone receptor negative by immunohistochemistry.  In part B, breast right, 8 o'clock, 4 cm from the nipple, ultrasound-guided core biopsy, invasive mammary carcinoma, no special type, ductal (and mucinous) Sullivan grade 2, occasional calcifications were noted.  Invasive carcinoma was estrogen receptor positive and progesterone receptor negative by immunohistochemistry.  On quantitation of the ER and MD, ER was positive 99% of the cells staining with strong intensity.  MD was subsequently noted to be positive with 15% of the cells staining with moderate intensity.       There was also a HER2 FISH performed, and the HER2 FISH showed average number of HER2 signals per nucleus 2.6.  Average number of CEN17 signals 1.7 with a ratio of 1.6, VASILIY negative for biopsy A.  For biopsy B, the HER2 signals per nucleus 2.9.  Average number CEN17 signals per nucleus 1.7 with a ratio of 1.7, VASILIY negative.  Overall, by 2018 ASCO/CAP guidelines, this tumor is HER2 negative.     She was enrolled in I-SPY2 trial to Durvulumab, Taxol and Olaparib arm. She completed 12 cycles of weekly Taxol. She also completed 4 cycles  of adriamycin and cyclophosphamide (AC).    RCB3 residual cancer burden of her resected right breast cancer.  The tumor measured at least 10.2 cm in curvilinear shape after neoadjuvant chemotherapy.  The tumor was invasive mucinous carcinoma, Donna grade 2.  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  " Extensive angiolymphatic invasion was present.  Margins were negative for carcinoma.  Invasive carcinoma was 1.5 mm from the anterior superior margin and 6 mm from the anterior inferior margin.  There was benign skin, nipple, and skeletal muscle.  Focal deep dermal lymphovascular invasion was present.  Calcifications were present in the DCIS.  Pathologic stage was [id number] sn.  Overall, the [facility] residual cancer burden was calculated as 3.425 class RCB3.     Mily completed XRT to the R chest wall, LN and SCV in January 2020.   She then started on letrozole and Xeloda.      INTERVAL HISTORY  Mily Lei returns to clinic for follow up. She is having arthralgias on the letrozole.  This started shortly after starting the letrozole.  She is a  and is on her feet all day on the days she works (part time).  This is fairly moderate in intensity for her and making her more tired at the end of the day.  She is not exercising or taking any analgesics.  She seems to be doing well on the Xeloda- no HFS, she is using Bag Balm on her hands twice a day.  No diarrhea to report.  Occasional zofran needed for nausea.  She has a great attitude and is trying to stay extremely positive about the joint pains.  She wonders about PEA to use for pain.        PHYSICAL EXAMINATION:   VITAL SIGNS:/83   Pulse 99   Temp 98.5  F (36.9  C) (Oral)   Resp 16   Ht 1.675 m (5' 5.95\")   Wt 60.8 kg (134 lb)   LMP 11/02/2014   SpO2 97%   BMI 21.66 kg/m      Wt Readings from Last 4 Encounters:   03/04/20 61.9 kg (136 lb 8 oz)   02/25/20 60.8 kg (134 lb)   02/04/20 62.2 kg (137 lb 3.2 oz)   01/14/20 62.6 kg (138 lb 0.1 oz)       GENERAL:  Mily appeared generally well.  She has no alopecia.   HEENT:  Oropharynx is without lesions.   LYMPH:  There is no palpable cervical, supraclavicular, subclavicular or axillary lymphadenopathy.   LUNGS:  Clear to percussion and auscultation.   HEART:  Regular rate and rhythm, S1, S2. "   ABDOMEN:  Soft and nontender without hepatosplenomegaly.   EXTREMITIES:  Without edema.  She has grade 1 pinkness of her hands and feet, but no other evidence of hand-foot syndrome.   BREASTS:  Exam was not performed today.      LABORATORY DATA:       1/14/2020 08:08 2/4/2020 17:06 2/25/2020 09:49   WBC 3.9 (L) 4.5 3.2 (L)   Hemoglobin 12.6 13.5 13.9   Hematocrit 38.4 40.4 40.3   Platelet Count 214 217 222   RBC Count 4.34 4.53 4.58   MCV 89 89 88   MCH 29.0 29.8 30.3   MCHC 32.8 33.4 34.5   RDW 14.1 14.6 14.7   Diff Method Automated Method Automated Method Automated Method   % Neutrophils 65.5 54.4 51.6   % Lymphocytes 13.4 21.4 26.3   % Monocytes 9.0 10.1 10.4   % Eosinophils 10.8 13.7 10.8   % Basophils 1.0 0.4 0.6   % Immature Granulocytes 0.3 0.0 0.3   Nucleated RBCs 0 0 0   Absolute Neutrophil 2.5 2.5 1.6      2/25/2020 09:49   Sodium 138   Potassium 3.6   Chloride 106   Carbon Dioxide 25   Urea Nitrogen 7   Creatinine 0.60   GFR Estimate >90   GFR Estimate If Black >90   Calcium 9.0   Anion Gap 7   Albumin 3.8   Protein Total 7.0   Bilirubin Total 0.4   Alkaline Phosphatase 67   ALT 29   AST 26     ASSESSMENT AND PLAN:     1Marilu Lei is a 54-year-old woman who presented with a locally advanced right breast cancer.  This cancer developed in the context of previous breast augmentation.  On presentation, she had multiple masses occupying an area of approximately 9 cm in the upper outer quadrant with some extension to the subareolar region.  She did not have any clinically apparent lymph node involvement.  She was treated with neoadjuvant chemotherapy on the I-SPY 2 trial and was randomized to paclitaxel, olaparib and durvalumab followed by dose-dense AC.  She tolerated the treatment reasonably well but had an RCB3 result with final stage [id number].  She started on the CREATE-X trial with a plan of having adjuvant capecitabine for 24 weeks combined with an aromatase inhibitor, which will be continued  for 10 years.      In regards to the Xeloda- I think Mily has been doing really well.  No HFS or diarrhea.  Occasional nausea, controlled with zofran.  Eating well and keeping her weight stable.   In regards to the letrozole- she is having fairly moderate arthralgias.  I encouraged daily exercise 5 x a week, scheduled tylenol twice a day and once a day Ibuprofen to try and keep the pains tolerable.  If this is not sufficient, we may need to take a break and try a different AI.  She can try glucosamine, but I would avoid the PEA at this time.     2. TSH: mild TSH elevation today, however Ft4 WNL today, will check again next cycle.     Caryn Phelps PA-C

## 2020-02-25 NOTE — LETTER
2/25/2020       RE: Kathy Lei  [address] [place] [address]     Dear Colleague,    Thank you for referring your patient, Kathy Lei, to the [facility] CANCER [facility] at [facility]. Please see a copy of my visit note below.    [facility] CANCER CLINIC  FOLLOW-UP VISIT NOTE  Date of visit: Feb 25, 2020        REASON FOR VISIT: follow up breast cancer       TREATMENT HISTORY:  A.  Neoadjuvant durvalumab, oliparib, paclitaxel on I SPY 2  B  Neoadjuvant ddAC.  C.  Bilateral mastectomy.  RCB 3 result.  D.  Post-surgical radiation.  Completed 1-6-20.  E.  Begin CREATE-X. With letrozole.     HPI: Kathy Lei is a 54 yo female with a new diagnosis of an estrogen-receptor positive, MT-positive, HER2-negative breast cancer, grade 2, in the upper outer quadrant of the right breast since the end of year 2018.  She had a diagnostic mammogram which showed bilateral prepectoral saline implants.  On the right breast at 11:30 position, there were grouped coarse heterogeneous calcifications spanning 1.3 cm, new since 2014, adjacent calcifications 11-o'clock position posterior depth.  There was an irregular mass in the lateral right breast 9-o'clock position mid-posterior depth, and an additional mass with obscured margins.  No significant changes in the left breast.  Right breast ultrasound was performed.  In the area of the palpable lump in the right breast, 11-o'clock position, 6 cm from the nipple, there is an irregular hypoechoic mass with indistinct margins measuring approximately 1.0 x 0.9 x 1.6 cm in size.  Immediately adjacent to the mass, 11:30 position, are microcalcifications.  In the second area of palpable lump right breast, 9:30 position, 5 cm from the nipple, there was an irregular hypoechoic mass measuring 3.2 x 0.5 x 1.3 cm in size felt to account for the mammographic findings.  There were multiple additional  round hypoechoic masses similar to the findings at 9:30 position seen incidentally during real time and not directly palpable.  For example, in the right breast at 8-o'clock position, 4 cm from the nipple, there was a mass measuring 0.8 x 0.6 x 0.6 cm, BI-RADS 4.       The pathology showed part A, breast needle biopsy 11 o'clock, 6 cm from the nipple, invasive mammary carcinoma, no special type, ductal (and mucinous) [place] grade 2.  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  Calcifications were associated with the DCIS.  There was a focus suspicious for lymphovascular invasion.  Invasive carcinoma was estrogen receptor positive and progesterone receptor negative by immunohistochemistry.  In part B, breast right, 8 o'clock, 4 cm from the nipple, ultrasound-guided core biopsy, invasive mammary carcinoma, no special type, ductal (and mucinous) [place] grade 2, occasional calcifications were noted.  Invasive carcinoma was estrogen receptor positive and progesterone receptor negative by immunohistochemistry.  On quantitation of the ER and NV, ER was positive 99% of the cells staining with strong intensity.  NV was subsequently noted to be positive with 15% of the cells staining with moderate intensity.       There was also a HER2 FISH performed, and the HER2 FISH showed average number of HER2 signals per nucleus 2.6.  Average number of CEN17 signals 1.7 with a ratio of 1.6, VASILIY negative for biopsy A.  For biopsy B, the HER2 signals per nucleus 2.9.  Average number CEN17 signals per nucleus 1.7 with a ratio of 1.7, VASILIY negative.  Overall, by 2018 ASCO/CAP guidelines, this tumor is HER2 negative.     She was enrolled in I-SPY2 trial to Durvulumab, Taxol and Olaparib arm. She completed 12 cycles of weekly Taxol. She also completed 4 cycles  of adriamycin and cyclophosphamide (AC).    RCB3 residual cancer burden of her resected right breast cancer.  The tumor measured at  "least 10.2 cm in curvilinear shape after neoadjuvant chemotherapy.  The tumor was invasive mucinous carcinoma, Donna grade 2.  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  Extensive angiolymphatic invasion was present.  Margins were negative for carcinoma.  Invasive carcinoma was 1.5 mm from the anterior superior margin and 6 mm from the anterior inferior margin.  There was benign skin, nipple, and skeletal muscle.  Focal deep dermal lymphovascular invasion was present.  Calcifications were present in the DCIS.  Pathologic stage was [id number] sn.  Overall, the [facility] residual cancer burden was calculated as 3.425 class RCB3.     Mily completed XRT to the R chest wall, LN and SCV in January 2020.   She then started on letrozole and Xeloda.      INTERVAL HISTORY  Mily Lei returns to clinic for follow up. She is having arthralgias on the letrozole.  This started shortly after starting the letrozole.  She is a  and is on her feet all day on the days she works (part time).  This is fairly moderate in intensity for her and making her more tired at the end of the day.  She is not exercising or taking any analgesics.  She seems to be doing well on the Xeloda- no HFS, she is using Bag Balm on her hands twice a day.  No diarrhea to report.  Occasional zofran needed for nausea.  She has a great attitude and is trying to stay extremely positive about the joint pains.  She wonders about PEA to use for pain.        PHYSICAL EXAMINATION:   VITAL SIGNS:/83   Pulse 99   Temp 98.5  F (36.9  C) (Oral)   Resp 16   Ht 1.675 m (5' 5.95\")   Wt 60.8 kg (134 lb)   LMP 11/02/2014   SpO2 97%   BMI 21.66 kg/m      Wt Readings from Last 4 Encounters:   03/04/20 61.9 kg (136 lb 8 oz)   02/25/20 60.8 kg (134 lb)   02/04/20 62.2 kg (137 lb 3.2 oz)   01/14/20 62.6 kg (138 lb 0.1 oz)       GENERAL:  Mily appeared generally well.  She has no alopecia.   HEENT:  Oropharynx " is without lesions.   LYMPH:  There is no palpable cervical, supraclavicular, subclavicular or axillary lymphadenopathy.   LUNGS:  Clear to percussion and auscultation.   HEART:  Regular rate and rhythm, S1, S2.   ABDOMEN:  Soft and nontender without hepatosplenomegaly.   EXTREMITIES:  Without edema.  She has grade 1 pinkness of her hands and feet, but no other evidence of hand-foot syndrome.   BREASTS:  Exam was not performed today.      LABORATORY DATA:       1/14/2020 08:08 2/4/2020 17:06 2/25/2020 09:49   WBC 3.9 (L) 4.5 3.2 (L)   Hemoglobin 12.6 13.5 13.9   Hematocrit 38.4 40.4 40.3   Platelet Count 214 217 222   RBC Count 4.34 4.53 4.58   MCV 89 89 88   MCH 29.0 29.8 30.3   MCHC 32.8 33.4 34.5   RDW 14.1 14.6 14.7   Diff Method Automated Method Automated Method Automated Method   % Neutrophils 65.5 54.4 51.6   % Lymphocytes 13.4 21.4 26.3   % Monocytes 9.0 10.1 10.4   % Eosinophils 10.8 13.7 10.8   % Basophils 1.0 0.4 0.6   % Immature Granulocytes 0.3 0.0 0.3   Nucleated RBCs 0 0 0   Absolute Neutrophil 2.5 2.5 1.6      2/25/2020 09:49   Sodium 138   Potassium 3.6   Chloride 106   Carbon Dioxide 25   Urea Nitrogen 7   Creatinine 0.60   GFR Estimate >90   GFR Estimate If Black >90   Calcium 9.0   Anion Gap 7   Albumin 3.8   Protein Total 7.0   Bilirubin Total 0.4   Alkaline Phosphatase 67   ALT 29   AST 26     ASSESSMENT AND PLAN:     1.  Mily Lei is a 54-year-old woman who presented with a locally advanced right breast cancer.  This cancer developed in the context of previous breast augmentation.  On presentation, she had multiple masses occupying an area of approximately 9 cm in the upper outer quadrant with some extension to the subareolar region.  She did not have any clinically apparent lymph node involvement.  She was treated with neoadjuvant chemotherapy on the I-SPY 2 trial and was randomized to paclitaxel, olaparib and durvalumab followed by dose-dense AC.  She tolerated the treatment reasonably  well but had an RCB3 result with final stage [id number].  She started on the CREATE-X trial with a plan of having adjuvant capecitabine for 24 weeks combined with an aromatase inhibitor, which will be continued for 10 years.      In regards to the Xeloda- I think Mily has been doing really well.  No HFS or diarrhea.  Occasional nausea, controlled with zofran.  Eating well and keeping her weight stable.   In regards to the letrozole- she is having fairly moderate arthralgias.  I encouraged daily exercise 5 x a week, scheduled tylenol twice a day and once a day Ibuprofen to try and keep the pains tolerable.  If this is not sufficient, we may need to take a break and try a different AI.  She can try glucosamine, but I would avoid the PEA at this time.     2. TSH: mild TSH elevation today, however Ft4 WNL today, will check again next cycle.       Again, thank you for allowing me to participate in the care of your patient.      Sincerely,    Crystal Phelps PA-C

## 2020-03-02 NOTE — PROGRESS NOTES
Department of Therapeutic Radiology--Radiation Oncology                   [place] [address]  [address] [place]  [address]  Office:  [phone]  Fax:  [phone]   Radiation Oncology Clinic  [address] [place] [address]  Phone:  [phone]  Fax:  [phone]     RE: Kathy Lei : 1965   MRN: [id number] SHAINA: 3/4/2020     OUTPATIENT VISIT NOTE       DIAGNOSIS: Infiltrating ductal carcinoma of the right breast, cT3N0, status post neoadjuvant chemotherapy, status post bilateral mastectomy and right SLN biopsy, yp T3N1 (sn), ER+/IL+/Her2-     AREAS TREATED: Right chest wall and regional lymphatics    DOSE:  5040 cGy in 28 fractions followed by boost of 1000 cGy to the mastectomy scar.     TYPES OF RADIATION GIVEN: 3D conformal radiotherapy      INTERVAL SINCE COMPLETION OF RADIATION THERAPY:  2 months (she completed treatment on 2019)    SUBJECTIVE: Ms. Lei is a 54-year-old female with locally advanced right breast cancer. Around 2018, she developed a sharp pain in the upper outer quadrant of her right breast.  Diagnostic mammogram and ultrasound on 2019 revealed multiple masses in the right breast with the largest mass at 9:30 o'clock position 5 cm from the nipple measuring 3.2 cm and second largest mass at 11 o'clock position 6 cm from the nipple measuring 1.6 cm.  A contrast-enhanced mammogram on 2019 a large area of mass and non-mass-like enhancement measuring 7.2 cm in greatest dimension.  Biopsy from the 2 dominant masses revealed invasive ductal carcinoma with a minor mucinous component, Donna grade 2, with a focus suspicious for lymphovascular space invasion.  Tumor was ER positive, IL positive and HER-2/johan nonamplified by FISH.  There was also DCIS, nuclear grade 3, solid and cribriform types with comedonecrosis.  Further evaluation with MRI of the breast on  revealed the enhancement to measure up to 8.9 cm in  greatest dimension with contiguous or superficial involvement of the posterior areola.  There was no suspicious axillary   or internal mammary chain adenopathy.  CT of the chest, abdomen and pelvis on 02/05 did not reveal evidence of distant metastases.       Ms. Lei enrolled in the I-SPY 2 trial and received weekly Taxol along with durvalumab and olaparib.  This was followed by dose-dense AC x4 cycles. On 08/15, she underwent bilateral skin-sparing mastectomy, right axillary sentinel lymph node mapping and biopsy, removal of breast implants and placement of tissue expanders.  Pathology from the mastectomy specimen revealed residual invasive mucinous carcinoma, [place] grade 2, spanning at least 10.2 cm in   linear extent with treatment response noted.  There was extensive angiolymphatic invasion and focal deep dermal lymphovascular space invasion. Closest margin was 1.5 mm from the anterior superior margin.  There was focal DCIS, high nuclear grade, solid type, with widely negative margins.  Two sentinel lymph nodes were removed.  One contained 1 mm micrometastasis with no extracapsular extension.  The second sentinel lymph node had an isolated tumor cell.  There was no definite treatment response noted in the lymph nodes.  Final pathologic stage [id number] (sentinel), RCB class III.      Ms. Lei was then referred to us for adjuvant radiation therapy.  Unfortunately she developed cellulitis, necessitating the removal of expanders.  This has resulted in some delay of her treatment but she ultimately she started treatment on 11/14.  She developed significant skin reaction, requiring application of Silvadene cream.  She otherwise tolerated the treatment well.       In the interim, she saw Dr. Guzman.  Given the significant residual disease, Dr. Guzman recommended 24 weeks of Xeloda, which she started Mid January.  She also started endocrine therapy with Letrozole.  She tolerates these well.  She also met  with Dr. Ramos to discuss reconstruction plan.  Autologous reconstruction was recommended.  But this will await till she completes Xeloda.               Ms. Valente is here today for a routine follow-up visit.  She is now 2 months after treatment completion. 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 Her skin has recovered well.  She stats that redness has subsided and she does not have any peeling.  She has good range of motion and reports no difficulty carrying out her work.  She notes no signs of swelling in her arm.        OBJECTIVE:   /81   Pulse 93   Wt 61.9 kg (136 lb 8 oz)   LMP 11/02/2014   SpO2 95%   BMI 22.07 kg/m     Gen: Appears well, hair growing out.  Breasts: Breasts absent, c/w bilateral mastectomy; Slight hyperpigmentation of the right chest wall, but without desquamation.  Extremity: excellent range of motion in the right shoulder.  No evidence of RUE edema.     ASSESSMENT AND PLAN: In summary, Ms. Lei is a 55 yo female with a locoregionally advanced right breast cancer.  She is status post neoadjuvant chemotherapy, bilateral mastectomy and right SLN Bx with finding of residual breast and cristina disease.  She is 2 months status post adjuvant radiation therapy.  She has recovered well from the acute treatment toxicity.  Her skin has healed nicely.      We discussed potential late effects of lymphedema, given that she received comprehensive cristina irradiation. I will refer her to lymphedema therapy.      I have asked Ms. Lei to come back for another visit in 6 months.         Rut Padilla M.D./Ph.D.  Radiation Oncologist   Department of Therapeutic Radiology   [facility]  Phone: [phone]

## 2020-03-04 ENCOUNTER — OFFICE VISIT (OUTPATIENT)
Dept: RADIATION ONCOLOGY | Facility: CLINIC | Age: 55
End: 2020-03-04
Attending: RADIOLOGY
Payer: COMMERCIAL

## 2020-03-04 VITALS
OXYGEN SATURATION: 95 % | HEART RATE: 93 BPM | BODY MASS INDEX: 22.07 KG/M2 | SYSTOLIC BLOOD PRESSURE: 134 MMHG | DIASTOLIC BLOOD PRESSURE: 81 MMHG | WEIGHT: 136.5 LBS

## 2020-03-04 DIAGNOSIS — Z17.0 MALIGNANT NEOPLASM OF UPPER-OUTER QUADRANT OF RIGHT BREAST IN FEMALE, ESTROGEN RECEPTOR POSITIVE (H): Primary | ICD-10-CM

## 2020-03-04 DIAGNOSIS — C50.411 MALIGNANT NEOPLASM OF UPPER-OUTER QUADRANT OF RIGHT BREAST IN FEMALE, ESTROGEN RECEPTOR POSITIVE (H): Primary | ICD-10-CM

## 2020-03-04 PROCEDURE — G0463 HOSPITAL OUTPT CLINIC VISIT: HCPCS | Performed by: RADIOLOGY

## 2020-03-04 NOTE — LETTER
3/4/2020       RE: Kathy Lei   [address] [place] [address]     Dear Colleague,    Thank you for referring your patient, Kathy Lei, to the RADIATION ONCOLOGY CLINIC. Please see a copy of my visit note below.    Department of Therapeutic Radiology--Radiation Oncology                   [facility] [address]  [address] [place]  [address]  Office:  [phone]  Fax:  [phone]   Radiation Oncology Clinic  [address] [place] [address]  Phone:  [phone]  Fax:  [phone]     RE: Kathy Lei : 1965   MRN: [id number] SHANIA: 3/4/2020     OUTPATIENT VISIT NOTE       DIAGNOSIS: Infiltrating ductal carcinoma of the right breast, cT3N0, status post neoadjuvant chemotherapy, status post bilateral mastectomy and right SLN biopsy, yp T3N1 (sn), ER+/TX+/Her2-     AREAS TREATED: Right chest wall and regional lymphatics    DOSE:  5040 cGy in 28 fractions followed by boost of 1000 cGy to the mastectomy scar.     TYPES OF RADIATION GIVEN: 3D conformal radiotherapy      INTERVAL SINCE COMPLETION OF RADIATION THERAPY:  2 months (she completed treatment on 2019)    SUBJECTIVE: Ms. Lei is a 54-year-old female with locally advanced right breast cancer. Around 2018, she developed a sharp pain in the upper outer quadrant of her right breast.  Diagnostic mammogram and ultrasound on 2019 revealed multiple masses in the right breast with the largest mass at 9:30 o'clock position 5 cm from the nipple measuring 3.2 cm and second largest mass at 11 o'clock position 6 cm from the nipple measuring 1.6 cm.  A contrast-enhanced mammogram on 2019 a large area of mass and non-mass-like enhancement measuring 7.2 cm in greatest dimension.  Biopsy from the 2 dominant masses revealed invasive ductal carcinoma with a minor mucinous component, [place] grade 2, with a focus suspicious for lymphovascular space invasion.  Tumor was ER  positive, DC positive and HER-2/johan nonamplified by FISH.  There was also DCIS, nuclear grade 3, solid and cribriform types with comedonecrosis.  Further evaluation with MRI of the breast on 02/04 revealed the enhancement to measure up to 8.9 cm in greatest dimension with contiguous or superficial involvement of the posterior areola.  There was no suspicious axillary   or internal mammary chain adenopathy.  CT of the chest, abdomen and pelvis on 02/05 did not reveal evidence of distant metastases.       Ms. Lei enrolled in the I-SPY 2 trial and received weekly Taxol along with durvalumab and olaparib.  This was followed by dose-dense AC x4 cycles. On 08/15, she underwent bilateral skin-sparing mastectomy, right axillary sentinel lymph node mapping and biopsy, removal of breast implants and placement of tissue expanders.  Pathology from the mastectomy specimen revealed residual invasive mucinous carcinoma, [place] grade 2, spanning at least 10.2 cm in   linear extent with treatment response noted.  There was extensive angiolymphatic invasion and focal deep dermal lymphovascular space invasion. Closest margin was 1.5 mm from the anterior superior margin.  There was focal DCIS, high nuclear grade, solid type, with widely negative margins.  Two sentinel lymph nodes were removed.  One contained 1 mm micrometastasis with no extracapsular extension.  The second sentinel lymph node had an isolated tumor cell.  There was no definite treatment response noted in the lymph nodes.  Final pathologic stage [id number] (sentinel), RCB class III.      Ms. Lei was then referred to us for adjuvant radiation therapy.  Unfortunately she developed cellulitis, necessitating the removal of expanders.  This has resulted in some delay of her treatment but she ultimately she started treatment on 11/14.  She developed significant skin reaction, requiring application of Silvadene cream.  She otherwise tolerated the treatment well.        In the interim, she saw Dr. Guzman.  Given the significant residual disease, Dr. Guzman recommended 24 weeks of Xeloda, which she started Mid January.  She also started endocrine therapy with Letrozole.  She tolerates these well.  She also met with Dr. aRmos to discuss reconstruction plan.  Autologous reconstruction was recommended.  But this will await till she completes Xeloda.               Ms. Valente is here today for a routine follow-up visit.  She is now 2 months after treatment completion. 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 Her skin has recovered well.  She stats that redness has subsided and she does not have any peeling.  She has good range of motion and reports no difficulty carrying out her work.  She notes no signs of swelling in her arm.        OBJECTIVE:   /81   Pulse 93   Wt 61.9 kg (136 lb 8 oz)   LMP 11/02/2014   SpO2 95%   BMI 22.07 kg/m     Gen: Appears well, hair growing out.  Breasts: Breasts absent, c/w bilateral mastectomy; Slight hyperpigmentation of the right chest wall, but without desquamation.  Extremity: excellent range of motion in the right shoulder.  No evidence of RUE edema.     ASSESSMENT AND PLAN: In summary, Ms. Lei is a 55 yo female with a locoregionally advanced right breast cancer.  She is status post neoadjuvant chemotherapy, bilateral mastectomy and right SLN Bx with finding of residual breast and cristina disease.  She is 2 months status post adjuvant radiation therapy.  She has recovered well from the acute treatment toxicity.  Her skin has healed nicely.      We discussed potential late effects of lymphedema, given that she received comprehensive cristina irradiation. I will refer her to lymphedema therapy.      I have asked Ms. Lei to come back for another visit in 6 months.         Rut Padilla M.D./Ph.D.  Radiation Oncologist   Department of Therapeutic Radiology   [facility]  Phone: [phone]

## 2020-03-04 NOTE — NURSING NOTE
FOLLOW-UP VISIT    Patient Name: Kathy Lei      : 1965     Age: 54 year old        ______________________________________________________________________________     Chief Complaint   Patient presents with     Cancer     Radiation follow up     /81   Pulse 93   Wt 61.9 kg (136 lb 8 oz)   LMP 2014   SpO2 95%   BMI 22.07 kg/m       Date Radiation Completed: Rt breast     Pain  Denies    Labs  Other Labs: No    Imaging  None    Other Appointments: No    MD Name:  Appointment Date:    MD Name: Appointment Date:   MD Name: Appointment Date:   Other Appointment Notes:     Residual Radiation side effect: Pt feels she has healed well from radiation.     Additional Instructions:     Nurse face-to-face time: Level 3:  10 min face to face time

## 2020-03-08 NOTE — PROGRESS NOTES
[facility] CANCER CLINIC  FOLLOW-UP VISIT NOTE  Date of visit: Feb 25, 2020        REASON FOR VISIT: follow up breast cancer       TREATMENT HISTORY:  A.  Neoadjuvant durvalumab, oliparib, paclitaxel on I SPY 2  B  Neoadjuvant ddAC.  C.  Bilateral mastectomy.  RCB 3 result.  D.  Post-surgical radiation.  Completed 1-6-20.  E.  Begin CREATE-X. With letrozole.     HPI: Kathy Lei is a 52 yo female with a new diagnosis of an estrogen-receptor positive, MD-positive, HER2-negative breast cancer, grade 2, in the upper outer quadrant of the right breast since the end of year 2018.  She had a diagnostic mammogram which showed bilateral prepectoral saline implants.  On the right breast at 11:30 position, there were grouped coarse heterogeneous calcifications spanning 1.3 cm, new since 2014, adjacent calcifications 11-o'clock position posterior depth.  There was an irregular mass in the lateral right breast 9-o'clock position mid-posterior depth, and an additional mass with obscured margins.  No significant changes in the left breast.  Right breast ultrasound was performed.  In the area of the palpable lump in the right breast, 11-o'clock position, 6 cm from the nipple, there is an irregular hypoechoic mass with indistinct margins measuring approximately 1.0 x 0.9 x 1.6 cm in size.  Immediately adjacent to the mass, 11:30 position, are microcalcifications.  In the second area of palpable lump right breast, 9:30 position, 5 cm from the nipple, there was an irregular hypoechoic mass measuring 3.2 x 0.5 x 1.3 cm in size felt to account for the mammographic findings.  There were multiple additional round hypoechoic masses similar to the findings at 9:30 position seen incidentally during real time and not directly palpable.  For example, in the right breast at 8-o'clock position, 4 cm from the nipple, there was a mass measuring 0.8 x 0.6 x 0.6 cm, BI-RADS 4.       The pathology showed part A, breast  needle biopsy 11 o'clock, 6 cm from the nipple, invasive mammary carcinoma, no special type, ductal (and mucinous) [place] grade 2.  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  Calcifications were associated with the DCIS.  There was a focus suspicious for lymphovascular invasion.  Invasive carcinoma was estrogen receptor positive and progesterone receptor negative by immunohistochemistry.  In part B, breast right, 8 o'clock, 4 cm from the nipple, ultrasound-guided core biopsy, invasive mammary carcinoma, no special type, ductal (and mucinous) [place] grade 2, occasional calcifications were noted.  Invasive carcinoma was estrogen receptor positive and progesterone receptor negative by immunohistochemistry.  On quantitation of the ER and MD, ER was positive 99% of the cells staining with strong intensity.  MD was subsequently noted to be positive with 15% of the cells staining with moderate intensity.       There was also a HER2 FISH performed, and the HER2 FISH showed average number of HER2 signals per nucleus 2.6.  Average number of CEN17 signals 1.7 with a ratio of 1.6, VASILIY negative for biopsy A.  For biopsy B, the HER2 signals per nucleus 2.9.  Average number CEN17 signals per nucleus 1.7 with a ratio of 1.7, VASILIY negative.  Overall, by 2018 ASCO/CAP guidelines, this tumor is HER2 negative.     She was enrolled in I-SPY2 trial to Durvulumab, Taxol and Olaparib arm. She completed 12 cycles of weekly Taxol. She also completed 4 cycles  of adriamycin and cyclophosphamide (AC).    RCB3 residual cancer burden of her resected right breast cancer.  The tumor measured at least 10.2 cm in curvilinear shape after neoadjuvant chemotherapy.  The tumor was invasive mucinous carcinoma, [place] grade 2.  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  Extensive angiolymphatic invasion was present.  Margins were negative for carcinoma.  Invasive  "carcinoma was 1.5 mm from the anterior superior margin and 6 mm from the anterior inferior margin.  There was benign skin, nipple, and skeletal muscle.  Focal deep dermal lymphovascular invasion was present.  Calcifications were present in the DCIS.  Pathologic stage was [id number] sn.  Overall, the [facility] residual cancer burden was calculated as 3.425 class RCB3.     Mily completed XRT to the R chest wall, LN and SCV in January 2020.   She then started on letrozole and Xeloda.      INTERVAL HISTORY  Mily Lei returns to clinic for follow up. She is having arthralgias on the letrozole.  This started shortly after starting the letrozole.  She is a  and is on her feet all day on the days she works (part time).  This is fairly moderate in intensity for her and making her more tired at the end of the day.  She is not exercising or taking any analgesics.  She seems to be doing well on the Xeloda- no HFS, she is using Bag Balm on her hands twice a day.  No diarrhea to report.  Occasional zofran needed for nausea.  She has a great attitude and is trying to stay extremely positive about the joint pains.  She wonders about PEA to use for pain.        PHYSICAL EXAMINATION:   VITAL SIGNS:/83   Pulse 99   Temp 98.5  F (36.9  C) (Oral)   Resp 16   Ht 1.675 m (5' 5.95\")   Wt 60.8 kg (134 lb)   LMP 11/02/2014   SpO2 97%   BMI 21.66 kg/m      Wt Readings from Last 4 Encounters:   03/04/20 61.9 kg (136 lb 8 oz)   02/25/20 60.8 kg (134 lb)   02/04/20 62.2 kg (137 lb 3.2 oz)   01/14/20 62.6 kg (138 lb 0.1 oz)       GENERAL:  Mily appeared generally well.  She has no alopecia.   HEENT:  Oropharynx is without lesions.   LYMPH:  There is no palpable cervical, supraclavicular, subclavicular or axillary lymphadenopathy.   LUNGS:  Clear to percussion and auscultation.   HEART:  Regular rate and rhythm, S1, S2.   ABDOMEN:  Soft and nontender without hepatosplenomegaly.   EXTREMITIES:  Without edema.  She " has grade 1 pinkness of her hands and feet, but no other evidence of hand-foot syndrome.   BREASTS:  Exam was not performed today.      LABORATORY DATA:       1/14/2020 08:08 2/4/2020 17:06 2/25/2020 09:49   WBC 3.9 (L) 4.5 3.2 (L)   Hemoglobin 12.6 13.5 13.9   Hematocrit 38.4 40.4 40.3   Platelet Count 214 217 222   RBC Count 4.34 4.53 4.58   MCV 89 89 88   MCH 29.0 29.8 30.3   MCHC 32.8 33.4 34.5   RDW 14.1 14.6 14.7   Diff Method Automated Method Automated Method Automated Method   % Neutrophils 65.5 54.4 51.6   % Lymphocytes 13.4 21.4 26.3   % Monocytes 9.0 10.1 10.4   % Eosinophils 10.8 13.7 10.8   % Basophils 1.0 0.4 0.6   % Immature Granulocytes 0.3 0.0 0.3   Nucleated RBCs 0 0 0   Absolute Neutrophil 2.5 2.5 1.6      2/25/2020 09:49   Sodium 138   Potassium 3.6   Chloride 106   Carbon Dioxide 25   Urea Nitrogen 7   Creatinine 0.60   GFR Estimate >90   GFR Estimate If Black >90   Calcium 9.0   Anion Gap 7   Albumin 3.8   Protein Total 7.0   Bilirubin Total 0.4   Alkaline Phosphatase 67   ALT 29   AST 26     ASSESSMENT AND PLAN:     1.  Mily Lei is a 54-year-old woman who presented with a locally advanced right breast cancer.  This cancer developed in the context of previous breast augmentation.  On presentation, she had multiple masses occupying an area of approximately 9 cm in the upper outer quadrant with some extension to the subareolar region.  She did not have any clinically apparent lymph node involvement.  She was treated with neoadjuvant chemotherapy on the I-SPY 2 trial and was randomized to paclitaxel, olaparib and durvalumab followed by dose-dense AC.  She tolerated the treatment reasonably well but had an RCB3 result with final stage [id number].  She started on the CREATE-X trial with a plan of having adjuvant capecitabine for 24 weeks combined with an aromatase inhibitor, which will be continued for 10 years.      In regards to the Xeloda- I think Mily has been doing really well.  No HFS or  diarrhea.  Occasional nausea, controlled with zofran.  Eating well and keeping her weight stable.   In regards to the letrozole- she is having fairly moderate arthralgias.  I encouraged daily exercise 5 x a week, scheduled tylenol twice a day and once a day Ibuprofen to try and keep the pains tolerable.  If this is not sufficient, we may need to take a break and try a different AI.  She can try glucosamine, but I would avoid the PEA at this time.     2. TSH: mild TSH elevation today, however Ft4 WNL today, will check again next cycle.     Caryn Phelps PA-C

## 2020-03-17 ENCOUNTER — VIRTUAL VISIT (OUTPATIENT)
Dept: ONCOLOGY | Facility: CLINIC | Age: 55
End: 2020-03-17
Attending: INTERNAL MEDICINE
Payer: COMMERCIAL

## 2020-03-17 VITALS
DIASTOLIC BLOOD PRESSURE: 78 MMHG | SYSTOLIC BLOOD PRESSURE: 117 MMHG | RESPIRATION RATE: 14 BRPM | HEART RATE: 90 BPM | OXYGEN SATURATION: 96 % | TEMPERATURE: 98.1 F

## 2020-03-17 DIAGNOSIS — E03.9 HYPOTHYROIDISM, UNSPECIFIED TYPE: ICD-10-CM

## 2020-03-17 DIAGNOSIS — Z17.0 MALIGNANT NEOPLASM OF UPPER-OUTER QUADRANT OF RIGHT BREAST IN FEMALE, ESTROGEN RECEPTOR POSITIVE (H): Primary | ICD-10-CM

## 2020-03-17 DIAGNOSIS — C50.411 MALIGNANT NEOPLASM OF UPPER-OUTER QUADRANT OF RIGHT BREAST IN FEMALE, ESTROGEN RECEPTOR POSITIVE (H): Primary | ICD-10-CM

## 2020-03-17 DIAGNOSIS — R11.2 CHEMOTHERAPY INDUCED NAUSEA AND VOMITING: ICD-10-CM

## 2020-03-17 DIAGNOSIS — T45.1X5A CHEMOTHERAPY INDUCED NAUSEA AND VOMITING: ICD-10-CM

## 2020-03-17 LAB
ALBUMIN SERPL-MCNC: 3.6 G/DL (ref 3.4–5)
ALP SERPL-CCNC: 65 U/L (ref 40–150)
ALT SERPL W P-5'-P-CCNC: 26 U/L (ref 0–50)
ANION GAP SERPL CALCULATED.3IONS-SCNC: 4 MMOL/L (ref 3–14)
AST SERPL W P-5'-P-CCNC: 23 U/L (ref 0–45)
BASOPHILS # BLD AUTO: 0 10E9/L (ref 0–0.2)
BASOPHILS NFR BLD AUTO: 0.7 %
BILIRUB SERPL-MCNC: 0.3 MG/DL (ref 0.2–1.3)
BUN SERPL-MCNC: 8 MG/DL (ref 7–30)
CALCIUM SERPL-MCNC: 9.1 MG/DL (ref 8.5–10.1)
CHLORIDE SERPL-SCNC: 106 MMOL/L (ref 94–109)
CO2 SERPL-SCNC: 30 MMOL/L (ref 20–32)
CREAT SERPL-MCNC: 0.66 MG/DL (ref 0.52–1.04)
DIFFERENTIAL METHOD BLD: ABNORMAL
EOSINOPHIL # BLD AUTO: 0.3 10E9/L (ref 0–0.7)
EOSINOPHIL NFR BLD AUTO: 8.1 %
ERYTHROCYTE [DISTWIDTH] IN BLOOD BY AUTOMATED COUNT: 15.9 % (ref 10–15)
GFR SERPL CREATININE-BSD FRML MDRD: >90 ML/MIN/{1.73_M2}
GLUCOSE SERPL-MCNC: 96 MG/DL (ref 70–99)
HCT VFR BLD AUTO: 40.4 % (ref 35–47)
HGB BLD-MCNC: 13.5 G/DL (ref 11.7–15.7)
IMM GRANULOCYTES # BLD: 0 10E9/L (ref 0–0.4)
IMM GRANULOCYTES NFR BLD: 0.2 %
LYMPHOCYTES # BLD AUTO: 0.9 10E9/L (ref 0.8–5.3)
LYMPHOCYTES NFR BLD AUTO: 21 %
MCH RBC QN AUTO: 30.8 PG (ref 26.5–33)
MCHC RBC AUTO-ENTMCNC: 33.4 G/DL (ref 31.5–36.5)
MCV RBC AUTO: 92 FL (ref 78–100)
MONOCYTES # BLD AUTO: 0.3 10E9/L (ref 0–1.3)
MONOCYTES NFR BLD AUTO: 8.1 %
NEUTROPHILS # BLD AUTO: 2.6 10E9/L (ref 1.6–8.3)
NEUTROPHILS NFR BLD AUTO: 61.9 %
NRBC # BLD AUTO: 0 10*3/UL
NRBC BLD AUTO-RTO: 0 /100
PLATELET # BLD AUTO: 198 10E9/L (ref 150–450)
POTASSIUM SERPL-SCNC: 3.7 MMOL/L (ref 3.4–5.3)
PROT SERPL-MCNC: 7 G/DL (ref 6.8–8.8)
RBC # BLD AUTO: 4.39 10E12/L (ref 3.8–5.2)
SODIUM SERPL-SCNC: 140 MMOL/L (ref 133–144)
T4 FREE SERPL-MCNC: 0.95 NG/DL (ref 0.76–1.46)
TSH SERPL DL<=0.005 MIU/L-ACNC: 14.91 MU/L (ref 0.4–4)
WBC # BLD AUTO: 4.2 10E9/L (ref 4–11)

## 2020-03-17 PROCEDURE — 36415 COLL VENOUS BLD VENIPUNCTURE: CPT

## 2020-03-17 PROCEDURE — 85025 COMPLETE CBC W/AUTO DIFF WBC: CPT | Performed by: INTERNAL MEDICINE

## 2020-03-17 PROCEDURE — G0463 HOSPITAL OUTPT CLINIC VISIT: HCPCS | Mod: ZF

## 2020-03-17 PROCEDURE — 84443 ASSAY THYROID STIM HORMONE: CPT | Performed by: INTERNAL MEDICINE

## 2020-03-17 PROCEDURE — 99213 OFFICE O/P EST LOW 20 MIN: CPT | Mod: ZP | Performed by: PHYSICIAN ASSISTANT

## 2020-03-17 PROCEDURE — 84439 ASSAY OF FREE THYROXINE: CPT | Performed by: INTERNAL MEDICINE

## 2020-03-17 PROCEDURE — 80053 COMPREHEN METABOLIC PANEL: CPT | Performed by: INTERNAL MEDICINE

## 2020-03-17 RX ORDER — EPINEPHRINE 0.3 MG/.3ML
0.3 INJECTION SUBCUTANEOUS EVERY 5 MIN PRN
Status: CANCELLED | OUTPATIENT
Start: 2020-04-07

## 2020-03-17 RX ORDER — MEPERIDINE HYDROCHLORIDE 25 MG/ML
25 INJECTION INTRAMUSCULAR; INTRAVENOUS; SUBCUTANEOUS EVERY 30 MIN PRN
Status: CANCELLED | OUTPATIENT
Start: 2020-04-07

## 2020-03-17 RX ORDER — LORAZEPAM 2 MG/ML
0.5 INJECTION INTRAMUSCULAR EVERY 4 HOURS PRN
Status: CANCELLED
Start: 2020-04-07

## 2020-03-17 RX ORDER — ALBUTEROL SULFATE 0.83 MG/ML
2.5 SOLUTION RESPIRATORY (INHALATION)
Status: CANCELLED | OUTPATIENT
Start: 2020-04-07

## 2020-03-17 RX ORDER — NALOXONE HYDROCHLORIDE 0.4 MG/ML
.1-.4 INJECTION, SOLUTION INTRAMUSCULAR; INTRAVENOUS; SUBCUTANEOUS
Status: CANCELLED | OUTPATIENT
Start: 2020-04-07

## 2020-03-17 RX ORDER — DIPHENHYDRAMINE HCL 25 MG
50 CAPSULE ORAL ONCE
Status: CANCELLED
Start: 2020-04-07

## 2020-03-17 RX ORDER — METHYLPREDNISOLONE SODIUM SUCCINATE 125 MG/2ML
125 INJECTION, POWDER, LYOPHILIZED, FOR SOLUTION INTRAMUSCULAR; INTRAVENOUS
Status: CANCELLED
Start: 2020-04-07

## 2020-03-17 RX ORDER — ALBUTEROL SULFATE 90 UG/1
1-2 AEROSOL, METERED RESPIRATORY (INHALATION)
Status: CANCELLED
Start: 2020-04-07

## 2020-03-17 RX ORDER — EPINEPHRINE 1 MG/ML
0.3 INJECTION, SOLUTION INTRAMUSCULAR; SUBCUTANEOUS EVERY 5 MIN PRN
Status: CANCELLED | OUTPATIENT
Start: 2020-04-07

## 2020-03-17 RX ORDER — SODIUM CHLORIDE 9 MG/ML
1000 INJECTION, SOLUTION INTRAVENOUS CONTINUOUS PRN
Status: CANCELLED
Start: 2020-04-07

## 2020-03-17 RX ORDER — DIPHENHYDRAMINE HYDROCHLORIDE 50 MG/ML
50 INJECTION INTRAMUSCULAR; INTRAVENOUS
Status: CANCELLED
Start: 2020-04-07

## 2020-03-17 ASSESSMENT — PAIN SCALES - GENERAL: PAINLEVEL: NO PAIN (0)

## 2020-03-17 NOTE — NURSING NOTE
Chief Complaint   Patient presents with     Blood Draw     Labs drawn via  by RN in lab. VS taken.      Labs drawn via venipuncture. Vital signs taken. Checked into next appointment.   Lakeshia Nash RN

## 2020-03-17 NOTE — PROGRESS NOTES
"Oncology Rooming Note    March 17, 2020 8:24 AM   Kathy Lei is a 54 year old female who presents for:    Chief Complaint   Patient presents with     Oncology Clinic Visit     RETURN VISIT; BREAST CANCER      Initial Vitals: LMP 11/02/2014  Estimated body mass index is 22.07 kg/m  as calculated from the following:    Height as of 2/25/20: 1.675 m (5' 5.95\").    Weight as of 3/4/20: 61.9 kg (136 lb 8 oz). There is no height or weight on file to calculate BSA.  Data Unavailable Comment: Data Unavailable   Patient's last menstrual period was 11/02/2014.  Allergies reviewed: Yes  Medications reviewed: Yes    Medications: Medication refills not needed today.  Pharmacy name entered into [facility]:    [id number] - [place], [place] - [address] AT [facility] [address]  [facility] - [place], [place] - [address]    Clinical concerns: No new concerns today  Tena Helton was notified.      Shweta Okeefe              "

## 2020-03-17 NOTE — PROGRESS NOTES
[facility] CANCER CLINIC  FOLLOW-UP VISIT NOTE  Date of visit: Mar 17, 2020        REASON FOR VISIT: follow up breast cancer       TREATMENT HISTORY:  A.  Neoadjuvant durvalumab, oliparib, paclitaxel on I SPY 2  B  Neoadjuvant ddAC.  C.  Bilateral mastectomy.  RCB 3 result.  D.  Post-surgical radiation.  Completed 1-6-20.  E.  Begin CREATE-X. With letrozole.     HPI: Kathy Lei is a 52 yo female with a new diagnosis of an estrogen-receptor positive, [place]-positive, HER2-negative breast cancer, grade 2, in the upper outer quadrant of the right breast since the end of year 2018.  She had a diagnostic mammogram which showed bilateral prepectoral saline implants.  On the right breast at 11:30 position, there were grouped coarse heterogeneous calcifications spanning 1.3 cm, new since 2014, adjacent calcifications 11-o'clock position posterior depth.  There was an irregular mass in the lateral right breast 9-o'clock position mid-posterior depth, and an additional mass with obscured margins.  No significant changes in the left breast.  Right breast ultrasound was performed.  In the area of the palpable lump in the right breast, 11-o'clock position, 6 cm from the nipple, there is an irregular hypoechoic mass with indistinct margins measuring approximately 1.0 x 0.9 x 1.6 cm in size.  Immediately adjacent to the mass, 11:30 position, are microcalcifications.  In the second area of palpable lump right breast, 9:30 position, 5 cm from the nipple, there was an irregular hypoechoic mass measuring 3.2 x 0.5 x 1.3 cm in size felt to account for the mammographic findings.  There were multiple additional round hypoechoic masses similar to the findings at 9:30 position seen incidentally during real time and not directly palpable.  For example, in the right breast at 8-o'clock position, 4 cm from the nipple, there was a mass measuring 0.8 x 0.6 x 0.6 cm, BI-RADS 4.       The pathology showed part A, breast  needle biopsy 11 o'clock, 6 cm from the nipple, invasive mammary carcinoma, no special type, ductal (and mucinous) [place] grade 2.  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  Calcifications were associated with the DCIS.  There was a focus suspicious for lymphovascular invasion.  Invasive carcinoma was estrogen receptor positive and progesterone receptor negative by immunohistochemistry.  In part B, breast right, 8 o'clock, 4 cm from the nipple, ultrasound-guided core biopsy, invasive mammary carcinoma, no special type, ductal (and mucinous) [place] grade 2, occasional calcifications were noted.  Invasive carcinoma was estrogen receptor positive and progesterone receptor negative by immunohistochemistry.  On quantitation of the ER and AK, ER was positive 99% of the cells staining with strong intensity.  AK was subsequently noted to be positive with 15% of the cells staining with moderate intensity.       There was also a HER2 FISH performed, and the HER2 FISH showed average number of HER2 signals per nucleus 2.6.  Average number of CEN17 signals 1.7 with a ratio of 1.6, VASILIY negative for biopsy A.  For biopsy B, the HER2 signals per nucleus 2.9.  Average number CEN17 signals per nucleus 1.7 with a ratio of 1.7, VASILIY negative.  Overall, by 2018 ASCO/CAP guidelines, this tumor is HER2 negative.     She was enrolled in I-SPY2 trial to Durvulumab, Taxol and Olaparib arm. She completed 12 cycles of weekly Taxol. She also completed 4 cycles  of adriamycin and cyclophosphamide (AC).    RCB3 residual cancer burden of her resected right breast cancer.  The tumor measured at least 10.2 cm in curvilinear shape after neoadjuvant chemotherapy.  The tumor was invasive mucinous carcinoma, [place] grade 2.  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  Extensive angiolymphatic invasion was present.  Margins were negative for carcinoma.  Invasive  carcinoma was 1.5 mm from the anterior superior margin and 6 mm from the anterior inferior margin.  There was benign skin, nipple, and skeletal muscle.  Focal deep dermal lymphovascular invasion was present.  Calcifications were present in the DCIS.  Pathologic stage was [id number] sn.  Overall, the [facility] residual cancer burden was calculated as 3.425 class RCB3.     Mily completed XRT to the R chest wall, LN and SCV in January 2020.   She then started on letrozole and Xeloda.      INTERVAL HISTORY  Mily Lei returns to clinic for follow up. This was NOT a virtual visit despte the order type stating that. She was seen in clinic.    Nothing new. Aches are better with use of tylenol and ibuprofen once daily. Has transient nausea on Xeloda, takes Zofran with great relief.    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    No fevers, chills, cough, SOB, chest pain, n/v/d/c, bleeding, swelling, rashes.       PHYSICAL EXAMINATION:   VITAL SIGNS:LMP 11/02/2014     Wt Readings from Last 4 Encounters:   03/04/20 61.9 kg (136 lb 8 oz)   02/25/20 60.8 kg (134 lb)   02/04/20 62.2 kg (137 lb 3.2 oz)   01/14/20 62.6 kg (138 lb 0.1 oz)       GENERAL:  Mily appeared generally well.  She has no alopecia.   ecog 0   I spent >15 minutes with the patient, with over 50% of the time spent counseling or coordinating their care as described above.    LABS    ASSESSMENT AND PLAN:     1.  Mily Lei is a 54-year-old woman who presented with a locally advanced right breast cancer.  This cancer developed in the context of previous breast augmentation.  On presentation, she had multiple masses occupying an area of approximately 9 cm in the upper outer quadrant with some extension to the subareolar region.  She did not have any clinically apparent lymph node involvement.  She was treated with neoadjuvant chemotherapy on the I-SPY 2 trial and was  randomized to paclitaxel, olaparib and durvalumab followed by dose-dense AC.  She tolerated the treatment reasonably well but had an RCB3 result with final stage [id number].  She started on the CREATE-X trial with a plan of having adjuvant capecitabine for 24 weeks combined with an aromatase inhibitor, which will be continued for 10 years.      In regards to the Xeloda- I think Mily has been doing really well.  No HFS or diarrhea.  Occasional nausea, controlled with zofran.  Eating well and keeping her weight stable.   In regards to the letrozole- she is having fairly moderate arthralgias, with signficant improvement with Tylenol/Ibu once daily.    2. TSH: TSH increasingly elevated today (14/91) with normal T4. Still subclinical given asymptomatic, but need to watch closely with repeat check in 3 weeks. She could be at risk for development of hypothyroidism.    3. COVID 19 precautions. She is wearing an N95.Discussed that this was not fitted to her so could have false sense of security. Reviewed general precautions. Her WBC is normal but she is still on active chemotherapy and has underlying asthma, so could be at increased risk of severe disease.    Tena Helton PA-C

## 2020-03-19 DIAGNOSIS — F41.9 ANXIETY: ICD-10-CM

## 2020-03-19 NOTE — TELEPHONE ENCOUNTER
LORAZEPAM 0.5MG TABLETS       Last Written Prescription Date:  2/24/2020  Last Fill Quantity: 30 tablet,   # refills: 0  Last Office Visit: 9/24/2019  Future Office visit:       Routing refill request to provider for review/approval because:  Drug not on the [facility], [facility] or [facility] refill protocol or controlled substance

## 2020-03-20 ENCOUNTER — MYC REFILL (OUTPATIENT)
Dept: FAMILY MEDICINE | Facility: CLINIC | Age: 55
End: 2020-03-20

## 2020-03-20 DIAGNOSIS — F41.9 ANXIETY: ICD-10-CM

## 2020-03-20 RX ORDER — LORAZEPAM 0.5 MG/1
TABLET ORAL
Qty: 30 TABLET | Refills: 0 | Status: SHIPPED | OUTPATIENT
Start: 2020-03-20 | End: 2020-04-22

## 2020-03-20 RX ORDER — LORAZEPAM 0.5 MG/1
TABLET ORAL
Qty: 30 TABLET | Refills: 0 | Status: CANCELLED | OUTPATIENT
Start: 2020-03-20

## 2020-03-23 DIAGNOSIS — E03.9 HYPOTHYROIDISM, UNSPECIFIED TYPE: ICD-10-CM

## 2020-03-23 RX ORDER — LEVOTHYROXINE SODIUM 125 UG/1
125 TABLET ORAL DAILY
Qty: 30 TABLET | Refills: 1 | Status: SHIPPED | OUTPATIENT
Start: 2020-03-23 | End: 2020-05-26

## 2020-04-01 ENCOUNTER — TELEPHONE (OUTPATIENT)
Dept: CARDIOLOGY | Facility: CLINIC | Age: 55
End: 2020-04-01

## 2020-04-07 ENCOUNTER — VIRTUAL VISIT (OUTPATIENT)
Dept: ONCOLOGY | Facility: CLINIC | Age: 55
End: 2020-04-07
Attending: PHYSICIAN ASSISTANT
Payer: COMMERCIAL

## 2020-04-07 ENCOUNTER — APPOINTMENT (OUTPATIENT)
Dept: LAB | Facility: CLINIC | Age: 55
End: 2020-04-07
Attending: PHYSICIAN ASSISTANT
Payer: COMMERCIAL

## 2020-04-07 VITALS
HEART RATE: 96 BPM | TEMPERATURE: 98.1 F | SYSTOLIC BLOOD PRESSURE: 128 MMHG | DIASTOLIC BLOOD PRESSURE: 85 MMHG | RESPIRATION RATE: 18 BRPM | WEIGHT: 139.8 LBS | OXYGEN SATURATION: 95 % | BODY MASS INDEX: 22.6 KG/M2

## 2020-04-07 DIAGNOSIS — C50.411 MALIGNANT NEOPLASM OF UPPER-OUTER QUADRANT OF RIGHT BREAST IN FEMALE, ESTROGEN RECEPTOR POSITIVE (H): ICD-10-CM

## 2020-04-07 DIAGNOSIS — Z17.0 MALIGNANT NEOPLASM OF UPPER-OUTER QUADRANT OF RIGHT BREAST IN FEMALE, ESTROGEN RECEPTOR POSITIVE (H): Primary | ICD-10-CM

## 2020-04-07 DIAGNOSIS — Z17.0 MALIGNANT NEOPLASM OF UPPER-OUTER QUADRANT OF RIGHT BREAST IN FEMALE, ESTROGEN RECEPTOR POSITIVE (H): ICD-10-CM

## 2020-04-07 DIAGNOSIS — C50.411 MALIGNANT NEOPLASM OF UPPER-OUTER QUADRANT OF RIGHT BREAST IN FEMALE, ESTROGEN RECEPTOR POSITIVE (H): Primary | ICD-10-CM

## 2020-04-07 LAB
ALBUMIN SERPL-MCNC: 4 G/DL (ref 3.4–5)
ALP SERPL-CCNC: 61 U/L (ref 40–150)
ALT SERPL W P-5'-P-CCNC: 25 U/L (ref 0–50)
ANION GAP SERPL CALCULATED.3IONS-SCNC: 6 MMOL/L (ref 3–14)
AST SERPL W P-5'-P-CCNC: 19 U/L (ref 0–45)
BASOPHILS # BLD AUTO: 0 10E9/L (ref 0–0.2)
BASOPHILS NFR BLD AUTO: 0.6 %
BILIRUB SERPL-MCNC: 0.5 MG/DL (ref 0.2–1.3)
BUN SERPL-MCNC: 13 MG/DL (ref 7–30)
CALCIUM SERPL-MCNC: 9.3 MG/DL (ref 8.5–10.1)
CHLORIDE SERPL-SCNC: 106 MMOL/L (ref 94–109)
CO2 SERPL-SCNC: 25 MMOL/L (ref 20–32)
CREAT SERPL-MCNC: 0.73 MG/DL (ref 0.52–1.04)
DIFFERENTIAL METHOD BLD: ABNORMAL
EOSINOPHIL # BLD AUTO: 0.2 10E9/L (ref 0–0.7)
EOSINOPHIL NFR BLD AUTO: 3.2 %
ERYTHROCYTE [DISTWIDTH] IN BLOOD BY AUTOMATED COUNT: 16.2 % (ref 10–15)
GFR SERPL CREATININE-BSD FRML MDRD: >90 ML/MIN/{1.73_M2}
GLUCOSE SERPL-MCNC: 93 MG/DL (ref 70–99)
HCT VFR BLD AUTO: 39.2 % (ref 35–47)
HGB BLD-MCNC: 13.3 G/DL (ref 11.7–15.7)
IMM GRANULOCYTES # BLD: 0 10E9/L (ref 0–0.4)
IMM GRANULOCYTES NFR BLD: 0.4 %
LYMPHOCYTES # BLD AUTO: 1.1 10E9/L (ref 0.8–5.3)
LYMPHOCYTES NFR BLD AUTO: 20.3 %
MCH RBC QN AUTO: 31.2 PG (ref 26.5–33)
MCHC RBC AUTO-ENTMCNC: 33.9 G/DL (ref 31.5–36.5)
MCV RBC AUTO: 92 FL (ref 78–100)
MONOCYTES # BLD AUTO: 0.4 10E9/L (ref 0–1.3)
MONOCYTES NFR BLD AUTO: 8 %
NEUTROPHILS # BLD AUTO: 3.6 10E9/L (ref 1.6–8.3)
NEUTROPHILS NFR BLD AUTO: 67.5 %
NRBC # BLD AUTO: 0 10*3/UL
NRBC BLD AUTO-RTO: 0 /100
PLATELET # BLD AUTO: 202 10E9/L (ref 150–450)
POTASSIUM SERPL-SCNC: 3.7 MMOL/L (ref 3.4–5.3)
PROT SERPL-MCNC: 7.4 G/DL (ref 6.8–8.8)
RBC # BLD AUTO: 4.26 10E12/L (ref 3.8–5.2)
SODIUM SERPL-SCNC: 137 MMOL/L (ref 133–144)
WBC # BLD AUTO: 5.4 10E9/L (ref 4–11)

## 2020-04-07 PROCEDURE — 80053 COMPREHEN METABOLIC PANEL: CPT | Mod: TEL | Performed by: INTERNAL MEDICINE

## 2020-04-07 PROCEDURE — 36415 COLL VENOUS BLD VENIPUNCTURE: CPT

## 2020-04-07 PROCEDURE — 85025 COMPLETE CBC W/AUTO DIFF WBC: CPT | Mod: TEL | Performed by: INTERNAL MEDICINE

## 2020-04-07 PROCEDURE — 99213 OFFICE O/P EST LOW 20 MIN: CPT | Mod: TEL | Performed by: PHYSICIAN ASSISTANT

## 2020-04-07 RX ORDER — CAPECITABINE 500 MG/1
1000 TABLET, FILM COATED ORAL 2 TIMES DAILY
Qty: 84 TABLET | Refills: 1 | Status: SHIPPED | OUTPATIENT
Start: 2020-04-07 | End: 2020-06-30

## 2020-04-07 ASSESSMENT — PAIN SCALES - GENERAL: PAINLEVEL: MILD PAIN (3)

## 2020-04-07 NOTE — LETTER
"4/7/2020       RE: Kathy Lei  [address] [place] [address]     Dear Colleague,    Thank you for referring your patient, Kathy Lei, to the [facility]. Please see a copy of my visit note below.    Kathy Lei is a 54 year old female who is being evaluated via a billable telephone visit.      The patient has been notified of following:     \"This telephone visit will be conducted via a call between you and your physician/provider. We have found that certain health care needs can be provided without the need for a physical exam.  This service lets us provide the care you need with a short phone conversation.  If a prescription is necessary we can send it directly to your pharmacy.  If lab work is needed we can place an order for that and you can then stop by our lab to have the test done at a later time.    Telephone visits are billed at different rates depending on your insurance coverage. During this emergency period, for some insurers they may be billed the same as an in-person visit.  Please reach out to your insurance provider with any questions.    If during the course of the call the physician/provider feels a telephone visit is not appropriate, you will not be charged for this service.\"    Patient has given verbal consent for Telephone visit?  Yes    Kathy Lei complains of    Chief Complaint   Patient presents with     Telephone     Malignant neoplasm of upper-outer quadrant of right breast       I have reviewed and updated the patient's  Medication List.    ALLERGIES  Buspar [buspirone] and Remeron [mirtazapine]          FERNIE Mock    Additional provider notes:      TREATMENT HISTORY:  A.  Neoadjuvant durvalumab, oliparib, paclitaxel on I SPY 2  B  Neoadjuvant ddAC.  C.  Bilateral mastectomy.  RCB 3 result.  D.  Post-surgical radiation.  Completed 1-6-20.  E.  Begin CREATE-X 1/17/20. With letrozole.     Interval History: Mily" is feeling well. She is tolerating Xeloda well apart from having mild fatigue and intermittent nausea, well controlled on taking Zofran PRN. She has not had any mucositis or diarrhea. She will have constipation at times and is taking dulcolax or miralax for this as needed. She denies any HFS. She is having joint stiffness and arthralgias from letrozole. She wonders if it would be okay for her to take every other day.     No fevers/chills, cough, SOB, or other infectious concerns. ROS otherwise negative.     Vitals:   Vital Signs 4/7/2020   Systolic 128   Diastolic 85   Pulse 96   Temperature 98.1   Respirations 18   Weight (LB) 139 lb 12.8 oz   Height    BMI (Calculated)    Pain    O2 95       Labs:    4/7/2020 15:53   Sodium 137   Potassium 3.7   Chloride 106   Carbon Dioxide 25   Urea Nitrogen 13   Creatinine 0.73   GFR Estimate >90   GFR Estimate If Black >90   Calcium 9.3   Anion Gap 6   Albumin 4.0   Protein Total 7.4   Bilirubin Total 0.5   Alkaline Phosphatase 61   ALT 25   AST 19   Glucose 93   WBC 5.4   Hemoglobin 13.3   Hematocrit 39.2   Platelet Count 202   RBC Count 4.26   MCV 92   MCH 31.2   MCHC 33.9   RDW 16.2 (H)   Diff Method Automated Method   % Neutrophils 67.5   % Lymphocytes 20.3   % Monocytes 8.0   % Eosinophils 3.2   % Basophils 0.6   % Immature Granulocytes 0.4   Nucleated RBCs 0   Absolute Neutrophil 3.6   Absolute Lymphocytes 1.1   Absolute Monocytes 0.4   Absolute Eosinophils 0.2   Absolute Basophils 0.0   Abs Immature Granulocytes 0.0   Absolute Nucleated RBC 0.0       Assessment & Plan:   1. Locally advanced R breast cancer, ER positive: S/p treatment as above with RCB III disease at the time of surgery. S/p XRT and now undergoing adjuvant Xeloda. She is s/p 4 cycles of 1500 mg BID dosing 14 days on and 7 days off and is tolerating well with minimal symptoms. She is having difficulty with taking letrozole at the same time. I will ask Dr. Guzman if we can defer start of letrozole until  after Xeloda treatment is completed. Follow-up in 3 weeks.     2. TSH: Levothyroxine dose was increased to 125 mcg on 3/23 due to rising TSH. Will recheck TSH in 3 weeks to ensure dose is correct.     Phone call duration: 13 minutes    Karen Fernandez PA-C        Vitals and blood drawn by LPN. Pt checked into appt.     YEISON Galicia LPN    Again, thank you for allowing me to participate in the care of your patient.      Sincerely,    Karen Fernandez PA-C

## 2020-04-07 NOTE — PROGRESS NOTES
"Kathy Lei is a 54 year old female who is being evaluated via a billable telephone visit.      The patient has been notified of following:     \"This telephone visit will be conducted via a call between you and your physician/provider. We have found that certain health care needs can be provided without the need for a physical exam.  This service lets us provide the care you need with a short phone conversation.  If a prescription is necessary we can send it directly to your pharmacy.  If lab work is needed we can place an order for that and you can then stop by our lab to have the test done at a later time.    Telephone visits are billed at different rates depending on your insurance coverage. During this emergency period, for some insurers they may be billed the same as an in-person visit.  Please reach out to your insurance provider with any questions.    If during the course of the call the physician/provider feels a telephone visit is not appropriate, you will not be charged for this service.\"    Patient has given verbal consent for Telephone visit?  Yes    Kathy Lei complains of    Chief Complaint   Patient presents with     Telephone     Malignant neoplasm of upper-outer quadrant of right breast       I have reviewed and updated the patient's  Medication List.    ALLERGIES  Buspar [buspirone] and Remeron [mirtazapine]          FERNIE Mock      Additional provider notes: {use your own note template here:909891} ***    Phone call duration: *** minutes    {signature options:100370}      "

## 2020-04-07 NOTE — PROGRESS NOTES
"Kathy Lei is a 54 year old female who is being evaluated via a billable telephone visit.      The patient has been notified of following:     \"This telephone visit will be conducted via a call between you and your physician/provider. We have found that certain health care needs can be provided without the need for a physical exam.  This service lets us provide the care you need with a short phone conversation.  If a prescription is necessary we can send it directly to your pharmacy.  If lab work is needed we can place an order for that and you can then stop by our lab to have the test done at a later time.    Telephone visits are billed at different rates depending on your insurance coverage. During this emergency period, for some insurers they may be billed the same as an in-person visit.  Please reach out to your insurance provider with any questions.    If during the course of the call the physician/provider feels a telephone visit is not appropriate, you will not be charged for this service.\"    Patient has given verbal consent for Telephone visit?  Yes    Kathy Lei complains of    Chief Complaint   Patient presents with     Telephone     Malignant neoplasm of upper-outer quadrant of right breast       I have reviewed and updated the patient's  Medication List.    ALLERGIES  Buspar [buspirone] and Remeron [mirtazapine]          FERNIE Mock    Additional provider notes:      TREATMENT HISTORY:  A.  Neoadjuvant durvalumab, oliparib, paclitaxel on I SPY 2  B  Neoadjuvant ddAC.  C.  Bilateral mastectomy.  RCB 3 result.  D.  Post-surgical radiation.  Completed 1-6-20.  E.  Begin CREATE-X 1/17/20. With letrozole.     Interval History: Mily is feeling well. She is tolerating Xeloda well apart from having mild fatigue and intermittent nausea, well controlled on taking Zofran PRN. She has not had any mucositis or diarrhea. She will have constipation at times and is taking dulcolax or miralax " for this as needed. She denies any HFS. She is having joint stiffness and arthralgias from letrozole. She wonders if it would be okay for her to take every other day.     No fevers/chills, cough, SOB, or other infectious concerns. ROS otherwise negative.     Vitals:   Vital Signs 4/7/2020   Systolic 128   Diastolic 85   Pulse 96   Temperature 98.1   Respirations 18   Weight (LB) 139 lb 12.8 oz   Height    BMI (Calculated)    Pain    O2 95       Labs:    4/7/2020 15:53   Sodium 137   Potassium 3.7   Chloride 106   Carbon Dioxide 25   Urea Nitrogen 13   Creatinine 0.73   GFR Estimate >90   GFR Estimate If Black >90   Calcium 9.3   Anion Gap 6   Albumin 4.0   Protein Total 7.4   Bilirubin Total 0.5   Alkaline Phosphatase 61   ALT 25   AST 19   Glucose 93   WBC 5.4   Hemoglobin 13.3   Hematocrit 39.2   Platelet Count 202   RBC Count 4.26   MCV 92   MCH 31.2   MCHC 33.9   RDW 16.2 (H)   Diff Method Automated Method   % Neutrophils 67.5   % Lymphocytes 20.3   % Monocytes 8.0   % Eosinophils 3.2   % Basophils 0.6   % Immature Granulocytes 0.4   Nucleated RBCs 0   Absolute Neutrophil 3.6   Absolute Lymphocytes 1.1   Absolute Monocytes 0.4   Absolute Eosinophils 0.2   Absolute Basophils 0.0   Abs Immature Granulocytes 0.0   Absolute Nucleated RBC 0.0       Assessment & Plan:   1. Locally advanced R breast cancer, ER positive: S/p treatment as above with RCB III disease at the time of surgery. S/p XRT and now undergoing adjuvant Xeloda. She is s/p 4 cycles of 1500 mg BID dosing 14 days on and 7 days off and is tolerating well with minimal symptoms. She is having difficulty with taking letrozole at the same time. I will ask Dr. Guzman if we can defer start of letrozole until after Xeloda treatment is completed. Follow-up in 3 weeks.     2. TSH: Levothyroxine dose was increased to 125 mcg on 3/23 due to rising TSH. Will recheck TSH in 3 weeks to ensure dose is correct.     Phone call duration: 13 minutes    Karen Moncada  CAMILO Fernandez

## 2020-04-17 ENCOUNTER — TELEPHONE (OUTPATIENT)
Dept: ONCOLOGY | Facility: CLINIC | Age: 55
End: 2020-04-17

## 2020-04-17 NOTE — ORAL ONC MGMT
"Oral Chemotherapy Monitoring Program    Primary Oncologist: Dr. Guzman  Primary Oncology Clinic: [facility]  Cancer Diagnosis: Breast cancer    Therapy History:  C1D1: 1/17/2020  C5D1: 4/10/2020  Xeloda 1500 mg (3 X 500 mg tab) by mouth twice daily for 14 days, then off for 7 days    Drug Interaction Assessment: No new known drug interactions    Lab Monitoring Plan  CBC and CMP every 3 weeks  Subjective/Objective:  Kathy Lei is a 54 year old female contacted by phone for a follow-up visit for oral chemotherapy. Mily verified she is taking the Xeloda correctly and has not missed any doses this cycle. She said cycle 5 started on 4/10/2020. She said she is doing just fine. She looked up exercises for her arm, to decrease her chance of getting lymphedema. She denies mouth sores, hand-foot syndrome, and diarrhea. She has nausea that comes in waves. She has never vomited from it. She takes Zofran and the nausea goes away. She said she messaged scheduling to set up her next provider appointment and labs.    ORAL CHEMOTHERAPY 1/14/2020 1/20/2020 4/17/2020   Drug Name Xeloda (Capecitabine) Xeloda (Capecitabine) Xeloda (Capecitabine)   Current Dosage 1500mg 1500mg 1500mg   Current Schedule BID BID BID   Cycle Details 2 weeks on 1 week off 2 weeks on 1 week off 2 weeks on 1 week off   Start Date of Last Cycle - 1/17/2020 4/10/2020   Planned next cycle start date - 2/7/2020 5/1/2020   Doses missed in last 2 weeks - 0 0   Adherence Assessment - Adherent Adherent   Adverse Effects - Nausea No AE identified during assessment   Pharmacist Intervention(nausea) - No -   Any new drug interactions? - - No       Vitals:  BP:   BP Readings from Last 1 Encounters:   04/07/20 128/85     Wt Readings from Last 1 Encounters:   04/07/20 63.4 kg (139 lb 12.8 oz)     Estimated body surface area is 1.72 meters squared as calculated from the following:    Height as of 2/25/20: 1.675 m (5' 5.95\").    Weight as of 4/7/20: 63.4 " kg (139 lb 12.8 oz).    Labs:  _  Result Component Current Result Ref Range   Sodium 137 (4/7/2020) 133 - 144 mmol/L     _  Result Component Current Result Ref Range   Potassium 3.7 (4/7/2020) 3.4 - 5.3 mmol/L     _  Result Component Current Result Ref Range   Calcium 9.3 (4/7/2020) 8.5 - 10.1 mg/dL     No results found for Mag within last 30 days.     No results found for Phos within last 30 days.     _  Result Component Current Result Ref Range   Albumin 4.0 (4/7/2020) 3.4 - 5.0 g/dL     _  Result Component Current Result Ref Range   Urea Nitrogen 13 (4/7/2020) 7 - 30 mg/dL     _  Result Component Current Result Ref Range   Creatinine 0.73 (4/7/2020) 0.52 - 1.04 mg/dL       _  Result Component Current Result Ref Range   AST 19 (4/7/2020) 0 - 45 U/L     _  Result Component Current Result Ref Range   ALT 25 (4/7/2020) 0 - 50 U/L     _  Result Component Current Result Ref Range   Bilirubin Total 0.5 (4/7/2020) 0.2 - 1.3 mg/dL       _  Result Component Current Result Ref Range   WBC 5.4 (4/7/2020) 4.0 - 11.0 10e9/L     _  Result Component Current Result Ref Range   Hemoglobin 13.3 (4/7/2020) 11.7 - 15.7 g/dL     _  Result Component Current Result Ref Range   Platelet Count 202 (4/7/2020) 150 - 450 10e9/L     _  Result Component Current Result Ref Range   Absolute Neutrophil 3.6 (4/7/2020) 1.6 - 8.3 10e9/L       Assessment:  Mily is tolerating Xeloda with occasional nausea controlled with Zofran.     Plan:  -Continue Xeloda 1500 mg by mouth twice daily for 14 days of 21 day cycle    Follow-Up:  Review provider appt planned for 4/28    Refill Due:  By 5/1    Rekha Méndez  Pharmacy Intern  [facility]  [phone]

## 2020-04-26 ENCOUNTER — MYC REFILL (OUTPATIENT)
Dept: FAMILY MEDICINE | Facility: CLINIC | Age: 55
End: 2020-04-26

## 2020-04-26 DIAGNOSIS — F41.9 ANXIETY: ICD-10-CM

## 2020-04-27 VITALS
WEIGHT: 140.5 LBS | SYSTOLIC BLOOD PRESSURE: 122 MMHG | RESPIRATION RATE: 16 BRPM | TEMPERATURE: 97.9 F | HEART RATE: 94 BPM | DIASTOLIC BLOOD PRESSURE: 82 MMHG | OXYGEN SATURATION: 95 % | BODY MASS INDEX: 22.72 KG/M2

## 2020-04-27 DIAGNOSIS — E03.9 HYPOTHYROIDISM, UNSPECIFIED TYPE: ICD-10-CM

## 2020-04-27 DIAGNOSIS — Z17.0 MALIGNANT NEOPLASM OF UPPER-OUTER QUADRANT OF RIGHT BREAST IN FEMALE, ESTROGEN RECEPTOR POSITIVE (H): ICD-10-CM

## 2020-04-27 DIAGNOSIS — C50.411 MALIGNANT NEOPLASM OF UPPER-OUTER QUADRANT OF RIGHT BREAST IN FEMALE, ESTROGEN RECEPTOR POSITIVE (H): ICD-10-CM

## 2020-04-27 LAB
ALBUMIN SERPL-MCNC: 4.1 G/DL (ref 3.4–5)
ALP SERPL-CCNC: 64 U/L (ref 40–150)
ALT SERPL W P-5'-P-CCNC: 26 U/L (ref 0–50)
ANION GAP SERPL CALCULATED.3IONS-SCNC: 8 MMOL/L (ref 3–14)
AST SERPL W P-5'-P-CCNC: 17 U/L (ref 0–45)
BASOPHILS # BLD AUTO: 0 10E9/L (ref 0–0.2)
BASOPHILS NFR BLD AUTO: 0.6 %
BILIRUB SERPL-MCNC: 0.3 MG/DL (ref 0.2–1.3)
BUN SERPL-MCNC: 10 MG/DL (ref 7–30)
CALCIUM SERPL-MCNC: 8.9 MG/DL (ref 8.5–10.1)
CHLORIDE SERPL-SCNC: 104 MMOL/L (ref 94–109)
CO2 SERPL-SCNC: 26 MMOL/L (ref 20–32)
CREAT SERPL-MCNC: 0.67 MG/DL (ref 0.52–1.04)
DIFFERENTIAL METHOD BLD: ABNORMAL
EOSINOPHIL # BLD AUTO: 0.2 10E9/L (ref 0–0.7)
EOSINOPHIL NFR BLD AUTO: 3.2 %
ERYTHROCYTE [DISTWIDTH] IN BLOOD BY AUTOMATED COUNT: 16.1 % (ref 10–15)
GFR SERPL CREATININE-BSD FRML MDRD: >90 ML/MIN/{1.73_M2}
GLUCOSE SERPL-MCNC: 101 MG/DL (ref 70–99)
HCT VFR BLD AUTO: 37.1 % (ref 35–47)
HGB BLD-MCNC: 12.9 G/DL (ref 11.7–15.7)
IMM GRANULOCYTES # BLD: 0 10E9/L (ref 0–0.4)
IMM GRANULOCYTES NFR BLD: 0.4 %
LYMPHOCYTES # BLD AUTO: 1.3 10E9/L (ref 0.8–5.3)
LYMPHOCYTES NFR BLD AUTO: 25.6 %
MCH RBC QN AUTO: 32.7 PG (ref 26.5–33)
MCHC RBC AUTO-ENTMCNC: 34.8 G/DL (ref 31.5–36.5)
MCV RBC AUTO: 94 FL (ref 78–100)
MONOCYTES # BLD AUTO: 0.4 10E9/L (ref 0–1.3)
MONOCYTES NFR BLD AUTO: 8.6 %
NEUTROPHILS # BLD AUTO: 3.1 10E9/L (ref 1.6–8.3)
NEUTROPHILS NFR BLD AUTO: 61.6 %
NRBC # BLD AUTO: 0 10*3/UL
NRBC BLD AUTO-RTO: 0 /100
PLATELET # BLD AUTO: 230 10E9/L (ref 150–450)
POTASSIUM SERPL-SCNC: 3.6 MMOL/L (ref 3.4–5.3)
PROT SERPL-MCNC: 7.4 G/DL (ref 6.8–8.8)
RBC # BLD AUTO: 3.95 10E12/L (ref 3.8–5.2)
SODIUM SERPL-SCNC: 138 MMOL/L (ref 133–144)
T4 FREE SERPL-MCNC: 1.16 NG/DL (ref 0.76–1.46)
TSH SERPL DL<=0.005 MIU/L-ACNC: 4.25 MU/L (ref 0.4–4)
WBC # BLD AUTO: 5 10E9/L (ref 4–11)

## 2020-04-27 PROCEDURE — 84439 ASSAY OF FREE THYROXINE: CPT | Performed by: PHYSICIAN ASSISTANT

## 2020-04-27 PROCEDURE — 84443 ASSAY THYROID STIM HORMONE: CPT | Performed by: PHYSICIAN ASSISTANT

## 2020-04-27 PROCEDURE — 80053 COMPREHEN METABOLIC PANEL: CPT | Performed by: PHYSICIAN ASSISTANT

## 2020-04-27 PROCEDURE — 85025 COMPLETE CBC W/AUTO DIFF WBC: CPT | Performed by: PHYSICIAN ASSISTANT

## 2020-04-27 RX ORDER — LORAZEPAM 0.5 MG/1
0.5 TABLET ORAL DAILY PRN
Qty: 30 TABLET | Refills: 0 | OUTPATIENT
Start: 2020-04-27

## 2020-04-27 ASSESSMENT — PAIN SCALES - GENERAL: PAINLEVEL: NO PAIN (0)

## 2020-04-27 NOTE — NURSING NOTE
Chief Complaint   Patient presents with     Blood Draw     Labs drawn via VPT by RN in lab. VS taken.      Labs collected from venipuncture by RN. Vitals taken.    Martita WYNN RN PHN BSN  BMT/Oncology Lab

## 2020-04-28 ENCOUNTER — VIRTUAL VISIT (OUTPATIENT)
Dept: ONCOLOGY | Facility: CLINIC | Age: 55
End: 2020-04-28
Attending: PHYSICIAN ASSISTANT
Payer: COMMERCIAL

## 2020-04-28 ENCOUNTER — MYC MEDICAL ADVICE (OUTPATIENT)
Dept: ONCOLOGY | Facility: CLINIC | Age: 55
End: 2020-04-28

## 2020-04-28 DIAGNOSIS — C50.411 MALIGNANT NEOPLASM OF UPPER-OUTER QUADRANT OF RIGHT BREAST IN FEMALE, ESTROGEN RECEPTOR POSITIVE (H): Primary | ICD-10-CM

## 2020-04-28 DIAGNOSIS — Z17.0 MALIGNANT NEOPLASM OF UPPER-OUTER QUADRANT OF RIGHT BREAST IN FEMALE, ESTROGEN RECEPTOR POSITIVE (H): Primary | ICD-10-CM

## 2020-04-28 PROCEDURE — 99214 OFFICE O/P EST MOD 30 MIN: CPT | Mod: 95 | Performed by: PHYSICIAN ASSISTANT

## 2020-04-28 PROCEDURE — 40000114 ZZH STATISTIC NO CHARGE CLINIC VISIT

## 2020-04-28 RX ORDER — MEPERIDINE HYDROCHLORIDE 25 MG/ML
25 INJECTION INTRAMUSCULAR; INTRAVENOUS; SUBCUTANEOUS EVERY 30 MIN PRN
Status: CANCELLED | OUTPATIENT
Start: 2020-05-01

## 2020-04-28 RX ORDER — DIPHENHYDRAMINE HYDROCHLORIDE 50 MG/ML
50 INJECTION INTRAMUSCULAR; INTRAVENOUS
Status: CANCELLED
Start: 2020-05-01

## 2020-04-28 RX ORDER — METHYLPREDNISOLONE SODIUM SUCCINATE 125 MG/2ML
125 INJECTION, POWDER, LYOPHILIZED, FOR SOLUTION INTRAMUSCULAR; INTRAVENOUS
Status: CANCELLED
Start: 2020-05-01

## 2020-04-28 RX ORDER — EPINEPHRINE 1 MG/ML
0.3 INJECTION, SOLUTION INTRAMUSCULAR; SUBCUTANEOUS EVERY 5 MIN PRN
Status: CANCELLED | OUTPATIENT
Start: 2020-05-01

## 2020-04-28 RX ORDER — EPINEPHRINE 0.3 MG/.3ML
0.3 INJECTION SUBCUTANEOUS EVERY 5 MIN PRN
Status: CANCELLED | OUTPATIENT
Start: 2020-05-01

## 2020-04-28 RX ORDER — ALBUTEROL SULFATE 0.83 MG/ML
2.5 SOLUTION RESPIRATORY (INHALATION)
Status: CANCELLED | OUTPATIENT
Start: 2020-05-01

## 2020-04-28 RX ORDER — DIPHENHYDRAMINE HCL 25 MG
50 CAPSULE ORAL ONCE
Status: CANCELLED
Start: 2020-05-01

## 2020-04-28 RX ORDER — ALBUTEROL SULFATE 90 UG/1
1-2 AEROSOL, METERED RESPIRATORY (INHALATION)
Status: CANCELLED
Start: 2020-05-01

## 2020-04-28 RX ORDER — LORAZEPAM 2 MG/ML
0.5 INJECTION INTRAMUSCULAR EVERY 4 HOURS PRN
Status: CANCELLED
Start: 2020-05-01

## 2020-04-28 RX ORDER — SODIUM CHLORIDE 9 MG/ML
1000 INJECTION, SOLUTION INTRAVENOUS CONTINUOUS PRN
Status: CANCELLED
Start: 2020-05-01

## 2020-04-28 RX ORDER — NALOXONE HYDROCHLORIDE 0.4 MG/ML
.1-.4 INJECTION, SOLUTION INTRAMUSCULAR; INTRAVENOUS; SUBCUTANEOUS
Status: CANCELLED | OUTPATIENT
Start: 2020-05-01

## 2020-04-28 NOTE — PROGRESS NOTES
"Kathy Lei is a 54 year old female who is being evaluated via a billable video visit.      The patient has been notified of following:     \"This video visit will be conducted via a call between you and your physician/provider. We have found that certain health care needs can be provided without the need for an in-person physical exam.  This service lets us provide the care you need with a video conversation.  If a prescription is necessary we can send it directly to your pharmacy.  If lab work is needed we can place an order for that and you can then stop by our lab to have the test done at a later time.    Video visits are billed at different rates depending on your insurance coverage.  Please reach out to your insurance provider with any questions.    If during the course of the call the physician/provider feels a video visit is not appropriate, you will not be charged for this service.\"    Patient has given verbal consent for Video visit? Yes    How would you like to obtain your AVS? Triston    Patient would like the video invitation sent by: Text to cell phone: [phone]            I have reviewed and updated the patient's allergies and medication list.    Concerns: Patient has no new concerns.      Refills: XELODA      DEVANG Corcoran    VIDEO VISIT  Apr 28, 2020     REASON FOR VISIT: breast cancer follow up      TREATMENT HISTORY:  A.  Neoadjuvant durvalumab, oliparib, paclitaxel on I SPY 2  B  Neoadjuvant ddAC.  C.  Bilateral mastectomy.  RCB 3 result.  D.  Post-surgical radiation.  Completed 1-6-20.  E.  Begin CREATE-X 1/17/20. With letrozole.      Interval History: Mily is feeling well. She is tolerating Xeloda well apart from having mild fatigue and intermittent nausea, well controlled on taking Zofran PRN. She has not had any mucositis or diarrhea. She will have constipation at times and is taking dulcolax or miralax for this as needed. She denies any HFS but does have Xerosis and just " picked up some Utter Cream. She is having joint stiffness and arthralgias from letrozole. She and I have discussed this previously- its moderate and sometimes severe.  The Tylenol, occasional Ibuprofen and exercise does help.  However she is unable to wash her floors and other type of cleaning, etc that she normally does. She is a  and now, now working during the pandemic.      No fevers/chills, cough, SOB, or other infectious concerns. ROS otherwise negative.      Vitals:   Vitals 4/27/2020   Systolic 122   Diastolic 82   Pulse 94   Respiration 16   Temp 97.9   O2 95   Pain    Weight 140 lb 8 oz   Video physical exam  General: Patient appears well in no acute distress. Normal body habitus.  Skin: No visualized rash or lesions on visualized skin  Eyes: EOMI, no erythema, sclera icterus or discharge noted  Resp: Appears to be breathing comfortably without accessory muscle usage, speaking in full sentences, no cough  MSK: Appears to have normal range of motion based on visualized movements  Neurologic: No apparent tremors, facial movements symmetric  Psych: affect bright, alert and oriented  The rest of a comprehensive physical examination is deferred due to PHE (public health emergency) video restrictions         Labs:      4/27/2020 14:48   Sodium 138   Potassium 3.6   Chloride 104   Carbon Dioxide 26   Urea Nitrogen 10   Creatinine 0.67   GFR Estimate >90   GFR Estimate If Black >90   Calcium 8.9   Anion Gap 8   Albumin 4.1   Protein Total 7.4   Bilirubin Total 0.3   Alkaline Phosphatase 64   ALT 26   AST 17   T4 Free 1.16   TSH 4.25 (H)   Glucose 101 (H)   WBC 5.0   Hemoglobin 12.9   Hematocrit 37.1   Platelet Count 230   RBC Count 3.95   MCV 94        Assessment & Plan:   1. Locally advanced R breast cancer, ER positive: S/p treatment as above with RCB III disease at the time of surgery. S/p XRT and now undergoing adjuvant Xeloda. She is s/p 4 cycles of 1500 mg BID dosing 14 days on and 7 days off and  is tolerating well with minimal symptoms. She is having difficulty with taking letrozole at the same time and her arthralgias are moderate to severe in character.  Exercise, Tylenol and Ibuprofen intermittently help, but she is having on going challenges.      We will take a break on her letrozole for now, I'll reach out to Dr Guzman about the plan.       2. TSH: Levothyroxine dose was increased to 125 mcg on 3/23 due to rising TSH. TSH is under better control today, continue at this dose for now.     3. 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  We can re address this later this summer but for now will write her a letter to support her with social distancing.      Video-Visit Details    Type of service:  Video Visit    Video Start Time: 7:10  Video End Time: 7:36 am    Originating Location (pt. Location): HOME    Distant Location (provider location):  [facility]     Mode of Communication:  Video Conference via Katerine Phelps PA-C

## 2020-05-15 DIAGNOSIS — C50.411 MALIGNANT NEOPLASM OF UPPER-OUTER QUADRANT OF RIGHT BREAST IN FEMALE, ESTROGEN RECEPTOR POSITIVE (H): ICD-10-CM

## 2020-05-15 DIAGNOSIS — Z17.0 MALIGNANT NEOPLASM OF UPPER-OUTER QUADRANT OF RIGHT BREAST IN FEMALE, ESTROGEN RECEPTOR POSITIVE (H): ICD-10-CM

## 2020-05-15 LAB
ALBUMIN SERPL-MCNC: 3.9 G/DL (ref 3.4–5)
ALP SERPL-CCNC: 64 U/L (ref 40–150)
ALT SERPL W P-5'-P-CCNC: 21 U/L (ref 0–50)
ANION GAP SERPL CALCULATED.3IONS-SCNC: 4 MMOL/L (ref 3–14)
AST SERPL W P-5'-P-CCNC: 17 U/L (ref 0–45)
BASOPHILS # BLD AUTO: 0 10E9/L (ref 0–0.2)
BASOPHILS NFR BLD AUTO: 0.8 %
BILIRUB SERPL-MCNC: 0.4 MG/DL (ref 0.2–1.3)
BUN SERPL-MCNC: 9 MG/DL (ref 7–30)
CALCIUM SERPL-MCNC: 9.3 MG/DL (ref 8.5–10.1)
CHLORIDE SERPL-SCNC: 105 MMOL/L (ref 94–109)
CO2 SERPL-SCNC: 29 MMOL/L (ref 20–32)
CREAT SERPL-MCNC: 0.58 MG/DL (ref 0.52–1.04)
DIFFERENTIAL METHOD BLD: ABNORMAL
EOSINOPHIL # BLD AUTO: 0.2 10E9/L (ref 0–0.7)
EOSINOPHIL NFR BLD AUTO: 4.4 %
ERYTHROCYTE [DISTWIDTH] IN BLOOD BY AUTOMATED COUNT: 15.8 % (ref 10–15)
GFR SERPL CREATININE-BSD FRML MDRD: >90 ML/MIN/{1.73_M2}
GLUCOSE SERPL-MCNC: 96 MG/DL (ref 70–99)
HCT VFR BLD AUTO: 39 % (ref 35–47)
HGB BLD-MCNC: 13.3 G/DL (ref 11.7–15.7)
IMM GRANULOCYTES # BLD: 0 10E9/L (ref 0–0.4)
IMM GRANULOCYTES NFR BLD: 0.3 %
LYMPHOCYTES # BLD AUTO: 1 10E9/L (ref 0.8–5.3)
LYMPHOCYTES NFR BLD AUTO: 26 %
MCH RBC QN AUTO: 33.3 PG (ref 26.5–33)
MCHC RBC AUTO-ENTMCNC: 34.1 G/DL (ref 31.5–36.5)
MCV RBC AUTO: 98 FL (ref 78–100)
MONOCYTES # BLD AUTO: 0.3 10E9/L (ref 0–1.3)
MONOCYTES NFR BLD AUTO: 9.3 %
NEUTROPHILS # BLD AUTO: 2.2 10E9/L (ref 1.6–8.3)
NEUTROPHILS NFR BLD AUTO: 59.2 %
NRBC # BLD AUTO: 0 10*3/UL
NRBC BLD AUTO-RTO: 0 /100
PLATELET # BLD AUTO: 209 10E9/L (ref 150–450)
POTASSIUM SERPL-SCNC: 3.6 MMOL/L (ref 3.4–5.3)
PROT SERPL-MCNC: 7.4 G/DL (ref 6.8–8.8)
RBC # BLD AUTO: 3.99 10E12/L (ref 3.8–5.2)
SODIUM SERPL-SCNC: 138 MMOL/L (ref 133–144)
WBC # BLD AUTO: 3.7 10E9/L (ref 4–11)

## 2020-05-18 ENCOUNTER — VIRTUAL VISIT (OUTPATIENT)
Dept: ONCOLOGY | Facility: CLINIC | Age: 55
End: 2020-05-18
Attending: PHYSICIAN ASSISTANT
Payer: COMMERCIAL

## 2020-05-18 DIAGNOSIS — C50.411 MALIGNANT NEOPLASM OF UPPER-OUTER QUADRANT OF RIGHT BREAST IN FEMALE, ESTROGEN RECEPTOR POSITIVE (H): ICD-10-CM

## 2020-05-18 DIAGNOSIS — G47.00 INSOMNIA, UNSPECIFIED TYPE: Primary | ICD-10-CM

## 2020-05-18 DIAGNOSIS — Z17.0 MALIGNANT NEOPLASM OF UPPER-OUTER QUADRANT OF RIGHT BREAST IN FEMALE, ESTROGEN RECEPTOR POSITIVE (H): ICD-10-CM

## 2020-05-18 PROCEDURE — 40000114 ZZH STATISTIC NO CHARGE CLINIC VISIT

## 2020-05-18 PROCEDURE — 99214 OFFICE O/P EST MOD 30 MIN: CPT | Mod: 95 | Performed by: PHYSICIAN ASSISTANT

## 2020-05-18 RX ORDER — ALBUTEROL SULFATE 90 UG/1
1-2 AEROSOL, METERED RESPIRATORY (INHALATION)
Status: CANCELLED
Start: 2020-05-29

## 2020-05-18 RX ORDER — DIPHENHYDRAMINE HYDROCHLORIDE 50 MG/ML
50 INJECTION INTRAMUSCULAR; INTRAVENOUS
Status: CANCELLED
Start: 2020-05-29

## 2020-05-18 RX ORDER — SODIUM CHLORIDE 9 MG/ML
1000 INJECTION, SOLUTION INTRAVENOUS CONTINUOUS PRN
Status: CANCELLED
Start: 2020-05-29

## 2020-05-18 RX ORDER — LORAZEPAM 2 MG/ML
0.5 INJECTION INTRAMUSCULAR EVERY 4 HOURS PRN
Status: CANCELLED
Start: 2020-05-29

## 2020-05-18 RX ORDER — MEPERIDINE HYDROCHLORIDE 25 MG/ML
25 INJECTION INTRAMUSCULAR; INTRAVENOUS; SUBCUTANEOUS EVERY 30 MIN PRN
Status: CANCELLED | OUTPATIENT
Start: 2020-05-29

## 2020-05-18 RX ORDER — NALOXONE HYDROCHLORIDE 0.4 MG/ML
.1-.4 INJECTION, SOLUTION INTRAMUSCULAR; INTRAVENOUS; SUBCUTANEOUS
Status: CANCELLED | OUTPATIENT
Start: 2020-05-29

## 2020-05-18 RX ORDER — METHYLPREDNISOLONE SODIUM SUCCINATE 125 MG/2ML
125 INJECTION, POWDER, LYOPHILIZED, FOR SOLUTION INTRAMUSCULAR; INTRAVENOUS
Status: CANCELLED
Start: 2020-05-29

## 2020-05-18 RX ORDER — TRAZODONE HYDROCHLORIDE 50 MG/1
50-100 TABLET, FILM COATED ORAL
Qty: 90 TABLET | Status: SHIPPED | OUTPATIENT
Start: 2020-05-18 | End: 2020-10-26

## 2020-05-18 RX ORDER — EPINEPHRINE 1 MG/ML
0.3 INJECTION, SOLUTION INTRAMUSCULAR; SUBCUTANEOUS EVERY 5 MIN PRN
Status: CANCELLED | OUTPATIENT
Start: 2020-05-29

## 2020-05-18 RX ORDER — EPINEPHRINE 0.3 MG/.3ML
0.3 INJECTION SUBCUTANEOUS EVERY 5 MIN PRN
Status: CANCELLED | OUTPATIENT
Start: 2020-05-29

## 2020-05-18 RX ORDER — ALBUTEROL SULFATE 0.83 MG/ML
2.5 SOLUTION RESPIRATORY (INHALATION)
Status: CANCELLED | OUTPATIENT
Start: 2020-05-29

## 2020-05-18 RX ORDER — DIPHENHYDRAMINE HCL 25 MG
50 CAPSULE ORAL ONCE
Status: CANCELLED
Start: 2020-05-29

## 2020-05-18 NOTE — PROGRESS NOTES
"Kathy Lei is a 54 year old female who is being evaluated via a billable video visit.      The patient has been notified of following:     \"This video visit will be conducted via a call between you and your physician/provider. We have found that certain health care needs can be provided without the need for an in-person physical exam.  This service lets us provide the care you need with a video conversation.  If a prescription is necessary we can send it directly to your pharmacy.  If lab work is needed we can place an order for that and you can then stop by our lab to have the test done at a later time.    Video visits are billed at different rates depending on your insurance coverage.  Please reach out to your insurance provider with any questions.    If during the course of the call the physician/provider feels a video visit is not appropriate, you will not be charged for this service.\"    Patient has given verbal consent for Video visit? Yes    How would you like to obtain your AVS? [place]    Patient would like the video invitation sent by: Text to cell phone: [phone]    Will anyone else be joining your video visit? No      I have reviewed and updated the patient's allergies and medication list.    Concerns: No new concerns   Refills: None needed.      Secondary Video Option (Doximity), send text message to: [phone]     Valentine Mosqueda CMA    VIDEO VISIT  May 18, 2020        REASON FOR VISIT: breast cancer follow up        TREATMENT HISTORY:  A.  Neoadjuvant durvalumab, oliparib, paclitaxel on I SPY 2  B  Neoadjuvant ddAC.  C.  Bilateral mastectomy.  RCB 3 result.  D.  Post-surgical radiation.  Completed 1-6-20.  E.  Begin CREATE-X 1/17/20. With letrozole.      Interval History: Mily is feeling well. She is tolerating Xeloda well apart from having mild fatigue and intermittent nausea, well controlled on taking Zofran PRN. She has not had any mucositis or diarrhea. She has occasional GERD. "  She will have constipation at times and is taking dulcolax or miralax for this as needed. Her skin has been holding up, no major HFS.   She is having joint stiffness and arthralgias from letrozole- she has been off of this since 4/28. It has improved, she gets out of bed easier and moves about easier, they are still there though, minor.  She is a  and now, now working during the pandemic.      No fevers/chills, cough, SOB, or other infectious concerns. ROS otherwise negative.      Vitals:   Vitals 4/27/2020   Systolic 122   Diastolic 82   Pulse 94   Respiration 16   Temp 97.9   O2 95   Pain    Weight 140 lb 8 oz     Video physical exam  General: Patient appears well in no acute distress. Normal body habitus.  Skin: No visualized rash or lesions on visualized skin  Eyes: EOMI, no erythema, sclera icterus or discharge noted  Resp: Appears to be breathing comfortably without accessory muscle usage, speaking in full sentences, no cough  MSK: Appears to have normal range of motion based on visualized movements  Neurologic: No apparent tremors, facial movements symmetric  Psych: affect bright, alert and oriented  The rest of a comprehensive physical examination is deferred due to PHE (public health emergency) video restrictions           Labs:      4/7/2020 15:53 4/27/2020 14:48 5/15/2020 10:05   Sodium 137 138 138   Potassium 3.7 3.6 3.6   Chloride 106 104 105   Carbon Dioxide 25 26 29   Urea Nitrogen 13 10 9   Creatinine 0.73 0.67 0.58   GFR Estimate >90 >90 >90   GFR Estimate If Black >90 >90 >90   Calcium 9.3 8.9 9.3   Anion Gap 6 8 4   Albumin 4.0 4.1 3.9   Protein Total 7.4 7.4 7.4   Bilirubin Total 0.5 0.3 0.4   Alkaline Phosphatase 61 64 64   ALT 25 26 21   AST 19 17 17   T4 Free  1.16    TSH  4.25 (H)    Glucose 93 101 (H) 96   WBC 5.4 5.0 3.7 (L)   Hemoglobin 13.3 12.9 13.3   Hematocrit 39.2 37.1 39.0   Platelet Count 202 230 209   RBC Count 4.26 3.95 3.99   MCV 92 94 98   MCH 31.2 32.7 33.3 (H)    MCHC 33.9 34.8 34.1   RDW 16.2 (H) 16.1 (H) 15.8 (H)   Diff Method Automated Method Automated Method Automated Method   % Neutrophils 67.5 61.6 59.2   % Lymphocytes 20.3 25.6 26.0   % Monocytes 8.0 8.6 9.3   % Eosinophils 3.2 3.2 4.4   % Basophils 0.6 0.6 0.8   % Immature Granulocytes 0.4 0.4 0.3   Nucleated RBCs 0 0 0   Absolute Neutrophil 3.6 3.1 2.2        Assessment & Plan:   1. Locally advanced R breast cancer, ER positive: S/p treatment as above with RCB III disease at the time of surgery. S/p XRT and now undergoing adjuvant Xeloda. She is s/p 6 cycles of 1500 mg BID dosing 14 days on and 7 days off and is tolerating well with minimal symptoms. Her labs are appropriate for C7 to start next week.  She is currently on a break from her letrozole and we will wait for her severe arthralgias to completely resolve- and then discuss starting a new AI.  She is happy with this plan.       2. TSH: Levothyroxine dose was increased to 125 mcg on 3/23 due to rising TSH. TSH is under better control today, continue at this dose for now.      3. 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  We can re address this later this summer but for now will write her a letter to support her with social distancing.            Video-Visit Details    Type of service:  Video Visit    Video Start Time: 7:08  Video End Time: 7:35    Originating Location (pt. Location): Home    Distant Location (provider location):  [facility]     Platform used for Video Visit: Selwyn Phelps PA-C

## 2020-05-18 NOTE — LETTER
"5/18/2020      RE: Kathy Lei  [address] [place] [address]       Kathy Lei is a 54 year old female who is being evaluated via a billable video visit.      The patient has been notified of following:     \"This video visit will be conducted via a call between you and your physician/provider. We have found that certain health care needs can be provided without the need for an in-person physical exam.  This service lets us provide the care you need with a video conversation.  If a prescription is necessary we can send it directly to your pharmacy.  If lab work is needed we can place an order for that and you can then stop by our lab to have the test done at a later time.    Video visits are billed at different rates depending on your insurance coverage.  Please reach out to your insurance provider with any questions.    If during the course of the call the physician/provider feels a video visit is not appropriate, you will not be charged for this service.\"    Patient has given verbal consent for Video visit? Yes    How would you like to obtain your AVS? [place]    Patient would like the video invitation sent by: Text to cell phone: [phone]    Will anyone else be joining your video visit? No      I have reviewed and updated the patient's allergies and medication list.    Concerns: No new concerns   Refills: None needed.      Secondary Video Option (Doximity), send text message to: [phone]     Valentine Mosqueda CMA    VIDEO VISIT  May 18, 2020        REASON FOR VISIT: breast cancer follow up        TREATMENT HISTORY:  A.  Neoadjuvant durvalumab, oliparib, paclitaxel on I SPY 2  B  Neoadjuvant ddAC.  C.  Bilateral mastectomy.  RCB 3 result.  D.  Post-surgical radiation.  Completed 1-6-20.  E.  Begin CREATE-X 1/17/20. With letrozole.      Interval History: Mily is feeling well. She is tolerating Xeloda well apart from having mild fatigue and intermittent nausea, well " controlled on taking Zofran PRN. She has not had any mucositis or diarrhea. She has occasional GERD.  She will have constipation at times and is taking dulcolax or miralax for this as needed. Her skin has been holding up, no major HFS.   She is having joint stiffness and arthralgias from letrozole- she has been off of this since 4/28. It has improved, she gets out of bed easier and moves about easier, they are still there though, minor.  She is a  and now, now working during the pandemic.      No fevers/chills, cough, SOB, or other infectious concerns. ROS otherwise negative.      Vitals:   Vitals 4/27/2020   Systolic 122   Diastolic 82   Pulse 94   Respiration 16   Temp 97.9   O2 95   Pain    Weight 140 lb 8 oz     Video physical exam  General: Patient appears well in no acute distress. Normal body habitus.  Skin: No visualized rash or lesions on visualized skin  Eyes: EOMI, no erythema, sclera icterus or discharge noted  Resp: Appears to be breathing comfortably without accessory muscle usage, speaking in full sentences, no cough  MSK: Appears to have normal range of motion based on visualized movements  Neurologic: No apparent tremors, facial movements symmetric  Psych: affect bright, alert and oriented  The rest of a comprehensive physical examination is deferred due to PHE (public health emergency) video restrictions           Labs:      4/7/2020 15:53 4/27/2020 14:48 5/15/2020 10:05   Sodium 137 138 138   Potassium 3.7 3.6 3.6   Chloride 106 104 105   Carbon Dioxide 25 26 29   Urea Nitrogen 13 10 9   Creatinine 0.73 0.67 0.58   GFR Estimate >90 >90 >90   GFR Estimate If Black >90 >90 >90   Calcium 9.3 8.9 9.3   Anion Gap 6 8 4   Albumin 4.0 4.1 3.9   Protein Total 7.4 7.4 7.4   Bilirubin Total 0.5 0.3 0.4   Alkaline Phosphatase 61 64 64   ALT 25 26 21   AST 19 17 17   T4 Free  1.16    TSH  4.25 (H)    Glucose 93 101 (H) 96   WBC 5.4 5.0 3.7 (L)   Hemoglobin 13.3 12.9 13.3   Hematocrit 39.2 37.1  39.0   Platelet Count 202 230 209   RBC Count 4.26 3.95 3.99   MCV 92 94 98   MCH 31.2 32.7 33.3 (H)   MCHC 33.9 34.8 34.1   RDW 16.2 (H) 16.1 (H) 15.8 (H)   Diff Method Automated Method Automated Method Automated Method   % Neutrophils 67.5 61.6 59.2   % Lymphocytes 20.3 25.6 26.0   % Monocytes 8.0 8.6 9.3   % Eosinophils 3.2 3.2 4.4   % Basophils 0.6 0.6 0.8   % Immature Granulocytes 0.4 0.4 0.3   Nucleated RBCs 0 0 0   Absolute Neutrophil 3.6 3.1 2.2        Assessment & Plan:   1. Locally advanced R breast cancer, ER positive: S/p treatment as above with RCB III disease at the time of surgery. S/p XRT and now undergoing adjuvant Xeloda. She is s/p 6 cycles of 1500 mg BID dosing 14 days on and 7 days off and is tolerating well with minimal symptoms. Her labs are appropriate for C7 to start next week.  She is currently on a break from her letrozole and we will wait for her severe arthralgias to completely resolve- and then discuss starting a new AI.  She is happy with this plan.       2. TSH: Levothyroxine dose was increased to 125 mcg on 3/23 due to rising TSH. TSH is under better control today, continue at this dose for now.      3. 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  We can re address this later this summer but for now will write her a letter to support her with social distancing.            Video-Visit Details    Type of service:  Video Visit    Video Start Time: 7:08  Video End Time: 7:35    Originating Location (pt. Location): Home    Distant Location (provider location):  [facility]     Platform used for Video Visit: CAMILO Bermudez PA-C

## 2020-05-19 DIAGNOSIS — Z17.0 MALIGNANT NEOPLASM OF UPPER-OUTER QUADRANT OF RIGHT BREAST IN FEMALE, ESTROGEN RECEPTOR POSITIVE (H): Primary | ICD-10-CM

## 2020-05-19 DIAGNOSIS — C50.411 MALIGNANT NEOPLASM OF UPPER-OUTER QUADRANT OF RIGHT BREAST IN FEMALE, ESTROGEN RECEPTOR POSITIVE (H): Primary | ICD-10-CM

## 2020-05-19 RX ORDER — CAPECITABINE 500 MG/1
1000 TABLET, FILM COATED ORAL 2 TIMES DAILY
Qty: 84 TABLET | Refills: 1 | Status: SHIPPED | OUTPATIENT
Start: 2020-05-19 | End: 2020-06-30

## 2020-05-21 ENCOUNTER — TELEPHONE (OUTPATIENT)
Dept: ONCOLOGY | Facility: CLINIC | Age: 55
End: 2020-05-21

## 2020-05-21 NOTE — ORAL ONC MGMT
Oral Chemotherapy Monitoring Program    Primary Oncologist: Dr. Guzman  Primary Oncology Clinic: [facility]  Cancer Diagnosis: Breast Cancer    Therapy History:  Xeloda 1500 mg (3 x 500 mg tab) by mouth twice daily for 14 days, then off for 7 days.  C1D1: 1/17/2020  C6D1: 5/1/2020  C7D1: 5/22/2020    Drug Interaction Assessment: No new drug interactions identified.    Lab Monitoring Plan  CBC and CMP q3 weeks  Subjective/Objective:  Kathy Lei is a 54 year old female contacted by phone for a follow-up visit for oral chemotherapy.  Mily confirmed she is taking [id number] mg twice daily for 14 days on and 7 days off. She has not missed a dose in this cycle. She plans to start cycle 7 tomorrow (5/22). She says she is doing well on the medication. She reports experiencing mouth sensitivity but denies mouth sores and is managing it well with mouthwash. She also said she has occasional waves of nausea but manages it with Zofran and has never vomited. She mentioned experiencing constipation but is managing it well with miralax. She denies hand-foot syndrome and diarrhea.      ORAL CHEMOTHERAPY 1/14/2020 1/20/2020 4/17/2020 5/21/2020   Drug Name Xeloda (Capecitabine) Xeloda (Capecitabine) Xeloda (Capecitabine) Xeloda (Capecitabine)   Current Dosage 1500mg 1500mg 1500mg 1500mg   Current Schedule BID BID BID BID   Cycle Details 2 weeks on 1 week off 2 weeks on 1 week off 2 weeks on 1 week off 2 weeks on 1 week off   Start Date of Last Cycle - 1/17/2020 4/10/2020 5/1/2020   Planned next cycle start date - 2/7/2020 5/1/2020 5/22/2020   Doses missed in last 2 weeks - 0 0 0   Adherence Assessment - Adherent Adherent Adherent   Adverse Effects - Nausea No AE identified during assessment No AE identified during assessment   Pharmacist Intervention(nausea) - No - -   Any new drug interactions? - - No No       Vitals:  BP:   BP Readings from Last 1 Encounters:   04/27/20 122/82     Wt Readings from Last 1  "Encounters:   04/27/20 63.7 kg (140 lb 8 oz)     Estimated body surface area is 1.72 meters squared as calculated from the following:    Height as of 2/25/20: 1.675 m (5' 5.95\").    Weight as of 4/27/20: 63.7 kg (140 lb 8 oz).    Labs:  _  Result Component Current Result Ref Range   Sodium 138 (5/15/2020) 133 - 144 mmol/L     _  Result Component Current Result Ref Range   Potassium 3.6 (5/15/2020) 3.4 - 5.3 mmol/L     _  Result Component Current Result Ref Range   Calcium 9.3 (5/15/2020) 8.5 - 10.1 mg/dL     No results found for Mag within last 30 days.     No results found for Phos within last 30 days.     _  Result Component Current Result Ref Range   Albumin 3.9 (5/15/2020) 3.4 - 5.0 g/dL     _  Result Component Current Result Ref Range   Urea Nitrogen 9 (5/15/2020) 7 - 30 mg/dL     _  Result Component Current Result Ref Range   Creatinine 0.58 (5/15/2020) 0.52 - 1.04 mg/dL       _  Result Component Current Result Ref Range   AST 17 (5/15/2020) 0 - 45 U/L     _  Result Component Current Result Ref Range   ALT 21 (5/15/2020) 0 - 50 U/L     _  Result Component Current Result Ref Range   Bilirubin Total 0.4 (5/15/2020) 0.2 - 1.3 mg/dL       _  Result Component Current Result Ref Range   WBC 3.7 (L) (5/15/2020) 4.0 - 11.0 10e9/L     _  Result Component Current Result Ref Range   Hemoglobin 13.3 (5/15/2020) 11.7 - 15.7 g/dL     _  Result Component Current Result Ref Range   Platelet Count 209 (5/15/2020) 150 - 450 10e9/L     _  Result Component Current Result Ref Range   Absolute Neutrophil 2.2 (5/15/2020) 1.6 - 8.3 10e9/L       Assessment:  Mily is tolerating Xeloda well with occasional nausea and constipation controlled with Zofran and miralax. She is also experiencing mouth sensitivity controlled with OTC mouthwash. No pharmacological interventions were made during this encounter.    Plan:  Continue Xeloda therapy at current dose and schedule.    Follow-Up:  Review labs on 6/8 provider appt 6/9.    Refill Due:  By " 6/12    Simin Diaz  Pharmacy Intern  [facility]  [phone]

## 2020-05-26 DIAGNOSIS — E03.9 HYPOTHYROIDISM, UNSPECIFIED TYPE: ICD-10-CM

## 2020-05-26 RX ORDER — LEVOTHYROXINE SODIUM 125 UG/1
125 TABLET ORAL DAILY
Qty: 30 TABLET | Refills: 1 | Status: SHIPPED | OUTPATIENT
Start: 2020-05-26 | End: 2020-08-10

## 2020-05-26 NOTE — TELEPHONE ENCOUNTER
"Per Caryn's 5/18 visit note \"2. TSH: Levothyroxine dose was increased to 125 mcg on 3/23 due to rising TSH. TSH is under better control today, continue at this dose for now. \"  Refilling.  "

## 2020-06-08 ENCOUNTER — MYC REFILL (OUTPATIENT)
Dept: FAMILY MEDICINE | Facility: CLINIC | Age: 55
End: 2020-06-08

## 2020-06-08 DIAGNOSIS — F41.9 ANXIETY: ICD-10-CM

## 2020-06-08 DIAGNOSIS — Z17.0 MALIGNANT NEOPLASM OF UPPER-OUTER QUADRANT OF RIGHT BREAST IN FEMALE, ESTROGEN RECEPTOR POSITIVE (H): ICD-10-CM

## 2020-06-08 DIAGNOSIS — C50.411 MALIGNANT NEOPLASM OF UPPER-OUTER QUADRANT OF RIGHT BREAST IN FEMALE, ESTROGEN RECEPTOR POSITIVE (H): ICD-10-CM

## 2020-06-08 LAB
ALBUMIN SERPL-MCNC: 3.9 G/DL (ref 3.4–5)
ALP SERPL-CCNC: 69 U/L (ref 40–150)
ALT SERPL W P-5'-P-CCNC: 24 U/L (ref 0–50)
ANION GAP SERPL CALCULATED.3IONS-SCNC: 7 MMOL/L (ref 3–14)
AST SERPL W P-5'-P-CCNC: 16 U/L (ref 0–45)
BASOPHILS # BLD AUTO: 0 10E9/L (ref 0–0.2)
BASOPHILS NFR BLD AUTO: 0.5 %
BILIRUB SERPL-MCNC: 0.4 MG/DL (ref 0.2–1.3)
BUN SERPL-MCNC: 12 MG/DL (ref 7–30)
CALCIUM SERPL-MCNC: 8.6 MG/DL (ref 8.5–10.1)
CHLORIDE SERPL-SCNC: 105 MMOL/L (ref 94–109)
CO2 SERPL-SCNC: 27 MMOL/L (ref 20–32)
CREAT SERPL-MCNC: 0.75 MG/DL (ref 0.52–1.04)
DIFFERENTIAL METHOD BLD: ABNORMAL
EOSINOPHIL # BLD AUTO: 0.2 10E9/L (ref 0–0.7)
EOSINOPHIL NFR BLD AUTO: 4.3 %
ERYTHROCYTE [DISTWIDTH] IN BLOOD BY AUTOMATED COUNT: 15.2 % (ref 10–15)
GFR SERPL CREATININE-BSD FRML MDRD: 89 ML/MIN/{1.73_M2}
GLUCOSE SERPL-MCNC: 84 MG/DL (ref 70–99)
HCT VFR BLD AUTO: 39.7 % (ref 35–47)
HGB BLD-MCNC: 13.7 G/DL (ref 11.7–15.7)
IMM GRANULOCYTES # BLD: 0 10E9/L (ref 0–0.4)
IMM GRANULOCYTES NFR BLD: 0.3 %
LYMPHOCYTES # BLD AUTO: 0.9 10E9/L (ref 0.8–5.3)
LYMPHOCYTES NFR BLD AUTO: 22.8 %
MCH RBC QN AUTO: 33.9 PG (ref 26.5–33)
MCHC RBC AUTO-ENTMCNC: 34.5 G/DL (ref 31.5–36.5)
MCV RBC AUTO: 98 FL (ref 78–100)
MONOCYTES # BLD AUTO: 0.4 10E9/L (ref 0–1.3)
MONOCYTES NFR BLD AUTO: 10.2 %
NEUTROPHILS # BLD AUTO: 2.4 10E9/L (ref 1.6–8.3)
NEUTROPHILS NFR BLD AUTO: 61.9 %
NRBC # BLD AUTO: 0 10*3/UL
NRBC BLD AUTO-RTO: 0 /100
PLATELET # BLD AUTO: 241 10E9/L (ref 150–450)
POTASSIUM SERPL-SCNC: 3.6 MMOL/L (ref 3.4–5.3)
PROT SERPL-MCNC: 7.2 G/DL (ref 6.8–8.8)
RBC # BLD AUTO: 4.04 10E12/L (ref 3.8–5.2)
SODIUM SERPL-SCNC: 139 MMOL/L (ref 133–144)
WBC # BLD AUTO: 3.9 10E9/L (ref 4–11)

## 2020-06-09 ENCOUNTER — VIRTUAL VISIT (OUTPATIENT)
Dept: ONCOLOGY | Facility: CLINIC | Age: 55
End: 2020-06-09
Attending: PHYSICIAN ASSISTANT
Payer: COMMERCIAL

## 2020-06-09 DIAGNOSIS — Z17.0 MALIGNANT NEOPLASM OF UPPER-OUTER QUADRANT OF RIGHT BREAST IN FEMALE, ESTROGEN RECEPTOR POSITIVE (H): Primary | ICD-10-CM

## 2020-06-09 DIAGNOSIS — C50.411 MALIGNANT NEOPLASM OF UPPER-OUTER QUADRANT OF RIGHT BREAST IN FEMALE, ESTROGEN RECEPTOR POSITIVE (H): Primary | ICD-10-CM

## 2020-06-09 PROCEDURE — 99214 OFFICE O/P EST MOD 30 MIN: CPT | Mod: 95 | Performed by: PHYSICIAN ASSISTANT

## 2020-06-09 PROCEDURE — 40001009 ZZH VIDEO/TELEPHONE VISIT; NO CHARGE

## 2020-06-09 RX ORDER — LORAZEPAM 0.5 MG/1
0.5 TABLET ORAL DAILY PRN
Qty: 30 TABLET | Refills: 0 | Status: SHIPPED | OUTPATIENT
Start: 2020-06-09 | End: 2020-07-12

## 2020-06-09 NOTE — TELEPHONE ENCOUNTER
Routing refill request to provider for review/approval because:  Drug not on the [facility] refill protocol     Last filled: 4/23/2020 # 30 no refills    Last visit: 12/31/2020

## 2020-06-09 NOTE — LETTER
"    6/9/2020         RE: Kathy Lei  [address] [place] [address]      Kathy Lei is a 54 year old female who is being evaluated via a billable video visit.      The patient has been notified of following:     \"This video visit will be conducted via a call between you and your physician/provider. We have found that certain health care needs can be provided without the need for an in-person physical exam.  This service lets us provide the care you need with a video conversation.  If a prescription is necessary we can send it directly to your pharmacy.  If lab work is needed we can place an order for that and you can then stop by our lab to have the test done at a later time.    Video visits are billed at different rates depending on your insurance coverage.  Please reach out to your insurance provider with any questions.    If during the course of the call the physician/provider feels a video visit is not appropriate, you will not be charged for this service.\"    Patient has given verbal consent for Video visit? Yes    How would you like to obtain your AVS? [place]ney[place]    Patient would like the video invitation sent by: Text to cell phone: [phone]    Will anyone else be joining your video visit? No       Secondary Video Option (Doximity), send text message to:  [phone]    I have reviewed and updated the patient's allergies and medication list.    Concerns: Patient has no new concerns.      Refills: None      DEVANG Corcoran    VIDEO VISIT  Jun 9, 2020          REASON FOR VISIT: breast cancer follow up        TREATMENT HISTORY:  A.  Neoadjuvant durvalumab, oliparib, paclitaxel on I SPY 2  B  Neoadjuvant ddAC.  C.  Bilateral mastectomy.  RCB 3 result.  D.  Post-surgical radiation.  Completed 1-6-20.  E.  Begin CREATE-X 1/17/20. With letrozole.      Interval History: Mily is feeling well. She is tolerating Xeloda well apart from having mild fatigue and intermittent nausea, " well controlled on taking Zofran PRN. She has not had any mucositis or diarrhea. She has occasional GERD.  She will have constipation at times and is taking dulcolax or miralax for this as needed. Her skin has been holding up, no major HFS.   She is having joint stiffness and arthralgias from letrozole- she has been off of this since 4/28. It has improved, she gets out of bed easier and moves about easier, they are still there though, minor.  She is a  and now, now working during the pandemic. She admits she is not working out at all, aside from housework and gardening.      No fevers/chills, cough, SOB, or other infectious concerns. ROS otherwise negative.      Vitals:    Video physical exam  General: Patient appears well in no acute distress. Normal body habitus.  Skin: No visualized rash or lesions on visualized skin  Eyes: EOMI, no erythema, sclera icterus or discharge noted  Resp: Appears to be breathing comfortably without accessory muscle usage, speaking in full sentences, no cough  MSK: Appears to have normal range of motion based on visualized movements  Neurologic: No apparent tremors, facial movements symmetric  Psych: affect bright, alert and oriented  The rest of a comprehensive physical examination is deferred due to PHE (public health emergency) video restrictions           Labs:      6/8/2020 10:21   Sodium 139   Potassium 3.6   Chloride 105   Carbon Dioxide 27   Urea Nitrogen 12   Creatinine 0.75   GFR Estimate 89   GFR Estimate If Black >90   Calcium 8.6   Anion Gap 7   Albumin 3.9   Protein Total 7.2   Bilirubin Total 0.4   Alkaline Phosphatase 69   ALT 24   AST 16   Glucose 84   WBC 3.9 (L)   Hemoglobin 13.7   Hematocrit 39.7   Platelet Count 241   RBC Count 4.04   MCV 98          Assessment & Plan:   1. Locally advanced R breast cancer, ER positive: S/p treatment as above with RCB III disease at the time of surgery. S/p XRT and now undergoing adjuvant Xeloda. She is s/p 7 cycles of  1500 mg BID dosing 14 days on and 7 days off and is tolerating well with minimal symptoms. Her labs are appropriate for C8 to start next week.  She is currently on a break from her letrozole and we will wait for her severe arthralgias to completely resolve- and then discuss starting a new AI.  She is happy with this plan.  She will see Dr Guzman on 6/30 and at that time restart an AI, could probably try Arimidex.      2. TSH: Levothyroxine dose was increased to 125 mcg on 3/23 due to rising TSH. TSH is under better control today, continue at this dose for now.      3. 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  We can re address this later this summer but for now will write her a letter to support her with social distancing.    4. Arthralgias: initially thought to be completely due to the letrozole, however, still not resolved.  Discussed trying Cancer Re[facility] to start exercise and she is willing. Referral placed.     5. Survivorship: I will make an appt for Mily to be seen by myself for a survivorship appointment.  We discussed she needs a baseline DEXA scan to assess her bone status.                   Video-Visit Details    Type of service:  Video Visit    Video Start Time: 7:10  Video End Time: 7:37    Originating Location (pt. Location): HOME    Distant Location (provider location):  [facility]ER [facility]     Platform used for Video Visit: SOLANGE Phelps PA-C

## 2020-06-09 NOTE — PROGRESS NOTES
"Kathy Lei is a 54 year old female who is being evaluated via a billable video visit.      The patient has been notified of following:     \"This video visit will be conducted via a call between you and your physician/provider. We have found that certain health care needs can be provided without the need for an in-person physical exam.  This service lets us provide the care you need with a video conversation.  If a prescription is necessary we can send it directly to your pharmacy.  If lab work is needed we can place an order for that and you can then stop by our lab to have the test done at a later time.    Video visits are billed at different rates depending on your insurance coverage.  Please reach out to your insurance provider with any questions.    If during the course of the call the physician/provider feels a video visit is not appropriate, you will not be charged for this service.\"    Patient has given verbal consent for Video visit? Yes    How would you like to obtain your AVS? [place]    Patient would like the video invitation sent by: Text to cell phone: [phone]    Will anyone else be joining your video visit? No       Secondary Video Option (Doximity), send text message to:  [phone]    I have reviewed and updated the patient's allergies and medication list.    Concerns: Patient has no new concerns.      Refills: None      Arnold Cuenca, EMT    VIDEO VISIT  Jun 9, 2020          REASON FOR VISIT: breast cancer follow up        TREATMENT HISTORY:  A.  Neoadjuvant durvalumab, oliparib, paclitaxel on I SPY 2  B  Neoadjuvant ddAC.  C.  Bilateral mastectomy.  RCB 3 result.  D.  Post-surgical radiation.  Completed 1-6-20.  E.  Begin CREATE-X 1/17/20. With letrozole.      Interval History: Mily is feeling well. She is tolerating Xeloda well apart from having mild fatigue and intermittent nausea, well controlled on taking Zofran PRN. She has not had any mucositis or diarrhea. She has " occasional GERD.  She will have constipation at times and is taking dulcolax or miralax for this as needed. Her skin has been holding up, no major HFS.   She is having joint stiffness and arthralgias from letrozole- she has been off of this since 4/28. It has improved, she gets out of bed easier and moves about easier, they are still there though, minor.  She is a  and now, now working during the pandemic. She admits she is not working out at all, aside from housework and gardening.      No fevers/chills, cough, SOB, or other infectious concerns. ROS otherwise negative.      Vitals:    Video physical exam  General: Patient appears well in no acute distress. Normal body habitus.  Skin: No visualized rash or lesions on visualized skin  Eyes: EOMI, no erythema, sclera icterus or discharge noted  Resp: Appears to be breathing comfortably without accessory muscle usage, speaking in full sentences, no cough  MSK: Appears to have normal range of motion based on visualized movements  Neurologic: No apparent tremors, facial movements symmetric  Psych: affect bright, alert and oriented  The rest of a comprehensive physical examination is deferred due to PHE (public health emergency) video restrictions           Labs:      6/8/2020 10:21   Sodium 139   Potassium 3.6   Chloride 105   Carbon Dioxide 27   Urea Nitrogen 12   Creatinine 0.75   GFR Estimate 89   GFR Estimate If Black >90   Calcium 8.6   Anion Gap 7   Albumin 3.9   Protein Total 7.2   Bilirubin Total 0.4   Alkaline Phosphatase 69   ALT 24   AST 16   Glucose 84   WBC 3.9 (L)   Hemoglobin 13.7   Hematocrit 39.7   Platelet Count 241   RBC Count 4.04   MCV 98          Assessment & Plan:   1. Locally advanced R breast cancer, ER positive: S/p treatment as above with RCB III disease at the time of surgery. S/p XRT and now undergoing adjuvant Xeloda. She is s/p 7 cycles of 1500 mg BID dosing 14 days on and 7 days off and is tolerating well with minimal symptoms.  Her labs are appropriate for C8 to start next week.  She is currently on a break from her letrozole and we will wait for her severe arthralgias to completely resolve- and then discuss starting a new AI.  She is happy with this plan.  She will see Dr Guzman on 6/30 and at that time restart an AI, could probably try Arimidex.      2. TSH: Levothyroxine dose was increased to 125 mcg on 3/23 due to rising TSH. TSH is under better control today, continue at this dose for now.      3. 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  We can re address this later this summer but for now will write her a letter to support her with social distancing.    4. Arthralgias: initially thought to be completely due to the letrozole, however, still not resolved.  Discussed trying Cancer Rehab to start exercise and she is willing. Referral placed.     5. Survivorship: I will make an appt for Mily to be seen by myself for a survivorship appointment.  We discussed she needs a baseline DEXA scan to assess her bone status.                   Video-Visit Details    Type of service:  Video Visit    Video Start Time: 7:10  Video End Time: 7:37    Originating Location (pt. Location): HOME    Distant Location (provider location):  [facility]     Platform used for Video Visit: SOLANGE Phelps PA-C

## 2020-06-29 VITALS
SYSTOLIC BLOOD PRESSURE: 109 MMHG | BODY MASS INDEX: 23.15 KG/M2 | HEART RATE: 94 BPM | RESPIRATION RATE: 14 BRPM | DIASTOLIC BLOOD PRESSURE: 70 MMHG | WEIGHT: 143.2 LBS | TEMPERATURE: 97.9 F | OXYGEN SATURATION: 96 %

## 2020-06-29 DIAGNOSIS — Z17.0 MALIGNANT NEOPLASM OF UPPER-OUTER QUADRANT OF RIGHT BREAST IN FEMALE, ESTROGEN RECEPTOR POSITIVE (H): ICD-10-CM

## 2020-06-29 DIAGNOSIS — C50.411 MALIGNANT NEOPLASM OF UPPER-OUTER QUADRANT OF RIGHT BREAST IN FEMALE, ESTROGEN RECEPTOR POSITIVE (H): ICD-10-CM

## 2020-06-29 LAB
ALBUMIN SERPL-MCNC: 4.1 G/DL (ref 3.4–5)
ALP SERPL-CCNC: 72 U/L (ref 40–150)
ALT SERPL W P-5'-P-CCNC: 24 U/L (ref 0–50)
ANION GAP SERPL CALCULATED.3IONS-SCNC: 5 MMOL/L (ref 3–14)
AST SERPL W P-5'-P-CCNC: 19 U/L (ref 0–45)
BASOPHILS # BLD AUTO: 0 10E9/L (ref 0–0.2)
BASOPHILS NFR BLD AUTO: 0.7 %
BILIRUB SERPL-MCNC: 0.5 MG/DL (ref 0.2–1.3)
BUN SERPL-MCNC: 13 MG/DL (ref 7–30)
CALCIUM SERPL-MCNC: 8.5 MG/DL (ref 8.5–10.1)
CHLORIDE SERPL-SCNC: 105 MMOL/L (ref 94–109)
CO2 SERPL-SCNC: 26 MMOL/L (ref 20–32)
CREAT SERPL-MCNC: 0.74 MG/DL (ref 0.52–1.04)
DIFFERENTIAL METHOD BLD: ABNORMAL
EOSINOPHIL # BLD AUTO: 0.2 10E9/L (ref 0–0.7)
EOSINOPHIL NFR BLD AUTO: 5 %
ERYTHROCYTE [DISTWIDTH] IN BLOOD BY AUTOMATED COUNT: 14.6 % (ref 10–15)
GFR SERPL CREATININE-BSD FRML MDRD: 90 ML/MIN/{1.73_M2}
GLUCOSE SERPL-MCNC: 101 MG/DL (ref 70–99)
HCT VFR BLD AUTO: 40 % (ref 35–47)
HGB BLD-MCNC: 13.6 G/DL (ref 11.7–15.7)
IMM GRANULOCYTES # BLD: 0 10E9/L (ref 0–0.4)
IMM GRANULOCYTES NFR BLD: 0.5 %
LYMPHOCYTES # BLD AUTO: 0.9 10E9/L (ref 0.8–5.3)
LYMPHOCYTES NFR BLD AUTO: 21.4 %
MCH RBC QN AUTO: 33.9 PG (ref 26.5–33)
MCHC RBC AUTO-ENTMCNC: 34 G/DL (ref 31.5–36.5)
MCV RBC AUTO: 100 FL (ref 78–100)
MONOCYTES # BLD AUTO: 0.4 10E9/L (ref 0–1.3)
MONOCYTES NFR BLD AUTO: 8.9 %
NEUTROPHILS # BLD AUTO: 2.8 10E9/L (ref 1.6–8.3)
NEUTROPHILS NFR BLD AUTO: 63.5 %
NRBC # BLD AUTO: 0 10*3/UL
NRBC BLD AUTO-RTO: 0 /100
PLATELET # BLD AUTO: 216 10E9/L (ref 150–450)
POTASSIUM SERPL-SCNC: 3.9 MMOL/L (ref 3.4–5.3)
PROT SERPL-MCNC: 7.5 G/DL (ref 6.8–8.8)
RBC # BLD AUTO: 4.01 10E12/L (ref 3.8–5.2)
SODIUM SERPL-SCNC: 136 MMOL/L (ref 133–144)
WBC # BLD AUTO: 4.4 10E9/L (ref 4–11)

## 2020-06-29 PROCEDURE — 36415 COLL VENOUS BLD VENIPUNCTURE: CPT | Performed by: INTERNAL MEDICINE

## 2020-06-29 PROCEDURE — 80053 COMPREHEN METABOLIC PANEL: CPT | Performed by: INTERNAL MEDICINE

## 2020-06-29 PROCEDURE — 85025 COMPLETE CBC W/AUTO DIFF WBC: CPT | Performed by: INTERNAL MEDICINE

## 2020-06-29 ASSESSMENT — PAIN SCALES - GENERAL: PAINLEVEL: NO PAIN (0)

## 2020-06-29 NOTE — PROGRESS NOTES
"Kathy Lei is a 54 year old female who is being evaluated via a billable video visit.      The patient has been notified of following:     \"This video visit will be conducted via a call between you and your physician/provider. We have found that certain health care needs can be provided without the need for an in-person physical exam.  This service lets us provide the care you need with a video conversation.  If a prescription is necessary we can send it directly to your pharmacy.  If lab work is needed we can place an order for that and you can then stop by our lab to have the test done at a later time.    Video visits are billed at different rates depending on your insurance coverage.  Please reach out to your insurance provider with any questions.    If during the course of the call the physician/provider feels a video visit is not appropriate, you will not be charged for this service.\"    Patient has given verbal consent for Video visit? Yes  How would you like to obtain your AVS? [place]  Patient would like the video invitation sent by: Text to cell phone: [phone]  Will anyone else be joining your video visit? No      *pt has no new needs or concerns  Amanda Garduno CMA on 6/30/2020 at 7:08 AM      Video-Visit Details    Type of service:  Video Visit    Video Start Time: 7:35  Video End Time: 7:49    Originating Location (pt. Location): Home  Distant Location (provider location):  [facility]     Platform used for Video Visit: [employer]              HPI: Kathy Lei is a 52 yo female with a new diagnosis of an estrogen-receptor positive, NH-positive, HER2-negative breast cancer, grade 2, in the upper outer quadrant of the right breast since the end of year 2018.      Mily presented between Christmas and New Years of 2018 with new sharp pains in the upper outer quadrant of the right breast.  She underwent mammographic screening.       IMAGING:  Mammography and ultrasound: "  She had a diagnostic mammogram which showed bilateral prepectoral saline implants.  On the right breast at 11:30 position, there were grouped coarse heterogeneous calcifications spanning 1.3 cm, new since 2014, adjacent calcifications 11-o'clock position posterior depth.  There was an irregular mass in the lateral right breast 9-o'clock position mid-posterior depth, and an additional mass with obscured margins.  No significant changes in the left breast.  Right breast ultrasound was performed.  In the area of the palpable lump in the right breast, 11-o'clock position, 6 cm from the nipple, there is an irregular hypoechoic mass with indistinct margins measuring approximately 1.0 x 0.9 x 1.6 cm in size.  Immediately adjacent to the mass, 11:30 position, are microcalcifications.  In the second area of palpable lump right breast, 9:30 position, 5 cm from the nipple, there was an irregular hypoechoic mass measuring 3.2 x 0.5 x 1.3 cm in size felt to account for the mammographic findings.  There were multiple additional round hypoechoic masses similar to the findings at 9:30 position seen incidentally during real time and not directly palpable.  For example, in the right breast at 8-o'clock position, 4 cm from the nipple, there was a mass measuring 0.8 x 0.6 x 0.6 cm, BI-RADS 4.       Contrast mammogram was subsequently performed and the contrast mammogram showed a large area of mass and non-mass-like enhancement in the upper outer right breast measuring 7.2 cm in greatest dimension, corresponding global asymmetry in the upper outer right breast.  Enhancement extended to the implant without evidence of extension to the skin surface or nipple, and the calcifications were noted as previously found.      PATHOLOGY:  The pathology showed part A, breast needle biopsy 11 o'clock, 6 cm from the nipple, invasive mammary carcinoma, no special type, ductal (and mucinous) [place] grade 2.  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  Calcifications were associated with the DCIS.  There was a focus suspicious for lymphovascular invasion.  Invasive carcinoma was estrogen receptor positive and progesterone receptor negative by immunohistochemistry.  In part B, breast right, 8 o'clock, 4 cm from the nipple, ultrasound-guided core biopsy, invasive mammary carcinoma, no special type, ductal (and mucinous) [place] grade 2, occasional calcifications were noted.  Invasive carcinoma was estrogen receptor positive and progesterone receptor negative by immunohistochemistry.  On quantitation of the ER and ME, ER was positive 99% of the cells staining with strong intensity.  ME was subsequently noted to be positive with 15% of the cells staining with moderate intensity.       There was also a HER2 FISH performed, and the HER2 FISH showed average number of HER2 signals per nucleus 2.6.  Average number of CEN17 signals 1.7 with a ratio of 1.6, VASILIY negative for biopsy A.  For biopsy B, the HER2 signals per nucleus 2.9.  Average number CEN17 signals per nucleus 1.7 with a ratio of 1.7, VASILIY negative.  Overall, by 2018 ASCO/CAP guidelines, this tumor is HER2 negative.     She was enrolled in I-SPY2 trial to Durvulumab, Taxol and Olaparib arm. She completed 12 cycles of weekly Taxol. She also completed 4 cycles  of adriamycin and cyclophosphamide (AC).     PAST MEDICAL HISTORY:  In terms of her breast history, she does have a history of a smaller right breast which was treated with implants 19 years ago.  She has a history of 2 papillomas in the right breast, 1 removed at the 6-o'clock position 5 years ago, another removed at the 6-o'clock position approximately 10 years ago.  These incisions are approximately at the 5:30 to 6-o'clock position.  She has no history of radiation therapy.  No history of breast cancer of any type.  No history of tumor of any kind.  No history of heart problems, heart attack, breathing  problems, blood clots, seizures, stroke, arthritis, peptic ulcer disease or osteoporosis.  No history of bone fractures.  She is not currently participating in a clinical trial. She does have a history of asthma and a history of hypothyroidism.      FAMILY HISTORY:  Entirely negative for breast cancer or ovarian cancer or breast cancer in a male relative.      ALLERGIES:  No known drug allergies.  No allergy to seafood, iodine or contrast dye.  She does not take aspirin.      PAST MENSTRUAL HISTORY:  First period was at age 11.  First and only pregnancy was at age 26.  No miscarriages or abortions.  Occasional use of oral contraceptives in her 20s.  No history of hormone replacement therapy.  Last period was 3 years ago.      SOCIAL HISTORY:  Tobacco history was from age 15 to present, smoking a pack of cigarettes a week.  She is now trying to stop cigarettes.   In terms of alcohol use, in her early teens and early 20s, she drank approximately 10 drinks on the weekend and has tapered off drinking since then.      TREATMENT HISTORY:  A.  Neoadjuvant durvalumab, oliparib, paclitaxel on I SPY 2  B  Neoadjuvant ddAC.  C.  Bilateral mastectomy.  RCB 3 result.  D.  Post-surgical radiation.  Completed 1-6-20.  E.  Begin CREATE-X. With letrozole. Plan is for 24 weeks. Begun January 14.  Ended June 26.     F.  She held the letrozole beginning April 28 because of severe joint pains.  G.  She restarted AI with anastrozole 6-30-20.     INTERVAL HISTORY  Mily Lei returns to clinic with her  to discuss the finding of an RCB3 residual cancer burden of her resected right breast cancer.  The tumor measured at least 10.2 cm in curvilinear shape after neoadjuvant chemotherapy.  The tumor was invasive mucinous carcinoma, [place] grade 2.  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  Extensive angiolymphatic invasion was present.  Margins were negative for carcinoma.  Invasive carcinoma  was 1.5 mm from the anterior superior margin and 6 mm from the anterior inferior margin.  There was benign skin, nipple, and skeletal muscle.  Focal deep dermal lymphovascular invasion was present.  Calcifications were present in the DCIS.  Pathologic stage was [id number] sn.  Overall, the [facility] residual cancer burden was calculated as 3.425 class RCB3.      January 6-2020  Treatment Summary to Date  Treatment Site: Rt CW , nodes + SCV Current Dose: 6040/6040 cGy Fractions: 33/33       HISTORY OF PRESENT ILLNESS:     Mily has no complaints today she is been feeling generally well.  She has no pain no fatigue no depression, occasional anxiety.  She does take Lexapro.      REVIEW OF SYSTEMS:   She denies fevers or chills cough chest pain shortness of breath nausea or vomiting constipation or diarrhea bone pain back pain or headache.  The remainder of a 10-point review of systems is negative.      PHYSICAL EXAMINATION:    None today.  She appeared generally well.  No allopecia.      LABS reviewed with the patient.  CBC and CMP were within normal limits      ASSESSMENT AND PLAN:     1.  Mily Lei is a 54-year-old woman who presented with a locally advanced right breast cancer.  This cancer developed in the context of previous breast augmentation.  On presentation, she had multiple masses occupying an area of approximately 9 cm in the upper outer quadrant with some extension to the subareolar region.  She did not have any clinically apparent lymph node involvement.  She was treated with neoadjuvant chemotherapy on the I-SPY 2 trial and was randomized to paclitaxel, olaparib and durvalumab followed by dose-dense AC.  She tolerated the treatment reasonably well but had an RCB3 result with final stage [id number].  She started on the CREATE-X trial and had adjuvant capecitabine for 24 weeks combined with an aromatase inhibitor, which will be continued for 10 years.    She completed 24 weeks of Xeloda.  I did discuss  with her the option of the SELIN trial at [place].  She does not like driving on the highway and would prefer not to travel to [place] for a trial.  She would like to just continue with an aromatase inhibitor.  2.  Tolerance of the CREATE-X plan.    She tolerated Xeloda quite well.  No hand-foot syndrome at the end of treatment.    3.  Hormone therapy.    Will change her hormonal therapy from letrozole which was held April 28 because of joint pains and we will try anastrozole instead.  We will see her back in 1 month and see how she tolerates it.  We did discuss that she could have the side effects of joint stiffness vaginal dryness hot flashes.  She is willing to try anastrozole.    4.  Antiemetics.  She has Zofran if needed.   5.    Discussion of exercise.  Mily is performing mat exercises and works with hand weights.  I commended her on her exercise and hand weights because they can help with bone density.  6. Breast reconstruction.  Mily is having some discomfort in her breast reconstruction sites and will meet with Dr. Tena in the next month or so.  7.  Lymphedema.  Mily does have some right arm stiffness.  She feels that her exercise is helping.  If she has increasing discomfort we can refer her to lymphedema clinic.  7.  Follow up.    We will see Mily in follow-up in our clinic in 1 month and then in 3 months for routine follow-up. Follow up with ANTOINETTE video visit July 28 with JAVIER LISA. Dexa scan this month. Follow up with me by video visit September 22 with JAVIER LISA. Lymphedema clinic evaluation.       Thank you for allowing us to continue to participate in Mily Lei's care.      Christian Guzman MD      [facility]           I spent 14 minutes with the patient more than 50% of which was in counseling and coordination of care.

## 2020-06-29 NOTE — NURSING NOTE
Chief Complaint   Patient presents with     Blood Draw     Labs drawn via  by RN in lab. VS taken.      Labs drawn via venipuncture. Vital signs taken.   Lakeshia Nash RN

## 2020-06-30 ENCOUNTER — VIRTUAL VISIT (OUTPATIENT)
Dept: ONCOLOGY | Facility: CLINIC | Age: 55
End: 2020-06-30
Attending: PHYSICIAN ASSISTANT
Payer: COMMERCIAL

## 2020-06-30 DIAGNOSIS — Z17.0 MALIGNANT NEOPLASM OF UPPER-OUTER QUADRANT OF RIGHT BREAST IN FEMALE, ESTROGEN RECEPTOR POSITIVE (H): ICD-10-CM

## 2020-06-30 DIAGNOSIS — C50.411 MALIGNANT NEOPLASM OF UPPER-OUTER QUADRANT OF RIGHT BREAST IN FEMALE, ESTROGEN RECEPTOR POSITIVE (H): ICD-10-CM

## 2020-06-30 PROCEDURE — 40000114 ZZH STATISTIC NO CHARGE CLINIC VISIT

## 2020-06-30 PROCEDURE — 99213 OFFICE O/P EST LOW 20 MIN: CPT | Mod: 95 | Performed by: INTERNAL MEDICINE

## 2020-06-30 RX ORDER — ANASTROZOLE 1 MG/1
1 TABLET ORAL DAILY
Qty: 30 TABLET | Refills: 3 | Status: SHIPPED | OUTPATIENT
Start: 2020-06-30 | End: 2020-07-15 | Stop reason: SINTOL

## 2020-06-30 NOTE — LETTER
6/30/2020         RE: Kathy Lei  [address] [place] [address]        Dear Colleague,    Thank you for referring your patient, Kathy Lei, to the [facility]. Please see a copy of my visit note below.    Kathy Lei is a 54 year old female who is being evaluated via a billable video visit.      *pt has no new needs or concerns  Amanda Garduno CMA on 6/30/2020 at 7:08 AM      Video-Visit Details    Type of service:  Video Visit    Video Start Time: 7:35  Video End Time: 7:49    Originating Location (pt. Location): Home  Distant Location (provider location):  [facility]     Platform used for Video Visit: [employer]              HPI: Kathy Lei is a 54 yo female with a new diagnosis of an estrogen-receptor positive, AR-positive, HER2-negative breast cancer, grade 2, in the upper outer quadrant of the right breast since the end of year 2018.      Mily presented between Christmas and New Years of 2018 with new sharp pains in the upper outer quadrant of the right breast.  She underwent mammographic screening.       IMAGING:  Mammography and ultrasound:  She had a diagnostic mammogram which showed bilateral prepectoral saline implants.  On the right breast at 11:30 position, there were grouped coarse heterogeneous calcifications spanning 1.3 cm, new since 2014, adjacent calcifications 11-o'clock position posterior depth.  There was an irregular mass in the lateral right breast 9-o'clock position mid-posterior depth, and an additional mass with obscured margins.  No significant changes in the left breast.  Right breast ultrasound was performed.  In the area of the palpable lump in the right breast, 11-o'clock position, 6 cm from the nipple, there is an irregular hypoechoic mass with indistinct margins measuring approximately 1.0 x 0.9 x 1.6 cm in size.  Immediately adjacent to the mass, 11:30 position, are microcalcifications.  In  the second area of palpable lump right breast, 9:30 position, 5 cm from the nipple, there was an irregular hypoechoic mass measuring 3.2 x 0.5 x 1.3 cm in size felt to account for the mammographic findings.  There were multiple additional round hypoechoic masses similar to the findings at 9:30 position seen incidentally during real time and not directly palpable.  For example, in the right breast at 8-o'clock position, 4 cm from the nipple, there was a mass measuring 0.8 x 0.6 x 0.6 cm, BI-RADS 4.       Contrast mammogram was subsequently performed and the contrast mammogram showed a large area of mass and non-mass-like enhancement in the upper outer right breast measuring 7.2 cm in greatest dimension, corresponding global asymmetry in the upper outer right breast.  Enhancement extended to the implant without evidence of extension to the skin surface or nipple, and the calcifications were noted as previously found.      PATHOLOGY:  The pathology showed part A, breast needle biopsy 11 o'clock, 6 cm from the nipple, invasive mammary carcinoma, no special type, ductal (and mucinous) Ozark grade 2.  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  Calcifications were associated with the DCIS.  There was a focus suspicious for lymphovascular invasion.  Invasive carcinoma was estrogen receptor positive and progesterone receptor negative by immunohistochemistry.  In part B, breast right, 8 o'clock, 4 cm from the nipple, ultrasound-guided core biopsy, invasive mammary carcinoma, no special type, ductal (and mucinous) Ozark grade 2, occasional calcifications were noted.  Invasive carcinoma was estrogen receptor positive and progesterone receptor negative by immunohistochemistry.  On quantitation of the ER and CO, ER was positive 99% of the cells staining with strong intensity.  CO was subsequently noted to be positive with 15% of the cells staining with moderate intensity.        There was also a HER2 FISH performed, and the HER2 FISH showed average number of HER2 signals per nucleus 2.6.  Average number of CEN17 signals 1.7 with a ratio of 1.6, VASILIY negative for biopsy A.  For biopsy B, the HER2 signals per nucleus 2.9.  Average number CEN17 signals per nucleus 1.7 with a ratio of 1.7, VASILIY negative.  Overall, by 2018 ASCO/CAP guidelines, this tumor is HER2 negative.     She was enrolled in I-SPY2 trial to Durvulumab, Taxol and Olaparib arm. She completed 12 cycles of weekly Taxol. She also completed 4 cycles  of adriamycin and cyclophosphamide (AC).     PAST MEDICAL HISTORY:  In terms of her breast history, she does have a history of a smaller right breast which was treated with implants 19 years ago.  She has a history of 2 papillomas in the right breast, 1 removed at the 6-o'clock position 5 years ago, another removed at the 6-o'clock position approximately 10 years ago.  These incisions are approximately at the 5:30 to 6-o'clock position.  She has no history of radiation therapy.  No history of breast cancer of any type.  No history of tumor of any kind.  No history of heart problems, heart attack, breathing problems, blood clots, seizures, stroke, arthritis, peptic ulcer disease or osteoporosis.  No history of bone fractures.  She is not currently participating in a clinical trial. She does have a history of asthma and a history of hypothyroidism.      FAMILY HISTORY:  Entirely negative for breast cancer or ovarian cancer or breast cancer in a male relative.      ALLERGIES:  No known drug allergies.  No allergy to seafood, iodine or contrast dye.  She does not take aspirin.      PAST MENSTRUAL HISTORY:  First period was at age 11.  First and only pregnancy was at age 26.  No miscarriages or abortions.  Occasional use of oral contraceptives in her 20s.  No history of hormone replacement therapy.  Last period was 3 years ago.      SOCIAL HISTORY:  Tobacco history was from age 15 to  present, smoking a pack of cigarettes a week.  She is now trying to stop cigarettes.   In terms of alcohol use, in her early teens and early 20s, she drank approximately 10 drinks on the weekend and has tapered off drinking since then.      TREATMENT HISTORY:  A.  Neoadjuvant durvalumab, oliparib, paclitaxel on I SPY 2  B  Neoadjuvant ddAC.  C.  Bilateral mastectomy.  RCB 3 result.  D.  Post-surgical radiation.  Completed 1-6-20.  E.  Begin CREATE-X. With letrozole. Plan is for 24 weeks. Begun January 14.  Ended June 26.     F.  She held the letrozole beginning April 28 because of severe joint pains.  G.  She restarted AI with anastrozole 6-30-20.     INTERVAL HISTORY  Mily Lei returns to clinic with her  to discuss the finding of an RCB3 residual cancer burden of her resected right breast cancer.  The tumor measured at least 10.2 cm in curvilinear shape after neoadjuvant chemotherapy.  The tumor was invasive mucinous carcinoma, Donna grade 2.  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  Extensive angiolymphatic invasion was present.  Margins were negative for carcinoma.  Invasive carcinoma was 1.5 mm from the anterior superior margin and 6 mm from the anterior inferior margin.  There was benign skin, nipple, and skeletal muscle.  Focal deep dermal lymphovascular invasion was present.  Calcifications were present in the DCIS.  Pathologic stage was [id number] sn.  Overall, the [facility] residual cancer burden was calculated as 3.425 class RCB3.      January 6-2020  Treatment Summary to Date  Treatment Site: Rt CW , nodes + SCV Current Dose: 6040/6040 cGy Fractions: 33/33       HISTORY OF PRESENT ILLNESS:     Mily has no complaints today she is been feeling generally well.  She has no pain no fatigue no depression, occasional anxiety.  She does take Lexapro.      REVIEW OF SYSTEMS:   She denies fevers or chills cough chest pain shortness of breath nausea or  vomiting constipation or diarrhea bone pain back pain or headache.  The remainder of a 10-point review of systems is negative.      PHYSICAL EXAMINATION:    None today.  She appeared generally well.  No allopecia.      LABS reviewed with the patient.  CBC and CMP were within normal limits      ASSESSMENT AND PLAN:     1.  Mily Lei is a 54-year-old woman who presented with a locally advanced right breast cancer.  This cancer developed in the context of previous breast augmentation.  On presentation, she had multiple masses occupying an area of approximately 9 cm in the upper outer quadrant with some extension to the subareolar region.  She did not have any clinically apparent lymph node involvement.  She was treated with neoadjuvant chemotherapy on the I-SPY 2 trial and was randomized to paclitaxel, olaparib and durvalumab followed by dose-dense AC.  She tolerated the treatment reasonably well but had an RCB3 result with final stage [id number].  She started on the CREATE-X trial and had adjuvant capecitabine for 24 weeks combined with an aromatase inhibitor, which will be continued for 10 years.    She completed 24 weeks of Xeloda.  I did discuss with her the option of the SELIN trial at [place].  She does not like driving on the highway and would prefer not to travel to [place] for a trial.  She would like to just continue with an aromatase inhibitor.  2.  Tolerance of the CREATE-X plan.    She tolerated Xeloda quite well.  No hand-foot syndrome at the end of treatment.    3.  Hormone therapy.    Will change her hormonal therapy from letrozole which was held April 28 because of joint pains and we will try anastrozole instead.  We will see her back in 1 month and see how she tolerates it.  We did discuss that she could have the side effects of joint stiffness vaginal dryness hot flashes.  She is willing to try anastrozole.    4.  Antiemetics.  She has Zofran if needed.   5.    Discussion of exercise.   Mily is performing mat exercises and works with hand weights.  I commended her on her exercise and hand weights because they can help with bone density.  6. Breast reconstruction.  Mily is having some discomfort in her breast reconstruction sites and will meet with Dr. Tena in the next month or so.  7.  Lymphedema.  Mily does have some right arm stiffness.  She feels that her exercise is helping.  If she has increasing discomfort we can refer her to lymphedema clinic.  7.  Follow up.    We will see Mily in follow-up in our clinic in 1 month and then in 3 months for routine follow-up. Follow up with ANTOINETTE video visit July 28 with JAVIER LISA. Dexa scan this month. Follow up with me by video visit September 22 with SLOAN CMP. Lymphedema clinic evaluation.       Thank you for allowing us to continue to participate in Mily Lei's care.      Christian Guzman MD      [facility]           I spent 14 minutes with the patient more than 50% of which was in counseling and coordination of care.       Again, thank you for allowing me to participate in the care of your patient.        Sincerely,        Christian Guzman MD

## 2020-07-05 ENCOUNTER — DOCUMENTATION ONLY (OUTPATIENT)
Dept: ONCOLOGY | Facility: CLINIC | Age: 55
End: 2020-07-05

## 2020-07-05 NOTE — PROGRESS NOTES
ORAL CHEMOTHERAPY DISCONTINUATION       Primary Oncologist:  Dr. Guzman  Primary Oncology Clinic: [facility]  Cancer Diagnosis:  Breast  Therapy History:  Xeloda (capecitabine) 1500mg twice daily  Take 3 tablets by mouth twice daily, 14 days on, 7 days off.  C1D1= 01/17/2020  Therapy Ended On:  6/30/2020  Reason For Discontinuation: therapy completed    Additional Notes:  Thank you for the opportunity to be a part of this patient's oral chemotherapy. The oncology pharmacy will no longer be following this patient for oral chemotherapy changes. Feel free to contact us.

## 2020-07-06 ENCOUNTER — HOSPITAL ENCOUNTER (OUTPATIENT)
Dept: PHYSICAL THERAPY | Facility: CLINIC | Age: 55
Setting detail: THERAPIES SERIES
End: 2020-07-06
Attending: INTERNAL MEDICINE
Payer: COMMERCIAL

## 2020-07-06 DIAGNOSIS — C50.411 MALIGNANT NEOPLASM OF UPPER-OUTER QUADRANT OF RIGHT BREAST IN FEMALE, ESTROGEN RECEPTOR POSITIVE (H): ICD-10-CM

## 2020-07-06 DIAGNOSIS — Z17.0 MALIGNANT NEOPLASM OF UPPER-OUTER QUADRANT OF RIGHT BREAST IN FEMALE, ESTROGEN RECEPTOR POSITIVE (H): ICD-10-CM

## 2020-07-06 PROCEDURE — 97110 THERAPEUTIC EXERCISES: CPT | Mod: GP | Performed by: PHYSICAL THERAPIST

## 2020-07-06 PROCEDURE — 97161 PT EVAL LOW COMPLEX 20 MIN: CPT | Mod: GP | Performed by: PHYSICAL THERAPIST

## 2020-07-06 PROCEDURE — 97140 MANUAL THERAPY 1/> REGIONS: CPT | Mod: GP | Performed by: PHYSICAL THERAPIST

## 2020-07-06 PROCEDURE — 97535 SELF CARE MNGMENT TRAINING: CPT | Mod: GP | Performed by: PHYSICAL THERAPIST

## 2020-07-06 NOTE — PROGRESS NOTES
"   07/06/20 1400   Rehab Discipline   Discipline PT   Type of Visit   Type of visit Initial Edema Evaluation       present No   General Information   Start of care 07/06/20   Referring physician Dr. Christian Guzman   Orders Evaluate and treat as indicated   Order date 06/30/20   Medical diagnosis Lymphedema, Breast CA   Onset of illness / date of surgery 08/15/19   Edema onset 08/15/19   Affected body parts Trunk;RUE   Edema etiology Cancer with lymph node dissection;Radiation;Chemo;Surgery;Infection   Location - Cancer with lymph node dissection R breast   Location - Radiation R chest, back, axilla, possible supraclavicular   Radiation comments completed 1/6/20   Chemotherapy comments completed, some neutropenia. Now on hormone therapy   Edema etiology comments Pt reports feeling puffy under her armpit on the R   Pertinent history of current problem (PT: include personal factors and/or comorbidities that impact the POC; OT: include additional occupational profile info) Pt had to have expanders placed at time of mastectomy removed due to infections on 10/31/19   Surgical / medical history reviewed Yes   Prior level of functional mobility Independent, hairdresser   Community support Family / friend caregiver   Patient role / employment history Employed  (not working due to COVID-19 for her safety)   Psychosocial concerns Impaired body image   Living environment [place] [facility]   Fall Risk Screen   Fall screen completed by PT   Have you fallen 2 or more times in the past year? No   Have you fallen and had an injury in the past year? No   Is patient a fall risk? No   Functional Scales   Shoulder Pain and Disability Index (score out of 100). A higher score indicates greater impairment. 12.31   Subjective Report   Patient report of symptoms Tight under the arm, especially in the morning when reaching.   Patient / Family Goals   Patient / family goals statement \"I want to know what exercises to do to " "prevent swelling.\"   Pain   Patient currently in pain No   Pain comments Would have pain with stretching   Cognitive Status   Orientation Orientation to person, place and time   Level of consciousness Alert   Follows commands and answers questions 100% of the time   Personal safety and judgement Intact   Memory Intact   Edema Exam / Assessment   Skin condition Pitting;Intact   Skin condition comments Pt without UE swelling per measurements.  She does have pitting in areas on the R side of chest near incisions. Soft puffy swelling under the R axilla at lateral chest   Pitting 1+   Pitting location R chest   Scar Yes   Location B chest   Mobility L side is clean and mobile, R side is very adhered    Stemmer sign Negative   Ulceration No   Girth Measurements   Girth Measurements Refer to separate girth measurement flowsheet   Volume UE   Right UE (mL) 1496   Left UE (mL) 1439   Range of Motion   ROM comments AROM standing: R flexion 145, abduction 105; L flexion 145, abduction is 146. Pt feels tension in side of trunk with R flexion despite good ROM   Strength   Strength comments functional   Posture   Posture Forward head position   Posture comments Pt has scoliosis. Large surgery as a child and she feels that she is having more problems recently since she has not been as active with treatment. Also pain from her AI.   Activities of Daily Living   Activities of Daily Living independent   Bed Mobility   Bed mobility independent   Transfers   Transfers independent   Gait / Locomotion   Gait / Locomotion independent   Sensory   Sensory perception comments no deficits noted   Coordination   Coordination Gross motor coordination appropriate   Muscle Tone   Muscle tone No deficits were identified   Planned Edema Interventions   Planned edema interventions Manual lymph drainage;Fit for compression garment;Exercises;Precautions to prevent infection / exacerbation;Education;Manual therapy;ADL training;Skin care / " precautions;Scar mobilization;Soft tissue mobilization;Myofascial release;Home management program development   Clinical Impression   Criteria for skilled therapeutic intervention met Yes   Therapy diagnosis Lymphedema, impaired shoulder ROM   Influenced by the following impairments / conditions Stage 2   Functional limitations due to impairments / conditions reaching overhead, pain, ADLs, risk of worsening edema, fibrosis and infection. Plan to have LIVIER reconstruction and needs optimal tissue mobility   Clinical Presentation Stable/Uncomplicated   Clinical Presentation Rationale Pt is motivated and active, feels better after first session   Clinical Decision Making (Complexity) Low complexity   Treatment Frequency 1x/week   Treatment duration 75 days   Patient / family and/or staff in agreement with plan of care Yes   Risks and benefits of therapy have been explained Yes   Education Assessment   Preferred learning style Listening;Reading;Demonstration;Pictures / video   Barriers to learning No barriers   Total Evaluation Time   PT Shadi Low Complexity Minutes (87598) 20

## 2020-07-08 ENCOUNTER — ANCILLARY PROCEDURE (OUTPATIENT)
Dept: BONE DENSITY | Facility: CLINIC | Age: 55
End: 2020-07-08
Attending: PHYSICIAN ASSISTANT
Payer: COMMERCIAL

## 2020-07-08 DIAGNOSIS — Z17.0 MALIGNANT NEOPLASM OF UPPER-OUTER QUADRANT OF RIGHT BREAST IN FEMALE, ESTROGEN RECEPTOR POSITIVE (H): ICD-10-CM

## 2020-07-08 DIAGNOSIS — C50.411 MALIGNANT NEOPLASM OF UPPER-OUTER QUADRANT OF RIGHT BREAST IN FEMALE, ESTROGEN RECEPTOR POSITIVE (H): ICD-10-CM

## 2020-07-14 ENCOUNTER — HOSPITAL ENCOUNTER (OUTPATIENT)
Dept: PHYSICAL THERAPY | Facility: CLINIC | Age: 55
Setting detail: THERAPIES SERIES
End: 2020-07-14
Attending: INTERNAL MEDICINE
Payer: COMMERCIAL

## 2020-07-14 PROCEDURE — 97535 SELF CARE MNGMENT TRAINING: CPT | Mod: GP | Performed by: PHYSICAL THERAPIST

## 2020-07-14 PROCEDURE — 97110 THERAPEUTIC EXERCISES: CPT | Mod: GP | Performed by: PHYSICAL THERAPIST

## 2020-07-14 PROCEDURE — 97140 MANUAL THERAPY 1/> REGIONS: CPT | Mod: GP | Performed by: PHYSICAL THERAPIST

## 2020-07-15 DIAGNOSIS — Z17.0 MALIGNANT NEOPLASM OF UPPER-OUTER QUADRANT OF RIGHT BREAST IN FEMALE, ESTROGEN RECEPTOR POSITIVE (H): ICD-10-CM

## 2020-07-15 DIAGNOSIS — C50.411 MALIGNANT NEOPLASM OF UPPER-OUTER QUADRANT OF RIGHT BREAST IN FEMALE, ESTROGEN RECEPTOR POSITIVE (H): ICD-10-CM

## 2020-07-15 RX ORDER — LETROZOLE 2.5 MG/1
2.5 TABLET, FILM COATED ORAL DAILY
Qty: 90 TABLET | Refills: 3 | Status: ON HOLD | OUTPATIENT
Start: 2020-07-15 | End: 2021-02-11

## 2020-07-17 ENCOUNTER — TELEPHONE (OUTPATIENT)
Dept: ONCOLOGY | Facility: CLINIC | Age: 55
End: 2020-07-17

## 2020-07-22 DIAGNOSIS — I42.9 CARDIOMYOPATHY, UNSPECIFIED TYPE (H): ICD-10-CM

## 2020-07-24 ENCOUNTER — MYC REFILL (OUTPATIENT)
Dept: ONCOLOGY | Facility: CLINIC | Age: 55
End: 2020-07-24

## 2020-07-24 ENCOUNTER — MYC REFILL (OUTPATIENT)
Dept: FAMILY MEDICINE | Facility: CLINIC | Age: 55
End: 2020-07-24

## 2020-07-24 DIAGNOSIS — J45.40 MODERATE PERSISTENT ASTHMA WITHOUT COMPLICATION: ICD-10-CM

## 2020-07-24 DIAGNOSIS — I42.9 CARDIOMYOPATHY, UNSPECIFIED TYPE (H): ICD-10-CM

## 2020-07-24 RX ORDER — LISINOPRIL 5 MG/1
5 TABLET ORAL DAILY
Qty: 90 TABLET | Refills: 3 | Status: CANCELLED | OUTPATIENT
Start: 2020-07-24

## 2020-07-24 RX ORDER — ALBUTEROL SULFATE 90 UG/1
AEROSOL, METERED RESPIRATORY (INHALATION)
Qty: 18 G | Status: CANCELLED | OUTPATIENT
Start: 2020-07-24

## 2020-07-27 DIAGNOSIS — C50.411 MALIGNANT NEOPLASM OF UPPER-OUTER QUADRANT OF RIGHT BREAST IN FEMALE, ESTROGEN RECEPTOR POSITIVE (H): ICD-10-CM

## 2020-07-27 DIAGNOSIS — Z17.0 MALIGNANT NEOPLASM OF UPPER-OUTER QUADRANT OF RIGHT BREAST IN FEMALE, ESTROGEN RECEPTOR POSITIVE (H): ICD-10-CM

## 2020-07-27 LAB
ALBUMIN SERPL-MCNC: 3.9 G/DL (ref 3.4–5)
ALP SERPL-CCNC: 66 U/L (ref 40–150)
ALT SERPL W P-5'-P-CCNC: 22 U/L (ref 0–50)
ANION GAP SERPL CALCULATED.3IONS-SCNC: 5 MMOL/L (ref 3–14)
AST SERPL W P-5'-P-CCNC: 17 U/L (ref 0–45)
BASOPHILS # BLD AUTO: 0 10E9/L (ref 0–0.2)
BASOPHILS NFR BLD AUTO: 0.5 %
BILIRUB SERPL-MCNC: 0.5 MG/DL (ref 0.2–1.3)
BUN SERPL-MCNC: 11 MG/DL (ref 7–30)
CALCIUM SERPL-MCNC: 8.8 MG/DL (ref 8.5–10.1)
CHLORIDE SERPL-SCNC: 106 MMOL/L (ref 94–109)
CO2 SERPL-SCNC: 25 MMOL/L (ref 20–32)
CREAT SERPL-MCNC: 0.64 MG/DL (ref 0.52–1.04)
DIFFERENTIAL METHOD BLD: ABNORMAL
EOSINOPHIL # BLD AUTO: 0.2 10E9/L (ref 0–0.7)
EOSINOPHIL NFR BLD AUTO: 4.6 %
ERYTHROCYTE [DISTWIDTH] IN BLOOD BY AUTOMATED COUNT: 13 % (ref 10–15)
GFR SERPL CREATININE-BSD FRML MDRD: >90 ML/MIN/{1.73_M2}
GLUCOSE SERPL-MCNC: 101 MG/DL (ref 70–99)
HCT VFR BLD AUTO: 42.5 % (ref 35–47)
HGB BLD-MCNC: 14.1 G/DL (ref 11.7–15.7)
IMM GRANULOCYTES # BLD: 0 10E9/L (ref 0–0.4)
IMM GRANULOCYTES NFR BLD: 0.3 %
LYMPHOCYTES # BLD AUTO: 1.1 10E9/L (ref 0.8–5.3)
LYMPHOCYTES NFR BLD AUTO: 28.8 %
MCH RBC QN AUTO: 32.5 PG (ref 26.5–33)
MCHC RBC AUTO-ENTMCNC: 33.2 G/DL (ref 31.5–36.5)
MCV RBC AUTO: 98 FL (ref 78–100)
MONOCYTES # BLD AUTO: 0.4 10E9/L (ref 0–1.3)
MONOCYTES NFR BLD AUTO: 10.5 %
NEUTROPHILS # BLD AUTO: 2.1 10E9/L (ref 1.6–8.3)
NEUTROPHILS NFR BLD AUTO: 55.3 %
NRBC # BLD AUTO: 0 10*3/UL
NRBC BLD AUTO-RTO: 0 /100
PLATELET # BLD AUTO: 272 10E9/L (ref 150–450)
POTASSIUM SERPL-SCNC: 4 MMOL/L (ref 3.4–5.3)
PROT SERPL-MCNC: 7.4 G/DL (ref 6.8–8.8)
RBC # BLD AUTO: 4.34 10E12/L (ref 3.8–5.2)
SODIUM SERPL-SCNC: 136 MMOL/L (ref 133–144)
WBC # BLD AUTO: 3.7 10E9/L (ref 4–11)

## 2020-07-28 ENCOUNTER — VIRTUAL VISIT (OUTPATIENT)
Dept: ONCOLOGY | Facility: CLINIC | Age: 55
End: 2020-07-28
Attending: PHYSICIAN ASSISTANT
Payer: COMMERCIAL

## 2020-07-28 DIAGNOSIS — I42.9 CARDIOMYOPATHY, UNSPECIFIED TYPE (H): ICD-10-CM

## 2020-07-28 DIAGNOSIS — J45.40 MODERATE PERSISTENT ASTHMA WITHOUT COMPLICATION: ICD-10-CM

## 2020-07-28 PROCEDURE — 99214 OFFICE O/P EST MOD 30 MIN: CPT | Mod: 95 | Performed by: PHYSICIAN ASSISTANT

## 2020-07-28 PROCEDURE — 40001009 ZZH VIDEO/TELEPHONE VISIT; NO CHARGE

## 2020-07-28 RX ORDER — LISINOPRIL 5 MG/1
5 TABLET ORAL DAILY
Qty: 90 TABLET | Refills: 3 | Status: SHIPPED | OUTPATIENT
Start: 2020-07-28 | End: 2021-07-12

## 2020-07-28 RX ORDER — LISINOPRIL 5 MG/1
TABLET ORAL
Qty: 90 TABLET | Refills: 3 | OUTPATIENT
Start: 2020-07-28

## 2020-07-28 RX ORDER — ALBUTEROL SULFATE 90 UG/1
AEROSOL, METERED RESPIRATORY (INHALATION)
Qty: 18 G | Status: SHIPPED | OUTPATIENT
Start: 2020-07-28 | End: 2021-09-01

## 2020-07-28 NOTE — LETTER
"    7/28/2020         RE: Kathy Lei  [address] [place] [address]      Kathy Lei is a 54 year old female who is being evaluated via a billable video visit.      The patient has been notified of following:     \"This video visit will be conducted via a call between you and your physician/provider. We have found that certain health care needs can be provided without the need for an in-person physical exam.  This service lets us provide the care you need with a video conversation.  If a prescription is necessary we can send it directly to your pharmacy.  If lab work is needed we can place an order for that and you can then stop by our lab to have the test done at a later time.    Video visits are billed at different rates depending on your insurance coverage.  Please reach out to your insurance provider with any questions.    If during the course of the call the physician/provider feels a video visit is not appropriate, you will not be charged for this service.\"    Patient has given verbal consent for Video visit? Yes    How would you like to obtain your AVS? MyChart     If you are dropped from the video visit, the video invite should be resent to: Text to cell phone: [phone]     Will anyone else be joining your video visit? No    Vitals - Patient Reported  Weight (Patient Reported): 62.6 kg (138 lb)  Height (Patient Reported): 167.5 cm (5' 5.95\")  BMI (Based on Pt Reported Ht/Wt): 22.31  Pain Score: No Pain (0)    Since starting Letrozole patient has been breaking out in hives.    Shakir Skaggs LPN    VIDEO VISIT  Jul 28, 2020             REASON FOR VISIT: breast cancer follow up        TREATMENT HISTORY:  A.  Neoadjuvant durvalumab, oliparib, paclitaxel on I SPY 2  B  Neoadjuvant ddAC.  C.  Bilateral mastectomy.  RCB 3 result.  D.  Post-surgical radiation.  Completed 1-6-20.  E.  Begin CREATE-X 1/17/20. With letrozole.      Interval History: Mily is feeling well. She is " tolerating Xeloda well and completed in June 2020.  Since she stopped her energy levels are better, nausea is gone and she just overall is feeling well.   She is having joint stiffness and arthralgias from letrozole- she has been off of this since 4/28, the arthralgias improved but still persisted.  She did try ~2 weeks of Arimidex, this caused dizziness.  She could hardly handle taking it, stopped and went back to letrozole.  Unfortunately, this lead to hives and itching, she has been intermittently taking x 2 weeks, each time it leads to itchy hives.   She is a  and now, now working during the pandemic. She admits she is not working out at all, aside from housework and gardening. She is thinking of being a PCA for her 90 year old aging failing mother.      No fevers/chills, cough, SOB, or other infectious concerns. ROS otherwise negative.      Vitals:    Video physical exam  General: Patient appears well in no acute distress. Normal body habitus.  Skin: No visualized rash or lesions on visualized skin  Eyes: EOMI, no erythema, sclera icterus or discharge noted  Resp: Appears to be breathing comfortably without accessory muscle usage, speaking in full sentences, no cough  MSK: Appears to have normal range of motion based on visualized movements  Neurologic: No apparent tremors, facial movements symmetric  Psych: affect bright, alert and oriented  The rest of a comprehensive physical examination is deferred due to PHE (public health emergency) video restrictions           Labs:       7/27/2020 09:47   Sodium 136   Potassium 4.0   Chloride 106   Carbon Dioxide 25   Urea Nitrogen 11   Creatinine 0.64   GFR Estimate >90   GFR Estimate If Black >90   Calcium 8.8   Anion Gap 5   Albumin 3.9   Protein Total 7.4   Bilirubin Total 0.5   Alkaline Phosphatase 66   ALT 22   AST 17   Glucose 101 (H)   WBC 3.7 (L)   Hemoglobin 14.1   Hematocrit 42.5   Platelet Count 272   RBC Count 4.34   MCV 98   MCH 32.5   MCHC  33.2   RDW 13.0   Diff Method Automated Method   % Neutrophils 55.3   % Lymphocytes 28.8   % Monocytes 10.5   % Eosinophils 4.6   % Basophils 0.5   % Immature Granulocytes 0.3   Nucleated RBCs 0   Absolute Neutrophil 2.1       DEXA: worst score is -1.7     Assessment & Plan:   1. Locally advanced R breast cancer, ER positive: S/p treatment as above with RCB III disease at the time of surgery. S/p XRT and now undergoing adjuvant Xeloda, completed in June of 2020.  She had dizziness with Arimidex, then hives on letrozole.  Will stop the letrozole and have a wash out week and then move on with Aromasin.  I'll follow up with her in a month for tolerability.     We reviewed survivorship data today, I counseled her on exercise, calcium + Vit D, osteopenia, mood, diet and ongoing screening.  I walked her through our [employer] survivorship online videos and other resources as well.  She was grateful for the information.      2. TSH: did not discuss.  She is going to see her PCP sometime this year for routine health maintenance.      3. Work- Mily is not working- although is thinking of being a PCA for her mom so that she can keep her safe and out of a nursing home.     4. Arthralgias: initially thought to be completely due to the letrozole, however, still not resolved.  Discussed trying Cancer Rehab, which she started.  Also seeing lymphedema for her right arm stiffness.             Video-Visit Details    Type of service:  Video Visit    Video Start Time: 9:16  Video End Time: 9:50    Originating Location (pt. Location): HOME    Distant Location (provider location):  [facility]     Platform used for Video Visit: Alayna Phelps PA-C

## 2020-07-28 NOTE — PROGRESS NOTES
"Kathy Lei is a 54 year old female who is being evaluated via a billable video visit.      The patient has been notified of following:     \"This video visit will be conducted via a call between you and your physician/provider. We have found that certain health care needs can be provided without the need for an in-person physical exam.  This service lets us provide the care you need with a video conversation.  If a prescription is necessary we can send it directly to your pharmacy.  If lab work is needed we can place an order for that and you can then stop by our lab to have the test done at a later time.    Video visits are billed at different rates depending on your insurance coverage.  Please reach out to your insurance provider with any questions.    If during the course of the call the physician/provider feels a video visit is not appropriate, you will not be charged for this service.\"    Patient has given verbal consent for Video visit? Yes    How would you like to obtain your AVS? MyChart     If you are dropped from the video visit, the video invite should be resent to: Text to cell phone: [phone]     Will anyone else be joining your video visit? No    Vitals - Patient Reported  Weight (Patient Reported): 62.6 kg (138 lb)  Height (Patient Reported): 167.5 cm (5' 5.95\")  BMI (Based on Pt Reported Ht/Wt): 22.31  Pain Score: No Pain (0)    Since starting Letrozole patient has been breaking out in hives.    Shakir Skaggs LPN    VIDEO VISIT  Jul 28, 2020             REASON FOR VISIT: breast cancer follow up        TREATMENT HISTORY:  A.  Neoadjuvant durvalumab, oliparib, paclitaxel on I SPY 2  B  Neoadjuvant ddAC.  C.  Bilateral mastectomy.  RCB 3 result.  D.  Post-surgical radiation.  Completed 1-6-20.  E.  Begin CREATE-X 1/17/20. With letrozole.      Interval History: Mily is feeling well. She is tolerating Xeloda well and completed in June 2020.  Since she stopped her energy levels are " better, nausea is gone and she just overall is feeling well.   She is having joint stiffness and arthralgias from letrozole- she has been off of this since 4/28, the arthralgias improved but still persisted.  She did try ~2 weeks of Arimidex, this caused dizziness.  She could hardly handle taking it, stopped and went back to letrozole.  Unfortunately, this lead to hives and itching, she has been intermittently taking x 2 weeks, each time it leads to itchy hives.   She is a  and now, now working during the pandemic. She admits she is not working out at all, aside from housework and gardening. She is thinking of being a PCA for her 90 year old aging failing mother.      No fevers/chills, cough, SOB, or other infectious concerns. ROS otherwise negative.      Vitals:    Video physical exam  General: Patient appears well in no acute distress. Normal body habitus.  Skin: No visualized rash or lesions on visualized skin  Eyes: EOMI, no erythema, sclera icterus or discharge noted  Resp: Appears to be breathing comfortably without accessory muscle usage, speaking in full sentences, no cough  MSK: Appears to have normal range of motion based on visualized movements  Neurologic: No apparent tremors, facial movements symmetric  Psych: affect bright, alert and oriented  The rest of a comprehensive physical examination is deferred due to PHE (public health emergency) video restrictions           Labs:       7/27/2020 09:47   Sodium 136   Potassium 4.0   Chloride 106   Carbon Dioxide 25   Urea Nitrogen 11   Creatinine 0.64   GFR Estimate >90   GFR Estimate If Black >90   Calcium 8.8   Anion Gap 5   Albumin 3.9   Protein Total 7.4   Bilirubin Total 0.5   Alkaline Phosphatase 66   ALT 22   AST 17   Glucose 101 (H)   WBC 3.7 (L)   Hemoglobin 14.1   Hematocrit 42.5   Platelet Count 272   RBC Count 4.34   MCV 98   MCH 32.5   MCHC 33.2   RDW 13.0   Diff Method Automated Method   % Neutrophils 55.3   % Lymphocytes 28.8   %  Monocytes 10.5   % Eosinophils 4.6   % Basophils 0.5   % Immature Granulocytes 0.3   Nucleated RBCs 0   Absolute Neutrophil 2.1       DEXA: worst score is -1.7     Assessment & Plan:   1. Locally advanced R breast cancer, ER positive: S/p treatment as above with RCB III disease at the time of surgery. S/p XRT and now undergoing adjuvant Xeloda, completed in June of 2020.  She had dizziness with Arimidex, then hives on letrozole.  Will stop the letrozole and have a wash out week and then move on with Aromasin.  I'll follow up with her in a month for tolerability.     We reviewed survivorship data today, I counseled her on exercise, calcium + Vit D, osteopenia, mood, diet and ongoing screening.  I walked her through our [employer] survivorship online videos and other resources as well.  She was grateful for the information.      2. TSH: did not discuss.  She is going to see her PCP sometime this year for routine health maintenance.      3. Work- Mily is not working- although is thinking of being a PCA for her mom so that she can keep her safe and out of a nursing home.     4. Arthralgias: initially thought to be completely due to the letrozole, however, still not resolved.  Discussed trying Cancer Rehab, which she started.  Also seeing lymphedema for her right arm stiffness.             Video-Visit Details    Type of service:  Video Visit    Video Start Time: 9:16  Video End Time: 9:50    Originating Location (pt. Location): HOME    Distant Location (provider location):  [facility]     Platform used for Video Visit: Alanya Phelps PA-C

## 2020-08-10 DIAGNOSIS — E03.9 HYPOTHYROIDISM, UNSPECIFIED TYPE: ICD-10-CM

## 2020-08-10 DIAGNOSIS — C50.411 MALIGNANT NEOPLASM OF UPPER-OUTER QUADRANT OF RIGHT BREAST IN FEMALE, ESTROGEN RECEPTOR POSITIVE (H): Primary | ICD-10-CM

## 2020-08-10 DIAGNOSIS — Z17.0 MALIGNANT NEOPLASM OF UPPER-OUTER QUADRANT OF RIGHT BREAST IN FEMALE, ESTROGEN RECEPTOR POSITIVE (H): Primary | ICD-10-CM

## 2020-08-10 RX ORDER — LEVOTHYROXINE SODIUM 125 UG/1
125 TABLET ORAL DAILY
Qty: 90 TABLET | Refills: 1 | Status: SHIPPED | OUTPATIENT
Start: 2020-08-10 | End: 2020-10-26

## 2020-08-10 RX ORDER — EXEMESTANE 25 MG/1
25 TABLET ORAL DAILY
Qty: 30 TABLET | Refills: 0 | Status: SHIPPED | OUTPATIENT
Start: 2020-08-10 | End: 2020-10-26

## 2020-08-27 NOTE — PROGRESS NOTES
"Kathy Lei is a 54 year old female who is being evaluated via a billable video visit.      The patient has been notified of following:     \"This video visit will be conducted via a call between you and your physician/provider. We have found that certain health care needs can be provided without the need for an in-person physical exam.  This service lets us provide the care you need with a video conversation.  If a prescription is necessary we can send it directly to your pharmacy.  If lab work is needed we can place an order for that and you can then stop by our lab to have the test done at a later time.    Video visits are billed at different rates depending on your insurance coverage.  Please reach out to your insurance provider with any questions.    If during the course of the call the physician/provider feels a video visit is not appropriate, you will not be charged for this service.\"    Patient has given verbal consent for Video visit? Yes  How would you like to obtain your AVS? MyChart  If you are dropped from the video visit, the video invite should be resent to: Text to cell phone: [phone]   Will anyone else be joining your video visit? No       Vitals - Patient Reported  Weight (Patient Reported): 63.5 kg (140 lb)  Height (Patient Reported): 165.1 cm (5' 5\")  BMI (Based on Pt Reported Ht/Wt): 23.3  Pain Score: No Pain (0)      I have reviewed and updated the patient's allergies and medication list.    Concerns: No concerns  Refills: No refills needed.        Valentine Mosqueda CMA      Video-Visit Details    Type of service:  Video Visit    Video Start Time: 08:47 am  Video End Time: 9:04 AM    Originating Location (pt. Location): Home    Distant Location (provider location):  [facility]     Platform used for Video Visit: Selwyn Osborne PA-C      Oncology/Hematology Visit Note  Aug 28, 2020    Reason for Visit: Follow up of breast cancer    History of Present " "Illness:   A.  Neoadjuvant durvalumab, oliparib, paclitaxel on I SPY 2  B  Neoadjuvant ddAC.  C.  Bilateral mastectomy.  RCB 3 result.  D.  Post-surgical radiation.  Completed 1-6-20.  E.  Begin CREATE-X 1/17/20. With letrozole    Interval History:  Kathy Lei was met with on follow up. She is doing well. She tried the aromasin but had terrible hot flashes, nausea, and anxiety while taking. She since switched back to letrozole. Has the body aches, but states they are \"not too bad.\" She has been trying yoga with some relief. Has not yet set up a time to meet with Cancer Rehab. Otherwise she has not new complaints. Energy level has been good, she is trying to figure out what to do with her job. She uses her inhaler for occasional shortness of breath, but this is unchanged. No fevers, chills, cough. Denies any other new pains.     Review of Systems:  Patient denies  unexplained weight changes, headaches, dizziness, new lumps or bumps, abdominal pain, bleeding issues, or rash.    Current Outpatient Medications   Medication Sig Dispense Refill     acetaminophen (TYLENOL) 325 MG tablet Take 1-2 tablets (325-650 mg) by mouth every 4 hours as needed for other (mild pain) 50 tablet 0     ADVIL 200 MG OR TABS 1 TABLET EVERY 4 TO 6 HOURS AS NEEDED 0 0     albuterol (PROVENTIL) (2.5 MG/3ML) 0.083% neb solution Take 1 vial (2.5 mg) by nebulization every 6 hours as needed for shortness of breath / dyspnea or wheezing 30 mL PRN     albuterol (VENTOLIN HFA) 108 (90 Base) MCG/ACT inhaler INHALE 1 TO 2 PUFFS INTO THE LUNGS EVERY 4 HOURS AS NEEDED FOR SHORTNESS OF BREATH OR DIFFICULTY BREATHING 18 g PRN     exemestane (AROMASIN) 25 MG tablet Take 1 tablet (25 mg) by mouth daily 30 tablet 0     fluticasone (FLOVENT HFA) 110 MCG/ACT inhaler Inhale 2 puffs into the lungs 2 times daily 1 Inhaler PRN     hydrocortisone 2.5 % cream Apply 1 g topically 4 times daily 90 g 1     hydrocortisone 2.5 % cream Apply topically 2 times " daily 30 g 1     letrozole (FEMARA) 2.5 MG tablet Take 1 tablet (2.5 mg) by mouth daily 90 tablet 3     levothyroxine (SYNTHROID/LEVOTHROID) 125 MCG tablet Take 1 tablet (125 mcg) by mouth daily 90 tablet 1     lisinopril (ZESTRIL) 5 MG tablet Take 1 tablet (5 mg) by mouth daily 90 tablet 3     LORazepam (ATIVAN) 0.5 MG tablet Take 1 tablet (0.5 mg) by mouth daily as needed for anxiety TAKE 1 TABLET(0.5 MG) BY MOUTH EVERY 4 HOURS AS NEEDED FOR ANXIETY OR NAUSEA OR VOMITING OR SLEEP 30 tablet 2     ondansetron (ZOFRAN) 8 MG tablet Take 1 tablet (8 mg) by mouth every 8 hours as needed for nausea 60 tablet 3     order for DME Cranial prosthesis 1 Units 1     sertraline (ZOLOFT) 100 MG tablet Take 1 tablet (100 mg) by mouth daily 30 tablet PRN     sodium chloride (OCEAN) 0.65 % nasal spray Spray in both nostrils four times daily 30 mL 3     traZODone (DESYREL) 50 MG tablet Take 1-2 tablets ( mg) by mouth nightly as needed for sleep 90 tablet PRN       Physical Examination:  [facility] 11/02/2014   Wt Readings from Last 10 Encounters:   06/29/20 65 kg (143 lb 3.2 oz)   04/27/20 63.7 kg (140 lb 8 oz)   04/07/20 63.4 kg (139 lb 12.8 oz)   03/04/20 61.9 kg (136 lb 8 oz)   02/25/20 60.8 kg (134 lb)   02/04/20 62.2 kg (137 lb 3.2 oz)   01/14/20 62.6 kg (138 lb 0.1 oz)   01/14/20 62.6 kg (138 lb 1.6 oz)   01/06/20 61.2 kg (135 lb)   12/31/19 62.9 kg (138 lb 9.6 oz)     Video physical exam  General: Patient appears well in no acute distress. Normal body habitus.  Skin: No visualized rash or lesions on visualized skin  Eyes: EOMI, no erythema, sclera icterus or discharge noted  Resp: Appears to be breathing comfortably without accessory muscle usage, speaking in full sentences, no cough  MSK: Appears to have normal range of motion based on visualized movements  Neurologic: No apparent tremors, facial movements symmetric  Psych: affect normal, alert and oriented    The rest of a comprehensive physical examination is deferred due  to PHE (public health emergency) video restrictions      Laboratory Data:  No labs drawn today    Assessment and Plan:  1. Locally advanced R breast cancer, ER positive: S/p treatment as above with RCB III disease at the time of surgery. S/p XRT and now undergoing adjuvant Xeloda, completed in June of 2020.  Previously did not tolerate Arimidex or aromasin. Now back in Letrozole. Tolerating fairly well aside from body aches, which she feels are manageable.      2. TSH: did not discuss, encouraged her to make a follow up appointment with PCP for routine health maintenance.     3. Arthralgias: letrozole contributing.  Discussed trying Cancer Rehab, which she will reach out to. Also doing yoga.  Seeing lymphedema for her right arm stiffness. Advised supportive cares.       Niru Osborne PA-C  [facility]  [address], [place] [id number] [phone]

## 2020-08-28 ENCOUNTER — VIRTUAL VISIT (OUTPATIENT)
Dept: ONCOLOGY | Facility: CLINIC | Age: 55
End: 2020-08-28
Attending: INTERNAL MEDICINE
Payer: COMMERCIAL

## 2020-08-28 DIAGNOSIS — Z17.0 MALIGNANT NEOPLASM OF UPPER-OUTER QUADRANT OF RIGHT BREAST IN FEMALE, ESTROGEN RECEPTOR POSITIVE (H): Primary | ICD-10-CM

## 2020-08-28 DIAGNOSIS — C50.411 MALIGNANT NEOPLASM OF UPPER-OUTER QUADRANT OF RIGHT BREAST IN FEMALE, ESTROGEN RECEPTOR POSITIVE (H): Primary | ICD-10-CM

## 2020-08-28 PROCEDURE — G0463 HOSPITAL OUTPT CLINIC VISIT: HCPCS | Mod: GT,ZF

## 2020-08-28 PROCEDURE — 40001009 ZZH VIDEO/TELEPHONE VISIT; NO CHARGE

## 2020-08-28 PROCEDURE — 99213 OFFICE O/P EST LOW 20 MIN: CPT | Mod: 95 | Performed by: PHYSICIAN ASSISTANT

## 2020-08-28 NOTE — LETTER
"    8/28/2020         RE: Kathy Lei  [address] [place] [address]        Dear Colleague,    Thank you for referring your patient, Kathy Lei, to the [facility]. Please see a copy of my visit note below.    Kathy Lei is a 54 year old female who is being evaluated via a billable video visit.      The patient has been notified of following:     \"This video visit will be conducted via a call between you and your physician/provider. We have found that certain health care needs can be provided without the need for an in-person physical exam.  This service lets us provide the care you need with a video conversation.  If a prescription is necessary we can send it directly to your pharmacy.  If lab work is needed we can place an order for that and you can then stop by our lab to have the test done at a later time.    Video visits are billed at different rates depending on your insurance coverage.  Please reach out to your insurance provider with any questions.    If during the course of the call the physician/provider feels a video visit is not appropriate, you will not be charged for this service.\"    Patient has given verbal consent for Video visit? Yes  How would you like to obtain your AVS? MyChart  If you are dropped from the video visit, the video invite should be resent to: Text to cell phone: [phone]   Will anyone else be joining your video visit? No       Vitals - Patient Reported  Weight (Patient Reported): 63.5 kg (140 lb)  Height (Patient Reported): 165.1 cm (5' 5\")  BMI (Based on Pt Reported Ht/Wt): 23.3  Pain Score: No Pain (0)      I have reviewed and updated the patient's allergies and medication list.    Concerns: No concerns  Refills: No refills needed.        Valentine Mosqueda CMA      Video-Visit Details    Type of service:  Video Visit    Video Start Time: 08:47 am  Video End Time: 9:04 AM    Originating Location (pt. Location): " "Home    Distant Location (provider location):  [facility]     Platform used for Video Visit: Selwyn Osborne PA-C      Oncology/Hematology Visit Note  Aug 28, 2020    Reason for Visit: Follow up of breast cancer    History of Present Illness:   A.  Neoadjuvant durvalumab, oliparib, paclitaxel on I SPY 2  B  Neoadjuvant ddAC.  C.  Bilateral mastectomy.  RCB 3 result.  D.  Post-surgical radiation.  Completed 1-6-20.  E.  Begin CREATE-X 1/17/20. With letrozole    Interval History:  Kathy Lei was met with on follow up. She is doing well. She tried the aromasin but had terrible hot flashes, nausea, and anxiety while taking. She since switched back to letrozole. Has the body aches, but states they are \"not too bad.\" She has been trying yoga with some relief. Has not yet set up a time to meet with Cancer Rehab. Otherwise she has not new complaints. Energy level has been good, she is trying to figure out what to do with her job. She uses her inhaler for occasional shortness of breath, but this is unchanged. No fevers, chills, cough. Denies any other new pains.     Review of Systems:  Patient denies  unexplained weight changes, headaches, dizziness, new lumps or bumps, abdominal pain, bleeding issues, or rash.    Current Outpatient Medications   Medication Sig Dispense Refill     acetaminophen (TYLENOL) 325 MG tablet Take 1-2 tablets (325-650 mg) by mouth every 4 hours as needed for other (mild pain) 50 tablet 0     ADVIL 200 MG OR TABS 1 TABLET EVERY 4 TO 6 HOURS AS NEEDED 0 0     albuterol (PROVENTIL) (2.5 MG/3ML) 0.083% neb solution Take 1 vial (2.5 mg) by nebulization every 6 hours as needed for shortness of breath / dyspnea or wheezing 30 mL PRN     albuterol (VENTOLIN HFA) 108 (90 Base) MCG/ACT inhaler INHALE 1 TO 2 PUFFS INTO THE LUNGS EVERY 4 HOURS AS NEEDED FOR SHORTNESS OF BREATH OR DIFFICULTY BREATHING 18 g PRN     exemestane (AROMASIN) 25 MG tablet Take 1 tablet (25 mg) by " mouth daily 30 tablet 0     fluticasone (FLOVENT HFA) 110 MCG/ACT inhaler Inhale 2 puffs into the lungs 2 times daily 1 Inhaler PRN     hydrocortisone 2.5 % cream Apply 1 g topically 4 times daily 90 g 1     hydrocortisone 2.5 % cream Apply topically 2 times daily 30 g 1     letrozole (FEMARA) 2.5 MG tablet Take 1 tablet (2.5 mg) by mouth daily 90 tablet 3     levothyroxine (SYNTHROID/LEVOTHROID) 125 MCG tablet Take 1 tablet (125 mcg) by mouth daily 90 tablet 1     lisinopril (ZESTRIL) 5 MG tablet Take 1 tablet (5 mg) by mouth daily 90 tablet 3     LORazepam (ATIVAN) 0.5 MG tablet Take 1 tablet (0.5 mg) by mouth daily as needed for anxiety TAKE 1 TABLET(0.5 MG) BY MOUTH EVERY 4 HOURS AS NEEDED FOR ANXIETY OR NAUSEA OR VOMITING OR SLEEP 30 tablet 2     ondansetron (ZOFRAN) 8 MG tablet Take 1 tablet (8 mg) by mouth every 8 hours as needed for nausea 60 tablet 3     order for DME Cranial prosthesis 1 Units 1     sertraline (ZOLOFT) 100 MG tablet Take 1 tablet (100 mg) by mouth daily 30 tablet PRN     sodium chloride (OCEAN) 0.65 % nasal spray Spray in both nostrils four times daily 30 mL 3     traZODone (DESYREL) 50 MG tablet Take 1-2 tablets ( mg) by mouth nightly as needed for sleep 90 tablet PRN       Physical Examination:  [facility] 11/02/2014   Wt Readings from Last 10 Encounters:   06/29/20 65 kg (143 lb 3.2 oz)   04/27/20 63.7 kg (140 lb 8 oz)   04/07/20 63.4 kg (139 lb 12.8 oz)   03/04/20 61.9 kg (136 lb 8 oz)   02/25/20 60.8 kg (134 lb)   02/04/20 62.2 kg (137 lb 3.2 oz)   01/14/20 62.6 kg (138 lb 0.1 oz)   01/14/20 62.6 kg (138 lb 1.6 oz)   01/06/20 61.2 kg (135 lb)   12/31/19 62.9 kg (138 lb 9.6 oz)     Video physical exam  General: Patient appears well in no acute distress. Normal body habitus.  Skin: No visualized rash or lesions on visualized skin  Eyes: EOMI, no erythema, sclera icterus or discharge noted  Resp: Appears to be breathing comfortably without accessory muscle usage, speaking in full  sentences, no cough  MSK: Appears to have normal range of motion based on visualized movements  Neurologic: No apparent tremors, facial movements symmetric  Psych: affect normal, alert and oriented    The rest of a comprehensive physical examination is deferred due to PHE (public health emergency) video restrictions      Laboratory Data:  No labs drawn today    Assessment and Plan:  1. Locally advanced R breast cancer, ER positive: S/p treatment as above with RCB III disease at the time of surgery. S/p XRT and now undergoing adjuvant Xeloda, completed in June of 2020.  Previously did not tolerate Arimidex or aromasin. Now back in Letrozole. Tolerating fairly well aside from body aches, which she feels are manageable.      2. TSH: did not discuss, encouraged her to make a follow up appointment with PCP for routine health maintenance.     3. Arthralgias: letrozole contributing.  Discussed trying Cancer Rehab, which she will reach out to. Also doing yoga.  Seeing lymphedema for her right arm stiffness. Advised supportive cares.       Niru Osborne PA-C  [facility]  [address], [place] [id number] [phone]

## 2020-09-01 NOTE — PROGRESS NOTES
[facility], [place]  Radiation Oncology Follow-up Note  Sep 2, 2020    Kathy Lei   MRN: [id number]  : 1965     DISEASE TREATED: Infiltrating ductal carcinoma of the right breast , clinical T3N0, status post neoadjuvant chemotherapy (below), status post bilateral mastectomy and right sentinel lymph node biopsy ([id number]) ER+/WY+/HER2-     INTERVAL SINCE COMPLETION OF RADIATION THERAPY: 7 1/2 months; completed treatment on 2020.    SYSTEMIC THERAPY  (1) I-SPY 2 (Neoadjuvant duvalumab, oliparib, paclitaxel) followed by dose dense AC    HORMONAL THERAPY: letrozole    TYPE OF RADIATION THERAPY ADMINISTERED:   (1) 3D conformal radiotherapy to the right chest wall, axilla, SCV and IM chain: 5040 cGy in 180 cGy fractions for a total of 28 fractions    (2) 9 MeV electrons boost to the right chest wall (scar): 1000 cGy in 200 cGy fractions for a total of 5 fractions       SUBJECTIVE:   Ms. Lei is a 54 year old female here for follow up for her locally advanced right sided infiltrating ductal carcinoma treated as above. Her oncologic history is as follows: around 2018, she developed a sharp pain in the upper outer quadrant of her right breast. Diagnostic mammogram and ultrasound on 2019 revealed multiple masses in the right breast with the largest mass at 9:30 o'clock position 5 cm from the nipple measuring 3.2 cm and second largest mass at 11 o'clock position 6 cm from the nipple measuring 1.6 cm.  A contrast-enhanced mammogram on 2019 a large area of mass and non-mass-like enhancement measuring 7.2 cm in greatest dimension.  Biopsy from the 2 dominant masses revealed invasive ductal carcinoma with a minor mucinous component, Donna grade 2, with a focus suspicious for lymphovascular space invasion.  Tumor was ER positive, WY positive and HER-2/johan nonamplified by FISH.  There was also DCIS, nuclear grade 3, solid and cribriform types with  comedonecrosis.  Further evaluation with MRI of the breast on 02/04 revealed the enhancement to measure up to 8.9 cm in greatest dimension with contiguous or superficial involvement of the posterior areola.  There was no suspicious axillary or internal mammary chain adenopathy.  CT of the chest, abdomen and pelvis on 02/05 did not reveal evidence of distant metastases.       Ms. Lei enrolled in the I-SPY 2 trial and received weekly Taxol along with durvalumab and olaparib.  This was followed by dose-dense AC x4 cycles. On 08/15, she underwent bilateral skin-sparing mastectomy, right axillary sentinel lymph node mapping and biopsy, removal of breast implants and placement of tissue expanders.  Pathology from the mastectomy specimen revealed residual invasive mucinous carcinoma, L[place] grade 2, spanning at least 10.2 cm in linear extent with treatment response noted.  There was extensive angiolymphatic invasion and focal deep dermal lymphovascular space invasion. Closest margin was 1.5 mm from the anterior superior margin.  There was focal DCIS, high nuclear grade, solid type, with widely negative margins.  Two sentinel lymph nodes were removed.  One contained 1 mm micrometastasis with no extracapsular extension.  The second sentinel lymph node had an isolated tumor cell.  There was no definite treatment response noted in the lymph nodes.  Final pathologic stage [id number] (sentinel), RCB class III.      Ms. Lei was then referred to us for adjuvant radiation therapy.  Unfortunately she developed cellulitis, necessitating the removal of expanders.  This has resulted in some delay of her treatment but she ultimately she started treatment on 11/14.  She developed significant skin reaction, requiring application of Silvadene cream.  She otherwise tolerated the treatment well.        Given the significant residual disease, Dr. Guzman recommended 24 weeks of Xeloda, which she started Mid January and completed  "in June.  She has been on endocrine therapy with Letrozole, which she has been tolerating.         Ms. Valente is seen today and is doing well.  She has great energy level, but opted not to resume her work as a  due to Covid-19 and upcoming reconstructive surgery.  She has some tightness in her right shoulder and has been doing home exercise.  She met with lymphedema therapy and underwent a few sessions and discharged with home therapy.  She was advised to use \"dry brush\" to stimulate lymphatics.  She notes no new lumps and bumps.  She is meeting with Dr. Ramos next Tuesday to discuss reconstruction.     OBJECTIVE:   LMP 11/02/2014     PHYSICAL EXAM:  Gen: Alert, in NAD  Eyes: EOMI, sclera anicteric  Pulm: Breathing comfortably on room air, no audible wheezes or ronchi  CV: Well-perfused, no cyanosis  Abdominal: Soft, nondistended  Skin: Normal color and turgor  Neurologic/MSK: motor grossly intact, normal gait  Lymph: No cervical adenopathy. No axillary adenopathy.  Breast: Bilateral breasts absent, compatible with bilateral mastectomies. The skin and soft tissue is firmer on the irradiated right side.  No suspicious nodules or discoloration.  Indurated tissue on the right lateral chest wall, compatible with soft tissue lymphedema.   Ext: No apparent edema of the right upper extremity.  Measurement at 15 cm from elbow reveals circumference of 29 cm on the right and 30 cm on the left. Slightly decreased ROM with respect to right shoulder adduction on the right.   Psych: Appropriate mood and affect    IMPRESSION: No clinical evidence for recurrent disease at this time. Mild chest wall edema.  No upper arm lymphedema.      RECOMMENDATIONS:    1. Discussed the importance of continued therapy to minimize chest wall and prevent upper arm edema.    2. Will bring her back in 6 months for ongoing monitoring of lymphedema and cosmesis.      The patient was seen and examined with Dr. Jaswant Bettencourt " MD Randy

## 2020-09-02 ENCOUNTER — OFFICE VISIT (OUTPATIENT)
Dept: RADIATION ONCOLOGY | Facility: CLINIC | Age: 55
End: 2020-09-02
Attending: RADIOLOGY
Payer: COMMERCIAL

## 2020-09-02 ENCOUNTER — MYC REFILL (OUTPATIENT)
Dept: FAMILY MEDICINE | Facility: CLINIC | Age: 55
End: 2020-09-02

## 2020-09-02 VITALS — WEIGHT: 144 LBS | BODY MASS INDEX: 23.28 KG/M2

## 2020-09-02 DIAGNOSIS — F41.9 ANXIETY: ICD-10-CM

## 2020-09-02 DIAGNOSIS — C50.411 MALIGNANT NEOPLASM OF UPPER-OUTER QUADRANT OF RIGHT BREAST IN FEMALE, ESTROGEN RECEPTOR POSITIVE (H): Primary | ICD-10-CM

## 2020-09-02 DIAGNOSIS — Z17.0 MALIGNANT NEOPLASM OF UPPER-OUTER QUADRANT OF RIGHT BREAST IN FEMALE, ESTROGEN RECEPTOR POSITIVE (H): Primary | ICD-10-CM

## 2020-09-02 PROCEDURE — G0463 HOSPITAL OUTPT CLINIC VISIT: HCPCS | Performed by: RADIOLOGY

## 2020-09-02 RX ORDER — LEVOTHYROXINE SODIUM 112 UG/1
112 TABLET ORAL DAILY
COMMUNITY
Start: 2020-08-08 | End: 2020-10-26

## 2020-09-02 RX ORDER — LORAZEPAM 0.5 MG/1
0.5 TABLET ORAL DAILY PRN
Qty: 30 TABLET | Refills: 2 | Status: CANCELLED | OUTPATIENT
Start: 2020-09-02

## 2020-09-02 NOTE — PROGRESS NOTES
FOLLOW-UP VISIT    Patient Name: Kathy Lei      : 1965     Age: 54 year old        ______________________________________________________________________________     Chief Complaint   Patient presents with     Cancer     Radiation therapy follow up right chest wall 6040 cGy completed 20     Wt 65.3 kg (144 lb)   LMP 2014   BMI 23.28 kg/m       Date Radiation Completed: Right chest wall 6040 cGy completed 20    Pain  Current history of pain associated with this visit:   Intensity: 5/10  Current: Soreness, intermittent  Location: both breast, right breast > left  Treatment: ibuprofen and tylenol prn     Labs  Other Labs: No    Imaging  None    Other Appointments: Seeing plastic surgeon next week, see below    MD Name: Dr. Ramos Appointment Date: next Tuesday sept 8th    MD Name: Appointment Date:   MD Name: Appointment Date:   Other Appointment Notes: Patient is going to make an appt with her primary care provider soon to check TSH levels.     Residual Radiation side effect: Soreness upon touching skin of both breasts, right worse than left. Lymphedema in right axillary area, patient has been to lymphedema clinic and is doing massage. Energy levels good.      Additional Instructions: n/a    Nurse face-to-face time: Level 4:  15 min face to face time

## 2020-09-02 NOTE — LETTER
2020         RE: Kathy Lei   [address] [place] [address]        Dear Colleague,    Thank you for referring your patient, Kathy Lei, to the RADIATION ONCOLOGY CLINIC. Please see a copy of my visit note below.    [facility], [place]  Radiation Oncology Follow-up Note  Sep 2, 2020    Kathy Lei   MRN: [id number]  : 1965     DISEASE TREATED: Infiltrating ductal carcinoma of the right breast , clinical T3N0, status post neoadjuvant chemotherapy (below), status post bilateral mastectomy and right sentinel lymph node biopsy ([id number]) ER+/CT+/HER2-     INTERVAL SINCE COMPLETION OF RADIATION THERAPY: 7  months; completed treatment on 2020.    SYSTEMIC THERAPY  (1) I-SPY 2 (Neoadjuvant duvalumab, oliparib, paclitaxel) followed by dose dense AC    HORMONAL THERAPY: letrozole    TYPE OF RADIATION THERAPY ADMINISTERED:   (1) 3D conformal radiotherapy to the right chest wall, axilla, SCV and IM chain: 5040 cGy in 180 cGy fractions for a total of 28 fractions    (2) 9 MeV electrons boost to the right chest wall (scar): 1000 cGy in 200 cGy fractions for a total of 5 fractions       SUBJECTIVE:   Ms. Lei is a 54 year old female here for follow up for her locally advanced right sided infiltrating ductal carcinoma treated as above. Her oncologic history is as follows: around 2018, she developed a sharp pain in the upper outer quadrant of her right breast. Diagnostic mammogram and ultrasound on 2019 revealed multiple masses in the right breast with the largest mass at 9:30 o'clock position 5 cm from the nipple measuring 3.2 cm and second largest mass at 11 o'clock position 6 cm from the nipple measuring 1.6 cm.  A contrast-enhanced mammogram on 2019 a large area of mass and non-mass-like enhancement measuring 7.2 cm in greatest dimension.  Biopsy from the 2 dominant masses revealed invasive ductal carcinoma  with a minor mucinous component, Donna grade 2, with a focus suspicious for lymphovascular space invasion.  Tumor was ER positive, AR positive and HER-2/johan nonamplified by FISH.  There was also DCIS, nuclear grade 3, solid and cribriform types with comedonecrosis.  Further evaluation with MRI of the breast on 02/04 revealed the enhancement to measure up to 8.9 cm in greatest dimension with contiguous or superficial involvement of the posterior areola.  There was no suspicious axillary or internal mammary chain adenopathy.  CT of the chest, abdomen and pelvis on 02/05 did not reveal evidence of distant metastases.       Ms. Lei enrolled in the I-SPY 2 trial and received weekly Taxol along with durvalumab and olaparib.  This was followed by dose-dense AC x4 cycles. On 08/15, she underwent bilateral skin-sparing mastectomy, right axillary sentinel lymph node mapping and biopsy, removal of breast implants and placement of tissue expanders.  Pathology from the mastectomy specimen revealed residual invasive mucinous carcinoma, Needham grade 2, spanning at least 10.2 cm in linear extent with treatment response noted.  There was extensive angiolymphatic invasion and focal deep dermal lymphovascular space invasion. Closest margin was 1.5 mm from the anterior superior margin.  There was focal DCIS, high nuclear grade, solid type, with widely negative margins.  Two sentinel lymph nodes were removed.  One contained 1 mm micrometastasis with no extracapsular extension.  The second sentinel lymph node had an isolated tumor cell.  There was no definite treatment response noted in the lymph nodes.  Final pathologic stage [id number] (sentinel), RCB class III.      Ms. Lei was then referred to us for adjuvant radiation therapy.  Unfortunately she developed cellulitis, necessitating the removal of expanders.  This has resulted in some delay of her treatment but she ultimately she started treatment on 11/14.  She  "developed significant skin reaction, requiring application of Silvadene cream.  She otherwise tolerated the treatment well.        Given the significant residual disease, Dr. Guzman recommended 24 weeks of Xeloda, which she started Mid January and completed in June.  She has been on endocrine therapy with Letrozole, which she has been tolerating.         Ms. Valente is seen today and is doing well.  She has great energy level, but opted not to resume her work as a  due to Covid-19 and upcoming reconstructive surgery.  She has some tightness in her right shoulder and has been doing home exercise.  She met with lymphedema therapy and underwent a few sessions and discharged with home therapy.  She was advised to use \"dry brush\" to stimulate lymphatics.  She notes no new lumps and bumps.  She is meeting with Dr. Ramos next Tuesday to discuss reconstruction.     OBJECTIVE:   LMP 11/02/2014     PHYSICAL EXAM:  Gen: Alert, in NAD  Eyes: EOMI, sclera anicteric  Pulm: Breathing comfortably on room air, no audible wheezes or ronchi  CV: Well-perfused, no cyanosis  Abdominal: Soft, nondistended  Skin: Normal color and turgor  Neurologic/MSK: motor grossly intact, normal gait  Lymph: No cervical adenopathy. No axillary adenopathy.  Breast: Bilateral breasts absent, compatible with bilateral mastectomies. The skin and soft tissue is firmer on the irradiated right side.  No suspicious nodules or discoloration.  Indurated tissue on the right lateral chest wall, compatible with soft tissue lymphedema.   Ext: No apparent edema of the right upper extremity.  Measurement at 15 cm from elbow reveals circumference of 29 cm on the right and 30 cm on the left. Slightly decreased ROM with respect to right shoulder adduction on the right.   Psych: Appropriate mood and affect    IMPRESSION: No clinical evidence for recurrent disease at this time. Mild chest wall edema.  No upper arm lymphedema.      RECOMMENDATIONS:    1. " Discussed the importance of continued therapy to minimize chest wall and prevent upper arm edema.    2. Will bring her back in 6 months for ongoing monitoring of lymphedema and cosmesis.      The patient was seen and examined with Dr. Padilla.       Rut Padilla MD          FOLLOW-UP VISIT    Patient Name: Kathy Lei      : 1965     Age: 54 year old        ______________________________________________________________________________     Chief Complaint   Patient presents with     Cancer     Radiation therapy follow up right chest wall 6040 cGy completed 20     Wt 65.3 kg (144 lb)   LMP 2014   BMI 23.28 kg/m       Date Radiation Completed: Right chest wall 6040 cGy completed 20    Pain  Current history of pain associated with this visit:   Intensity: 5/10  Current: Soreness, intermittent  Location: both breast, right breast > left  Treatment: ibuprofen and tylenol prn     Labs  Other Labs: No    Imaging  None    Other Appointments: Seeing plastic surgeon next week, see below    MD Name: Dr. Ramos Appointment Date: next Tuesday sept 8th    MD Name: Appointment Date:   MD Name: Appointment Date:   Other Appointment Notes: Patient is going to make an appt with her primary care provider soon to check TSH levels.     Residual Radiation side effect: Soreness upon touching skin of both breasts, right worse than left. Lymphedema in right axillary area, patient has been to lymphedema clinic and is doing massage. Energy levels good.      Additional Instructions: n/a    Nurse face-to-face time: Level 4:  15 min face to face time          Again, thank you for allowing me to participate in the care of your patient.        Sincerely,        Rut Padilla MD

## 2020-09-03 ENCOUNTER — VIRTUAL VISIT (OUTPATIENT)
Dept: FAMILY MEDICINE | Facility: OTHER | Age: 55
End: 2020-09-03
Payer: COMMERCIAL

## 2020-09-03 PROCEDURE — 99421 OL DIG E/M SVC 5-10 MIN: CPT | Performed by: PHYSICIAN ASSISTANT

## 2020-09-04 DIAGNOSIS — Z20.822 SUSPECTED 2019 NOVEL CORONAVIRUS INFECTION: Primary | ICD-10-CM

## 2020-09-04 LAB
SARS-COV-2 RNA SPEC QL NAA+PROBE: NOT DETECTED
SPECIMEN SOURCE: NORMAL

## 2020-09-04 NOTE — PROGRESS NOTES
"Date: 2020 10:50:56  Clinician: Marvin Wong  Clinician NPI: [id number]  Patient: Kathy Lei  Patient : 1965  Patient Address:  [address], [place] [address]  Patient Phone: [phone]  Visit Protocol: URI  Patient Summary:  Kathy is a 54 year old ( : 1965 ) female who initiated a Visit for COVID-19 (Coronavirus) evaluation and screening. When asked the question \"Please sign me up to receive news, health information and promotions from [facility].\", Kathy responded \"Yes\".    When asked when her symptoms started, Kathy reported that she does not have any symptoms.   She denies taking antibiotic medication in the past month and having recent facial or sinus surgery in the past 60 days.    Pertinent COVID-19 (Coronavirus) information  In the past 14 days, Kathy has not worked in a congregate living setting.   She does not work or volunteer as healthcare worker or a  and does not work or volunteer in a healthcare facility.   Kathy also has not lived in a congregate living setting in the past 14 days. She does not live with a healthcare worker.   Kathy has had a close contact with a laboratory-confirmed COVID-19 patient in the last 14 days. She was not exposed at her work. Additional information about contact with COVID-19 (Coronavirus) patient as reported by the patient (free text): My friend tested positive for Covid. I was with her outside and inside...unfortunately no masks. This was on Tuesday Aug 25 and she fell ill on Saturday the ...tested positive along with her  and son on Monday the .  The reason I am concerned is that I've been doctoring from breast cancer and I have asthma since early 20's. I use a couple of inhalers each day.  Should I be tested? I do not have symptoms yet but worried about being asymptomatic?    Patient reported they are not living in the same household with a COVID-19 positive patient.  Patient was in " an enclosed space for greater than 15 minutes with a COVID-19 patient.  Since December 2019, Kathy and has not had upper respiratory infection or influenza-like illness. Has not been diagnosed with lab-confirmed COVID-19 test   Pertinent medical history  Kathy does not get yeast infections when she takes antibiotics.   Kathy does not need a return to work/school note.   Weight: 144 lbs   Kathy does not smoke or use smokeless tobacco.   Additional information as reported by the patient (free text): I was on Capecitabine chemo pills for months after having HER2 NEGATIVE ESTROGEN POSITIVE double mastectomy in 2019. I now take Letrozole to kill the ESTROGEN.  My white count is starting to go back up after completing Capecitabine   Weight: 144 lbs    MEDICATIONS: Multiple Vitamin-Minerals oral, levothyroxine oral, sertraline oral, Ventolin HFA inhalation, Flovent HFA inhalation, lisinopril oral, letrozole oral, ALLERGIES: NKDA  Clinician Response:  Dear Kathy,   Your symptoms show that you may have coronavirus (COVID-19). This illness can cause fever, cough and trouble breathing. Many people get a mild case and get better on their own. Some people can get very sick.  What should I do?  We would like to test you for this virus.   1. Please call [phone] to schedule your visit. Explain that you were referred by [facility] to have a COVID-19 test. Be ready to share your [facility] visit ID number.  The following will serve as your written order for this COVID Test, ordered by me, for the indication of suspected COVID [Z20.828]: The test will be ordered in [employer], our electronic health record, after you are scheduled. It will show as ordered and authorized by Wally Gutierrez MD.  Order: COVID-19 (Coronavirus) PCR for SYMPTOMATIC testing from [facility].      2. When it's time for your COVID test:  Stay at least 6 feet away from others. (If someone will drive you to your test, stay in the backseat, as far away from the  " as you can.)   Cover your mouth and nose with a mask, tissue or washcloth.  Go straight to the testing site. Don't make any stops on the way there or back.      3.Starting now: Stay home and away from others (self-isolate) until:   You've had no fever---and no medicine that reduces fever---for one full day (24 hours). And...   Your other symptoms have gotten better. For example, your cough or breathing has improved. And...   At least 10 days have passed since your symptoms started.       During this time, don't leave the house except for testing or medical care.   Stay in your own room, even for meals. Use your own bathroom if you can.   Stay away from others in your home. No hugging, kissing or shaking hands. No visitors.  Don't go to work, school or anywhere else.    Clean \"high touch\" surfaces often (doorknobs, counters, handles, etc.). Use a household cleaning spray or wipes. You'll find a full list of  on the EPA website: www.epa.gov/pesticide-registration/list-n-disinfectants-use-against-sars-cov-2.   Cover your mouth and nose with a mask, tissue or washcloth to avoid spreading germs.  Wash your hands and face often. Use soap and water.  Caregivers in these groups are at risk for severe illness due to COVID-19:  o People 65 years and older  o People who live in a nursing home or long-term care facility  o People with chronic disease (lung, heart, cancer, diabetes, kidney, liver, immunologic)  o People who have a weakened immune system, including those who:   Are in cancer treatment  Take medicine that weakens the immune system, such as corticosteroids  Had a bone marrow or organ transplant  Have an immune deficiency  Have poorly controlled HIV or AIDS  Are obese (body mass index of 40 or higher)  Smoke regularly   o Caregivers should wear gloves while washing dishes, handling laundry and cleaning bedrooms and bathrooms.  o Use caution when washing and drying laundry: Don't shake dirty laundry, " and use the warmest water setting that you can.  o For more tips, go to www.cdc.gov/coronavirus/2019-ncov/downloads/10Things.pdf.       How can I take care of myself?   Get lots of rest. Drink extra fluids (unless a doctor has told you not to).   Take Tylenol (acetaminophen) for fever or pain. If you have liver or kidney problems, ask your family doctor if it's okay to take Tylenol.   Adults can take either:    650 mg (two 325 mg pills) every 4 to 6 hours, or...   1,000 mg (two 500 mg pills) every 8 hours as needed.    Note: Don't take more than 3,000 mg in one day. Acetaminophen is found in many medicines (both prescribed and over-the-counter medicines). Read all labels to be sure you don't take too much.   For children, check the Tylenol bottle for the right dose. The dose is based on the child's age or weight.    If you have other health problems (like cancer, heart failure, an organ transplant or severe kidney disease): Call your specialty clinic if you don't feel better in the next 2 days.       Know when to call 911. Emergency warning signs include:    Trouble breathing or shortness of breath Pain or pressure in the chest that doesn't go away Feeling confused like you haven't felt before, or not being able to wake up Bluish-colored lips or face.  Where can I get more information?   [facility] -- About COVID-19: [email or web address]   CDC -- What to Do If You're Sick: www.cdc.gov/coronavirus/2019-ncov/about/steps-when-sick.html   CDC -- Ending Home Isolation: www.cdc.gov/coronavirus/2019-ncov/hcp/disposition-in-home-patients.html   CDC -- Caring for Someone: www.cdc.gov/coronavirus/2019-ncov/if-you-are-sick/care-for-someone.html   [facility] -- Interim Guidance for Hospital Discharge to Home: www.health.[facility].[place]/diseases/coronavirus/hcp/hospdischarge.pdf   [facility] clinical trials (COVID-19 research studies): clinicalaffairs.[facility]-clinical-trials    Below are the COVID-19  hotlines at the [place] Department [facility] ([facility]). Interpreters are available.    For health questions: Call 182-819-3415 or 1-746.215.2793 (7 a.m. to 7 p.m.) For questions about schools and childcare: Call 853-035-0196 or 1-433.561.4489 (7 a.m. to 7 p.m.)    Diagnosis: Contact with and (suspected) exposure to other viral communicable diseases  Diagnosis ICD: Z20.828

## 2020-09-16 NOTE — PROGRESS NOTES
Outpatient Physical Therapy Discharge Note     Patient: Kathy Lei  : 1965    Beginning/End Dates of Reporting Period:  20 to 2020    Referring Provider: Dr. Christian Guzman    Therapy Diagnosis: Impaired ROM     Client Self Report: Pt has been using the swell spot at night.  She was  sore after the last session but that improved.     Objective Measurements:  Objective Measure: R standing shoulder AROM  Details: Flex 151, abd 105  Objective Measure: Edema   Details: less swelling noted at AM appt under axilla, no pitting on chest but some hard fluid    Goals:  Goal Identifier ROM   Goal Description Pt will increase R shoulder AROM flexion to 155 and Abduction to 140 to allow reaching overhead and greater ability to perform work duties   Target Date 20   Date Met      Progress:improved, not to goals, pt cancelled further appt due to improvement, no needs     Goal Identifier Tissue mobility   Goal Description Pt will have improved tissue mobility to increase glide of R chest and decrease lymphedema.   Target Date 20   Date Met   20   Progress: Improved over 2 visits     Goal Identifier Risk Reduction   Goal Description Pt will report 3 risk reduction measures for worsening lymphedema or developing in the R UE.   Target Date 20   Date Met   20   Progress:       Progress Toward Goals:   Progress this reporting period: Pt seen for 2 visits. She had good improvement and elected to cancel subsequent visits.    Plan:  Discharge from therapy.    Discharge:    Reason for Discharge: Patient chooses to discontinue therapy.  No further needs at the time    Equipment Issued: home program, swell spot and bandage    Discharge Plan: Patient to continue home program.

## 2020-09-24 DIAGNOSIS — F32.A DEPRESSION, UNSPECIFIED DEPRESSION TYPE: ICD-10-CM

## 2020-09-24 NOTE — LETTER
September 29, 2020      Kathy Lei  [address] [place] [address]        Radha Minor,      We received a refill request for sertraline (ZOLOFT) 100 MG tablet; however, you are due for an annual physical and chronic care visit to receive a refill. Please call us at [phone] at your earliest convenience to schedule an appointment.       We greatly appreciate the opportunity to serve you and thank you for trusting us with your health care.        Your health care team at [facility]           Sincerely,        Rachel Arauz NP

## 2020-09-27 NOTE — PROGRESS NOTES
"Kathy Lei is a 54 year old female who is being evaluated via a billable video visit.      The patient has been notified of following:     \"This video visit will be conducted via a call between you and your physician/provider. We have found that certain health care needs can be provided without the need for an in-person physical exam.  This service lets us provide the care you need with a video conversation.  If a prescription is necessary we can send it directly to your pharmacy.  If lab work is needed we can place an order for that and you can then stop by our lab to have the test done at a later time.    Video visits are billed at different rates depending on your insurance coverage.  Please reach out to your insurance provider with any questions.    If during the course of the call the physician/provider feels a video visit is not appropriate, you will not be charged for this service.\"    Patient has given verbal consent for Video visit? Yes    How would you like to obtain your AVS? MyChart    If you are dropped from the video visit, the video invite should be resent to: Text to cell phone: [phone]    Will anyone else be joining your video visit? No         I have reviewed and updated the patient's allergies and medication list.    Concerns: No new concerns.  Refills: Sertraline.      Vitals - Patient Reported  Weight (Patient Reported): 65.3 kg (144 lb)  Height (Patient Reported): 167.5 cm (5' 5.95\")  BMI (Based on Pt Reported Ht/Wt): 23.28  Pain Score: No Pain (0)          Leona Quintero CMA        Video-Visit Details    Type of service:  Video Visit    Video Start Time:10:00  Video End Time: 10:30    Originating Location (pt. Location): HOME    Distant Location (provider location):  [facility]     Platform used for Video Visit: [employer]      HPI: Kathy Lei is a 55 yo woman with a new diagnosis of an estrogen-receptor positive, MS-positive, HER2-negative breast cancer, " grade 2, in the upper outer quadrant of the right breast since the end of year 2018.      Mily presented between Christmas and New Years of 2018 with new sharp pains in the upper outer quadrant of the right breast.  She underwent mammographic screening.       IMAGING:  Mammography and ultrasound:  She had a diagnostic mammogram which showed bilateral prepectoral saline implants.  On the right breast at 11:30 position, there were grouped coarse heterogeneous calcifications spanning 1.3 cm, new since 2014, adjacent calcifications 11-o'clock position posterior depth.  There was an irregular mass in the lateral right breast 9-o'clock position mid-posterior depth, and an additional mass with obscured margins.  No significant changes in the left breast.  Right breast ultrasound was performed.  In the area of the palpable lump in the right breast, 11-o'clock position, 6 cm from the nipple, there is an irregular hypoechoic mass with indistinct margins measuring approximately 1.0 x 0.9 x 1.6 cm in size.  Immediately adjacent to the mass, 11:30 position, are microcalcifications.  In the second area of palpable lump right breast, 9:30 position, 5 cm from the nipple, there was an irregular hypoechoic mass measuring 3.2 x 0.5 x 1.3 cm in size felt to account for the mammographic findings.  There were multiple additional round hypoechoic masses similar to the findings at 9:30 position seen incidentally during real time and not directly palpable.  For example, in the right breast at 8-o'clock position, 4 cm from the nipple, there was a mass measuring 0.8 x 0.6 x 0.6 cm, BI-RADS 4.       Contrast mammogram was subsequently performed and the contrast mammogram showed a large area of mass and non-mass-like enhancement in the upper outer right breast measuring 7.2 cm in greatest dimension, corresponding global asymmetry in the upper outer right breast.  Enhancement extended to the implant without evidence of extension to the skin  surface or nipple, and the calcifications were noted as previously found.      PATHOLOGY:  The pathology showed part A, breast needle biopsy 11 o'clock, 6 cm from the nipple, invasive mammary carcinoma, no special type, ductal (and mucinous) Bradgate grade 2.  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  Calcifications were associated with the DCIS.  There was a focus suspicious for lymphovascular invasion.  Invasive carcinoma was estrogen receptor positive and progesterone receptor negative by immunohistochemistry.  In part B, breast right, 8 o'clock, 4 cm from the nipple, ultrasound-guided core biopsy, invasive mammary carcinoma, no special type, ductal (and mucinous) Bradgate grade 2, occasional calcifications were noted.  Invasive carcinoma was estrogen receptor positive and progesterone receptor negative by immunohistochemistry.  On quantitation of the ER and MD, ER was positive 99% of the cells staining with strong intensity.  MD was subsequently noted to be positive with 15% of the cells staining with moderate intensity.       There was also a HER2 FISH performed, and the HER2 FISH showed average number of HER2 signals per nucleus 2.6.  Average number of CEN17 signals 1.7 with a ratio of 1.6, VASILIY negative for biopsy A.  For biopsy B, the HER2 signals per nucleus 2.9.  Average number CEN17 signals per nucleus 1.7 with a ratio of 1.7, VASILIY negative.  Overall, by 2018 ASCO/CAP guidelines, this tumor is HER2 negative.     She was enrolled in I-SPY2 trial to Durvulumab, Taxol and Olaparib arm. She completed 12 cycles of weekly Taxol. She also completed 4 cycles  of adriamycin and cyclophosphamide (AC).     PAST MEDICAL HISTORY:  In terms of her breast history, she does have a history of a smaller right breast which was treated with implants 19 years ago.  She has a history of 2 papillomas in the right breast, 1 removed at the 6-o'clock position 5 years ago, another  removed at the 6-o'clock position approximately 10 years ago.  These incisions are approximately at the 5:30 to 6-o'clock position.  She has no history of radiation therapy.  No history of breast cancer of any type.  No history of tumor of any kind.  No history of heart problems, heart attack, breathing problems, blood clots, seizures, stroke, arthritis, peptic ulcer disease or osteoporosis.  No history of bone fractures.  She is not currently participating in a clinical trial. She does have a history of asthma and a history of hypothyroidism.      FAMILY HISTORY:  Entirely negative for breast cancer or ovarian cancer or breast cancer in a male relative.      ALLERGIES:  No known drug allergies.  No allergy to seafood, iodine or contrast dye.  She does not take aspirin.      PAST MENSTRUAL HISTORY:  First period was at age 11.  First and only pregnancy was at age 26.  No miscarriages or abortions.  Occasional use of oral contraceptives in her 20s.  No history of hormone replacement therapy.  Last period was 3 years ago.      SOCIAL HISTORY:  Tobacco history was from age 15 to present, smoking a pack of cigarettes a week.  She is now trying to stop cigarettes.   In terms of alcohol use, in her early teens and early 20s, she drank approximately 10 drinks on the weekend and has tapered off drinking since then.      TREATMENT HISTORY:  A.  Neoadjuvant durvalumab, oliparib, paclitaxel on I SPY 2  B  Neoadjuvant ddAC.  C.  Bilateral mastectomy.  RCB 3 result.   Pathologic stage was [id number] sn.   D.  Post-surgical radiation.  Completed 1-6-20.    Treatment Summary to Date  Treatment Site: Rt CW , nodes + SCV Current Dose: 6040/6040 cGy Fractions: 33/33      E.  Begin CREATE-X. With letrozole. Plan is for 24 weeks. Begun January 14.  Ended June 26.      F.  She held the letrozole beginning April 28 because of severe joint pains.  G.  She restarted AI with anastrozole 6-30-20.  Discontinued August 15 due to hot flashes and  nausea.  H.  Restarted letrozole on 8-15-20 and is tolerating it well.      INTERVAL HISTORY  , Mily has been doing quite well.  She does have some discomfort in the right armpit area related to some scar tissue and her reconstruction.  She went back on letrozole and has tolerated it better than anastrozole.  She does have some joint discomfort.  Nonetheless she is exercising every other day about 30 minutes and this is helping in terms of the joint discomfort.  She denies fatigue, depression, anxiety.  She is not working at this time because of her reconstruction surgery.  She did have a fall and hit her head on a glass door with no loss of consciousness.  This happened about 2 weeks ago she had no neurologic symptoms and no headaches.  She said that this was due to twisting and ankle that led to the fall.  She does have hot flashes but they are less with the letrozole.      REVIEW OF SYSTEMS:     On review of systems she denies fevers or chills, cough, chest pain, shortness of breath, nausea, vomiting constipation or diarrhea, bone pain, back pain or headache.  The remainder of a 10 point review of systems is negative.     PHYSICAL EXAMINATION:    None today.  She appeared generally well.  No alopecia.  Mood and affect were normal.     LABS reviewed with the patient.  CBC and CMP were within normal limits        ASSESSMENT AND PLAN:     1.  Mily Lei is a 54-year-old woman who presented with a locally advanced right breast cancer.  This cancer developed in the context of previous breast augmentation.  On presentation, she had multiple masses occupying an area of approximately 9 cm in the upper outer quadrant with some extension to the subareolar region.  She did not have any clinically apparent lymph node involvement.  She was treated with neoadjuvant chemotherapy on the I-SPY 2 trial and was randomized to paclitaxel, olaparib and durvalumab followed by dose-dense AC.  She tolerated the treatment reasonably  well but had an RCB3 result with final stage [id number].  She started on the CREATE-X trial and had adjuvant capecitabine for 24 weeks combined with an aromatase inhibitor, which will be continued for 10 years.    She completed 24 weeks of Xeloda.  I did discuss with her the option of the SELIN trial at [place].  She does not like driving on the highway and would prefer not to travel to [place] for a trial.  She would like to just continue with an aromatase inhibitor.  2.  Hormone therapy.      Her hormonal therapy was changed back to letrozole at her request.  She has fewer hot flashes and less nausea with letrozole.  She would like to stay on letrozole.  We did discuss the plan for 5 or 7 years of hormonal therapy and she is in agreement.   4.    Discussion of abemaciclib and letrozole adjuvant therapy.  We did discuss the [place] E study and she does not want to go on this regimen.  We discussed the risks and potential benefits and discussed that she could be a candidate for this type of treatment based on the large tumor size.  After careful discussion of the risks and potential benefits she does not want to try abemaciclib and letrozole adjuvant therapy.  6. Breast reconstruction.  Mily is having some discomfort in her breast reconstruction sites.  She is being followed by Dr. Tena.  7.  Lymphedema.  Mily does have some right arm stiffness.  She feels that her exercise is helping.  If she has increasing discomfort we can refer her to lymphedema clinic.  8.  Bone health.  We discussed that her DEXA scan showed a most valid negative T score of -1.7.  Recommended calcium vitamin D and weightbearing exercise.  8.  Follow up.  Follow up with ANTOINETTE with video visit and CBC, [facility] December 29.        Sincerely,    Christian Guzman M.D.   Professor   Division of Hematology, Oncology, Transplant   Department of Medicine   [employer]   [phone]          10:00 10:30   I spent 30  minutes with the patient more than 50% of which was in counseling and coordination of care.

## 2020-09-28 VITALS
HEART RATE: 85 BPM | RESPIRATION RATE: 16 BRPM | DIASTOLIC BLOOD PRESSURE: 78 MMHG | BODY MASS INDEX: 23.33 KG/M2 | SYSTOLIC BLOOD PRESSURE: 127 MMHG | WEIGHT: 144.3 LBS | TEMPERATURE: 98.2 F | OXYGEN SATURATION: 96 %

## 2020-09-28 DIAGNOSIS — C50.411 MALIGNANT NEOPLASM OF UPPER-OUTER QUADRANT OF RIGHT BREAST IN FEMALE, ESTROGEN RECEPTOR POSITIVE (H): ICD-10-CM

## 2020-09-28 DIAGNOSIS — Z17.0 MALIGNANT NEOPLASM OF UPPER-OUTER QUADRANT OF RIGHT BREAST IN FEMALE, ESTROGEN RECEPTOR POSITIVE (H): ICD-10-CM

## 2020-09-28 LAB
ALBUMIN SERPL-MCNC: 3.7 G/DL (ref 3.4–5)
ALP SERPL-CCNC: 62 U/L (ref 40–150)
ALT SERPL W P-5'-P-CCNC: 20 U/L (ref 0–50)
ANION GAP SERPL CALCULATED.3IONS-SCNC: 8 MMOL/L (ref 3–14)
AST SERPL W P-5'-P-CCNC: 15 U/L (ref 0–45)
BASOPHILS # BLD AUTO: 0 10E9/L (ref 0–0.2)
BASOPHILS NFR BLD AUTO: 1 %
BILIRUB SERPL-MCNC: 0.4 MG/DL (ref 0.2–1.3)
BUN SERPL-MCNC: 12 MG/DL (ref 7–30)
CALCIUM SERPL-MCNC: 9 MG/DL (ref 8.5–10.1)
CHLORIDE SERPL-SCNC: 108 MMOL/L (ref 94–109)
CO2 SERPL-SCNC: 23 MMOL/L (ref 20–32)
CREAT SERPL-MCNC: 0.58 MG/DL (ref 0.52–1.04)
DIFFERENTIAL METHOD BLD: NORMAL
EOSINOPHIL # BLD AUTO: 0.2 10E9/L (ref 0–0.7)
EOSINOPHIL NFR BLD AUTO: 5.1 %
ERYTHROCYTE [DISTWIDTH] IN BLOOD BY AUTOMATED COUNT: 12.4 % (ref 10–15)
GFR SERPL CREATININE-BSD FRML MDRD: >90 ML/MIN/{1.73_M2}
GLUCOSE SERPL-MCNC: 102 MG/DL (ref 70–99)
HCT VFR BLD AUTO: 43.3 % (ref 35–47)
HGB BLD-MCNC: 14.3 G/DL (ref 11.7–15.7)
IMM GRANULOCYTES # BLD: 0 10E9/L (ref 0–0.4)
IMM GRANULOCYTES NFR BLD: 0.2 %
LYMPHOCYTES # BLD AUTO: 1 10E9/L (ref 0.8–5.3)
LYMPHOCYTES NFR BLD AUTO: 23.7 %
MCH RBC QN AUTO: 30.8 PG (ref 26.5–33)
MCHC RBC AUTO-ENTMCNC: 33 G/DL (ref 31.5–36.5)
MCV RBC AUTO: 93 FL (ref 78–100)
MONOCYTES # BLD AUTO: 0.3 10E9/L (ref 0–1.3)
MONOCYTES NFR BLD AUTO: 8.2 %
NEUTROPHILS # BLD AUTO: 2.6 10E9/L (ref 1.6–8.3)
NEUTROPHILS NFR BLD AUTO: 61.8 %
NRBC # BLD AUTO: 0 10*3/UL
NRBC BLD AUTO-RTO: 0 /100
PLATELET # BLD AUTO: 240 10E9/L (ref 150–450)
POTASSIUM SERPL-SCNC: 3.9 MMOL/L (ref 3.4–5.3)
PROT SERPL-MCNC: 7.2 G/DL (ref 6.8–8.8)
RBC # BLD AUTO: 4.65 10E12/L (ref 3.8–5.2)
SODIUM SERPL-SCNC: 139 MMOL/L (ref 133–144)
WBC # BLD AUTO: 4.1 10E9/L (ref 4–11)

## 2020-09-28 PROCEDURE — 80053 COMPREHEN METABOLIC PANEL: CPT | Performed by: INTERNAL MEDICINE

## 2020-09-28 PROCEDURE — 85025 COMPLETE CBC W/AUTO DIFF WBC: CPT | Performed by: INTERNAL MEDICINE

## 2020-09-28 ASSESSMENT — PAIN SCALES - GENERAL: PAINLEVEL: NO PAIN (0)

## 2020-09-28 NOTE — PROGRESS NOTES
Chief Complaint   Patient presents with     Blood Draw     Vitals and blood drawn via VPT by ALBAN.     YEISON Galicia LPN

## 2020-09-29 ENCOUNTER — VIRTUAL VISIT (OUTPATIENT)
Dept: ONCOLOGY | Facility: CLINIC | Age: 55
End: 2020-09-29
Attending: INTERNAL MEDICINE
Payer: COMMERCIAL

## 2020-09-29 DIAGNOSIS — C50.411 MALIGNANT NEOPLASM OF UPPER-OUTER QUADRANT OF RIGHT BREAST IN FEMALE, ESTROGEN RECEPTOR POSITIVE (H): Primary | ICD-10-CM

## 2020-09-29 DIAGNOSIS — Z17.0 MALIGNANT NEOPLASM OF UPPER-OUTER QUADRANT OF RIGHT BREAST IN FEMALE, ESTROGEN RECEPTOR POSITIVE (H): Primary | ICD-10-CM

## 2020-09-29 PROCEDURE — 40001009 ZZH VIDEO/TELEPHONE VISIT; NO CHARGE

## 2020-09-29 PROCEDURE — 99214 OFFICE O/P EST MOD 30 MIN: CPT | Mod: 95 | Performed by: INTERNAL MEDICINE

## 2020-09-29 RX ORDER — SERTRALINE HYDROCHLORIDE 100 MG/1
TABLET, FILM COATED ORAL
Qty: 30 TABLET | Refills: 0 | Status: SHIPPED | OUTPATIENT
Start: 2020-09-29 | End: 2020-10-26

## 2020-09-29 ASSESSMENT — ENCOUNTER SYMPTOMS
NAIL CHANGES: 0
MUSCLE CRAMPS: 0
MYALGIAS: 1
POOR WOUND HEALING: 0
ARTHRALGIAS: 1
MUSCLE WEAKNESS: 0
STIFFNESS: 1
NECK PAIN: 0
BACK PAIN: 0
JOINT SWELLING: 0
SKIN CHANGES: 0

## 2020-09-29 NOTE — LETTER
"    9/29/2020         RE: Kathy Lei  [address] [place] [address]        Dear Colleague,    Thank you for referring your patient, Kathy Lei, to the [facility]. Please see a copy of my visit note below.    Kathy Lei is a 54 year old female who is being evaluated via a billable video visit.      The patient has been notified of following:     \"This video visit will be conducted via a call between you and your physician/provider. We have found that certain health care needs can be provided without the need for an in-person physical exam.  This service lets us provide the care you need with a video conversation.  If a prescription is necessary we can send it directly to your pharmacy.  If lab work is needed we can place an order for that and you can then stop by our lab to have the test done at a later time.    Video visits are billed at different rates depending on your insurance coverage.  Please reach out to your insurance provider with any questions.    If during the course of the call the physician/provider feels a video visit is not appropriate, you will not be charged for this service.\"    Patient has given verbal consent for Video visit? Yes    How would you like to obtain your AVS? MyChart    If you are dropped from the video visit, the video invite should be resent to: Text to cell phone: [phone]    Will anyone else be joining your video visit? No         I have reviewed and updated the patient's allergies and medication list.    Concerns: No new concerns.  Refills: Sertraline.      Vitals - Patient Reported  Weight (Patient Reported): 65.3 kg (144 lb)  Height (Patient Reported): 167.5 cm (5' 5.95\")  BMI (Based on Pt Reported Ht/Wt): 23.28  Pain Score: No Pain (0)          Leona Quintero CMA        Video-Visit Details    Type of service:  Video Visit    Video Start Time:10:00  Video End Time: 10:30    Originating Location (pt. Location): " HOME    Distant Location (provider location):  [facility]     Platform used for Video Visit: SOLANGE      HPI: Kathy Lei is a 53 yo woman with a new diagnosis of an estrogen-receptor positive, IL-positive, HER2-negative breast cancer, grade 2, in the upper outer quadrant of the right breast since the end of year 2018.      Mily presented between Christmas and New Years of 2018 with new sharp pains in the upper outer quadrant of the right breast.  She underwent mammographic screening.       IMAGING:  Mammography and ultrasound:  She had a diagnostic mammogram which showed bilateral prepectoral saline implants.  On the right breast at 11:30 position, there were grouped coarse heterogeneous calcifications spanning 1.3 cm, new since 2014, adjacent calcifications 11-o'clock position posterior depth.  There was an irregular mass in the lateral right breast 9-o'clock position mid-posterior depth, and an additional mass with obscured margins.  No significant changes in the left breast.  Right breast ultrasound was performed.  In the area of the palpable lump in the right breast, 11-o'clock position, 6 cm from the nipple, there is an irregular hypoechoic mass with indistinct margins measuring approximately 1.0 x 0.9 x 1.6 cm in size.  Immediately adjacent to the mass, 11:30 position, are microcalcifications.  In the second area of palpable lump right breast, 9:30 position, 5 cm from the nipple, there was an irregular hypoechoic mass measuring 3.2 x 0.5 x 1.3 cm in size felt to account for the mammographic findings.  There were multiple additional round hypoechoic masses similar to the findings at 9:30 position seen incidentally during real time and not directly palpable.  For example, in the right breast at 8-o'clock position, 4 cm from the nipple, there was a mass measuring 0.8 x 0.6 x 0.6 cm, BI-RADS 4.       Contrast mammogram was subsequently performed and the contrast mammogram showed a  large area of mass and non-mass-like enhancement in the upper outer right breast measuring 7.2 cm in greatest dimension, corresponding global asymmetry in the upper outer right breast.  Enhancement extended to the implant without evidence of extension to the skin surface or nipple, and the calcifications were noted as previously found.      PATHOLOGY:  The pathology showed part A, breast needle biopsy 11 o'clock, 6 cm from the nipple, invasive mammary carcinoma, no special type, ductal (and mucinous) Donna grade 2.  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  Calcifications were associated with the DCIS.  There was a focus suspicious for lymphovascular invasion.  Invasive carcinoma was estrogen receptor positive and progesterone receptor negative by immunohistochemistry.  In part B, breast right, 8 o'clock, 4 cm from the nipple, ultrasound-guided core biopsy, invasive mammary carcinoma, no special type, ductal (and mucinous) Donna grade 2, occasional calcifications were noted.  Invasive carcinoma was estrogen receptor positive and progesterone receptor negative by immunohistochemistry.  On quantitation of the ER and ME, ER was positive 99% of the cells staining with strong intensity.  ME was subsequently noted to be positive with 15% of the cells staining with moderate intensity.       There was also a HER2 FISH performed, and the HER2 FISH showed average number of HER2 signals per nucleus 2.6.  Average number of CEN17 signals 1.7 with a ratio of 1.6, VASILIY negative for biopsy A.  For biopsy B, the HER2 signals per nucleus 2.9.  Average number CEN17 signals per nucleus 1.7 with a ratio of 1.7, VASILIY negative.  Overall, by 2018 ASCO/CAP guidelines, this tumor is HER2 negative.     She was enrolled in I-SPY2 trial to Durvulumab, Taxol and Olaparib arm. She completed 12 cycles of weekly Taxol. She also completed 4 cycles  of adriamycin and cyclophosphamide (AC).     PAST  MEDICAL HISTORY:  In terms of her breast history, she does have a history of a smaller right breast which was treated with implants 19 years ago.  She has a history of 2 papillomas in the right breast, 1 removed at the 6-o'clock position 5 years ago, another removed at the 6-o'clock position approximately 10 years ago.  These incisions are approximately at the 5:30 to 6-o'clock position.  She has no history of radiation therapy.  No history of breast cancer of any type.  No history of tumor of any kind.  No history of heart problems, heart attack, breathing problems, blood clots, seizures, stroke, arthritis, peptic ulcer disease or osteoporosis.  No history of bone fractures.  She is not currently participating in a clinical trial. She does have a history of asthma and a history of hypothyroidism.      FAMILY HISTORY:  Entirely negative for breast cancer or ovarian cancer or breast cancer in a male relative.      ALLERGIES:  No known drug allergies.  No allergy to seafood, iodine or contrast dye.  She does not take aspirin.      PAST MENSTRUAL HISTORY:  First period was at age 11.  First and only pregnancy was at age 26.  No miscarriages or abortions.  Occasional use of oral contraceptives in her 20s.  No history of hormone replacement therapy.  Last period was 3 years ago.      SOCIAL HISTORY:  Tobacco history was from age 15 to present, smoking a pack of cigarettes a week.  She is now trying to stop cigarettes.   In terms of alcohol use, in her early teens and early 20s, she drank approximately 10 drinks on the weekend and has tapered off drinking since then.      TREATMENT HISTORY:  A.  Neoadjuvant durvalumab, oliparib, paclitaxel on I SPY 2  B  Neoadjuvant ddAC.  C.  Bilateral mastectomy.  RCB 3 result.   Pathologic stage was [id number] sn.   D.  Post-surgical radiation.  Completed 1-6-20.    Treatment Summary to Date  Treatment Site: Rt CW , nodes + SCV Current Dose: 6040/6040 cGy Fractions: 33/33      E.  Begin  CREATE-X. With letrozole. Plan is for 24 weeks. Begun January 14.  Ended June 26.      F.  She held the letrozole beginning April 28 because of severe joint pains.  G.  She restarted AI with anastrozole 6-30-20.  Discontinued August 15 due to hot flashes and nausea.  H.  Restarted letrozole on 8-15-20 and is tolerating it well.      INTERVAL HISTORY  , Mily has been doing quite well.  She does have some discomfort in the right armpit area related to some scar tissue and her reconstruction.  She went back on letrozole and has tolerated it better than anastrozole.  She does have some joint discomfort.  Nonetheless she is exercising every other day about 30 minutes and this is helping in terms of the joint discomfort.  She denies fatigue, depression, anxiety.  She is not working at this time because of her reconstruction surgery.  She did have a fall and hit her head on a glass door with no loss of consciousness.  This happened about 2 weeks ago she had no neurologic symptoms and no headaches.  She said that this was due to twisting and ankle that led to the fall.  She does have hot flashes but they are less with the letrozole.      REVIEW OF SYSTEMS:     On review of systems she denies fevers or chills, cough, chest pain, shortness of breath, nausea, vomiting constipation or diarrhea, bone pain, back pain or headache.  The remainder of a 10 point review of systems is negative.     PHYSICAL EXAMINATION:    None today.  She appeared generally well.  No alopecia.  Mood and affect were normal.     LABS reviewed with the patient.  CBC and CMP were within normal limits        ASSESSMENT AND PLAN:     1.  Mily Lei is a 54-year-old woman who presented with a locally advanced right breast cancer.  This cancer developed in the context of previous breast augmentation.  On presentation, she had multiple masses occupying an area of approximately 9 cm in the upper outer quadrant with some extension to the subareolar region.   She did not have any clinically apparent lymph node involvement.  She was treated with neoadjuvant chemotherapy on the I-SPY 2 trial and was randomized to paclitaxel, olaparib and durvalumab followed by dose-dense AC.  She tolerated the treatment reasonably well but had an RCB3 result with final stage [id number].  She started on the CREATE-X trial and had adjuvant capecitabine for 24 weeks combined with an aromatase inhibitor, which will be continued for 10 years.    She completed 24 weeks of Xeloda.  I did discuss with her the option of the SELIN trial at [place].  She does not like driving on the highway and would prefer not to travel to [place] for a trial.  She would like to just continue with an aromatase inhibitor.  2.  Hormone therapy.      Her hormonal therapy was changed back to letrozole at her request.  She has fewer hot flashes and less nausea with letrozole.  She would like to stay on letrozole.  We did discuss the plan for 5 or 7 years of hormonal therapy and she is in agreement.   4.    Discussion of abemaciclib and letrozole adjuvant therapy.  We did discuss the [place] E study and she does not want to go on this regimen.  We discussed the risks and potential benefits and discussed that she could be a candidate for this type of treatment based on the large tumor size.  After careful discussion of the risks and potential benefits she does not want to try abemaciclib and letrozole adjuvant therapy.  6. Breast reconstruction.  Mily is having some discomfort in her breast reconstruction sites.  She is being followed by Dr. Tena.  7.  Lymphedema.  Mily does have some right arm stiffness.  She feels that her exercise is helping.  If she has increasing discomfort we can refer her to lymphedema clinic.  8.  Bone health.  We discussed that her DEXA scan showed a most valid negative T score of -1.7.  Recommended calcium vitamin D and weightbearing exercise.  8.  Follow up.  Follow up with ANTOINETTE  with video visit and CBC, CMP December 29.        Sincerely,    Christian Guzman M.D.   Professor   Division of Hematology, Oncology, Transplant   Department of Medicine   [facility] [employer]   [phone]          10:00 10:30   I spent 30 minutes with the patient more than 50% of which was in counseling and coordination of care.       Again, thank you for allowing me to participate in the care of your patient.        Sincerely,        Christian Guzman MD

## 2020-09-29 NOTE — TELEPHONE ENCOUNTER
Routing refill request to provider for review/approval because:  --Drug interaction warning and I am not finding reason for override as required by RN refill policy.  --Due for annual office visit.         ,  --Please contact patient and ask to schedule an annual preventative chronic care visit with Rachel Arauz.           --Last visit:  9/24/2019   --Future Office Visit:  NONE      PHQ 9/24/2019   PHQ-9 Total Score 0   Q9: Thoughts of better off dead/self-harm past 2 weeks Not at all

## 2020-09-29 NOTE — TELEPHONE ENCOUNTER
LM to call back and schedule annual and chronic care visit. Sent [employer] message and letter.    Abena RUFFIN  [facility]    Routing to nurse team due to pending medication.

## 2020-10-06 ENCOUNTER — OFFICE VISIT (OUTPATIENT)
Dept: PLASTIC SURGERY | Facility: CLINIC | Age: 55
End: 2020-10-06
Attending: PLASTIC SURGERY
Payer: COMMERCIAL

## 2020-10-06 VITALS
HEART RATE: 84 BPM | OXYGEN SATURATION: 98 % | DIASTOLIC BLOOD PRESSURE: 80 MMHG | WEIGHT: 142 LBS | TEMPERATURE: 98.1 F | RESPIRATION RATE: 16 BRPM | SYSTOLIC BLOOD PRESSURE: 116 MMHG | BODY MASS INDEX: 22.96 KG/M2

## 2020-10-06 DIAGNOSIS — Z98.890 S/P BREAST RECONSTRUCTION, BILATERAL: Primary | ICD-10-CM

## 2020-10-06 DIAGNOSIS — Z90.13 S/P BILATERAL MASTECTOMY: Primary | ICD-10-CM

## 2020-10-06 PROCEDURE — G0463 HOSPITAL OUTPT CLINIC VISIT: HCPCS

## 2020-10-06 PROCEDURE — 99213 OFFICE O/P EST LOW 20 MIN: CPT | Performed by: PLASTIC SURGERY

## 2020-10-06 RX ORDER — CEFAZOLIN SODIUM 1 G/50ML
1 INJECTION, SOLUTION INTRAVENOUS SEE ADMIN INSTRUCTIONS
Status: CANCELLED | OUTPATIENT
Start: 2020-10-06

## 2020-10-06 RX ORDER — CEFAZOLIN SODIUM 2 G/50ML
2 SOLUTION INTRAVENOUS
Status: CANCELLED | OUTPATIENT
Start: 2020-10-06

## 2020-10-06 ASSESSMENT — PAIN SCALES - GENERAL: PAINLEVEL: NO PAIN (0)

## 2020-10-06 NOTE — PROGRESS NOTES
POSTOPERATIVE VISIT NOTE      PRESENTING COMPLAINT:  Postoperative visit, status post bilateral expander-based reconstruction for right-sided breast cancer and then removal of both expanders for infection.  Last surgery done in 10/2019.      HISTORY OF PRESENTING COMPLAINT:  Ms. Lei is 54 years old, here for a regular postoperative visit.  She underwent radiation on the right side last year.  She is now a year out from her radiation.  She has been moisturizing extremely well.  Now ready for the next stage of delayed breast reconstruction.      PHYSICAL EXAMINATION:  Vital signs are stable.  She is afebrile, in no obvious distress.  Everything is healed.  Left breast skin is soft.  Right breast is adhered.      ASSESSMENT AND PLAN:  Based upon the above findings, a diagnosis of bilateral breast reconstruction for breast cancer, now ready for delayed bilateral breast reconstruction, was made.  The plan is to proceed with free LIVIER flaps.  I went over the planned procedure with the patient in detail.  She agreed with the plan.  We will see her back in preop nearer the time.  We will place the orders.  She will need a CTA and PAC visit.  All questions were answered.  She was happy with the visit.

## 2020-10-06 NOTE — LETTER
10/6/2020         RE: Kathy Lei  [address] [place] [address]        Dear Colleague,    Thank you for referring your patient, Kathy Lei, to the [facility]. Please see a copy of my visit note below.    POSTOPERATIVE VISIT NOTE      PRESENTING COMPLAINT:  Postoperative visit, status post bilateral expander-based reconstruction for right-sided breast cancer and then removal of both expanders for infection.  Last surgery done in 10/2019.      HISTORY OF PRESENTING COMPLAINT:  Ms. Lei is 54 years old, here for a regular postoperative visit.  She underwent radiation on the right side last year.  She is now a year out from her radiation.  She has been moisturizing extremely well.  Now ready for the next stage of delayed breast reconstruction.      PHYSICAL EXAMINATION:  Vital signs are stable.  She is afebrile, in no obvious distress.  Everything is healed.  Left breast skin is soft.  Right breast is adhered.      ASSESSMENT AND PLAN:  Based upon the above findings, a diagnosis of bilateral breast reconstruction for breast cancer, now ready for delayed bilateral breast reconstruction, was made.  The plan is to proceed with free LIVIER flaps.  I went over the planned procedure with the patient in detail.  She agreed with the plan.  We will see her back in preop nearer the time.  We will place the orders.  She will need a CTA and PAC visit.  All questions were answered.  She was happy with the visit.       Sincerely,        LALY Ramos MD

## 2020-10-06 NOTE — NURSING NOTE
"Oncology Rooming Note    October 6, 2020 10:05 AM   Kathy Lei is a 54 year old female who presents for:    Chief Complaint   Patient presents with     Oncology Clinic Visit     Return; Breast Ca     Initial Vitals: /87   Pulse 84   Temp 98.1  F (36.7  C) (Oral)   Resp 16   Wt 64.4 kg (142 lb)   LMP 11/02/2014   SpO2 98%   BMI 22.96 kg/m   Estimated body mass index is 22.96 kg/m  as calculated from the following:    Height as of 2/25/20: 1.675 m (5' 5.95\").    Weight as of this encounter: 64.4 kg (142 lb). Body surface area is 1.73 meters squared.  No Pain (0) Comment: Data Unavailable   Patient's last menstrual period was 11/02/2014.  Allergies reviewed: Yes  Medications reviewed: Yes    Medications: Medication refills not needed today.  Pharmacy name entered into Notorious:    [id number] - [place], [place] - [address] AT [facility] [address]  [facility] - [place], [place] - [address]    Clinical concerns: Consultation for reconstruction surgery. Dr Ramos was notified.      Madie Naik CMA              "

## 2020-10-20 ASSESSMENT — ENCOUNTER SYMPTOMS
HEADACHES: 0
COUGH: 0
HEARTBURN: 0
NAUSEA: 0
FEVER: 0
HEMATOCHEZIA: 0
CHILLS: 0
BREAST MASS: 0
NERVOUS/ANXIOUS: 1
HEMATURIA: 0
CONSTIPATION: 0
WEAKNESS: 0
FREQUENCY: 0
SHORTNESS OF BREATH: 0
DIARRHEA: 0
ARTHRALGIAS: 1
MYALGIAS: 1
JOINT SWELLING: 0
ABDOMINAL PAIN: 0
SORE THROAT: 0
DYSURIA: 0
DIZZINESS: 0
PALPITATIONS: 0
EYE PAIN: 0
PARESTHESIAS: 0

## 2020-10-20 NOTE — TELEPHONE ENCOUNTER
Called pt and LVM to schedule a follow up appt with Dr. Alanis. Also mailed a letter as well.   
Universal Safety Interventions

## 2020-10-26 ENCOUNTER — OFFICE VISIT (OUTPATIENT)
Dept: FAMILY MEDICINE | Facility: CLINIC | Age: 55
End: 2020-10-26
Payer: COMMERCIAL

## 2020-10-26 VITALS
WEIGHT: 141 LBS | HEART RATE: 94 BPM | SYSTOLIC BLOOD PRESSURE: 132 MMHG | TEMPERATURE: 98.8 F | RESPIRATION RATE: 18 BRPM | BODY MASS INDEX: 22.8 KG/M2 | DIASTOLIC BLOOD PRESSURE: 77 MMHG

## 2020-10-26 DIAGNOSIS — Z23 NEED FOR PROPHYLACTIC VACCINATION AND INOCULATION AGAINST INFLUENZA: ICD-10-CM

## 2020-10-26 DIAGNOSIS — F41.9 ANXIETY: ICD-10-CM

## 2020-10-26 DIAGNOSIS — G47.00 INSOMNIA, UNSPECIFIED TYPE: ICD-10-CM

## 2020-10-26 DIAGNOSIS — Z00.00 ROUTINE GENERAL MEDICAL EXAMINATION AT A HEALTH CARE FACILITY: Primary | ICD-10-CM

## 2020-10-26 DIAGNOSIS — J45.40 MODERATE PERSISTENT ASTHMA WITHOUT COMPLICATION: ICD-10-CM

## 2020-10-26 DIAGNOSIS — E03.9 HYPOTHYROIDISM, UNSPECIFIED TYPE: ICD-10-CM

## 2020-10-26 DIAGNOSIS — T45.1X5A CHEMOTHERAPY INDUCED NAUSEA AND VOMITING: ICD-10-CM

## 2020-10-26 DIAGNOSIS — C50.411 MALIGNANT NEOPLASM OF UPPER-OUTER QUADRANT OF RIGHT BREAST IN FEMALE, ESTROGEN RECEPTOR POSITIVE (H): ICD-10-CM

## 2020-10-26 DIAGNOSIS — F32.A DEPRESSION, UNSPECIFIED DEPRESSION TYPE: ICD-10-CM

## 2020-10-26 DIAGNOSIS — R11.2 CHEMOTHERAPY INDUCED NAUSEA AND VOMITING: ICD-10-CM

## 2020-10-26 DIAGNOSIS — Z17.0 MALIGNANT NEOPLASM OF UPPER-OUTER QUADRANT OF RIGHT BREAST IN FEMALE, ESTROGEN RECEPTOR POSITIVE (H): ICD-10-CM

## 2020-10-26 LAB
CHOLEST SERPL-MCNC: 260 MG/DL
HDLC SERPL-MCNC: 67 MG/DL
LDLC SERPL CALC-MCNC: 169 MG/DL
NONHDLC SERPL-MCNC: 193 MG/DL
TRIGL SERPL-MCNC: 119 MG/DL
TSH SERPL DL<=0.005 MIU/L-ACNC: 0.73 MU/L (ref 0.4–4)

## 2020-10-26 PROCEDURE — 99213 OFFICE O/P EST LOW 20 MIN: CPT | Mod: 25 | Performed by: NURSE PRACTITIONER

## 2020-10-26 PROCEDURE — 99396 PREV VISIT EST AGE 40-64: CPT | Mod: 25 | Performed by: NURSE PRACTITIONER

## 2020-10-26 PROCEDURE — 90682 RIV4 VACC RECOMBINANT DNA IM: CPT | Performed by: NURSE PRACTITIONER

## 2020-10-26 PROCEDURE — 36415 COLL VENOUS BLD VENIPUNCTURE: CPT | Performed by: NURSE PRACTITIONER

## 2020-10-26 PROCEDURE — 80061 LIPID PANEL: CPT | Performed by: NURSE PRACTITIONER

## 2020-10-26 PROCEDURE — 84443 ASSAY THYROID STIM HORMONE: CPT | Performed by: NURSE PRACTITIONER

## 2020-10-26 PROCEDURE — 90471 IMMUNIZATION ADMIN: CPT | Performed by: NURSE PRACTITIONER

## 2020-10-26 RX ORDER — SERTRALINE HYDROCHLORIDE 100 MG/1
100 TABLET, FILM COATED ORAL DAILY
Qty: 90 TABLET | Refills: 3 | Status: SHIPPED | OUTPATIENT
Start: 2020-10-26 | End: 2021-12-16

## 2020-10-26 RX ORDER — ONDANSETRON 8 MG/1
8 TABLET, FILM COATED ORAL EVERY 8 HOURS PRN
Qty: 60 TABLET | Refills: 3 | Status: SHIPPED | OUTPATIENT
Start: 2020-10-26 | End: 2021-04-12

## 2020-10-26 RX ORDER — TRAZODONE HYDROCHLORIDE 50 MG/1
50-100 TABLET, FILM COATED ORAL
Qty: 90 TABLET | Status: SHIPPED | OUTPATIENT
Start: 2020-10-26 | End: 2022-02-08

## 2020-10-26 RX ORDER — DEXAMETHASONE 4 MG/1
2 TABLET ORAL 2 TIMES DAILY
Qty: 1 INHALER | Status: SHIPPED | OUTPATIENT
Start: 2020-10-26 | End: 2023-04-10

## 2020-10-26 RX ORDER — LORAZEPAM 0.5 MG/1
0.5 TABLET ORAL DAILY PRN
Qty: 30 TABLET | Refills: 2 | Status: SHIPPED | OUTPATIENT
Start: 2020-10-26 | End: 2021-02-17

## 2020-10-26 RX ORDER — LEVOTHYROXINE SODIUM 125 UG/1
125 TABLET ORAL DAILY
Qty: 90 TABLET | Refills: 3 | Status: SHIPPED | OUTPATIENT
Start: 2020-10-26 | End: 2021-08-31

## 2020-10-26 ASSESSMENT — ENCOUNTER SYMPTOMS
COUGH: 0
FEVER: 0
HEMATURIA: 0
EYE PAIN: 0
SHORTNESS OF BREATH: 0
PARESTHESIAS: 0
BREAST MASS: 0
FREQUENCY: 0
ARTHRALGIAS: 1
MYALGIAS: 1
CONSTIPATION: 0
ABDOMINAL PAIN: 0
NERVOUS/ANXIOUS: 1
HEARTBURN: 0
DIZZINESS: 0
CHILLS: 0
NAUSEA: 0
PALPITATIONS: 0
HEMATOCHEZIA: 0
HEADACHES: 0
DIARRHEA: 0
DYSURIA: 0
SORE THROAT: 0
JOINT SWELLING: 0
WEAKNESS: 0

## 2020-10-26 ASSESSMENT — ANXIETY QUESTIONNAIRES
3. WORRYING TOO MUCH ABOUT DIFFERENT THINGS: NOT AT ALL
7. FEELING AFRAID AS IF SOMETHING AWFUL MIGHT HAPPEN: NOT AT ALL
6. BECOMING EASILY ANNOYED OR IRRITABLE: NOT AT ALL
GAD7 TOTAL SCORE: 0
1. FEELING NERVOUS, ANXIOUS, OR ON EDGE: NOT AT ALL
5. BEING SO RESTLESS THAT IT IS HARD TO SIT STILL: NOT AT ALL
2. NOT BEING ABLE TO STOP OR CONTROL WORRYING: NOT AT ALL

## 2020-10-26 ASSESSMENT — PATIENT HEALTH QUESTIONNAIRE - PHQ9
5. POOR APPETITE OR OVEREATING: NOT AT ALL
SUM OF ALL RESPONSES TO PHQ QUESTIONS 1-9: 1

## 2020-10-26 NOTE — PROGRESS NOTES
SUBJECTIVE:   CC: Kathy Lei is an 54 year old woman who presents for preventive health visit.     {Split Bill scripting  The purpose of this visit is to discuss your medical history and prevent health problems before you are sick. You may be responsible for a co-pay, coinsurance, or deductible if your visit today includes services such as checking on a sore throat, having an x-ray or lab test, or treating and evaluating a new or existing condition     Patient has been advised of split billing requirements and indicates understanding: Yes  Healthy Habits:     Getting at least 3 servings of Calcium per day:  Yes    Bi-annual eye exam:  NO    Dental care twice a year:  NO    Sleep apnea or symptoms of sleep apnea:  None    Diet:  Gluten-free/reduced    Frequency of exercise:  2-3 days/week    Duration of exercise:  15-30 minutes    Taking medications regularly:  Yes    Medication side effects:  Muscle aches    PHQ-2 Total Score: 0    Additional concerns today:  No          Her asthma has been well-controlled on Flovent.  She rarely feels that she needs to use her albuterol.    Her anxiety is fairly stable.  She takes Ativan typically not more than 3 times a week.  She still has some nausea, does take Zofran PRN.  She feels that her depression is well-controlled on her current dose of Zoloft.     She feels she is doing well on her current dose of Synthroid.  She denies any symptoms such as fatigue; changes in weight; temperature intolerance; skin changes; constipation or loose stools.        Today's PHQ-2 Score:   PHQ-2 ( 1999 Pfizer) 10/20/2020   Q1: Little interest or pleasure in doing things 0   Q2: Feeling down, depressed or hopeless 0   PHQ-2 Score 0   Q1: Little interest or pleasure in doing things Not at all   Q2: Feeling down, depressed or hopeless Not at all   PHQ-2 Score 0       Abuse: Current or Past (Physical, Sexual or Emotional) - No  Do you feel safe in your environment? Yes        Social  History     Tobacco Use     Smoking status: Former Smoker     Packs/day: 0.50     Years: 30.00     Pack years: 15.00     Types: Cigarettes     Start date: 1980     Quit date: 2011     Years since quittin.3     Smokeless tobacco: Never Used     Tobacco comment: social smoker at times   Substance Use Topics     Alcohol use: Yes     Comment: on weekends if going out         Alcohol Use 10/20/2020   Prescreen: >3 drinks/day or >7 drinks/week? No   Prescreen: >3 drinks/day or >7 drinks/week? -       Reviewed orders with patient.  Reviewed health maintenance and updated orders accordingly - Yes          Pertinent mammograms are reviewed under the imaging tab.  History of abnormal Pap smear: NO - age 30-65 PAP every 5 years with negative HPV co-testing recommended  PAP / HPV Latest Ref Rng & Units 2018 10/10/2011 10/8/2007   PAP - NIL NIL NIL   HPV 16 DNA NEG:Negative Negative - -   HPV 18 DNA NEG:Negative Negative - -   OTHER HR HPV NEG:Negative Negative - -     Reviewed and updated as needed this visit by clinical staff  Tobacco  Allergies  Meds   Med Hx  Surg Hx  Fam Hx  Soc Hx        Reviewed and updated as needed this visit by Provider                    Review of Systems   Constitutional: Negative for chills and fever.   HENT: Negative for congestion, ear pain, hearing loss and sore throat.    Eyes: Negative for pain and visual disturbance.   Respiratory: Negative for cough and shortness of breath.    Cardiovascular: Negative for chest pain, palpitations and peripheral edema.   Gastrointestinal: Negative for abdominal pain, constipation, diarrhea, heartburn, hematochezia and nausea.   Breasts:  Negative for tenderness, breast mass and discharge.   Genitourinary: Negative for dysuria, frequency, genital sores, hematuria, pelvic pain, urgency, vaginal bleeding and vaginal discharge.   Musculoskeletal: Positive for arthralgias and myalgias. Negative for joint swelling.   Skin: Negative for  rash.   Neurological: Negative for dizziness, weakness, headaches and paresthesias.   Psychiatric/Behavioral: Negative for mood changes. The patient is nervous/anxious.           OBJECTIVE:   /77   Pulse 94   Temp 98.8  F (37.1  C) (Oral)   Resp 18   Wt 64 kg (141 lb)   LMP 11/02/2014   BMI 22.80 kg/m    Physical Exam  GENERAL: healthy, alert and no distress  EYES: Eyes grossly normal to inspection, PERRL and conjunctivae and sclerae normal  HENT: ear canals and TM's normal, nose and mouth without ulcers or lesions  NECK: no adenopathy, no asymmetry, masses, or scars and thyroid normal to palpation  RESP: lungs clear to auscultation - no rales, rhonchi or wheezes  BREAST: bilateral mastectomy  CV: regular rate and rhythm, normal S1 S2, no S3 or S4, no murmur, click or rub, no peripheral edema and peripheral pulses strong  ABDOMEN: soft, nontender, no hepatosplenomegaly, no masses and bowel sounds normal  MS: no gross musculoskeletal defects noted, no edema  SKIN: no suspicious lesions or rashes  NEURO: Normal strength and tone, mentation intact and speech normal  PSYCH: mentation appears normal, affect normal/bright        ASSESSMENT/PLAN:   (Z00.00) Routine general medical examination at a health care facility  (primary encounter diagnosis)  Comment:   Plan: Lipid panel reflex to direct LDL Fasting            (F32.9) Depression, unspecified depression type  Comment: stable  Plan: sertraline (ZOLOFT) 100 MG tablet        The current medical regimen is effective;  continue present plan and medications.     (E03.9) Hypothyroidism, unspecified type  Comment:   Plan: levothyroxine (SYNTHROID/LEVOTHROID) 125 MCG         tablet, TSH with free T4 reflex        The current medical regimen is effective;  continue present plan and medications.     (R11.2,  T45.1X5A) Chemotherapy induced nausea and vomiting  Comment:   Plan: ondansetron (ZOFRAN) 8 MG tablet        Refills given.     (C50.411,  Z17.0) Malignant  "neoplasm of upper-outer quadrant of right breast in female, estrogen receptor positive (H)  Comment:   Plan: ondansetron (ZOFRAN) 8 MG tablet            (G47.00) Insomnia, unspecified type  Comment:   Plan: traZODone (DESYREL) 50 MG tablet        The current medical regimen is effective;  continue present plan and medications.     (J45.40) Moderate persistent asthma without complication  Comment: well-controlled  Plan: fluticasone (FLOVENT HFA) 110 MCG/ACT inhaler        The current medical regimen is effective;  continue present plan and medications.     (F41.9) Anxiety  Comment:   Plan: LORazepam (ATIVAN) 0.5 MG tablet        Refills given.     (Z23) Need for prophylactic vaccination and inoculation against influenza  Comment:   Plan: INFLUENZA QUAD, RECOMBINANT, P-FREE (RIV4)         (FLUBLOCK) [59529], Vaccine Administration,         Initial [66540]              Patient has been advised of split billing requirements and indicates understanding:   COUNSELING:  Reviewed preventive health counseling, as reflected in patient instructions    Estimated body mass index is 22.8 kg/m  as calculated from the following:    Height as of 2/25/20: 1.675 m (5' 5.95\").    Weight as of this encounter: 64 kg (141 lb).        She reports that she quit smoking about 9 years ago. Her smoking use included cigarettes. She started smoking about 39 years ago. She has a 15.00 pack-year smoking history. She has never used smokeless tobacco.      Counseling Resources:  ATP IV Guidelines  Pooled Cohorts Equation Calculator  Breast Cancer Risk Calculator  BRCA-Related Cancer Risk Assessment: FHS-7 Tool  FRAX Risk Assessment  ICSI Preventive Guidelines  Dietary Guidelines for Americans, 2010  USDA's MyPlate  ASA Prophylaxis  Lung CA Screening    Rachel Arauz NP  [facility]  "

## 2020-10-27 ASSESSMENT — ASTHMA QUESTIONNAIRES: ACT_TOTALSCORE: 21

## 2020-10-27 ASSESSMENT — ANXIETY QUESTIONNAIRES: GAD7 TOTAL SCORE: 0

## 2020-11-05 ENCOUNTER — TRANSFERRED RECORDS (OUTPATIENT)
Dept: HEALTH INFORMATION MANAGEMENT | Facility: CLINIC | Age: 55
End: 2020-11-05

## 2020-11-05 LAB — COLOGUARD-ABSTRACT: NEGATIVE

## 2020-12-23 ENCOUNTER — TELEPHONE (OUTPATIENT)
Dept: SURGERY | Facility: CLINIC | Age: 55
End: 2020-12-23

## 2020-12-23 PROBLEM — Z90.13 S/P BILATERAL MASTECTOMY: Status: ACTIVE | Noted: 2020-12-23

## 2020-12-23 NOTE — TELEPHONE ENCOUNTER
Surgery is scheduled with Dr. Ramos and Dr. Garnett on 2/8 at [facility].  Scheduled per patient.    H&P: to be completed by PAC.    Additional appointments:   CT  on 1/26 at 9 AM, [facility]   Consult w/Dr. Ramos  on 1/26 at 9:45 AM   PAC  on 1/26 at 10:15 AM     COVID-19 test: 2/6, 10 AM at [facility] [place], [place]   Post-op: 2/23, 10:30 AM with [facility]  as a in person visit.    The RN completed the education regarding the surgery.     Patient will receive surgery arrival and start time from PAC.    The surgery packet was sent via US mail. and sent via "[employer].    Patient will complete COVID-19 test that was scheduled by surgical coordinator 2-4 days prior to surgery.     I called the patient and was able to confirm the scheduled information above.

## 2020-12-23 NOTE — TELEPHONE ENCOUNTER
STACY to schedule surgery with Dr. Ramos on 2/8.    Noted we will also schedule CT/consult/PAC/COVID/post-op.    Provided direct contact information, requested follow up from patient.

## 2020-12-24 NOTE — TELEPHONE ENCOUNTER
FUTURE VISIT INFORMATION      SURGERY INFORMATION:    Date: 20    Location: [facility] OR    Surgeon:  LALY Ramos MD Kim, Nicholas, MD    Anesthesia Type:  Combined General with Block    Procedure: Bilateral breast reconstruction with free abdominal flap and SPY    Consult: ov 10/6    RECORDS REQUESTED FROM:       Primary Care Provider: Rachel Arauz NP- [facility]    Most recent EKG+ Tracin/10/19    Most recent ECHO: 19

## 2021-01-26 ENCOUNTER — OFFICE VISIT (OUTPATIENT)
Dept: SURGERY | Facility: CLINIC | Age: 56
End: 2021-01-26
Payer: COMMERCIAL

## 2021-01-26 ENCOUNTER — ANCILLARY PROCEDURE (OUTPATIENT)
Dept: CT IMAGING | Facility: CLINIC | Age: 56
End: 2021-01-26
Attending: PLASTIC SURGERY
Payer: COMMERCIAL

## 2021-01-26 ENCOUNTER — PRE VISIT (OUTPATIENT)
Dept: SURGERY | Facility: CLINIC | Age: 56
End: 2021-01-26

## 2021-01-26 ENCOUNTER — OFFICE VISIT (OUTPATIENT)
Dept: PLASTIC SURGERY | Facility: CLINIC | Age: 56
End: 2021-01-26
Attending: PLASTIC SURGERY
Payer: COMMERCIAL

## 2021-01-26 ENCOUNTER — ANESTHESIA EVENT (OUTPATIENT)
Dept: SURGERY | Facility: CLINIC | Age: 56
End: 2021-01-26
Payer: COMMERCIAL

## 2021-01-26 VITALS
DIASTOLIC BLOOD PRESSURE: 78 MMHG | WEIGHT: 146.3 LBS | HEART RATE: 98 BPM | HEIGHT: 66 IN | OXYGEN SATURATION: 97 % | BODY MASS INDEX: 23.51 KG/M2 | RESPIRATION RATE: 14 BRPM | SYSTOLIC BLOOD PRESSURE: 125 MMHG | TEMPERATURE: 97.3 F

## 2021-01-26 VITALS
HEART RATE: 98 BPM | DIASTOLIC BLOOD PRESSURE: 74 MMHG | BODY MASS INDEX: 23.63 KG/M2 | HEIGHT: 66 IN | SYSTOLIC BLOOD PRESSURE: 125 MMHG | OXYGEN SATURATION: 97 % | WEIGHT: 147 LBS | TEMPERATURE: 97.3 F | RESPIRATION RATE: 14 BRPM

## 2021-01-26 DIAGNOSIS — Z17.0 MALIGNANT NEOPLASM OF UPPER-OUTER QUADRANT OF RIGHT BREAST IN FEMALE, ESTROGEN RECEPTOR POSITIVE (H): ICD-10-CM

## 2021-01-26 DIAGNOSIS — C50.411 MALIGNANT NEOPLASM OF UPPER-OUTER QUADRANT OF RIGHT BREAST IN FEMALE, ESTROGEN RECEPTOR POSITIVE (H): ICD-10-CM

## 2021-01-26 DIAGNOSIS — Z98.890 S/P BREAST RECONSTRUCTION, BILATERAL: Primary | ICD-10-CM

## 2021-01-26 DIAGNOSIS — Z90.13 S/P BILATERAL MASTECTOMY: ICD-10-CM

## 2021-01-26 DIAGNOSIS — Z01.818 PRE-OP EXAMINATION: Primary | ICD-10-CM

## 2021-01-26 LAB
ALBUMIN SERPL-MCNC: 3.2 G/DL (ref 3.4–5)
ALP SERPL-CCNC: 53 U/L (ref 40–150)
ALT SERPL W P-5'-P-CCNC: 14 U/L (ref 0–50)
ANION GAP SERPL CALCULATED.3IONS-SCNC: 7 MMOL/L (ref 3–14)
AST SERPL W P-5'-P-CCNC: 8 U/L (ref 0–45)
BASOPHILS # BLD AUTO: 0 10E9/L (ref 0–0.2)
BASOPHILS NFR BLD AUTO: 0.8 %
BILIRUB SERPL-MCNC: 0.2 MG/DL (ref 0.2–1.3)
BUN SERPL-MCNC: 13 MG/DL (ref 7–30)
CALCIUM SERPL-MCNC: 7.7 MG/DL (ref 8.5–10.1)
CHLORIDE SERPL-SCNC: 99 MMOL/L (ref 94–109)
CO2 SERPL-SCNC: 24 MMOL/L (ref 20–32)
CREAT SERPL-MCNC: 0.58 MG/DL (ref 0.52–1.04)
DIFFERENTIAL METHOD BLD: NORMAL
EOSINOPHIL # BLD AUTO: 0.1 10E9/L (ref 0–0.7)
EOSINOPHIL NFR BLD AUTO: 3.5 %
ERYTHROCYTE [DISTWIDTH] IN BLOOD BY AUTOMATED COUNT: 12.8 % (ref 10–15)
GFR SERPL CREATININE-BSD FRML MDRD: >90 ML/MIN/{1.73_M2}
GLUCOSE SERPL-MCNC: 85 MG/DL (ref 70–99)
HCT VFR BLD AUTO: 38.4 % (ref 35–47)
HGB BLD-MCNC: 12.8 G/DL (ref 11.7–15.7)
IMM GRANULOCYTES # BLD: 0 10E9/L (ref 0–0.4)
IMM GRANULOCYTES NFR BLD: 0.3 %
LYMPHOCYTES # BLD AUTO: 0.9 10E9/L (ref 0.8–5.3)
LYMPHOCYTES NFR BLD AUTO: 21.5 %
MCH RBC QN AUTO: 29.6 PG (ref 26.5–33)
MCHC RBC AUTO-ENTMCNC: 33.3 G/DL (ref 31.5–36.5)
MCV RBC AUTO: 89 FL (ref 78–100)
MONOCYTES # BLD AUTO: 0.3 10E9/L (ref 0–1.3)
MONOCYTES NFR BLD AUTO: 8.1 %
NEUTROPHILS # BLD AUTO: 2.6 10E9/L (ref 1.6–8.3)
NEUTROPHILS NFR BLD AUTO: 65.8 %
NRBC # BLD AUTO: 0 10*3/UL
NRBC BLD AUTO-RTO: 0 /100
PLATELET # BLD AUTO: 210 10E9/L (ref 150–450)
POTASSIUM SERPL-SCNC: 3.6 MMOL/L (ref 3.4–5.3)
PROT SERPL-MCNC: 6.2 G/DL (ref 6.8–8.8)
RBC # BLD AUTO: 4.33 10E12/L (ref 3.8–5.2)
SODIUM SERPL-SCNC: 130 MMOL/L (ref 133–144)
WBC # BLD AUTO: 4 10E9/L (ref 4–11)

## 2021-01-26 PROCEDURE — 99203 OFFICE O/P NEW LOW 30 MIN: CPT | Performed by: PHYSICIAN ASSISTANT

## 2021-01-26 PROCEDURE — 86850 RBC ANTIBODY SCREEN: CPT | Performed by: PHYSICIAN ASSISTANT

## 2021-01-26 PROCEDURE — 86901 BLOOD TYPING SEROLOGIC RH(D): CPT | Performed by: PHYSICIAN ASSISTANT

## 2021-01-26 PROCEDURE — 99212 OFFICE O/P EST SF 10 MIN: CPT | Performed by: PLASTIC SURGERY

## 2021-01-26 PROCEDURE — 85025 COMPLETE CBC W/AUTO DIFF WBC: CPT | Performed by: INTERNAL MEDICINE

## 2021-01-26 PROCEDURE — 86900 BLOOD TYPING SEROLOGIC ABO: CPT | Performed by: PHYSICIAN ASSISTANT

## 2021-01-26 PROCEDURE — G0463 HOSPITAL OUTPT CLINIC VISIT: HCPCS

## 2021-01-26 PROCEDURE — 74174 CTA ABD&PLVS W/CONTRAST: CPT | Performed by: RADIOLOGY

## 2021-01-26 PROCEDURE — 80053 COMPREHEN METABOLIC PANEL: CPT | Performed by: INTERNAL MEDICINE

## 2021-01-26 RX ORDER — IOPAMIDOL 755 MG/ML
100 INJECTION, SOLUTION INTRAVASCULAR ONCE
Status: COMPLETED | OUTPATIENT
Start: 2021-01-26 | End: 2021-01-26

## 2021-01-26 RX ORDER — BIOTIN 10000 MCG
CAPSULE ORAL EVERY MORNING
COMMUNITY
End: 2023-04-10

## 2021-01-26 RX ADMIN — IOPAMIDOL 100 ML: 755 INJECTION, SOLUTION INTRAVASCULAR at 08:40

## 2021-01-26 ASSESSMENT — MIFFLIN-ST. JEOR
SCORE: 1264.49
SCORE: 1268.54

## 2021-01-26 ASSESSMENT — PAIN SCALES - GENERAL
PAINLEVEL: NO PAIN (0)
PAINLEVEL: NO PAIN (0)

## 2021-01-26 ASSESSMENT — LIFESTYLE VARIABLES: TOBACCO_USE: 1

## 2021-01-26 ASSESSMENT — ENCOUNTER SYMPTOMS: SEIZURES: 0

## 2021-01-26 NOTE — ANESTHESIA PREPROCEDURE EVALUATION
Anesthesia Pre-Procedure Evaluation    Patient: Kathy Lei   MRN:     [id number] Gender:   female   Age:    55 year old :      1965        Preoperative Diagnosis: S/P bilateral mastectomy [Z90.13]   Procedure(s):  Bilateral breast reconstruction with free abdominal flap and SPY     LABS:  CBC:   Lab Results   Component Value Date    WBC 4.0 2021    WBC 4.1 2020    HGB 12.8 2021    HGB 14.3 2020    HCT 38.4 2021    HCT 43.3 2020     2021     2020     BMP:   Lab Results   Component Value Date     (L) 2021     2020    POTASSIUM 3.6 2021    POTASSIUM 3.9 2020    CHLORIDE 99 2021    CHLORIDE 108 2020    CO2 24 2021    CO2 23 2020    BUN 13 2021    BUN 12 2020    CR 0.58 2021    CR 0.58 2020    GLC 85 2021     (H) 2020     COAGS:   Lab Results   Component Value Date    PTT 26 2019    INR 1.02 2019    FIBR 264 2019     POC:   Lab Results   Component Value Date    BGM 88 10/31/2019    HCG (A) 2002     Canceled, Test credited  CORRECTED ON  AT 1617: PREVIOUSLY REPORTED AS Negative     OTHER:   Lab Results   Component Value Date    A1C 5.6 2019    BRYANT 7.7 (L) 2021    PHOS 3.0 06/10/2019    MAG 2.1 06/10/2019    ALBUMIN 3.2 (L) 2021    PROTTOTAL 6.2 (L) 2021    ALT 14 2021    AST 8 2021    ALKPHOS 53 2021    BILITOTAL 0.2 2021    TSH 0.73 10/26/2020    T4 1.16 2020    CRP 55.3 (H) 2019    SED 61 (H) 2019        Preop Vitals    BP Readings from Last 3 Encounters:   21 125/78   10/26/20 132/77   10/06/20 116/80    Pulse Readings from Last 3 Encounters:   21 98   10/26/20 94   10/06/20 84      Resp Readings from Last 3 Encounters:   21 14   10/26/20 18   10/06/20 16    SpO2 Readings from Last 3 Encounters:   21 97%   10/06/20 98%  "  20 96%      Temp Readings from Last 1 Encounters:   21 97.3  F (36.3  C) (Oral)    Ht Readings from Last 1 Encounters:   21 1.675 m (5' 5.95\")      Wt Readings from Last 1 Encounters:   21 66.4 kg (146 lb 4.8 oz)    Estimated body mass index is 23.65 kg/m  as calculated from the following:    Height as of an earlier encounter on 21: 1.675 m (5' 5.95\").    Weight as of an earlier encounter on 21: 66.4 kg (146 lb 4.8 oz).     LDA:  Closed/Suction Drain 1 Left;Anterior Breast Bulb 15 Ugandan (Active)   Number of days: 530       Closed/Suction Drain 2 Right;Anterior Breast Bulb 15 Ugandan (Active)   Number of days: 530       Closed/Suction Drain Left Chest 15 Ugandan (Active)   Number of days: 453       Closed/Suction Drain Right Chest 15 Ugandan (Active)   Number of days: 453        Past Medical History:   Diagnosis Date     Asthma      Breast cancer (H) 2018     Depressive disorder, not elsewhere classified      Hypertension      Hypothyroidism      Malignant neoplasm of upper-outer quadrant of right breast in female, estrogen receptor positive (H) 2019     Mild intermittent asthma      PONV (postoperative nausea and vomiting)      Scoliosis (and kyphoscoliosis), idiopathic       Past Surgical History:   Procedure Laterality Date     ABDOMEN SURGERY   c section     BACK SURGERY  scoliosis fusion      BIOPSY  breast     BREAST SURGERY  implants      BRONCHOSCOPY (RIGID OR FLEXIBLE), DIAGNOSTIC N/A 2019    Procedure: BRONCHOSCOPY, WITH BRONCHOALVEOLAR LAVAGE;  Surgeon: Maksim Claros MD;  Location: [facility] GI     C/SECTION, LOW TRANSVERSE      , Low Transverse     COSMETIC SURGERY  breast implants      INSERT PORT VASCULAR ACCESS Left 3/6/2019    Procedure: Single Lumen Chest Port Placement;  Surgeon: Jerome Dash PA-C;  Location: [facility] OR     IR CHEST PORT PLACEMENT > 5 YRS OF AGE  3/6/2019     MASTECTOMY SIMPLE, SENTINEL NODE, COMBINED N/A " 8/15/2019    Procedure: Bilateral Skin Sparing Mastectomy, Right Axillary [place] Lymph Node Biopsy;  Surgeon: Anne Yousif MD;  Location: [facility] OR     RECONSTRUCT BREAST, INSERT TISSUE EXPANDER, COMBINED Bilateral 8/15/2019    Procedure: Bilateral Breast Reconstruction With Expanders and SPY;  Surgeon: LALY Ramos MD;  Location: [facility] OR     REMOVE IMPLANT BREAST Bilateral 8/15/2019    Procedure: Removal of bilateral breast implant and Bilateral excision of implant capsules;  Surgeon: Anne Yousif MD;  Location: [facility] OR     REMOVE IMPLANT BREAST Bilateral 10/31/2019    Procedure: Right Breast Implant Removal;  Surgeon: LALY Ramos MD;  Location: [facility]     REMOVE PORT VASCULAR ACCESS N/A 8/15/2019    Procedure: Remove Vascular Access Port;  Surgeon: Anne Yousif MD;  Location: [facility]     SURGICAL HISTORY OF -       spinal fusion- Lacey kim     SURGICAL HISTORY OF -       removal of right breast papilloma      Allergies   Allergen Reactions     Buspar [Buspirone] Nausea and Vomiting     Percocet [Oxycodone-Acetaminophen]      Nausea and Vomiting      Remeron [Mirtazapine]      Dizzy , cant function , balance        Anesthesia Evaluation     . Pt has had prior anesthetic. Type: General and MAC.    History of anesthetic complications   - PONV.  No issues with recent surgeries.      ROS/MED HX    ENT/Pulmonary:     (+) tobacco use (15 year pack history, quit in 1980), Past use, Intermittent, asthma Treatment: Inhaler prn and Inhaler daily,      Neurologic:  - neg neurologic ROS  (-) no seizures, no CVA, no TIA and migraines   Cardiovascular:     (+) Dyslipidemia hypertension-----Previous cardiac testing   Echo: Date: 2019 Results:  Interpretation Summary  Borderline (EF 50-55%) reduced left ventricular function is present.Mild  concentric wall thickening consistent with left ventricular hypertrophy is  present.  Global right ventricular function is normal.  Mild sclerosis of  the aortic valve and mitral annulus present.  The inferior vena cava was normal in size with preserved respiratory  variability.  No pericardial effusion is present.  Stress Test: Date: Results:    ECG Reviewed: Date: 2019 Results:  Sinus rhythm  Cath: Date: Results:        METS/Exercise Tolerance:  4 - Raking leaves, gardening   Hematologic:  - neg hematologic  ROS    (-) history of blood clots, anemia and History of Transfusion   Musculoskeletal:   (+) other musculoskeletal- scoliosis,       GI/Hepatic:     (+) Other GI/Hepatic nausea    (-) GERD   Renal/Genitourinary:  - neg Renal ROS       Endo:     (+) thyroid problem, hypothyroidism,       Psychiatric:     (+) psychiatric history depression and anxiety    (-) chronic opioid use history   Infectious Disease:         Malignancy: (+) Malignancy, History of Breast.Breast CA Remission status post Surgery, Chemo and Radiation.          Other:    (+) , no H/O Chronic Pain,                       PHYSICAL EXAM:   Mental Status/Neuro: A/A/O; Age Appropriate   Airway: Facies: Feasible  Mallampati: II  Mouth/Opening: Full  TM distance: > 6 cm  Neck ROM: Limited   Respiratory: Auscultation: CTAB     Resp. Rate: Normal     Resp. Effort: Normal      CV: Rhythm: Regular  Heart: Normal Sounds  Edema: None  Pulses: Normal  Neck: Normal   Comments:                      Assessment:   ASA SCORE: 4    H&P: History and physical reviewed and following examination; no interval change.   Smoking Status:  Non-Smoker/Unknown   NPO Status: NPO Appropriate     Plan:   Anes. Type:  General   Pre-Medication: None   Induction:  IV (Standard)   Airway: ETT; Oral   Access/Monitoring: PIV; A-Line; 2nd PIV   Maintenance: Balanced     Postop Plan:   Postop Pain: Opioids  Postop Sedation/Airway: Not planned     PONV Management:   Adult Risk Factors: Female, H/o PONV or Motion Sickness, Non-Smoker, Postop Opioids   Prevention: Ondansetron, Dexamethasone     CONSENT: Direct conversation   Plan and  risks discussed with: Patient   Blood Products: Consented (ALL Blood Products)             [unfilled]  PAC Discussion and Assessment    ASA Classification: 3  Case is suitable for: [place]  Anesthetic techniques and relevant risks discussed: GA with regional block for post-op pain control                  PAC Resident/NP Anesthesia Assessment: Mily is a 55 year old woman who is scheduled for Bilateral breast reconstruction with free abdominal flap and SPY  on 2/8/21 by Dr. Ramos and Dr. Garnett in treatment of s/p bilateral mastectomy.  PAC referral for risk assessment and optimization for anesthesia with comorbid conditions of HTN, HLD, asthma, former smoker, hypothyroidism, nausea, depression, anxiety and scoliosis:    Pre-operative considerations:  1.  Cardiac:  Functional status- METS 4, the patient does yoga, resistance training and outdoor activities like shoveling snow and gardening. She denies any cardiac symptoms.  Intermediate risk surgery with 0.4% (RCRI #) risk of major adverse cardiac event. The patient had cardiac testing in May and Jun 2019 with EKG and echo. No further testing indicated.   ~ HTN - hold lisinopril DOS.   ~ HLD - managing with diet and exercise.     2.  Pulm:  Airway feasible.  VIANEY risk:   ~ Former smoker - 15 year pack history and quit in 1980  ~ Asthma - well controlled. The patient denies any recent excerbations. Continue flovent and albuterol.     3. Endo: hypothyroidism - continue levothyroxine.     4. GI:  Risk of PONV score = 4.  If > 2, anti-emetic intervention recommended. The patient reports no issues with recent surgeries with IV interventions.   ~ Chemo associated nausea - PRN zofran.     5. : History of breast cancer s/p mastectomy, chemotherapy and on hormone therapy - continue femara.     6. Psych: depression, anxiety - continue zoloft, trazodone and ativan.     7. Musculoskeletal: scoliosis - s/p previous back surgery. Hold Advil 48 hours. Cnsideration for  careful positioning to minimize discomfort.     VTE risk: 0.26%    Patient is optimized and is acceptable candidate for the proposed procedure.  No further diagnostic evaluation is needed.     For further details of assessment, testing, and physical exam please see H and P completed on same date.    Mel Boo PA-C        Mid-Level Provider/Resident: Mel Boo PA-C  Date: 1/26/21                                  Mel Boo PA-C

## 2021-01-26 NOTE — PROGRESS NOTES
PREOPERATIVE VISIT      PRESENTING COMPLAINT:  Preoperative visit for upcoming bilateral breast reconstruction with free LIVIER flap on 02/08/2021.        HISTORY OF PRESENTING COMPLAINT:  Ms. Lei is 55 years old.  She is going to undergo a bilateral delayed breast reconstruction with free LIVIER flaps.  She had right-sided breast cancer.  She had expander-based reconstruction but had bilateral infection requiring removal back in 10/2019.  She had her CTA today.  She had her PAC clearance today.  Otherwise, no change in her history and physical exam.      ASSESSMENT AND PLAN:  Based on above findings, a diagnosis of bilateral delayed breast reconstruction with free LIVIER flaps, here for a preoperative visit was made.  Went over the planned procedure with the patient in detail.  All risks, benefits and alternatives of the procedure including pain, infection, bleeding, scarring, asymmetry, seromas, hematomas, wound breakdown, wound dehiscence, loss of the flaps, abdominal wall healing issues, abdominal wall weakness and bulges, umbilicus removal, staged nature of reconstructions, numbness of the chest, injury to deeper structures, DVT, PE, MI, CVA, pneumonia, renal failure and death were explained.  She understood them all and wants to proceed.  I look forward to helping her out in the near future.        Total time spent with patient 15 minutes.  This included pre-visit chart review, the visit itself and post-visit paperwork.

## 2021-01-26 NOTE — ADDENDUM NOTE
Addendum  created 01/26/21 1253 by Mel Boo PA-C    Clinical Note Signed, Order list changed, Order sets accessed

## 2021-01-26 NOTE — NURSING NOTE
"Oncology Rooming Note    January 26, 2021 9:38 AM   Kathy Lei is a 55 year old female who presents for:    Chief Complaint   Patient presents with     Oncology Clinic Visit     P RETURN - BREAST CANCER     Initial Vitals: /78 (BP Location: Right arm, Patient Position: Chair, Cuff Size: Adult Regular)   Pulse 98   Temp 97.3  F (36.3  C) (Oral)   Resp 14   Ht 1.675 m (5' 5.95\")   Wt 66.4 kg (146 lb 4.8 oz)   LMP 11/02/2014   SpO2 97%   BMI 23.65 kg/m   Estimated body mass index is 23.65 kg/m  as calculated from the following:    Height as of this encounter: 1.675 m (5' 5.95\").    Weight as of this encounter: 66.4 kg (146 lb 4.8 oz). Body surface area is 1.76 meters squared.  No Pain (0) Comment: Data Unavailable   Patient's last menstrual period was 11/02/2014.  Allergies reviewed: Yes  Medications reviewed: Yes    Medications: Medication refills not needed today.  Pharmacy name entered into [facility]:    [id number] - [place], [place] - [address] AT [facility] [address]  [facility] - [place], [place] - [address]    Clinical concerns: No new concerns. Richard was notified.      Shakir Skaggs LPN            "

## 2021-01-26 NOTE — PATIENT INSTRUCTIONS
Preparing for Your Surgery      Name:  Kathy Lei   MRN:  [id number]   :  1965   Today's Date:  2021       Arriving for surgery:  Surgery date:  2021  Arrival time:  5:00 am    Restrictions due to COVID 19:  One consistent visitor per patient is allowed  No ill visitors  All visitors must wear face mask     parking is available for anyone with mobility limitations or disabilities.  ([place]  24 hours/ 7 days a week; [facility] [place]  7 am- 3:30 pm, Mon- Fri)    Please come to:       [facility] [address]  [address] [id number]       -    Please proceed to the Surgery Lounge on the 3rd floor. [phone]?     - ?If you are in need of directions, wheelchair or escort please stop at the Information Desk in the lobby.       What can I eat or drink?  -  You may eat and drink normally for up to 8 hours before your surgery. (Until 21 at 11 pm)  -  You may have clear liquids until 2 hours before surgery. (Until 5 am arrival time)    Examples of clear liquids:  Water  Clear broth  Juices (apple, white grape, white cranberry  and cider) without pulp  Noncarbonated, powder based beverages  (lemonade and Lalo-Aid)  Sodas (Sprite, 7-Up, ginger ale and seltzer)  Coffee or tea (without milk or cream)  Gatorade    -  No Alcohol for at least 24 hours before surgery     Which medicines can I take?    Hold Aspirin for 7 days before surgery.   Hold Multivitamins for 7 days before surgery.    Hold Supplements (collagen, Apple cider vinegar, Biotin) for 7 days before surgery.    Hold Ibuprofen (Advil, Motrin) for 1 day before surgery    Hold Naproxen (Aleve) for 4 days before surgery.      -  PLEASE TAKE these medications the day of surgery:  Inhalers as usual and bring to hospital   Levothyroxine  Lorazepam if needed       How do I prepare myself?  - Please take 2 showers before surgery using Scrubcare or Hibiclens soap.    Use this soap only from the  neck to your toes.     Leave the soap on your skin for one minute--then rinse thoroughly.      You may use your own shampoo and conditioner; no other hair products.   - Please remove all jewelry and body piercings.  - No lotions, deodorants or fragrance.  - No makeup or fingernail polish.   - Bring your ID and insurance card.    - All patients are required to have a Covid-19 test within 4 days of surgery/procedure.      -Patients will be contacted by the [facility] scheduling team within 1 week of surgery to make an appointment.      - Patients may call the Scheduling team at [phone] if they have not been scheduled within 4 days of  surgery.        Questions or Concerns:    - For any questions regarding the day of surgery or your hospital stay, please contact the Pre Admission Nursing Office at [phone].       - If you have health changes between today and your surgery please call your surgeon.       For questions after surgery please call your surgeons office.

## 2021-01-26 NOTE — H&P
Pre-Operative H & P     CC:  Preoperative exam to assess for increased cardiopulmonary risk while undergoing surgery and anesthesia.    Date of Encounter: 1/26/2021  Primary Care Physician:  Rachel Arauz     Reason for visit: pre operative examination, S/P bilateral mastectomy     HPI  Kathy Lei is a 55 year old female who presents for pre-operative H & P in preparation for Bilateral breast reconstruction with free abdominal flap and SPY  with Dr. Ramos and Dr. Garnett on 2/8/21 at [facility].     The patient is a 55 year old woman who has a past medical history significant for HTN, HLD, asthma, former smoker, hypothyroidism, chemo associated nausea, depression, anxiety, scoliosis and a history of breast cancer. The patient underwent neoadjuvant chemotherapy followed by mastectomy on 8/15/19 and completed post surgical radiation. She had both of her expanders removed secondary to infection in 10/2019. The patient followed up with Dr. Ramos and discussed the next steps in her treatment. She has now been counseled for the procedure as above.     History is obtained from the patient and chart review    Past Medical History  Past Medical History:   Diagnosis Date     Anxiety      Asthma      Breast cancer (H) 12/2018     Depressive disorder, not elsewhere classified      HLD (hyperlipidemia)      Hypertension      Hypothyroidism      Malignant neoplasm of upper-outer quadrant of right breast in female, estrogen receptor positive (H) 01/28/2019     Mild intermittent asthma      PONV (postoperative nausea and vomiting)      Scoliosis (and kyphoscoliosis), idiopathic        Past Surgical History  Past Surgical History:   Procedure Laterality Date     ABDOMEN SURGERY  1990 c section     BACK SURGERY      spinal fusion- Lacey kim scoliosis fusion 1976     BIOPSY  breast     BREAST SURGERY  implants 1999     BRONCHOSCOPY (RIGID OR FLEXIBLE), DIAGNOSTIC N/A  2019    Procedure: BRONCHOSCOPY, WITH BRONCHOALVEOLAR LAVAGE;  Surgeon: Maksim Claros MD;  Location: [facility] GI     C/SECTION, LOW TRANSVERSE      , Low Transverse     COSMETIC SURGERY  breast implants 2000     INSERT PORT VASCULAR ACCESS Left 2019    Procedure: Single Lumen Chest Port Placement;  Surgeon: Jerome Dash PA-C;  Location: [facility] OR     IR CHEST PORT PLACEMENT > 5 YRS OF AGE  2019     MASTECTOMY SIMPLE, SENTINEL NODE, COMBINED N/A 08/15/2019    Procedure: Bilateral Skin Sparing Mastectomy, Right Axillary Elberta Lymph Node Biopsy;  Surgeon: Anne Yousif MD;  Location: [facility] OR     RECONSTRUCT BREAST, INSERT TISSUE EXPANDER, COMBINED Bilateral 08/15/2019    Procedure: Bilateral Breast Reconstruction With Expanders and SPY;  Surgeon: LALY Ramos MD;  Location: [facility] OR     REMOVE IMPLANT BREAST Bilateral 08/15/2019    Procedure: Removal of bilateral breast implant and Bilateral excision of implant capsules;  Surgeon: Anne Yousif MD;  Location: [facility] OR     REMOVE IMPLANT BREAST Bilateral 10/31/2019    Procedure: Right Breast Implant Removal;  Surgeon: LALY Ramos MD;  Location: [facility] OR     REMOVE PORT VASCULAR ACCESS N/A 08/15/2019    Procedure: Remove Vascular Access Port;  Surgeon: Anne Yousif MD;  Location: [facility] OR     SURGICAL HISTORY OF -       removal of right breast papilloma       Hx of Blood transfusions/reactions: denies     Hx of abnormal bleeding or anti-platelet use: none    Menstrual history: Patient's last menstrual period was 2014.:     Steroid use in the last year: with chemotherapy    Personal or FH with difficulty with Anesthesia:  PONV    Prior to Admission Medications  Current Outpatient Medications   Medication Sig Dispense Refill     ADVIL 200 MG OR TABS Take by mouth every 4 hours as needed  0 0     albuterol (VENTOLIN HFA) 108 (90 Base) MCG/ACT inhaler INHALE 1 TO 2 PUFFS INTO THE LUNGS EVERY 4 HOURS AS  NEEDED FOR SHORTNESS OF BREATH OR DIFFICULTY BREATHING (Patient taking differently: as needed INHALE 1 TO 2 PUFFS INTO THE LUNGS EVERY 4 HOURS AS NEEDED FOR SHORTNESS OF BREATH OR DIFFICULTY BREATHING) 18 g PRN     APPLE CIDER VINEGAR PO Take 4 chew tab by mouth as needed WILMAN        Biotin 10 MG CAPS Take by mouth every morning       COLLAGEN PO Take 2 Scoops by mouth as needed Powder        fluticasone (FLOVENT HFA) 110 MCG/ACT inhaler Inhale 2 puffs into the lungs 2 times daily 1 Inhaler PRN     letrozole (FEMARA) 2.5 MG tablet Take 1 tablet (2.5 mg) by mouth daily (Patient taking differently: Take 2.5 mg by mouth At Bedtime ) 90 tablet 3     levothyroxine (SYNTHROID/LEVOTHROID) 125 MCG tablet Take 1 tablet (125 mcg) by mouth daily (Patient taking differently: Take 125 mcg by mouth every morning ) 90 tablet 3     lisinopril (ZESTRIL) 5 MG tablet Take 1 tablet (5 mg) by mouth daily (Patient taking differently: Take 5 mg by mouth every evening ) 90 tablet 3     LORazepam (ATIVAN) 0.5 MG tablet Take 1 tablet (0.5 mg) by mouth daily as needed for anxiety TAKE 1 TABLET(0.5 MG) BY MOUTH EVERY 4 HOURS AS NEEDED FOR ANXIETY OR NAUSEA OR VOMITING OR SLEEP 30 tablet 2     ondansetron (ZOFRAN) 8 MG tablet Take 1 tablet (8 mg) by mouth every 8 hours as needed for nausea 60 tablet 3     sertraline (ZOLOFT) 100 MG tablet Take 1 tablet (100 mg) by mouth daily (Patient taking differently: Take 100 mg by mouth At Bedtime ) 90 tablet 3     traZODone (DESYREL) 50 MG tablet Take 1-2 tablets ( mg) by mouth nightly as needed for sleep 90 tablet PRN       Allergies  Allergies   Allergen Reactions     Buspar [Buspirone] Nausea and Vomiting     Percocet [Oxycodone-Acetaminophen]      Nausea and Vomiting      Remeron [Mirtazapine]      Dizzy , cant function , balance       Social History  Social History     Socioeconomic History     Marital status:      Spouse name: Not on file     Number of children: 1     Years of  education: Not on file     Highest education level: Not on file   Occupational History     Not on file   Social Needs     Financial resource strain: Not on file     Food insecurity     Worry: Not on file     Inability: Not on file     Transportation needs     Medical: Not on file     Non-medical: Not on file   Tobacco Use     Smoking status: Former Smoker     Packs/day: 0.50     Years: 30.00     Pack years: 15.00     Types: Cigarettes     Start date: 1980     Quit date: 2011     Years since quittin.5     Smokeless tobacco: Never Used     Tobacco comment: social smoker at times   Substance and Sexual Activity     Alcohol use: Yes     Comment: on weekends if going out     Drug use: No     Sexual activity: Not Currently     Partners: Male     Birth control/protection: Post-menopausal     Comment:    Lifestyle     Physical activity     Days per week: Not on file     Minutes per session: Not on file     Stress: Not on file   Relationships     Social connections     Talks on phone: Not on file     Gets together: Not on file     Attends Sabianist service: Not on file     Active member of club or organization: Not on file     Attends meetings of clubs or organizations: Not on file     Relationship status: Not on file     Intimate partner violence     Fear of current or ex partner: Not on file     Emotionally abused: Not on file     Physically abused: Not on file     Forced sexual activity: Not on file   Other Topics Concern     Parent/sibling w/ CABG, MI or angioplasty before 65F 55M? No   Social History Narrative    Dairy/d 1 servings/d.     Caffeine 2-3 servings/d    Exercise 0 x week    Sunscreen used - Yes    Seatbelts used - Yes    Working smoke/CO detectors in the home - Yes    Guns stored in the home - Yes,hunting guns/rifles in gun cabinet    Self Breast Exams - No due to implants    Self Testicular Exam - NA    Eye Exam up to date - No    Dental Exam up to date - No    Pap Smear up to date -  No    Mammogram up to date - Not sure how long ago,hx implants    PSA up to date - NA    Dexa Scan up to date - NA    Flex Sig / Colonoscopy up to date - NA    Immunizations up to date - Yes-Td 2007    Abuse: Current or Past(Physical, Sexual or Emotional)- No    Do you feel safe in your environment - Yes       Family History  Family History   Problem Relation Age of Onset     Hypertension Mother      Thyroid Disease Mother         on meds     Psychotic Disorder Mother         anxiety     Hyperlipidemia Mother      Anxiety Disorder Mother      Cerebrovascular Disease Father         minor     Hypertension Father      Osteoporosis Maternal Grandmother      Asthma Maternal Grandfather      Thyroid Disease Sister         x2-on meds     Psychotic Disorder Sister         problems making decisions     Asthma Other      C.A.D. No family hx of      Diabetes No family hx of      Breast Cancer No family hx of      Cancer - colorectal No family hx of        Review of Systems    ROS/MED HX    ENT/Pulmonary:     (+) tobacco use (15 year pack history, quit in 1980), Past use, Intermittent, asthma Treatment: Inhaler prn and Inhaler daily,      Neurologic:  - neg neurologic ROS  (-) no seizures, no CVA, no TIA and migraines   Cardiovascular:     (+) Dyslipidemia hypertension-----Previous cardiac testing   Echo: Date: 2019 Results:  Interpretation Summary  Borderline (EF 50-55%) reduced left ventricular function is present.Mild  concentric wall thickening consistent with left ventricular hypertrophy is  present.  Global right ventricular function is normal.  Mild sclerosis of the aortic valve and mitral annulus present.  The inferior vena cava was normal in size with preserved respiratory  variability.  No pericardial effusion is present.  Stress Test: Date: Results:    ECG Reviewed: Date: 2019 Results:  Sinus rhythm  Cath: Date: Results:        METS/Exercise Tolerance:  4 - Raking leaves, gardening   Hematologic:  - neg hematologic   "ROS    (-) history of blood clots, anemia and History of Transfusion   Musculoskeletal:   (+) other musculoskeletal- scoliosis,       GI/Hepatic:     (+) Other GI/Hepatic nausea    (-) GERD   Renal/Genitourinary:  - neg Renal ROS       Endo:     (+) thyroid problem, hypothyroidism,       Psychiatric:     (+) psychiatric history depression and anxiety    (-) chronic opioid use history   Infectious Disease:         Malignancy: (+) Malignancy, History of Breast.Breast CA Remission status post Surgery and Chemo.          Other:    (+) , no H/O Chronic Pain,         The complete review of systems is negative other than noted in the HPI or here.   Temp: 97.3  F (36.3  C) Temp src: Oral BP: 125/74 Pulse: 98   Resp: 14 SpO2: 97 %         147 lbs 0 oz  5' 6\"[PT REPORTED[   Body mass index is 23.73 kg/m .       Physical Exam  Constitutional: Awake, alert, cooperative, no apparent distress, and appears stated age.  Eyes: Pupils equal, round and reactive to light, extra ocular muscles intact, sclera clear, conjunctiva normal.  HENT: Normocephalic, oral pharynx with moist mucus membranes, good dentition. No goiter appreciated.   Respiratory: Clear to auscultation bilaterally, no crackles or wheezing.  Cardiovascular: Regular rate and rhythm, normal S1 and S2, and no murmur noted.  Carotids +2, no bruits. No edema. Palpable pulses to radial  DP and PT arteries.   GI: Normal bowel sounds, soft, non-distended, non-tender, no masses palpated, no hepatosplenomegaly.    Lymph/Hematologic: No cervical lymphadenopathy and no supraclavicular lymphadenopathy.  Genitourinary:  defer  Skin: Warm and dry.  No rashes at anticipated surgical site.   Musculoskeletal: Limited ROM of neck. There is no redness, warmth, or swelling of the joints. Gross motor strength is normal.    Neurologic: Awake, alert, oriented to name, place and time. Cranial nerves II-XII are grossly intact. Gait is normal.   Neuropsychiatric: Calm, cooperative. Normal " affect.     Labs: (personally reviewed)    LABS:  CBC:   Lab Results   Component Value Date    WBC 4.0 2021    WBC 4.1 2020    HGB 12.8 2021    HGB 14.3 2020    HCT 38.4 2021    HCT 43.3 2020     2021     2020     BMP:   Lab Results   Component Value Date     (L) 2021     2020    POTASSIUM 3.6 2021    POTASSIUM 3.9 2020    CHLORIDE 99 2021    CHLORIDE 108 2020    CO2 24 2021    CO2 23 2020    BUN 13 2021    BUN 12 2020    CR 0.58 2021    CR 0.58 2020    GLC 85 2021     (H) 2020     COAGS:   Lab Results   Component Value Date    PTT 26 2019    INR 1.02 2019    FIBR 264 2019     POC:   Lab Results   Component Value Date    BGM 88 10/31/2019    HCG (A) 2002     Canceled, Test credited  CORRECTED ON  AT 1617: PREVIOUSLY REPORTED AS Negative     OTHER:   Lab Results   Component Value Date    A1C 5.6 2019    BRYANT 7.7 (L) 2021    PHOS 3.0 06/10/2019    MAG 2.1 06/10/2019    ALBUMIN 3.2 (L) 2021    PROTTOTAL 6.2 (L) 2021    ALT 14 2021    AST 8 2021    ALKPHOS 53 2021    BILITOTAL 0.2 2021    TSH 0.73 10/26/2020    T4 1.16 2020    CRP 55.3 (H) 2019    SED 61 (H) 2019      Discussed with the patient who drank a significant amount of water yesterday for her CT scan as she was told to stay hydrated. No history of hyponatremia. Likely related to fluid dilution. Discussed increasing salt and will recheck DOS.     EK/10/19     Sinus rhythm, possible LAE             Echo 19     Interpretation Summary     Global and regional left ventricular function is normal with an EF of 55-60%.     Global peak LV longitudinal strain is averaged at -18.6%. This is within     reported normal limits (normal <-18%).     Right ventricular function, chamber size, wall motion, and  thickness are     normal.     No pericardial effusion is present.     The patient's records and results personally reviewed by this provider.     Outside records reviewed from: care everywhere    ASSESSMENT and PLAN  Mily is a 55 year old woman who is scheduled for Bilateral breast reconstruction with free abdominal flap and SPY  on 2/8/21 by Dr. Ramos and Dr. Garnett in treatment of s/p bilateral mastectomy.  PAC referral for risk assessment and optimization for anesthesia with comorbid conditions of HTN, HLD, asthma, former smoker, hypothyroidism, nausea, depression, anxiety and scoliosis:    Pre-operative considerations:  1.  Cardiac:  Functional status- METS 4, the patient does yoga, resistance training and outdoor activities like shoveling snow and gardening. She denies any cardiac symptoms.  Intermediate risk surgery with 0.4% (RCRI #) risk of major adverse cardiac event. The patient had cardiac testing in May and Jun 2019 with EKG and echo. No further testing indicated.   ~ HTN - hold lisinopril DOS.   ~ HLD - managing with diet and exercise.     2.  Pulm:  Airway feasible.  VIANEY risk:   ~ Former smoker - 15 year pack history and quit in 1980  ~ Asthma - well controlled. The patient denies any recent excerbations. Continue flovent and albuterol.     3. Endo: hypothyroidism - continue levothyroxine.     4. GI:  Risk of PONV score = 4.  If > 2, anti-emetic intervention recommended. The patient reports no issues with recent surgeries with IV interventions.   ~ Chemo associated nausea - PRN zofran.     5. : History of breast cancer s/p mastectomy, chemotherapy and on hormone therapy - continue femara.     6. Psych: depression, anxiety - continue zoloft, trazodone and ativan.     7. Musculoskeletal: scoliosis - s/p previous back surgery. Hold Advil 48 hours. Cnsideration for careful positioning to minimize discomfort.     VTE risk: 0.26%    The patient is optimized for their procedure. AVS with information on  surgery time/arrival time, meds and NPO status given by nursing staff.        Mel Boo PA-C  Preoperative Assessment Center  [facility]  Clinic and Surgery Center  Phone: [phone]  Fax: [phone]

## 2021-01-26 NOTE — PROGRESS NOTES
Chief Complaint   Patient presents with     Blood Draw     Blood drawn from PIV, PIV removed     Blood drawn from right arm PIV. PIV removed.    -Keisha FAY CMA

## 2021-01-26 NOTE — Clinical Note
1/26/2021         RE: Kathy Lei  [address] [place] [address]        Dear Colleague,    Thank you for referring your patient, Kathy Lei, to the [facility]. Please see a copy of my visit note below.    Chief Complaint   Patient presents with     Blood Draw     Blood drawn from PIV, PIV removed     Blood drawn from right arm PIV. PIV removed.    -Keisha FAY CMA      Again, thank you for allowing me to participate in the care of your patient.        Sincerely,        [facility] Lab Draw

## 2021-01-26 NOTE — LETTER
1/26/2021         RE: Kathy Lei  [address] [place] [address]        Dear Colleague,    Thank you for referring your patient, Kathy Lei, to the [facility]. Please see a copy of my visit note below.    PREOPERATIVE VISIT      PRESENTING COMPLAINT:  Preoperative visit for upcoming bilateral breast reconstruction with free LIVIER flap on 02/08/2021.        HISTORY OF PRESENTING COMPLAINT:  Ms. Lei is 55 years old.  She is going to undergo a bilateral delayed breast reconstruction with free LIVIER flaps.  She had right-sided breast cancer.  She had expander-based reconstruction but had bilateral infection requiring removal back in 10/2019.  She had her CTA today.  She had her PAC clearance today.  Otherwise, no change in her history and physical exam.      ASSESSMENT AND PLAN:  Based on above findings, a diagnosis of bilateral delayed breast reconstruction with free LIVIER flaps, here for a preoperative visit was made.  Went over the planned procedure with the patient in detail.  All risks, benefits and alternatives of the procedure including pain, infection, bleeding, scarring, asymmetry, seromas, hematomas, wound breakdown, wound dehiscence, loss of the flaps, abdominal wall healing issues, abdominal wall weakness and bulges, umbilicus removal, staged nature of reconstructions, numbness of the chest, injury to deeper structures, DVT, PE, MI, CVA, pneumonia, renal failure and death were explained.  She understood them all and wants to proceed.  I look forward to helping her out in the near future.        Total time spent with patient 15 minutes.  This included pre-visit chart review, the visit itself and post-visit paperwork.           Again, thank you for allowing me to participate in the care of your patient.        Sincerely,        LALY Ramos MD

## 2021-01-27 DIAGNOSIS — Z11.59 ENCOUNTER FOR SCREENING FOR OTHER VIRAL DISEASES: ICD-10-CM

## 2021-01-31 ENCOUNTER — TELEPHONE (OUTPATIENT)
Dept: ONCOLOGY | Facility: CLINIC | Age: 56
End: 2021-01-31

## 2021-01-31 DIAGNOSIS — E83.51 HYPOCALCEMIA: Primary | ICD-10-CM

## 2021-01-31 NOTE — TELEPHONE ENCOUNTER
I called Mily and let her know that the CT abdomen and pelvis showed no concerning findings and was done to check on arterial structure for flap placement.     She feels well.     We will repeat the CMP because of concern of low calcium of 7.7 and sodium of 130. She had low Ca++ and Na in July of 2019.  Not sure of the cause. Will check PTH vitamin D3 and phosphorous.     No evidence of blastic bone metastases or any bone metastases on recent CT abdomen and pelvis. I went over this report with her.      Follow up with me in late February or early March after the reconstruction surgery, with CBC, CMP.        Christian Guzman MD

## 2021-02-01 ENCOUNTER — PATIENT OUTREACH (OUTPATIENT)
Dept: ONCOLOGY | Facility: CLINIC | Age: 56
End: 2021-02-01

## 2021-02-01 DIAGNOSIS — E83.51 HYPOCALCEMIA: ICD-10-CM

## 2021-02-01 DIAGNOSIS — Z17.0 MALIGNANT NEOPLASM OF UPPER-OUTER QUADRANT OF RIGHT BREAST IN FEMALE, ESTROGEN RECEPTOR POSITIVE (H): ICD-10-CM

## 2021-02-01 DIAGNOSIS — C50.411 MALIGNANT NEOPLASM OF UPPER-OUTER QUADRANT OF RIGHT BREAST IN FEMALE, ESTROGEN RECEPTOR POSITIVE (H): ICD-10-CM

## 2021-02-01 LAB
ALBUMIN SERPL-MCNC: 4 G/DL (ref 3.4–5)
ALP SERPL-CCNC: 68 U/L (ref 40–150)
ALT SERPL W P-5'-P-CCNC: 23 U/L (ref 0–50)
ANION GAP SERPL CALCULATED.3IONS-SCNC: 8 MMOL/L (ref 3–14)
AST SERPL W P-5'-P-CCNC: 16 U/L (ref 0–45)
BASOPHILS # BLD AUTO: 0 10E9/L (ref 0–0.2)
BASOPHILS NFR BLD AUTO: 0.9 %
BILIRUB SERPL-MCNC: 0.3 MG/DL (ref 0.2–1.3)
BUN SERPL-MCNC: 12 MG/DL (ref 7–30)
CALCIUM SERPL-MCNC: 9.1 MG/DL (ref 8.5–10.1)
CHLORIDE SERPL-SCNC: 103 MMOL/L (ref 94–109)
CO2 SERPL-SCNC: 26 MMOL/L (ref 20–32)
CREAT SERPL-MCNC: 0.61 MG/DL (ref 0.52–1.04)
DIFFERENTIAL METHOD BLD: NORMAL
EOSINOPHIL # BLD AUTO: 0.2 10E9/L (ref 0–0.7)
EOSINOPHIL NFR BLD AUTO: 4.3 %
ERYTHROCYTE [DISTWIDTH] IN BLOOD BY AUTOMATED COUNT: 12.9 % (ref 10–15)
GFR SERPL CREATININE-BSD FRML MDRD: >90 ML/MIN/{1.73_M2}
GLUCOSE SERPL-MCNC: 108 MG/DL (ref 70–99)
HCT VFR BLD AUTO: 41.9 % (ref 35–47)
HGB BLD-MCNC: 13.8 G/DL (ref 11.7–15.7)
IMM GRANULOCYTES # BLD: 0 10E9/L (ref 0–0.4)
IMM GRANULOCYTES NFR BLD: 0.2 %
LYMPHOCYTES # BLD AUTO: 1.2 10E9/L (ref 0.8–5.3)
LYMPHOCYTES NFR BLD AUTO: 26.2 %
MCH RBC QN AUTO: 29 PG (ref 26.5–33)
MCHC RBC AUTO-ENTMCNC: 32.9 G/DL (ref 31.5–36.5)
MCV RBC AUTO: 88 FL (ref 78–100)
MONOCYTES # BLD AUTO: 0.3 10E9/L (ref 0–1.3)
MONOCYTES NFR BLD AUTO: 7.3 %
NEUTROPHILS # BLD AUTO: 2.9 10E9/L (ref 1.6–8.3)
NEUTROPHILS NFR BLD AUTO: 61.1 %
NRBC # BLD AUTO: 0 10*3/UL
NRBC BLD AUTO-RTO: 0 /100
PHOSPHATE SERPL-MCNC: 3.1 MG/DL (ref 2.5–4.5)
PLATELET # BLD AUTO: 243 10E9/L (ref 150–450)
POTASSIUM SERPL-SCNC: 4 MMOL/L (ref 3.4–5.3)
PROT SERPL-MCNC: 7.4 G/DL (ref 6.8–8.8)
PTH-INTACT SERPL-MCNC: 44 PG/ML (ref 18–80)
RBC # BLD AUTO: 4.76 10E12/L (ref 3.8–5.2)
SODIUM SERPL-SCNC: 137 MMOL/L (ref 133–144)
WBC # BLD AUTO: 4.7 10E9/L (ref 4–11)

## 2021-02-01 PROCEDURE — 82306 VITAMIN D 25 HYDROXY: CPT | Performed by: INTERNAL MEDICINE

## 2021-02-01 PROCEDURE — 83970 ASSAY OF PARATHORMONE: CPT | Performed by: INTERNAL MEDICINE

## 2021-02-01 PROCEDURE — 85025 COMPLETE CBC W/AUTO DIFF WBC: CPT | Performed by: INTERNAL MEDICINE

## 2021-02-01 PROCEDURE — 80053 COMPREHEN METABOLIC PANEL: CPT | Performed by: INTERNAL MEDICINE

## 2021-02-01 PROCEDURE — 84100 ASSAY OF PHOSPHORUS: CPT | Performed by: INTERNAL MEDICINE

## 2021-02-01 NOTE — NURSING NOTE
Chief Complaint   Patient presents with     Blood Draw     blood drawn via VPT by ALBAN.      YEISON Galicia LPN

## 2021-02-01 NOTE — PROGRESS NOTES
Returned call to patient and left a VM on her cell phone. Mily Mortensen RN, BSN  [facility] Nurse Coordinator

## 2021-02-01 NOTE — PROGRESS NOTES
Reviewed lab work drawn today all WNL and released to [facility] [place] for patient to review.  Answered all patient's questions and verbalized understanding. Mily Mortensen RN, BSN.

## 2021-02-01 NOTE — Clinical Note
2/1/2021         RE: Kathy Lei  [address] [place] [address]        Dear Colleague,    Thank you for referring your patient, Kathy Lei, to the [facility]. Please see a copy of my visit note below.    No notes on file    Again, thank you for allowing me to participate in the care of your patient.        Sincerely,        [facility] Draw  
none

## 2021-02-02 ENCOUNTER — TELEPHONE (OUTPATIENT)
Dept: SURGERY | Facility: CLINIC | Age: 56
End: 2021-02-02

## 2021-02-02 LAB — DEPRECATED CALCIDIOL+CALCIFEROL SERPL-MC: 40 UG/L (ref 20–75)

## 2021-02-02 NOTE — TELEPHONE ENCOUNTER
Received voicemail from Mily requesting a letter for her disability paperwork.    Sent message to team to get this sent to her via [employer].    Sent her a [employer] to confirm that I received her message and that her request is being worked on by the team.

## 2021-02-06 DIAGNOSIS — Z11.59 ENCOUNTER FOR SCREENING FOR OTHER VIRAL DISEASES: ICD-10-CM

## 2021-02-06 LAB
LABORATORY COMMENT REPORT: NORMAL
SARS-COV-2 RNA RESP QL NAA+PROBE: NEGATIVE
SARS-COV-2 RNA RESP QL NAA+PROBE: NORMAL
SPECIMEN SOURCE: NORMAL
SPECIMEN SOURCE: NORMAL

## 2021-02-06 PROCEDURE — 87635 SARS-COV-2 COVID-19 AMP PRB: CPT | Performed by: PLASTIC SURGERY

## 2021-02-08 ENCOUNTER — ANESTHESIA (OUTPATIENT)
Dept: SURGERY | Facility: CLINIC | Age: 56
End: 2021-02-08
Payer: COMMERCIAL

## 2021-02-08 ENCOUNTER — HOSPITAL ENCOUNTER (INPATIENT)
Facility: CLINIC | Age: 56
LOS: 3 days | Discharge: HOME | End: 2021-02-11
Attending: PLASTIC SURGERY | Admitting: PLASTIC SURGERY
Payer: COMMERCIAL

## 2021-02-08 DIAGNOSIS — Z98.890 S/P FLAP GRAFT: Primary | ICD-10-CM

## 2021-02-08 DIAGNOSIS — Z90.13 S/P BILATERAL MASTECTOMY: ICD-10-CM

## 2021-02-08 LAB
ABO + RH BLD: NORMAL
ABO + RH BLD: NORMAL
ANION GAP SERPL CALCULATED.3IONS-SCNC: 4 MMOL/L (ref 3–14)
ANION GAP SERPL CALCULATED.3IONS-SCNC: 8 MMOL/L (ref 3–14)
BASE EXCESS BLDV CALC-SCNC: 0.2 MMOL/L
BLD GP AB SCN SERPL QL: NORMAL
BLOOD BANK CMNT PATIENT-IMP: NORMAL
BLOOD BANK CMNT PATIENT-IMP: NORMAL
BUN SERPL-MCNC: 12 MG/DL (ref 7–30)
BUN SERPL-MCNC: 7 MG/DL (ref 7–30)
CA-I BLD-MCNC: 4.8 MG/DL (ref 4.4–5.2)
CALCIUM SERPL-MCNC: 8.6 MG/DL (ref 8.5–10.1)
CALCIUM SERPL-MCNC: 9.3 MG/DL (ref 8.5–10.1)
CHLORIDE BLD-SCNC: 102 MMOL/L (ref 94–109)
CHLORIDE SERPL-SCNC: 104 MMOL/L (ref 94–109)
CHLORIDE SERPL-SCNC: 105 MMOL/L (ref 94–109)
CO2 SERPL-SCNC: 25 MMOL/L (ref 20–32)
CO2 SERPL-SCNC: 28 MMOL/L (ref 20–32)
CREAT SERPL-MCNC: 0.49 MG/DL (ref 0.52–1.04)
CREAT SERPL-MCNC: 0.56 MG/DL (ref 0.52–1.04)
CREAT SERPL-MCNC: 0.68 MG/DL (ref 0.52–1.04)
ERYTHROCYTE [DISTWIDTH] IN BLOOD BY AUTOMATED COUNT: 12.8 % (ref 10–15)
GFR SERPL CREATININE-BSD FRML MDRD: >90 ML/MIN/{1.73_M2}
GLUCOSE BLD-MCNC: 199 MG/DL (ref 70–99)
GLUCOSE BLDC GLUCOMTR-MCNC: 94 MG/DL (ref 70–99)
GLUCOSE SERPL-MCNC: 109 MG/DL (ref 70–99)
GLUCOSE SERPL-MCNC: 228 MG/DL (ref 70–99)
HCO3 BLDV-SCNC: 24 MMOL/L (ref 21–28)
HCT VFR BLD AUTO: 31.9 % (ref 35–47)
HGB BLD-MCNC: 10.8 G/DL (ref 11.7–15.7)
HGB BLD-MCNC: 11.7 G/DL (ref 11.7–15.7)
LACTATE BLD-SCNC: 1.5 MMOL/L (ref 0.7–2)
MAGNESIUM SERPL-MCNC: 1.3 MG/DL (ref 1.6–2.3)
MCH RBC QN AUTO: 29.8 PG (ref 26.5–33)
MCHC RBC AUTO-ENTMCNC: 33.9 G/DL (ref 31.5–36.5)
MCV RBC AUTO: 88 FL (ref 78–100)
O2/TOTAL GAS SETTING VFR VENT: 40 %
OXYHGB MFR BLDV: 79 %
PCO2 BLDV: 37 MM HG (ref 40–50)
PH BLDV: 7.43 PH (ref 7.32–7.43)
PLATELET # BLD AUTO: 222 10E9/L (ref 150–450)
PO2 BLDV: 44 MM HG (ref 25–47)
POTASSIUM BLD-SCNC: 3.1 MMOL/L (ref 3.4–5.3)
POTASSIUM SERPL-SCNC: 3.7 MMOL/L (ref 3.4–5.3)
POTASSIUM SERPL-SCNC: 4 MMOL/L (ref 3.4–5.3)
RBC # BLD AUTO: 3.62 10E12/L (ref 3.8–5.2)
SODIUM BLD-SCNC: 135 MMOL/L (ref 133–144)
SODIUM SERPL-SCNC: 136 MMOL/L (ref 133–144)
SODIUM SERPL-SCNC: 137 MMOL/L (ref 133–144)
SPECIMEN EXP DATE BLD: NORMAL
WBC # BLD AUTO: 11.5 10E9/L (ref 4–11)

## 2021-02-08 PROCEDURE — 82810 BLOOD GASES O2 SAT ONLY: CPT

## 2021-02-08 PROCEDURE — 250N000012 HC RX MED GY IP 250 OP 636 PS 637: Performed by: PLASTIC SURGERY

## 2021-02-08 PROCEDURE — 258N000003 HC RX IP 258 OP 636: Performed by: STUDENT IN AN ORGANIZED HEALTH CARE EDUCATION/TRAINING PROGRAM

## 2021-02-08 PROCEDURE — 272N000001 HC OR GENERAL SUPPLY STERILE: Performed by: PLASTIC SURGERY

## 2021-02-08 PROCEDURE — 0HRU077 REPLACEMENT OF LEFT BREAST USING DEEP INFERIOR EPIGASTRIC ARTERY PERFORATOR FLAP, OPEN APPROACH: ICD-10-PCS | Performed by: PLASTIC SURGERY

## 2021-02-08 PROCEDURE — 85027 COMPLETE CBC AUTOMATED: CPT | Performed by: STUDENT IN AN ORGANIZED HEALTH CARE EDUCATION/TRAINING PROGRAM

## 2021-02-08 PROCEDURE — 278N000051 HC OR IMPLANT GENERAL: Performed by: PLASTIC SURGERY

## 2021-02-08 PROCEDURE — 80048 BASIC METABOLIC PNL TOTAL CA: CPT | Performed by: PHYSICIAN ASSISTANT

## 2021-02-08 PROCEDURE — 84132 ASSAY OF SERUM POTASSIUM: CPT

## 2021-02-08 PROCEDURE — 82803 BLOOD GASES ANY COMBINATION: CPT

## 2021-02-08 PROCEDURE — 82565 ASSAY OF CREATININE: CPT | Performed by: STUDENT IN AN ORGANIZED HEALTH CARE EDUCATION/TRAINING PROGRAM

## 2021-02-08 PROCEDURE — 120N000002 HC R&B MED SURG/OB UMMC

## 2021-02-08 PROCEDURE — 0WUF0JZ SUPPLEMENT ABDOMINAL WALL WITH SYNTHETIC SUBSTITUTE, OPEN APPROACH: ICD-10-PCS | Performed by: PLASTIC SURGERY

## 2021-02-08 PROCEDURE — 710N000010 HC RECOVERY PHASE 1, LEVEL 2, PER MIN: Performed by: PLASTIC SURGERY

## 2021-02-08 PROCEDURE — 250N000011 HC RX IP 250 OP 636: Performed by: NURSE ANESTHETIST, CERTIFIED REGISTERED

## 2021-02-08 PROCEDURE — 250N000013 HC RX MED GY IP 250 OP 250 PS 637: Performed by: STUDENT IN AN ORGANIZED HEALTH CARE EDUCATION/TRAINING PROGRAM

## 2021-02-08 PROCEDURE — 82947 ASSAY GLUCOSE BLOOD QUANT: CPT

## 2021-02-08 PROCEDURE — P9041 ALBUMIN (HUMAN),5%, 50ML: HCPCS | Performed by: NURSE ANESTHETIST, CERTIFIED REGISTERED

## 2021-02-08 PROCEDURE — 258N000003 HC RX IP 258 OP 636: Performed by: PLASTIC SURGERY

## 2021-02-08 PROCEDURE — 01Q80ZZ REPAIR THORACIC NERVE, OPEN APPROACH: ICD-10-PCS | Performed by: PLASTIC SURGERY

## 2021-02-08 PROCEDURE — 250N000011 HC RX IP 250 OP 636: Performed by: STUDENT IN AN ORGANIZED HEALTH CARE EDUCATION/TRAINING PROGRAM

## 2021-02-08 PROCEDURE — 80048 BASIC METABOLIC PNL TOTAL CA: CPT | Performed by: STUDENT IN AN ORGANIZED HEALTH CARE EDUCATION/TRAINING PROGRAM

## 2021-02-08 PROCEDURE — 250N000009 HC RX 250: Performed by: NURSE ANESTHETIST, CERTIFIED REGISTERED

## 2021-02-08 PROCEDURE — 84295 ASSAY OF SERUM SODIUM: CPT

## 2021-02-08 PROCEDURE — 250N000011 HC RX IP 250 OP 636: Performed by: PLASTIC SURGERY

## 2021-02-08 PROCEDURE — 250N000025 HC SEVOFLURANE, PER MIN: Performed by: PLASTIC SURGERY

## 2021-02-08 PROCEDURE — 82435 ASSAY OF BLOOD CHLORIDE: CPT

## 2021-02-08 PROCEDURE — 88305 TISSUE EXAM BY PATHOLOGIST: CPT | Mod: 26 | Performed by: PATHOLOGY

## 2021-02-08 PROCEDURE — 83605 ASSAY OF LACTIC ACID: CPT

## 2021-02-08 PROCEDURE — 4A1GXSH MONITORING OF SKIN AND BREAST VASCULAR PERFUSION USING INDOCYANINE GREEN DYE, EXTERNAL APPROACH: ICD-10-PCS | Performed by: PLASTIC SURGERY

## 2021-02-08 PROCEDURE — C9290 INJ, BUPIVACAINE LIPOSOME: HCPCS | Performed by: STUDENT IN AN ORGANIZED HEALTH CARE EDUCATION/TRAINING PROGRAM

## 2021-02-08 PROCEDURE — 88305 TISSUE EXAM BY PATHOLOGIST: CPT | Mod: TC | Performed by: PLASTIC SURGERY

## 2021-02-08 PROCEDURE — 250N000011 HC RX IP 250 OP 636: Performed by: ANESTHESIOLOGY

## 2021-02-08 PROCEDURE — 36415 COLL VENOUS BLD VENIPUNCTURE: CPT | Performed by: PHYSICIAN ASSISTANT

## 2021-02-08 PROCEDURE — 36415 COLL VENOUS BLD VENIPUNCTURE: CPT | Performed by: STUDENT IN AN ORGANIZED HEALTH CARE EDUCATION/TRAINING PROGRAM

## 2021-02-08 PROCEDURE — 370N000017 HC ANESTHESIA TECHNICAL FEE, PER MIN: Performed by: PLASTIC SURGERY

## 2021-02-08 PROCEDURE — 82330 ASSAY OF CALCIUM: CPT

## 2021-02-08 PROCEDURE — 258N000003 HC RX IP 258 OP 636: Performed by: ANESTHESIOLOGY

## 2021-02-08 PROCEDURE — 0HRT077 REPLACEMENT OF RIGHT BREAST USING DEEP INFERIOR EPIGASTRIC ARTERY PERFORATOR FLAP, OPEN APPROACH: ICD-10-PCS | Performed by: PLASTIC SURGERY

## 2021-02-08 PROCEDURE — 360N000078 HC SURGERY LEVEL 5, PER MIN: Performed by: PLASTIC SURGERY

## 2021-02-08 PROCEDURE — 999N000141 HC STATISTIC PRE-PROCEDURE NURSING ASSESSMENT: Performed by: PLASTIC SURGERY

## 2021-02-08 PROCEDURE — 250N000009 HC RX 250: Performed by: PLASTIC SURGERY

## 2021-02-08 PROCEDURE — 999N001017 HC STATISTIC GLUCOSE BY METER IP

## 2021-02-08 PROCEDURE — C1763 CONN TISS, NON-HUMAN: HCPCS | Performed by: PLASTIC SURGERY

## 2021-02-08 PROCEDURE — 258N000003 HC RX IP 258 OP 636: Performed by: NURSE ANESTHETIST, CERTIFIED REGISTERED

## 2021-02-08 PROCEDURE — 83735 ASSAY OF MAGNESIUM: CPT | Performed by: STUDENT IN AN ORGANIZED HEALTH CARE EDUCATION/TRAINING PROGRAM

## 2021-02-08 DEVICE — IMPLANTABLE DEVICE: Type: IMPLANTABLE DEVICE | Site: BREAST | Status: FUNCTIONAL

## 2021-02-08 DEVICE — IMP DEVICE ANASTOMOTIC 3.5MM COUPLER PURPLE GEM2755: Type: IMPLANTABLE DEVICE | Site: BREAST | Status: FUNCTIONAL

## 2021-02-08 DEVICE — GRAFT CONNECTOR NERVE PROTECTOR AXOGUARD 3X15MM AGX315: Type: IMPLANTABLE DEVICE | Site: BREAST | Status: FUNCTIONAL

## 2021-02-08 DEVICE — IMP DEVICE ANASTOMOTIC 4.0MM COUPLER ORANGE GEM2756: Type: IMPLANTABLE DEVICE | Site: BREAST | Status: FUNCTIONAL

## 2021-02-08 DEVICE — GRAFT STRATTICE 10X16CM FIRM 1016002 CHG PER SQ CM=160 UNITS: Type: IMPLANTABLE DEVICE | Site: ABDOMEN | Status: FUNCTIONAL

## 2021-02-08 RX ORDER — METHOCARBAMOL 750 MG/1
750 TABLET, FILM COATED ORAL EVERY 6 HOURS PRN
Status: DISCONTINUED | OUTPATIENT
Start: 2021-02-08 | End: 2021-02-11 | Stop reason: HOSPADM

## 2021-02-08 RX ORDER — AMOXICILLIN 250 MG
1 CAPSULE ORAL 2 TIMES DAILY
Status: DISCONTINUED | OUTPATIENT
Start: 2021-02-08 | End: 2021-02-11 | Stop reason: HOSPADM

## 2021-02-08 RX ORDER — PROCHLORPERAZINE MALEATE 5 MG
10 TABLET ORAL EVERY 6 HOURS PRN
Status: DISCONTINUED | OUTPATIENT
Start: 2021-02-08 | End: 2021-02-11 | Stop reason: HOSPADM

## 2021-02-08 RX ORDER — FENTANYL CITRATE 50 UG/ML
25-50 INJECTION, SOLUTION INTRAMUSCULAR; INTRAVENOUS
Status: DISCONTINUED | OUTPATIENT
Start: 2021-02-08 | End: 2021-02-08 | Stop reason: HOSPADM

## 2021-02-08 RX ORDER — SODIUM CHLORIDE, SODIUM LACTATE, POTASSIUM CHLORIDE, CALCIUM CHLORIDE 600; 310; 30; 20 MG/100ML; MG/100ML; MG/100ML; MG/100ML
INJECTION, SOLUTION INTRAVENOUS CONTINUOUS PRN
Status: DISCONTINUED | OUTPATIENT
Start: 2021-02-08 | End: 2021-02-08

## 2021-02-08 RX ORDER — NALOXONE HYDROCHLORIDE 0.4 MG/ML
0.2 INJECTION, SOLUTION INTRAMUSCULAR; INTRAVENOUS; SUBCUTANEOUS
Status: DISCONTINUED | OUTPATIENT
Start: 2021-02-08 | End: 2021-02-08 | Stop reason: HOSPADM

## 2021-02-08 RX ORDER — NALOXONE HYDROCHLORIDE 0.4 MG/ML
0.4 INJECTION, SOLUTION INTRAMUSCULAR; INTRAVENOUS; SUBCUTANEOUS
Status: DISCONTINUED | OUTPATIENT
Start: 2021-02-08 | End: 2021-02-08

## 2021-02-08 RX ORDER — CALCIUM CHLORIDE 100 MG/ML
INJECTION INTRAVENOUS; INTRAVENTRICULAR PRN
Status: DISCONTINUED | OUTPATIENT
Start: 2021-02-08 | End: 2021-02-08

## 2021-02-08 RX ORDER — ONDANSETRON 2 MG/ML
4 INJECTION INTRAMUSCULAR; INTRAVENOUS EVERY 6 HOURS PRN
Status: DISCONTINUED | OUTPATIENT
Start: 2021-02-08 | End: 2021-02-08

## 2021-02-08 RX ORDER — NALOXONE HYDROCHLORIDE 0.4 MG/ML
0.2 INJECTION, SOLUTION INTRAMUSCULAR; INTRAVENOUS; SUBCUTANEOUS
Status: ACTIVE | OUTPATIENT
Start: 2021-02-08 | End: 2021-02-09

## 2021-02-08 RX ORDER — NALOXONE HYDROCHLORIDE 0.4 MG/ML
0.4 INJECTION, SOLUTION INTRAMUSCULAR; INTRAVENOUS; SUBCUTANEOUS
Status: ACTIVE | OUTPATIENT
Start: 2021-02-08 | End: 2021-02-09

## 2021-02-08 RX ORDER — LIDOCAINE 40 MG/G
CREAM TOPICAL
Status: DISCONTINUED | OUTPATIENT
Start: 2021-02-08 | End: 2021-02-08

## 2021-02-08 RX ORDER — OXYCODONE HYDROCHLORIDE 5 MG/1
5-10 TABLET ORAL
Status: DISCONTINUED | OUTPATIENT
Start: 2021-02-08 | End: 2021-02-08

## 2021-02-08 RX ORDER — NALOXONE HYDROCHLORIDE 0.4 MG/ML
0.4 INJECTION, SOLUTION INTRAMUSCULAR; INTRAVENOUS; SUBCUTANEOUS
Status: DISCONTINUED | OUTPATIENT
Start: 2021-02-08 | End: 2021-02-08 | Stop reason: HOSPADM

## 2021-02-08 RX ORDER — PROPOFOL 10 MG/ML
INJECTION, EMULSION INTRAVENOUS PRN
Status: DISCONTINUED | OUTPATIENT
Start: 2021-02-08 | End: 2021-02-08

## 2021-02-08 RX ORDER — MAGNESIUM OXIDE 400 MG/1
400 TABLET ORAL 3 TIMES DAILY
Status: DISCONTINUED | OUTPATIENT
Start: 2021-02-08 | End: 2021-02-08

## 2021-02-08 RX ORDER — SODIUM CHLORIDE, SODIUM LACTATE, POTASSIUM CHLORIDE, CALCIUM CHLORIDE 600; 310; 30; 20 MG/100ML; MG/100ML; MG/100ML; MG/100ML
INJECTION, SOLUTION INTRAVENOUS CONTINUOUS
Status: DISCONTINUED | OUTPATIENT
Start: 2021-02-08 | End: 2021-02-08 | Stop reason: HOSPADM

## 2021-02-08 RX ORDER — ACETAMINOPHEN 325 MG/1
975 TABLET ORAL EVERY 8 HOURS
Status: DISCONTINUED | OUTPATIENT
Start: 2021-02-08 | End: 2021-02-11 | Stop reason: HOSPADM

## 2021-02-08 RX ORDER — LABETALOL HYDROCHLORIDE 5 MG/ML
10 INJECTION, SOLUTION INTRAVENOUS
Status: DISCONTINUED | OUTPATIENT
Start: 2021-02-08 | End: 2021-02-08 | Stop reason: HOSPADM

## 2021-02-08 RX ORDER — LIDOCAINE 40 MG/G
CREAM TOPICAL
Status: DISCONTINUED | OUTPATIENT
Start: 2021-02-08 | End: 2021-02-11 | Stop reason: HOSPADM

## 2021-02-08 RX ORDER — LIDOCAINE HYDROCHLORIDE 20 MG/ML
INJECTION, SOLUTION INFILTRATION; PERINEURAL PRN
Status: DISCONTINUED | OUTPATIENT
Start: 2021-02-08 | End: 2021-02-08

## 2021-02-08 RX ORDER — POTASSIUM CHLORIDE 7.45 MG/ML
INJECTION INTRAVENOUS PRN
Status: DISCONTINUED | OUTPATIENT
Start: 2021-02-08 | End: 2021-02-08

## 2021-02-08 RX ORDER — ACETAMINOPHEN 325 MG/1
975 TABLET ORAL EVERY 8 HOURS
Status: DISCONTINUED | OUTPATIENT
Start: 2021-02-08 | End: 2021-02-08

## 2021-02-08 RX ORDER — ONDANSETRON 2 MG/ML
4 INJECTION INTRAMUSCULAR; INTRAVENOUS EVERY 30 MIN PRN
Status: DISCONTINUED | OUTPATIENT
Start: 2021-02-08 | End: 2021-02-08 | Stop reason: HOSPADM

## 2021-02-08 RX ORDER — CEFAZOLIN SODIUM 2 G/100ML
2 INJECTION, SOLUTION INTRAVENOUS
Status: COMPLETED | OUTPATIENT
Start: 2021-02-08 | End: 2021-02-08

## 2021-02-08 RX ORDER — ALBUMIN, HUMAN INJ 5% 5 %
SOLUTION INTRAVENOUS CONTINUOUS PRN
Status: DISCONTINUED | OUTPATIENT
Start: 2021-02-08 | End: 2021-02-08

## 2021-02-08 RX ORDER — ASPIRIN 81 MG/1
81 TABLET, CHEWABLE ORAL DAILY
Status: DISCONTINUED | OUTPATIENT
Start: 2021-02-09 | End: 2021-02-11 | Stop reason: HOSPADM

## 2021-02-08 RX ORDER — PROPOFOL 10 MG/ML
INJECTION, EMULSION INTRAVENOUS CONTINUOUS PRN
Status: DISCONTINUED | OUTPATIENT
Start: 2021-02-08 | End: 2021-02-08

## 2021-02-08 RX ORDER — LIDOCAINE 40 MG/G
CREAM TOPICAL
Status: DISCONTINUED | OUTPATIENT
Start: 2021-02-08 | End: 2021-02-08 | Stop reason: HOSPADM

## 2021-02-08 RX ORDER — ONDANSETRON 4 MG/1
4 TABLET, ORALLY DISINTEGRATING ORAL EVERY 30 MIN PRN
Status: DISCONTINUED | OUTPATIENT
Start: 2021-02-08 | End: 2021-02-08 | Stop reason: HOSPADM

## 2021-02-08 RX ORDER — ONDANSETRON 4 MG/1
4 TABLET, ORALLY DISINTEGRATING ORAL EVERY 6 HOURS PRN
Status: DISCONTINUED | OUTPATIENT
Start: 2021-02-08 | End: 2021-02-08

## 2021-02-08 RX ORDER — OXYCODONE HYDROCHLORIDE 5 MG/1
5-10 TABLET ORAL
Status: DISCONTINUED | OUTPATIENT
Start: 2021-02-08 | End: 2021-02-11 | Stop reason: HOSPADM

## 2021-02-08 RX ORDER — INDOCYANINE GREEN AND WATER 25 MG
KIT INJECTION PRN
Status: DISCONTINUED | OUTPATIENT
Start: 2021-02-08 | End: 2021-02-08

## 2021-02-08 RX ORDER — DOPAMINE HYDROCHLORIDE 160 MG/100ML
2-20 INJECTION, SOLUTION INTRAVENOUS CONTINUOUS
Status: DISCONTINUED | OUTPATIENT
Start: 2021-02-08 | End: 2021-02-08 | Stop reason: HOSPADM

## 2021-02-08 RX ORDER — DIPHENHYDRAMINE HYDROCHLORIDE 50 MG/ML
25 INJECTION INTRAMUSCULAR; INTRAVENOUS EVERY 6 HOURS PRN
Status: DISCONTINUED | OUTPATIENT
Start: 2021-02-08 | End: 2021-02-11 | Stop reason: HOSPADM

## 2021-02-08 RX ORDER — HYDROMORPHONE HYDROCHLORIDE 1 MG/ML
.3-.5 INJECTION, SOLUTION INTRAMUSCULAR; INTRAVENOUS; SUBCUTANEOUS
Status: DISCONTINUED | OUTPATIENT
Start: 2021-02-08 | End: 2021-02-08

## 2021-02-08 RX ORDER — FLUMAZENIL 0.1 MG/ML
0.2 INJECTION, SOLUTION INTRAVENOUS
Status: DISCONTINUED | OUTPATIENT
Start: 2021-02-08 | End: 2021-02-08 | Stop reason: HOSPADM

## 2021-02-08 RX ORDER — PROCHLORPERAZINE 25 MG
25 SUPPOSITORY, RECTAL RECTAL EVERY 12 HOURS PRN
Status: DISCONTINUED | OUTPATIENT
Start: 2021-02-08 | End: 2021-02-11 | Stop reason: HOSPADM

## 2021-02-08 RX ORDER — MAGNESIUM SULFATE HEPTAHYDRATE 40 MG/ML
4 INJECTION, SOLUTION INTRAVENOUS ONCE
Status: COMPLETED | OUTPATIENT
Start: 2021-02-08 | End: 2021-02-09

## 2021-02-08 RX ORDER — HYDROMORPHONE HYDROCHLORIDE 1 MG/ML
.3-.5 INJECTION, SOLUTION INTRAMUSCULAR; INTRAVENOUS; SUBCUTANEOUS
Status: DISCONTINUED | OUTPATIENT
Start: 2021-02-08 | End: 2021-02-11 | Stop reason: HOSPADM

## 2021-02-08 RX ORDER — FENTANYL CITRATE 50 UG/ML
INJECTION, SOLUTION INTRAMUSCULAR; INTRAVENOUS PRN
Status: DISCONTINUED | OUTPATIENT
Start: 2021-02-08 | End: 2021-02-08

## 2021-02-08 RX ORDER — DIPHENHYDRAMINE HCL 25 MG
25 CAPSULE ORAL EVERY 6 HOURS PRN
Status: DISCONTINUED | OUTPATIENT
Start: 2021-02-08 | End: 2021-02-11 | Stop reason: HOSPADM

## 2021-02-08 RX ORDER — CEFAZOLIN SODIUM 1 G/3ML
1 INJECTION, POWDER, FOR SOLUTION INTRAMUSCULAR; INTRAVENOUS SEE ADMIN INSTRUCTIONS
Status: DISCONTINUED | OUTPATIENT
Start: 2021-02-08 | End: 2021-02-08 | Stop reason: HOSPADM

## 2021-02-08 RX ORDER — EPHEDRINE SULFATE 50 MG/ML
INJECTION, SOLUTION INTRAMUSCULAR; INTRAVENOUS; SUBCUTANEOUS PRN
Status: DISCONTINUED | OUTPATIENT
Start: 2021-02-08 | End: 2021-02-08

## 2021-02-08 RX ORDER — BUPIVACAINE HYDROCHLORIDE 2.5 MG/ML
INJECTION, SOLUTION EPIDURAL; INFILTRATION; INTRACAUDAL PRN
Status: DISCONTINUED | OUTPATIENT
Start: 2021-02-08 | End: 2021-02-08

## 2021-02-08 RX ORDER — AMOXICILLIN 250 MG
2 CAPSULE ORAL 2 TIMES DAILY
Status: DISCONTINUED | OUTPATIENT
Start: 2021-02-08 | End: 2021-02-08

## 2021-02-08 RX ORDER — ACETAMINOPHEN 325 MG/1
650 TABLET ORAL EVERY 4 HOURS PRN
Status: DISCONTINUED | OUTPATIENT
Start: 2021-02-11 | End: 2021-02-08

## 2021-02-08 RX ORDER — SODIUM CHLORIDE, SODIUM LACTATE, POTASSIUM CHLORIDE, CALCIUM CHLORIDE 600; 310; 30; 20 MG/100ML; MG/100ML; MG/100ML; MG/100ML
INJECTION, SOLUTION INTRAVENOUS CONTINUOUS
Status: DISCONTINUED | OUTPATIENT
Start: 2021-02-08 | End: 2021-02-10

## 2021-02-08 RX ORDER — DEXAMETHASONE SODIUM PHOSPHATE 4 MG/ML
INJECTION, SOLUTION INTRA-ARTICULAR; INTRALESIONAL; INTRAMUSCULAR; INTRAVENOUS; SOFT TISSUE PRN
Status: DISCONTINUED | OUTPATIENT
Start: 2021-02-08 | End: 2021-02-08

## 2021-02-08 RX ORDER — NALOXONE HYDROCHLORIDE 0.4 MG/ML
0.2 INJECTION, SOLUTION INTRAMUSCULAR; INTRAVENOUS; SUBCUTANEOUS
Status: DISCONTINUED | OUTPATIENT
Start: 2021-02-08 | End: 2021-02-08

## 2021-02-08 RX ORDER — ONDANSETRON 2 MG/ML
4 INJECTION INTRAMUSCULAR; INTRAVENOUS EVERY 6 HOURS PRN
Status: DISCONTINUED | OUTPATIENT
Start: 2021-02-08 | End: 2021-02-11 | Stop reason: HOSPADM

## 2021-02-08 RX ORDER — AMOXICILLIN 250 MG
2 CAPSULE ORAL 2 TIMES DAILY
Status: DISCONTINUED | OUTPATIENT
Start: 2021-02-08 | End: 2021-02-11 | Stop reason: HOSPADM

## 2021-02-08 RX ORDER — ONDANSETRON 4 MG/1
4 TABLET, ORALLY DISINTEGRATING ORAL EVERY 6 HOURS PRN
Status: DISCONTINUED | OUTPATIENT
Start: 2021-02-08 | End: 2021-02-11 | Stop reason: HOSPADM

## 2021-02-08 RX ORDER — DEXTROSE MONOHYDRATE, SODIUM CHLORIDE, AND POTASSIUM CHLORIDE 50; 1.49; 4.5 G/1000ML; G/1000ML; G/1000ML
INJECTION, SOLUTION INTRAVENOUS CONTINUOUS
Status: DISCONTINUED | OUTPATIENT
Start: 2021-02-08 | End: 2021-02-08

## 2021-02-08 RX ORDER — HYDROMORPHONE HYDROCHLORIDE 1 MG/ML
.3-.5 INJECTION, SOLUTION INTRAMUSCULAR; INTRAVENOUS; SUBCUTANEOUS EVERY 5 MIN PRN
Status: DISCONTINUED | OUTPATIENT
Start: 2021-02-08 | End: 2021-02-08 | Stop reason: HOSPADM

## 2021-02-08 RX ORDER — AMOXICILLIN 250 MG
1 CAPSULE ORAL 2 TIMES DAILY
Status: DISCONTINUED | OUTPATIENT
Start: 2021-02-08 | End: 2021-02-08

## 2021-02-08 RX ORDER — ONDANSETRON 2 MG/ML
INJECTION INTRAMUSCULAR; INTRAVENOUS PRN
Status: DISCONTINUED | OUTPATIENT
Start: 2021-02-08 | End: 2021-02-08

## 2021-02-08 RX ADMIN — INDOCYANINE GREEN 12.5 MG: KIT INTRAVENOUS at 09:49

## 2021-02-08 RX ADMIN — ROCURONIUM BROMIDE 20 MG: 10 INJECTION INTRAVENOUS at 15:35

## 2021-02-08 RX ADMIN — CALCIUM CHLORIDE 0.5 G: 100 INJECTION INTRAVENOUS; INTRAVENTRICULAR at 15:56

## 2021-02-08 RX ADMIN — HYDROMORPHONE HYDROCHLORIDE 0.5 MG: 1 INJECTION, SOLUTION INTRAMUSCULAR; INTRAVENOUS; SUBCUTANEOUS at 20:56

## 2021-02-08 RX ADMIN — CEFAZOLIN 1 G: 1 INJECTION, POWDER, FOR SOLUTION INTRAMUSCULAR; INTRAVENOUS at 13:55

## 2021-02-08 RX ADMIN — CEFAZOLIN 1 G: 1 INJECTION, POWDER, FOR SOLUTION INTRAMUSCULAR; INTRAVENOUS at 15:55

## 2021-02-08 RX ADMIN — PROPOFOL 30 MG: 10 INJECTION, EMULSION INTRAVENOUS at 09:55

## 2021-02-08 RX ADMIN — BUPIVACAINE 20 ML: 13.3 INJECTION, SUSPENSION, LIPOSOMAL INFILTRATION at 06:45

## 2021-02-08 RX ADMIN — PHENYLEPHRINE HYDROCHLORIDE 100 MCG: 10 INJECTION INTRAVENOUS at 08:42

## 2021-02-08 RX ADMIN — FENTANYL CITRATE 50 MCG: 50 INJECTION, SOLUTION INTRAMUSCULAR; INTRAVENOUS at 14:52

## 2021-02-08 RX ADMIN — Medication 10 MG: at 08:12

## 2021-02-08 RX ADMIN — ALBUMIN (HUMAN): 12.5 SOLUTION INTRAVENOUS at 15:04

## 2021-02-08 RX ADMIN — PHENYLEPHRINE HYDROCHLORIDE 100 MCG: 10 INJECTION INTRAVENOUS at 08:04

## 2021-02-08 RX ADMIN — SODIUM CHLORIDE, POTASSIUM CHLORIDE, SODIUM LACTATE AND CALCIUM CHLORIDE: 600; 310; 30; 20 INJECTION, SOLUTION INTRAVENOUS at 07:43

## 2021-02-08 RX ADMIN — PHENYLEPHRINE HYDROCHLORIDE 100 MCG: 10 INJECTION INTRAVENOUS at 08:22

## 2021-02-08 RX ADMIN — ROCURONIUM BROMIDE 20 MG: 10 INJECTION INTRAVENOUS at 12:20

## 2021-02-08 RX ADMIN — LIDOCAINE HYDROCHLORIDE 60 MG: 20 INJECTION, SOLUTION INFILTRATION; PERINEURAL at 07:50

## 2021-02-08 RX ADMIN — FENTANYL CITRATE 25 MCG: 50 INJECTION, SOLUTION INTRAMUSCULAR; INTRAVENOUS at 18:01

## 2021-02-08 RX ADMIN — PHENYLEPHRINE HYDROCHLORIDE 100 MCG: 10 INJECTION INTRAVENOUS at 08:28

## 2021-02-08 RX ADMIN — ROCURONIUM BROMIDE 20 MG: 10 INJECTION INTRAVENOUS at 13:48

## 2021-02-08 RX ADMIN — DOCUSATE SODIUM 50 MG AND SENNOSIDES 8.6 MG 1 TABLET: 8.6; 5 TABLET, FILM COATED ORAL at 20:56

## 2021-02-08 RX ADMIN — CALCIUM CHLORIDE 0.5 G: 100 INJECTION INTRAVENOUS; INTRAVENTRICULAR at 14:41

## 2021-02-08 RX ADMIN — ROCURONIUM BROMIDE 20 MG: 10 INJECTION INTRAVENOUS at 11:47

## 2021-02-08 RX ADMIN — Medication 10 MG: at 09:53

## 2021-02-08 RX ADMIN — ONDANSETRON 4 MG: 2 INJECTION INTRAMUSCULAR; INTRAVENOUS at 16:52

## 2021-02-08 RX ADMIN — FENTANYL CITRATE 50 MCG: 50 INJECTION, SOLUTION INTRAMUSCULAR; INTRAVENOUS at 06:41

## 2021-02-08 RX ADMIN — Medication 2 G: at 08:01

## 2021-02-08 RX ADMIN — ALBUMIN (HUMAN): 12.5 SOLUTION INTRAVENOUS at 12:41

## 2021-02-08 RX ADMIN — ROCURONIUM BROMIDE 20 MG: 10 INJECTION INTRAVENOUS at 12:58

## 2021-02-08 RX ADMIN — FENTANYL CITRATE 25 MCG: 50 INJECTION, SOLUTION INTRAMUSCULAR; INTRAVENOUS at 18:40

## 2021-02-08 RX ADMIN — SUGAMMADEX 200 MG: 100 INJECTION, SOLUTION INTRAVENOUS at 17:02

## 2021-02-08 RX ADMIN — POTASSIUM CHLORIDE, DEXTROSE MONOHYDRATE AND SODIUM CHLORIDE: 150; 5; 450 INJECTION, SOLUTION INTRAVENOUS at 18:28

## 2021-02-08 RX ADMIN — FENTANYL CITRATE 50 MCG: 50 INJECTION, SOLUTION INTRAMUSCULAR; INTRAVENOUS at 15:40

## 2021-02-08 RX ADMIN — SODIUM CHLORIDE, POTASSIUM CHLORIDE, SODIUM LACTATE AND CALCIUM CHLORIDE: 600; 310; 30; 20 INJECTION, SOLUTION INTRAVENOUS at 08:13

## 2021-02-08 RX ADMIN — FENTANYL CITRATE 50 MCG: 50 INJECTION, SOLUTION INTRAMUSCULAR; INTRAVENOUS at 07:50

## 2021-02-08 RX ADMIN — SODIUM CHLORIDE, POTASSIUM CHLORIDE, SODIUM LACTATE AND CALCIUM CHLORIDE: 600; 310; 30; 20 INJECTION, SOLUTION INTRAVENOUS at 20:56

## 2021-02-08 RX ADMIN — PROPOFOL 50 MCG/KG/MIN: 10 INJECTION, EMULSION INTRAVENOUS at 08:13

## 2021-02-08 RX ADMIN — DEXAMETHASONE SODIUM PHOSPHATE 8 MG: 4 INJECTION, SOLUTION INTRA-ARTICULAR; INTRALESIONAL; INTRAMUSCULAR; INTRAVENOUS; SOFT TISSUE at 09:07

## 2021-02-08 RX ADMIN — CEFAZOLIN 1 G: 1 INJECTION, POWDER, FOR SOLUTION INTRAMUSCULAR; INTRAVENOUS at 11:56

## 2021-02-08 RX ADMIN — ALBUMIN (HUMAN): 12.5 SOLUTION INTRAVENOUS at 09:17

## 2021-02-08 RX ADMIN — BUPIVACAINE HYDROCHLORIDE 60 ML: 2.5 INJECTION, SOLUTION EPIDURAL; INFILTRATION; INTRACAUDAL; PERINEURAL at 06:56

## 2021-02-08 RX ADMIN — POTASSIUM CHLORIDE 10 MEQ: 7.46 INJECTION, SOLUTION INTRAVENOUS at 16:49

## 2021-02-08 RX ADMIN — ROCURONIUM BROMIDE 20 MG: 10 INJECTION INTRAVENOUS at 14:47

## 2021-02-08 RX ADMIN — PROPOFOL 120 MG: 10 INJECTION, EMULSION INTRAVENOUS at 07:50

## 2021-02-08 RX ADMIN — FENTANYL CITRATE 50 MCG: 50 INJECTION, SOLUTION INTRAMUSCULAR; INTRAVENOUS at 10:21

## 2021-02-08 RX ADMIN — MIDAZOLAM 2 MG: 1 INJECTION INTRAMUSCULAR; INTRAVENOUS at 07:38

## 2021-02-08 RX ADMIN — HYDROMORPHONE HYDROCHLORIDE 0.5 MG: 1 INJECTION, SOLUTION INTRAMUSCULAR; INTRAVENOUS; SUBCUTANEOUS at 23:02

## 2021-02-08 RX ADMIN — ROCURONIUM BROMIDE 60 MG: 10 INJECTION INTRAVENOUS at 07:51

## 2021-02-08 RX ADMIN — CALCIUM CHLORIDE 0.5 G: 100 INJECTION INTRAVENOUS; INTRAVENTRICULAR at 09:57

## 2021-02-08 RX ADMIN — ROCURONIUM BROMIDE 20 MG: 10 INJECTION INTRAVENOUS at 08:44

## 2021-02-08 RX ADMIN — PHENYLEPHRINE HYDROCHLORIDE 100 MCG: 10 INJECTION INTRAVENOUS at 09:09

## 2021-02-08 RX ADMIN — ENOXAPARIN SODIUM 40 MG: 40 INJECTION SUBCUTANEOUS at 07:22

## 2021-02-08 RX ADMIN — DOPAMINE HYDROCHLORIDE 5 MCG/KG/MIN: 160 INJECTION, SOLUTION INTRAVENOUS at 10:18

## 2021-02-08 RX ADMIN — ACETAMINOPHEN 975 MG: 325 TABLET, FILM COATED ORAL at 20:56

## 2021-02-08 RX ADMIN — SODIUM CHLORIDE, POTASSIUM CHLORIDE, SODIUM LACTATE AND CALCIUM CHLORIDE: 600; 310; 30; 20 INJECTION, SOLUTION INTRAVENOUS at 15:11

## 2021-02-08 RX ADMIN — HYDROMORPHONE HYDROCHLORIDE 0.5 MG: 1 INJECTION, SOLUTION INTRAMUSCULAR; INTRAVENOUS; SUBCUTANEOUS at 19:07

## 2021-02-08 RX ADMIN — FENTANYL CITRATE 50 MCG: 50 INJECTION, SOLUTION INTRAMUSCULAR; INTRAVENOUS at 18:20

## 2021-02-08 RX ADMIN — ONDANSETRON 4 MG: 2 INJECTION INTRAMUSCULAR; INTRAVENOUS at 18:00

## 2021-02-08 RX ADMIN — MAGNESIUM SULFATE IN WATER 4 G: 40 INJECTION, SOLUTION INTRAVENOUS at 22:06

## 2021-02-08 RX ADMIN — ROCURONIUM BROMIDE 20 MG: 10 INJECTION INTRAVENOUS at 11:14

## 2021-02-08 RX ADMIN — FENTANYL CITRATE 50 MCG: 50 INJECTION, SOLUTION INTRAMUSCULAR; INTRAVENOUS at 06:51

## 2021-02-08 RX ADMIN — ROCURONIUM BROMIDE 20 MG: 10 INJECTION INTRAVENOUS at 09:33

## 2021-02-08 RX ADMIN — HYDROMORPHONE HYDROCHLORIDE 0.5 MG: 1 INJECTION, SOLUTION INTRAMUSCULAR; INTRAVENOUS; SUBCUTANEOUS at 14:59

## 2021-02-08 RX ADMIN — PROCHLORPERAZINE EDISYLATE 10 MG: 5 INJECTION INTRAMUSCULAR; INTRAVENOUS at 23:43

## 2021-02-08 RX ADMIN — CEFAZOLIN 1 G: 1 INJECTION, POWDER, FOR SOLUTION INTRAMUSCULAR; INTRAVENOUS at 10:00

## 2021-02-08 RX ADMIN — ROCURONIUM BROMIDE 20 MG: 10 INJECTION INTRAVENOUS at 10:05

## 2021-02-08 RX ADMIN — ROCURONIUM BROMIDE 20 MG: 10 INJECTION INTRAVENOUS at 10:36

## 2021-02-08 ASSESSMENT — ACTIVITIES OF DAILY LIVING (ADL): ADLS_ACUITY_SCORE: 12

## 2021-02-08 ASSESSMENT — MIFFLIN-ST. JEOR: SCORE: 1239.88

## 2021-02-08 NOTE — OP NOTE
DATE OF OPERATION: February 8, 2021     PREOPERATIVE DIAGNOSIS:   1.  History of right breast cancer status post bilateral mastectomy and tissue expander reconstruction with adjuvant right sided radiation therapy, complicated by bilateral tissue expander infection necessitating removal, now ready for autologous tissue breast reconstruction.  2.  Bilateral breasts with loss of sensation.     POSTOPERATIVE DIAGNOSIS:   1.  History of right breast cancer status post bilateral mastectomy and tissue expander reconstruction with adjuvant right sided radiation therapy, complicated by bilateral tissue expander infection necessitating removal, now ready for autologous tissue breast reconstruction.  2.  Bilateral breasts with loss of sensation.     PROCEDURES PERFORMED:   1.  Bilateral breast reconstruction with free bilateral deep inferior epigastric  fasciocutaneous flaps.  2.  Bilateral breast flap reinnervation by coaptation of flap T11 intercostal nerve to chest T3 intercostal nerve end-to-end using nerve allograft under operative microscope using microsurgical technique.  3.  Bilateral breast flap reinnervation coaptation sites conduit wrapping under the operative microscope using microsurgical technique.  4.  Intraoperative chemical angiography using the SPY Elite system.  5.  Bilateral abdominal wall reinforcement with Strattice acellular dermal matrix, size 16 x 5 cm per side.    SURGEON: Stefan Garnett MD     CO SURGEON: Ko Ramos MD (Co surgeon was required for this operation due to the technical complexity and long duration of free tissue transfer breast reconstruction in the setting of inconsistent availability of appropriately skilled resident assistance.  Dr. Ramos was the main surgeon for the right breast reconstruction and abdominal reconstruction, whereas Dr. Garnett was the main surgeon for the left breast reconstruction and intraoperative chemical angiography.  Bilateral breast reinnervation  using nerve allograft and conduit wrapping of coaptation sites was performed together under the operative microscope.)    ASSISTANT:   1.  Stefan Barney MD  2.  Vick Burden MD  3.  BETZAIDA Hahn (This assistant was required because there was inconsistent and inadequate resident assistance available to perform wound closure and apply sterile dressings.)     ANESTHESIA: General.     ESTIMATED BLOOD LOSS: 150 mL     FINDINGS:   1.  [place] of bilateral deep inferior epigastric  based flaps required extensive retrograde  dissection resulting in at least 1 hour added to the operative time per flap.  Right breast flap was harvested on 1 medial row  while the left breast flap was harvested on 1 medial row .  2.  End-to-end anastomosis of left deep inferior epigastric flap artery to right internal mammary artery and left deep inferior epigastric flap vein to right internal mammary vein using a size 4.0 mm venous .  Right breast flap weight 532 g.  Right breast flap ischemia time 21 minutes.  3.  End-to-end anastomosis of right deep inferior epigastric flap artery to left internal mammary artery and right deep inferior epigastric flap vein to left internal mammary vein using a size 3.5 mm venous .  Left breast flap weight 554 g.  Left breast flap ischemia time 28 minutes.  4.  End-to-end coaptation of left deep inferior epigastric flap T11 intercostal nerve to right chest T3 intercostal nerve using nerve allograft with dimensions 1 - 2 mm diameter and 70 mm length.  5.  End-to-end coaptation of right deep inferior epigastric flap T11 intercostal nerve to left chest T3 intercostal nerve using nerve allograft with dimensions 1 - 2 mm diameter and 70 mm length.  6.  Wrapping of nerve coaptation sites using conduit with dimensions 3 mm diameter and 15 mm length divided in half per coaptation site for a total of 4 coaptation sites.    SPECIMENS: Bilateral breast  skin.     COMPLICATIONS: None.     DRAINS: 15 [place]ian drain per each breast and in abdomen for a total of 3 drains.     IMPLANTS: None.     DESCRIPTION OF PROCEDURE:   Informed consent was obtained and surgical sites marked in the preoperative holding area.  Preoperative antibiotics and anticoagulation were given.  Patient was taken to the operating room and placed in a supine position over an under body Kp hugger.  Room was kept warm and patient body temperature monitored throughout the entire case.  Bilateral lower leg SCDs were applied and general anesthesia was obtained.  A Fink was placed and arms were tucked to the sides with all pressure points padded.  Patient's chest and abdomen were then prepped and draped in a sterile fashion.  A timeout was performed.    Deep inferior epigastric perforators were identified bilaterally using a pencil doppler.  Bilateral kota-abdominal flaps were designed around these dopplered marks.  The superior abdominal incision was made above the level of the umbilicus.  Dissection was carried superficial to the abdominal wall fascia up to the level of the xyphoid at the midline and laterally below the ribs on both sides.  The surgical bed was then reflexed to gauge tension of donor site closure and confirm the inferior abdominal incision.  Bilateral superficial inferior epigastric veins were dissected circumferentially for a distance of 3 cm inferiorly before clipping and ligating.  The abdominal flap was then raised from lateral to medial in a suprafascial plane until the skin perforators were reached.  The midline abdominal incision was then made so that medial to lateral dissection could be performed to further visualize the skin perforators completely.    In addition to  dissection, the T11 intercostal nerve entering into each flap was visualized and dissected circumferentially to the level of the deep fascia.  The nerves were ligated at that level to obtain just the  sensory fascicles.  Under the operative microscope, a nerve allograft was coapted to the ligated nerve end-to-end using 9-0 nylon suture in an epineural fashion at 180 degrees from each other.  This coaptation site was then wrapped with a conduit that was secured into place using vascular clips tethered to the adjacent .  The nerve graft was then protected at all times until coaptation to the chest T4 nerve later.    The abdominal flaps were then isolated to select perforators using microvascular clamps to occlude flow through all other perforators.  Under guidance from the surgical team, 5 cc of indocyanine green dye was given IV, followed by a 10 cc saline flush.  Using the SPY Elite system, real time intraoperative chemical angiography was performed and perfusion of the flaps was evaluated at 30 seconds and 60 seconds.  Decision was made to use the following perforators:  Right breast flap: 1 medial row .  Left breast flap: 1 medial row .  In addition to  selection, chemical angiography was used to determine the amount of flap tissue to be debrided at the lateral edges.    The rectus fascia was split in a longitudinal manner towards the external iliac vessels.  Retrograde dissection was performed for the select perforators down to the deep inferior epigastric vessels, which were then dissected to their origin from the external iliac vessels and  from one another.  This dissection was performed meticulously and at times using microsurgical instruments to control all vascular branches, adding at least 1 hour to the operative time per flap.  The pedicles were then marked, and the flaps were then stapled back to their initial positions.    Bilateral chests were prepared for flap inset by creating space prepectorally to adequately fit the flaps.  The pectoralis muscle was split along its medial fibers below the angle of Shade to reveal the third rib cartilage.  The  perichondrium was entered and the medial rib cartilage was removed with a rongeur.  The deep perichondrium was then dissected off the chest with bipolar cautery.  The internal mammary vessels were prepared under the operative microscope.  Single vessel clamps were placed proximally and the distal ends were double clipped and cut sharply at a level appropriate for anastomosis.  Pulsatile flow was noted from the artery and the vein flushed easily.  The vessel cut edges were stripped of adventitia, properly dilated, and lumens thoroughly irrigated with heparinized solution.    Attention was then turned to the inferior aspect from where the rib cartilage was removed.  The T3 intercostal nerve was dissected out bilaterally.  These were then traced distally into the mastectomy skin flap and were found to be ligated from previous surgery.  Decision was made to use these nerves as donors for reinnervation.    The abdominal flap pedicles were ligated starting with the artery then vein.  The flaps were weighed at the back table.  The flap arteries were then dilated and flushed with heparinized solution until clear egress was seen from the flap veins.  The flaps were secured to the chest with a moist lap and the pedicle was laid out to meet the prepared internal mammary vessels without twisting or kinking.  Under the operative microscope, the arterial anastomosis was performed end-to-end using double opposing clamps and interrupted 8-0 nylons.  180 degree sutures were placed first, followed by back wall sutures, then front wall sutures.  The venous anastomosis was performed with a venous  after proper measurements were made.  Clamps were removed and pulsatile flow was noticed through the arterial anastomoses.  Acland testing revealed flow through the venous anastomoses.  Bleeding from flap edges was noted and a doppler signal could be obtained at the flap skin paddles.    The medial edges of the flaps were lifted from  the chest wall.  This exposed the nerve grafts that were previously coapted and T3 intercostal nerve dissected into our plane.  Nerve coaptation from the T3 intercostal nerve to allograft was made using 9-0 nylon suture in 180 degree fashion within the epineurium.  This coaptation site was then wrapped with a conduit which was secured in place using vascular clips.  This was performed under the operative microscope using microsurgical technique.     Bilateral flaps were placed inside the breast pockets after trimming their lateral corners.  Appropriate skin paddles were designed and the rest of the skin was de-epithelialized.  Mastectomy skin flaps were trimmed appropriately.  A 15 [place] APOLLO drain was placed into each side and the skin closed in layers.  Doppler signals were intact on both skin paddles following this.  Simultaneously, the abdominal fascia was closed with 0 PDS suture in a running manner with a 16 x 10 cm Strattice acellular dermal matrix divided in half so that a 16 x 5 cm piece was placed in a retro rectus plane to reinforce the closure per side.  100% of the acellular dermal matrix opened was used.  The umbilicus was removed and the stalk closed with 0 PDS suture.  We then closed the abdominal incision with 0 PDS suture in the superficial fascia and 2-0 Monocryl suture in the deep dermal layer followed by 3-0 Stratafix suture in the skin.  This was performed under beachchair positioning.    Incision tape and glue were applied.  Drain dressings were applied.  An abdominal binder was applied.  All counts were correct at the end of the case.  The patient was then awakened, extubated, and transferred to the postoperative area in a flexed position at the hip.  Fink was kept in.  Dr. Ramos was the main surgeon for the right breast reconstruction and abdominal reconstruction, whereas Dr. Garnett was the main surgeon for the left breast reconstruction and intraoperative chemical angiography.  Bilateral  breast reinnervation using nerve allograft and conduit wrapping of coaptation sites was performed together under the operative microscope.     DISPOSITION: Inpatient floor for frequent flap monitoring.

## 2021-02-08 NOTE — OP NOTE
DATE OF OPERATION: February 8, 2021     PREOPERATIVE DIAGNOSIS:   1.  History of right breast cancer status post bilateral mastectomy and tissue expander reconstruction with adjuvant right sided radiation therapy, complicated by bilateral tissue expander infection necessitating removal, now ready for autologous tissue breast reconstruction.  2.  Bilateral breasts with loss of sensation.     POSTOPERATIVE DIAGNOSIS:   1.  History of right breast cancer status post bilateral mastectomy and tissue expander reconstruction with adjuvant right sided radiation therapy, complicated by bilateral tissue expander infection necessitating removal, now ready for autologous tissue breast reconstruction.  2.  Bilateral breasts with loss of sensation.     PROCEDURES PERFORMED:   1.  Bilateral breast reconstruction with free bilateral deep inferior epigastric  fasciocutaneous flaps.  2.  Bilateral breast flap reinnervation by coaptation of flap T11 intercostal nerve to chest T3 intercostal nerve end-to-end using nerve allograft under operative microscope using microsurgical technique.  3.  Bilateral breast flap reinnervation coaptation sites conduit wrapping under the operative microscope using microsurgical technique.  4.  Intraoperative chemical angiography using the SPY Elite system.  5.  Bilateral abdominal wall reinforcement with Strattice acellular dermal matrix, size 16 x 5 cm per side.     SURGEON: Ko Ramos MD     CO SURGEON: Stefan Garnett MD (Co surgeon was required for this operation due to the technical complexity and long duration of free tissue transfer breast reconstruction in the setting of inconsistent availability of appropriately skilled resident assistance.  Dr. Ramos was the main surgeon for the right breast reconstruction and abdominal reconstruction, whereas Dr. Garnett was the main surgeon for the left breast reconstruction and intraoperative chemical angiography.  Bilateral breast reinnervation  using nerve allograft and conduit wrapping of coaptation sites was performed together under the operative microscope.)     ASSISTANT:   1.  Stefan Barney MD  2.  Vick Burden MD  3.  BETZAIDA Hahn (This assistant was required because there was inconsistent and inadequate resident assistance available to perform wound closure and apply sterile dressings.)     ANESTHESIA: General.     ESTIMATED BLOOD LOSS: 150 mL     FINDINGS:   1.  [place] of bilateral deep inferior epigastric  based flaps required extensive retrograde  dissection resulting in at least 1 hour added to the operative time per flap.  Right breast flap was harvested on 1 medial row  while the left breast flap was harvested on 1 medial row .  2.  End-to-end anastomosis of left deep inferior epigastric flap artery to right internal mammary artery and left deep inferior epigastric flap vein to right internal mammary vein using a size 4.0 mm venous .  Right breast flap weight 532 g.  Right breast flap ischemia time 21 minutes.  3.  End-to-end anastomosis of right deep inferior epigastric flap artery to left internal mammary artery and right deep inferior epigastric flap vein to left internal mammary vein using a size 3.5 mm venous .  Left breast flap weight 554 g.  Left breast flap ischemia time 28 minutes.  4.  End-to-end coaptation of left deep inferior epigastric flap T11 intercostal nerve to right chest T3 intercostal nerve using nerve allograft with dimensions 1 - 2 mm diameter and 70 mm length.  5.  End-to-end coaptation of right deep inferior epigastric flap T11 intercostal nerve to left chest T3 intercostal nerve using nerve allograft with dimensions 1 - 2 mm diameter and 70 mm length.  6.  Wrapping of nerve coaptation sites using conduit with dimensions 3 mm diameter and 15 mm length divided in half per coaptation site for a total of 4 coaptation sites.     SPECIMENS: Bilateral breast  skin.     COMPLICATIONS: None.     DRAINS: 15 [place] drain per each breast and in abdomen for a total of 3 drains.     IMPLANTS: None.     DESCRIPTION OF PROCEDURE:   Informed consent was obtained and surgical sites marked in the preoperative holding area.  Preoperative antibiotics and anticoagulation were given.  Patient was taken to the operating room and placed in a supine position over an under body Kp hugger.  Room was kept warm and patient body temperature monitored throughout the entire case.  Bilateral lower leg SCDs were applied and general anesthesia was obtained.  A Fink was placed and arms were tucked to the sides with all pressure points padded.  Patient's chest and abdomen were then prepped and draped in a sterile fashion.  A timeout was performed.     Deep inferior epigastric perforators were identified bilaterally using a pencil doppler.  Bilateral kota-abdominal flaps were designed around these dopplered marks.  The superior abdominal incision was made above the level of the umbilicus.  Dissection was carried superficial to the abdominal wall fascia up to the level of the xyphoid at the midline and laterally below the ribs on both sides.  The surgical bed was then reflexed to gauge tension of donor site closure and confirm the inferior abdominal incision.  Bilateral superficial inferior epigastric veins were dissected circumferentially for a distance of 3 cm inferiorly before clipping and ligating.  The abdominal flap was then raised from lateral to medial in a suprafascial plane until the skin perforators were reached.  The midline abdominal incision was then made so that medial to lateral dissection could be performed to further visualize the skin perforators completely.     In addition to  dissection, the T11 intercostal nerve entering into each flap was visualized and dissected circumferentially to the level of the deep fascia.  The nerves were ligated at that level to obtain just  the sensory fascicles.  Under the operative microscope, a nerve allograft was coapted to the ligated nerve end-to-end using 9-0 nylon suture in an epineural fashion at 180 degrees from each other.  This coaptation site was then wrapped with a conduit that was secured into place using vascular clips tethered to the adjacent .  The nerve graft was then protected at all times until coaptation to the chest T4 nerve later.     The abdominal flaps were then isolated to select perforators using microvascular clamps to occlude flow through all other perforators.  Under guidance from the surgical team, 5 cc of indocyanine green dye was given IV, followed by a 10 cc saline flush.  Using the SPY Elite system, real time intraoperative chemical angiography was performed and perfusion of the flaps was evaluated at 30 seconds and 60 seconds.  Decision was made to use the following perforators:  Right breast flap: 1 medial row .  Left breast flap: 1 medial row .  In addition to  selection, chemical angiography was used to determine the amount of flap tissue to be debrided at the lateral edges.     The rectus fascia was split in a longitudinal manner towards the external iliac vessels.  Retrograde dissection was performed for the select perforators down to the deep inferior epigastric vessels, which were then dissected to their origin from the external iliac vessels and  from one another.  This dissection was performed meticulously and at times using microsurgical instruments to control all vascular branches, adding at least 1 hour to the operative time per flap.  The pedicles were then marked, and the flaps were then stapled back to their initial positions.     Bilateral chests were prepared for flap inset by creating space prepectorally to adequately fit the flaps.  The pectoralis muscle was split along its medial fibers below the angle of Shade to reveal the third rib cartilage.   The perichondrium was entered and the medial rib cartilage was removed with a rongeur.  The deep perichondrium was then dissected off the chest with bipolar cautery.  The internal mammary vessels were prepared under the operative microscope.  Single vessel clamps were placed proximally and the distal ends were double clipped and cut sharply at a level appropriate for anastomosis.  Pulsatile flow was noted from the artery and the vein flushed easily.  The vessel cut edges were stripped of adventitia, properly dilated, and lumens thoroughly irrigated with heparinized solution.     Attention was then turned to the inferior aspect from where the rib cartilage was removed.  The T3 intercostal nerve was dissected out bilaterally.  These were then traced distally into the mastectomy skin flap and were found to be ligated from previous surgery.  Decision was made to use these nerves as donors for reinnervation.     The abdominal flap pedicles were ligated starting with the artery then vein.  The flaps were weighed at the back table.  The flap arteries were then dilated and flushed with heparinized solution until clear egress was seen from the flap veins.  The flaps were secured to the chest with a moist lap and the pedicle was laid out to meet the prepared internal mammary vessels without twisting or kinking.  Under the operative microscope, the arterial anastomosis was performed end-to-end using double opposing clamps and interrupted 8-0 nylons.  180 degree sutures were placed first, followed by back wall sutures, then front wall sutures.  The venous anastomosis was performed with a venous  after proper measurements were made.  Clamps were removed and pulsatile flow was noticed through the arterial anastomoses.  Acland testing revealed flow through the venous anastomoses.  Bleeding from flap edges was noted and a doppler signal could be obtained at the flap skin paddles.     The medial edges of the flaps were lifted  from the chest wall.  This exposed the nerve grafts that were previously coapted and T3 intercostal nerve dissected into our plane.  Nerve coaptation from the T3 intercostal nerve to allograft was made using 9-0 nylon suture in 180 degree fashion within the epineurium.  This coaptation site was then wrapped with a conduit which was secured in place using vascular clips.  This was performed under the operative microscope using microsurgical technique.     Bilateral flaps were placed inside the breast pockets after trimming their lateral corners.  Appropriate skin paddles were designed and the rest of the skin was de-epithelialized.  Mastectomy skin flaps were trimmed appropriately.  A 15 [place] APOLLO drain was placed into each side and the skin closed in layers.  Doppler signals were intact on both skin paddles following this.  Simultaneously, the abdominal fascia was closed with 0 PDS suture in a running manner with a 16 x 10 cm Strattice acellular dermal matrix divided in half so that a 16 x 5 cm piece was placed in a retro rectus plane to reinforce the closure per side.  100% of the acellular dermal matrix opened was used.  The umbilicus was removed and the stalk closed with 0 PDS suture.  We then closed the abdominal incision with 0 PDS suture in the superficial fascia and 2-0 Monocryl suture in the deep dermal layer followed by 3-0 Stratafix suture in the skin.  This was performed under beachchair positioning.     Incision tape and glue were applied.  Drain dressings were applied.  An abdominal binder was applied.  All counts were correct at the end of the case.  The patient was then awakened, extubated, and transferred to the postoperative area in a flexed position at the hip.  Fink was kept in.  Dr. Ramos was the main surgeon for the right breast reconstruction and abdominal reconstruction, whereas Dr. Garnett was the main surgeon for the left breast reconstruction and intraoperative chemical angiography.   Bilateral breast reinnervation using nerve allograft and conduit wrapping of coaptation sites was performed together under the operative microscope.     DISPOSITION: Inpatient floor for frequent flap monitoring.

## 2021-02-08 NOTE — ANESTHESIA PROCEDURE NOTES
Pre-Procedure   Staff -   Anesthesiologist:  Kaleb More DO  Resident/Fellow: Prabhu Read MD  Performed By: anesthesiologist and with residents  Location: pre-op  Procedure Start/Stop Times: 2/8/2021 6:45 AM and 2/8/2021 7:00 AM  Pre-Anesthestic Checklist: patient identified, IV checked, site marked, risks and benefits discussed, informed consent, monitors and equipment checked, pre-op evaluation, at physician/surgeon's request and post-op pain management  Timeout:  Correct Patient: Yes   Correct Procedure: Yes   Correct Site: Yes   Correct Position: Yes   Correct Laterality: Yes   Site Marked: Yes    Procedure Documentation  Procedure: TAP  Laterality: bilateral  Patient Position:supine  Patient Prep/Sterile Barriers: sterile gloves, Chloraprep  Needle type: short bevel  Needle Gauge: 21.   Needle Length (millimeters) 110   Ultrasound guided, Ultrasound used to identify targeted nerve, plexus, vascular marker, or fascial plane and place a needle adjacent to it in real-time, Ultrasound was used to visualize the spread of anesthetic in close proximity to the above referenced structure  A permanent image is entered into the patient's record.    Assessment/Narrative      The placement was negative for: blood aspirated, painful injection and site bleeding  Paresthesias: No.  Bolus given via needle. No blood aspirated via catheter.   Secured via.   Insertion/Infusion Method: Single Shot  Complications: none  Injection made incrementally with aspirations every 5 mL.  Medication Administration Time: 2/8/2021 6:45 AM

## 2021-02-08 NOTE — BRIEF OP NOTE
[facility]    Brief Operative Note    Pre-operative diagnosis: S/P bilateral mastectomy [Z90.13]  Post-operative diagnosis Same as pre-operative diagnosis    Procedure: Procedure(s):  Bilateral breast reconstruction with Bilateral LIVIER flaps, reinervation, strattice abdominal wall repair, SPY  Surgeon: Surgeon(s) and Role:     * LALY Ramos MD - Primary     * Lakeshia Bains PA-FRANCIE - Assisting     * Vick Burden MD - Fellow - Assisting     * Stefan Garnett MD  Anesthesia: Combined General with Block   Estimated blood loss: 150cc  Drains: Adria-[place]  Specimens:   ID Type Source Tests Collected by Time Destination   A : Right Breast Skin Tissue Other SURGICAL PATHOLOGY EXAM LALY Ramos MD 2/8/2021  1:33 PM    B : Left Breast Skin Tissue Other SURGICAL PATHOLOGY EXAM LALY Ramos MD 2/8/2021  3:05 PM      Findings:   See full op report..  Complications: None.  Implants:   Implant Name Type Inv. Item Serial No.  Lot No. LRB No. Used Action   GRAFT CONNECTOR NERVE PROTECTOR AXOGUARD 3X15MM [id number] Bone/Tissue/Biologic GRAFT CONNECTOR NERVE PROTECTOR AXOGUARD 3X15MM [id number]  AXOGEN [id number] Right 1 Implanted   Advance Nerve Graftt Bone/Tissue/Biologic    LOT [id number] Right 1 Implanted   GRAFT STRATTICE [id number] FIRM [id number] CHG PER SQ CM=160 UNITS Bone/Tissue/Biologic GRAFT STRATTICE [id number] FIRM [id number] CHG PER SQ GV=046 UNITS  [employer] [id number] N/A 1 Implanted   IMP DEVICE ANASTOMOTIC 4.0MM  ORANGE [id number] Other IMP DEVICE ANASTOMOTIC 4.0MM  ORANGE [id number]  [employer] [id number] Right 1 Implanted   Avance Nerve Graft Bone/Tissue/Biologic   AXOGEN [id number] Left 1 Implanted   GRAFT CONNECTOR NERVE PROTECTOR AXOGUARD 3X15MM [id number] Bone/Tissue/Biologic GRAFT CONNECTOR NERVE PROTECTOR AXOGUARD 3X15MM [id number]  AXOGEN [id number] Left 1 Implanted   IMP DEVICE ANASTOMOTIC 3.5MM  PURPLE [id number] Other  IMP DEVICE ANASTOMOTIC 3.5MM  PURPLE [id number]  [employer] [id number] Left 1 Implanted     Plan:  - Admit to plastics  - Q1h flap checks  - ASA  - NPO, bedrest overnight

## 2021-02-08 NOTE — ANESTHESIA PROCEDURE NOTES
Pre-Procedure   Staff -   Anesthesiologist:  Kaleb More DO  Resident/Fellow: Prabhu Read MD  Performed By: anesthesiologist and with residents  Location: pre-op  Procedure Start/Stop Times: 2/8/2021 6:45 AM and 2/8/2021 7:00 AM  Pre-Anesthestic Checklist: patient identified, IV checked, site marked, risks and benefits discussed, informed consent, monitors and equipment checked, pre-op evaluation, at physician/surgeon's request and post-op pain management  Timeout:  Correct Patient: Yes   Correct Procedure: Yes   Correct Site: Yes   Correct Position: Yes   Correct Laterality: Yes   Site Marked: Yes    Procedure Documentation  Procedure: Pectoralis (Bilateral PEC plus TAP)  Laterality: bilateral  Patient Position:supine  Patient Prep/Sterile Barriers: sterile gloves, Chloraprep  Needle type: short bevel  Needle Gauge: 21.   Needle Length (millimeters) 110   Ultrasound guided, Ultrasound used to identify targeted nerve, plexus, vascular marker, or fascial plane and place a needle adjacent to it in real-time, Ultrasound was used to visualize the spread of anesthetic in close proximity to the above referenced structure  A permanent image is entered into the patient's record.    Assessment/Narrative      The placement was negative for: blood aspirated, painful injection and site bleeding  Paresthesias: No.  Bolus given via needle. No blood aspirated via catheter.   Secured via.   Insertion/Infusion Method: Single Shot  Complications: none  Injection made incrementally with aspirations every 5 mL.  Medication Administration Time: 2/8/2021 6:50 AM

## 2021-02-08 NOTE — ANESTHESIA CARE TRANSFER NOTE
Patient: Kathy Lei    Procedure(s):  Bilateral breast reconstruction with Bilateral LIVIER flaps, reinervation, strattice abdominal wall repair, SPY    Diagnosis: S/P bilateral mastectomy [Z90.13]  Diagnosis Additional Information: No value filed.    Anesthesia Type:   General     Note:    Oropharynx: oropharynx clear of all foreign objects and spontaneously breathing  Level of Consciousness: drowsy  Oxygen Supplementation: nasal cannula  Level of Supplemental Oxygen (L/min / FiO2): 4  Independent Airway: airway patency satisfactory and stable  Dentition: dentition unchanged  Vital Signs Stable: post-procedure vital signs reviewed and stable  Report to RN Given: handoff report given  Patient transferred to: PACU    Handoff Report: Identifed the Patient, Identified the Reponsible Provider, Reviewed the pertinent medical history, Discussed the surgical course, Reviewed Intra-OP anesthesia mangement and issues during anesthesia, Set expectations for post-procedure period and Allowed opportunity for questions and acknowledgement of understanding      Vitals: (Last set prior to Anesthesia Care Transfer)  CRNA VITALS  2/8/2021 1646 - 2/8/2021 1732      2/8/2021             Pulse:  117    SpO2:  98 %        Electronically Signed By: CECILE Rhodes CRNA  February 8, 2021  5:32 PM

## 2021-02-08 NOTE — OR NURSING
Left Abdomen ---> Right Breast  Ischemia Time:  Start: 1245  Stop: 1306  Weight: 532 grams   Size: 4.0    Right Abdomen --> Left Breast  Ischemia Time:  Start: 1407  Stop: 1435  Weight: 554 grams   Size: 3.5

## 2021-02-08 NOTE — ANESTHESIA PROCEDURE NOTES
Airway   Date/Time: 2/8/2021 7:55 AM   Patient location during procedure: OR  Staff -   CRNA: Coral Roland APRN CRNA  Performed By: CRNA    Consent for Airway   Urgency: elective    Indications and Patient Condition  Indications for airway management: seth-procedural  Induction type:intravenousMask difficulty assessment: 1 - vent by mask    Final Airway Details  Final airway type: endotracheal airway  Successful airway:ETT - single  Endotracheal Airway Details   ETT size (mm): 7.0  Cuffed: yes  Successful intubation technique: direct laryngoscopy  Grade View of Cords: 1  Adjucts: stylet  Measured from: lips  Secured at (cm): 21  Secured with: pink tape  Bite block used: None    Post intubation assessment   Placement verified by: capnometry, equal breath sounds and chest rise   Number of attempts at approach: 1  Secured with:pink tape  Ease of procedure: easy  Dentition: Intact and Unchanged

## 2021-02-09 LAB
ANION GAP SERPL CALCULATED.3IONS-SCNC: 3 MMOL/L (ref 3–14)
BUN SERPL-MCNC: 9 MG/DL (ref 7–30)
CALCIUM SERPL-MCNC: 8 MG/DL (ref 8.5–10.1)
CHLORIDE SERPL-SCNC: 108 MMOL/L (ref 94–109)
CO2 SERPL-SCNC: 29 MMOL/L (ref 20–32)
CREAT SERPL-MCNC: 0.62 MG/DL (ref 0.52–1.04)
ERYTHROCYTE [DISTWIDTH] IN BLOOD BY AUTOMATED COUNT: 13.3 % (ref 10–15)
GFR SERPL CREATININE-BSD FRML MDRD: >90 ML/MIN/{1.73_M2}
GLUCOSE SERPL-MCNC: 114 MG/DL (ref 70–99)
HCT VFR BLD AUTO: 28 % (ref 35–47)
HGB BLD-MCNC: 9 G/DL (ref 11.7–15.7)
MAGNESIUM SERPL-MCNC: 2.6 MG/DL (ref 1.6–2.3)
MCH RBC QN AUTO: 28.9 PG (ref 26.5–33)
MCHC RBC AUTO-ENTMCNC: 32.1 G/DL (ref 31.5–36.5)
MCV RBC AUTO: 90 FL (ref 78–100)
PLATELET # BLD AUTO: 199 10E9/L (ref 150–450)
POTASSIUM SERPL-SCNC: 3.8 MMOL/L (ref 3.4–5.3)
RBC # BLD AUTO: 3.11 10E12/L (ref 3.8–5.2)
SODIUM SERPL-SCNC: 140 MMOL/L (ref 133–144)
WBC # BLD AUTO: 7.1 10E9/L (ref 4–11)

## 2021-02-09 PROCEDURE — 250N000013 HC RX MED GY IP 250 OP 250 PS 637: Performed by: STUDENT IN AN ORGANIZED HEALTH CARE EDUCATION/TRAINING PROGRAM

## 2021-02-09 PROCEDURE — 250N000011 HC RX IP 250 OP 636: Performed by: STUDENT IN AN ORGANIZED HEALTH CARE EDUCATION/TRAINING PROGRAM

## 2021-02-09 PROCEDURE — 85027 COMPLETE CBC AUTOMATED: CPT | Performed by: STUDENT IN AN ORGANIZED HEALTH CARE EDUCATION/TRAINING PROGRAM

## 2021-02-09 PROCEDURE — 83735 ASSAY OF MAGNESIUM: CPT | Performed by: STUDENT IN AN ORGANIZED HEALTH CARE EDUCATION/TRAINING PROGRAM

## 2021-02-09 PROCEDURE — 258N000003 HC RX IP 258 OP 636: Performed by: STUDENT IN AN ORGANIZED HEALTH CARE EDUCATION/TRAINING PROGRAM

## 2021-02-09 PROCEDURE — 80048 BASIC METABOLIC PNL TOTAL CA: CPT | Performed by: STUDENT IN AN ORGANIZED HEALTH CARE EDUCATION/TRAINING PROGRAM

## 2021-02-09 PROCEDURE — 36415 COLL VENOUS BLD VENIPUNCTURE: CPT | Performed by: STUDENT IN AN ORGANIZED HEALTH CARE EDUCATION/TRAINING PROGRAM

## 2021-02-09 PROCEDURE — 120N000002 HC R&B MED SURG/OB UMMC

## 2021-02-09 RX ORDER — LORAZEPAM 0.5 MG/1
0.5 TABLET ORAL DAILY PRN
Status: DISCONTINUED | OUTPATIENT
Start: 2021-02-09 | End: 2021-02-11 | Stop reason: HOSPADM

## 2021-02-09 RX ORDER — LISINOPRIL 2.5 MG/1
5 TABLET ORAL EVERY EVENING
Status: DISCONTINUED | OUTPATIENT
Start: 2021-02-09 | End: 2021-02-11 | Stop reason: HOSPADM

## 2021-02-09 RX ORDER — SERTRALINE HYDROCHLORIDE 100 MG/1
100 TABLET, FILM COATED ORAL AT BEDTIME
Status: DISCONTINUED | OUTPATIENT
Start: 2021-02-09 | End: 2021-02-11 | Stop reason: HOSPADM

## 2021-02-09 RX ORDER — ALBUTEROL SULFATE 90 UG/1
1-2 AEROSOL, METERED RESPIRATORY (INHALATION) EVERY 4 HOURS PRN
Status: DISCONTINUED | OUTPATIENT
Start: 2021-02-09 | End: 2021-02-11 | Stop reason: HOSPADM

## 2021-02-09 RX ADMIN — OXYCODONE HYDROCHLORIDE 10 MG: 5 TABLET ORAL at 11:34

## 2021-02-09 RX ADMIN — HYDROMORPHONE HYDROCHLORIDE 0.5 MG: 1 INJECTION, SOLUTION INTRAMUSCULAR; INTRAVENOUS; SUBCUTANEOUS at 01:31

## 2021-02-09 RX ADMIN — OXYCODONE HYDROCHLORIDE 5 MG: 5 TABLET ORAL at 10:21

## 2021-02-09 RX ADMIN — HYDROMORPHONE HYDROCHLORIDE 0.5 MG: 1 INJECTION, SOLUTION INTRAMUSCULAR; INTRAVENOUS; SUBCUTANEOUS at 17:31

## 2021-02-09 RX ADMIN — METHOCARBAMOL 750 MG: 750 TABLET, FILM COATED ORAL at 20:38

## 2021-02-09 RX ADMIN — ONDANSETRON 4 MG: 4 TABLET, ORALLY DISINTEGRATING ORAL at 06:57

## 2021-02-09 RX ADMIN — HYDROMORPHONE HYDROCHLORIDE 0.5 MG: 1 INJECTION, SOLUTION INTRAMUSCULAR; INTRAVENOUS; SUBCUTANEOUS at 15:16

## 2021-02-09 RX ADMIN — DOCUSATE SODIUM 50 MG AND SENNOSIDES 8.6 MG 2 TABLET: 8.6; 5 TABLET, FILM COATED ORAL at 19:39

## 2021-02-09 RX ADMIN — SODIUM CHLORIDE, POTASSIUM CHLORIDE, SODIUM LACTATE AND CALCIUM CHLORIDE: 600; 310; 30; 20 INJECTION, SOLUTION INTRAVENOUS at 18:35

## 2021-02-09 RX ADMIN — DOCUSATE SODIUM 50 MG AND SENNOSIDES 8.6 MG 1 TABLET: 8.6; 5 TABLET, FILM COATED ORAL at 08:03

## 2021-02-09 RX ADMIN — HYDROMORPHONE HYDROCHLORIDE 0.5 MG: 1 INJECTION, SOLUTION INTRAMUSCULAR; INTRAVENOUS; SUBCUTANEOUS at 13:08

## 2021-02-09 RX ADMIN — SODIUM CHLORIDE, POTASSIUM CHLORIDE, SODIUM LACTATE AND CALCIUM CHLORIDE: 600; 310; 30; 20 INJECTION, SOLUTION INTRAVENOUS at 10:09

## 2021-02-09 RX ADMIN — LORAZEPAM 0.5 MG: 0.5 TABLET ORAL at 21:19

## 2021-02-09 RX ADMIN — SERTRALINE HYDROCHLORIDE 100 MG: 100 TABLET ORAL at 22:47

## 2021-02-09 RX ADMIN — ENOXAPARIN SODIUM 40 MG: 40 INJECTION SUBCUTANEOUS at 08:06

## 2021-02-09 RX ADMIN — OXYCODONE HYDROCHLORIDE 5 MG: 5 TABLET ORAL at 08:03

## 2021-02-09 RX ADMIN — ACETAMINOPHEN 975 MG: 325 TABLET, FILM COATED ORAL at 15:59

## 2021-02-09 RX ADMIN — HYDROMORPHONE HYDROCHLORIDE 0.5 MG: 1 INJECTION, SOLUTION INTRAMUSCULAR; INTRAVENOUS; SUBCUTANEOUS at 19:40

## 2021-02-09 RX ADMIN — OXYCODONE HYDROCHLORIDE 10 MG: 5 TABLET ORAL at 23:44

## 2021-02-09 RX ADMIN — ALBUTEROL SULFATE 2 PUFF: 90 AEROSOL, METERED RESPIRATORY (INHALATION) at 21:19

## 2021-02-09 RX ADMIN — ASPIRIN 81 MG: 81 TABLET, CHEWABLE ORAL at 08:03

## 2021-02-09 RX ADMIN — SODIUM CHLORIDE, POTASSIUM CHLORIDE, SODIUM LACTATE AND CALCIUM CHLORIDE 1000 ML: 600; 310; 30; 20 INJECTION, SOLUTION INTRAVENOUS at 06:05

## 2021-02-09 RX ADMIN — HYDROMORPHONE HYDROCHLORIDE 0.5 MG: 1 INJECTION, SOLUTION INTRAMUSCULAR; INTRAVENOUS; SUBCUTANEOUS at 05:25

## 2021-02-09 RX ADMIN — LISINOPRIL 5 MG: 2.5 TABLET ORAL at 19:40

## 2021-02-09 ASSESSMENT — ACTIVITIES OF DAILY LIVING (ADL)
ADLS_ACUITY_SCORE: 12

## 2021-02-09 NOTE — PLAN OF CARE
Temperature elevated up to 100.2. Tachycardic in the low 100s. Other VSS on 1 LPM oxygen. Bilateral breast flaps WDL with StO2 in the 90s. Transverse abdominal incision CDI and JEANINE. Abdominal binder on. Pain somewhat controlled with oxycodone and IV dilaudid. Tolerating clear liquids, diet advanced to regular. Ha in place with adequate urine output. Passing a small amount of gas. Abdominal and bilateral breast JPs with serosanguinous output. Attempted to get patient out of bed but she became too dizzy and was only able to sit on the edge of the bed. Did not remove ha since she was unable to stand. Will attempt again later.

## 2021-02-09 NOTE — PROVIDER NOTIFICATION
Dr. Charlton notified. Ok for sign out. Only 10meq of K given, re-check 4.0, ok to treat upstairs.

## 2021-02-09 NOTE — PROVIDER NOTIFICATION
Notified Vick Burden, plastics resident via [facility] at 0035 AM regarding bilateral breast flaps slightly paler. Doppler pulses still strong, skin warm. .      Orders were not obtained.    Comments: Will continue with Q1 hour flap monitoring.      Paged Vick Burden again at 6925 concerning new purple [place] on top corner of left breast and slight swelling above left breast. Dopper pulse still strong. Will continue to monitor.

## 2021-02-09 NOTE — PLAN OF CARE
POD #2 s/p bilateral breast reconstruction with free bilateral LIVIER flap. A&Ox1. VSS on 1L. Temp max 100.3, tylenol given with improvement. Q 1 hr flap check with Flaps warm, soft, pale pink, and with strong doppler pulses. A little tachy in 100-110. Transverse abdominal pain control with IV dilaudid, robaxin. Transverse abdominal incision covered with ABD's pad with a binder in place for comfort. JPx3 with serosanguinous output. Ha catheter in place with MD shereen ok to keep ha until tomorrow. Attempted to ambulate but unsuccessful because patient became dizzy. PIV infusing MIVF. On regular diet, denies nausea. Ativan given for anxiety. Passing gas/no post op BM. Continue with plan of care and maintain pain control.

## 2021-02-09 NOTE — ANESTHESIA POSTPROCEDURE EVALUATION
Patient: Kathy Lei    Procedure(s):  Bilateral breast reconstruction with Bilateral LIVIER flaps, reinervation, strattice abdominal wall repair, SPY    Diagnosis:S/P bilateral mastectomy [Z90.13]  Diagnosis Additional Information: No value filed.    Anesthesia Type:  General    Note:  Disposition: Admission   Postop Pain Control: Uneventful            Sign Out: Well controlled pain   PONV: Yes            Symptoms: Nausea only            Sign Out: PONV/POV resolved with treatment   Neuro/Psych: Uneventful            Sign Out: Acceptable/Baseline neuro status   Airway/Respiratory: Uneventful            Sign Out: Acceptable/Baseline resp. status   CV/Hemodynamics: Uneventful            Sign Out: Acceptable CV status   Other NRE:    DID A NON-ROUTINE EVENT OCCUR? No         Last vitals:  Vitals:    02/08/21 1828 02/08/21 1830 02/08/21 1900   BP:  127/67 125/70   Pulse:  109 112   Resp:  16    Temp:      SpO2: 97% 97% 96%       Last vitals prior to Anesthesia Care Transfer:  CRNA VITALS  2/8/2021 1646 - 2/8/2021 1746      2/8/2021             Pulse:  117    SpO2:  98 %          Electronically Signed By: Arnold Charlton MD  February 8, 2021  7:22 PM

## 2021-02-09 NOTE — PLAN OF CARE
"Arrived from PACU at 1955    Pain: Abdominal pain managed with scheduled tylenol and PRN IV dilaudid   Nausea: denies  Lab: Magnesium 1.3, replacement infusing, recheck tomorrow AM  /63 (BP Location: Left arm)   Pulse 107   Temp 99  F (37.2  C) (Oral)   Resp 16   Ht 1.651 m (5' 5\")   Wt 65.4 kg (144 lb 2.9 oz)   LMP 11/02/2014   SpO2 95%   BMI 23.99 kg/m   - tachycardic, regular pulse rate   Output: Adequate output via ha catheter  Diet: NPO   Activity: Bedrest with no hip flexion beyond 30 degrees. Up in carol/ambulate with assist on POD #1   Bowel Function: Bowel sounds hypoactive in all quadrants, no passing of flatus, no bm this shift   Incision: Transverse abdominal incision, liquid bandage, no drainage, JEANINE- abdominal binder in place   Breast flaps: L breast flap warm, soft, mild edema above left breast, strong pulse- irregular pulse at times, page notified Vick Burden. StO2 low 90's with signal quality in high 90's. R breast flap warm, soft, strong pulse. StO2 high 90's with signal quality in high 80's/low 90's. Incision with sutures and liquid bandage.   Drains: APOLLO x 3. Stripped per orders. Bright bloody output, dressing CDI   Lines: L PIV x 2. PIV infusing MIVF at 100 ml/hr, dressing CDI. Other L PIV saline locked, dressing CDI   Plan: Continue to monitor pain, nausea, VS, output, and ROBF.   "

## 2021-02-09 NOTE — PROGRESS NOTES
"Plastic Surgery Progress Note     S: NAEO. Doppler with strong pulses. Left breast slightly swollen and bruised. Pain well controlled on scheduled tylenol and PRN dilaudid.      O:  /49   Pulse 106   Temp 99.1  F (37.3  C) (Oral)   Resp 17   Ht 1.651 m (5' 5\")   Wt 65.4 kg (144 lb 2.9 oz)   LMP 11/02/2014   SpO2 97%   BMI 23.99 kg/m    Gen: NAD, AOx3,  Chest: BL LIVIER flaps soft, warm, and well perfused. Doppler signals intact. Vioptics within normal range. Some surrounding bruising, evolving. Drains SS  Abdomen: Soft non distended. Incisions intact. Drains SS     A/P:  55 female POD #1 s/p bilateral breast reconstruction with free bilateral LIVIER flap. Progressing appropriately postoperatively.     - Wean O2 as tolerated  - Clear liquids; advance as tolerated  - Up to chair with assist  - Fink discontinue this AM  - Discontinue DON hugger  - Continue pain management; scheduled tylenol, PRN dilaudid and oxycodone  - ASA, Lovenox (lovenox injection teaching)      Sary Bergman, MS4    Stefan Barney MD PGY6  Plastic and Reconstructive Surgery RL1    "

## 2021-02-09 NOTE — PLAN OF CARE
Tachycardic in 100-110s, on 3L NC, OVSS, capno in place. AOx4. Beach chair positioned with DON hugger over pt. Pain controlled with PRN IV dilaudid and scheduled tylenol. Bilateral breast incisions liquid bandaged and sutured. Flaps warm, soft, pale pink, and with strong doppler pulses - checked Q1hr. Left breast slightly swollen and bruised, see provider notification note. Lower transverse abdominal incision liquid bandaged and intact, gently covered with ABDs and abd binder. NPO overnight. Switching to clear liquid this AM. Denies passing flatus/stool. Fink patent with low urine output, Vick Burden paged, 1L LR bolus given per orders. Bilateral breast and abd APOLLO to bulb suction, stripped per orders. Left PIV x2, one infusing MIVF and the other saline locked. Continue with POC.

## 2021-02-09 NOTE — PROGRESS NOTES
Admitted/transferred from:   2 RN  skin assessment completed by ERIK CAMPOS RN and Opal Castaneda RN  Skin assessment finding: WNL  Interventions/actions: Will encourage ambulation/repositioning as tolerated- patient on bedrest until POD #1    Will continue to monitor.

## 2021-02-10 ENCOUNTER — APPOINTMENT (OUTPATIENT)
Dept: OCCUPATIONAL THERAPY | Facility: CLINIC | Age: 56
End: 2021-02-10
Attending: PLASTIC SURGERY
Payer: COMMERCIAL

## 2021-02-10 LAB — MAGNESIUM SERPL-MCNC: 2 MG/DL (ref 1.6–2.3)

## 2021-02-10 PROCEDURE — 250N000011 HC RX IP 250 OP 636: Performed by: STUDENT IN AN ORGANIZED HEALTH CARE EDUCATION/TRAINING PROGRAM

## 2021-02-10 PROCEDURE — 250N000013 HC RX MED GY IP 250 OP 250 PS 637: Performed by: STUDENT IN AN ORGANIZED HEALTH CARE EDUCATION/TRAINING PROGRAM

## 2021-02-10 PROCEDURE — 999N000128 HC STATISTIC PERIPHERAL IV START W/O US GUIDANCE

## 2021-02-10 PROCEDURE — 36415 COLL VENOUS BLD VENIPUNCTURE: CPT | Performed by: PLASTIC SURGERY

## 2021-02-10 PROCEDURE — 83735 ASSAY OF MAGNESIUM: CPT | Performed by: PLASTIC SURGERY

## 2021-02-10 PROCEDURE — 258N000003 HC RX IP 258 OP 636: Performed by: STUDENT IN AN ORGANIZED HEALTH CARE EDUCATION/TRAINING PROGRAM

## 2021-02-10 PROCEDURE — 120N000002 HC R&B MED SURG/OB UMMC

## 2021-02-10 PROCEDURE — 97110 THERAPEUTIC EXERCISES: CPT | Mod: GO | Performed by: OCCUPATIONAL THERAPIST

## 2021-02-10 PROCEDURE — 97535 SELF CARE MNGMENT TRAINING: CPT | Mod: GO | Performed by: OCCUPATIONAL THERAPIST

## 2021-02-10 PROCEDURE — 97165 OT EVAL LOW COMPLEX 30 MIN: CPT | Mod: GO | Performed by: OCCUPATIONAL THERAPIST

## 2021-02-10 RX ADMIN — LISINOPRIL 5 MG: 2.5 TABLET ORAL at 20:22

## 2021-02-10 RX ADMIN — LORAZEPAM 0.5 MG: 0.5 TABLET ORAL at 21:07

## 2021-02-10 RX ADMIN — OXYCODONE HYDROCHLORIDE 5 MG: 5 TABLET ORAL at 16:44

## 2021-02-10 RX ADMIN — DOCUSATE SODIUM 50 MG AND SENNOSIDES 8.6 MG 2 TABLET: 8.6; 5 TABLET, FILM COATED ORAL at 20:22

## 2021-02-10 RX ADMIN — DOCUSATE SODIUM 50 MG AND SENNOSIDES 8.6 MG 2 TABLET: 8.6; 5 TABLET, FILM COATED ORAL at 07:52

## 2021-02-10 RX ADMIN — LEVOTHYROXINE SODIUM 125 MCG: 0.03 TABLET ORAL at 07:53

## 2021-02-10 RX ADMIN — SODIUM CHLORIDE, POTASSIUM CHLORIDE, SODIUM LACTATE AND CALCIUM CHLORIDE: 600; 310; 30; 20 INJECTION, SOLUTION INTRAVENOUS at 03:09

## 2021-02-10 RX ADMIN — ENOXAPARIN SODIUM 40 MG: 40 INJECTION SUBCUTANEOUS at 07:53

## 2021-02-10 RX ADMIN — ALBUTEROL SULFATE 2 PUFF: 90 AEROSOL, METERED RESPIRATORY (INHALATION) at 16:34

## 2021-02-10 RX ADMIN — ASPIRIN 81 MG: 81 TABLET, CHEWABLE ORAL at 07:53

## 2021-02-10 RX ADMIN — SERTRALINE HYDROCHLORIDE 100 MG: 100 TABLET ORAL at 22:40

## 2021-02-10 RX ADMIN — ACETAMINOPHEN 975 MG: 325 TABLET, FILM COATED ORAL at 20:22

## 2021-02-10 RX ADMIN — OXYCODONE HYDROCHLORIDE 10 MG: 5 TABLET ORAL at 03:07

## 2021-02-10 RX ADMIN — ONDANSETRON 4 MG: 2 INJECTION INTRAMUSCULAR; INTRAVENOUS at 05:46

## 2021-02-10 RX ADMIN — PROCHLORPERAZINE EDISYLATE 10 MG: 5 INJECTION INTRAMUSCULAR; INTRAVENOUS at 10:41

## 2021-02-10 RX ADMIN — ACETAMINOPHEN 975 MG: 325 TABLET, FILM COATED ORAL at 12:04

## 2021-02-10 RX ADMIN — OXYCODONE HYDROCHLORIDE 5 MG: 5 TABLET ORAL at 10:40

## 2021-02-10 RX ADMIN — METHOCARBAMOL 750 MG: 750 TABLET, FILM COATED ORAL at 05:46

## 2021-02-10 ASSESSMENT — ACTIVITIES OF DAILY LIVING (ADL)
ADLS_ACUITY_SCORE: 12
PREVIOUS_RESPONSIBILITIES: MEAL PREP;HOUSEKEEPING;LAUNDRY;SHOPPING;MEDICATION MANAGEMENT;FINANCES;DRIVING
ADLS_ACUITY_SCORE: 12
ADLS_ACUITY_SCORE: 14
ADLS_ACUITY_SCORE: 14
ADLS_ACUITY_SCORE: 12
IADL_COMMENTS: SO CAN ASSIST AS NEEDED.
ADLS_ACUITY_SCORE: 14

## 2021-02-10 NOTE — PROGRESS NOTES
02/10/21 1400   Signing Clinician's Name / Credentials   Signing clinician's name / credentials mariam rodriguez ot/l   Quick Adds   Rehab Discipline OT   Therapeutic Activity   Minutes of Treatment 10 minutes   Symptoms Noted During/After Treatment None   Treatment Detail Pt ambulating in hallway greater than household distances with SBA. Pt ascend/descend 8 stairs mod I. Pt VSS on RA with no signs of activity intolerance.    ADL Training   Minutes of Treatment 26   Treatment Detail Pt educated in post surgical precautions with focus on bed mobility, dressing, bathing, community mobility. pt demo's mod I supine <> EOB, whole body dressing and basic safety with mobility after education. pt educated in self monitoring and basic EC/WS with showering and demo's competence. Pt in agreement to have SO assist as needed, educated in reacher and dressing stick for dressing. Pt demo's full competence.    OT Discharge Planning    OT Discharge Recommendation (DC Rec) Home with assist   OT Rationale for DC Rec pt progressing well and after treatment today will continue to progress well.    OT Brief overview of current status  PT mod I ambualting today and with stairs as well. Pt mod I bed mobiltiy and with understanding of precautions.    Additional Documentation   OT Plan OT: follow up on precautions, DISCH.    Total Session Time   Total Session Time (minutes) 3 minutes

## 2021-02-10 NOTE — PLAN OF CARE
POD #2 s/p bilateral breast reconstruction with free bilateral LIVIER flap.  VSS, tachy(not within notifyer), 90s on RA.  Neuros intact.  LS clear, IS encouraged.  +BS, passing flatus, no BM yet.  Voided good amnt after ha was removed.  Tolerated 50% regular diet.  C/o's nausea relieved wit prn compazine and zofran.  SBA with activity, dizziness improved.Worked with PT  Pain is minimal, prn and sched tylenol and prn oxycodone given with relief (pt can not rohit tylenol and oxycodone together-allergic to percocet).  Flap checks q2hrs. Bilateral breast flap with +doppler, pale but with bruising in the incision areas, warm and soft to touch, with immediate blood return. A hard bruise noted above the rt breast flap(adjacent to the rt armpit)-MD notified.  JPs x3 with serosang drainage.  Left PIV sl'd   Continue with POC.

## 2021-02-10 NOTE — PROGRESS NOTES
02/10/21 1400   Quick Adds   Type of Visit Initial Occupational Therapy Evaluation   Living Environment   People in home significant other   Current Living Arrangements house   Home Accessibility stairs to enter home;stairs within home   Number of Stairs, Main Entrance 4   Number of Stairs, Within Home, Primary 10   Stair Railings, Within Home, Primary railings on both sides of stairs   Transportation Anticipated family or friend will provide   Self-Care   Usual Activity Tolerance good   Current Activity Tolerance good   Regular Exercise No   Equipment Currently Used at Home none   Disability/Function   Hearing Difficulty or Deaf no   Wear Glasses or Blind no   Concentrating, Remembering or Making Decisions Difficulty no   Difficulty Communicating no   Difficulty Eating/Swallowing no   Walking or Climbing Stairs Difficulty no   Dressing/Bathing Difficulty no   Toileting issues no   Doing Errands Independently Difficulty (such as shopping) no   Fall history within last six months no   Change in Functional Status Since Onset of Current Illness/Injury yes   General Information   Referring Physician Ko Ramos   Patient/Family Therapy Goal Statement (OT) retrurn to PLOF   Additional Occupational Profile Info/Pertinent History of Current Problem 55 female POD #2 s/p bilateral breast reconstruction with free bilateral LIVIER flap. Progressing appropriately postoperatively   Existing Precautions/Restrictions abdominal  (breast reconstruction. )   General Observations and Info pt motivated in therapy.    Cognitive Status Examination   Cognitive Status Comments no concerns.    Visual Perception   Impact of Vision Impairment on Function (Vision) no concerns.    Sensory   Sensory Quick Adds No deficits were identified   Range of Motion Comprehensive   General Range of Motion other (see comments)   Comment, General Range of Motion B UE WFL within precautions.    Strength Comprehensive (MMT)   General Manual Muscle Testing  (MMT) Assessment other (see comments)   Comment, General Manual Muscle Testing (MMT) Assessment no MMT 2/2 precautions. B UE grossly WFL.    Coordination   Coordination Comments no concerns. .    Bed Mobility   Bed Mobility supine-sit;sit-supine   Supine-Sit [place] (Bed Mobility)   (education required. )   Sit-Supine [place] (Bed Mobility)   (education required. )   Transfers   Transfers bed-chair transfer;sit-stand transfer   Transfer Skill: Bed to Chair/Chair to Bed   Bed-Chair [place] (Transfers) contact guard   Sit-Stand Transfer   Sit-Stand [place] (Transfers) contact guard   Balance   Balance Assessment no deficits were identified   Activities of Daily Living   BADL Assessment/Intervention upper body dressing;lower body dressing   Upper Body Dressing Assessment/Training   [place] Level (Upper Body Dressing)   (education required. )   Lower Body Dressing Assessment/Training   [place] Level (Lower Body Dressing)   (education required. )   Instrumental Activities of Daily Living (IADL)   Previous Responsibilities meal prep;housekeeping;laundry;shopping;medication management;finances;driving   IADL Comments SO can assist as needed.    Clinical Impression   Criteria for Skilled Therapeutic Interventions Met (OT) yes   OT Diagnosis decreased ADL I   Assessment of Occupational Performance 3-5 Performance Deficits   Identified Performance Deficits bed mobility, dressing, bathing, community mobility.    Planned Therapy Interventions (OT) ADL retraining;IADL retraining;bed mobility training;strengthening;transfer training;home program guidelines;progressive activity/exercise;risk factor education   Clinical Decision Making Complexity (OT) low complexity   Therapy Frequency (OT)   (1-3 visits. )   Predicted Duration of Therapy 1 week   Risk & Benefits of therapy have been explained evaluation/treatment results reviewed;care plan/treatment goals reviewed;risks/benefits  reviewed;current/potential barriers reviewed;participants voiced agreement with care plan;participants included;patient   Comment-Clinical Impression pt presents to OT with post surgical precautions limiting ADL I, pt to benefit from skilled OT intervention  to addres sthe above problem list.    OT Discharge Planning    OT Discharge Recommendation (DC Rec) Home with assist   OT Rationale for DC Rec pt progressing well and after treatment today will continue to progress well.    OT Brief overview of current status  PT mod I ambualting today and with stairs as well. Pt mod I bed mobiltiy and with understanding of precautions.    Total Evaluation Time (Minutes)   Total Evaluation Time (Minutes) 5

## 2021-02-10 NOTE — PLAN OF CARE
PT order received and chart reviewed. Spoke with OT who saw for formal evaluation. Per OT patient with no acute inpatient PT needs. Will complete orders at this time and defer any further therapy to OT.

## 2021-02-10 NOTE — PLAN OF CARE
Ha catheter wasn't removed this AM because patient feels dizzy whenever she gets up and wasn't unable to ambulate, plastic paged.     Spoke with: RAMÓN ACKERMAN MD     Orders were obtained to remove ha catheter at 0600 in the AM.     Comments: Patient tried to ambulate around 1900 but unable due to dizziness. VSS, will continue to monitor

## 2021-02-10 NOTE — PROGRESS NOTES
"Plastic Surgery Progress Note     S: NAEO.Pain well controlled. Having some nausea but controlled with Zofran. Complaining of dizziness, encouraged fluid and diet intake and attempting ambulation. Mild left forearm swelling.      O:  /69 (BP Location: Left arm)   Pulse 106   Temp 98.7  F (37.1  C) (Oral)   Resp 18   Ht 1.651 m (5' 5\")   Wt 65.4 kg (144 lb 2.9 oz)   LMP 11/02/2014   SpO2 95%   BMI 23.99 kg/m    Gen: NAD, AOx3,  Chest: BL LIVIER flaps soft, warm, and well perfused. Doppler signals intact. Some surrounding bruising, evolving. Drains s/s  Abdomen: Soft non distended. Incisions intact. Drains sliding scale  Ext: Mild left forearm swelling     A/P:  55 female POD #2 s/p bilateral breast reconstruction with free bilateral LIVIER flap. Progressing appropriately postoperatively.    - Regular diet; PRN zofran available for nausea  - Up to chair with assist; encourage ambulation  - Discontinued Vioptix  - Will consider DVT US if left arm swelling worsens, mild and at site of IV infusion. No clinical symptoms.  - Continue pain management; scheduled tylenol, PRN dilaudid and oxycodone  - APOLLO monitoring/recording and APOLLO cares/measurement training  - ASA, Lovenox (lovenox injection teaching)   - Discharge possibly today or early tomorrow pending ambulation and progression of nausea     Sary Bergman, MS4    Stefan Barney MD PGY6  Plastic and Reconstructive Surgery RL1        "

## 2021-02-10 NOTE — PLAN OF CARE
POD #2 s/p bilateral breast reconstruction with free bilateral LIVIER flap. A&Ox1. VSS on 1L. Temp max 99.8. Q 1 hr flap check with Flaps warm, soft, pale pink, and with strong doppler pulses. A little tachy in 100-110. Transverse abdominal pain control with oxycodone and robaxin with some relief. Transverse abdominal incision covered with ABD's pad with a binder in place for comfort. JPx3 with serosanguinous output. Ha catheter in place with adequate, plan to remove ha at 0600. PIV infusing MIVF. On regular diet, zofran given for nausea. Passing gas/no post op BM. Continue with plan of care and maintain pain control.     Addendum: Ha catheter removed around 0730, due to void at 1330

## 2021-02-11 ENCOUNTER — PATIENT OUTREACH (OUTPATIENT)
Dept: CARE COORDINATION | Facility: CLINIC | Age: 56
End: 2021-02-11

## 2021-02-11 ENCOUNTER — APPOINTMENT (OUTPATIENT)
Dept: OCCUPATIONAL THERAPY | Facility: CLINIC | Age: 56
End: 2021-02-11
Attending: PLASTIC SURGERY
Payer: COMMERCIAL

## 2021-02-11 VITALS
TEMPERATURE: 97.2 F | WEIGHT: 144.18 LBS | HEIGHT: 65 IN | DIASTOLIC BLOOD PRESSURE: 63 MMHG | BODY MASS INDEX: 24.02 KG/M2 | SYSTOLIC BLOOD PRESSURE: 126 MMHG | HEART RATE: 113 BPM | OXYGEN SATURATION: 92 % | RESPIRATION RATE: 18 BRPM

## 2021-02-11 LAB
COPATH REPORT: NORMAL
MAGNESIUM SERPL-MCNC: 1.9 MG/DL (ref 1.6–2.3)
PLATELET # BLD AUTO: 196 10E9/L (ref 150–450)

## 2021-02-11 PROCEDURE — 250N000011 HC RX IP 250 OP 636: Performed by: STUDENT IN AN ORGANIZED HEALTH CARE EDUCATION/TRAINING PROGRAM

## 2021-02-11 PROCEDURE — 250N000013 HC RX MED GY IP 250 OP 250 PS 637: Performed by: STUDENT IN AN ORGANIZED HEALTH CARE EDUCATION/TRAINING PROGRAM

## 2021-02-11 PROCEDURE — 85049 AUTOMATED PLATELET COUNT: CPT | Performed by: STUDENT IN AN ORGANIZED HEALTH CARE EDUCATION/TRAINING PROGRAM

## 2021-02-11 PROCEDURE — 36415 COLL VENOUS BLD VENIPUNCTURE: CPT | Performed by: PLASTIC SURGERY

## 2021-02-11 PROCEDURE — 97535 SELF CARE MNGMENT TRAINING: CPT | Mod: GO | Performed by: OCCUPATIONAL THERAPIST

## 2021-02-11 PROCEDURE — 83735 ASSAY OF MAGNESIUM: CPT | Performed by: PLASTIC SURGERY

## 2021-02-11 PROCEDURE — 97530 THERAPEUTIC ACTIVITIES: CPT | Mod: GO | Performed by: OCCUPATIONAL THERAPIST

## 2021-02-11 RX ORDER — ONDANSETRON 4 MG/1
4 TABLET, ORALLY DISINTEGRATING ORAL EVERY 6 HOURS PRN
Qty: 12 TABLET | Refills: 0 | Status: SHIPPED | OUTPATIENT
Start: 2021-02-11 | End: 2021-04-12

## 2021-02-11 RX ORDER — OXYCODONE HYDROCHLORIDE 5 MG/1
5-10 TABLET ORAL EVERY 6 HOURS PRN
Qty: 20 TABLET | Refills: 0 | Status: SHIPPED | OUTPATIENT
Start: 2021-02-11 | End: 2021-02-14

## 2021-02-11 RX ORDER — AMOXICILLIN 250 MG
1 CAPSULE ORAL 2 TIMES DAILY
Qty: 30 TABLET | Refills: 0 | Status: SHIPPED | OUTPATIENT
Start: 2021-02-11 | End: 2021-04-12

## 2021-02-11 RX ORDER — ASPIRIN 81 MG/1
81 TABLET, CHEWABLE ORAL DAILY
Qty: 30 TABLET | Refills: 0 | Status: SHIPPED | OUTPATIENT
Start: 2021-02-12 | End: 2022-02-08

## 2021-02-11 RX ADMIN — METHOCARBAMOL 750 MG: 750 TABLET, FILM COATED ORAL at 05:17

## 2021-02-11 RX ADMIN — ALBUTEROL SULFATE 2 PUFF: 90 AEROSOL, METERED RESPIRATORY (INHALATION) at 07:00

## 2021-02-11 RX ADMIN — ONDANSETRON 4 MG: 2 INJECTION INTRAMUSCULAR; INTRAVENOUS at 05:17

## 2021-02-11 RX ADMIN — OXYCODONE HYDROCHLORIDE 5 MG: 5 TABLET ORAL at 11:54

## 2021-02-11 RX ADMIN — LEVOTHYROXINE SODIUM 125 MCG: 0.03 TABLET ORAL at 07:42

## 2021-02-11 RX ADMIN — ENOXAPARIN SODIUM 40 MG: 40 INJECTION SUBCUTANEOUS at 07:42

## 2021-02-11 RX ADMIN — OXYCODONE HYDROCHLORIDE 5 MG: 5 TABLET ORAL at 07:53

## 2021-02-11 RX ADMIN — OXYCODONE HYDROCHLORIDE 5 MG: 5 TABLET ORAL at 01:17

## 2021-02-11 RX ADMIN — ASPIRIN 81 MG: 81 TABLET, CHEWABLE ORAL at 07:42

## 2021-02-11 RX ADMIN — ACETAMINOPHEN 975 MG: 325 TABLET, FILM COATED ORAL at 05:14

## 2021-02-11 ASSESSMENT — ACTIVITIES OF DAILY LIVING (ADL)
ADLS_ACUITY_SCORE: 12
ADLS_ACUITY_SCORE: 14

## 2021-02-11 NOTE — PLAN OF CARE
POD #3 s/p bilateral breast reconstruction with free bilateral LIVIER flap.  VSSNeuros intact.  LS clear, IS encouraged.  +BS, passing flatus, +BM in previous shift  Voiding spontaneously  Tolerated regular diet.  UAL in room  Pain is minimal, sched tylenol and prn oxycodone given with relief (pt can not rohit tylenol and oxycodone together-allergic to percocet).  Flap checks q2hrs. Bilateral breast flap with +doppler, pale but with bruising in the incision areas, warm and soft to touch, with immediate blood return. JPs x3 with serosang drainage.  Transverse incision with bruising, derma bond intact-abd binder in place.  Left PIV removed.  Pt able to give herself lovenox injection, literature given as well.  Discharge instructions and follow up appointment reviewed with pt using teach back. Supplies given for drain/site cares.

## 2021-02-11 NOTE — DISCHARGE INSTRUCTIONS
LIVIER FLAP BREAST RECONSTRUCTION POST-OPERATIVE INSTRUCTIONS    Instructions  What are my post-operative instructions?     Have someone drive you home after surgery and help you at home for 1-2      days.     Get plenty of rest.     Follow balanced diet.     Decreased activity may promote constipation, so you may want to add      more raw fruit to your diet, and be sure to increase fluid intake.    Take pain medication as prescribed. Do not take NSAIDS for 1 week after surgery.      Do not drink alcohol when taking pain medications.     Even when not taking pain medications, no alcohol for 3 weeks as it      causes fluid retention.     If you are taking vitamins with iron, resume these as tolerated.     Do not smoke. Smoking delays healing and increases the risk of      complications.    Activities     Start walking as soon as possible, this helps to reduce swelling and     lowers the chance of blood clots.     You may use your arms for activities of daily living for the first three     weeks, but do not push over your head until 3 weeks post-op.     Do not drive until you are no longer taking any pain medications     (narcotics).     Unless stated on this form, discuss your time off work with your surgeon.     No driving for 4 weeks. When abdominal area will allow for sudden      braking, you may resume driving.     No heavy lifting for 6 to 8 weeks.    Incision Care     You may shower 72 hours after surgery with drains in place.     If you have implants, no showering until drains are removed.     No tub soaking, bathing,or swimming for 6 to 8 weeks.     Avoid exposing scars to sun for at least 12 months.     Always use a strong sunblock, if sun exposure is unavoidable (SPF 50 or     greater).     Keep surgical tape on until it peels off.     Keep incisions clean and inspect daily for signs of infection.     Do not wear a bra.     Sleep with pillows under knee for 2 weeks (some women choose to sleep      in a  recliner or lounge chair).    What to Expect     Maximum discomfort will occur the first few days following surgery; you      may experience incision discomfort and generalized discomfort in your      breasts and abdomen.     Oozing can be expected.     Appearance     A new breast(s) mound(s) will be constructed with sutures around the     outer edges.     The abdomen will be tight and much flatter in appearance.     The majority of swelling will subside in 3-4 weeks, but some swelling may      persist for up to 3 months.     You will walk bent over and will slowly rise over the first 1-2 weeks.    Follow-Up Care     Your 1st post-operative visit will be scheduled for 7 to 10 days after       surgery. Some drains may be taken out during this visit.     Your 2nd post-operative visit will be for removal of remaining drains.     Your 3rd post-operative visit will be scheduled somewhere between 4-6      weeks from the initial surgery date.    Please note my office will call you 1-2 business days after your procedure to check up on how you're doing and to schedule your post-operative appointment.     When to Call:     If you have increased swelling or bruising.     If swelling and redness persist after a few days.     If you have increased redness along the incision.     If you have severe or increased pain not relieved by medication.     If you have any side effects to medications; such as, rash, nausea,      headache, vomiting.     If you have an oral temperature over 100.4 degrees.     If you have any yellowish or greenish drainage from the incisions or      notice a foul odor.     If you have bleeding from the incisions that is difficult to control with      light pressure.     If you have loss of feeling or motion.    For Medical Questions, Please Call:     [phone], Monday - Friday, 8 a.m. - 4:30 p.m.     After hours and on weekends, call Hospital Paging at [phone], and ask for the       Plastic Surgeon on  call.

## 2021-02-11 NOTE — PLAN OF CARE
POD #3 s/p bilateral breast reconstruction with free bilateral LIVIER flap. A&Ox1. VSS on room air. Q 2 hr flap check with Flaps warm, soft, pale pink, and with strong doppler pulses. Transverse abdominal pain control with oxycodone with some improvement. Transverse abdominal incision covered with ABD's pad and a binder in place for comfort. JPx3 with serosanguinous output. Voiding adequate amount, passing gas/ BM x1 this shift. PIV saline locked. On regular diet, denies nausea. Up to the bathroom with SBA assist. Continue with plan of care and maintain pain

## 2021-02-11 NOTE — PLAN OF CARE
Occupational Therapy Discharge Summary    Reason for therapy discharge:    Discharged to home.    Progress towards therapy goal(s). [facility] for goal details.  Goals met    Therapy recommendation(s):    No further therapy is recommended.

## 2021-02-11 NOTE — DISCHARGE SUMMARY
[facility] Discharge Summary    Kathy Lei MRN# [id number]   Age: 55 year old YOB: 1965     Date of Admission:  2/8/2021  Date of Discharge::  2/11/2021  Admitting Physician:  LALY Ramos MD  Discharge Physician:  Lakeshia Bains PA-C     Home clinic: [facility]          Admission Diagnoses:   S/P bilateral mastectomy [Z90.13]  Personal history of breast cancer          Discharge Diagnosis:   S/P bilateral mastectomy [Z90.13]  Personal history of breast cancer  S.p flap/graft          Procedures:   Procedure(s): Bilateral LIVIER breast reconstruction            Medications Prior to Admission:     Medications Prior to Admission   Medication Sig Dispense Refill Last Dose     albuterol (VENTOLIN HFA) 108 (90 Base) MCG/ACT inhaler INHALE 1 TO 2 PUFFS INTO THE LUNGS EVERY 4 HOURS AS NEEDED FOR SHORTNESS OF BREATH OR DIFFICULTY BREATHING (Patient taking differently: as needed INHALE 1 TO 2 PUFFS INTO THE LUNGS EVERY 4 HOURS AS NEEDED FOR SHORTNESS OF BREATH OR DIFFICULTY BREATHING) 18 g PRN 2/8/2021 at 0400     APPLE CIDER VINEGAR PO Take 4 chew tab by mouth as needed WILMAN    Past Month at Unknown time     Biotin 10 MG CAPS Take by mouth every morning   Past Month at Unknown time     COLLAGEN PO Take 2 Scoops by mouth as needed Powder    Past Month at Unknown time     fluticasone (FLOVENT HFA) 110 MCG/ACT inhaler Inhale 2 puffs into the lungs 2 times daily 1 Inhaler PRN 2/8/2021 at Unknown time     levothyroxine (SYNTHROID/LEVOTHROID) 125 MCG tablet Take 1 tablet (125 mcg) by mouth daily (Patient taking differently: Take 125 mcg by mouth every morning ) 90 tablet 3 2/8/2021 at 0400     lisinopril (ZESTRIL) 5 MG tablet Take 1 tablet (5 mg) by mouth daily (Patient taking differently: Take 5 mg by mouth every evening ) 90 tablet 3 2/7/2021 at 2100     LORazepam (ATIVAN) 0.5 MG tablet Take 1 tablet (0.5 mg) by mouth daily as needed for anxiety TAKE 1 TABLET(0.5  MG) BY MOUTH EVERY 4 HOURS AS NEEDED FOR ANXIETY OR NAUSEA OR VOMITING OR SLEEP 30 tablet 2 2/8/2021 at 0500     sertraline (ZOLOFT) 100 MG tablet Take 1 tablet (100 mg) by mouth daily (Patient taking differently: Take 100 mg by mouth At Bedtime ) 90 tablet 3 2/7/2021 at 2100     traZODone (DESYREL) 50 MG tablet Take 1-2 tablets ( mg) by mouth nightly as needed for sleep 90 tablet PRN Past Week at Unknown time     ondansetron (ZOFRAN) 8 MG tablet Take 1 tablet (8 mg) by mouth every 8 hours as needed for nausea 60 tablet 3 More than a month at Unknown time     [DISCONTINUED] ADVIL 200 MG OR TABS Take by mouth every 4 hours as needed  0 0 Past Week at Unknown time     [DISCONTINUED] letrozole (FEMARA) 2.5 MG tablet Take 1 tablet (2.5 mg) by mouth daily (Patient taking differently: Take 2.5 mg by mouth At Bedtime ) 90 tablet 3 2/7/2021 at 2100             Discharge Medications:     Current Discharge Medication List      START taking these medications    Details   aspirin (ASA) 81 MG chewable tablet Take 1 tablet (81 mg) by mouth daily  Qty: 30 tablet, Refills: 0    Associated Diagnoses: S/P flap graft      enoxaparin ANTICOAGULANT (LOVENOX) 40 MG/0.4ML syringe Inject 0.4 mLs (40 mg) Subcutaneous every 24 hours for 10 days  Qty: 4 mL, Refills: 0    Associated Diagnoses: S/P flap graft      ondansetron (ZOFRAN-ODT) 4 MG ODT tab Take 1 tablet (4 mg) by mouth every 6 hours as needed for nausea or vomiting  Qty: 12 tablet, Refills: 0    Associated Diagnoses: S/P flap graft      oxyCODONE (ROXICODONE) 5 MG tablet Take 1-2 tablets (5-10 mg) by mouth every 6 hours as needed for moderate to severe pain  Qty: 20 tablet, Refills: 0    Associated Diagnoses: S/P flap graft      senna-docusate (SENOKOT-S/PERICOLACE) 8.6-50 MG tablet Take 1 tablet by mouth 2 times daily  Qty: 30 tablet, Refills: 0    Associated Diagnoses: S/P flap graft         CONTINUE these medications which have NOT CHANGED    Details   albuterol (VENTOLIN  HFA) 108 (90 Base) MCG/ACT inhaler INHALE 1 TO 2 PUFFS INTO THE LUNGS EVERY 4 HOURS AS NEEDED FOR SHORTNESS OF BREATH OR DIFFICULTY BREATHING  Qty: 18 g, Refills: PRN    Comments: Pharmacy may dispense brand covered by insurance (Proair, or proventil or ventolin or generic albuterol inhaler)  Associated Diagnoses: Moderate persistent asthma without complication      APPLE CIDER VINEGAR PO Take 4 chew tab by mouth as needed WILMAN       Biotin 10 MG CAPS Take by mouth every morning      COLLAGEN PO Take 2 Scoops by mouth as needed Powder       fluticasone (FLOVENT HFA) 110 MCG/ACT inhaler Inhale 2 puffs into the lungs 2 times daily  Qty: 1 Inhaler, Refills: PRN    Associated Diagnoses: Moderate persistent asthma without complication      levothyroxine (SYNTHROID/LEVOTHROID) 125 MCG tablet Take 1 tablet (125 mcg) by mouth daily  Qty: 90 tablet, Refills: 3    Associated Diagnoses: Hypothyroidism, unspecified type      lisinopril (ZESTRIL) 5 MG tablet Take 1 tablet (5 mg) by mouth daily  Qty: 90 tablet, Refills: 3    Associated Diagnoses: Cardiomyopathy, unspecified type (H)      LORazepam (ATIVAN) 0.5 MG tablet Take 1 tablet (0.5 mg) by mouth daily as needed for anxiety TAKE 1 TABLET(0.5 MG) BY MOUTH EVERY 4 HOURS AS NEEDED FOR ANXIETY OR NAUSEA OR VOMITING OR SLEEP  Qty: 30 tablet, Refills: 2    Comments: Profile Rx: patient will contact pharmacy when needed  Associated Diagnoses: Anxiety      sertraline (ZOLOFT) 100 MG tablet Take 1 tablet (100 mg) by mouth daily  Qty: 90 tablet, Refills: 3    Associated Diagnoses: Depression, unspecified depression type      traZODone (DESYREL) 50 MG tablet Take 1-2 tablets ( mg) by mouth nightly as needed for sleep  Qty: 90 tablet, Refills: PRN    Associated Diagnoses: Insomnia, unspecified type      ondansetron (ZOFRAN) 8 MG tablet Take 1 tablet (8 mg) by mouth every 8 hours as needed for nausea  Qty: 60 tablet, Refills: 3    Associated Diagnoses: Chemotherapy induced nausea  and vomiting; Malignant neoplasm of upper-outer quadrant of right breast in female, estrogen receptor positive (H)         STOP taking these medications       ADVIL 200 MG OR TABS Comments:   Reason for Stopping:         letrozole (FEMARA) 2.5 MG tablet Comments:   Reason for Stopping:                     Consultations:   No consultations were requested during this admission          Brief History of Illness:   Reason for admission requiring a surgical or invasive procedure:   Personal history of breast cancer, s/p mastectomy and TE placement in the past, now undergoing breast reconstruction with autologous tissue   The patient underwent the following procedure(s):   Bilateral LIVIER autologous breast reconstruction   There were no immediate complications during this procedure.    Please refer to the full operative summary for details.         Hospital Course:   The patient's hospital course was unremarkable.  She recovered as anticipated and experienced no post-operative complications. Fink was removed on POD1 and diet was advanced. She ambulated with OT pn POD2, pain controlled with oral medication.          Discharge Instructions and Follow-Up:   Discharge diet: Regular   Discharge activity: Lifting restricted to 5 pounds   Discharge follow-up: Follow up as scheduled in 7-10 days   Wound care: No wound care. Abdominal binder to abdomen. No pressure on breast flaps, avoid bra.            Discharge Disposition:   Discharged to home      Attestation:  I have reviewed today's vital signs, notes, medications, labs and imaging.  Amount of time performed on this discharge summary: 15 minutes.    Lakeshia Bains PA-C

## 2021-02-12 NOTE — PROGRESS NOTES
[facility]: Post-Discharge Note  SITUATION                                                      Admission:    Admission Date: 02/08/21   Reason for Admission: S/P bilateral mastectomy  Discharge:   Discharge Date: 02/11/21  Discharge Diagnosis: S/P bilateral mastectomy    BACKGROUND                                                      The patient's hospital course was unremarkable.  She recovered as anticipated and experienced no post-operative complications. Fink was removed on POD1 and diet was advanced. She ambulated with OT pn POD2, pain controlled with oral medication    ASSESSMENT      Discharge Assessment  Patient reports symptoms are: Improved  Does the patient have all of their medications?: Yes  Does patient know what their new medications are for?: Yes  Does patient have a follow-up appointment scheduled?: Yes  Does patient have any other questions or concerns?: No    Post-op  Did the patient have surgery or a procedure: Yes  Fever: No  Chills: No  Eating & Drinking: eating and drinking without complaints/concerns  Bowel Function: normal  Urinary Status: voiding without complaint/concerns        PLAN                                                      Outpatient Plan:  Follow up as scheduled in 7-10 days    Future Appointments   Date Time Provider Department Center   2/23/2021 10:30 AM Lakeshia Bains PA-C [facility]   3/2/2021 10:00 AM Christian Guzman MD [facility]           Hanh Mora

## 2021-02-14 ENCOUNTER — MYC REFILL (OUTPATIENT)
Dept: FAMILY MEDICINE | Facility: CLINIC | Age: 56
End: 2021-02-14

## 2021-02-14 DIAGNOSIS — Z98.890 S/P FLAP GRAFT: ICD-10-CM

## 2021-02-15 ENCOUNTER — PATIENT OUTREACH (OUTPATIENT)
Dept: PLASTIC SURGERY | Facility: CLINIC | Age: 56
End: 2021-02-15

## 2021-02-15 RX ORDER — OXYCODONE HYDROCHLORIDE 5 MG/1
5-10 TABLET ORAL EVERY 6 HOURS PRN
Qty: 20 TABLET | Refills: 0 | Status: SHIPPED | OUTPATIENT
Start: 2021-02-15 | End: 2021-04-12

## 2021-02-15 NOTE — PATIENT INSTRUCTIONS
Spoke with pt regarding Oxycodone refill request. Pt states pain is across abdominal incision. Rates pain 5-6 on 0-10 scale with movement. States she has one Oxycodone tab left. States she has been taking 2 tabs every 6 hours and has been alternating with Tylenol, 2 every 6 hours. Recommended pt begin alternating with Tylenol and Ibuprofen on a schedule and utilize Oxycodone for severe breakthrough pain. Explained request for Oxycodone will be reviewed with Dr. Ramos and pt will be contacted if we are unable to send. Pt states understanding and denies any additional questions or concerns. Amanda GIBSON RN, BSN

## 2021-02-16 ENCOUNTER — AMBULATORY - HEALTHEAST (OUTPATIENT)
Dept: OTHER | Facility: CLINIC | Age: 56
End: 2021-02-16

## 2021-02-16 ENCOUNTER — DOCUMENTATION ONLY (OUTPATIENT)
Dept: OTHER | Facility: CLINIC | Age: 56
End: 2021-02-16

## 2021-02-22 NOTE — PROGRESS NOTES
"Plastic Surgery Outpatient Visit    ID: Kathy Lei is a 55 year old female with PMH R breast cancer s/p mastectomy with R XRT c/b TE infection requiring removal, now s/p delayed LIVIER recon 2/8 with Dr. Ramos.     S: doing well, had pain but now managed with tylenol/ibuprofen. breast drain output minimal, abdominal drain 15cc daily for the past 2 days.     O:  /76 (BP Location: Left arm, Patient Position: Chair, Cuff Size: Adult Regular)   Pulse 94   Temp 97.7  F (36.5  C) (Oral)   Ht 1.651 m (5' 5\")   LMP 11/02/2014   SpO2 97%   BMI 23.99 kg/m     General: NAD  Chest: bilateral breast flaps warm, soft, viable. Mild resolving ecchymosis.   Abdomen: incision c/d/i. No erythema or open areas.     A/P:  -healing well  -all drains removed  -wear abdominal compression x2 more weeks  -RTC 2 weeks (pt will cancel if doing well and no concerns at that time), then 4-5 weeks with Dr. Richard Bains PA-C  Plastic and Reconstructive Surgery    20 minutes spent on the date of the encounter doing chart review, history and physical, dressing changes, documentation and further activity as noted above.    "

## 2021-02-23 ENCOUNTER — OFFICE VISIT (OUTPATIENT)
Dept: PLASTIC SURGERY | Facility: CLINIC | Age: 56
End: 2021-02-23
Payer: COMMERCIAL

## 2021-02-23 VITALS
SYSTOLIC BLOOD PRESSURE: 125 MMHG | OXYGEN SATURATION: 97 % | HEART RATE: 94 BPM | TEMPERATURE: 97.7 F | HEIGHT: 65 IN | BODY MASS INDEX: 23.99 KG/M2 | DIASTOLIC BLOOD PRESSURE: 76 MMHG

## 2021-02-23 DIAGNOSIS — Z98.890 S/P FLAP GRAFT: Primary | ICD-10-CM

## 2021-02-23 PROCEDURE — 99213 OFFICE O/P EST LOW 20 MIN: CPT | Performed by: PHYSICIAN ASSISTANT

## 2021-02-23 ASSESSMENT — PAIN SCALES - GENERAL: PAINLEVEL: NO PAIN (0)

## 2021-02-23 NOTE — NURSING NOTE
"Chief Complaint   Patient presents with     Surgical Followup     2wk post op, DOS 2/8 (Richard/[place])       Vitals:    02/23/21 1024   BP: 125/76   BP Location: Left arm   Patient Position: Chair   Cuff Size: Adult Regular   Pulse: 94   Temp: 97.7  F (36.5  C)   TempSrc: Oral   SpO2: 97%   Height: 1.651 m (5' 5\")       Body mass index is 23.99 kg/m .    Reji Navarro, EMT    "

## 2021-02-23 NOTE — LETTER
"2/23/2021       RE: Kathy Lei  [address] [place] [address]     Dear Colleague,    Thank you for referring your patient, Kathy Lei, to the [facility] [place] [facility]. Please see a copy of my visit note below.    Plastic Surgery Outpatient Visit    ID: Kathy Lei is a 55 year old female with PMH R breast cancer s/p mastectomy with R XRT c/b TE infection requiring removal, now s/p delayed LIVIER recon 2/8 with Dr. Ramos.     S: doing well, had pain but now managed with tylenol/ibuprofen. breast drain output minimal, abdominal drain 15cc daily for the past 2 days.     O:  /76 (BP Location: Left arm, Patient Position: Chair, Cuff Size: Adult Regular)   Pulse 94   Temp 97.7  F (36.5  C) (Oral)   Ht 1.651 m (5' 5\")   LMP 11/02/2014   SpO2 97%   BMI 23.99 kg/m     General: NAD  Chest: bilateral breast flaps warm, soft, viable. Mild resolving ecchymosis.   Abdomen: incision c/d/i. No erythema or open areas.     A/P:  -healing well  -all drains removed  -wear abdominal compression x2 more weeks  -RTC 2 weeks (pt will cancel if doing well and no concerns at that time), then 4-5 weeks with Dr. Richard Bains PA-C  Plastic and Reconstructive Surgery    20 minutes spent on the date of the encounter doing chart review, history and physical, dressing changes, documentation and further activity as noted above.    "

## 2021-02-26 ENCOUNTER — DOCUMENTATION ONLY (OUTPATIENT)
Dept: PLASTIC SURGERY | Facility: CLINIC | Age: 56
End: 2021-02-26

## 2021-02-26 NOTE — PROGRESS NOTES
This patient's FMLA paperwork was faxed to [place] at fax number [phone].   Patient's FMLA is from 2/8/21 through 3/23/21.   Surgeon: Dr. Ramos  A copy was also scanned into the patient's chart.     5:55 PM, 2/26/2021  Reji Navarro, EMT

## 2021-03-01 NOTE — PROGRESS NOTES
"Mily is a 55 year old who is being evaluated via a billable video visit.      How would you like to obtain your AVS? MyChart  If the video visit is dropped, the invitation should be resent by: Text to cell phone: [phone]  Will anyone else be joining your video visit? No      I have reviewed and updated the patient's allergies and medication list.    Concerns: No new concerns.   Refills: None needed.        Vitals - Patient Reported  Weight (Patient Reported): 65.3 kg (144 lb)  Height (Patient Reported): 165.1 cm (5' 5\")  BMI (Based on Pt Reported Ht/Wt): 23.96  Pain Score: Moderate Pain (4)  Pain Loc: Abdomen    Leona Quintero CMA      Video Start Time: 10:09  Video-Visit Details    Type of service:  Video Visit    Video End Time:10:24    Originating Location (pt. Location): Home    Distant Location (provider location):  [facility]     Platform used for Video Visit: [employer]      HPI: Kathy Lei is a 54 yo woman with a new diagnosis of an estrogen-receptor positive, [place]-positive, HER2-negative breast cancer, grade 2, in the upper outer quadrant of the right breast since the end of year 2018.      Mily presented between Christmas and New Years of 2018 with new sharp pains in the upper outer quadrant of the right breast.  She underwent mammographic screening.       IMAGING:  Mammography and ultrasound:  She had a diagnostic mammogram which showed bilateral prepectoral saline implants.  On the right breast at 11:30 position, there were grouped coarse heterogeneous calcifications spanning 1.3 cm, new since 2014, adjacent calcifications 11-o'clock position posterior depth.  There was an irregular mass in the lateral right breast 9-o'clock position mid-posterior depth, and an additional mass with obscured margins.  No significant changes in the left breast.  Right breast ultrasound was performed.  In the area of the palpable lump in the right breast, 11-o'clock position, 6 cm from " the nipple, there is an irregular hypoechoic mass with indistinct margins measuring approximately 1.0 x 0.9 x 1.6 cm in size.  Immediately adjacent to the mass, 11:30 position, are microcalcifications.  In the second area of palpable lump right breast, 9:30 position, 5 cm from the nipple, there was an irregular hypoechoic mass measuring 3.2 x 0.5 x 1.3 cm in size felt to account for the mammographic findings.  There were multiple additional round hypoechoic masses similar to the findings at 9:30 position seen incidentally during real time and not directly palpable.  For example, in the right breast at 8-o'clock position, 4 cm from the nipple, there was a mass measuring 0.8 x 0.6 x 0.6 cm, BI-RADS 4.       Contrast mammogram was subsequently performed and the contrast mammogram showed a large area of mass and non-mass-like enhancement in the upper outer right breast measuring 7.2 cm in greatest dimension, corresponding global asymmetry in the upper outer right breast.  Enhancement extended to the implant without evidence of extension to the skin surface or nipple, and the calcifications were noted as previously found.      PATHOLOGY:  The pathology showed part A, breast needle biopsy 11 o'clock, 6 cm from the nipple, invasive mammary carcinoma, no special type, ductal (and mucinous) [place] grade 2.  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  Calcifications were associated with the DCIS.  There was a focus suspicious for lymphovascular invasion.  Invasive carcinoma was estrogen receptor positive and progesterone receptor negative by immunohistochemistry.  In part B, breast right, 8 o'clock, 4 cm from the nipple, ultrasound-guided core biopsy, invasive mammary carcinoma, no special type, ductal (and mucinous) Donna grade 2, occasional calcifications were noted.  Invasive carcinoma was estrogen receptor positive and progesterone receptor negative by  immunohistochemistry.  On quantitation of the ER and W[place], ER was positive 99% of the cells staining with strong intensity.  W[place] was subsequently noted to be positive with 15% of the cells staining with moderate intensity.       There was also a HER2 FISH performed, and the HER2 FISH showed average number of HER2 signals per nucleus 2.6.  Average number of CEN17 signals 1.7 with a ratio of 1.6, VASILIY negative for biopsy A.  For biopsy B, the HER2 signals per nucleus 2.9.  Average number CEN17 signals per nucleus 1.7 with a ratio of 1.7, VASILIY negative.  Overall, by 2018 ASCO/CAP guidelines, this tumor is HER2 negative.     She was enrolled in I-SPY2 trial to Durvulumab, Taxol and Olaparib arm. She completed 12 cycles of weekly Taxol. She also completed 4 cycles  of adriamycin and cyclophosphamide (AC).     PAST MEDICAL HISTORY:  In terms of her breast history, she does have a history of a smaller right breast which was treated with implants 19 years ago.  She has a history of 2 papillomas in the right breast, 1 removed at the 6-o'clock position 5 years ago, another removed at the 6-o'clock position approximately 10 years ago.  These incisions are approximately at the 5:30 to 6-o'clock position.  She has no history of radiation therapy.  No history of breast cancer of any type.  No history of tumor of any kind.  No history of heart problems, heart attack, breathing problems, blood clots, seizures, stroke, arthritis, peptic ulcer disease or osteoporosis.  No history of bone fractures.  She is not currently participating in a clinical trial. She does have a history of asthma and a history of hypothyroidism.      FAMILY HISTORY:  Entirely negative for breast cancer or ovarian cancer or breast cancer in a male relative.      ALLERGIES:  No known drug allergies.  No allergy to seafood, iodine or contrast dye.  She does not take aspirin.      PAST MENSTRUAL HISTORY:  First period was at age 11.  First and only pregnancy was at  age 26.  No miscarriages or abortions.  Occasional use of oral contraceptives in her 20s.  No history of hormone replacement therapy.  Last period was 3 years ago.      SOCIAL HISTORY:  Tobacco history was from age 15 to present, smoking a pack of cigarettes a week.  She is now trying to stop cigarettes.   In terms of alcohol use, in her early teens and early 20s, she drank approximately 10 drinks on the weekend and has tapered off drinking since then.      TREATMENT HISTORY:  A.  Neoadjuvant durvalumab, oliparib, paclitaxel on I SPY 2  B  Neoadjuvant ddAC.  C.  Bilateral mastectomy.  RCB 3 result.   Pathologic stage was [id number] sn.   D.  Post-surgical radiation.  Completed 1-6-20.     Treatment Summary to Date  Treatment Site: Rt CW , nodes + SCV Current Dose: 6040/6040 cGy Fractions: 33/33       E.  Begin CREATE-X. With letrozole. Plan is for 24 weeks. Begun January 14.  Ended June 26.      F.  She held the letrozole beginning April 28 because of severe joint pains.  G.  She restarted AI with anastrozole 6-30-20.  Discontinued August 15 due to hot flashes and nausea.  H.  Restarted letrozole on 8-15-20 and is tolerating it well.   I.  Free flap reconstruction performed February 8, 2021.  Letrozole was held for 3 weeks.  I advised her to restart it on March 2, 2021.     INTERVAL HISTORY  Mily had her flap surgery performed on February 8.  She had considerable discomfort afterwards and initially used oxycodone for pain control.  She has now discontinued opiates and she is on Advil.  She has some burning discomfort along the abdominal incision at the flap donor site.  She also has some pain in her right elbow related to exercise.  She has not been on letrozole.  She held at at the time of her surgery and I did advise her to resume the letrozole today.  She has been off the letrozole for approximately 3 weeks.  She says that her fatigue is moderate but is improving.  She has some depression and some anxiety but they  are controlled by sertraline and she has lorazepam for severe anxiety when it occurs.  She says that she takes lorazepam very infrequently.      REVIEW OF SYSTEMS:     She denies fevers or chills, cough, chest pain, shortness of breath, nausea, vomiting, constipation, diarrhea, bone pain, back pain, headache.  Remainder of a 10 point review of systems is negative.     PHYSICAL EXAMINATION:    None today.  She appeared generally well.  No alopecia.  Mood and affect were normal.     LABS reviewed.  Her hemoglobin was low at 9.0 after her surgery.  Platelets and WBC were normal.        ASSESSMENT AND PLAN:     1.  Mily Lei is a 55-year-old woman who presented with a locally advanced right breast cancer.  This cancer developed in the context of previous breast augmentation.  On presentation, she had multiple masses occupying an area of approximately 9 cm in the upper outer quadrant with some extension to the subareolar region.  She did not have any clinically apparent lymph node involvement.  She was treated with neoadjuvant chemotherapy on the I-SPY 2 trial and was randomized to paclitaxel, olaparib and durvalumab followed by dose-dense AC.  She tolerated the treatment reasonably well but had an RCB3 result with final stage [id number].  She started on the CREATE-X trial and had adjuvant capecitabine for 24 weeks combined with an aromatase inhibitor, which will be continued for 10 years.    She completed 24 weeks of Xeloda.  She declined the SELIN trial.   2.  Free flap reconstruction was performed on February 8.  She is recovering well from the surgery.  Her letrozole was held for 3 weeks and I did advise her to restart it today.  3.  Hormonal therapy.      Her hormonal therapy was changed back to letrozole at her request.  She has fewer hot flashes and less nausea with letrozole.  She would like to stay on letrozole.  We did discuss the plan for 5 or 7 years of hormonal therapy and she is in agreement. She had  held letrozole for 2 weeks after surgery and I advised her to restart letrozole today and continue it. She says she will do so.   4. Breast reconstruction.  Mily is having some discomfort in her breast reconstruction sites.  She is being followed by Dr. Tena.  5.  Lymphedema.  Mily does have some right arm stiffness.  She feels that her exercise is helping.  If she has increasing discomfort we can refer her to lymphedema clinic.  I did advise her to go back to lymphedema clinic because she does have right arm discomfort.  6.  Exercise.  I advised Mily to walk 30 minutes a day.  This should be more feasible now that the weather is warming up.  7.  Discussion of diet.  I advised her to eat a diet with more fruits and vegetables Mediterranean-style.  She will work on this.  8.  Bone health.  We discussed that her DEXA scan showed a most valid negative T score of -1.7 on 7-8-21.  Recommended calcium vitamin D and weightbearing exercise.  9.  Follow up.  Follow up with ANTOINETTE by video visit June 1 with CBC, CMP.         Sincerely,     Christian Guzman M.D.   Professor   Division of Hematology, Oncology, Transplant   Department of Medicine   [employer]   [phone]         10:09-10:24   I spent 15 minutes with the patient more than 50% of which was in counseling and coordination of care.

## 2021-03-02 ENCOUNTER — VIRTUAL VISIT (OUTPATIENT)
Dept: ONCOLOGY | Facility: CLINIC | Age: 56
End: 2021-03-02
Attending: INTERNAL MEDICINE
Payer: COMMERCIAL

## 2021-03-02 DIAGNOSIS — C50.411 MALIGNANT NEOPLASM OF UPPER-OUTER QUADRANT OF RIGHT BREAST IN FEMALE, ESTROGEN RECEPTOR POSITIVE (H): Primary | ICD-10-CM

## 2021-03-02 DIAGNOSIS — Z17.0 MALIGNANT NEOPLASM OF UPPER-OUTER QUADRANT OF RIGHT BREAST IN FEMALE, ESTROGEN RECEPTOR POSITIVE (H): Primary | ICD-10-CM

## 2021-03-02 PROCEDURE — 999N001193 HC VIDEO/TELEPHONE VISIT; NO CHARGE

## 2021-03-02 PROCEDURE — 99213 OFFICE O/P EST LOW 20 MIN: CPT | Mod: 95 | Performed by: INTERNAL MEDICINE

## 2021-03-02 NOTE — LETTER
"    3/2/2021         RE: Kathy Lei  [address]  [place] [id number]        Dear Colleague,    Thank you for referring your patient, Kathy Lei, to the [facility]. Please see a copy of my visit note below.    Mily is a 55 year old who is being evaluated via a billable video visit.      How would you like to obtain your AVS? MyChart  If the video visit is dropped, the invitation should be resent by: Text to cell phone: [phone]  Will anyone else be joining your video visit? No      I have reviewed and updated the patient's allergies and medication list.    Concerns: No new concerns.   Refills: None needed.        Vitals - Patient Reported  Weight (Patient Reported): 65.3 kg (144 lb)  Height (Patient Reported): 165.1 cm (5' 5\")  BMI (Based on Pt Reported Ht/Wt): 23.96  Pain Score: Moderate Pain (4)  Pain Loc: Abdomen    Leona Quintero CMA      Video Start Time: 10:09  Video-Visit Details    Type of service:  Video Visit    Video End Time:10:24    Originating Location (pt. Location): Home    Distant Location (provider location):  [facility]     Platform used for Video Visit: [employer]      HPI: Kathy Lei is a 54 yo woman with a new diagnosis of an estrogen-receptor positive, NJ-positive, HER2-negative breast cancer, grade 2, in the upper outer quadrant of the right breast since the end of year 2018.      Mily presented between Christmas and New Years of 2018 with new sharp pains in the upper outer quadrant of the right breast.  She underwent mammographic screening.       IMAGING:  Mammography and ultrasound:  She had a diagnostic mammogram which showed bilateral prepectoral saline implants.  On the right breast at 11:30 position, there were grouped coarse heterogeneous calcifications spanning 1.3 cm, new since 2014, adjacent calcifications 11-o'clock position posterior depth.  There was an irregular mass in the lateral " right breast 9-o'clock position mid-posterior depth, and an additional mass with obscured margins.  No significant changes in the left breast.  Right breast ultrasound was performed.  In the area of the palpable lump in the right breast, 11-o'clock position, 6 cm from the nipple, there is an irregular hypoechoic mass with indistinct margins measuring approximately 1.0 x 0.9 x 1.6 cm in size.  Immediately adjacent to the mass, 11:30 position, are microcalcifications.  In the second area of palpable lump right breast, 9:30 position, 5 cm from the nipple, there was an irregular hypoechoic mass measuring 3.2 x 0.5 x 1.3 cm in size felt to account for the mammographic findings.  There were multiple additional round hypoechoic masses similar to the findings at 9:30 position seen incidentally during real time and not directly palpable.  For example, in the right breast at 8-o'clock position, 4 cm from the nipple, there was a mass measuring 0.8 x 0.6 x 0.6 cm, BI-RADS 4.       Contrast mammogram was subsequently performed and the contrast mammogram showed a large area of mass and non-mass-like enhancement in the upper outer right breast measuring 7.2 cm in greatest dimension, corresponding global asymmetry in the upper outer right breast.  Enhancement extended to the implant without evidence of extension to the skin surface or nipple, and the calcifications were noted as previously found.      PATHOLOGY:  The pathology showed part A, breast needle biopsy 11 o'clock, 6 cm from the nipple, invasive mammary carcinoma, no special type, ductal (and mucinous) Alexander grade 2.  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  Calcifications were associated with the DCIS.  There was a focus suspicious for lymphovascular invasion.  Invasive carcinoma was estrogen receptor positive and progesterone receptor negative by immunohistochemistry.  In part B, breast right, 8 o'clock, 4 cm from the  nipple, ultrasound-guided core biopsy, invasive mammary carcinoma, no special type, ductal (and mucinous) [place] grade 2, occasional calcifications were noted.  Invasive carcinoma was estrogen receptor positive and progesterone receptor negative by immunohistochemistry.  On quantitation of the ER and DE, ER was positive 99% of the cells staining with strong intensity.  DE was subsequently noted to be positive with 15% of the cells staining with moderate intensity.       There was also a HER2 FISH performed, and the HER2 FISH showed average number of HER2 signals per nucleus 2.6.  Average number of CEN17 signals 1.7 with a ratio of 1.6, VASILIY negative for biopsy A.  For biopsy B, the HER2 signals per nucleus 2.9.  Average number CEN17 signals per nucleus 1.7 with a ratio of 1.7, VASILIY negative.  Overall, by 2018 ASCO/CAP guidelines, this tumor is HER2 negative.     She was enrolled in I-SPY2 trial to Durvulumab, Taxol and Olaparib arm. She completed 12 cycles of weekly Taxol. She also completed 4 cycles  of adriamycin and cyclophosphamide (AC).     PAST MEDICAL HISTORY:  In terms of her breast history, she does have a history of a smaller right breast which was treated with implants 19 years ago.  She has a history of 2 papillomas in the right breast, 1 removed at the 6-o'clock position 5 years ago, another removed at the 6-o'clock position approximately 10 years ago.  These incisions are approximately at the 5:30 to 6-o'clock position.  She has no history of radiation therapy.  No history of breast cancer of any type.  No history of tumor of any kind.  No history of heart problems, heart attack, breathing problems, blood clots, seizures, stroke, arthritis, peptic ulcer disease or osteoporosis.  No history of bone fractures.  She is not currently participating in a clinical trial. She does have a history of asthma and a history of hypothyroidism.      FAMILY HISTORY:  Entirely negative for breast cancer or ovarian  cancer or breast cancer in a male relative.      ALLERGIES:  No known drug allergies.  No allergy to seafood, iodine or contrast dye.  She does not take aspirin.      PAST MENSTRUAL HISTORY:  First period was at age 11.  First and only pregnancy was at age 26.  No miscarriages or abortions.  Occasional use of oral contraceptives in her 20s.  No history of hormone replacement therapy.  Last period was 3 years ago.      SOCIAL HISTORY:  Tobacco history was from age 15 to present, smoking a pack of cigarettes a week.  She is now trying to stop cigarettes.   In terms of alcohol use, in her early teens and early 20s, she drank approximately 10 drinks on the weekend and has tapered off drinking since then.      TREATMENT HISTORY:  A.  Neoadjuvant durvalumab, oliparib, paclitaxel on I SPY 2  B  Neoadjuvant ddAC.  C.  Bilateral mastectomy.  RCB 3 result.   Pathologic stage was [id number] sn.   D.  Post-surgical radiation.  Completed 1-6-20.     Treatment Summary to Date  Treatment Site: Rt CW , nodes + SCV Current Dose: 6040/6040 cGy Fractions: 33/33       E.  Begin CREATE-X. With letrozole. Plan is for 24 weeks. Begun January 14.  Ended June 26.      F.  She held the letrozole beginning April 28 because of severe joint pains.  G.  She restarted AI with anastrozole 6-30-20.  Discontinued August 15 due to hot flashes and nausea.  H.  Restarted letrozole on 8-15-20 and is tolerating it well.   I.  Free flap reconstruction performed February 8, 2021.  Letrozole was held for 3 weeks.  I advised her to restart it on March 2, 2021.     INTERVAL HISTORY  Mily had her flap surgery performed on February 8.  She had considerable discomfort afterwards and initially used oxycodone for pain control.  She has now discontinued opiates and she is on Advil.  She has some burning discomfort along the abdominal incision at the flap donor site.  She also has some pain in her right elbow related to exercise.  She has not been on letrozole.  She  held at at the time of her surgery and I did advise her to resume the letrozole today.  She has been off the letrozole for approximately 3 weeks.  She says that her fatigue is moderate but is improving.  She has some depression and some anxiety but they are controlled by sertraline and she has lorazepam for severe anxiety when it occurs.  She says that she takes lorazepam very infrequently.      REVIEW OF SYSTEMS:     She denies fevers or chills, cough, chest pain, shortness of breath, nausea, vomiting, constipation, diarrhea, bone pain, back pain, headache.  Remainder of a 10 point review of systems is negative.     PHYSICAL EXAMINATION:    None today.  She appeared generally well.  No alopecia.  Mood and affect were normal.     LABS reviewed.  Her hemoglobin was low at 9.0 after her surgery.  Platelets and WBC were normal.        ASSESSMENT AND PLAN:     1.  Mily Lei is a 55-year-old woman who presented with a locally advanced right breast cancer.  This cancer developed in the context of previous breast augmentation.  On presentation, she had multiple masses occupying an area of approximately 9 cm in the upper outer quadrant with some extension to the subareolar region.  She did not have any clinically apparent lymph node involvement.  She was treated with neoadjuvant chemotherapy on the I-SPY 2 trial and was randomized to paclitaxel, olaparib and durvalumab followed by dose-dense AC.  She tolerated the treatment reasonably well but had an RCB3 result with final stage [id number].  She started on the CREATE-X trial and had adjuvant capecitabine for 24 weeks combined with an aromatase inhibitor, which will be continued for 10 years.    She completed 24 weeks of Xeloda.  She declined the SELIN trial.   2.  Free flap reconstruction was performed on February 8.  She is recovering well from the surgery.  Her letrozole was held for 3 weeks and I did advise her to restart it today.  3.  Hormonal therapy.      Her  hormonal therapy was changed back to letrozole at her request.  She has fewer hot flashes and less nausea with letrozole.  She would like to stay on letrozole.  We did discuss the plan for 5 or 7 years of hormonal therapy and she is in agreement. She had held letrozole for 2 weeks after surgery and I advised her to restart letrozole today and continue it. She says she will do so.   4. Breast reconstruction.  Mily is having some discomfort in her breast reconstruction sites.  She is being followed by Dr. Tena.  5.  Lymphedema.  Mily does have some right arm stiffness.  She feels that her exercise is helping.  If she has increasing discomfort we can refer her to lymphedema clinic.  I did advise her to go back to lymphedema clinic because she does have right arm discomfort.  6.  Exercise.  I advised Mily to walk 30 minutes a day.  This should be more feasible now that the weather is warming up.  7.  Discussion of diet.  I advised her to eat a diet with more fruits and vegetables Mediterranean-style.  She will work on this.  8.  Bone health.  We discussed that her DEXA scan showed a most valid negative T score of -1.7 on 7-8-21.  Recommended calcium vitamin D and weightbearing exercise.  9.  Follow up.  Follow up with ANTOINETTE by video visit June 1 with JAVIER LISA.         Sincerely,     Christian Guzman M.D.   Professor   Division of Hematology, Oncology, Transplant   Department of Medicine   [employer]   [phone]         10:09-10:24   I spent 15 minutes with the patient more than 50% of which was in counseling and coordination of care.

## 2021-03-12 ENCOUNTER — IMMUNIZATION (OUTPATIENT)
Dept: NURSING | Facility: CLINIC | Age: 56
End: 2021-03-12
Payer: COMMERCIAL

## 2021-03-12 PROCEDURE — 0001A PR COVID VAC PFIZER DIL RECON 30 MCG/0.3 ML IM: CPT

## 2021-03-12 PROCEDURE — 91300 PR COVID VAC PFIZER DIL RECON 30 MCG/0.3 ML IM: CPT

## 2021-03-19 ASSESSMENT — ENCOUNTER SYMPTOMS
STIFFNESS: 0
MUSCLE WEAKNESS: 1
MUSCLE CRAMPS: 0
ARTHRALGIAS: 1
JOINT SWELLING: 0
MYALGIAS: 1
BACK PAIN: 0
NECK PAIN: 0

## 2021-03-23 ENCOUNTER — OFFICE VISIT (OUTPATIENT)
Dept: PLASTIC SURGERY | Facility: CLINIC | Age: 56
End: 2021-03-23
Attending: PLASTIC SURGERY
Payer: COMMERCIAL

## 2021-03-23 VITALS
DIASTOLIC BLOOD PRESSURE: 81 MMHG | TEMPERATURE: 98.2 F | RESPIRATION RATE: 16 BRPM | HEART RATE: 81 BPM | HEIGHT: 65 IN | OXYGEN SATURATION: 96 % | WEIGHT: 142.9 LBS | BODY MASS INDEX: 23.81 KG/M2 | SYSTOLIC BLOOD PRESSURE: 125 MMHG

## 2021-03-23 DIAGNOSIS — Z98.890 S/P BREAST RECONSTRUCTION, BILATERAL: Primary | ICD-10-CM

## 2021-03-23 DIAGNOSIS — Z98.890 S/P FLAP GRAFT: Primary | ICD-10-CM

## 2021-03-23 PROCEDURE — 99024 POSTOP FOLLOW-UP VISIT: CPT | Performed by: PLASTIC SURGERY

## 2021-03-23 PROCEDURE — G0463 HOSPITAL OUTPT CLINIC VISIT: HCPCS

## 2021-03-23 RX ORDER — CEFAZOLIN SODIUM 2 G/50ML
2 SOLUTION INTRAVENOUS
Status: CANCELLED | OUTPATIENT
Start: 2021-03-23

## 2021-03-23 RX ORDER — CEFAZOLIN SODIUM 2 G/50ML
2 SOLUTION INTRAVENOUS SEE ADMIN INSTRUCTIONS
Status: CANCELLED | OUTPATIENT
Start: 2021-03-23

## 2021-03-23 ASSESSMENT — MIFFLIN-ST. JEOR: SCORE: 1234.07

## 2021-03-23 NOTE — LETTER
3/23/2021         RE: Kathy Lei  [address] [place] [address]        Dear Colleague,    Thank you for referring your patient, Kathy Lei, to the [facility]. Please see a copy of my visit note below.    POSTOPERATIVE VISIT      PRESENTING COMPLAINT:  Postoperative visit, status post right breast cancer, underwent bilateral mastectomy and tissue expander reconstruction with adjuvant radiation therapy, complicated by bilateral breast expander infection necessitating removal followed by delayed bilateral breast reconstruction with free LIVIER flaps done on 02/08/2021.      HISTORY OF PRESENTING COMPLAINT:  Ms. Lei is 55 years old.  She is 6 weeks out from surgery and has done extremely well, completely healed.  No major issues.      PHYSICAL EXAMINATION:  Vital signs are stable.  She is afebrile, in no obvious distress.  Both breasts are completely healed.  The left breast has some scar indentation medially.  Has some upper pole indentations bilaterally.  Absent nipples.  Abdomen is flat and healed.  There is an absent umbilicus.  No real standing cutaneous cones, just some fatty deposits.      ASSESSMENT AND PLAN:  Based on above findings, a diagnosis of bilateral breast reconstruction with free LIVIER flap was made.  She has healed completely.  Happy with the results.  The plan now is to proceed with stage 3 reconstruction including bilateral nipple reconstruction, umbilicoplasty, fat grafting to bilateral breasts and possible scar revisions.  All questions were answered in detail.  Explained to her how this would be done.  All risks, benefits and alternatives including pain, infection, bleeding, scarring, asymmetry, seromas, hematomas, wound breakdown, wound dehiscence, indentations, injury to deeper structures, wound healing complications, DVT, PE, MI, CVA, pneumonia, renal failure and death were explained.  She understood them all and wants  to proceed.  I look forward to helping her out in the near future.           Again, thank you for allowing me to participate in the care of your patient.        Sincerely,        LALY Ramos MD

## 2021-03-23 NOTE — LETTER
Date:November 3, 2021      Provider requested that no letter be sent. Do not send.       [facility]

## 2021-03-23 NOTE — PROGRESS NOTES
POSTOPERATIVE VISIT      PRESENTING COMPLAINT:  Postoperative visit, status post right breast cancer, underwent bilateral mastectomy and tissue expander reconstruction with adjuvant radiation therapy, complicated by bilateral breast expander infection necessitating removal followed by delayed bilateral breast reconstruction with free LIVIER flaps done on 02/08/2021.      HISTORY OF PRESENTING COMPLAINT:  Ms. Lei is 55 years old.  She is 6 weeks out from surgery and has done extremely well, completely healed.  No major issues.      PHYSICAL EXAMINATION:  Vital signs are stable.  She is afebrile, in no obvious distress.  Both breasts are completely healed.  The left breast has some scar indentation medially.  Has some upper pole indentations bilaterally.  Absent nipples.  Abdomen is flat and healed.  There is an absent umbilicus.  No real standing cutaneous cones, just some fatty deposits.      ASSESSMENT AND PLAN:  Based on above findings, a diagnosis of bilateral breast reconstruction with free LIVIER flap was made.  She has healed completely.  Happy with the results.  The plan now is to proceed with stage 3 reconstruction including bilateral nipple reconstruction, umbilicoplasty, fat grafting to bilateral breasts and possible scar revisions.  All questions were answered in detail.  Explained to her how this would be done.  All risks, benefits and alternatives including pain, infection, bleeding, scarring, asymmetry, seromas, hematomas, wound breakdown, wound dehiscence, indentations, injury to deeper structures, wound healing complications, DVT, PE, MI, CVA, pneumonia, renal failure and death were explained.  She understood them all and wants to proceed.  I look forward to helping her out in the near future.

## 2021-03-23 NOTE — NURSING NOTE
"Oncology Rooming Note    March 23, 2021 9:51 AM   Kathy Lei is a 55 year old female who presents for:    Chief Complaint   Patient presents with     Oncology Clinic Visit     POST OP/ BREAST CANCER      Initial Vitals: /81   Pulse 81   Temp 98.2  F (36.8  C) (Oral)   Resp 16   Ht 1.651 m (5' 5\")   Wt 64.8 kg (142 lb 14.4 oz)   LMP 11/02/2014   SpO2 96%   BMI 23.78 kg/m   Estimated body mass index is 23.78 kg/m  as calculated from the following:    Height as of this encounter: 1.651 m (5' 5\").    Weight as of this encounter: 64.8 kg (142 lb 14.4 oz). Body surface area is 1.72 meters squared.  Data Unavailable Comment: Data Unavailable   Patient's last menstrual period was 11/02/2014.  Allergies reviewed: Yes  Medications reviewed: Yes    Medications: Medication refills not needed today.  Pharmacy name entered into [employer]:    [id number] - [place], [place] - [address] AT [facility] [address]  [facility] - [place], [place] - [address]    Clinical concerns: Patient is having abdominal discomfort around her belly button area.  Dr Ramos  was NOT notified.- discussing patients with nurse Amanda Okeefe            "

## 2021-03-24 ENCOUNTER — DOCUMENTATION ONLY (OUTPATIENT)
Dept: SURGERY | Facility: CLINIC | Age: 56
End: 2021-03-24

## 2021-03-24 NOTE — PROGRESS NOTES
I contacted the patient via [employer] and spoke with the patient to confirm the scheduled dates and provide the following information:     Surgeon/surgery date/location:  Dr. Ramos on 5/6 at [facility].  Arrival:   9:30 AM  Pre-op consult:   Dr. Ramos on 4/20.   Pre-op physical with:   PCP.  COVID-19 test:   5/3.  Post-op:   5/11    The surgery packet was provided via [employer] and patient accepted dates.

## 2021-04-02 ENCOUNTER — IMMUNIZATION (OUTPATIENT)
Dept: NURSING | Facility: CLINIC | Age: 56
End: 2021-04-02
Attending: INTERNAL MEDICINE
Payer: COMMERCIAL

## 2021-04-02 PROCEDURE — 91300 PR COVID VAC PFIZER DIL RECON 30 MCG/0.3 ML IM: CPT

## 2021-04-02 PROCEDURE — 0002A PR COVID VAC PFIZER DIL RECON 30 MCG/0.3 ML IM: CPT

## 2021-04-07 ENCOUNTER — TELEPHONE (OUTPATIENT)
Dept: SURGERY | Facility: CLINIC | Age: 56
End: 2021-04-07

## 2021-04-07 NOTE — TELEPHONE ENCOUNTER
Spoke with patient and rescheduled surgery from 5/6 to 4/22.     Pre-op physical will be with PCP on 4/12.    Pre-op consult with Dr. Ramos will be on 4/20.    COVID-19 test has been rescheduled to 4/20.    Post-op with Dr. Ramos has been rescheduled to 4/28.    Updated surgery information sent to patient via [employer] per patient request. Clinical team also updated.

## 2021-04-08 DIAGNOSIS — Z11.59 ENCOUNTER FOR SCREENING FOR OTHER VIRAL DISEASES: ICD-10-CM

## 2021-04-12 ENCOUNTER — OFFICE VISIT (OUTPATIENT)
Dept: FAMILY MEDICINE | Facility: CLINIC | Age: 56
End: 2021-04-12
Payer: COMMERCIAL

## 2021-04-12 VITALS
RESPIRATION RATE: 18 BRPM | DIASTOLIC BLOOD PRESSURE: 79 MMHG | OXYGEN SATURATION: 97 % | SYSTOLIC BLOOD PRESSURE: 120 MMHG | TEMPERATURE: 97.7 F | WEIGHT: 142 LBS | BODY MASS INDEX: 23.66 KG/M2 | HEIGHT: 65 IN | HEART RATE: 88 BPM

## 2021-04-12 DIAGNOSIS — I42.9 CARDIOMYOPATHY, UNSPECIFIED TYPE (H): ICD-10-CM

## 2021-04-12 DIAGNOSIS — C50.411 MALIGNANT NEOPLASM OF UPPER-OUTER QUADRANT OF RIGHT BREAST IN FEMALE, ESTROGEN RECEPTOR POSITIVE (H): ICD-10-CM

## 2021-04-12 DIAGNOSIS — Z98.890 S/P BREAST RECONSTRUCTION, BILATERAL: ICD-10-CM

## 2021-04-12 DIAGNOSIS — Z17.0 MALIGNANT NEOPLASM OF UPPER-OUTER QUADRANT OF RIGHT BREAST IN FEMALE, ESTROGEN RECEPTOR POSITIVE (H): ICD-10-CM

## 2021-04-12 DIAGNOSIS — M25.572 LEFT ANKLE PAIN, UNSPECIFIED CHRONICITY: ICD-10-CM

## 2021-04-12 DIAGNOSIS — G62.0 DRUG-INDUCED POLYNEUROPATHY (H): ICD-10-CM

## 2021-04-12 DIAGNOSIS — Z01.818 PREOP GENERAL PHYSICAL EXAM: Primary | ICD-10-CM

## 2021-04-12 PROBLEM — D70.9 NEUTROPENIC FEVER (H): Status: RESOLVED | Noted: 2019-07-10 | Resolved: 2021-04-12

## 2021-04-12 PROBLEM — R50.81 NEUTROPENIC FEVER (H): Status: RESOLVED | Noted: 2019-07-10 | Resolved: 2021-04-12

## 2021-04-12 LAB
ANION GAP SERPL CALCULATED.3IONS-SCNC: 5 MMOL/L (ref 3–14)
BUN SERPL-MCNC: 12 MG/DL (ref 7–30)
CALCIUM SERPL-MCNC: 9.1 MG/DL (ref 8.5–10.1)
CHLORIDE SERPL-SCNC: 106 MMOL/L (ref 94–109)
CO2 SERPL-SCNC: 28 MMOL/L (ref 20–32)
CREAT SERPL-MCNC: 0.6 MG/DL (ref 0.52–1.04)
ERYTHROCYTE [DISTWIDTH] IN BLOOD BY AUTOMATED COUNT: 13.1 % (ref 10–15)
GFR SERPL CREATININE-BSD FRML MDRD: >90 ML/MIN/{1.73_M2}
GLUCOSE SERPL-MCNC: 93 MG/DL (ref 70–99)
HCT VFR BLD AUTO: 40.5 % (ref 35–47)
HGB BLD-MCNC: 13.2 G/DL (ref 11.7–15.7)
MCH RBC QN AUTO: 28.2 PG (ref 26.5–33)
MCHC RBC AUTO-ENTMCNC: 32.6 G/DL (ref 31.5–36.5)
MCV RBC AUTO: 87 FL (ref 78–100)
PLATELET # BLD AUTO: 290 10E9/L (ref 150–450)
POTASSIUM SERPL-SCNC: 3.8 MMOL/L (ref 3.4–5.3)
RBC # BLD AUTO: 4.68 10E12/L (ref 3.8–5.2)
SODIUM SERPL-SCNC: 139 MMOL/L (ref 133–144)
WBC # BLD AUTO: 4.4 10E9/L (ref 4–11)

## 2021-04-12 PROCEDURE — 99214 OFFICE O/P EST MOD 30 MIN: CPT | Performed by: NURSE PRACTITIONER

## 2021-04-12 PROCEDURE — 85027 COMPLETE CBC AUTOMATED: CPT | Performed by: NURSE PRACTITIONER

## 2021-04-12 PROCEDURE — 80048 BASIC METABOLIC PNL TOTAL CA: CPT | Performed by: NURSE PRACTITIONER

## 2021-04-12 PROCEDURE — 36415 COLL VENOUS BLD VENIPUNCTURE: CPT | Performed by: NURSE PRACTITIONER

## 2021-04-12 ASSESSMENT — ANXIETY QUESTIONNAIRES
3. WORRYING TOO MUCH ABOUT DIFFERENT THINGS: NOT AT ALL
GAD7 TOTAL SCORE: 3
1. FEELING NERVOUS, ANXIOUS, OR ON EDGE: SEVERAL DAYS
7. FEELING AFRAID AS IF SOMETHING AWFUL MIGHT HAPPEN: SEVERAL DAYS
5. BEING SO RESTLESS THAT IT IS HARD TO SIT STILL: NOT AT ALL
2. NOT BEING ABLE TO STOP OR CONTROL WORRYING: SEVERAL DAYS
6. BECOMING EASILY ANNOYED OR IRRITABLE: NOT AT ALL

## 2021-04-12 ASSESSMENT — PATIENT HEALTH QUESTIONNAIRE - PHQ9
5. POOR APPETITE OR OVEREATING: NOT AT ALL
SUM OF ALL RESPONSES TO PHQ QUESTIONS 1-9: 0

## 2021-04-12 ASSESSMENT — MIFFLIN-ST. JEOR: SCORE: 1222.05

## 2021-04-12 NOTE — PROGRESS NOTES
[facility] [address] [place] [address]  Phone: [phone]  Primary Provider: Rachel Arauz        PREOPERATIVE EVALUATION:  Today's date: 4/12/2021    Kathy Lei is a 55 year old female who presents for a preoperative evaluation.    Surgical Information:  Surgery/Procedure: Bilateral breast revision, fat grafting, nipple reconstruction, umbilicoplasty, possible scar revision   [facility]   Surgeon: LALY Ramos MD  Surgery Date: 4/22/2021  Time of Surgery: arrival 5:45 for 7 AM  Where patient plans to recover: at home with pt's son. Patients  as well.   Fax number for surgical facility:     Type of Anesthesia Anticipated: General    Assessment & Plan     The proposed surgical procedure is considered INTERMEDIATE risk.    Preop general physical exam    - CBC with platelets  - Basic metabolic panel  (Ca, Cl, CO2, Creat, Gluc, K, Na, BUN)    Malignant neoplasm of upper-outer quadrant of right breast in female, estrogen receptor positive (H)      S/P breast reconstruction, bilateral      Left ankle pain, unspecified chronicity  Referral to ortho given.   - Orthopedic & Spine  Referral; Future    Drug-induced polyneuropathy (H)      Cardiomyopathy, unspecified type (H)  The current medical regimen is effective;  continue present plan and medications.            Risks and Recommendations:  The patient has the following additional risks and recommendations for perioperative complications:   - No identified additional risk factors other than previously addressed    Medication Instructions:   - ACE/ARB: May be continued on the day of surgery.     RECOMMENDATION:  APPROVAL GIVEN to proceed with proposed procedure, without further diagnostic evaluation.            Subjective     HPI related to upcoming procedure: Last stage of bilateral breast reconstruction after bilateral mastectomy for breast cancer.      Preop Questions 4/6/2021   1. Have you ever had a heart attack or stroke? No   2. Have you ever had surgery on your heart or blood vessels, such as a stent placement, a coronary artery bypass, or surgery on an artery in your head, neck, heart, or legs? No   3. Do you have chest pain with activity? No   4. Do you have a history of  heart failure? No   5. Do you currently have a cold, bronchitis or symptoms of other infection? No   6. Do you have a cough, shortness of breath, or wheezing? No   7. Do you or anyone in your family have previous history of blood clots? No   8. Do you or does anyone in your family have a serious bleeding problem such as prolonged bleeding following surgeries or cuts? No   9. Have you ever had problems with anemia or been told to take iron pills? No   10. Have you had any abnormal blood loss such as black, tarry or bloody stools, or abnormal vaginal bleeding? No   11. Have you ever had a blood transfusion? YES -    11a. Have you ever had a transfusion reaction? No   12. Are you willing to have a blood transfusion if it is medically needed before, during, or after your surgery? Yes   13. Have you or any of your relatives ever had problems with anesthesia? No   14. Do you have sleep apnea, excessive snoring or daytime drowsiness? No   15. Do you have any artifical heart valves or other implanted medical devices like a pacemaker, defibrillator, or continuous glucose monitor? No   16. Do you have artificial joints? No   17. Are you allergic to latex? No   18. Is there any chance that you may be pregnant? No       Health Care Directive:  Patient has a Health Care Directive on file      Preoperative Review of :   reviewed - controlled substances reflected in medication list.      Status of Chronic Conditions:  See problem list for active medical problems.  Problems all longstanding and stable, except as noted/documented.  See ROS for pertinent symptoms related to these  conditions.      Review of Systems  CONSTITUTIONAL: NEGATIVE for fever, chills, change in weight  INTEGUMENTARY/SKIN: NEGATIVE for worrisome rashes, moles or lesions  EYES: NEGATIVE for vision changes or irritation  ENT/MOUTH: NEGATIVE for ear, mouth and throat problems  RESP: NEGATIVE for significant cough or SOB  CV: NEGATIVE for chest pain, palpitations or peripheral edema  GI: NEGATIVE for nausea, abdominal pain, heartburn, or change in bowel habits  : NEGATIVE for frequency, dysuria, or hematuria  MUSCULOSKELETAL: NEGATIVE for significant arthralgias or myalgia  NEURO: NEGATIVE for weakness, dizziness or paresthesias  ENDOCRINE: NEGATIVE for temperature intolerance, skin/hair changes  HEME: NEGATIVE for bleeding problems  PSYCHIATRIC: NEGATIVE for changes in mood or affect    Patient Active Problem List    Diagnosis Date Noted     Drug-induced polyneuropathy (H) 04/12/2021     Priority: Medium     Cardiomyopathy, unspecified type (H) 04/12/2021     Priority: Medium     S/P breast reconstruction, bilateral 03/23/2021     Priority: Medium     Added automatically from request for surgery [id number]       S/P bilateral mastectomy 12/23/2020     Priority: Medium     Added automatically from request for surgery [id number]       S/P mastectomy, bilateral 08/15/2019     Priority: Medium     Chemotherapy induced nausea and vomiting 06/10/2019     Priority: Medium     Malignant neoplasm of upper-outer quadrant of right breast in female, estrogen receptor positive (H) 01/28/2019     Priority: Medium     Moderate persistent asthma without complication 07/16/2018     Priority: Medium     Hypothyroidism, unspecified hypothyroidism type 12/08/2015     Priority: Medium     Hypothyroidism 01/27/2015     Priority: Medium     Anxiety 12/08/2011     Priority: Medium     CARDIOVASCULAR SCREENING; LDL GOAL LESS THAN 160 10/31/2010     Priority: Medium      Past Medical History:   Diagnosis Date     Anxiety      Asthma      Breast cancer  (H) 2018     Cardiomyopathy, unspecified type (H) 2021     Depressive disorder, not elsewhere classified      HLD (hyperlipidemia)      Hypertension      Hypothyroidism      Malignant neoplasm of upper-outer quadrant of right breast in female, estrogen receptor positive (H) 2019     Mild intermittent asthma      PONV (postoperative nausea and vomiting)      Scoliosis (and kyphoscoliosis), idiopathic      Past Surgical History:   Procedure Laterality Date     ABDOMEN SURGERY   c section     BACK SURGERY      spinal fusion- Lacey kim scoliosis fusion      BIOPSY  breast     BREAST SURGERY  implants      BRONCHOSCOPY (RIGID OR FLEXIBLE), DIAGNOSTIC N/A 2019    Procedure: BRONCHOSCOPY, WITH BRONCHOALVEOLAR LAVAGE;  Surgeon: Maksim Claros MD;  Location: [facility]     C/SECTION, LOW TRANSVERSE      , Low Transverse     COSMETIC SURGERY  breast implants      GRAFT FREE VASCULARIZED TRANSVERSE RECTUS ABDOMINIS MYOCUTANEOUS Bilateral 2021    Procedure: Bilateral breast reconstruction with Bilateral LIVIER flaps, reinervation, strattice abdominal wall repair, SPY;  Surgeon: LALY Ramos MD;  Location: [facility] OR     INSERT PORT VASCULAR ACCESS Left 2019    Procedure: Single Lumen Chest Port Placement;  Surgeon: Jerome Dash PA-C;  Location: [facility] OR     IR CHEST PORT PLACEMENT > 5 YRS OF AGE  2019     MASTECTOMY SIMPLE, SENTINEL NODE, COMBINED N/A 08/15/2019    Procedure: Bilateral Skin Sparing Mastectomy, Right Axillary [place] Lymph Node Biopsy;  Surgeon: Anne Yousif MD;  Location: [facility] OR     RECONSTRUCT BREAST, INSERT TISSUE EXPANDER, COMBINED Bilateral 08/15/2019    Procedure: Bilateral Breast Reconstruction With Expanders and SPY;  Surgeon: LALY Ramos MD;  Location: [facility] OR     REMOVE IMPLANT BREAST Bilateral 08/15/2019    Procedure: Removal of bilateral breast implant and Bilateral excision of implant capsules;  Surgeon: Nica  Anne Johnson MD;  Location: [facility] OR     REMOVE IMPLANT BREAST Bilateral 10/31/2019    Procedure: Right Breast Implant Removal;  Surgeon: LALY Ramos MD;  Location: [facility] OR     REMOVE PORT VASCULAR ACCESS N/A 08/15/2019    Procedure: Remove Vascular Access Port;  Surgeon: Anne Yousif MD;  Location: [facility] OR     SURGICAL HISTORY OF -       removal of right breast papilloma     Current Outpatient Medications   Medication Sig Dispense Refill     albuterol (VENTOLIN HFA) 108 (90 Base) MCG/ACT inhaler INHALE 1 TO 2 PUFFS INTO THE LUNGS EVERY 4 HOURS AS NEEDED FOR SHORTNESS OF BREATH OR DIFFICULTY BREATHING (Patient taking differently: as needed INHALE 1 TO 2 PUFFS INTO THE LUNGS EVERY 4 HOURS AS NEEDED FOR SHORTNESS OF BREATH OR DIFFICULTY BREATHING) 18 g PRN     Biotin 10 MG CAPS Take by mouth every morning       fluticasone (FLOVENT HFA) 110 MCG/ACT inhaler Inhale 2 puffs into the lungs 2 times daily 1 Inhaler PRN     levothyroxine (SYNTHROID/LEVOTHROID) 125 MCG tablet Take 1 tablet (125 mcg) by mouth daily (Patient taking differently: Take 125 mcg by mouth every morning ) 90 tablet 3     lisinopril (ZESTRIL) 5 MG tablet Take 1 tablet (5 mg) by mouth daily (Patient taking differently: Take 5 mg by mouth every evening ) 90 tablet 3     LORazepam (ATIVAN) 0.5 MG tablet TAKE 1 TABLET(0.5 MG) BY MOUTH DAILY EVERY 4 HOURS AS NEEDED FOR ANXIETY OR NAUSEA OR VOMITING OR SLEEP 30 tablet 2     Polyethylene Glycol 3350 (MIRALAX PO) Take by mouth daily as needed       sertraline (ZOLOFT) 100 MG tablet Take 1 tablet (100 mg) by mouth daily (Patient taking differently: Take 100 mg by mouth At Bedtime ) 90 tablet 3     traZODone (DESYREL) 50 MG tablet Take 1-2 tablets ( mg) by mouth nightly as needed for sleep 90 tablet PRN     APPLE CIDER VINEGAR PO Take 4 chew tab by mouth as needed WILMAN        aspirin (ASA) 81 MG chewable tablet Take 1 tablet (81 mg) by mouth daily (Patient not taking: Reported on  "3/23/2021) 30 tablet 0       Allergies   Allergen Reactions     Buspar [Buspirone] Nausea and Vomiting     Gluten Meal      Percocet [Oxycodone-Acetaminophen]      Nausea and Vomiting      Remeron [Mirtazapine]      Dizzy , cant function , balance        Social History     Tobacco Use     Smoking status: Former Smoker     Packs/day: 0.50     Years: 30.00     Pack years: 15.00     Types: Cigarettes     Start date: 1980     Quit date: 2011     Years since quittin.7     Smokeless tobacco: Never Used     Tobacco comment: social smoker at times   Substance Use Topics     Alcohol use: Yes     Comment: on weekends if going out       History   Drug Use No         Objective     /79   Pulse 88   Temp 97.7  F (36.5  C) (Oral)   Resp 18   Ht 1.638 m (5' 4.5\")   Wt 64.4 kg (142 lb)   LMP 2014   SpO2 97%   BMI 24.00 kg/m      Physical Exam    GENERAL APPEARANCE: healthy, alert and no distress     EYES: EOMI,  PERRL     HENT: ear canals and TM's normal and nose and mouth without ulcers or lesions     NECK: no adenopathy, no asymmetry, masses, or scars and thyroid normal to palpation     RESP: lungs clear to auscultation - no rales, rhonchi or wheezes     CV: regular rates and rhythm, normal S1 S2, no S3 or S4 and no murmur, click or rub     ABDOMEN:  soft, nontender, no HSM or masses and bowel sounds normal     MS: extremities normal- no gross deformities noted, no evidence of inflammation in joints, FROM in all extremities.     SKIN: no suspicious lesions or rashes     NEURO: Normal strength and tone, sensory exam grossly normal, mentation intact and speech normal     PSYCH: mentation appears normal. and affect normal/bright     LYMPHATICS: No cervical adenopathy    Recent Labs   Lab Test 21  0643 21  0641 213 21  1810   HGB  --  9.0*  --  10.8*    199  --  222   NA  --  140  --  136   POTASSIUM  --  3.8  --  4.0   CR  --  0.62 0.56 0.49*    "     Diagnostics:  Labs pending at this time.  Results will be reviewed when available.   No EKG required, no history of coronary heart disease, significant arrhythmia, peripheral arterial disease or other structural heart disease.    Revised Cardiac Risk Index (RCRI):  The patient has the following serious cardiovascular risks for perioperative complications:   - No serious cardiac risks = 0 points     RCRI Interpretation: 0 points: Class I (very low risk - 0.4% complication rate)           Signed Electronically by: Rachel Arauz NP  Copy of this evaluation report is provided to requesting physician.

## 2021-04-12 NOTE — PATIENT INSTRUCTIONS

## 2021-04-13 ASSESSMENT — ANXIETY QUESTIONNAIRES: GAD7 TOTAL SCORE: 3

## 2021-04-13 ASSESSMENT — ASTHMA QUESTIONNAIRES: ACT_TOTALSCORE: 20

## 2021-04-13 NOTE — TELEPHONE ENCOUNTER
DIAGNOSIS: Left ankle pain/ Rachel Arauz NP/ no images   APPOINTMENT DATE: 5.17.21   NOTES STATUS DETAILS   OFFICE NOTE from referring provider Internal 4.12.21 TERRY Morales   OFFICE NOTE from other specialist N/A    DISCHARGE SUMMARY from hospital N/A    DISCHARGE REPORT from the ER N/A    OPERATIVE REPORT N/A    EMG report N/A    MEDICATION LIST Internal    MRI N/A    DEXA (osteoporosis/bone health) N/A    CT SCAN N/A    XRAYS (IMAGES & REPORTS) N/A

## 2021-04-14 ASSESSMENT — ENCOUNTER SYMPTOMS: ARTHRALGIAS: 1

## 2021-04-16 ENCOUNTER — PATIENT OUTREACH (OUTPATIENT)
Dept: PLASTIC SURGERY | Facility: CLINIC | Age: 56
End: 2021-04-16

## 2021-04-16 NOTE — PROGRESS NOTES
PLASTICS SURGERY HUDDLE:    Patient was reviewed in preparation for their surgery the following was reviewed and has been completed:    Surgeon: Dr. Ramos    Surgery & Date: 4/22/21     PAC/H&P: Yes    Pre-op: Yes    Labs: N/A    Packet: Yes    Imaging: N/A    Post-Op Appointments: Yes    Covid Test:  Yes    Wound Vac: N/A    PA obtained: Yes    Amanda GIBSON RN, BSN

## 2021-04-20 ENCOUNTER — OFFICE VISIT (OUTPATIENT)
Dept: PLASTIC SURGERY | Facility: CLINIC | Age: 56
End: 2021-04-20
Attending: PLASTIC SURGERY
Payer: COMMERCIAL

## 2021-04-20 VITALS
DIASTOLIC BLOOD PRESSURE: 83 MMHG | WEIGHT: 143 LBS | SYSTOLIC BLOOD PRESSURE: 133 MMHG | RESPIRATION RATE: 18 BRPM | TEMPERATURE: 98 F | OXYGEN SATURATION: 99 % | HEIGHT: 65 IN | BODY MASS INDEX: 23.82 KG/M2 | HEART RATE: 91 BPM

## 2021-04-20 DIAGNOSIS — Z11.59 ENCOUNTER FOR SCREENING FOR OTHER VIRAL DISEASES: ICD-10-CM

## 2021-04-20 DIAGNOSIS — Z98.890 S/P BREAST RECONSTRUCTION, BILATERAL: Primary | ICD-10-CM

## 2021-04-20 PROCEDURE — 99024 POSTOP FOLLOW-UP VISIT: CPT | Performed by: PLASTIC SURGERY

## 2021-04-20 PROCEDURE — U0005 INFEC AGEN DETEC AMPLI PROBE: HCPCS | Performed by: PATHOLOGY

## 2021-04-20 PROCEDURE — U0003 INFECTIOUS AGENT DETECTION BY NUCLEIC ACID (DNA OR RNA); SEVERE ACUTE RESPIRATORY SYNDROME CORONAVIRUS 2 (SARS-COV-2) (CORONAVIRUS DISEASE [COVID-19]), AMPLIFIED PROBE TECHNIQUE, MAKING USE OF HIGH THROUGHPUT TECHNOLOGIES AS DESCRIBED BY CMS-2020-01-R: HCPCS | Mod: 90 | Performed by: PATHOLOGY

## 2021-04-20 ASSESSMENT — MIFFLIN-ST. JEOR: SCORE: 1226.58

## 2021-04-20 ASSESSMENT — PAIN SCALES - GENERAL: PAINLEVEL: NO PAIN (0)

## 2021-04-20 NOTE — LETTER
4/20/2021         RE: Kathy Lei  [address] [place] [address]        Dear Colleague,    Thank you for referring your patient, Kathy Lei, to the [facility]. Please see a copy of my visit note below.    PREOPERATIVE VISIT      PRESENTING COMPLAINT:  Preoperative visit for upcoming stage 3I reconstruction on 04/22/2021.      HISTORY OF PRESENTING COMPLAINT:  Ms. Lei is 55 years old.  She is here for a preoperative visit.  No change in history and physical exam.  Plan is bilateral breast revision, fat grafting, nipple reconstruction, umbilicoplasty and scar revision.      ASSESSMENT AND PLAN:  Based on above findings, a diagnosis of bilateral breast reconstruction for breast cancer was made.  Plan is stage 3 coming up in 2 days.  No change in history and physical exam.  All risks, benefits and alternatives of the procedure including pain, infection, bleeding, scarring, asymmetry, seromas, hematomas, wound breakdown, wound dehiscence, injury to deeper structures, contour irregularities, asymmetries, requirement of further surgeries, DVT, PE, MI, CVA, pneumonia, renal failure and death were explained.  She understood them all and wants to proceed.  I look forward to helping her out in the near future.      Total time spent with chart review, the visit itself and post-visit paperwork was 20 minutes.           Again, thank you for allowing me to participate in the care of your patient.        Sincerely,        LALY Ramos MD

## 2021-04-20 NOTE — PROGRESS NOTES
PREOPERATIVE VISIT      PRESENTING COMPLAINT:  Preoperative visit for upcoming stage 3I reconstruction on 04/22/2021.      HISTORY OF PRESENTING COMPLAINT:  Ms. Lei is 55 years old.  She is here for a preoperative visit.  No change in history and physical exam.  Plan is bilateral breast revision, fat grafting, nipple reconstruction, umbilicoplasty and scar revision.      ASSESSMENT AND PLAN:  Based on above findings, a diagnosis of bilateral breast reconstruction for breast cancer was made.  Plan is stage 3 coming up in 2 days.  No change in history and physical exam.  All risks, benefits and alternatives of the procedure including pain, infection, bleeding, scarring, asymmetry, seromas, hematomas, wound breakdown, wound dehiscence, injury to deeper structures, contour irregularities, asymmetries, requirement of further surgeries, DVT, PE, MI, CVA, pneumonia, renal failure and death were explained.  She understood them all and wants to proceed.  I look forward to helping her out in the near future.      Total time spent with chart review, the visit itself and post-visit paperwork was 20 minutes.

## 2021-04-20 NOTE — NURSING NOTE
"Oncology Rooming Note    April 20, 2021 7:53 AM   Kathy Lei is a 55 year old female who presents for:    Chief Complaint   Patient presents with     RECHECK     Malignant neoplasm of upper-outer quadrant of right breast in female, estrogen receptor positive (H)      Initial Vitals: /83   Pulse 91   Temp 98  F (36.7  C) (Tympanic)   Resp 18   Ht 1.638 m (5' 4.5\")   Wt 64.9 kg (143 lb)   LMP 11/02/2014   SpO2 99%   BMI 24.17 kg/m   Estimated body mass index is 24.17 kg/m  as calculated from the following:    Height as of this encounter: 1.638 m (5' 4.5\").    Weight as of this encounter: 64.9 kg (143 lb). Body surface area is 1.72 meters squared.  No Pain (0) Comment: Data Unavailable   Patient's last menstrual period was 11/02/2014.  Allergies reviewed: Yes  Medications reviewed: Yes    Medications: Medication refills not needed today.  Pharmacy name entered into [facility]:    [id number] - [place], [place] - [address] AT [facility] [address]  [facility] - [place], [place] - [address]    Clinical concerns: No new needs    Amanda Garduno CMA              "

## 2021-04-21 ENCOUNTER — ANESTHESIA EVENT (OUTPATIENT)
Dept: SURGERY | Facility: AMBULATORY SURGERY CENTER | Age: 56
End: 2021-04-21

## 2021-04-21 NOTE — ANESTHESIA PREPROCEDURE EVALUATION
Anesthesia Pre-Procedure Evaluation    Patient: Kathy Lei   MRN: [id number] : 1965        Preoperative Diagnosis: S/P breast reconstruction, bilateral [Z98.890]   Procedure : Procedure(s):  Bilateral breast revision, fat grafting, nipple reconstruction, umbilicoplasty, possble scar revision     Past Medical History:   Diagnosis Date     Anxiety      Asthma      Breast cancer (H) 2018     Cardiomyopathy, unspecified type (H) 2021     Depressive disorder, not elsewhere classified      HLD (hyperlipidemia)      Hypertension      Hypothyroidism      Malignant neoplasm of upper-outer quadrant of right breast in female, estrogen receptor positive (H) 2019     Mild intermittent asthma      PONV (postoperative nausea and vomiting)      Scoliosis (and kyphoscoliosis), idiopathic       Past Surgical History:   Procedure Laterality Date     ABDOMEN SURGERY   c section     BACK SURGERY      spinal fusion- Lacey kim scoliosis fusion      BIOPSY  breast     BREAST SURGERY  implants      BRONCHOSCOPY (RIGID OR FLEXIBLE), DIAGNOSTIC N/A 2019    Procedure: BRONCHOSCOPY, WITH BRONCHOALVEOLAR LAVAGE;  Surgeon: Maksim Claros MD;  Location: [facility]     C/SECTION, LOW TRANSVERSE      , Low Transverse     COSMETIC SURGERY  breast implants      GRAFT FREE VASCULARIZED TRANSVERSE RECTUS ABDOMINIS MYOCUTANEOUS Bilateral 2021    Procedure: Bilateral breast reconstruction with Bilateral LIVIER flaps, reinervation, strattice abdominal wall repair, SPY;  Surgeon: LALY Ramos MD;  Location: [facility]     INSERT PORT VASCULAR ACCESS Left 2019    Procedure: Single Lumen Chest Port Placement;  Surgeon: Jerome Dash PA-C;  [facility]: [facility]     IR CHEST PORT PLACEMENT > 5 YRS OF AGE  2019     MASTECTOMY SIMPLE, SENTINEL NODE, COMBINED N/A 08/15/2019    Procedure: Bilateral Skin Sparing Mastectomy, Right Axillary [place] Lymph Node Biopsy;  Surgeon:  Anne Yousif MD;  Location: [facility]     RECONSTRUCT BREAST, INSERT TISSUE EXPANDER, COMBINED Bilateral 08/15/2019    Procedure: Bilateral Breast Reconstruction With Expanders and SPY;  Surgeon: LALY Ramos MD;  Location: [facility]     REMOVE IMPLANT BREAST Bilateral 08/15/2019    Procedure: Removal of bilateral breast implant and Bilateral excision of implant capsules;  Surgeon: Anne Yousif MD;  Location: [facility]     REMOVE IMPLANT BREAST Bilateral 10/31/2019    Procedure: Right Breast Implant Removal;  Surgeon: LALY Ramos MD;  Location: [facility]     REMOVE PORT VASCULAR ACCESS N/A 08/15/2019    Procedure: Remove Vascular Access Port;  Surgeon: Anne Yousif MD;  Location: [facility]     SURGICAL HISTORY OF -       removal of right breast papilloma      Allergies   Allergen Reactions     Buspar [Buspirone] Nausea and Vomiting     Gluten Meal      Percocet [Oxycodone-Acetaminophen]      Nausea and Vomiting      Remeron [Mirtazapine]      Dizzy , cant function , balance      Social History     Tobacco Use     Smoking status: Former Smoker     Packs/day: 0.50     Years: 30.00     Pack years: 15.00     Types: Cigarettes     Start date: 1980     Quit date: 2011     Years since quittin.8     Smokeless tobacco: Never Used     Tobacco comment: social smoker at times   Substance Use Topics     Alcohol use: Yes     Comment: on weekends if going out      Wt Readings from Last 1 Encounters:   21 64.9 kg (143 lb)        ROS/MED HX     ENT/Pulmonary:     (+) tobacco use (15 year pack history, quit in ), Past use, Intermittent, asthma Treatment: Inhaler prn and Inhaler daily,      Neurologic:  - neg neurologic ROS  (-) no seizures, no CVA, no TIA and migraines   Cardiovascular:     (+) Dyslipidemia hypertension-----Previous cardiac testing   Echo: Date:  Results:  Interpretation Summary  Borderline (EF 50-55%) reduced left ventricular function is  present.Mild  concentric wall thickening consistent with left ventricular hypertrophy is  present.  Global right ventricular function is normal.  Mild sclerosis of the aortic valve and mitral annulus present.  The inferior vena cava was normal in size with preserved respiratory  variability.  No pericardial effusion is present.  Stress Test: Date: Results:     ECG Reviewed: Date: 2019 Results:  Sinus rhythm  Cath: Date: Results:         METS/Exercise Tolerance:  4 - Raking leaves, gardening   Hematologic:  - neg hematologic  ROS    (-) history of blood clots, anemia and History of Transfusion   Musculoskeletal:   (+) other musculoskeletal- scoliosis,        GI/Hepatic:     (+) Other GI/Hepatic nausea    (-) GERD   Renal/Genitourinary:  - neg Renal ROS        Endo:     (+) thyroid problem, hypothyroidism,        Psychiatric:     (+) psychiatric history depression and anxiety    (-) chronic opioid use history   Infectious Disease:          Malignancy: (+) Malignancy, History of Breast.Breast CA Remission status post Surgery, Chemo and Radiation.            Other:    (+) , no H/O Chronic Pain,                 Physical Exam    Airway        Mallampati: II   TM distance: > 3 FB      Respiratory Devices and Support         Dental           Cardiovascular          Rhythm and rate: regular and normal     Pulmonary           breath sounds clear to auscultation           OUTSIDE LABS:  CBC:   Lab Results   Component Value Date    WBC 4.4 04/12/2021    WBC 7.1 02/09/2021    HGB 13.2 04/12/2021    HGB 9.0 (L) 02/09/2021    HCT 40.5 04/12/2021    HCT 28.0 (L) 02/09/2021     04/12/2021     02/11/2021     BMP:   Lab Results   Component Value Date     04/12/2021     02/09/2021    POTASSIUM 3.8 04/12/2021    POTASSIUM 3.8 02/09/2021    CHLORIDE 106 04/12/2021    CHLORIDE 108 02/09/2021    CO2 28 04/12/2021    CO2 29 02/09/2021    BUN 12 04/12/2021    BUN 9 02/09/2021    CR 0.60 04/12/2021    CR 0.62  02/09/2021    GLC 93 04/12/2021     (H) 02/09/2021     COAGS:   Lab Results   Component Value Date    PTT 26 02/05/2019    INR 1.02 02/05/2019    FIBR 264 02/05/2019     POC:   Lab Results   Component Value Date    BGM 94 02/08/2021    HCG (A) 11/20/2002     Canceled, Test credited  CORRECTED ON 11/21 AT 1617: PREVIOUSLY REPORTED AS Negative     HEPATIC:   Lab Results   Component Value Date    ALBUMIN 4.0 02/01/2021    PROTTOTAL 7.4 02/01/2021    ALT 23 02/01/2021    AST 16 02/01/2021    ALKPHOS 68 02/01/2021    BILITOTAL 0.3 02/01/2021     OTHER:   Lab Results   Component Value Date    LACT 1.5 02/08/2021    A1C 5.6 02/05/2019    BRYANT 9.1 04/12/2021    PHOS 3.1 02/01/2021    MAG 1.9 02/11/2021    TSH 0.73 10/26/2020    T4 1.16 04/27/2020    CRP 55.3 (H) 07/22/2019    SED 61 (H) 07/22/2019       Anesthesia Plan    ASA Status:  3      Anesthesia Type: General.     - Airway: LMA   Induction: Intravenous.   Maintenance: TIVA.        Consents    Anesthesia Plan(s) and associated risks, benefits, and realistic alternatives discussed. Questions answered and patient/representative(s) expressed understanding.     - Discussed with:  Patient         Postoperative Care       PONV prophylaxis: Ondansetron (or other 5HT-3), Dexamethasone or Solumedrol     Comments:         H&P reviewed: Unable to attach H&P to encounter due to EHR limitations. H&P Update: appropriate H&P reviewed, patient examined. No interval changes since H&P (within 30 days).         Blue Baker MD

## 2021-04-22 ENCOUNTER — ANESTHESIA (OUTPATIENT)
Dept: SURGERY | Facility: AMBULATORY SURGERY CENTER | Age: 56
End: 2021-04-22

## 2021-04-22 ENCOUNTER — HOSPITAL ENCOUNTER (OUTPATIENT)
Facility: AMBULATORY SURGERY CENTER | Age: 56
Discharge: HOME | End: 2021-04-22
Attending: PLASTIC SURGERY | Admitting: PLASTIC SURGERY
Payer: COMMERCIAL

## 2021-04-22 VITALS
SYSTOLIC BLOOD PRESSURE: 116 MMHG | RESPIRATION RATE: 16 BRPM | OXYGEN SATURATION: 100 % | HEIGHT: 64 IN | BODY MASS INDEX: 24.41 KG/M2 | WEIGHT: 143 LBS | TEMPERATURE: 97.1 F | HEART RATE: 93 BPM | DIASTOLIC BLOOD PRESSURE: 68 MMHG

## 2021-04-22 DIAGNOSIS — Z98.890 S/P BREAST RECONSTRUCTION, BILATERAL: ICD-10-CM

## 2021-04-22 PROCEDURE — 15771 GRFG AUTOL FAT LIPO 50 CC/<: CPT | Mod: 58

## 2021-04-22 PROCEDURE — 14000 TIS TRNFR TRUNK 10 SQ CM/<: CPT | Mod: 58,59

## 2021-04-22 PROCEDURE — 11406 EXC TR-EXT B9+MARG >4.0 CM: CPT | Mod: 58,59

## 2021-04-22 PROCEDURE — 19350 NIPPLE/AREOLA RECONSTRUCTION: CPT | Mod: 50,58

## 2021-04-22 RX ORDER — FENTANYL CITRATE 50 UG/ML
INJECTION, SOLUTION INTRAMUSCULAR; INTRAVENOUS PRN
Status: DISCONTINUED | OUTPATIENT
Start: 2021-04-22 | End: 2021-04-22

## 2021-04-22 RX ORDER — CEFAZOLIN SODIUM 2 G/50ML
2 SOLUTION INTRAVENOUS SEE ADMIN INSTRUCTIONS
Status: DISCONTINUED | OUTPATIENT
Start: 2021-04-22 | End: 2021-04-22 | Stop reason: HOSPADM

## 2021-04-22 RX ORDER — ONDANSETRON 4 MG/1
4 TABLET, ORALLY DISINTEGRATING ORAL EVERY 30 MIN PRN
Status: DISCONTINUED | OUTPATIENT
Start: 2021-04-22 | End: 2021-04-23 | Stop reason: HOSPADM

## 2021-04-22 RX ORDER — LIDOCAINE 40 MG/G
CREAM TOPICAL
Status: DISCONTINUED | OUTPATIENT
Start: 2021-04-22 | End: 2021-04-22 | Stop reason: HOSPADM

## 2021-04-22 RX ORDER — SODIUM CHLORIDE, SODIUM LACTATE, POTASSIUM CHLORIDE, CALCIUM CHLORIDE 600; 310; 30; 20 MG/100ML; MG/100ML; MG/100ML; MG/100ML
INJECTION, SOLUTION INTRAVENOUS CONTINUOUS
Status: DISCONTINUED | OUTPATIENT
Start: 2021-04-22 | End: 2021-04-23 | Stop reason: HOSPADM

## 2021-04-22 RX ORDER — ONDANSETRON 2 MG/ML
4 INJECTION INTRAMUSCULAR; INTRAVENOUS EVERY 30 MIN PRN
Status: DISCONTINUED | OUTPATIENT
Start: 2021-04-22 | End: 2021-04-23 | Stop reason: HOSPADM

## 2021-04-22 RX ORDER — EPHEDRINE SULFATE 50 MG/ML
INJECTION, SOLUTION INTRAMUSCULAR; INTRAVENOUS; SUBCUTANEOUS PRN
Status: DISCONTINUED | OUTPATIENT
Start: 2021-04-22 | End: 2021-04-22

## 2021-04-22 RX ORDER — CEFAZOLIN SODIUM 2 G/50ML
2 SOLUTION INTRAVENOUS
Status: DISCONTINUED | OUTPATIENT
Start: 2021-04-22 | End: 2021-04-22 | Stop reason: HOSPADM

## 2021-04-22 RX ORDER — SODIUM CHLORIDE, SODIUM LACTATE, POTASSIUM CHLORIDE, CALCIUM CHLORIDE 600; 310; 30; 20 MG/100ML; MG/100ML; MG/100ML; MG/100ML
INJECTION, SOLUTION INTRAVENOUS CONTINUOUS
Status: DISCONTINUED | OUTPATIENT
Start: 2021-04-22 | End: 2021-04-22 | Stop reason: HOSPADM

## 2021-04-22 RX ORDER — FENTANYL CITRATE 50 UG/ML
25-50 INJECTION, SOLUTION INTRAMUSCULAR; INTRAVENOUS
Status: DISCONTINUED | OUTPATIENT
Start: 2021-04-22 | End: 2021-04-23 | Stop reason: HOSPADM

## 2021-04-22 RX ORDER — ACETAMINOPHEN 325 MG/1
975 TABLET ORAL ONCE
Status: COMPLETED | OUTPATIENT
Start: 2021-04-22 | End: 2021-04-22

## 2021-04-22 RX ORDER — OXYCODONE HYDROCHLORIDE 5 MG/1
5 TABLET ORAL EVERY 4 HOURS PRN
Status: DISCONTINUED | OUTPATIENT
Start: 2021-04-22 | End: 2021-04-23 | Stop reason: HOSPADM

## 2021-04-22 RX ORDER — PROPOFOL 10 MG/ML
INJECTION, EMULSION INTRAVENOUS CONTINUOUS PRN
Status: DISCONTINUED | OUTPATIENT
Start: 2021-04-22 | End: 2021-04-22

## 2021-04-22 RX ORDER — ONDANSETRON 4 MG/1
4-8 TABLET, ORALLY DISINTEGRATING ORAL EVERY 8 HOURS PRN
Qty: 4 TABLET | Refills: 0 | Status: SHIPPED | OUTPATIENT
Start: 2021-04-22 | End: 2023-04-10

## 2021-04-22 RX ORDER — AMOXICILLIN 250 MG
1-2 CAPSULE ORAL 2 TIMES DAILY
Qty: 30 TABLET | Refills: 0 | Status: SHIPPED | OUTPATIENT
Start: 2021-04-22 | End: 2023-04-10

## 2021-04-22 RX ORDER — ONDANSETRON 2 MG/ML
INJECTION INTRAMUSCULAR; INTRAVENOUS PRN
Status: DISCONTINUED | OUTPATIENT
Start: 2021-04-22 | End: 2021-04-22

## 2021-04-22 RX ORDER — PROPOFOL 10 MG/ML
INJECTION, EMULSION INTRAVENOUS PRN
Status: DISCONTINUED | OUTPATIENT
Start: 2021-04-22 | End: 2021-04-22

## 2021-04-22 RX ORDER — OXYCODONE HYDROCHLORIDE 5 MG/1
5-10 TABLET ORAL EVERY 6 HOURS PRN
Qty: 10 TABLET | Refills: 0 | Status: SHIPPED | OUTPATIENT
Start: 2021-04-22 | End: 2022-02-08

## 2021-04-22 RX ORDER — FENTANYL CITRATE 50 UG/ML
25-50 INJECTION, SOLUTION INTRAMUSCULAR; INTRAVENOUS
Status: DISCONTINUED | OUTPATIENT
Start: 2021-04-22 | End: 2021-04-22 | Stop reason: HOSPADM

## 2021-04-22 RX ORDER — NALOXONE HYDROCHLORIDE 0.4 MG/ML
0.4 INJECTION, SOLUTION INTRAMUSCULAR; INTRAVENOUS; SUBCUTANEOUS
Status: DISCONTINUED | OUTPATIENT
Start: 2021-04-22 | End: 2021-04-23 | Stop reason: HOSPADM

## 2021-04-22 RX ORDER — GABAPENTIN 300 MG/1
300 CAPSULE ORAL ONCE
Status: COMPLETED | OUTPATIENT
Start: 2021-04-22 | End: 2021-04-22

## 2021-04-22 RX ORDER — NALOXONE HYDROCHLORIDE 0.4 MG/ML
0.2 INJECTION, SOLUTION INTRAMUSCULAR; INTRAVENOUS; SUBCUTANEOUS
Status: DISCONTINUED | OUTPATIENT
Start: 2021-04-22 | End: 2021-04-23 | Stop reason: HOSPADM

## 2021-04-22 RX ORDER — DEXAMETHASONE SODIUM PHOSPHATE 4 MG/ML
INJECTION, SOLUTION INTRA-ARTICULAR; INTRALESIONAL; INTRAMUSCULAR; INTRAVENOUS; SOFT TISSUE PRN
Status: DISCONTINUED | OUTPATIENT
Start: 2021-04-22 | End: 2021-04-22

## 2021-04-22 RX ADMIN — ONDANSETRON 4 MG: 2 INJECTION INTRAMUSCULAR; INTRAVENOUS at 07:21

## 2021-04-22 RX ADMIN — SODIUM CHLORIDE, SODIUM LACTATE, POTASSIUM CHLORIDE, CALCIUM CHLORIDE: 600; 310; 30; 20 INJECTION, SOLUTION INTRAVENOUS at 06:43

## 2021-04-22 RX ADMIN — FENTANYL CITRATE 25 MCG: 50 INJECTION, SOLUTION INTRAMUSCULAR; INTRAVENOUS at 09:14

## 2021-04-22 RX ADMIN — PROPOFOL 150 MCG/KG/MIN: 10 INJECTION, EMULSION INTRAVENOUS at 07:21

## 2021-04-22 RX ADMIN — FENTANYL CITRATE 25 MCG: 50 INJECTION, SOLUTION INTRAMUSCULAR; INTRAVENOUS at 09:20

## 2021-04-22 RX ADMIN — FENTANYL CITRATE 50 MCG: 50 INJECTION, SOLUTION INTRAMUSCULAR; INTRAVENOUS at 07:47

## 2021-04-22 RX ADMIN — GABAPENTIN 300 MG: 300 CAPSULE ORAL at 06:29

## 2021-04-22 RX ADMIN — FENTANYL CITRATE 50 MCG: 50 INJECTION, SOLUTION INTRAMUSCULAR; INTRAVENOUS at 07:43

## 2021-04-22 RX ADMIN — SODIUM CHLORIDE, SODIUM LACTATE, POTASSIUM CHLORIDE, CALCIUM CHLORIDE: 600; 310; 30; 20 INJECTION, SOLUTION INTRAVENOUS at 06:56

## 2021-04-22 RX ADMIN — OXYCODONE HYDROCHLORIDE 5 MG: 5 TABLET ORAL at 09:14

## 2021-04-22 RX ADMIN — PROPOFOL 200 MG: 10 INJECTION, EMULSION INTRAVENOUS at 07:17

## 2021-04-22 RX ADMIN — EPHEDRINE SULFATE 10 MG: 50 INJECTION, SOLUTION INTRAMUSCULAR; INTRAVENOUS; SUBCUTANEOUS at 07:58

## 2021-04-22 RX ADMIN — FENTANYL CITRATE 25 MCG: 50 INJECTION, SOLUTION INTRAMUSCULAR; INTRAVENOUS at 09:25

## 2021-04-22 RX ADMIN — CEFAZOLIN SODIUM 2 G: 2 SOLUTION INTRAVENOUS at 07:21

## 2021-04-22 RX ADMIN — DEXAMETHASONE SODIUM PHOSPHATE 4 MG: 4 INJECTION, SOLUTION INTRA-ARTICULAR; INTRALESIONAL; INTRAMUSCULAR; INTRAVENOUS; SOFT TISSUE at 07:21

## 2021-04-22 RX ADMIN — ACETAMINOPHEN 975 MG: 325 TABLET ORAL at 06:29

## 2021-04-22 ASSESSMENT — MIFFLIN-ST. JEOR: SCORE: 1226.39

## 2021-04-22 NOTE — OP NOTE
Procedure Date: 04/22/2021    DATE OF PROCEDURE:  04/22/2021.    PREOPERATIVE DIAGNOSIS:  Status post breast cancer who underwent bilateral free flap breast reconstruction, now ready for stage III symmetry and enhancement and completion surgeries.    POSTOPERATIVE DIAGNOSIS:  Status post breast cancer who underwent bilateral free flap breast reconstruction, now ready for stage III symmetry and enhancement and completion surgeries.    PROCEDURES:      1.  Bilateral breast fat grafting using the Lynn technique and Revolve system.  A total of 200 mL injected.  2.  Umbilicoplasty.  3.  Bilateral modified skate flap and nipple reconstruction.  4.  Left breast scar revision with excision of skin and subcutaneous tissue and layered closure, total length 5 cm.  5.  Right lateral abdominal scar revision, with excision of skin and subcutaneous tissue and layered closure, total length 5 cm.  6.  Left lateral abdominal scar revision, with excision of skin and subcutaneous tissue and layered closure, total length 5 cm. (this was a separate incision on the abdomen).    SURGEON:  Eunice Davis M.D.    RESIDENT:  Diane Galvez___, M.D.    ANESTHESIA:  General anesthesia with endotracheal intubation.    ESTIMATED BLOOD LOSS:  Nil.    DRAINS:  Nil.    SPECIMENS Nil.    BLOOD LOSS:  10 mL    DESCRIPTION OF PROCEDURE:  After informed consent was obtained from the patient, the operative site and procedure was terminated and she was appropriately marked in the operating room, she was placed in the supine position with the knees comfortably, flexed well as a need to maneuver was placed and identified, induction of anesthesia.  Preoperative antibiotics were given in the OR.  All pressure points were appropriately padded.  General anesthesia was administered without any complications.  She was prepped and draped in standard surgical fashion.  I began by first instilling the tumescent solution in the medial thighs and flank regions.  I also  placed in the anterior lower abdominal region.  I then turned my attention to the nipple reconstructions.  I had preoperatively marked the position of the nipples.  I then nj our modified skate flaps based superiorly on the right side and inferiorly on the left side given the scar locations.  I then went ahead and cut the scar and cut the flaps, elevated them and then ensured hemostasis.  I closed the donor site with 2-0 Monocryl suture in a deep dermal layer.  I then interdigitated the flaps of 4-0 Monocryl suture in interrupted fashion and closed the donor site with running 4-0 Monocryl suture.  I turned my attention to the umbilicus.  We went ahead and made a X-shaped cut in the area that I had preoperatively marked out.  The 4 flaps were elevated.  The underlying fat was then excised down to the fascia.  Hemostasis was ensured.  Each flap was then sutured down to the fascia with 2-0 Monocryl suture in interrupted fashion and then the donor sites were closed with 4-0 Monocryl suture in interrupted fashion.  I then turned my attention to the scar revisions.  The left medial breast scar, the right lateral abdominal skin in the left lateral abdominal scars were excised down to the subcutaneous tissue.  Hemostasis was ensured, and the wound was closed in layers using 2-0 Monocryl suture in the deep dermal layer and 3-0 Monocryl suture in a running manner.  I then turned my attention to the liposuction.  I went ahead and used suction-assisted lipectomy.  We then used the Revolve system to collect the fat and used a 4 mm cannula and carried out the liposuction of the anterior abdomen, the lateral flanks and the medial thighs.  About 400 mL of aspirate was collected in the Revolve system.  This was cleaned 3 different times and then dried out and the fat collected and 10 mL syringes.  Total of 200 mL was then injected in both breasts in the upper poles of both breasts, medial poles of both breasts and then the right  side in the breast itself to make a volume similar to the left side.  Once this was completed, I then went ahead and closed all the stab incisions with 5-0 fast absorbing gut suture.  Appropriate dressings including surgical tape and glue was placed over all the incisions.  The patient was wrapped appropriately.  She was extubated and sent to recovery room in stable condition.    LALY Ramos MD        D: 2021   T: 2021   MT: PIERRET    Name:     CHRIS ROLANDMarilu  MRN:      [id number]        Account:        [id number]   :      1965           Procedure Date: 2021     Document: [id number]

## 2021-04-22 NOTE — BRIEF OP NOTE
[facility]    Brief Operative Note    Pre-operative diagnosis: S/P breast reconstruction, bilateral [Z98.890]  Post-operative diagnosis Same as pre-operative diagnosis    Procedure: Procedure(s):  Bilateral breast revision, fat grafting, nipple reconstruction, umbilicoplasty,  scar revision  Surgeon: Surgeon(s) and Role:     * LALY Ramos MD - Primary  Anesthesia: General   Estimated blood loss: Minimal  Drains: None  Specimens: * No specimens in log *  Findings:   None.  Complications: None.  Implants: * No implants in log *

## 2021-04-22 NOTE — DISCHARGE INSTRUCTIONS
[facility]  Home Care Following Anesthesia  For 24 hours after surgery:  1. Get plenty of rest.  A responsible adult must stay with you for at least 24 hours after you leave the surgery center.  2. Do not drive or use heavy equipment.  If you have weakness or tingling, don't drive or use heavy equipment until this feeling goes away.   3. Do not drink alcohol.   4. Avoid strenuous or risky activities.  Ask for help when climbing stairs.  5. You may feel lightheaded.  IF so, sit for a few minutes before standing.  Have someone help you get up.   6. If you have nausea (feel sick to your stomach): Drink only clear liquids such as apple juice, ginger ale, broth or 7-Up.  Rest may also help.  Be sure to drink enough fluids.  Move to a regular diet as you feel able.   7. You may have a slight fever.  Call the doctor if your fever is over 100 F (37.7 C) (taken under the tongue) or lasts longer than 24 hours.  8. You may have a dry mouth, a sore throat, muscle aches or trouble sleeping. These should go away after 24 hours.  9. Do not make important or legal decisions.   10. It is recommended to avoid smoking.               Tips for taking pain medications  To get the best pain relief possible, remember these points:    Take pain medications as directed, before pain becomes severe.    Pain medication can upset your stomach: taking it with food may help.    Constipation is a common side effect of pain medication. Drink plenty of  fluids.    Eat foods high in fiber. Take a stool softener if recommended by your doctor or pharmacist.    Do not drink alcohol, drive or operate machinery while taking pain medications.    Ask about other ways to control pain, such as with heat, ice or relaxation.    Tylenol/Acetaminophen Consumption  To help encourage the safe use of acetaminophen, the makers of TYLENOL  have lowered the maximum daily dose for single-ingredient Extra Strength TYLENOL   (acetaminophen) products sold in the [place] from 8 pills per day (4,000 mg) to 6 pills per day (3,000 mg). The dosing interval has also changed from 2 pills every 4-6 hours to 2 pills every 6 hours.    If you feel your pain relief is insufficient, you may take Tylenol/Acetaminophen in addition to your narcotic pain medication.     Be careful not to exceed 3,000 mg of Tylenol/Acetaminophen in a 24 hour period from all sources.    If you are taking extra strength Tylenol/acetaminophen (500 mg), the maximum dose is 6 tablets in 24 hours.    If you are taking regular strength acetaminophen (325 mg), the maximum dose is 9 tablets in 24 hours.    Call a doctor for any of the followin. Signs of infection (fever, growing tenderness at the surgery site, a large amount of drainage or bleeding, severe pain, foul-smelling drainage, redness, swelling).  2. It has been over 8 to 10 hours since surgery and you are still not able to urinate (pass water).  3. Headache for over 24 hours.  4. Numbness, tingling or weakness the day after surgery (if you had spinal anesthesia).  5. Signs of Covid-19 infection (temperature over 100 degrees, shortness of breath, cough, loss of taste/smell, generalized body aches, persistent headache, chills, sore throat, nausea/vomiting/diarrhea)  Your doctor is:  Dr. Ko Ramos, Plastic Surgery: [phone]  Or dial [phone] and ask for the resident on call for:  Plastics  For emergency care, call the:  [facility]:  [phone] (TTY for hearing impaired: [phone])    FAT GRAFTING POST-OPERATIVE INSTRUCTIONS    Instructions       Have someone drive you home after surgery and help you at home for 1-2      days.      Get plenty of rest.      Follow balanced diet.      Decreased activity may promote constipation, so you may want to add      more raw fruit to your diet, and be sure to increase fluid intake.      Take pain medication as prescribed. Do not take aspirin or any  products      containing aspirin unless approved by your surgeon.      Do not drink alcohol when taking pain medications.      Even when not taking pain medications, no alcohol for 3 weeks as it      causes fluid retention.      If you are taking vitamins with iron, resume these as tolerated.      Do not smoke, as smoking delays healing and increases the risk of      complications.    Activities      Do not drive until you are no longer taking any pain medications      (narcotics).      Start walking as soon as possible, this helps to reduce swelling and       lowers the chance of blood clots.      Unless stated on this form, discuss your time off work with your surgeon.    Treated Area Care       You may shower after 24 hours. The ACE wrap (if used) may be rewrapped as needed (if too tight or loose). Use it for support and may subtitute with a sports bra if preferred.  Wear the compression garment (spandex type clothing or the provided sponge and binder) in the area where the liposuction was performed to harvest the fat for the fat injection for 2 weeks post opor per the surgeon's recommendation.      Avoid exposing scars to sun for at least 12 months.      Always use a strong sunblock, if sun exposure is unavoidable (SPF 50 or      greater).      Inspect daily for signs of infection.      No tub soaking, bathing, or swimming while sutures or drains are in place.      You may wear makeup with sunblock protection shortly.      Stay out of the sun until redness and bruising subsides (usually 48      Hours).    What to Expect      Temporary stinging, throbbing, burning sensation, redness, swelling,       bruising, and excess fullness.      Some swelling, bruising or redness in the donor and recipient sites.      Swelling and puffiness may last several weeks.      Redness and bruising usually lasts about 48 hours.     Appearance      Improved skin texture.      Firmer and smoother skin.    Follow-Up Care      With  regular follow-up treatments, you can easily maintain your new       look.      Repeated treatments may be necessary.    When to Call      If you have increased swelling or bruising.      If swelling and redness persist after a few days.      If you have increased redness along the incision.      If you have severe or increased pain not relieved by medication.      If you have any side effects to medications; such as, rash, nausea,      headache, vomiting.      If you have an oral temperature over 100.4 degrees.      If you have any yellowish or greenish drainage from the incisions or      notice a foul odor.      If you have bleeding from the incisions that is difficult to control with      light pressure.      If you have loss of feeling or motion.      If you have any sign of abscesses, open sores, skin peeling or lumpiness.    For Medical Questions, Please Call:      [phone], Monday - Friday, 8 a.m. - 4:30 p.m.      After hours and on weekends, call Hospital Paging at [phone] and      ask for the Plastic Surgeon on call.

## 2021-04-22 NOTE — ANESTHESIA CARE TRANSFER NOTE
Patient: Kathy Lei    Procedure(s):  Bilateral breast revision, fat grafting, nipple reconstruction, umbilicoplasty,  scar revision    Diagnosis: S/P breast reconstruction, bilateral [Z98.890]  Diagnosis Additional Information: No value filed.    Anesthesia Type:   General     Note:    Oropharynx: oropharynx clear of all foreign objects  Level of Consciousness: awake  Oxygen Supplementation: room air    Independent Airway: airway patency satisfactory and stable  Dentition: dentition unchanged  Vital Signs Stable: post-procedure vital signs reviewed and stable  Report to RN Given: handoff report given  Patient transferred to: PACU    Handoff Report: Identifed the Patient, Identified the Reponsible Provider, Reviewed the pertinent medical history, Discussed the surgical course, Reviewed Intra-OP anesthesia mangement and issues during anesthesia, Set expectations for post-procedure period and Allowed opportunity for questions and acknowledgement of understanding      Vitals: (Last set prior to Anesthesia Care Transfer)  CRNA VITALS  4/22/2021 0834 - 4/22/2021 0910      4/22/2021             Resp Rate (set):  10        Electronically Signed By: CECILE Marie CRNA  April 22, 2021  9:10 AM

## 2021-04-22 NOTE — ANESTHESIA POSTPROCEDURE EVALUATION
Patient: Kathy Lei    Procedure(s):  Bilateral breast revision, fat grafting, nipple reconstruction, umbilicoplasty,  scar revision    Diagnosis:S/P breast reconstruction, bilateral [Z98.890]  Diagnosis Additional Information: No value filed.    Anesthesia Type:  General    Note:  Disposition: Outpatient   Postop Pain Control: Uneventful            Sign Out: Well controlled pain   PONV: No   Neuro/Psych: Uneventful            Sign Out: Acceptable/Baseline neuro status   Airway/Respiratory: Uneventful            Sign Out: Acceptable/Baseline resp. status   CV/Hemodynamics: Uneventful            Sign Out: Acceptable CV status; No obvious hypovolemia; No obvious fluid overload   Other NRE: NONE   DID A NON-ROUTINE EVENT OCCUR? No           Last vitals:  Vitals:    04/22/21 0915 04/22/21 0930 04/22/21 0957   BP: 130/76 130/76 116/68   Pulse: 99 93    Resp: 15 15 16   Temp:  36.4  C (97.5  F) 36.2  C (97.1  F)   SpO2: 97% 93% 100%       Last vitals prior to Anesthesia Care Transfer:  CRNA VITALS  4/22/2021 0834 - 4/22/2021 0934      4/22/2021             Resp Rate (set):  10          Electronically Signed By: Blue Baker MD  April 22, 2021  10:34 AM

## 2021-04-28 ENCOUNTER — OFFICE VISIT (OUTPATIENT)
Dept: PLASTIC SURGERY | Facility: CLINIC | Age: 56
End: 2021-04-28
Payer: COMMERCIAL

## 2021-04-28 VITALS
HEIGHT: 65 IN | SYSTOLIC BLOOD PRESSURE: 135 MMHG | HEART RATE: 85 BPM | OXYGEN SATURATION: 98 % | DIASTOLIC BLOOD PRESSURE: 86 MMHG | BODY MASS INDEX: 23.8 KG/M2 | TEMPERATURE: 97.8 F

## 2021-04-28 DIAGNOSIS — Z98.890 S/P BREAST RECONSTRUCTION, BILATERAL: Primary | ICD-10-CM

## 2021-04-28 PROCEDURE — 99024 POSTOP FOLLOW-UP VISIT: CPT | Performed by: PLASTIC SURGERY

## 2021-04-28 RX ORDER — LETROZOLE 2.5 MG/1
TABLET, FILM COATED ORAL
COMMUNITY
Start: 2021-01-15 | End: 2021-09-09

## 2021-04-28 ASSESSMENT — PAIN SCALES - GENERAL: PAINLEVEL: MILD PAIN (3)

## 2021-04-28 NOTE — NURSING NOTE
"Chief Complaint   Patient presents with     Surgical Followup     1wk post up 3rd stage breast recon DOS 4/22 (Richard)       Vitals:    04/28/21 1046   BP: 135/86   BP Location: Left arm   Patient Position: Chair   Cuff Size: Adult Regular   Pulse: 85   Temp: 97.8  F (36.6  C)   TempSrc: Oral   SpO2: 98%   Height: 1.651 m (5' 5\")       Body mass index is 23.8 kg/m .    Reji Navarro EMT    "

## 2021-04-28 NOTE — LETTER
2021       RE: Kathy Roland   [address] [place] [address]     Dear Colleague,    Thank you for referring your patient, Kathy Roland, to the [facility] [place] at [facility]. Please see a copy of my visit note below.    Service Date: 2021, presenting complaint postoperative visit status post stage III reconstruction of her breasts on 2001.  History and complaint Ms. Roland is a 55-year-old week out from her surgery done very well in rehabilitation results.  No major issues on exam, vital signs stable.  He is afebrile, in no obvious distress.  Everything is healing in well.    ASSESSMENT AND PLAN:  Based on the above findings, a diagnosis of bilateral breast reconstruction with stage III breast reconstruction done week ago.  Advised aggressive moisturization refrain from any heavy activity and exercising for another couple weeks.  I will see her back in 6 weeks.    LALY Ramos MD    D: 2021   T: 2021   MT: [facility]    Name:     KATHY ROLAND  MRN:      [id number]        Account:      [id number]   :      1965           Service Date: 2021       Document: [id number]

## 2021-04-29 NOTE — PROGRESS NOTES
Service Date: 2021, presenting complaint postoperative visit status post stage III reconstruction of her breasts on 2001.  History and complaint Ms. Roland is a 55-year-old week out from her surgery done very well in rehabilitation results.  No major issues on exam, vital signs stable.  He is afebrile, in no obvious distress.  Everything is healing in well.    ASSESSMENT AND PLAN:  Based on the above findings, a diagnosis of bilateral breast reconstruction with stage III breast reconstruction done week ago.  Advised aggressive moisturization refrain from any heavy activity and exercising for another couple weeks.  I will see her back in 6 weeks.    LALY Ramos MD        D: 2021   T: 2021   MT: ELÍAS    Name:     CHRIS ROLAND  MRN:      [id number]        Account:      [id number]   :      1965           Service Date: 2021       Document: [id number]

## 2021-05-17 ENCOUNTER — ANCILLARY PROCEDURE (OUTPATIENT)
Dept: GENERAL RADIOLOGY | Facility: CLINIC | Age: 56
End: 2021-05-17
Attending: FAMILY MEDICINE
Payer: COMMERCIAL

## 2021-05-17 ENCOUNTER — PRE VISIT (OUTPATIENT)
Dept: ORTHOPEDICS | Facility: CLINIC | Age: 56
End: 2021-05-17

## 2021-05-17 ENCOUNTER — OFFICE VISIT (OUTPATIENT)
Dept: ORTHOPEDICS | Facility: CLINIC | Age: 56
End: 2021-05-17
Attending: NURSE PRACTITIONER
Payer: COMMERCIAL

## 2021-05-17 DIAGNOSIS — M25.572 LEFT ANKLE PAIN, UNSPECIFIED CHRONICITY: ICD-10-CM

## 2021-05-17 PROCEDURE — 72100 X-RAY EXAM L-S SPINE 2/3 VWS: CPT | Performed by: RADIOLOGY

## 2021-05-17 PROCEDURE — 99203 OFFICE O/P NEW LOW 30 MIN: CPT | Performed by: FAMILY MEDICINE

## 2021-05-17 PROCEDURE — 73610 X-RAY EXAM OF ANKLE: CPT | Mod: LT | Performed by: RADIOLOGY

## 2021-05-17 NOTE — LETTER
5/17/2021      RE: Kathy Lei  [address] [place] [address]         [facility]  SPORTS & ORTHOPEDIC CLINIC VISIT     May 17, 2021        ASSESSMENT & PLAN    55-year-old with left anterior ankle pain as well as radiating pain down the left lower extremity that seems most consistent with a lumbar radiculopathy    Reviewed imaging and assessment with patient in detail  Plan to refer to physical therapy.  Discussed indications for use of steroid burst as well as muscle relaxant such as tizanidine.  She will follow-up in 6 weeks if she is not improving.    Bill Hairston MD  [facility] SPORTS MEDICINE CLINIC [place]    -----  Chief Complaint   Patient presents with     Left Ankle - Pain       SUBJECTIVE  Kathy Lei is a/an 55 year old female who is seen in consultation at the request of  Rachel Arauz N.P. for evaluation of  Left ankle pain.     The patient is seen by themselves.  The patient is Right handed    Onset: many years(s) ago. 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 Was diagnosed with breast cancer in 2019. Pain is deep inside the ankle. Bothers her when she stands to cook, walking long periods. She has scoliosis, triple curve surgery in back, is wondering if it could be from scoliosis    Location of Pain: left deep ankle   Worsened by: Standing, Walking  Better with: Laying in bed with no pressure   Treatments tried: Voltaren,  Associated symptoms: no distal numbness or tingling; denies swelling or warmth    Orthopedic/Surgical history: NO  Social History/Occupation: No       REVIEW OF SYSTEMS:    Do you have fever, chills, weight loss? No    Do you have any vision problems? No    Do you have any chest pain or edema? No    Do you have any shortness of breath or wheezing?  No    Do you have stomach problems? No    Do you have any numbness or focal weakness? No    Do you have diabetes? No    Do you have problems  with bleeding or clotting? No    Do you have an rashes or other skin lesions? No    OBJECTIVE:  [facility] 11/02/2014      General: Alert, pleasant, no distress  Left ankle: warm, well perfused, SILT throughout     Inspection: No swelling deformity or ecchymosis     ROM: Symmetric without significant discomfort     Strength: Intact without pain.     Palpation: Mild TTP over the anterior ankle.  No TTP over the dorsal midfoot, distal tibia, lateral malleolus, medial malleolus     Special Tests: None    General: alert, pleasant, no distress. sitting comfortably in chair  Back/Spine: no tenderness to palpation of spinous processes, or paraspinous musculature of lumbar spine.  Limited ROM of the spine.  Slump test negative.  Neuro: SILT on bilateral lower extremities, can stand on toes and heel walk without difficulty, patellar and achilles reflexes 2+ and symmetric,         RADIOLOGY:    2 views of the lumbar spine are performed and reviewed independently demonstrating prior surgical changes noted in the thoracic spine with thoracolumbar curve present resulting in left lateral listhesis of L3 on L4 as well as asymmetric disc height loss on the left throughout the lumbar spine.  Diffuse lumbar endplate changes and facet arthropathy is noted.  See EMR for formal radiology report    Three-view x-rays of the left ankle are also performed and reviewed independently demonstrating no acute fracture or dislocation.  No significant degenerative disease.  See EMR for formal radiology report.        Bill Hairston MD

## 2021-05-17 NOTE — PROGRESS NOTES
[facility]  SPORTS & ORTHOPEDIC CLINIC VISIT     May 17, 2021        ASSESSMENT & PLAN    55-year-old with left anterior ankle pain as well as radiating pain down the left lower extremity that seems most consistent with a lumbar radiculopathy    Reviewed imaging and assessment with patient in detail  Plan to refer to physical therapy.  Discussed indications for use of steroid burst as well as muscle relaxant such as tizanidine.  She will follow-up in 6 weeks if she is not improving.    Bill Hairston MD  [facility] SPORTS MEDICINE CLINIC [place]    -----  Chief Complaint   Patient presents with     Left Ankle - Pain       SUBJECTIVE  Kathy Lei is a/an 55 year old female who is seen in consultation at the request of  Rachel Arauz N.P. for evaluation of  Left ankle pain.     The patient is seen by themselves.  The patient is Right handed    Onset: many years(s) ago. 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 Was diagnosed with breast cancer in 2019. Pain is deep inside the ankle. Bothers her when she stands to cook, walking long periods. She has scoliosis, triple curve surgery in back, is wondering if it could be from scoliosis    Location of Pain: left deep ankle   Worsened by: Standing, Walking  Better with: Laying in bed with no pressure   Treatments tried: Voltaren,  Associated symptoms: no distal numbness or tingling; denies swelling or warmth    Orthopedic/Surgical history: NO  Social History/Occupation: No       REVIEW OF SYSTEMS:    Do you have fever, chills, weight loss? No    Do you have any vision problems? No    Do you have any chest pain or edema? No    Do you have any shortness of breath or wheezing?  No    Do you have stomach problems? No    Do you have any numbness or focal weakness? No    Do you have diabetes? No    Do you have problems with bleeding or clotting? No    Do you have an rashes or other skin lesions?  No    OBJECTIVE:  [facility] 11/02/2014      General: Alert, pleasant, no distress  Left ankle: warm, well perfused, SILT throughout     Inspection: No swelling deformity or ecchymosis     ROM: Symmetric without significant discomfort     Strength: Intact without pain.     Palpation: Mild TTP over the anterior ankle.  No TTP over the dorsal midfoot, distal tibia, lateral malleolus, medial malleolus     Special Tests: None    General: alert, pleasant, no distress. sitting comfortably in chair  Back/Spine: no tenderness to palpation of spinous processes, or paraspinous musculature of lumbar spine.  Limited ROM of the spine.  Slump test negative.  Neuro: SILT on bilateral lower extremities, can stand on toes and heel walk without difficulty, patellar and achilles reflexes 2+ and symmetric,         RADIOLOGY:    2 views of the lumbar spine are performed and reviewed independently demonstrating prior surgical changes noted in the thoracic spine with thoracolumbar curve present resulting in left lateral listhesis of L3 on L4 as well as asymmetric disc height loss on the left throughout the lumbar spine.  Diffuse lumbar endplate changes and facet arthropathy is noted.  See EMR for formal radiology report    Three-view x-rays of the left ankle are also performed and reviewed independently demonstrating no acute fracture or dislocation.  No significant degenerative disease.  See EMR for formal radiology report.

## 2021-05-18 ENCOUNTER — THERAPY VISIT (OUTPATIENT)
Dept: PHYSICAL THERAPY | Facility: CLINIC | Age: 56
End: 2021-05-18
Payer: COMMERCIAL

## 2021-05-18 DIAGNOSIS — M25.572 LEFT ANKLE PAIN, UNSPECIFIED CHRONICITY: ICD-10-CM

## 2021-05-18 DIAGNOSIS — M54.50 LEFT-SIDED LOW BACK PAIN WITHOUT SCIATICA: ICD-10-CM

## 2021-05-18 PROCEDURE — 97161 PT EVAL LOW COMPLEX 20 MIN: CPT | Performed by: PHYSICAL THERAPIST

## 2021-05-18 PROCEDURE — 97110 THERAPEUTIC EXERCISES: CPT | Performed by: PHYSICAL THERAPIST

## 2021-05-18 NOTE — PROGRESS NOTES
"Physical Therapy Initial Evaluation  Subjective:    Patient Health History  Kathy Lei being seen for Physical therapy for lower spine...pain in ankle.          Pain is reported as 10/10 on pain scale.  General health as reported by patient is fair.  Pertinent medical history includes: asthma, cancer and high blood pressure.   Red flags:  None as reported by patient.  Medical allergies: none.   Surgeries include:  Orthopedic surgery and cancer surgery.    Current medications:  Anti-depressants, high blood pressure medication, sleep medication and thyroid medication.    Current occupation is Former [employer] for 35 years.                     Therapist Generated HPI Evaluation  Problem details: Pt presents to PT with a chief complaint of chronic LBP and L medial ankle pain with significant worsening of L medial ankle pain over the last 6 months (11/2021) with potential correlation to inactivity during breast cancer treatment. Pt reports a \"deep\" L medial ankle pain with prolonged standing and walking. Pt denies any direct correlation with her L LBP and her ankle pain and denies any shooting pain, numbness/tingling, or loss of strength. Radiographs reveal severe Lumbar DDD (see below) and MD recommended PT for L ankle pain w/ potential L sided lumbar radiculopathy.     Lumbar Radiographs: Moderate left convex curvature of thoracolumbar/lumbar  spine with associated degenerative changes and a left lateral  listhesis, greatest at L3-L4.         Type of problem:  Lumbar.    This is a chronic condition.  Condition occurred with:  Degenerative joint disease.  Where condition occurred: for unknown reasons.  Patient reports pain:  Lower lumbar spine and lumbar spine left.  Pain is described as aching and is intermittent.  Pain radiates to:  No radiation (secondary deep L medial ankle pain ). Pain is worse in the P.M..  Since onset symptoms are gradually worsening.  Associated symptoms:  Loss of motion/stiffness. " Symptoms are exacerbated by standing and walking  and relieved by rest (Laying supine for 30 min).  Special tests included:  X-ray.    Barriers include:  None as reported by patient.                        Objective:  Standing Alignment:        Lumbar:  Lordosis decr                      Ankle/Foot Evaluation  ROM:  AROM is normal.      Strength:    Dorsiflexion:  Left: 5/5     Pain:   Right: 5/5   Pain:    Inversion:Left: 5-/5  Pain:     Right: 5-/5  Pain:  Eversion:Left: 4+/5  Pain:  Right: 4+/5  Pain:                          MOBILITY TESTING:         Subtalar Left: hypomobile               Lumbar/SI Evaluation  ROM:    AROM Lumbar:   Flexion:          Mod loss  Ext:                    Major loss, pain + lumbar   Side Bend:        Left:     Right:   Rotation:           Left:  Mod loss    Right:  Mod loss  Side Glide:        Left:     Right:           Lumbar Myotomes:  normal            Lumbar DTR's:  normal        Lumbar Dermtomes:  normal                Neural Tension/Mobility:  Neural tension wnl lumbar: Mild increased L neural tension w/ Slump.                                                             General     ROS    Assessment/Plan:    Patient is a 55 year old female with lumbar complaints.    Patient has the following significant findings with corresponding treatment plan.                Diagnosis 1:  L sided LBP   Pain -  manual therapy, splint/taping/bracing/orthotics, self management and directional preference exercise  Decreased ROM/flexibility - manual therapy and therapeutic exercise  Decreased strength - therapeutic exercise and therapeutic activities  Impaired posture - neuro re-education    Therapy Evaluation Codes:     Cumulative Therapy Evaluation is: Low complexity.    Previous and current functional limitations:  (See Goal Flow Sheet for this information)    Short term and Long term goals: (See Goal Flow Sheet for this information)     Communication ability:  Patient appears to be able to  clearly communicate and understand verbal and written communication and follow directions correctly.  Treatment Explanation - The following has been discussed with the patient:   RX ordered/plan of care  Anticipated outcomes  Possible risks and side effects  This patient would benefit from PT intervention to resume normal activities.   Rehab potential is good.    Frequency:  1 X week, once daily  Duration:  for 6 weeks  Discharge Plan:  Achieve all LTG.  Independent in home treatment program.  Reach maximal therapeutic benefit.    Please refer to the daily flowsheet for treatment today, total treatment time and time spent performing 1:1 timed codes.

## 2021-05-19 ASSESSMENT — ENCOUNTER SYMPTOMS
MUSCLE WEAKNESS: 1
STIFFNESS: 0
BACK PAIN: 1
JOINT SWELLING: 0
MYALGIAS: 1
NECK PAIN: 0
MUSCLE CRAMPS: 0
ARTHRALGIAS: 0

## 2021-05-25 ENCOUNTER — OFFICE VISIT (OUTPATIENT)
Dept: PLASTIC SURGERY | Facility: CLINIC | Age: 56
End: 2021-05-25
Attending: PLASTIC SURGERY
Payer: COMMERCIAL

## 2021-05-25 ENCOUNTER — THERAPY VISIT (OUTPATIENT)
Dept: PHYSICAL THERAPY | Facility: CLINIC | Age: 56
End: 2021-05-25
Payer: COMMERCIAL

## 2021-05-25 VITALS
TEMPERATURE: 99.2 F | SYSTOLIC BLOOD PRESSURE: 118 MMHG | DIASTOLIC BLOOD PRESSURE: 78 MMHG | HEIGHT: 65 IN | RESPIRATION RATE: 16 BRPM | BODY MASS INDEX: 23.29 KG/M2 | HEART RATE: 98 BPM | OXYGEN SATURATION: 97 % | WEIGHT: 139.8 LBS

## 2021-05-25 DIAGNOSIS — M54.50 LEFT-SIDED LOW BACK PAIN WITHOUT SCIATICA: ICD-10-CM

## 2021-05-25 DIAGNOSIS — Z98.890 S/P BREAST RECONSTRUCTION, BILATERAL: Primary | ICD-10-CM

## 2021-05-25 DIAGNOSIS — M25.572 LEFT ANKLE PAIN, UNSPECIFIED CHRONICITY: ICD-10-CM

## 2021-05-25 PROCEDURE — 97110 THERAPEUTIC EXERCISES: CPT | Performed by: PHYSICAL THERAPIST

## 2021-05-25 PROCEDURE — G0463 HOSPITAL OUTPT CLINIC VISIT: HCPCS

## 2021-05-25 PROCEDURE — 99024 POSTOP FOLLOW-UP VISIT: CPT | Performed by: PLASTIC SURGERY

## 2021-05-25 PROCEDURE — 97140 MANUAL THERAPY 1/> REGIONS: CPT | Performed by: PHYSICAL THERAPIST

## 2021-05-25 PROCEDURE — 97112 NEUROMUSCULAR REEDUCATION: CPT | Performed by: PHYSICAL THERAPIST

## 2021-05-25 ASSESSMENT — MIFFLIN-ST. JEOR: SCORE: 1220.01

## 2021-05-25 NOTE — LETTER
5/25/2021         RE: Kathy Lei  [address] [place] [address]        Dear Colleague,    Thank you for referring your patient, Kathy Lei, to the [facility] BRE[facility]. Please see a copy of my visit note below.    PRESENTING COMPLAINT:  Postoperative visit status post bilateral breast reconstruction with free flaps, status post third stage on 04/22/2021.    HISTORY OF PRESENTING COMPLAINT:  Ms. Lei is 55 years old.  She is now 6 weeks out from surgery, fully healed, very happy with the results.  No issues.    PHYSICAL EXAMINATION:  Vital signs stable.  She is afebrile, in no obvious distress.  Everything is healing in well.    ASSESSMENT AND PLAN:  Based upon findings, bilateral breast reconstruction was made.  Plan is to get tattooing of the areola and gave her a referral for that.  She will see me after that.          Again, thank you for allowing me to participate in the care of your patient.        Sincerely,        LALY Ramos MD

## 2021-05-25 NOTE — PROGRESS NOTES
PRESENTING COMPLAINT:  Postoperative visit status post bilateral breast reconstruction with free flaps, status post third stage on 04/22/2021.    HISTORY OF PRESENTING COMPLAINT:  Ms. Lei is 55 years old.  She is now 6 weeks out from surgery, fully healed, very happy with the results.  No issues.    PHYSICAL EXAMINATION:  Vital signs stable.  She is afebrile, in no obvious distress.  Everything is healing in well.    ASSESSMENT AND PLAN:  Based upon findings, bilateral breast reconstruction was made.  Plan is to get tattooing of the areola and gave her a referral for that.  She will see me after that.

## 2021-05-25 NOTE — NURSING NOTE
"Oncology Rooming Note    May 25, 2021 11:11 AM   Kathy Lei is a 55 year old female who presents for:    Chief Complaint   Patient presents with     Oncology Clinic Visit     Patient with S/P breast reconstruction, bilateral here for provider visit      Initial Vitals: /78 (BP Location: Left arm, Patient Position: Sitting, Cuff Size: Adult Regular)   Pulse 98   Temp 99.2  F (37.3  C) (Oral)   Resp 16   Ht 1.651 m (5' 5\")   Wt 63.4 kg (139 lb 12.8 oz)   LMP 11/02/2014   SpO2 97%   BMI 23.26 kg/m   Estimated body mass index is 23.26 kg/m  as calculated from the following:    Height as of this encounter: 1.651 m (5' 5\").    Weight as of this encounter: 63.4 kg (139 lb 12.8 oz). Body surface area is 1.71 meters squared.  Data Unavailable Comment: Data Unavailable   Patient's last menstrual period was 11/02/2014.  Allergies reviewed: Yes  Medications reviewed: Yes    Medications: Medication refills not needed today.  Pharmacy name entered into [facility]:    [id number] - [place], [place] - [address] AT [facility] [address]  [facility] - [place], [place] - [address]    Clinical concerns:    Kathia Whitt CMA              "

## 2021-06-01 ENCOUNTER — THERAPY VISIT (OUTPATIENT)
Dept: PHYSICAL THERAPY | Facility: CLINIC | Age: 56
End: 2021-06-01
Payer: COMMERCIAL

## 2021-06-01 DIAGNOSIS — M25.572 LEFT ANKLE PAIN, UNSPECIFIED CHRONICITY: ICD-10-CM

## 2021-06-01 DIAGNOSIS — M54.50 LEFT-SIDED LOW BACK PAIN WITHOUT SCIATICA: Primary | ICD-10-CM

## 2021-06-01 PROCEDURE — 97140 MANUAL THERAPY 1/> REGIONS: CPT | Mod: GP | Performed by: PHYSICAL THERAPIST

## 2021-06-01 PROCEDURE — 97110 THERAPEUTIC EXERCISES: CPT | Mod: GP | Performed by: PHYSICAL THERAPIST

## 2021-06-07 ENCOUNTER — VIRTUAL VISIT (OUTPATIENT)
Dept: ONCOLOGY | Facility: CLINIC | Age: 56
End: 2021-06-07
Attending: INTERNAL MEDICINE
Payer: COMMERCIAL

## 2021-06-07 ENCOUNTER — APPOINTMENT (OUTPATIENT)
Dept: LAB | Facility: CLINIC | Age: 56
End: 2021-06-07
Attending: INTERNAL MEDICINE
Payer: COMMERCIAL

## 2021-06-07 VITALS
RESPIRATION RATE: 18 BRPM | WEIGHT: 139.1 LBS | TEMPERATURE: 98.2 F | HEART RATE: 88 BPM | DIASTOLIC BLOOD PRESSURE: 69 MMHG | BODY MASS INDEX: 23.15 KG/M2 | OXYGEN SATURATION: 95 % | SYSTOLIC BLOOD PRESSURE: 121 MMHG

## 2021-06-07 DIAGNOSIS — C50.411 MALIGNANT NEOPLASM OF UPPER-OUTER QUADRANT OF RIGHT BREAST IN FEMALE, ESTROGEN RECEPTOR POSITIVE (H): Primary | ICD-10-CM

## 2021-06-07 DIAGNOSIS — Z17.0 MALIGNANT NEOPLASM OF UPPER-OUTER QUADRANT OF RIGHT BREAST IN FEMALE, ESTROGEN RECEPTOR POSITIVE (H): Primary | ICD-10-CM

## 2021-06-07 LAB
ALBUMIN SERPL-MCNC: 3.8 G/DL (ref 3.4–5)
ALP SERPL-CCNC: 73 U/L (ref 40–150)
ALT SERPL W P-5'-P-CCNC: 20 U/L (ref 0–50)
ANION GAP SERPL CALCULATED.3IONS-SCNC: 7 MMOL/L (ref 3–14)
AST SERPL W P-5'-P-CCNC: 14 U/L (ref 0–45)
BASOPHILS # BLD AUTO: 0 10E9/L (ref 0–0.2)
BASOPHILS NFR BLD AUTO: 0.5 %
BILIRUB SERPL-MCNC: 0.4 MG/DL (ref 0.2–1.3)
BUN SERPL-MCNC: 12 MG/DL (ref 7–30)
CALCIUM SERPL-MCNC: 9.1 MG/DL (ref 8.5–10.1)
CHLORIDE SERPL-SCNC: 104 MMOL/L (ref 94–109)
CO2 SERPL-SCNC: 23 MMOL/L (ref 20–32)
CREAT SERPL-MCNC: 0.61 MG/DL (ref 0.52–1.04)
DIFFERENTIAL METHOD BLD: NORMAL
EOSINOPHIL # BLD AUTO: 0.2 10E9/L (ref 0–0.7)
EOSINOPHIL NFR BLD AUTO: 3.8 %
ERYTHROCYTE [DISTWIDTH] IN BLOOD BY AUTOMATED COUNT: 13.9 % (ref 10–15)
GFR SERPL CREATININE-BSD FRML MDRD: >90 ML/MIN/{1.73_M2}
GLUCOSE SERPL-MCNC: 98 MG/DL (ref 70–99)
HCT VFR BLD AUTO: 41.4 % (ref 35–47)
HGB BLD-MCNC: 13.5 G/DL (ref 11.7–15.7)
IMM GRANULOCYTES # BLD: 0 10E9/L (ref 0–0.4)
IMM GRANULOCYTES NFR BLD: 0.5 %
LYMPHOCYTES # BLD AUTO: 1.2 10E9/L (ref 0.8–5.3)
LYMPHOCYTES NFR BLD AUTO: 28.3 %
MCH RBC QN AUTO: 27.7 PG (ref 26.5–33)
MCHC RBC AUTO-ENTMCNC: 32.6 G/DL (ref 31.5–36.5)
MCV RBC AUTO: 85 FL (ref 78–100)
MONOCYTES # BLD AUTO: 0.3 10E9/L (ref 0–1.3)
MONOCYTES NFR BLD AUTO: 7.6 %
NEUTROPHILS # BLD AUTO: 2.5 10E9/L (ref 1.6–8.3)
NEUTROPHILS NFR BLD AUTO: 59.3 %
NRBC # BLD AUTO: 0 10*3/UL
NRBC BLD AUTO-RTO: 0 /100
PLATELET # BLD AUTO: 259 10E9/L (ref 150–450)
POTASSIUM SERPL-SCNC: 3.7 MMOL/L (ref 3.4–5.3)
PROT SERPL-MCNC: 7.4 G/DL (ref 6.8–8.8)
RBC # BLD AUTO: 4.88 10E12/L (ref 3.8–5.2)
SODIUM SERPL-SCNC: 135 MMOL/L (ref 133–144)
WBC # BLD AUTO: 4.2 10E9/L (ref 4–11)

## 2021-06-07 PROCEDURE — 36415 COLL VENOUS BLD VENIPUNCTURE: CPT

## 2021-06-07 PROCEDURE — 99214 OFFICE O/P EST MOD 30 MIN: CPT | Mod: 95 | Performed by: PHYSICIAN ASSISTANT

## 2021-06-07 PROCEDURE — 80053 COMPREHEN METABOLIC PANEL: CPT | Mod: GT | Performed by: INTERNAL MEDICINE

## 2021-06-07 PROCEDURE — 85025 COMPLETE CBC W/AUTO DIFF WBC: CPT | Mod: GT | Performed by: INTERNAL MEDICINE

## 2021-06-07 ASSESSMENT — PAIN SCALES - GENERAL: PAINLEVEL: NO PAIN (0)

## 2021-06-07 NOTE — PROGRESS NOTES
Mily is a 55 year old who is being evaluated via a billable video visit.      How would you like to obtain your AVS? MyChart  If the video visit is dropped, the invitation should be resent by: Text to cell phone: [phone]  Will anyone else be joining your video visit? No       I have reviewed and updated the patient's allergies and medication list.    Concerns: none  Refills: none     /69   Pulse 88   Temp 98.2  F (36.8  C) (Oral)   Resp 18   Wt 63.1 kg (139 lb 1.6 oz)   LMP 11/02/2014   SpO2 95%   BMI 23.15 kg/m      Roxane Thomas CMA        Video Start Time: 1:30 PM  Video-Visit Details    Type of service:  Video Visit    Video End Time:1:57 PM    Originating Location (pt. Location): Home    Distant Location (provider location):  [facility] CANC[facility]     Platform used for Video Visit: [employer]          HPI: Kathy Lei is a 56 yo woman with a new diagnosis of an estrogen-receptor positive, NE-positive, HER2-negative breast cancer, grade 2, in the upper outer quadrant of the right breast since the end of year 2018.      TREATMENT HISTORY:  A.  Neoadjuvant durvalumab, oliparib, paclitaxel on I SPY 2  B  Neoadjuvant ddAC.  C.  Bilateral mastectomy.  RCB 3 result.   Pathologic stage was [id number] sn.   D.  Post-surgical radiation.  Completed 1-6-20.     Treatment Summary to Date  Treatment Site: Rt CW , nodes + SCV Current Dose: 6040/6040 cGy Fractions: 33/33       E.  Begin CREATE-X. With letrozole. Plan is for 24 weeks. Begun January 14.  Ended June 26.      F.  She held the letrozole beginning April 28 because of severe joint pains.  G.  She restarted AI with anastrozole 6-30-20.  Discontinued August 15 due to hot flashes and nausea.  H.  Restarted letrozole on 8-15-20 and is tolerating it well.   I.  Free flap reconstruction performed February 8, 2021.  Letrozole was held for 3 weeks.  I advised her to restart it on March 2, 2021.     INTERVAL HISTORY  Mily is feeling  well today. She saw sports medicine about long history of L ankle pain. She had x-rays down of L ankle and L-spine and is now undergoing PT for lumbar radiculopathy. She has not noticed a clear improvement yet. She is doing home exercises and using Advil periodically. No other new or different pain. Letrozole does cause some general achiness/stiffness which is tolerable.     No lumps/bumps. No chest wall concerns though everything is still healing from reconstruction. No recent fevers/chills or infectious concerns, cough/SOB besides her asthma symptoms, abdominal pain, difficulties with bowels or bladder.      PHYSICAL EXAMINATION:   Video physical exam  General: Patient appears well in no acute distress.   Skin: No visualized rash or lesions on visualized skin  Eyes: EOMI, no erythema, sclera icterus or discharge noted  Resp: Appears to be breathing comfortably without accessory muscle usage, speaking in full sentences, no cough  MSK: Appears to have normal range of motion based on visualized movements  Neurologic: No apparent tremors, facial movements symmetric  Psych: affect bright, alert and oriented    The rest of a comprehensive physical examination is deferred due to PHE (public health emergency) video restrictions         LABS:    6/7/2021 10:19   Sodium 135   Potassium 3.7   Chloride 104   Carbon Dioxide 23   Urea Nitrogen 12   Creatinine 0.61   GFR Estimate >90   GFR Estimate If Black >90   Calcium 9.1   Anion Gap 7   Albumin 3.8   Protein Total 7.4   Bilirubin Total 0.4   Alkaline Phosphatase 73   ALT 20   AST 14   Glucose 98   WBC 4.2   Hemoglobin 13.5   Hematocrit 41.4   Platelet Count 259   RBC Count 4.88   MCV 85   MCH 27.7   MCHC 32.6   RDW 13.9   Diff Method Automated Method   % Neutrophils 59.3   % Lymphocytes 28.3   % Monocytes 7.6   % Eosinophils 3.8   % Basophils 0.5   % Immature Granulocytes 0.5   Nucleated RBCs 0   Absolute Neutrophil 2.5   Absolute Lymphocytes 1.2   Absolute Monocytes 0.3    Absolute Eosinophils 0.2   Absolute Basophils 0.0   Abs Immature Granulocytes 0.0   Absolute Nucleated RBC 0.0           ASSESSMENT AND PLAN:     1.  Mily Lei is a 55-year-old woman who presented with a locally advanced right breast cancer.  This cancer developed in the context of previous breast augmentation.  On presentation, she had multiple masses occupying an area of approximately 9 cm in the upper outer quadrant with some extension to the subareolar region.  She did not have any clinically apparent lymph node involvement.  She was treated with neoadjuvant chemotherapy on the I-SPY 2 trial and was randomized to paclitaxel, olaparib and durvalumab followed by dose-dense AC.  She tolerated the treatment reasonably well but had an RCB3 result with final stage [id number].  She started on the CREATE-X trial and had adjuvant capecitabine for 24 weeks combined with an aromatase inhibitor, which will be continued for 10 years. She completed 24 weeks of Xeloda.  She declined the SELIN trial.     She remains on letrozole and is tolerating well with manageable symptoms. Reviewed surveillance plan for visits with every 3 months with H&P and labs. No routine imaging is needed. Plan to follow-up with Dr. Guzman in about 3 months.     2. Breast reconstruction.  Now complete. She is recovering well and continues to follow with Dr. Ramos.     3.  Lymphedema. R arm stiffness now resolved.     4. Hx of triple scoliosis with DJD and lumbar radiculopathy: Undergoing PT currently and using Advil PRN.     5.  Bone health. DEXA scan showed a most valid negative T score of -1.7 on 7-8-21.  Recommended calcium vitamin D and weightbearing exercise.       35 minutes spent on the date of the encounter doing chart review, review of test results, interpretation of tests, patient visit and documentation      Karen Fernandez PA-C

## 2021-06-07 NOTE — LETTER
6/7/2021         RE: Kathy Lei  [address] [place] [address]        Dear Colleague,    Thank you for referring your patient, Kathy Lei, to the [facility]. Please see a copy of my visit note below.    Mily is a 55 year old who is being evaluated via a billable video visit.      How would you like to obtain your AVS? MyChart  If the video visit is dropped, the invitation should be resent by: Text to cell phone: [phone]  Will anyone else be joining your video visit? No       I have reviewed and updated the patient's allergies and medication list.    Concerns: none  Refills: none     /69   Pulse 88   Temp 98.2  F (36.8  C) (Oral)   Resp 18   Wt 63.1 kg (139 lb 1.6 oz)   LMP 11/02/2014   SpO2 95%   BMI 23.15 kg/m      Roxane Thomas CMA        Video Start Time: 1:30 PM  Video-Visit Details    Type of service:  Video Visit    Video End Time:1:57 PM    Originating Location (pt. Location): Home    Distant Location (provider location):  [facility]     Platform used for Video Visit: Netbooks          HPI: Kathy Lei is a 54 yo woman with a new diagnosis of an estrogen-receptor positive, AK-positive, HER2-negative breast cancer, grade 2, in the upper outer quadrant of the right breast since the end of year 2018.      TREATMENT HISTORY:  A.  Neoadjuvant durvalumab, oliparib, paclitaxel on I SPY 2  B  Neoadjuvant ddAC.  C.  Bilateral mastectomy.  RCB 3 result.   Pathologic stage was [id number] sn.   D.  Post-surgical radiation.  Completed 1-6-20.     Treatment Summary to Date  Treatment Site: Rt CW , nodes + SCV Current Dose: 6040/6040 cGy Fractions: 33/33       E.  Begin CREATE-X. With letrozole. Plan is for 24 weeks. Begun January 14.  Ended June 26.      F.  She held the letrozole beginning April 28 because of severe joint pains.  G.  She restarted AI with anastrozole 6-30-20.  Discontinued August 15 due  to hot flashes and nausea.  H.  Restarted letrozole on 8-15-20 and is tolerating it well.   I.  Free flap reconstruction performed February 8, 2021.  Letrozole was held for 3 weeks.  I advised her to restart it on March 2, 2021.     INTERVAL HISTORY  Mily is feeling well today. She saw sports medicine about long history of L ankle pain. She had x-rays down of L ankle and L-spine and is now undergoing PT for lumbar radiculopathy. She has not noticed a clear improvement yet. She is doing home exercises and using Advil periodically. No other new or different pain. Letrozole does cause some general achiness/stiffness which is tolerable.     No lumps/bumps. No chest wall concerns though everything is still healing from reconstruction. No recent fevers/chills or infectious concerns, cough/SOB besides her asthma symptoms, abdominal pain, difficulties with bowels or bladder.      PHYSICAL EXAMINATION:   Video physical exam  General: Patient appears well in no acute distress.   Skin: No visualized rash or lesions on visualized skin  Eyes: EOMI, no erythema, sclera icterus or discharge noted  Resp: Appears to be breathing comfortably without accessory muscle usage, speaking in full sentences, no cough  MSK: Appears to have normal range of motion based on visualized movements  Neurologic: No apparent tremors, facial movements symmetric  Psych: affect bright, alert and oriented    The rest of a comprehensive physical examination is deferred due to PHE (public health emergency) video restrictions         LABS:    6/7/2021 10:19   Sodium 135   Potassium 3.7   Chloride 104   Carbon Dioxide 23   Urea Nitrogen 12   Creatinine 0.61   GFR Estimate >90   GFR Estimate If Black >90   Calcium 9.1   Anion Gap 7   Albumin 3.8   Protein Total 7.4   Bilirubin Total 0.4   Alkaline Phosphatase 73   ALT 20   AST 14   Glucose 98   WBC 4.2   Hemoglobin 13.5   Hematocrit 41.4   Platelet Count 259   RBC Count 4.88   MCV 85   MCH 27.7   MCHC 32.6    RDW 13.9   Diff Method Automated Method   % Neutrophils 59.3   % Lymphocytes 28.3   % Monocytes 7.6   % Eosinophils 3.8   % Basophils 0.5   % Immature Granulocytes 0.5   Nucleated RBCs 0   Absolute Neutrophil 2.5   Absolute Lymphocytes 1.2   Absolute Monocytes 0.3   Absolute Eosinophils 0.2   Absolute Basophils 0.0   Abs Immature Granulocytes 0.0   Absolute Nucleated RBC 0.0           ASSESSMENT AND PLAN:     1.  Mily Lei is a 55-year-old woman who presented with a locally advanced right breast cancer.  This cancer developed in the context of previous breast augmentation.  On presentation, she had multiple masses occupying an area of approximately 9 cm in the upper outer quadrant with some extension to the subareolar region.  She did not have any clinically apparent lymph node involvement.  She was treated with neoadjuvant chemotherapy on the I-SPY 2 trial and was randomized to paclitaxel, olaparib and durvalumab followed by dose-dense AC.  She tolerated the treatment reasonably well but had an RCB3 result with final stage [id number].  She started on the CREATE-X trial and had adjuvant capecitabine for 24 weeks combined with an aromatase inhibitor, which will be continued for 10 years. She completed 24 weeks of Xeloda.  She declined the SELIN trial.     She remains on letrozole and is tolerating well with manageable symptoms. Reviewed surveillance plan for visits with every 3 months with H&P and labs. No routine imaging is needed. Plan to follow-up with Dr. Guzman in about 3 months.     2. Breast reconstruction.  Now complete. She is recovering well and continues to follow with Dr. Ramos.     3.  Lymphedema. R arm stiffness now resolved.     4. Hx of triple scoliosis with DJD and lumbar radiculopathy: Undergoing PT currently and using Advil PRN.     5.  Bone health. DEXA scan showed a most valid negative T score of -1.7 on 7-8-21.  Recommended calcium vitamin D and weightbearing exercise.       35 minutes  spent on the date of the encounter doing chart review, review of test results, interpretation of tests, patient visit and documentation      Karen Fernandez PA-C          Again, thank you for allowing me to participate in the care of your patient.        Sincerely,        Karen Fernandez PA-C

## 2021-06-09 ENCOUNTER — THERAPY VISIT (OUTPATIENT)
Dept: PHYSICAL THERAPY | Facility: CLINIC | Age: 56
End: 2021-06-09
Payer: COMMERCIAL

## 2021-06-09 DIAGNOSIS — M54.50 LEFT-SIDED LOW BACK PAIN WITHOUT SCIATICA: ICD-10-CM

## 2021-06-09 DIAGNOSIS — M25.572 LEFT ANKLE PAIN, UNSPECIFIED CHRONICITY: ICD-10-CM

## 2021-06-09 PROCEDURE — 97110 THERAPEUTIC EXERCISES: CPT | Mod: GP | Performed by: PHYSICAL THERAPIST

## 2021-06-09 PROCEDURE — 97112 NEUROMUSCULAR REEDUCATION: CPT | Mod: GP | Performed by: PHYSICAL THERAPIST

## 2021-06-23 ENCOUNTER — THERAPY VISIT (OUTPATIENT)
Dept: PHYSICAL THERAPY | Facility: CLINIC | Age: 56
End: 2021-06-23
Payer: COMMERCIAL

## 2021-06-23 DIAGNOSIS — M25.572 LEFT ANKLE PAIN, UNSPECIFIED CHRONICITY: ICD-10-CM

## 2021-06-23 DIAGNOSIS — M54.50 LEFT-SIDED LOW BACK PAIN WITHOUT SCIATICA: ICD-10-CM

## 2021-06-23 PROCEDURE — 97110 THERAPEUTIC EXERCISES: CPT | Mod: GP | Performed by: PHYSICAL THERAPIST

## 2021-06-23 PROCEDURE — 97112 NEUROMUSCULAR REEDUCATION: CPT | Mod: GP | Performed by: PHYSICAL THERAPIST

## 2021-06-23 NOTE — PROGRESS NOTES
"Discharge Note    Progress reporting period is from initial evaluation date (please see noted date below) to Jun 23, 2021.  Linked Episodes   Type: Episode: Status: Noted: Resolved: Last update: Updated by:   PHYSICAL THERAPY L sided LBP/L ankle pain  5/18/21 Active 5/18/2021 6/23/2021 10:16 AM Piter Otero, PT      Comments:       Kathy was seen for 5 visits addressing her chief complaint chronic LBP and L medial ankle pain with significant worsening of L medial ankle pain over the last several months with potential correlation to inactivity during breast cancer treatment. Pt reports a \"deep\" L medial ankle pain with prolonged standing and walking. Pt denies any direct correlation with her L LBP and her ankle pain and denies any shooting pain, numbness/tingling, or loss of strength. Radiographs reveal severe Lumbar DDD (see below) and MD recommended PT for L ankle pain w/ potential L sided lumbar radiculopathy.    Pt reports mild improvements with PT and able to stand a little longer but is still limited with her ankle pain with standing/walking. Pt would like to cont HEP and f/u with MD if sxs persist    SUBJECTIVE  Subjective changes noted by patient:  Pt reports doing okay, still is limited with prolonged standing and walking but pain is more variable lately. Exercises going well. Would like to cont HEP independently and f/u as needed  .  Current pain level is 6/10.     Previous pain level was  8/10.   Changes in function:  Yes (See Goal flowsheet attached for changes in current functional level)  Adverse reaction to treatment or activity: None    OBJECTIVE  Changes noted in objective findings: Mod loss of Lumbar extension ROM, improved hip ext ROM and ankle eversion strength. Cont HEP and f/u as needed     ASSESSMENT/PLAN  Diagnosis: L LBP and L ankle pain (Suspect L ankle pain is radicular)   Updated problem list and treatment plan:   Pain - HEP  Decreased ROM/flexibility - HEP  STG/LTGs have been met or " progress has been made towards goals:  Yes, please see goal flowsheet for most current information  Assessment of Progress: current status is unknown.    Last current status:     Self Management Plans:  HEP  I have re-evaluated this patient and find that the nature, scope, duration and intensity of the therapy is appropriate for the medical condition of the patient.  Kathy continues to require the following intervention to meet STG and LTG's:  HEP.    Recommendations:  Discharge with current home program.  Patient to follow up with MD as needed.    Please refer to the daily flowsheet for treatment today, total treatment time and time spent performing 1:1 timed codes.

## 2021-07-11 ENCOUNTER — MYC MEDICAL ADVICE (OUTPATIENT)
Dept: FAMILY MEDICINE | Facility: CLINIC | Age: 56
End: 2021-07-11

## 2021-07-11 DIAGNOSIS — I42.9 CARDIOMYOPATHY, UNSPECIFIED TYPE (H): ICD-10-CM

## 2021-07-12 RX ORDER — LISINOPRIL 5 MG/1
5 TABLET ORAL DAILY
Qty: 90 TABLET | Refills: 3 | Status: SHIPPED | OUTPATIENT
Start: 2021-07-12 | End: 2022-02-08

## 2021-07-12 NOTE — TELEPHONE ENCOUNTER
Are you willing to take over this medication for her. Her oncologist has been filling this for her.    Thank you

## 2021-08-28 DIAGNOSIS — J45.40 MODERATE PERSISTENT ASTHMA WITHOUT COMPLICATION: ICD-10-CM

## 2021-09-01 ENCOUNTER — MYC MEDICAL ADVICE (OUTPATIENT)
Dept: FAMILY MEDICINE | Facility: CLINIC | Age: 56
End: 2021-09-01

## 2021-09-01 RX ORDER — ALBUTEROL SULFATE 90 UG/1
AEROSOL, METERED RESPIRATORY (INHALATION)
Qty: 18 G | Status: SHIPPED | OUTPATIENT
Start: 2021-09-01 | End: 2022-02-08

## 2021-09-01 RX ORDER — ALBUTEROL SULFATE 90 UG/1
AEROSOL, METERED RESPIRATORY (INHALATION)
Qty: 18 G | OUTPATIENT
Start: 2021-09-01

## 2021-09-01 NOTE — TELEPHONE ENCOUNTER
Per Karen Fernandez PA-C. something PCP should resume managing and refill. Bottem was filling before oncology.  Past refill was one time fill by oncology.

## 2021-09-01 NOTE — TELEPHONE ENCOUNTER
Last prescribing provider: Caryn Phelps PA-C on 7/28/20    Last clinic visit date: 6/7/21 by Osmin Fernandez PA-C    Any missed appointments or no-shows since last clinic visit?: Pt cancelled via [employer] apt for 9/9 with Dr. Guzman    Recommendations for requested medication: none    Next clinic visit date:none scheduled.    Any other pertinent information: Routed to Osmin Fernandez

## 2021-09-08 ENCOUNTER — IMMUNIZATION (OUTPATIENT)
Dept: NURSING | Facility: CLINIC | Age: 56
End: 2021-09-08
Payer: COMMERCIAL

## 2021-09-08 ENCOUNTER — MYC MEDICAL ADVICE (OUTPATIENT)
Dept: ONCOLOGY | Facility: CLINIC | Age: 56
End: 2021-09-08

## 2021-09-08 PROCEDURE — 0003A PR COVID VAC PFIZER DIL RECON 30 MCG/0.3 ML IM: CPT

## 2021-09-08 PROCEDURE — 91300 PR COVID VAC PFIZER DIL RECON 30 MCG/0.3 ML IM: CPT

## 2021-09-09 DIAGNOSIS — Z17.0 MALIGNANT NEOPLASM OF UPPER-OUTER QUADRANT OF RIGHT BREAST IN FEMALE, ESTROGEN RECEPTOR POSITIVE (H): Primary | ICD-10-CM

## 2021-09-09 DIAGNOSIS — C50.411 MALIGNANT NEOPLASM OF UPPER-OUTER QUADRANT OF RIGHT BREAST IN FEMALE, ESTROGEN RECEPTOR POSITIVE (H): Primary | ICD-10-CM

## 2021-09-09 RX ORDER — LETROZOLE 2.5 MG/1
2.5 TABLET, FILM COATED ORAL DAILY
Qty: 90 TABLET | Refills: 3 | Status: SHIPPED | OUTPATIENT
Start: 2021-09-09 | End: 2022-10-12

## 2021-09-09 NOTE — TELEPHONE ENCOUNTER
Letrozole   Last prescribing provider: Patient reported med     Last clinic visit date: 6/7/21 FABIOLA Fernandez     Any missed appointments or no-shows since last clinic visit?: No     Recommendations for requested medication (if none, N/A): Copied from chart note FABIOLA Fernandez 6/7/21   H. Restarted letrozole on 8-15-20 and is tolerating it well.   I. Free flap reconstruction performed February 8, 2021. Letrozole was held for 3 weeks. I advised her to restart it on March 2, 2021.      Next clinic visit date: Not yet scheduled     Any other pertinent information (if none, N/A): N/A

## 2021-09-11 DIAGNOSIS — E03.9 HYPOTHYROIDISM, UNSPECIFIED TYPE: ICD-10-CM

## 2021-09-14 RX ORDER — LEVOTHYROXINE SODIUM 125 UG/1
TABLET ORAL
Qty: 90 TABLET | Refills: 0 | OUTPATIENT
Start: 2021-09-14

## 2021-09-14 NOTE — TELEPHONE ENCOUNTER
Order sent 8/31/21 for 3 months supply to requesting pharmacy.  Message sent to pharmacy - Refusal reason: Patient has requested refill too soon.  Kaia ZIMMER

## 2021-09-18 ENCOUNTER — HEALTH MAINTENANCE LETTER (OUTPATIENT)
Age: 56
End: 2021-09-18

## 2021-10-15 ENCOUNTER — MYC MEDICAL ADVICE (OUTPATIENT)
Dept: FAMILY MEDICINE | Facility: CLINIC | Age: 56
End: 2021-10-15

## 2021-10-19 ENCOUNTER — MYC MEDICAL ADVICE (OUTPATIENT)
Dept: FAMILY MEDICINE | Facility: CLINIC | Age: 56
End: 2021-10-19

## 2021-10-19 DIAGNOSIS — J45.40 MODERATE PERSISTENT ASTHMA WITHOUT COMPLICATION: Primary | ICD-10-CM

## 2021-10-21 NOTE — TELEPHONE ENCOUNTER
"Ivanna- please review and advise: pended pharmacy    \"Sorry to bother you again.   Can I try a different albuterol? Maybe my insurance will cover that.  Thank you...much appreciated as always \"    AG SalomonN RN  [facility]    "

## 2021-10-24 RX ORDER — ALBUTEROL SULFATE 90 UG/1
2 AEROSOL, METERED RESPIRATORY (INHALATION) EVERY 4 HOURS PRN
Qty: 8.5 G | Refills: 12 | Status: SHIPPED | OUTPATIENT
Start: 2021-10-24 | End: 2023-04-10

## 2021-11-18 ENCOUNTER — E-VISIT (OUTPATIENT)
Dept: FAMILY MEDICINE | Facility: CLINIC | Age: 56
End: 2021-11-18
Payer: COMMERCIAL

## 2021-11-18 DIAGNOSIS — B96.89 ACUTE BACTERIAL SINUSITIS: Primary | ICD-10-CM

## 2021-11-18 DIAGNOSIS — J01.90 ACUTE BACTERIAL SINUSITIS: Primary | ICD-10-CM

## 2021-11-18 PROCEDURE — 99421 OL DIG E/M SVC 5-10 MIN: CPT | Performed by: NURSE PRACTITIONER

## 2021-11-19 ENCOUNTER — MYC MEDICAL ADVICE (OUTPATIENT)
Dept: FAMILY MEDICINE | Facility: CLINIC | Age: 56
End: 2021-11-19
Payer: COMMERCIAL

## 2021-11-21 NOTE — PATIENT INSTRUCTIONS
Sinusitis (Antibiotic Treatment)    The sinuses are air-filled spaces within the bones of the face. They connect to the inside of the nose. Sinusitis is an inflammation of the tissue that lines the sinuses. Sinusitis can occur during a cold. It can also happen due to allergies to pollens and other particles in the air. Sinusitis can cause symptoms of sinus congestion and a feeling of fullness. A sinus infection causes fever, headache, and facial pain. There is often green or yellow fluid draining from the nose or into the back of the throat (post-nasal drip). You have been given antibiotics to treat this condition.   Home care    Take the full course of antibiotics as instructed. Don't stop taking them, even when you feel better.    Drink plenty of water, hot tea, and other liquids as directed by the healthcare provider. This may help thin nasal mucus. It also may help your sinuses drain fluids.    Heat may help soothe painful areas of your face. Use a towel soaked in hot water. Or,  the shower and direct the warm spray onto your face. Using a vaporizer along with a menthol rub at night may also help soothe symptoms.     An expectorant with guaifenesin may help thin nasal mucus and help your sinuses drain fluids. Talk with your provider or pharmacists before taking an over-the-counter (OTC) medicine if you have any questions about it or its side effects..    You can use an OTC decongestant, unless a similar medicine was prescribed to you. Nasal sprays work the fastest. Use one that contains phenylephrine or oxymetazoline. First blow your nose gently. Then use the spray. Don't use these medicines more often than directed on the label. If you do, your symptoms may get worse. You may also take pills that contain pseudoephedrine. Don t use products that combine multiple medicines. This is because side effects may be increased. Read labels. You can also ask the pharmacist for help. (People with high blood  pressure should not use decongestants. They can raise blood pressure.) Talk with your provider or pharmacist if you have any questions about the medicine..    OTC antihistamines may help if allergies contributed to your sinusitis. Talk with your provider or pharmacist if you have any questions about the medicine..    Don't use nasal rinses or irrigation during an acute sinus infection, unless your healthcare provider tells you to. Rinsing may spread the infection to other areas in your sinuses.    Use acetaminophen or ibuprofen to control pain, unless another pain medicine was prescribed to you. If you have chronic liver or kidney disease or ever had a stomach ulcer, talk with your healthcare provider before using these medicines. Never give aspirin to anyone under age 18 who is ill with a fever. It may cause severe liver damage.    Don't smoke. This can make symptoms worse.    Follow-up care  Follow up with your healthcare provider, or as advised.   When to seek medical advice  Call your healthcare provider if any of these occur:     Facial pain or headache that gets worse    Stiff neck    Unusual drowsiness or confusion    Swelling of your forehead or eyelids    Symptoms don't go away in 10 days    Vision problems, such as blurred or double vision    Fever of 100.4 F (38 C) or higher, or as directed by your healthcare provider  Call 911  Call 911 if any of these occur:     Seizure    Trouble breathing    Feeling dizzy or faint    Fingernails, skin or lips look blue, purple , or gray  Prevention  Here are steps you can take to help prevent an infection:     Keep good hand washing habits.    Don t have close contact with people who have sore throats, colds, or other upper respiratory infections.    Don t smoke, and stay away from secondhand smoke.    Stay up to date with of your vaccines.  [employer] last reviewed this educational content on 12/1/2019 2000-2021 [employer]. All rights reserved. This  information is not intended as a substitute for professional medical care. Always follow your healthcare professional's instructions.        Dear Kathy Lei    After reviewing your responses, I've been able to diagnose you with?a sinus infection caused by bacteria.?     Based on your responses and diagnosis, I have prescribed Augmentin to treat your symptoms. I have sent this to your pharmacy.?     It is also important to stay well hydrated, get lots of rest and take over-the-counter decongestants,?tylenol?or ibuprofen if you?are able to?take those medications per your primary care provider to help relieve discomfort.?     It is important that you take?all of?your prescribed medication even if your symptoms are improving after a few doses.? Taking?all of?your medicine helps prevent the symptoms from returning.?     If your symptoms worsen, you develop severe headache, vomiting, high fever (>102), or are not improving in 7 days, please contact your primary care provider for an appointment or visit any of our convenient Walk-in Care or Urgent Care Centers to be seen which can be found on our website?here.?     Thanks again for choosing?us?as your health care partner,?   ?  Rachel Arauz NP?

## 2022-01-08 ENCOUNTER — HEALTH MAINTENANCE LETTER (OUTPATIENT)
Age: 57
End: 2022-01-08

## 2022-02-05 ASSESSMENT — ENCOUNTER SYMPTOMS
PALPITATIONS: 0
SHORTNESS OF BREATH: 1
FEVER: 0
NERVOUS/ANXIOUS: 0
CONSTIPATION: 0
MYALGIAS: 1
EYE PAIN: 0
ABDOMINAL PAIN: 0
DIARRHEA: 0
HEARTBURN: 0
HEMATURIA: 0
FREQUENCY: 0
SORE THROAT: 0
BREAST MASS: 0
JOINT SWELLING: 1
PARESTHESIAS: 0
WEAKNESS: 0
ARTHRALGIAS: 1
DYSURIA: 0
HEADACHES: 0
CHILLS: 0
DIZZINESS: 0
HEMATOCHEZIA: 0
COUGH: 1
NAUSEA: 0

## 2022-02-08 ENCOUNTER — OFFICE VISIT (OUTPATIENT)
Dept: FAMILY MEDICINE | Facility: CLINIC | Age: 57
End: 2022-02-08
Payer: COMMERCIAL

## 2022-02-08 VITALS
WEIGHT: 141 LBS | SYSTOLIC BLOOD PRESSURE: 114 MMHG | BODY MASS INDEX: 23.49 KG/M2 | OXYGEN SATURATION: 97 % | DIASTOLIC BLOOD PRESSURE: 63 MMHG | HEIGHT: 65 IN | RESPIRATION RATE: 18 BRPM | TEMPERATURE: 97.7 F | HEART RATE: 95 BPM

## 2022-02-08 DIAGNOSIS — G62.0 DRUG-INDUCED POLYNEUROPATHY (H): ICD-10-CM

## 2022-02-08 DIAGNOSIS — J45.40 MODERATE PERSISTENT ASTHMA WITHOUT COMPLICATION: ICD-10-CM

## 2022-02-08 DIAGNOSIS — Z87.891 PERSONAL HISTORY OF TOBACCO USE: ICD-10-CM

## 2022-02-08 DIAGNOSIS — C50.411 MALIGNANT NEOPLASM OF UPPER-OUTER QUADRANT OF RIGHT BREAST IN FEMALE, ESTROGEN RECEPTOR POSITIVE (H): ICD-10-CM

## 2022-02-08 DIAGNOSIS — G47.00 INSOMNIA, UNSPECIFIED TYPE: ICD-10-CM

## 2022-02-08 DIAGNOSIS — Z23 NEED FOR SHINGLES VACCINE: ICD-10-CM

## 2022-02-08 DIAGNOSIS — Z23 NEED FOR PROPHYLACTIC VACCINATION AND INOCULATION AGAINST INFLUENZA: ICD-10-CM

## 2022-02-08 DIAGNOSIS — Z17.0 MALIGNANT NEOPLASM OF UPPER-OUTER QUADRANT OF RIGHT BREAST IN FEMALE, ESTROGEN RECEPTOR POSITIVE (H): ICD-10-CM

## 2022-02-08 DIAGNOSIS — F33.0 MILD RECURRENT MAJOR DEPRESSION (H): ICD-10-CM

## 2022-02-08 DIAGNOSIS — Z00.00 ROUTINE GENERAL MEDICAL EXAMINATION AT A HEALTH CARE FACILITY: Primary | ICD-10-CM

## 2022-02-08 DIAGNOSIS — E03.9 HYPOTHYROIDISM, UNSPECIFIED TYPE: ICD-10-CM

## 2022-02-08 DIAGNOSIS — I42.9 CARDIOMYOPATHY, UNSPECIFIED TYPE (H): ICD-10-CM

## 2022-02-08 LAB
ALBUMIN SERPL-MCNC: 3.9 G/DL (ref 3.4–5)
ALP SERPL-CCNC: 75 U/L (ref 40–150)
ALT SERPL W P-5'-P-CCNC: 22 U/L (ref 0–50)
ANION GAP SERPL CALCULATED.3IONS-SCNC: 5 MMOL/L (ref 3–14)
AST SERPL W P-5'-P-CCNC: 15 U/L (ref 0–45)
BILIRUB SERPL-MCNC: 0.4 MG/DL (ref 0.2–1.3)
BUN SERPL-MCNC: 9 MG/DL (ref 7–30)
CALCIUM SERPL-MCNC: 9.2 MG/DL (ref 8.5–10.1)
CHLORIDE BLD-SCNC: 106 MMOL/L (ref 94–109)
CHOLEST SERPL-MCNC: 249 MG/DL
CO2 SERPL-SCNC: 27 MMOL/L (ref 20–32)
CREAT SERPL-MCNC: 0.62 MG/DL (ref 0.52–1.04)
ERYTHROCYTE [DISTWIDTH] IN BLOOD BY AUTOMATED COUNT: 12.9 % (ref 10–15)
FASTING STATUS PATIENT QL REPORTED: NO
GFR SERPL CREATININE-BSD FRML MDRD: >90 ML/MIN/1.73M2
GLUCOSE BLD-MCNC: 108 MG/DL (ref 70–99)
HCT VFR BLD AUTO: 42.3 % (ref 35–47)
HDLC SERPL-MCNC: 81 MG/DL
HGB BLD-MCNC: 14.2 G/DL (ref 11.7–15.7)
HIV 1+2 AB+HIV1 P24 AG SERPL QL IA: NONREACTIVE
LDLC SERPL CALC-MCNC: 147 MG/DL
MCH RBC QN AUTO: 28.9 PG (ref 26.5–33)
MCHC RBC AUTO-ENTMCNC: 33.6 G/DL (ref 31.5–36.5)
MCV RBC AUTO: 86 FL (ref 78–100)
NONHDLC SERPL-MCNC: 168 MG/DL
PLATELET # BLD AUTO: 269 10E3/UL (ref 150–450)
POTASSIUM BLD-SCNC: 3.8 MMOL/L (ref 3.4–5.3)
PROT SERPL-MCNC: 7.6 G/DL (ref 6.8–8.8)
RBC # BLD AUTO: 4.91 10E6/UL (ref 3.8–5.2)
SODIUM SERPL-SCNC: 138 MMOL/L (ref 133–144)
TRIGL SERPL-MCNC: 105 MG/DL
TSH SERPL DL<=0.005 MIU/L-ACNC: 0.64 MU/L (ref 0.4–4)
WBC # BLD AUTO: 4.2 10E3/UL (ref 4–11)

## 2022-02-08 PROCEDURE — 85027 COMPLETE CBC AUTOMATED: CPT | Performed by: NURSE PRACTITIONER

## 2022-02-08 PROCEDURE — 80061 LIPID PANEL: CPT | Performed by: NURSE PRACTITIONER

## 2022-02-08 PROCEDURE — 84443 ASSAY THYROID STIM HORMONE: CPT | Performed by: NURSE PRACTITIONER

## 2022-02-08 PROCEDURE — 99396 PREV VISIT EST AGE 40-64: CPT | Mod: 25 | Performed by: NURSE PRACTITIONER

## 2022-02-08 PROCEDURE — G0296 VISIT TO DETERM LDCT ELIG: HCPCS | Performed by: NURSE PRACTITIONER

## 2022-02-08 PROCEDURE — 99214 OFFICE O/P EST MOD 30 MIN: CPT | Mod: 25 | Performed by: NURSE PRACTITIONER

## 2022-02-08 PROCEDURE — 90682 RIV4 VACC RECOMBINANT DNA IM: CPT | Performed by: NURSE PRACTITIONER

## 2022-02-08 PROCEDURE — 90471 IMMUNIZATION ADMIN: CPT | Performed by: NURSE PRACTITIONER

## 2022-02-08 PROCEDURE — 36415 COLL VENOUS BLD VENIPUNCTURE: CPT | Performed by: NURSE PRACTITIONER

## 2022-02-08 PROCEDURE — 90750 HZV VACC RECOMBINANT IM: CPT | Performed by: NURSE PRACTITIONER

## 2022-02-08 PROCEDURE — 87389 HIV-1 AG W/HIV-1&-2 AB AG IA: CPT | Performed by: NURSE PRACTITIONER

## 2022-02-08 PROCEDURE — 90472 IMMUNIZATION ADMIN EACH ADD: CPT | Performed by: NURSE PRACTITIONER

## 2022-02-08 PROCEDURE — 80053 COMPREHEN METABOLIC PANEL: CPT | Performed by: NURSE PRACTITIONER

## 2022-02-08 RX ORDER — SILVER SULFADIAZINE 10 MG/G
CREAM TOPICAL ONCE
Status: CANCELLED | OUTPATIENT
Start: 2022-02-08 | End: 2022-02-08

## 2022-02-08 RX ORDER — MONTELUKAST SODIUM 10 MG/1
10 TABLET ORAL AT BEDTIME
Qty: 90 TABLET | Refills: 3 | Status: SHIPPED | OUTPATIENT
Start: 2022-02-08 | End: 2023-02-09

## 2022-02-08 RX ORDER — ALBUTEROL SULFATE 90 UG/1
AEROSOL, METERED RESPIRATORY (INHALATION)
Qty: 18 G | Status: SHIPPED | OUTPATIENT
Start: 2022-02-08 | End: 2023-04-10

## 2022-02-08 RX ORDER — SERTRALINE HYDROCHLORIDE 100 MG/1
100 TABLET, FILM COATED ORAL AT BEDTIME
Qty: 90 TABLET | Refills: 3 | Status: SHIPPED | OUTPATIENT
Start: 2022-02-08 | End: 2023-03-30

## 2022-02-08 RX ORDER — LISINOPRIL 5 MG/1
5 TABLET ORAL DAILY
Qty: 90 TABLET | Refills: 3 | Status: SHIPPED | OUTPATIENT
Start: 2022-02-08 | End: 2023-04-10

## 2022-02-08 RX ORDER — TRAZODONE HYDROCHLORIDE 50 MG/1
50-100 TABLET, FILM COATED ORAL
Qty: 90 TABLET | Status: SHIPPED | OUTPATIENT
Start: 2022-02-08 | End: 2023-04-10

## 2022-02-08 RX ORDER — LEVOTHYROXINE SODIUM 125 UG/1
TABLET ORAL
Qty: 90 TABLET | Refills: 3 | Status: SHIPPED | OUTPATIENT
Start: 2022-02-08 | End: 2023-02-23

## 2022-02-08 ASSESSMENT — ENCOUNTER SYMPTOMS
JOINT SWELLING: 1
DIARRHEA: 0
ARTHRALGIAS: 1
SORE THROAT: 0
CHILLS: 0
MYALGIAS: 1
DIZZINESS: 0
EYE PAIN: 0
HEARTBURN: 0
CONSTIPATION: 0
FEVER: 0
COUGH: 1
WEAKNESS: 0
ABDOMINAL PAIN: 0
PALPITATIONS: 0
NAUSEA: 0
NERVOUS/ANXIOUS: 0
SHORTNESS OF BREATH: 1
HEADACHES: 0
FREQUENCY: 0
HEMATURIA: 0
BREAST MASS: 0
HEMATOCHEZIA: 0
DYSURIA: 0
PARESTHESIAS: 0

## 2022-02-08 ASSESSMENT — MIFFLIN-ST. JEOR: SCORE: 1212.51

## 2022-02-08 ASSESSMENT — ASTHMA QUESTIONNAIRES: ACT_TOTALSCORE: 17

## 2022-02-08 NOTE — NURSING NOTE
Prior to immunization administration, verified patients identity using patient s name and date of birth. Please see Immunization Activity for additional information.     Screening Questionnaire for Adult Immunization    Are you sick today?   No   Do you have allergies to medications, food, a vaccine component or latex?   gluten   Have you ever had a serious reaction after receiving a vaccination?   No   Do you have a long-term health problem with heart, lung, kidney, or metabolic disease (e.g., diabetes), asthma, a blood disorder, no spleen, complement component deficiency, a cochlear implant, or a spinal fluid leak?  Are you on long-term aspirin therapy?   Asthma    Do you have cancer, leukemia, HIV/AIDS, or any other immune system problem?   Breast cancer survivor    Do you have a parent, brother, or sister with an immune system problem?   No   In the past 3 months, have you taken medications that affect  your immune system, such as prednisone, other steroids, or anticancer drugs; drugs for the treatment of rheumatoid arthritis, Crohn s disease, or psoriasis; or have you had radiation treatments?   Letrozole    Have you had a seizure, or a brain or other nervous system problem?   No   During the past year, have you received a transfusion of blood or blood    products, or been given immune (gamma) globulin or antiviral drug?   No   For women: Are you pregnant or is there a chance you could become       pregnant during the next month?   No   Have you received any vaccinations in the past 4 weeks?   No     Immunization questionnaire was positive for at least one answer.        Per orders of Rachel Arauz, injection of Shingrix and flublok given by Brynn Goodman. Patient instructed to remain in clinic for 15 minutes afterwards, and to report any adverse reaction to me immediately.    Advised pt to return for 2nd Shingrix in 2-6 months      Screening performed by Brynn Goodman on 2/8/2022 at 11:32 AM.     [General Appearance - Well Developed] : well developed [General Appearance - Well Nourished] : well nourished [Normal Appearance] : normal appearance [Well Groomed] : well groomed [General Appearance - In No Acute Distress] : no acute distress [] : no respiratory distress [Respiration, Rhythm And Depth] : normal respiratory rhythm and effort [Exaggerated Use Of Accessory Muscles For Inspiration] : no accessory muscle use [Oriented To Time, Place, And Person] : oriented to person, place, and time [Affect] : the affect was normal [Mood] : the mood was normal [Not Anxious] : not anxious

## 2022-02-08 NOTE — PROGRESS NOTES
SUBJECTIVE:   CC: Kathy Lei is an 56 year old woman who presents for preventive health visit.       Patient has been advised of split billing requirements and indicates understanding: Yes  Healthy Habits:     Getting at least 3 servings of Calcium per day:  NO    Bi-annual eye exam:  Yes    Dental care twice a year:  NO    Sleep apnea or symptoms of sleep apnea:  None    Diet:  Gluten-free/reduced    Frequency of exercise:  2-3 days/week    Duration of exercise:  15-30 minutes    Taking medications regularly:  Yes    Medication side effects:  Muscle aches and Other    PHQ-2 Total Score: 0    Additional concerns today:  Yes    Asthma Follow-Up  Was ACT completed today?    Yes    ACT Total Scores 2/8/2022   ACT TOTAL SCORE -   ASTHMA ER VISITS -   ASTHMA HOSPITALIZATIONS -   ACT TOTAL SCORE (Goal Greater than or Equal to 20) 17   In the past 12 months, how many times did you visit the emergency room for your asthma without being admitted to the hospital? 0   In the past 12 months, how many times were you hospitalized overnight because of your asthma? 0     Her asthma has been worse and she is worried about her history of breast cancer.  She quit smoking in 2019.  She still gets nausea from Letrozol intermittently, Ativan helps.    Her mood is stable.  She is doing well on her current dose of Zoloft.    Her blood pressure is well-controlled on her current dose of Lisinopril.      She is doing well on her current dose of levothyroxine and denies any symptoms such as fatigue; changes in weight; temperature intolerance; skin changes; constipation or loose stools.    Trazodone is working well for her insomnia.         MA discussed with patient. Will be sending a "[employer] [place]" message in 4 weeks to repeat ACT. Patient given a blank copy of ACT questionnaire to take home.               Today's PHQ-2 Score:   PHQ-2 ( 1999 [employer]) 2/5/2022   Q1: Little interest or pleasure in doing things 0   Q2: Feeling down,  depressed or hopeless 0   PHQ-2 Score 0   PHQ-2 Total Score (12-17 Years)- Positive if 3 or more points; Administer PHQ-A if positive -   Q1: Little interest or pleasure in doing things Not at all   Q2: Feeling down, depressed or hopeless Not at all   PHQ-2 Score 0       Abuse: Current or Past (Physical, Sexual or Emotional) - No  Do you feel safe in your environment? Yes        Social History     Tobacco Use     Smoking status: Former Smoker     Packs/day: 0.50     Years: 30.00     Pack years: 15.00     Types: Cigarettes     Start date: 11/16/1980     Quit date: 1/1/2019     Years since quitting: 3.1     Smokeless tobacco: Never Used     Tobacco comment: social smoker at times   Substance Use Topics     Alcohol use: Yes     Comment: on weekends if going out         Alcohol Use 2/5/2022   Prescreen: >3 drinks/day or >7 drinks/week? No   Prescreen: >3 drinks/day or >7 drinks/week? -       Reviewed orders with patient.  Reviewed health maintenance and updated orders accordingly - Yes      Breast Cancer Screening:  Any new diagnosis of family breast, ovarian, or bowel cancer?     FHS-7: No flowsheet data found.      Pertinent mammograms are reviewed under the imaging tab.    History of abnormal Pap smear: NO - age 30- 65 PAP every 3 years recommended  PAP / HPV Latest Ref Rng & Units 7/16/2018 10/10/2011 10/8/2007   PAP (Historical) - NIL NIL NIL   HPV16 NEG:Negative Negative - -   HPV18 NEG:Negative Negative - -   HRHPV NEG:Negative Negative - -     Reviewed and updated as needed this visit by clinical staff    Meds             Reviewed and updated as needed this visit by Provider                   Review of Systems   Constitutional: Negative for chills and fever.   HENT: Negative for congestion, ear pain, hearing loss and sore throat.    Eyes: Negative for pain and visual disturbance.   Respiratory: Positive for cough and shortness of breath.    Cardiovascular: Negative for chest pain, palpitations and peripheral  "edema.   Gastrointestinal: Negative for abdominal pain, constipation, diarrhea, heartburn, hematochezia and nausea.   Breasts:  Negative for tenderness, breast mass and discharge.   Genitourinary: Negative for dysuria, frequency, genital sores, hematuria, pelvic pain, urgency, vaginal bleeding and vaginal discharge.   Musculoskeletal: Positive for arthralgias, joint swelling and myalgias.   Skin: Negative for rash.   Neurological: Negative for dizziness, weakness, headaches and paresthesias.   Psychiatric/Behavioral: Negative for mood changes. The patient is not nervous/anxious.           OBJECTIVE:   /63   Pulse 95   Temp 97.7  F (36.5  C) (Temporal)   Resp 18   Ht 1.638 m (5' 4.5\")   Wt 64 kg (141 lb)   LMP 11/02/2014   SpO2 97%   BMI 23.83 kg/m    Physical Exam  GENERAL: healthy, alert and no distress  EYES: Eyes grossly normal to inspection, PERRL and conjunctivae and sclerae normal  HENT: ear canals and TM's normal, nose and mouth without ulcers or lesions  NECK: no adenopathy, no asymmetry, masses, or scars and thyroid normal to palpation  RESP: lungs clear to auscultation - no rales, rhonchi or wheezes  CV: regular rate and rhythm, normal S1 S2, no S3 or S4, no murmur, click or rub, no peripheral edema and peripheral pulses strong  ABDOMEN: soft, nontender, no hepatosplenomegaly, no masses and bowel sounds normal  MS: no gross musculoskeletal defects noted, no edema  SKIN: no suspicious lesions or rashes  NEURO: Normal strength and tone, mentation intact and speech normal  PSYCH: mentation appears normal, affect normal/bright        ASSESSMENT/PLAN:   (Z00.00) Routine general medical examination at a health care facility  (primary encounter diagnosis)  Comment:   Plan: Lipid panel reflex to direct LDL Fasting, HIV         Antigen Antibody Combo            (J45.40) Moderate persistent asthma without complication  Comment: not controlled  Plan: albuterol (VENTOLIN HFA) 108 (90 Base) MCG/ACT        "  inhaler, montelukast (SINGULAIR) 10 MG tablet        Will add Singulair.  Discussed the use and indication of this medication as well as potential side effects. Will repeat ACT in one month.  Consider changing Flovent to Advair or similar if needed.     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     (E03.9) Hypothyroidism, unspecified type  Comment:   Plan: levothyroxine (SYNTHROID/LEVOTHROID) 125 MCG         tablet, TSH with free T4 reflex        The current medical regimen is effective;  continue present plan and medications.     (I42.9) Cardiomyopathy, unspecified type (H)  Comment: stable  Plan: lisinopril (ZESTRIL) 5 MG tablet        Asymptomatic.  The current medical regimen is effective;  continue present plan and medications.     (G47.00) Insomnia, unspecified type  Comment: stable  Plan: traZODone (DESYREL) 50 MG tablet        The current medical regimen is effective;  continue present plan and medications.     (Z87.891) Personal history of tobacco use  Comment:   Plan: Prof Fee: Shared Decision Making Visit for Lung        Cancer Screening, CT Chest Lung Cancer Scrn Low        Dose wo            (G62.0) Drug-induced polyneuropathy (H)  Comment: stable  Plan:     (C50.411,  Z17.0) Malignant neoplasm of upper-outer quadrant of right breast in female, estrogen receptor positive (H)  Comment:   Plan: Comprehensive metabolic panel (BMP + Alb, Alk         Phos, ALT, AST, Total. Bili, TP), CBC with         platelets        She will follow up with oncology.     (Z23) Need for shingles vaccine  Comment:   Plan:     (Z23) Need for prophylactic vaccination and inoculation against influenza  Comment:   Plan:         COUNSELING:  Reviewed preventive health counseling, as reflected in patient instructions    Estimated body mass index is 23.83 kg/m  as  "calculated from the following:    Height as of this encounter: 1.638 m (5' 4.5\").    Weight as of this encounter: 64 kg (141 lb).        She reports that she quit smoking about 3 years ago. Her smoking use included cigarettes. She started smoking about 41 years ago. She has a 15.00 pack-year smoking history. She has never used smokeless tobacco.      Counseling Resources:  ATP IV Guidelines  Pooled Cohorts Equation Calculator  Breast Cancer Risk Calculator  BRCA-Related Cancer Risk Assessment: FHS-7 Tool  FRAX Risk Assessment  ICSI Preventive Guidelines  Dietary Guidelines for Americans, 2010  USDA's MyPlate  ASA Prophylaxis  Lung CA Screening    Rachel Arauz NP  [facility]  "

## 2022-02-08 NOTE — PATIENT INSTRUCTIONS
Call [phone] to schedule lung cancer screen.    Preventive Health Recommendations  Female Ages 50 - 64    Yearly exam: See your health care provider every year in order to  o Review health changes.   o Discuss preventive care.    o Review your medicines if your doctor has prescribed any.      Get a Pap test every three years (unless you have an abnormal result and your provider advises testing more often).    If you get Pap tests with HPV test, you only need to test every 5 years, unless you have an abnormal result.     You do not need a Pap test if your uterus was removed (hysterectomy) and you have not had cancer.    You should be tested each year for STDs (sexually transmitted diseases) if you're at risk.     Have a mammogram every 1 to 2 years.    Have a colonoscopy at age 50, or have a yearly FIT test (stool test). These exams screen for colon cancer.      Have a cholesterol test every 5 years, or more often if advised.    Have a diabetes test (fasting glucose) every three years. If you are at risk for diabetes, you should have this test more often.     If you are at risk for osteoporosis (brittle bone disease), think about having a bone density scan (DEXA).    Shots: Get a flu shot each year. Get a tetanus shot every 10 years.    Nutrition:     Eat at least 5 servings of fruits and vegetables each day.    Eat whole-grain bread, whole-wheat pasta and brown rice instead of white grains and rice.    Get adequate Calcium and Vitamin D.     Lifestyle    Exercise at least 150 minutes a week (30 minutes a day, 5 days a week). This will help you control your weight and prevent disease.    Limit alcohol to one drink per day.    No smoking.     Wear sunscreen to prevent skin cancer.     See your dentist every six months for an exam and cleaning.    See your eye doctor every 1 to 2 years.    Lung Cancer Screening   Frequently Asked Questions  If you are at high-risk for lung cancer, getting screened with low-dose  computed tomography (LDCT) every year can help save your life. This handout offers answers to some of the most common questions about lung cancer screening. If you have other questions, please call [phone] ([phone]).     What is it?  Lung cancer screening uses special X-ray technology to create an image of your lung tissue. The exam is quick and easy and takes less than 10 seconds. We don t give you any medicine or use any needles. You can eat before and after the exam. You don t need to change your clothes as long as the clothing on your chest doesn t contain metal. But, you do need to be able to hold your breath for at least 6 seconds during the exam.    What is the goal of lung cancer screening?  The goal of lung cancer screening is to save lives. Many times, lung cancer is not found until a person starts having physical symptoms. Lung cancer screening can help detect lung cancer in the earliest stages when it may be easier to treat.    Who should be screened for lung cancer?  We suggest lung cancer screening for anyone who is at high-risk for lung cancer. You are in the high-risk group if you:      are between the ages of 55 and 79, and    have smoked at least 1 pack of cigarettes a day for 20 or more years, and    still smoke or have quit within the past 15 years.    However, if you have a new cough or shortness of breath, you should talk to your doctor before being screened.    Why does it matter if I have symptoms?  Certain symptoms can be a sign that you have a condition in your lungs that should be checked and treated by your doctor. These symptoms include fever, chest pain, a new or changing cough, shortness of breath that you have never felt before, coughing up blood or unexplained weight loss. Having any of these symptoms can greatly affect the results of lung cancer screening.       Should all smokers get an LDCT lung cancer screening exam?  It depends. Lung cancer screening is for a  very specific group of men and women who have a history of heavy smoking over a long period of time (see  Who should be screened for lung cancer  above).  I am in the high-risk group, but have been diagnosed with cancer in the past. Is LDCT lung cancer screening right for me?  In some cases, you should not have LDCT lung screening, such as when your doctor is already following your cancer with CT scan studies. Your doctor will help you decide if LDCT lung screening is right for you.  Do I need to have a screening exam every year?  Yes. If you are in the high-risk group described earlier, you should get an LDCT lung cancer screening exam every year until you are 79, or are no longer willing or able to undergo screening and possible procedures to diagnose and treat lung cancer.  How effective is LDCT at preventing death from lung cancer?  Studies have shown that LDCT lung cancer screening can lower the risk of death from lung cancer by 20 percent in people who are at high-risk.  What are the risks?  There are some risks and limitations of LDCT lung cancer screening. We want to make sure you understand the risks and benefits, so please let us know if you have any questions. Your doctor may want to talk with you more about these risks.    Radiation exposure: As with any exam that uses radiation, there is a very small increased risk of cancer. The amount of radiation in LDCT is small--about the same amount a person would get from a mammogram. Your doctor orders the exam when he or she feels the potential benefits outweigh the risks.    False negatives: No test is perfect, including LDCT. It is possible that you may have a medical condition, including lung cancer, that is not found during your exam. This is called a false negative result.    False positives and more testing: LDCT very often finds something in the lung that could be cancer, but in fact is not. This is called a false positive result. False positive tests  often cause anxiety. To make sure these findings are not cancer, you may need to have more tests. These tests will be done only if you give us permission. Sometimes patients need a treatment that can have side effects, such as a biopsy. For more information on false positives, see  What can I expect from the results?     Findings not related to lung cancer: Your LDCT exam also takes pictures of areas of your body next to your lungs. In a very small number of cases, the CT scan will show an abnormal finding in one of these areas, such as your kidneys, adrenal glands, liver or thyroid. This finding may not be serious, but you may need more tests. Your doctor can help you decide what other tests you may need, if any.  What can I expect from the results?  About 1 out of 4 LDCT exams will find something that may need more tests. Most of the time, these findings are lung nodules. Lung nodules are very small collections of tissue in the lung. These nodules are very common, and the vast majority--more than 97 percent--are not cancer (benign). Most are normal lymph nodes or small areas of scarring from past infections.  But, if a small lung nodule is found to be cancer, the cancer can be cured more than 90 percent of the time. To know if the nodule is cancer, we may need to get more images before your next yearly screening exam. If the nodule has suspicious features (for example, it is large, has an odd shape or grows over time), we will refer you to a specialist for further testing.  Will my doctor also get the results?  Yes. Your doctor will get a copy of your results.

## 2022-02-08 NOTE — PROGRESS NOTES
Lung Cancer Screening Shared Decision Making Visit     Kathy Lei is eligible for lung cancer screening on the basis of the information provided in my signed lung cancer screening order.     I have discussed with patient the risks and benefits of screening for lung cancer with low-dose CT.     The risks include:  radiation exposure: one low dose chest CT has as much ionizing radiation as about 15 chest x-rays or 6 months of background radiation living in [place]    false positives: 96% of positive findings/nodules are NOT cancer, but some might still require additional diagnostic evaluation, including biopsy  over-diagnosis: some slow growing cancers that might never have been clinically significant will be detected and treated unnecessarily     The benefit of early detection of lung cancer is contingent upon adherence to annual screening or more frequent follow up if indicated.     Furthermore, reaping the benefits of screening requires Kathy Lei to be willing and physically able to undergo diagnostic procedures, if indicated. Although no specific guide is available for determining severity of comorbidities, it is reasonable to withhold screening in patients who have greater mortality risk from other diseases.     We did discuss that the only way to prevent lung cancer is to not smoke. Smoking cessation counseling was not given.      I did offer risk estimation using a calculator such as this one:    ShouldIScreen

## 2022-02-09 ENCOUNTER — ANCILLARY PROCEDURE (OUTPATIENT)
Dept: CT IMAGING | Facility: CLINIC | Age: 57
End: 2022-02-09
Attending: NURSE PRACTITIONER
Payer: COMMERCIAL

## 2022-02-09 DIAGNOSIS — Z87.891 PERSONAL HISTORY OF TOBACCO USE: ICD-10-CM

## 2022-02-09 PROCEDURE — 71271 CT THORAX LUNG CANCER SCR C-: CPT | Performed by: RADIOLOGY

## 2022-02-14 ENCOUNTER — TELEPHONE (OUTPATIENT)
Dept: FAMILY MEDICINE | Facility: CLINIC | Age: 57
End: 2022-02-14
Payer: COMMERCIAL

## 2022-02-14 NOTE — TELEPHONE ENCOUNTER
"Kellie-Please review and advise if any follow up recommended.    Call received from Cherri, with Imaging Services, regarding incidental finding on CT lung cancer screen from 2/9/22:  \"2. Significant Incidental Finding(s):  Category S: Yes  Prior right breast cancer with prior mastectomy bilateral and eventual  removal of reconstructive implants bilaterally.. Radiation associated  changes in the right upper lobe.\"    Thank you!  AG MenchacaN, RN  [facility]      "

## 2022-03-22 ENCOUNTER — E-VISIT (OUTPATIENT)
Dept: FAMILY MEDICINE | Facility: CLINIC | Age: 57
End: 2022-03-22
Payer: COMMERCIAL

## 2022-03-22 DIAGNOSIS — B96.89 ACUTE BACTERIAL SINUSITIS: ICD-10-CM

## 2022-03-22 DIAGNOSIS — J01.90 ACUTE BACTERIAL SINUSITIS: ICD-10-CM

## 2022-03-22 PROCEDURE — 99421 OL DIG E/M SVC 5-10 MIN: CPT | Performed by: NURSE PRACTITIONER

## 2022-03-24 ENCOUNTER — MYC MEDICAL ADVICE (OUTPATIENT)
Dept: FAMILY MEDICINE | Facility: CLINIC | Age: 57
End: 2022-03-24

## 2022-03-24 ASSESSMENT — PATIENT HEALTH QUESTIONNAIRE - PHQ9
10. IF YOU CHECKED OFF ANY PROBLEMS, HOW DIFFICULT HAVE THESE PROBLEMS MADE IT FOR YOU TO DO YOUR WORK, TAKE CARE OF THINGS AT HOME, OR GET ALONG WITH OTHER PEOPLE: NOT DIFFICULT AT ALL
SUM OF ALL RESPONSES TO PHQ QUESTIONS 1-9: 0
SUM OF ALL RESPONSES TO PHQ QUESTIONS 1-9: 0

## 2022-03-25 ASSESSMENT — PATIENT HEALTH QUESTIONNAIRE - PHQ9: SUM OF ALL RESPONSES TO PHQ QUESTIONS 1-9: 0

## 2022-06-02 DIAGNOSIS — F41.9 ANXIETY: ICD-10-CM

## 2022-06-05 RX ORDER — LORAZEPAM 0.5 MG/1
TABLET ORAL
Qty: 30 TABLET | Refills: 5 | Status: SHIPPED | OUTPATIENT
Start: 2022-06-05 | End: 2022-10-28

## 2022-06-05 NOTE — TELEPHONE ENCOUNTER
Routing refill request to provider for review/approval because:  --Drug not on the [facility] refill protocol.    --Last order in chart:    Disp Refills Start End TIFFANY   LORazepam (ATIVAN) 0.5 MG tablet 30 tablet 5 12/19/2021  No   Sig: TAKE 1 TABLET(0.5 MG) BY MOUTH DAILY EVERY 4 HOURS AS NEEDED FOR ANXIETY OR NAUSEA OR VOMITING OR SLEEP       --Last visit:  2/8/2022 Regla CPE.    --Future Visit: none.

## 2022-07-20 ENCOUNTER — TELEPHONE (OUTPATIENT)
Dept: ORTHOPEDICS | Facility: CLINIC | Age: 57
End: 2022-07-20

## 2022-07-20 NOTE — TELEPHONE ENCOUNTER
" Health Call Center    Phone Message    May a detailed message be left on voicemail: no     Reason for Call: Other: Patient sent [id number] msg: \"A cortisone shot for my 5/6 lumbar of my spine to see if relieves pain in my ankle.  I was told with last appointment  this may help and buy me time.   Spine xrays on file\"    I believe she is looking for a referral for an injection?    Action Taken: Message routed to:  [facility]    Travel Screening: Not Applicable                                                                        "

## 2022-07-20 NOTE — TELEPHONE ENCOUNTER
LVM for patient that  has not seen her in over a year and that he would need to see her back in clinic to re evaluate prior to ordering any kind of injection. Sports number given to schedule a 40 minute New spine visit.

## 2022-07-26 DIAGNOSIS — B96.89 ACUTE BACTERIAL SINUSITIS: ICD-10-CM

## 2022-07-26 DIAGNOSIS — J01.90 ACUTE BACTERIAL SINUSITIS: ICD-10-CM

## 2022-07-29 NOTE — TELEPHONE ENCOUNTER
Routing refill request to provider for review/approval because:  --Drug not on the [facility] refill protocol amoxicillin-clavulanate (AUGMENTIN) 875-125 MG tablet.  --Appears to be somewhat of a chronic medication but I assume patient needs some type of visit for refill?    --Last visit: 3/22/22 e-visit Veronicaem - for Augmentin.  --Last visit: 2/8/22 Regla.    --Future Visit: none.        --Last Written Prescription Date:     Disp Refills Start End TIFFANY   amoxicillin-clavulanate (AUGMENTIN) 875-125 MG tablet 14 tablet 2 3/22/2022  No   Sig - Route: Take 1 tablet by mouth 2 times daily -

## 2022-08-08 ENCOUNTER — OFFICE VISIT (OUTPATIENT)
Dept: ORTHOPEDICS | Facility: CLINIC | Age: 57
End: 2022-08-08
Payer: COMMERCIAL

## 2022-08-08 DIAGNOSIS — M25.572 LEFT ANKLE PAIN, UNSPECIFIED CHRONICITY: Primary | ICD-10-CM

## 2022-08-08 PROCEDURE — 99213 OFFICE O/P EST LOW 20 MIN: CPT | Performed by: FAMILY MEDICINE

## 2022-08-08 NOTE — PROGRESS NOTES
[facility]  SPORTS & ORTHOPEDIC CLINIC VISIT     Aug 8, 2022        ASSESSMENT & PLAN    56-year-old with history of malignancy and chronic left ankle pain with indeterminate etiology.  Pain seems more prominent with standing and walking.  She does have some night pain.  This gives rise to suggestion for musculoskeletal etiology.  However she has a history of radiculopathy and has had symptoms radiating up and down the left leg previously.    Reviewed imaging and assessment with patient in detail  Given the persistent severe debilitating pain she has been experiencing we will plan to evaluate further with MRI of the ankle for occult intra-articular process.  May consider steroid injection versus further evaluation of the lumbar spine based on result    Bill Hairston MD  [facility]IC[facility]    -----  Chief Complaint   Patient presents with     Lower Back - Follow Up       SUBJECTIVE  Kathy Lei is a/an 56 year old female who is seen for follow up of left ankle pain.  Pain continues to be the worst in the left ankle.  Worse with walking.  Localized to the anterior ankle.    The patient is seen by themselves.    Date of injury: Chronic   Date of Last Visit: 5/17/21   Symptoms: no change  Worsened by: Standing, walking   Better with: Laying flat for 1.5 hours   Treatment to date: physical therapy (5 visits)  Associated symptoms: no distal numbness or tingling; denies swelling or warmth.  Has had less symptoms going up the shin than previous        REVIEW OF SYSTEMS:    See HPI     OBJECTIVE:  [facility] 11/02/2014      General: Alert, pleasant, no distress  Left ankle: warm, well perfused, SILT throughout     Inspection: No swelling ecchymosis or deformity     ROM: Symmetric.  Some pain with terminal plantar and dorsiflexion     Strength: Intact without pain.     Palpation: TTP over the anterior aspect of the tibiotalar joint.  No TTP of the lateral  posterior or medial ankle.     Special Tests: None      RADIOLOGY:  No new imaging this visit

## 2022-08-08 NOTE — LETTER
8/8/2022      RE: Kathy Lei  [address] [place] [address]     Dear Colleague,    Thank you for referring your patient, Kathy Lei, to the [facility] SPORTS MEDICINE [facility]. Please see a copy of my visit note below.      [facility]  SPORTS & ORTHOPEDIC CLINIC VISIT     Aug 8, 2022        ASSESSMENT & PLAN    56-year-old with history of malignancy and chronic left ankle pain with indeterminate etiology.  Pain seems more prominent with standing and walking.  She does have some night pain.  This gives rise to suggestion for musculoskeletal etiology.  However she has a history of radiculopathy and has had symptoms radiating up and down the left leg previously.    Reviewed imaging and assessment with patient in detail  Given the persistent severe debilitating pain she has been experiencing we will plan to evaluate further with MRI of the ankle for occult intra-articular process.  May consider steroid injection versus further evaluation of the lumbar spine based on result    Bill Hairston MD  [facility] SPORTS MEDICINE [facility]    -----  Chief Complaint   Patient presents with     Lower Back - Follow Up       SUBJECTIVE  Kathy Lei is a/an 56 year old female who is seen for follow up of left ankle pain.  Pain continues to be the worst in the left ankle.  Worse with walking.  Localized to the anterior ankle.    The patient is seen by themselves.    Date of injury: Chronic   Date of Last Visit: 5/17/21   Symptoms: no change  Worsened by: Standing, walking   Better with: Laying flat for 1.5 hours   Treatment to date: physical therapy (5 visits)  Associated symptoms: no distal numbness or tingling; denies swelling or warmth.  Has had less symptoms going up the shin than previous        REVIEW OF SYSTEMS:    See HPI     OBJECTIVE:  LMP 11/02/2014      General: Alert, pleasant, no distress  Left ankle: warm, well  perfused, SILT throughout     Inspection: No swelling ecchymosis or deformity     ROM: Symmetric.  Some pain with terminal plantar and dorsiflexion     Strength: Intact without pain.     Palpation: TTP over the anterior aspect of the tibiotalar joint.  No TTP of the lateral posterior or medial ankle.     Special Tests: None      RADIOLOGY:  No new imaging this visit          Again, thank you for allowing me to participate in the care of your patient.      Sincerely,    Bill Hairston MD

## 2022-08-10 ENCOUNTER — ANCILLARY PROCEDURE (OUTPATIENT)
Dept: MRI IMAGING | Facility: CLINIC | Age: 57
End: 2022-08-10
Attending: FAMILY MEDICINE
Payer: COMMERCIAL

## 2022-08-10 DIAGNOSIS — M25.572 LEFT ANKLE PAIN, UNSPECIFIED CHRONICITY: ICD-10-CM

## 2022-08-10 PROCEDURE — 73721 MRI JNT OF LWR EXTRE W/O DYE: CPT | Mod: LT | Performed by: RADIOLOGY

## 2022-08-11 ENCOUNTER — VIRTUAL VISIT (OUTPATIENT)
Dept: ORTHOPEDICS | Facility: CLINIC | Age: 57
End: 2022-08-11
Payer: COMMERCIAL

## 2022-08-11 DIAGNOSIS — M54.16 LUMBAR BACK PAIN WITH RADICULOPATHY AFFECTING LEFT LOWER EXTREMITY: Primary | ICD-10-CM

## 2022-08-11 PROCEDURE — 99213 OFFICE O/P EST LOW 20 MIN: CPT | Mod: 95 | Performed by: FAMILY MEDICINE

## 2022-08-11 NOTE — LETTER
8/11/2022       RE: Kathy Lei  [address] [place] [address]     Dear Colleague,    Thank you for referring your patient, Kathy Lei, to the [facility] SPORTS MEDICINE CLINIC [place] at [facility]. Please see a copy of my visit note below.      [facility] AND SURGERY CENTER  SPORTS & ORTHOPEDIC CLINIC VISIT     Aug 11, 2022        ASSESSMENT & PLAN    55 yo with chronic left ankle pain in the setting of chronic low back DDD. MRI today makes intraarticular injury or etiology of her pain very unlikely and supports radicular cause    Reviewed imaging and assessment with patient in detail  Presented options at this time as trial of diagnostic/therapeutic steroid injection of ankle vs mri of lumbar spine for furhter eval and possible MARTHA if lesion found that correlates with her pain. She would like to pursue mri. She will follow up virtually after the study to discuss results.     Bill Hairston MD  [facility] SPORTS MEDICINE [facility]    -----  Chief Complaint   Patient presents with     Follow Up     MRI results          Mily is a 56 year old who is being evaluated via a billable telephone visit.      What phone number would you like to be contacted at? [phone]  How would you like to obtain your AVS? MyChart  Phone call duration: 5 minutes         SUBJECTIVE  Kathy Lei is a/an 56 year old female who is seen for follow up of left ankle pain, here today for MRI results.        Date of injury: Chronic   Date of Last Visit: 8/8/22   Symptoms:  no change  Worsened by: Standing, Walking   Better with: Laying flat for 1.5 hours   Treatment to date: physical therapy (5 visits)  Associated symptoms: no distal numbness or tingling; denies swelling or warmth        REVIEW OF SYSTEMS:    See HPI     OBJECTIVE:  LMP 11/02/2014      No exam. Telephone visit    RADIOLOGY:    MRI of Left ankle dated  8/10/22. Per radiology report:   IMPRESSION:  1. Query mild posterior tibialis tenosynovitis and distal tendinosis.  2. Short segment peroneus longus tendinosis and mild peroneal  tenosynovitis.  3. Mild degenerative change calcaneocuboid joint.  4. Calcaneofibular ligament sprain.        Again, thank you for allowing me to participate in the care of your patient.      Sincerely,    Bill Hairston MD

## 2022-08-11 NOTE — PROGRESS NOTES
[facility] AND SURGERY CENTER  SPORTS & ORTHOPEDIC CLINIC VISIT     Aug 11, 2022        ASSESSMENT & PLAN    57 yo with chronic left ankle pain in the setting of chronic low back DDD. MRI today makes intraarticular injury or etiology of her pain very unlikely and supports radicular cause    Reviewed imaging and assessment with patient in detail  Presented options at this time as trial of diagnostic/therapeutic steroid injection of ankle vs mri of lumbar spine for furhter eval and possible MARTHA if lesion found that correlates with her pain. She would like to pursue mri. She will follow up virtually after the study to discuss results.     Bill Hairston MD  [facility] SPORTS MEDICINE [facility]    -----  Chief Complaint   Patient presents with     Follow Up     MRI results          Mily is a 56 year old who is being evaluated via a billable telephone visit.      What phone number would you like to be contacted at? [phone]  How would you like to obtain your AVS? [employer]  Phone call duration: 5 minutes         SUBJECTIVE  Kathy Lei is a/an 56 year old female who is seen for follow up of left ankle pain, here today for MRI results.        Date of injury: Chronic   Date of Last Visit: 8/8/22   Symptoms:  no change  Worsened by: Standing, Walking   Better with: Laying flat for 1.5 hours   Treatment to date: physical therapy (5 visits)  Associated symptoms: no distal numbness or tingling; denies swelling or warmth        REVIEW OF SYSTEMS:    See HPI     OBJECTIVE:  LMP 11/02/2014      No exam. Telephone visit    RADIOLOGY:    MRI of Left ankle dated 8/10/22. Per radiology report:   IMPRESSION:  1. Query mild posterior tibialis tenosynovitis and distal tendinosis.  2. Short segment peroneus longus tendinosis and mild peroneal  tenosynovitis.  3. Mild degenerative change calcaneocuboid joint.  4. Calcaneofibular ligament sprain.

## 2022-08-20 ENCOUNTER — ANCILLARY PROCEDURE (OUTPATIENT)
Dept: MRI IMAGING | Facility: CLINIC | Age: 57
End: 2022-08-20
Attending: FAMILY MEDICINE
Payer: COMMERCIAL

## 2022-08-20 DIAGNOSIS — M54.16 LUMBAR BACK PAIN WITH RADICULOPATHY AFFECTING LEFT LOWER EXTREMITY: ICD-10-CM

## 2022-08-20 PROCEDURE — 72148 MRI LUMBAR SPINE W/O DYE: CPT | Mod: GC | Performed by: STUDENT IN AN ORGANIZED HEALTH CARE EDUCATION/TRAINING PROGRAM

## 2022-08-22 ENCOUNTER — VIRTUAL VISIT (OUTPATIENT)
Dept: ORTHOPEDICS | Facility: CLINIC | Age: 57
End: 2022-08-22
Payer: COMMERCIAL

## 2022-08-22 DIAGNOSIS — M54.16 LUMBAR BACK PAIN WITH RADICULOPATHY AFFECTING LEFT LOWER EXTREMITY: Primary | ICD-10-CM

## 2022-08-22 PROCEDURE — 99214 OFFICE O/P EST MOD 30 MIN: CPT | Mod: 95 | Performed by: FAMILY MEDICINE

## 2022-08-22 NOTE — LETTER
8/22/2022       RE: Kathy Lei  [address]  [place] [address]     Dear Colleague,    Thank you for referring your patient, Kathy Lei, to the [facility] SPORTS MEDICINE CLINIC [place] at [facility]. Please see a copy of my visit note below.      [facility] AND SURGERY CENTER  SPORTS & ORTHOPEDIC CLINIC VISIT     Aug 22, 2022        ASSESSMENT & PLAN    56-year-old with chronic refractory left lower extremity pain specifically at the ankle.  Etiology has been difficult and no obvious local lower leg or ankle pathology has been detected.  After recent MRI however radicular etiology seems more likely.    Reviewed imaging and assessment with patient in detail  The patient would like to try an epidural steroid injection for both diagnostic and therapeutic purposes.  An order for a left L5 TFESI was placed.  Patient will follow-up in clinic 2 weeks after the procedure to assess her results.  If limited or partial relief may consider L4 injection subsequently.  Bill Hairston MD  [facility] SPORTS MEDICINE [facility]    -----  Chief Complaint   Patient presents with     Follow Up     MRI results          Mily is a 56 year old who is being evaluated via a billable telephone visit.      What phone number would you like to be contacted at? [phone]  How would you like to obtain your AVS? MyChart  Phone call duration: 9 minutes         SUBJECTIVE  Kathy Lei is a/an 56 year old female who is seen for follow up of left ankle pain pain.       Date of injury: Chronic   Date of Last Visit: 8/11/22 Virtually    Symptoms: no change  Worsened by: Standing and walking   Better with: Laying flat   Treatment to date: physical therapy (5 visits)  Associated symptoms: no distal numbness or tingling; denies swelling or warmth        REVIEW OF SYSTEMS:    See HPI     OBJECTIVE:  LMP 11/02/2014      No exam.  Telephone visit    RADIOLOGY:    MRI of the lumbar spine dated 8/20/2022.  Per radiology report:  IMPRESSION:  Scoliotic curvature with associated degenerative changes, most  significantly including moderate neural foraminal stenosis on the  right at L1-2, severe on the left at L4-5, and moderate to severe on  the left L5-S1 with potential impingement of right exiting L1, left  exiting L4 and left exiting L5. Moderate neural foraminal stenosis  bilaterally at L3-4 and on the right L4-5. Narrowing of the left  lateral recess at L4-5 resulting in potential impingement of the  descending left L5 nerve root.       Again, thank you for allowing me to participate in the care of your patient.      Sincerely,    Bill Hairston MD

## 2022-08-22 NOTE — PROGRESS NOTES
[facility] AND SURGERY CENTER  SPORTS & ORTHOPEDIC CLINIC VISIT     Aug 22, 2022        ASSESSMENT & PLAN    56-year-old with chronic refractory left lower extremity pain specifically at the ankle.  Etiology has been difficult and no obvious local lower leg or ankle pathology has been detected.  After recent MRI however radicular etiology seems more likely.    Reviewed imaging and assessment with patient in detail  The patient would like to try an epidural steroid injection for both diagnostic and therapeutic purposes.  An order for a left L5 TFESI was placed.  Patient will follow-up in clinic 2 weeks after the procedure to assess her results.  If limited or partial relief may consider L4 injection subsequently.  Bill Hairston MD  [facility] SPORTS MEDICINE [facility]    -----  Chief Complaint   Patient presents with     Follow Up     MRI results          Mily is a 56 year old who is being evaluated via a billable telephone visit.      What phone number would you like to be contacted at? [phone]  How would you like to obtain your AVS? [employer]  Phone call duration: 9 minutes         SUBJECTIVE  Kathy Lei is a/an 56 year old female who is seen for follow up of left ankle pain pain.       Date of injury: Chronic   Date of Last Visit: 8/11/22 Virtually    Symptoms: no change  Worsened by: Standing and walking   Better with: Laying flat   Treatment to date: physical therapy (5 visits)  Associated symptoms: no distal numbness or tingling; denies swelling or warmth        REVIEW OF SYSTEMS:    See HPI     OBJECTIVE:  LMP 11/02/2014      No exam. Telephone visit    RADIOLOGY:    MRI of the lumbar spine dated 8/20/2022.  Per radiology report:  IMPRESSION:  Scoliotic curvature with associated degenerative changes, most  significantly including moderate neural foraminal stenosis on the  right at L1-2, severe on the left at L4-5, and moderate to severe on  the left L5-S1 with potential  impingement of right exiting L1, left  exiting L4 and left exiting L5. Moderate neural foraminal stenosis  bilaterally at L3-4 and on the right L4-5. Narrowing of the left  lateral recess at L4-5 resulting in potential impingement of the  descending left L5 nerve root.

## 2022-08-29 ENCOUNTER — TELEPHONE (OUTPATIENT)
Dept: ORTHOPEDICS | Facility: CLINIC | Age: 57
End: 2022-08-29

## 2022-08-29 ENCOUNTER — ANCILLARY PROCEDURE (OUTPATIENT)
Dept: GENERAL RADIOLOGY | Facility: CLINIC | Age: 57
End: 2022-08-29
Attending: FAMILY MEDICINE
Payer: COMMERCIAL

## 2022-08-29 VITALS
HEART RATE: 81 BPM | SYSTOLIC BLOOD PRESSURE: 127 MMHG | OXYGEN SATURATION: 97 % | TEMPERATURE: 97.8 F | RESPIRATION RATE: 16 BRPM | DIASTOLIC BLOOD PRESSURE: 81 MMHG

## 2022-08-29 DIAGNOSIS — M54.16 LUMBAR BACK PAIN WITH RADICULOPATHY AFFECTING LEFT LOWER EXTREMITY: ICD-10-CM

## 2022-08-29 PROCEDURE — 62323 NJX INTERLAMINAR LMBR/SAC: CPT | Performed by: RADIOLOGY

## 2022-08-29 RX ORDER — LIDOCAINE HYDROCHLORIDE 10 MG/ML
5 INJECTION, SOLUTION EPIDURAL; INFILTRATION; INTRACAUDAL; PERINEURAL ONCE
Status: COMPLETED | OUTPATIENT
Start: 2022-08-29 | End: 2022-08-29

## 2022-08-29 RX ORDER — BUPIVACAINE HYDROCHLORIDE 5 MG/ML
10 INJECTION, SOLUTION PERINEURAL ONCE
Status: COMPLETED | OUTPATIENT
Start: 2022-08-29 | End: 2022-08-29

## 2022-08-29 RX ORDER — METHYLPREDNISOLONE ACETATE 80 MG/ML
80 INJECTION, SUSPENSION INTRA-ARTICULAR; INTRALESIONAL; INTRAMUSCULAR; SOFT TISSUE ONCE
Status: COMPLETED | OUTPATIENT
Start: 2022-08-29 | End: 2022-08-29

## 2022-08-29 RX ORDER — IOPAMIDOL 408 MG/ML
10 INJECTION, SOLUTION INTRATHECAL ONCE
Status: COMPLETED | OUTPATIENT
Start: 2022-08-29 | End: 2022-08-29

## 2022-08-29 RX ADMIN — IOPAMIDOL 2 ML: 408 INJECTION, SOLUTION INTRATHECAL at 11:05

## 2022-08-29 RX ADMIN — METHYLPREDNISOLONE ACETATE 80 MG: 80 INJECTION, SUSPENSION INTRA-ARTICULAR; INTRALESIONAL; INTRAMUSCULAR; SOFT TISSUE at 11:05

## 2022-08-29 RX ADMIN — BUPIVACAINE HYDROCHLORIDE 5 MG: 5 INJECTION, SOLUTION PERINEURAL at 11:05

## 2022-08-29 RX ADMIN — LIDOCAINE HYDROCHLORIDE 5 ML: 10 INJECTION, SOLUTION EPIDURAL; INFILTRATION; INTRACAUDAL; PERINEURAL at 11:05

## 2022-08-29 NOTE — DISCHARGE INSTRUCTIONS
Discharge Instructions for Epidural Steroid Injection/Nerve Block  Care of injection site:  If you have new numbness down your leg after the injection, this may last up to 4-6 hours, but should go away. You may need help with walking until your leg feels normal.  Over the next 24 to 48 hours, pain at the injection site may increase before it gets better.  For the next 48 hours, use ice packs for 15 minutes, 3-4 times a day for pain relief.  If you have a bandage, you may remove it the next morning   Do not submerge injection site in water for 24 hours, (no baths. pools, hot tubs). Showers are OK.            Activity:  You should relax today and then you may return to your regular activity tomorrow.  You may eat a normal diet.    Medicines:  Continue to take all medications as ordered by your provider.  Restart all your blood thinner medicines tomorrow at your regular dose, including Coumadin (Warfarin).  If you are restarting Coumadin, talk to your doctor about having your INR checked.      The steroid should help reduce swelling and pain. This may take from 3-14 days. Pain from the shot will be mild and go away in 2-3 days. Please follow up with the provider that ordered this injection in a month if you don't already have an appointment with them. Let them know if the injection was effective.    Common side effects may include:  Insomnia  Heartburn  Flushed face  Water retention  Increased appetite  Increased blood sugar    If you have diabetes, watch your blood sugar closely. If needed, call your doctor to help control your blood sugar.    Call your doctor or go to the Emergency Room if you have new severe pain or fever.

## 2022-08-29 NOTE — TELEPHONE ENCOUNTER
[facility] Call Center    Phone Message:  Pt called, stating her insurance company has denied coverage for her MRI.      More detailed information is needed from MD Yovanny, as to why the pt needs/needed the MRI.    All PT notes/evidence of pt doing PT need to be sent to the insurance company.    Provider Line, to discuss coverage denial of MRI:  [phone]     [phone]  Ref # [id number]    Member ID:  [id number]    Group #: [id number]    Date of Denial 8.23.22    Please contact pt with a FOLLOW UP call. Thank you.    May a detailed message be left on voicemail: Yes     Reason for Call: Other: MRI Coverage Denied     Action Taken: Message routed to:  [facility] ([facility]): Team/MD    Travel Screening: Not Applicable

## 2022-08-30 NOTE — TELEPHONE ENCOUNTER
Spoke with patient and let her know I am working on this and it could be a while before she hears anything. Patient is aware to inform us if she hears something first.

## 2022-09-08 ENCOUNTER — TELEPHONE (OUTPATIENT)
Dept: ORTHOPEDICS | Facility: CLINIC | Age: 57
End: 2022-09-08

## 2022-09-08 NOTE — TELEPHONE ENCOUNTER
[facility] Call Center    Phone Message    May a detailed message be left on voicemail: yes     Reason for Call: Other: Patients medical records went to wrong location it should go to [facility] Fax [phone] appeals department      Action Taken: Message routed to:  [facility] ([facility]): ortho    Travel Screening: Not Applicable

## 2022-09-22 ENCOUNTER — MYC MEDICAL ADVICE (OUTPATIENT)
Dept: FAMILY MEDICINE | Facility: CLINIC | Age: 57
End: 2022-09-22

## 2022-09-22 ASSESSMENT — ASTHMA QUESTIONNAIRES: ACT_TOTALSCORE: 23

## 2022-09-22 NOTE — TELEPHONE ENCOUNTER
Patient Quality Outreach      Summary:    Patient has the following on her problem list/HM: PHQ-9 and AACT    Patient is due/failing the following:   Spoke to patient over the phone     Type of outreach:    Completed ACT on the phone  Attached PHQ-9 to [employer] message     Questions for provider review:    None                                                                                                                                     Brynn Goodman MA       Chart routed to N/A.

## 2022-10-10 NOTE — NURSING NOTE
"Oncology Rooming Note    March 18, 2019 12:31 PM   Kathy Lei is a 53 year old female who presents for:    Chief Complaint   Patient presents with     RECHECK     ONC Breast Cancer      Port Draw     Labs drawn from port by RN in lab. Line flushed with saline and heparin. Vs taken and pt checked in for appt     Initial Vitals: /80 (BP Location: Right arm, Patient Position: Sitting, Cuff Size: Adult Regular)   Pulse 92   Temp 98.3  F (36.8  C) (Oral)   Resp 16   Ht 1.676 m (5' 5.98\")   Wt 69.8 kg (153 lb 12.8 oz)   LMP 11/02/2014   SpO2 95%   BMI 24.84 kg/m   Estimated body mass index is 24.84 kg/m  as calculated from the following:    Height as of this encounter: 1.676 m (5' 5.98\").    Weight as of this encounter: 69.8 kg (153 lb 12.8 oz). Body surface area is 1.8 meters squared.  No Pain (0) Comment: Data Unavailable   Patient's last menstrual period was 11/02/2014.  Allergies reviewed: Yes  Medications reviewed: Yes    Medications: MEDICATION REFILLS NEEDED TODAY. Provider was notified.  Pharmacy name entered into [facility]:    [id number] - [place], [place] - [address] [place] [address] [id number] - [place], [place] - [address] AT [facility] [address]    Clinical concerns: none        Mary Lv, COLETTE              " Anesthesia Volume In Cc (Will Not Render If 0): 0.5

## 2022-10-11 DIAGNOSIS — Z17.0 MALIGNANT NEOPLASM OF UPPER-OUTER QUADRANT OF RIGHT BREAST IN FEMALE, ESTROGEN RECEPTOR POSITIVE (H): ICD-10-CM

## 2022-10-11 DIAGNOSIS — C50.411 MALIGNANT NEOPLASM OF UPPER-OUTER QUADRANT OF RIGHT BREAST IN FEMALE, ESTROGEN RECEPTOR POSITIVE (H): ICD-10-CM

## 2022-10-12 RX ORDER — LETROZOLE 2.5 MG/1
2.5 TABLET, FILM COATED ORAL DAILY
Qty: 90 TABLET | Refills: 3 | Status: SHIPPED | OUTPATIENT
Start: 2022-10-12 | End: 2023-10-25

## 2022-10-12 NOTE — TELEPHONE ENCOUNTER
Letrozole Refill   Last prescribing provider: Karen Fernandez    Last clinic visit date: 6/7/21 Karen Fernandez     Any missed appointments or no-shows since last clinic visit?: Overdue for an office visit and labs     Recommendations for requested medication (if none, N/A): Copied from chart note 6/7/21 Karen Fernandez   She remains on letrozole and is tolerating well with manageable symptoms. Reviewed surveillance plan for visits with every 3 months with H&P and labs. No routine imaging is needed. Plan to follow-up with Dr. Guzman in about 3 months.       Next clinic visit date: Not yet scheduled     Any other pertinent information (if none, N/A): N/A   no chest pain, no cough, and no shortness of breath.

## 2022-10-19 ASSESSMENT — ANXIETY QUESTIONNAIRES
GAD7 TOTAL SCORE: 0
IF YOU CHECKED OFF ANY PROBLEMS ON THIS QUESTIONNAIRE, HOW DIFFICULT HAVE THESE PROBLEMS MADE IT FOR YOU TO DO YOUR WORK, TAKE CARE OF THINGS AT HOME, OR GET ALONG WITH OTHER PEOPLE: NOT DIFFICULT AT ALL
6. BECOMING EASILY ANNOYED OR IRRITABLE: NOT AT ALL
4. TROUBLE RELAXING: NOT AT ALL
GAD7 TOTAL SCORE: 0
GAD7 TOTAL SCORE: 0
2. NOT BEING ABLE TO STOP OR CONTROL WORRYING: NOT AT ALL
1. FEELING NERVOUS, ANXIOUS, OR ON EDGE: NOT AT ALL
5. BEING SO RESTLESS THAT IT IS HARD TO SIT STILL: NOT AT ALL
3. WORRYING TOO MUCH ABOUT DIFFERENT THINGS: NOT AT ALL
7. FEELING AFRAID AS IF SOMETHING AWFUL MIGHT HAPPEN: NOT AT ALL
7. FEELING AFRAID AS IF SOMETHING AWFUL MIGHT HAPPEN: NOT AT ALL
8. IF YOU CHECKED OFF ANY PROBLEMS, HOW DIFFICULT HAVE THESE MADE IT FOR YOU TO DO YOUR WORK, TAKE CARE OF THINGS AT HOME, OR GET ALONG WITH OTHER PEOPLE?: NOT DIFFICULT AT ALL

## 2022-10-27 ENCOUNTER — MYC MEDICAL ADVICE (OUTPATIENT)
Dept: FAMILY MEDICINE | Facility: CLINIC | Age: 57
End: 2022-10-27

## 2022-10-27 ENCOUNTER — TELEPHONE (OUTPATIENT)
Dept: FAMILY MEDICINE | Facility: CLINIC | Age: 57
End: 2022-10-27

## 2022-10-27 ASSESSMENT — ASTHMA QUESTIONNAIRES
ACT_TOTALSCORE: 20
QUESTION_5 LAST FOUR WEEKS HOW WOULD YOU RATE YOUR ASTHMA CONTROL: SOMEWHAT CONTROLLED
ACT_TOTALSCORE: 20
QUESTION_4 LAST FOUR WEEKS HOW OFTEN HAVE YOU USED YOUR RESCUE INHALER OR NEBULIZER MEDICATION (SUCH AS ALBUTEROL): TWO OR THREE TIMES PER WEEK
QUESTION_1 LAST FOUR WEEKS HOW MUCH OF THE TIME DID YOUR ASTHMA KEEP YOU FROM GETTING AS MUCH DONE AT WORK, SCHOOL OR AT HOME: NONE OF THE TIME
QUESTION_2 LAST FOUR WEEKS HOW OFTEN HAVE YOU HAD SHORTNESS OF BREATH: ONCE OR TWICE A WEEK
QUESTION_3 LAST FOUR WEEKS HOW OFTEN DID YOUR ASTHMA SYMPTOMS (WHEEZING, COUGHING, SHORTNESS OF BREATH, CHEST TIGHTNESS OR PAIN) WAKE YOU UP AT NIGHT OR EARLIER THAN USUAL IN THE MORNING: NOT AT ALL

## 2022-10-28 DIAGNOSIS — F41.9 ANXIETY: ICD-10-CM

## 2022-10-28 RX ORDER — LORAZEPAM 0.5 MG/1
TABLET ORAL
Qty: 30 TABLET | Refills: 2 | Status: SHIPPED | OUTPATIENT
Start: 2022-10-28 | End: 2023-01-27

## 2022-10-28 NOTE — TELEPHONE ENCOUNTER
Routing refill request to provider for review/approval because:  Drug not on the [facility] refill protocol     Last Written Prescription Date:  6/5/22  Last Fill Quantity: 30,  # refills: 5   Last office visit: 2/8/2022 with prescribing provider:  Bottem   Future Office Visit:      RAINA Dutton RN  [facility]

## 2022-11-10 DIAGNOSIS — M54.16 LUMBAR BACK PAIN WITH RADICULOPATHY AFFECTING LEFT LOWER EXTREMITY: Primary | ICD-10-CM

## 2022-11-19 ENCOUNTER — HEALTH MAINTENANCE LETTER (OUTPATIENT)
Age: 57
End: 2022-11-19

## 2022-11-29 ENCOUNTER — E-VISIT (OUTPATIENT)
Dept: FAMILY MEDICINE | Facility: CLINIC | Age: 57
End: 2022-11-29
Payer: COMMERCIAL

## 2022-11-29 DIAGNOSIS — J01.90 ACUTE SINUSITIS WITH SYMPTOMS > 10 DAYS: Primary | ICD-10-CM

## 2022-11-29 PROCEDURE — 99421 OL DIG E/M SVC 5-10 MIN: CPT | Performed by: NURSE PRACTITIONER

## 2022-11-29 NOTE — PATIENT INSTRUCTIONS
Dear Kathy Lei    After reviewing your responses, I've been able to diagnose you with sinusitis.      Based on your responses and diagnosis, I have prescribed Augmentin.   to treat your symptoms. I have sent this to your pharmacy.?     It is also important to stay well hydrated, get lots of rest and take over-the-counter decongestants,?tylenol?or ibuprofen if you?are able to?take those medications per your primary care provider to help relieve discomfort.?     It is important that you take?all of?your prescribed medication even if your symptoms are improving after a few doses.? Taking?all of?your medicine helps prevent the symptoms from returning.?     If your symptoms worsen, you develop severe headache, vomiting, high fever (>102), or are not improving in 7 days, please contact your primary care provider for an appointment or visit any of our convenient Walk-in Care or Urgent Care Centers to be seen which can be found on our website?here.?     Thanks again for choosing?us?as your health care partner,?   ?  Rachel Arauz NP?

## 2022-11-30 ENCOUNTER — TRANSFERRED RECORDS (OUTPATIENT)
Dept: HEALTH INFORMATION MANAGEMENT | Facility: CLINIC | Age: 57
End: 2022-11-30

## 2022-12-07 ENCOUNTER — MYC MEDICAL ADVICE (OUTPATIENT)
Dept: ORTHOPEDICS | Facility: CLINIC | Age: 57
End: 2022-12-07

## 2022-12-07 DIAGNOSIS — M54.16 LUMBAR BACK PAIN WITH RADICULOPATHY AFFECTING LEFT LOWER EXTREMITY: Primary | ICD-10-CM

## 2022-12-09 RX ORDER — PREDNISONE 20 MG/1
40 TABLET ORAL DAILY
Qty: 10 TABLET | Refills: 0 | Status: SHIPPED | OUTPATIENT
Start: 2022-12-09 | End: 2022-12-14

## 2022-12-16 ENCOUNTER — VIRTUAL VISIT (OUTPATIENT)
Dept: URGENT CARE | Facility: CLINIC | Age: 57
End: 2022-12-16
Payer: COMMERCIAL

## 2022-12-16 DIAGNOSIS — U07.1 COVID: Primary | ICD-10-CM

## 2022-12-16 PROCEDURE — 99213 OFFICE O/P EST LOW 20 MIN: CPT | Mod: CS | Performed by: EMERGENCY MEDICINE

## 2022-12-16 NOTE — PROGRESS NOTES
"Video visit:   Start time: 2:31 PM   Stop time: 2:37 PM   Duration: 7 minutes   Patient location: Home t provider location: [facility] virtual provider (remote).    Platform used for video visit: [employer]         The patient has been notified of following:     \"This video visit will be conducted via a call between you and your physician/provider. We have found that certain health care needs can be provided without the need for a physical exam.  This service lets us provide the care you need with a short phone conversation.  If a prescription is necessary we can send it directly to your pharmacy.  If lab work is needed we can place an order for that and you can then stop by our lab to have the test done at a later time.    Video visits are billed at different rates depending on your insurance coverage. During this emergency period, for some insurers they may be billed the same as an in-person visit.  Please reach out to your insurance provider with any questions.    If during the course of the call the physician/provider feels a video visit is not appropriate, you will not be charged for this service.\"    Patient has given verbal consent for Telephone visit?  Yes    What phone number would you like to be contacted at?  [phone]    How would you like to obtain your AVS? MyChart    Subjective   CC: Kathy Lei  is a 57 year old female who presents via phone visit today for the following health issues:   Chief Complaint   Patient presents with     Infection        COVID-19 Symptom Review  How many days ago did these symptoms start? 4    Are any of the following symptoms significant for you?  New or worsening difficulty breathing? Yes    Please describe what kind of difficulty you are having breathing:Mild dyspnea (able to do ADLs without difficulty, mild shortness of breath with activities such as climbing one or two flights of stairs or walking briskly)    Worsening cough? Yes, I am coughing up " mucus.    Fever or chills? Yes, I felt feverish or had chills.    Headache: YES    Sore throat: YES    Chest pain: No    Diarrhea: No    Body aches? YES    What treatments has patient tried?    Does patient live in a nursing home, group home, or shelter? No  Does patient have a way to get food/medications during quarantined? Yes, I have a friend or family member who can help me. and Yes                       Reviewed and updated as needed this visit by Provider                  Review of Systems         Objective    Gen: Patient is alert, oriented  Gen: No acute distress on video visit.  Nondyspneic appearing speaking in full sentences              Assessment/Plan:  Patient is a 57-year-old female who is on day 4 of COVID infection.  Symptoms are typical and do include mild dyspnea.  Patient has comorbidities of history of asthma and chemotherapy for breast cancer.  Patient does consent to taking paxlovid.  GFR is greater than 90.  She is instructed to withhold her Flovent and reduce her trazodone by 50% for the 5 days of paxlovid treatment.        Aakash Deleon MD

## 2023-01-06 ENCOUNTER — MYC MEDICAL ADVICE (OUTPATIENT)
Dept: ORTHOPEDICS | Facility: CLINIC | Age: 58
End: 2023-01-06

## 2023-01-06 DIAGNOSIS — M54.16 LUMBAR BACK PAIN WITH RADICULOPATHY AFFECTING LEFT LOWER EXTREMITY: Primary | ICD-10-CM

## 2023-01-09 RX ORDER — PREDNISONE 20 MG/1
40 TABLET ORAL DAILY
Qty: 10 TABLET | Refills: 0 | Status: SHIPPED | OUTPATIENT
Start: 2023-01-09 | End: 2023-11-27

## 2023-01-10 ENCOUNTER — MYC MEDICAL ADVICE (OUTPATIENT)
Dept: FAMILY MEDICINE | Facility: CLINIC | Age: 58
End: 2023-01-10

## 2023-01-10 DIAGNOSIS — Z87.891 PERSONAL HISTORY OF TOBACCO USE: Primary | ICD-10-CM

## 2023-01-11 NOTE — TELEPHONE ENCOUNTER
Kellie --    Please review and advise.     Pended CT scan referral for annual check on lungs.     Jena Sharif, BSN RN  [facility]

## 2023-02-05 ENCOUNTER — TELEPHONE (OUTPATIENT)
Dept: ONCOLOGY | Facility: CLINIC | Age: 58
End: 2023-02-05
Payer: COMMERCIAL

## 2023-02-05 DIAGNOSIS — Z17.0 MALIGNANT NEOPLASM OF UPPER-OUTER QUADRANT OF RIGHT BREAST IN FEMALE, ESTROGEN RECEPTOR POSITIVE (H): Primary | ICD-10-CM

## 2023-02-05 DIAGNOSIS — Z78.0 ASYMPTOMATIC POSTMENOPAUSAL STATUS: ICD-10-CM

## 2023-02-05 DIAGNOSIS — Z72.0 TOBACCO ABUSE: ICD-10-CM

## 2023-02-05 DIAGNOSIS — C50.411 MALIGNANT NEOPLASM OF UPPER-OUTER QUADRANT OF RIGHT BREAST IN FEMALE, ESTROGEN RECEPTOR POSITIVE (H): Primary | ICD-10-CM

## 2023-02-05 RX ORDER — HEPARIN SODIUM,PORCINE 10 UNIT/ML
5 VIAL (ML) INTRAVENOUS
Status: CANCELLED | OUTPATIENT
Start: 2023-02-14

## 2023-02-05 RX ORDER — ZOLEDRONIC ACID 0.04 MG/ML
4 INJECTION, SOLUTION INTRAVENOUS ONCE
Status: CANCELLED | OUTPATIENT
Start: 2023-02-14 | End: 2023-02-14

## 2023-02-05 RX ORDER — HEPARIN SODIUM (PORCINE) LOCK FLUSH IV SOLN 100 UNIT/ML 100 UNIT/ML
5 SOLUTION INTRAVENOUS
Status: CANCELLED | OUTPATIENT
Start: 2023-02-14

## 2023-02-05 NOTE — TELEPHONE ENCOUNTER
I called Mily and apologized that she had been lost to follow-up since 2021 and that there were several things that we need to do.  I discussed that we would continue with letrozole for a total of 10 years.  I discussed that we would give her adjuvant Zometa and we can get back going on the next visit.  We also needed to follow-up on her lung issues.  She did have groundglass opacities on a July 22, 2019 CT. these findings were asymptomatic grade 1 and appeared to resolve symptomatically and were not present on the February 9, 2022 CT chest.    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  The CT of the chest from February 9, 2022 which was performed for lung cancer screening showed    Pleural-based atelectasis/consolidation/scarring  in the right upper lobe, likely related to radiation associated  change. Other patchy subpleural densities in the right upper lobe are  again present. Lingular subsegmental atelectasis/scarring. Left upper  lobe subsegmental atelectasis/scarring. Calcified right lower lobe  Granulomas.    In terms of a resolution date for the grade 1 groundglass opacities later this possible date for resolution would have been February 9, 2022 but they may have resolved well before that.  In terms of symptoms she has had chronic cough for the past several years related to tobacco and environmental exposure to hairspray.  She did stop smoking at the time of her breast cancer diagnosis.  She will continue with lung cancer screening.  The next CT chest is due at this time.    I also discussed with her that we plan to give adjuvant Zometa and we will talk about that on her next visit.  She has had poor dentition but does have dental insurance.  She says that there are problems below the gum line.  I discussed that osteonecrosis of the jaw can be an issue and we would need dental clearance.  She will check with her dentist on Zometa. CT chest low dose. Dexa needed.      Christian Guzman MD

## 2023-02-06 DIAGNOSIS — J45.40 MODERATE PERSISTENT ASTHMA WITHOUT COMPLICATION: ICD-10-CM

## 2023-02-09 RX ORDER — MONTELUKAST SODIUM 10 MG/1
10 TABLET ORAL AT BEDTIME
Qty: 90 TABLET | Refills: 0 | Status: SHIPPED | OUTPATIENT
Start: 2023-02-09 | End: 2023-04-10

## 2023-02-09 NOTE — TELEPHONE ENCOUNTER
Medication is being filled for 1 time refill only due to:  Patient needs to be seen because it has been more than one year since last visit.    Last Written Prescription Date:  2/8/2022  Last Fill Quantity: 90,  # refills: 3   Last office visit: 2/8/2022 with prescribing provider:  Regla   Future Office Visit:  None    Scheduling:   Please contact patient to schedule an annual preventative. Thank you.     AG StoneN RN  [facility]

## 2023-02-10 ENCOUNTER — ANCILLARY PROCEDURE (OUTPATIENT)
Dept: CT IMAGING | Facility: CLINIC | Age: 58
End: 2023-02-10
Attending: NURSE PRACTITIONER
Payer: COMMERCIAL

## 2023-02-10 DIAGNOSIS — Z87.891 PERSONAL HISTORY OF TOBACCO USE: ICD-10-CM

## 2023-02-10 PROCEDURE — 71271 CT THORAX LUNG CANCER SCR C-: CPT | Mod: GC | Performed by: RADIOLOGY

## 2023-02-20 DIAGNOSIS — E03.9 HYPOTHYROIDISM, UNSPECIFIED TYPE: ICD-10-CM

## 2023-02-23 RX ORDER — LEVOTHYROXINE SODIUM 125 UG/1
TABLET ORAL
Qty: 90 TABLET | Refills: 0 | Status: SHIPPED | OUTPATIENT
Start: 2023-02-23 | End: 2023-04-10

## 2023-02-23 NOTE — TELEPHONE ENCOUNTER
---Prescription approved per [facility] Refill Protocol.       Winsome Menon RN BSN     [facility]        --Last visit:  2/8/2022     --Future Visit:   Next 5 appointments (look out 90 days)    Apr 10, 2023  2:00 PM  (Arrive by 1:40 PM)  Adult Preventative Visit with Rachel Arauz NP  [facility] ([facility] ) [address] [place] [id number] [phone]

## 2023-02-28 ENCOUNTER — E-VISIT (OUTPATIENT)
Dept: FAMILY MEDICINE | Facility: CLINIC | Age: 58
End: 2023-02-28
Payer: COMMERCIAL

## 2023-02-28 DIAGNOSIS — F41.9 ANXIETY: ICD-10-CM

## 2023-02-28 DIAGNOSIS — B96.89 ACUTE BACTERIAL SINUSITIS: ICD-10-CM

## 2023-02-28 DIAGNOSIS — J01.90 ACUTE BACTERIAL SINUSITIS: ICD-10-CM

## 2023-02-28 PROCEDURE — 99421 OL DIG E/M SVC 5-10 MIN: CPT | Performed by: NURSE PRACTITIONER

## 2023-02-28 RX ORDER — LORAZEPAM 0.5 MG/1
TABLET ORAL
Qty: 30 TABLET | Refills: 0 | Status: CANCELLED | OUTPATIENT
Start: 2023-02-28

## 2023-03-01 DIAGNOSIS — B96.89 ACUTE BACTERIAL SINUSITIS: ICD-10-CM

## 2023-03-01 DIAGNOSIS — J01.90 ACUTE BACTERIAL SINUSITIS: ICD-10-CM

## 2023-03-01 DIAGNOSIS — F41.9 ANXIETY: ICD-10-CM

## 2023-03-01 RX ORDER — LORAZEPAM 0.5 MG/1
TABLET ORAL
Qty: 30 TABLET | Refills: 5 | Status: SHIPPED | OUTPATIENT
Start: 2023-03-01 | End: 2023-08-23

## 2023-03-03 RX ORDER — LORAZEPAM 0.5 MG/1
TABLET ORAL
Qty: 30 TABLET | Refills: 5 | OUTPATIENT
Start: 2023-03-03

## 2023-03-06 ENCOUNTER — ANCILLARY PROCEDURE (OUTPATIENT)
Dept: GENERAL RADIOLOGY | Facility: CLINIC | Age: 58
End: 2023-03-06
Attending: FAMILY MEDICINE
Payer: COMMERCIAL

## 2023-03-06 VITALS
RESPIRATION RATE: 16 BRPM | DIASTOLIC BLOOD PRESSURE: 83 MMHG | HEART RATE: 99 BPM | OXYGEN SATURATION: 99 % | SYSTOLIC BLOOD PRESSURE: 129 MMHG

## 2023-03-06 PROCEDURE — 64483 NJX AA&/STRD TFRM EPI L/S 1: CPT | Mod: LT | Performed by: RADIOLOGY

## 2023-03-06 RX ORDER — IOPAMIDOL 408 MG/ML
10 INJECTION, SOLUTION INTRATHECAL ONCE
Status: COMPLETED | OUTPATIENT
Start: 2023-03-06 | End: 2023-03-06

## 2023-03-06 RX ORDER — LIDOCAINE HYDROCHLORIDE 10 MG/ML
5 INJECTION, SOLUTION EPIDURAL; INFILTRATION; INTRACAUDAL; PERINEURAL ONCE
Status: COMPLETED | OUTPATIENT
Start: 2023-03-06 | End: 2023-03-06

## 2023-03-06 RX ORDER — METHYLPREDNISOLONE ACETATE 80 MG/ML
80 INJECTION, SUSPENSION INTRA-ARTICULAR; INTRALESIONAL; INTRAMUSCULAR; SOFT TISSUE ONCE
Status: COMPLETED | OUTPATIENT
Start: 2023-03-06 | End: 2023-03-06

## 2023-03-06 RX ORDER — BUPIVACAINE HYDROCHLORIDE 5 MG/ML
10 INJECTION, SOLUTION EPIDURAL; INTRACAUDAL ONCE
Status: COMPLETED | OUTPATIENT
Start: 2023-03-06 | End: 2023-03-06

## 2023-03-06 RX ADMIN — METHYLPREDNISOLONE ACETATE 80 MG: 80 INJECTION, SUSPENSION INTRA-ARTICULAR; INTRALESIONAL; INTRAMUSCULAR; SOFT TISSUE at 08:26

## 2023-03-06 RX ADMIN — BUPIVACAINE HYDROCHLORIDE 2 ML: 5 INJECTION, SOLUTION EPIDURAL; INTRACAUDAL at 08:26

## 2023-03-06 RX ADMIN — LIDOCAINE HYDROCHLORIDE 5 ML: 10 INJECTION, SOLUTION EPIDURAL; INFILTRATION; INTRACAUDAL; PERINEURAL at 08:26

## 2023-03-06 RX ADMIN — IOPAMIDOL 2 ML: 408 INJECTION, SOLUTION INTRATHECAL at 08:25

## 2023-03-06 NOTE — DISCHARGE INSTRUCTIONS
Discharge Instructions for Epidural Steroid Injection  Care of injection site:  If you have new numbness down your leg after the injection, this may last up to 4-6 hours, but should go away. You may need help with walking until your leg feels normal.  Over the next 24 to 48 hours, pain at the injection site may increase before it gets better.  For the next 48 hours, use ice packs for 15 minutes, 3-4 times a day for pain relief.  If you have a bandage, you may remove it the next morning   Do not submerge injection site in water for 24 hours, (no baths. pools, hot tubs). Showers are OK.            Activity:  You should relax today and then you may return to your regular activity tomorrow.  You may eat a normal diet.    Medicines:  Continue to take all medications as ordered by your provider.  Restart all your blood thinner medicines tomorrow at your regular dose, including Coumadin (Warfarin).  If you are restarting Coumadin, talk to your doctor about having your INR checked.      The steroid should help reduce swelling and pain. This may take from 3-14 days. Pain from the shot will be mild and go away in 2-3 days. Please follow up with the provider that ordered this injection in a month if you don't already have an appointment with them. Let them know if the injection was effective.    Common side effects may include:  Insomnia  Heartburn  Flushed face  Water retention  Increased appetite  Increased blood sugar    If you have diabetes, watch your blood sugar closely. If needed, call your doctor to help control your blood sugar.    DO NOT GET THE COVID or FLU VACCINE 2 WEEKS AFTER YOUR INJECTION    Call your doctor or go to the Emergency Room if you have new severe pain or fever.     Wt Readings from Last 3 Encounters:   08/25/21 92 6 kg (204 lb 2 3 oz)   08/22/21 95 3 kg (210 lb 1 6 oz)   02/19/20 87 5 kg (193 lb)     Currently lasix on hold in light of sepsis  I's and O's, daily weight  Cardiology on board    Input appreciated

## 2023-03-06 NOTE — PROGRESS NOTES
Pt here for epidural injection. Tolerated without issue.  Verbalized understanding of discharge instructions. Escorted to jill.    Shweta Myers, GORAN

## 2023-03-27 DIAGNOSIS — F33.0 MILD RECURRENT MAJOR DEPRESSION (H): ICD-10-CM

## 2023-03-30 RX ORDER — SERTRALINE HYDROCHLORIDE 100 MG/1
100 TABLET, FILM COATED ORAL AT BEDTIME
Qty: 90 TABLET | Refills: 0 | Status: SHIPPED | OUTPATIENT
Start: 2023-03-30 | End: 2023-04-10

## 2023-03-30 NOTE — TELEPHONE ENCOUNTER
---Prescription approved per [facility] Refill Protocol.       Winsome Menon RN BSN     [facility]      --Last visit:  2/8/2022     --Future Visit:   Next 5 appointments (look out 90 days)    Apr 10, 2023  2:00 PM  (Arrive by 1:40 PM)  Adult Preventative Visit with Rachel Arauz NP  [facility] ([facility] ) [address] [place] [address]  [phone]          PHQ 4/12/2021 3/24/2022 10/19/2022   PHQ-9 Total Score 0 0 0   Q9: Thoughts of better off dead/self-harm past 2 weeks Not at all Not at all Not at all     Warnings Override History for sertraline (ZOLOFT) 100 MG tablet [[id number]]    Overridden by Rachel Arauz NP on Feb 8, 2022 11:08 AM   Drug-Drug   1. SELECTIVE SEROTONIN REUPTAKE INHIBITORS / SEROTONERGIC NON-OPIOID CNS DEPRESSANTS [Level: Major]   Other Orders: traZODone (DESYREL) 50 MG tablet      2. SELECTIVE SEROTONIN REUPTAKE INHIBITORS / SEROTONERGIC NON-OPIOID CNS DEPRESSANTS [Level: Major]   Other Orders: traZODone (DESYREL) 50 MG tablet

## 2023-04-03 ASSESSMENT — ENCOUNTER SYMPTOMS
PALPITATIONS: 0
SHORTNESS OF BREATH: 1
PARESTHESIAS: 0
FEVER: 0
NAUSEA: 1
CHILLS: 0
SORE THROAT: 0
DIZZINESS: 0
EYE PAIN: 0
DIARRHEA: 0
CONSTIPATION: 0
HEMATOCHEZIA: 0
HEADACHES: 0
ARTHRALGIAS: 1
HEARTBURN: 0
BREAST MASS: 0
COUGH: 0
DYSURIA: 0
NERVOUS/ANXIOUS: 1
WEAKNESS: 0
JOINT SWELLING: 0
HEMATURIA: 0
ABDOMINAL PAIN: 0
MYALGIAS: 0
FREQUENCY: 0

## 2023-04-10 ENCOUNTER — OFFICE VISIT (OUTPATIENT)
Dept: FAMILY MEDICINE | Facility: CLINIC | Age: 58
End: 2023-04-10
Payer: COMMERCIAL

## 2023-04-10 VITALS
BODY MASS INDEX: 23.63 KG/M2 | DIASTOLIC BLOOD PRESSURE: 66 MMHG | SYSTOLIC BLOOD PRESSURE: 118 MMHG | HEART RATE: 97 BPM | TEMPERATURE: 99.4 F | OXYGEN SATURATION: 96 % | HEIGHT: 65 IN | WEIGHT: 141.8 LBS | RESPIRATION RATE: 16 BRPM

## 2023-04-10 DIAGNOSIS — Z00.00 ROUTINE GENERAL MEDICAL EXAMINATION AT A HEALTH CARE FACILITY: Primary | ICD-10-CM

## 2023-04-10 DIAGNOSIS — E03.9 HYPOTHYROIDISM, UNSPECIFIED TYPE: ICD-10-CM

## 2023-04-10 DIAGNOSIS — C50.411 MALIGNANT NEOPLASM OF UPPER-OUTER QUADRANT OF RIGHT BREAST IN FEMALE, ESTROGEN RECEPTOR POSITIVE (H): ICD-10-CM

## 2023-04-10 DIAGNOSIS — G47.00 INSOMNIA, UNSPECIFIED TYPE: ICD-10-CM

## 2023-04-10 DIAGNOSIS — F33.0 MILD RECURRENT MAJOR DEPRESSION (H): ICD-10-CM

## 2023-04-10 DIAGNOSIS — G62.0 DRUG-INDUCED POLYNEUROPATHY (H): ICD-10-CM

## 2023-04-10 DIAGNOSIS — J45.40 MODERATE PERSISTENT ASTHMA WITHOUT COMPLICATION: ICD-10-CM

## 2023-04-10 DIAGNOSIS — Z17.0 MALIGNANT NEOPLASM OF UPPER-OUTER QUADRANT OF RIGHT BREAST IN FEMALE, ESTROGEN RECEPTOR POSITIVE (H): ICD-10-CM

## 2023-04-10 DIAGNOSIS — I42.9 CARDIOMYOPATHY, UNSPECIFIED TYPE (H): ICD-10-CM

## 2023-04-10 LAB
ALBUMIN SERPL BCG-MCNC: 4.6 G/DL (ref 3.5–5.2)
ALP SERPL-CCNC: 61 U/L (ref 35–104)
ALT SERPL W P-5'-P-CCNC: 12 U/L (ref 10–35)
ANION GAP SERPL CALCULATED.3IONS-SCNC: 16 MMOL/L (ref 7–15)
AST SERPL W P-5'-P-CCNC: 23 U/L (ref 10–35)
BILIRUB SERPL-MCNC: 0.5 MG/DL
BUN SERPL-MCNC: 10 MG/DL (ref 8–23)
CALCIUM SERPL-MCNC: 9.8 MG/DL (ref 8.6–10)
CHLORIDE SERPL-SCNC: 101 MMOL/L (ref 98–107)
CHOLEST SERPL-MCNC: 283 MG/DL
CREAT SERPL-MCNC: 0.58 MG/DL (ref 0.51–0.95)
DEPRECATED HCO3 PLAS-SCNC: 21 MMOL/L (ref 22–29)
ERYTHROCYTE [DISTWIDTH] IN BLOOD BY AUTOMATED COUNT: 13.2 % (ref 10–15)
GFR SERPL CREATININE-BSD FRML MDRD: >90 ML/MIN/1.73M2
GLUCOSE SERPL-MCNC: 95 MG/DL (ref 70–99)
HCT VFR BLD AUTO: 43.6 % (ref 35–47)
HDLC SERPL-MCNC: 74 MG/DL
HGB BLD-MCNC: 14.6 G/DL (ref 11.7–15.7)
LDLC SERPL CALC-MCNC: 192 MG/DL
MCH RBC QN AUTO: 28.6 PG (ref 26.5–33)
MCHC RBC AUTO-ENTMCNC: 33.5 G/DL (ref 31.5–36.5)
MCV RBC AUTO: 85 FL (ref 78–100)
NONHDLC SERPL-MCNC: 209 MG/DL
PLATELET # BLD AUTO: 276 10E3/UL (ref 150–450)
POTASSIUM SERPL-SCNC: 4.2 MMOL/L (ref 3.4–5.3)
PROT SERPL-MCNC: 7.5 G/DL (ref 6.4–8.3)
RBC # BLD AUTO: 5.11 10E6/UL (ref 3.8–5.2)
SODIUM SERPL-SCNC: 138 MMOL/L (ref 136–145)
TRIGL SERPL-MCNC: 86 MG/DL
TSH SERPL DL<=0.005 MIU/L-ACNC: 1.35 UIU/ML (ref 0.3–4.2)
WBC # BLD AUTO: 6.3 10E3/UL (ref 4–11)

## 2023-04-10 PROCEDURE — 87624 HPV HI-RISK TYP POOLED RSLT: CPT | Performed by: NURSE PRACTITIONER

## 2023-04-10 PROCEDURE — 84443 ASSAY THYROID STIM HORMONE: CPT | Performed by: NURSE PRACTITIONER

## 2023-04-10 PROCEDURE — 80061 LIPID PANEL: CPT | Performed by: NURSE PRACTITIONER

## 2023-04-10 PROCEDURE — 36415 COLL VENOUS BLD VENIPUNCTURE: CPT | Performed by: NURSE PRACTITIONER

## 2023-04-10 PROCEDURE — 85027 COMPLETE CBC AUTOMATED: CPT | Performed by: NURSE PRACTITIONER

## 2023-04-10 PROCEDURE — G0145 SCR C/V CYTO,THINLAYER,RESCR: HCPCS | Performed by: NURSE PRACTITIONER

## 2023-04-10 PROCEDURE — 80053 COMPREHEN METABOLIC PANEL: CPT | Performed by: NURSE PRACTITIONER

## 2023-04-10 PROCEDURE — 99214 OFFICE O/P EST MOD 30 MIN: CPT | Mod: 25 | Performed by: NURSE PRACTITIONER

## 2023-04-10 PROCEDURE — 99396 PREV VISIT EST AGE 40-64: CPT | Performed by: NURSE PRACTITIONER

## 2023-04-10 RX ORDER — LEVOTHYROXINE SODIUM 125 UG/1
TABLET ORAL
Qty: 90 TABLET | Refills: 3 | Status: SHIPPED | OUTPATIENT
Start: 2023-04-10 | End: 2024-04-15

## 2023-04-10 RX ORDER — LISINOPRIL 5 MG/1
5 TABLET ORAL DAILY
Qty: 90 TABLET | Refills: 3 | Status: SHIPPED | OUTPATIENT
Start: 2023-04-10 | End: 2024-04-15

## 2023-04-10 RX ORDER — FLUTICASONE PROPIONATE 110 UG/1
2 AEROSOL, METERED RESPIRATORY (INHALATION) 2 TIMES DAILY
Qty: 12 G | Refills: 12 | Status: SHIPPED | OUTPATIENT
Start: 2023-04-10 | End: 2024-05-20

## 2023-04-10 RX ORDER — SERTRALINE HYDROCHLORIDE 100 MG/1
100 TABLET, FILM COATED ORAL AT BEDTIME
Qty: 90 TABLET | Refills: 3 | Status: SHIPPED | OUTPATIENT
Start: 2023-04-10 | End: 2024-04-24

## 2023-04-10 RX ORDER — TRAZODONE HYDROCHLORIDE 50 MG/1
50-100 TABLET, FILM COATED ORAL
Qty: 90 TABLET | Status: SHIPPED | OUTPATIENT
Start: 2023-04-10 | End: 2024-05-20

## 2023-04-10 RX ORDER — ALBUTEROL SULFATE 90 UG/1
AEROSOL, METERED RESPIRATORY (INHALATION)
Qty: 18 G | Status: SHIPPED | OUTPATIENT
Start: 2023-04-10 | End: 2024-04-30

## 2023-04-10 RX ORDER — MONTELUKAST SODIUM 10 MG/1
10 TABLET ORAL AT BEDTIME
Qty: 90 TABLET | Refills: 3 | Status: SHIPPED | OUTPATIENT
Start: 2023-04-10 | End: 2024-04-15

## 2023-04-10 ASSESSMENT — ENCOUNTER SYMPTOMS
ARTHRALGIAS: 1
SHORTNESS OF BREATH: 1
EYE PAIN: 0
CHILLS: 0
DIZZINESS: 0
DYSURIA: 0
CONSTIPATION: 0
WEAKNESS: 0
NERVOUS/ANXIOUS: 1
HEMATOCHEZIA: 0
DIARRHEA: 0
MYALGIAS: 0
FREQUENCY: 0
SORE THROAT: 0
JOINT SWELLING: 0
ABDOMINAL PAIN: 0
HEARTBURN: 0
COUGH: 0
HEADACHES: 0
NAUSEA: 1
PARESTHESIAS: 0
HEMATURIA: 0
BREAST MASS: 0
FEVER: 0
PALPITATIONS: 0

## 2023-04-10 ASSESSMENT — PATIENT HEALTH QUESTIONNAIRE - PHQ9
SUM OF ALL RESPONSES TO PHQ QUESTIONS 1-9: 0
10. IF YOU CHECKED OFF ANY PROBLEMS, HOW DIFFICULT HAVE THESE PROBLEMS MADE IT FOR YOU TO DO YOUR WORK, TAKE CARE OF THINGS AT HOME, OR GET ALONG WITH OTHER PEOPLE: NOT DIFFICULT AT ALL
SUM OF ALL RESPONSES TO PHQ QUESTIONS 1-9: 0

## 2023-04-10 ASSESSMENT — PAIN SCALES - GENERAL: PAINLEVEL: MILD PAIN (2)

## 2023-04-10 NOTE — PROGRESS NOTES
SUBJECTIVE:   CC: Mily is an 57 year old who presents for preventive health visit.       4/10/2023     1:53 PM   Additional Questions   Roomed by kyle orellana   Accompanied by self   Patient has been advised of split billing requirements and indicates understanding: Yes  Healthy Habits:     Getting at least 3 servings of Calcium per day:  Yes    Bi-annual eye exam:  Yes    Dental care twice a year:  NO    Sleep apnea or symptoms of sleep apnea:  None    Diet:  Gluten-free/reduced    Frequency of exercise:  1 day/week    Duration of exercise:  15-30 minutes    Taking medications regularly:  Yes    Medication side effects:  None    PHQ-2 Total Score: 0    Additional concerns today:  No    Letrozole causes nausea, Zofran does not help.  She takes Ativan once daily, which is the only thing that has been helpful.     Her mood is stable on her current dose of Sertraline.    Her asthma has not been controlled.  She is using albuterol about once daily.    She is doing well on her current dose of levothyroxine.       Today's PHQ-2 Score:       4/3/2023     9:55 AM   PHQ-2 (  [employer])   Q1: Little interest or pleasure in doing things 0   Q2: Feeling down, depressed or hopeless 0   PHQ-2 Score 0   Q1: Little interest or pleasure in doing things Not at all   Q2: Feeling down, depressed or hopeless Not at all   PHQ-2 Score 0           Social History     Tobacco Use     Smoking status: Former     Packs/day: 0.50     Years: 30.00     Pack years: 15.00     Types: Cigarettes     Start date: 1980     Quit date: 2019     Years since quittin.2     Smokeless tobacco: Never     Tobacco comments:     social smoker at times   Vaping Use     Vaping status: Never Used   Substance Use Topics     Alcohol use: Yes     Comment: on weekends if going out             4/3/2023     9:55 AM   Alcohol Use   Prescreen: >3 drinks/day or >7 drinks/week? No     Reviewed orders with patient.  Reviewed health maintenance and updated orders  "accordingly - Yes      Breast Cancer Screening:    FHS-7:        View : No data to display.                  Pertinent mammograms are reviewed under the imaging tab.    History of abnormal Pap smear: NO - age 30-65 PAP every 5 years with negative HPV co-testing recommended      Latest Ref Rng & Units 7/16/2018    10:52 AM 7/16/2018     9:42 AM 10/10/2011     9:35 AM   PAP / HPV   PAP (Historical)  NIL    NIL     HPV 16 DNA NEG^Negative  Negative      HPV 18 DNA NEG^Negative  Negative      Other HR HPV NEG^Negative  Negative        Reviewed and updated as needed this visit by clinical staff   Tobacco  Allergies  Meds              Reviewed and updated as needed this visit by Provider                     Review of Systems   Constitutional: Negative for chills and fever.   HENT: Negative for congestion, ear pain, hearing loss and sore throat.    Eyes: Negative for pain and visual disturbance.   Respiratory: Positive for shortness of breath. Negative for cough.    Cardiovascular: Negative for chest pain, palpitations and peripheral edema.   Gastrointestinal: Positive for nausea. Negative for abdominal pain, constipation, diarrhea, heartburn and hematochezia.   Breasts:  Negative for tenderness, breast mass and discharge.   Genitourinary: Negative for dysuria, frequency, genital sores, hematuria, pelvic pain, urgency, vaginal bleeding and vaginal discharge.   Musculoskeletal: Positive for arthralgias. Negative for joint swelling and myalgias.   Skin: Negative for rash.   Neurological: Negative for dizziness, weakness, headaches and paresthesias.   Psychiatric/Behavioral: Negative for mood changes. The patient is nervous/anxious.           OBJECTIVE:   /66 (BP Location: Right arm, Patient Position: Sitting, Cuff Size: Adult Regular)   Pulse 97   Temp 99.4  F (37.4  C) (Temporal)   Resp 16   Ht 1.651 m (5' 5\")   Wt 64.3 kg (141 lb 12.8 oz)   LMP 11/02/2014   SpO2 96%   BMI 23.60 kg/m    Physical " Exam  GENERAL: healthy, alert and no distress  EYES: Eyes grossly normal to inspection, PERRL and conjunctivae and sclerae normal  HENT: ear canals and TM's normal, nose and mouth without ulcers or lesions  NECK: no adenopathy, no asymmetry, masses, or scars and thyroid normal to palpation  RESP: lungs clear to auscultation - no rales, rhonchi or wheezes  BREAST: normal without masses, tenderness or nipple discharge and no palpable axillary masses or adenopathy  CV: regular rate and rhythm, normal S1 S2, no S3 or S4, no murmur, click or rub, no peripheral edema and peripheral pulses strong  ABDOMEN: soft, nontender, no hepatosplenomegaly, no masses and bowel sounds normal   (female): normal female external genitalia, normal urethral meatus, vaginal mucosa pink, moist, well rugated, and normal cervix/adnexa/uterus without masses or discharge  MS: no gross musculoskeletal defects noted, no edema  SKIN: no suspicious lesions or rashes  NEURO: Normal strength and tone, mentation intact and speech normal  PSYCH: mentation appears normal, affect normal/bright        ASSESSMENT/PLAN:   (Z00.00) Routine general medical examination at a health care facility  (primary encounter diagnosis)  Comment:   Plan: Lipid panel reflex to direct LDL Fasting, Pap         screen with HPV - recommended age 30 - 65 years            (I42.9) Cardiomyopathy, unspecified type (H)  Comment: stable  Plan: lisinopril (ZESTRIL) 5 MG tablet, Comprehensive        metabolic panel (BMP + Alb, Alk Phos, ALT, AST,        Total. Bili, TP), CBC with platelets        The current medical regimen is effective;  continue present plan and medications.     (F33.0) Mild recurrent major depression (H)  Comment: stable  Plan: sertraline (ZOLOFT) 100 MG tablet        The current medical regimen is effective;  continue present plan and medications.     (G47.00) Insomnia, unspecified type  Comment: stable  Plan: traZODone (DESYREL) 50 MG tablet        The current  medical regimen is effective;  continue present plan and medications.     (J45.40) Moderate persistent asthma without complication  Comment: not controlled  Plan: montelukast (SINGULAIR) 10 MG tablet,         fluticasone (FLOVENT HFA) 110 MCG/ACT inhaler,         albuterol (VENTOLIN HFA) 108 (90 Base) MCG/ACT         inhaler        Restart Flovent.      (E03.9) Hypothyroidism, unspecified type  Comment: stable  Plan: TSH with free T4 reflex, levothyroxine         (SYNTHROID/LEVOTHROID) 125 MCG tablet        The current medical regimen is effective;  continue present plan and medications.     (G62.0) Drug-induced polyneuropathy (H)  Comment: stable  Plan:     (C50.411,  Z17.0) Malignant neoplasm of upper-outer quadrant of right breast in female, estrogen receptor positive (H)  Comment:   Plan: She will follow up with Dr. Guzman.           COUNSELING:  Reviewed preventive health counseling, as reflected in patient instructions        She reports that she quit smoking about 4 years ago. Her smoking use included cigarettes. She started smoking about 42 years ago. She has a 15.00 pack-year smoking history. She has never used smokeless tobacco.          Rachel Arauz NP  [facility]  Answers for HPI/ROS submitted by the patient on 4/10/2023  If you checked off any problems, how difficult have these problems made it for you to do your work, take care of things at home, or get along with other people?: Not difficult at all  PHQ9 TOTAL SCORE: 0

## 2023-04-11 ENCOUNTER — MYC MEDICAL ADVICE (OUTPATIENT)
Dept: FAMILY MEDICINE | Facility: CLINIC | Age: 58
End: 2023-04-11
Payer: COMMERCIAL

## 2023-04-11 DIAGNOSIS — E78.5 HYPERLIPIDEMIA LDL GOAL <130: Primary | ICD-10-CM

## 2023-04-12 NOTE — TELEPHONE ENCOUNTER
Kellie: pt is interested in medication; also curious about anion gap which is only 16    Yikes! Omg high! Can I get on medication while I work on a new diet? I'm afraid.  My Mom and Dad, Brother, Sister all had it too but not that high.      Elena AMBROCIO RN  [facility]

## 2023-04-12 NOTE — TELEPHONE ENCOUNTER
Select pharmacy and sign rx. Ask patient to see pcp for follow up in 6 months - virtual visit is fine.  No concerns about anion gap and ok to ignore for now until pcp is back.

## 2023-04-13 LAB
BKR LAB AP GYN ADEQUACY: NORMAL
BKR LAB AP GYN INTERPRETATION: NORMAL
BKR LAB AP HPV REFLEX: NORMAL
BKR LAB AP PREVIOUS ABNORMAL: NORMAL
PATH REPORT.COMMENTS IMP SPEC: NORMAL
PATH REPORT.COMMENTS IMP SPEC: NORMAL
PATH REPORT.RELEVANT HX SPEC: NORMAL

## 2023-04-13 RX ORDER — ATORVASTATIN CALCIUM 20 MG/1
20 TABLET, FILM COATED ORAL DAILY
Qty: 90 TABLET | Refills: 1 | Status: SHIPPED | OUTPATIENT
Start: 2023-04-13 | End: 2023-09-11

## 2023-04-13 NOTE — TELEPHONE ENCOUNTER
Per Dr. Bragg - Select pharmacy and sign rx. Ask patient to see pcp for follow up in 6 months - virtual visit is fine.  No concerns about anion gap and ok to ignore for now until pcp is back.     Team Coordinators: Please contact patient to set up PCP visit within 6 months for any further refills.    AG FrederickN, RN  [facility]

## 2023-04-16 LAB
HUMAN PAPILLOMA VIRUS 16 DNA: NEGATIVE
HUMAN PAPILLOMA VIRUS 18 DNA: NEGATIVE
HUMAN PAPILLOMA VIRUS FINAL DIAGNOSIS: NORMAL
HUMAN PAPILLOMA VIRUS OTHER HR: NEGATIVE

## 2023-05-10 ENCOUNTER — MYC MEDICAL ADVICE (OUTPATIENT)
Dept: FAMILY MEDICINE | Facility: CLINIC | Age: 58
End: 2023-05-10
Payer: COMMERCIAL

## 2023-05-10 DIAGNOSIS — L65.9 HAIR LOSS: Primary | ICD-10-CM

## 2023-05-11 RX ORDER — MINOXIDIL 10 MG/1
10 TABLET ORAL DAILY
Status: CANCELLED | OUTPATIENT
Start: 2023-05-11

## 2023-05-11 NOTE — TELEPHONE ENCOUNTER
Kellie --    Patient requesting pill form of minoxidil.  Pended below.    Last seen in office  4/10/2023. Would you like patient to schedule an appointment?    Jena Sharif, AGN RN  [facility]

## 2023-05-14 RX ORDER — MINOXIDIL 2.5 MG/1
TABLET ORAL
Qty: 45 TABLET | Refills: 3 | Status: SHIPPED | OUTPATIENT
Start: 2023-05-14 | End: 2024-05-15

## 2023-06-13 NOTE — NURSING NOTE
[id number]: Study Visit Note   Subject name: Kathy Lei     Visit: Pre-surgery visit    Did the study visit occur within the appropriate window allowed by the protocol? Yes    Since the last study visit, She has been doing well.  No new issues or concerns at this time.       I have personally interviewed Kathy Lei and reviewed her medical record for adverse events and concomitant medications and these have been recorded on the corresponding logs in Kathy Lei's research file.     Kathy Lei was given the opportunity to ask any trial related questions.  Please see provider progress note for physical exam and other clinical information. Labs were reviewed - any significant lab values were addressed and reviewed.    Dasha White RN     Pager [phone]        
[facility]

## 2023-06-14 ENCOUNTER — ANCILLARY PROCEDURE (OUTPATIENT)
Dept: INTERVENTIONAL RADIOLOGY/VASCULAR | Facility: CLINIC | Age: 58
End: 2023-06-14
Attending: FAMILY MEDICINE
Payer: COMMERCIAL

## 2023-06-14 VITALS
DIASTOLIC BLOOD PRESSURE: 72 MMHG | HEART RATE: 86 BPM | OXYGEN SATURATION: 97 % | SYSTOLIC BLOOD PRESSURE: 116 MMHG | RESPIRATION RATE: 16 BRPM

## 2023-06-14 PROCEDURE — 64483 NJX AA&/STRD TFRM EPI L/S 1: CPT | Mod: LT | Performed by: RADIOLOGY

## 2023-06-14 RX ORDER — IOPAMIDOL 408 MG/ML
10 INJECTION, SOLUTION INTRATHECAL ONCE
Status: COMPLETED | OUTPATIENT
Start: 2023-06-14 | End: 2023-06-14

## 2023-06-14 RX ORDER — METHYLPREDNISOLONE ACETATE 80 MG/ML
80 INJECTION, SUSPENSION INTRA-ARTICULAR; INTRALESIONAL; INTRAMUSCULAR; SOFT TISSUE ONCE
Status: COMPLETED | OUTPATIENT
Start: 2023-06-14 | End: 2023-06-14

## 2023-06-14 RX ORDER — LIDOCAINE HYDROCHLORIDE 10 MG/ML
5 INJECTION, SOLUTION EPIDURAL; INFILTRATION; INTRACAUDAL; PERINEURAL ONCE
Status: COMPLETED | OUTPATIENT
Start: 2023-06-14 | End: 2023-06-14

## 2023-06-14 RX ORDER — BUPIVACAINE HYDROCHLORIDE 5 MG/ML
10 INJECTION, SOLUTION EPIDURAL; INTRACAUDAL ONCE
Status: COMPLETED | OUTPATIENT
Start: 2023-06-14 | End: 2023-06-14

## 2023-06-14 RX ADMIN — IOPAMIDOL 2 ML: 408 INJECTION, SOLUTION INTRATHECAL at 08:29

## 2023-06-14 RX ADMIN — BUPIVACAINE HYDROCHLORIDE 3 ML: 5 INJECTION, SOLUTION EPIDURAL; INTRACAUDAL at 08:29

## 2023-06-14 RX ADMIN — METHYLPREDNISOLONE ACETATE 80 MG: 80 INJECTION, SUSPENSION INTRA-ARTICULAR; INTRALESIONAL; INTRAMUSCULAR; SOFT TISSUE at 08:29

## 2023-06-14 RX ADMIN — LIDOCAINE HYDROCHLORIDE 5 ML: 10 INJECTION, SOLUTION EPIDURAL; INFILTRATION; INTRACAUDAL; PERINEURAL at 08:29

## 2023-06-14 NOTE — PROGRESS NOTES
Pt here for epidural injection. Pt tolerated but had  with bilateral weakness post procedure. Had patient stay with us for 30 minutes. Pt still demonstrated weakness in both legs but declined to stay longer. Pts  will be responsible for care post procedure. Pt verbalized discharge instructions. Escorted to lobby.    Shweta Myers, RN

## 2023-06-14 NOTE — DISCHARGE INSTRUCTIONS
Discharge Instructions for Epidural Steroid Injection  Care of injection site:  If you have new numbness down your leg after the injection, this may last up to 4-6 hours, but should go away. You may need help with walking until your leg feels normal.  Over the next 24 to 48 hours, pain at the injection site may increase before it gets better.  For the next 48 hours, use ice packs for 15 minutes, 3-4 times a day for pain relief.  If you have a bandage, you may remove it the next morning   Do not submerge injection site in water for 24 hours, (no baths. pools, hot tubs). Showers are OK.            Activity:  You should relax today and then you may return to your regular activity tomorrow.  You may eat a normal diet.  Do not drive for 24 hours.         Medicines:  Continue to take all medications as ordered by your provider.  Restart all your blood thinner medicines tomorrow at your regular dose, including Coumadin (Warfarin).  If you are restarting Coumadin, talk to your doctor about having your INR checked.      The steroid should help reduce swelling and pain. This may take from 3-14 days. Pain from the shot will be mild and go away in 2-3 days. Please follow up with the provider that ordered this injection in a month if you don't already have an appointment with them. Let them know if the injection was effective.    Common side effects may include:  Insomnia  Heartburn  Flushed face  Water retention  Increased appetite  Increased blood sugar    If you have diabetes, watch your blood sugar closely. If needed, call your doctor to help control your blood sugar.    DO NOT GET THE COVID or FLU VACCINE 2 WEEKS AFTER YOUR INJECTION    Call your doctor or go to the Emergency Room if you have new severe pain or fever.

## 2023-06-15 ENCOUNTER — MYC MEDICAL ADVICE (OUTPATIENT)
Dept: ORTHOPEDICS | Facility: CLINIC | Age: 58
End: 2023-06-15
Payer: COMMERCIAL

## 2023-06-15 DIAGNOSIS — M54.16 LUMBAR BACK PAIN WITH RADICULOPATHY AFFECTING LEFT LOWER EXTREMITY: Primary | ICD-10-CM

## 2023-06-15 DIAGNOSIS — M54.50 LUMBAR PAIN: Primary | ICD-10-CM

## 2023-06-15 ASSESSMENT — ENCOUNTER SYMPTOMS
DISTURBANCES IN COORDINATION: 0
ARTHRALGIAS: 1
MEMORY LOSS: 0
SEIZURES: 0
COUGH DISTURBING SLEEP: 0
BACK PAIN: 1
JOINT SWELLING: 0
WHEEZING: 1
HEADACHES: 0
LOSS OF CONSCIOUSNESS: 0
PARALYSIS: 0
POSTURAL DYSPNEA: 0
SHORTNESS OF BREATH: 1
SPEECH CHANGE: 0
WEAKNESS: 0
STIFFNESS: 0
INSOMNIA: 0
DIZZINESS: 0
MYALGIAS: 1
NECK PAIN: 0
TINGLING: 1
DEPRESSION: 0
PANIC: 1
MUSCLE WEAKNESS: 0
MUSCLE CRAMPS: 0
DYSPNEA ON EXERTION: 1
NERVOUS/ANXIOUS: 1
NUMBNESS: 1
DECREASED CONCENTRATION: 0
TREMORS: 0

## 2023-06-15 NOTE — TELEPHONE ENCOUNTER
DIAGNOSIS: Back Pain   APPOINTMENT DATE: 06/20/2023   NOTES STATUS DETAILS   OFFICE NOTE from referring provider Internal Bill Hairston MD - [facility]   OFFICE NOTE from other specialist Internal    OPERATIVE REPORT N/A 1976 - Deepthi Butler, done with [facility]   MEDICATION LIST Internal    MRI Internal    XRAYS (IMAGES & REPORTS) Internal

## 2023-06-20 ENCOUNTER — PRE VISIT (OUTPATIENT)
Dept: ORTHOPEDICS | Facility: CLINIC | Age: 58
End: 2023-06-20

## 2023-06-20 ENCOUNTER — OFFICE VISIT (OUTPATIENT)
Dept: ORTHOPEDICS | Facility: CLINIC | Age: 58
End: 2023-06-20
Attending: FAMILY MEDICINE
Payer: COMMERCIAL

## 2023-06-20 ENCOUNTER — ANCILLARY PROCEDURE (OUTPATIENT)
Dept: GENERAL RADIOLOGY | Facility: CLINIC | Age: 58
End: 2023-06-20
Attending: ORTHOPAEDIC SURGERY
Payer: COMMERCIAL

## 2023-06-20 VITALS — WEIGHT: 143 LBS | HEIGHT: 65 IN | BODY MASS INDEX: 23.82 KG/M2

## 2023-06-20 DIAGNOSIS — M54.16 LUMBAR BACK PAIN WITH RADICULOPATHY AFFECTING LEFT LOWER EXTREMITY: ICD-10-CM

## 2023-06-20 DIAGNOSIS — M54.50 LUMBAR PAIN: ICD-10-CM

## 2023-06-20 PROCEDURE — 99204 OFFICE O/P NEW MOD 45 MIN: CPT | Performed by: ORTHOPAEDIC SURGERY

## 2023-06-20 PROCEDURE — 72110 X-RAY EXAM L-2 SPINE 4/>VWS: CPT | Performed by: STUDENT IN AN ORGANIZED HEALTH CARE EDUCATION/TRAINING PROGRAM

## 2023-06-20 RX ORDER — GABAPENTIN 300 MG/1
CAPSULE ORAL
Qty: 90 CAPSULE | Refills: 1 | Status: SHIPPED | OUTPATIENT
Start: 2023-06-20 | End: 2023-06-21

## 2023-06-20 NOTE — PROGRESS NOTES
Spine Surgery Consultation    REFERRING PHYSICIAN: Bill Hairston   PRIMARY CARE PHYSICIAN: Rachel Arauz           Chief Complaint:   Consult (New patient consult for lumbar spine.  C/C low back pain with intermittent left LE radicular pain, and left ankle pain.  Sx for many years.)      History of Present Illness:  Symptom Profile Including: location of symptoms, onset, severity, exacerbating/alleviating factors, previous treatments:        Kathy Lei is a 57 year old female who presents today for evaluation of left lower extremity radiculopathy in an L5 distribution.  She has a history of a previous thoracic fusion for idiopathic scoliosis when she was 10 years old in the 1970s which was done with a Lacey kim.  She is developed worsening back pain over time but really think it really bothers her with the left leg symptoms.  She has difficulty standing as a result of the symptoms.  She works as a hairdresser.  Straightening the leg is also painful.  She tried physical therapy and this made things worse so she had to stop.  She had 2 epidural injections 1 in March and 1 in June, and the first 1 helped her quite a bit but the most recent one was less effective.  Both these were left L5-S1 transforaminal epidural injections.  She is done Tylenol and anti-inflammatories.  She has not yet tried gabapentin.             Past Medical History:     Past Medical History:   Diagnosis Date     Anxiety      Asthma      Breast cancer (H) 12/2018     Cardiomyopathy, unspecified type (H) 4/12/2021     Depressive disorder, not elsewhere classified      HLD (hyperlipidemia)      Hypertension      Hypothyroidism      Malignant neoplasm of upper-outer quadrant of right breast in female, estrogen receptor positive (H) 01/28/2019     Mild intermittent asthma      PONV (postoperative nausea and vomiting)      Scoliosis (and kyphoscoliosis), idiopathic             Past Surgical History:     Past Surgical  History:   Procedure Laterality Date     ABDOMEN SURGERY   c section     BACK SURGERY      spinal fusion- Lacey kim scoliosis fusion      BIOPSY  breast     BREAST SURGERY  implants      BRONCHOSCOPY (RIGID OR FLEXIBLE), DIAGNOSTIC N/A 2019    Procedure: BRONCHOSCOPY, WITH BRONCHOALVEOLAR LAVAGE;  Surgeon: Maksim Claros MD;  Location: [facility]     C/SECTION, LOW TRANSVERSE      , Low Transverse     COSMETIC SURGERY  breast implants      GRAFT FAT TO BREAST Bilateral 2021    Procedure: Bilateral breast revision, fat grafting, nipple reconstruction, umbilicoplasty,  scar revision;  Surgeon: LALY Ramos MD;  Location: [facility]     GRAFT FREE VASCULARIZED TRANSVERSE RECTUS ABDOMINIS MYOCUTANEOUS Bilateral 2021    Procedure: Bilateral breast reconstruction with Bilateral LIVIER flaps, reinervation, strattice abdominal wall repair, SPY;  Surgeon: LALY Ramos MD;  Location: [facility]     INSERT PORT VASCULAR ACCESS Left 2019    Procedure: Single Lumen Chest Port Placement;  Surgeon: Jerome Dash PA-C;  Location: [facility]     IR CHEST PORT PLACEMENT > 5 YRS OF AGE  2019     MASTECTOMY SIMPLE, SENTINEL NODE, COMBINED N/A 08/15/2019    Procedure: Bilateral Skin Sparing Mastectomy, Right Axillary Jarreau Lymph Node Biopsy;  Surgeon: Anne Yousif MD;  Location: [facility]     RECONSTRUCT BREAST, INSERT TISSUE EXPANDER, COMBINED Bilateral 08/15/2019    Procedure: Bilateral Breast Reconstruction With Expanders and SPY;  Surgeon: LALY Ramos MD;  Location: [facility]     REMOVE IMPLANT BREAST Bilateral 08/15/2019    Procedure: Removal of bilateral breast implant and Bilateral excision of implant capsules;  Surgeon: Anne Yousif MD;  Location: [facility]     REMOVE IMPLANT BREAST Bilateral 10/31/2019    Procedure: Right Breast Implant Removal;  Surgeon: LALY Ramos MD;  Location: [facility]     REMOVE PORT VASCULAR ACCESS N/A  08/15/2019    Procedure: Remove Vascular Access Port;  Surgeon: Anne Yousif MD;  Location: [facility]     SURGICAL HISTORY OF -       removal of right breast papilloma            Social History:     Social History     Tobacco Use     Smoking status: Former     Packs/day: 0.50     Years: 30.00     Pack years: 15.00     Types: Cigarettes     Start date: 1980     Quit date: 2019     Years since quittin.4     Smokeless tobacco: Never     Tobacco comments:     social smoker at times   Vaping Use     Vaping status: Never Used   Substance Use Topics     Alcohol use: Yes     Comment: on weekends if going out            Family History:     Family History   Problem Relation Age of Onset     Hypertension Mother      Thyroid Disease Mother         on meds     Psychotic Disorder Mother         anxiety     Hyperlipidemia Mother      Anxiety Disorder Mother      Cerebrovascular Disease Father         minor     Hypertension Father      Osteoporosis Maternal Grandmother      Asthma Maternal Grandfather      Thyroid Disease Sister         x2-on meds     Psychotic Disorder Sister         problems making decisions     Asthma Other      C.A.D. No family hx of      Diabetes No family hx of      Breast Cancer No family hx of      Cancer - colorectal No family hx of             Allergies:     Allergies   Allergen Reactions     Buspar [Buspirone] Nausea and Vomiting     Gluten Meal      Percocet [Oxycodone-Acetaminophen]      Nausea and Vomiting      Remeron [Mirtazapine]      Dizzy , cant function , balance            Medications:     Current Outpatient Medications   Medication     albuterol (VENTOLIN HFA) 108 (90 Base) MCG/ACT inhaler     amoxicillin-clavulanate (AUGMENTIN) 875-125 MG tablet     atorvastatin (LIPITOR) 20 MG tablet     Cholecalciferol (VITAMIN D3 PO)     fluticasone (FLOVENT HFA) 110 MCG/ACT inhaler     letrozole (FEMARA) 2.5 MG tablet     levothyroxine (SYNTHROID/LEVOTHROID) 125 MCG tablet      "lisinopril (ZESTRIL) 5 MG tablet     LORazepam (ATIVAN) 0.5 MG tablet     minoxidil (LONITEN) 2.5 MG tablet     montelukast (SINGULAIR) 10 MG tablet     Multiple Vitamins-Minerals (WOMENS ONE DAILY PO)     Multiple Vitamins-Minerals (ZINC PO)     sertraline (ZOLOFT) 100 MG tablet     traZODone (DESYREL) 50 MG tablet     No current facility-administered medications for this visit.             Review of Systems:     A 10 point ROS was performed and reviewed. Specific responses to these questions are noted at the end of the document.         Physical Exam:   Vitals: Ht 1.66 m (5' 5.35\")   Wt 64.9 kg (143 lb)   LMP 11/02/2014   BMI 23.54 kg/m    Constitutional: awake, alert, cooperative, no apparent distress, appears stated age.    Eyes: The sclera are white.  Ears, Nose, Throat: The trachea is midline.  Psychiatric: The patient has a normal affect.  Respiratory: breathing non-labored  Cardiovascular: The extremities are warm and perfused.  Skin: no obvious rashes or lesions.  Musculoskeletal, Neurologic, and Spine:          Lumbar Spine:    Appearance - No gross stepoffs or deformities    Motor -     L2-3: Hip flexion 5/5 R and 5/5 L strength          L3/4:  Knee extension R 5/5 and L 5/5 strength         L4/5:  Foot dorsiflexion R 5/5 L 5/5 and       EHL dorsiflexion R 4/5 L 4/5 strength         S1:  Plantarflexion/Peroneal Muscles  R 5/5 and L 5/5 strength    Sensation: intact to light touch L3-S1 distribution BLE      Neurologic:      REFLEXES Left Right                  Patella 1+ 1+   Ankle jerk 1+ 1+   Babinski No upgoing great toe No upgoing great toe   Clonus 0 beats 0 beats     Hip Exam:  No pain with hip log roll and no tenderness over the greater trochanters.    Alignment:  Patient stands with a neutral standing sagittal balance.           Imaging:   We ordered and independently reviewed new radiographs at this clinic visit. The results were discussed with the patient.  Findings include:    Lumbar " radiographs show degenerative scoliosis, superimposed upon idiopathic scoliosis and previous instrumented thoracic fusion, I did compare these against her 2021 May lumbar films and the magnitude of scoliosis is similar    I reviewed her August 2022 lumbar MRI and she does have left L5-S1 foraminal stenosis in the setting of multilevel degenerative scoliosis with disc collapse and lateral listhesis             Assessment and Plan:   Assessment:  57 year old female with lateral listhesis, foraminal stenosis, left L5 distribution radicular complaints in the setting of degenerative scoliosis below previous instrumented fusion for idiopathic scoliosis     Plan:  1. Given her current symptoms I think the best treatment would be to try adding some gabapentin.  She is already done 2 injections.  Physical therapy made things worse.  We explained that if things do not improve on the gabapentin a last resort option would be to do an instrumented fusion down to the pelvis.  I spent quite a bit of time discussing risks and benefits and describing what the surgery would look like.  I would like to do a phone follow-up with her in 4 to 6 weeks to assess her progress with the gabapentin.  Risks of this surgery include risk of infection, risk of dural tear resulting in CSF leak which might result in headaches, or possible need for lumbar drain, or possible revision surgery in the setting of a persistent leak. Risk of hematoma or seroma resulting in wound complications.  Possible nerve root injury resulting in numbness weakness or paralysis into the arms or legs. Possible radiculitis which could result in similar symptoms or could result in significant neurogenic type pain. There is also a risk of delayed onset nerve root pals or radiculitis, which I explained is potentially due to stretch injury or possibly due to delayed onset swelling, and is sometimes temporary but can also be permanent.  Risk of incomplete decompression which  might require revision surgery in the future.  Risk of adjacent segment problems requiring surgery in the future. Risk of incomplete relief of symptoms possibly requiring revision surgery in the future. Furthermore, although rare, there are risks of major vessel injury such as to the major vessels anteriorly or to the bowel from the surgery.  Sometimes this can happen if an instrument is passed anteriorly through the disc space. There is a risk of nonunion which might require revision surgery in the future, and risk of hardware complications from the instrumentation.  I do use imaging to help guide the placement of the instrumentation, but even with this there is a chance of implant malposition or problem.  There is a risk of blood clots in the legs or the lungs.  Lastly, although rare, there are certainly risks of the anesthetic including stroke heart attack and death.      In most cases we need to use a bone graft extender in addition to local autograft.  The bone graft extender is usually crushed cancellous allograft from [employer], [employer] spinal graft [employer], produce crushed cancellous allograft  This is needed because there is usually not enough autograft available to complete the fusion.  In some cases we will also add autogenous iliac crest autograft if the quality of the local bone is poor or very limited in quantity.      For posterior lumbar fusions we use [employer] screws, rods, and interbody devices.      There is no underlying mental illness or substance abuse in this case that would impair the patient's ability to pursue surgery or undertake the recovery after surgery.          Respectfully,  Cosme Lu MD  Spine Surgery  [facility]      Answers for HPI/ROS submitted by the patient on 6/15/2023  General Symptoms: No  Skin Symptoms: No  HENT Symptoms: No  EYE SYMPTOMS: No  HEART SYMPTOMS: No  LUNG SYMPTOMS: Yes  INTESTINAL SYMPTOMS: No  URINARY SYMPTOMS: No  GYNECOLOGIC  SYMPTOMS: No  BREAST SYMPTOMS: No  SKELETAL SYMPTOMS: Yes  BLOOD SYMPTOMS: No  NERVOUS SYSTEM SYMPTOMS: Yes  MENTAL HEALTH SYMPTOMS: Yes  Difficulty breating or shortness of breath: Yes  Wheezing: Yes  Difficulty breathing on exertion: Yes  Nighttime Cough: No  Difficulty breathing when lying flat: No  Back pain: Yes  Muscle aches: Yes  Neck pain: No  Swollen joints: No  Joint pain: Yes  Bone pain: Yes  Muscle cramps: No  Muscle weakness: No  Joint stiffness: No  Bone fracture: No  Trouble with coordination: No  Dizziness or trouble with balance: No  Fainting or black-out spells: No  Memory loss: No  Headache: No  Seizures: No  Speech problems: No  Tingling: Yes  Tremor: No  Weakness: No  Difficulty walking: Yes  Paralysis: No  Numbness: Yes  Nervous or Anxious: Yes  Depression: No  Trouble sleeping: No  Trouble thinking or concentrating: No  Mood changes: No  Panic attacks: Yes

## 2023-06-20 NOTE — LETTER
6/20/2023         RE: Kathy Lei  [address] [place] [address]        Dear Colleague,    Thank you for referring your patient, Kathy Lei, to the [facility] [place]. Please see a copy of my visit note below.    Spine Surgery Consultation    REFERRING PHYSICIAN: Bill Hairston   PRIMARY CARE PHYSICIAN: Rachel Arauz           Chief Complaint:   Consult (New patient consult for lumbar spine.  C/C low back pain with intermittent left LE radicular pain, and left ankle pain.  Sx for many years.)      History of Present Illness:  Symptom Profile Including: location of symptoms, onset, severity, exacerbating/alleviating factors, previous treatments:        Kathy Lei is a 57 year old female who presents today for evaluation of left lower extremity radiculopathy in an L5 distribution.  She has a history of a previous thoracic fusion for idiopathic scoliosis when she was 10 years old in the 1970s which was done with a Lacey kim.  She is developed worsening back pain over time but really think it really bothers her with the left leg symptoms.  She has difficulty standing as a result of the symptoms.  She works as a hairdresser.  Straightening the leg is also painful.  She tried physical therapy and this made things worse so she had to stop.  She had 2 epidural injections 1 in March and 1 in June, and the first 1 helped her quite a bit but the most recent one was less effective.  Both these were left L5-S1 transforaminal epidural injections.  She is done Tylenol and anti-inflammatories.  She has not yet tried gabapentin.             Past Medical History:     Past Medical History:   Diagnosis Date    Anxiety     Asthma     Breast cancer (H) 12/2018    Cardiomyopathy, unspecified type (H) 4/12/2021    Depressive disorder, not elsewhere classified     HLD (hyperlipidemia)     Hypertension     Hypothyroidism     Malignant neoplasm of  upper-outer quadrant of right breast in female, estrogen receptor positive (H) 2019    Mild intermittent asthma     PONV (postoperative nausea and vomiting)     Scoliosis (and kyphoscoliosis), idiopathic             Past Surgical History:     Past Surgical History:   Procedure Laterality Date    ABDOMEN SURGERY   c section    BACK SURGERY      spinal fusion- Lacey kim scoliosis fusion     BIOPSY  breast    BREAST SURGERY  implants     BRONCHOSCOPY (RIGID OR FLEXIBLE), DIAGNOSTIC N/A 2019    Procedure: BRONCHOSCOPY, WITH BRONCHOALVEOLAR LAVAGE;  Surgeon: Maksim Claros MD;  Location: [facility]    C/SECTION, LOW TRANSVERSE      , Low Transverse    COSMETIC SURGERY  breast implants     GRAFT FAT TO BREAST Bilateral 2021    Procedure: Bilateral breast revision, fat grafting, nipple reconstruction, umbilicoplasty,  scar revision;  Surgeon: LALY Ramos MD;  Location: [facility] OR    GRAFT FREE VASCULARIZED TRANSVERSE RECTUS ABDOMINIS MYOCUTANEOUS Bilateral 2021    Procedure: Bilateral breast reconstruction with Bilateral LIVIER flaps, reinervation, strattice abdominal wall repair, SPY;  Surgeon: LALY Ramos MD;  Location: [facility] OR    INSERT PORT VASCULAR ACCESS Left 2019    Procedure: Single Lumen Chest Port Placement;  Surgeon: Jerome Dash PA-C;  [facility]    IR CHEST PORT PLACEMENT > 5 YRS OF AGE  2019    MASTECTOMY SIMPLE, SENTINEL NODE, COMBINED N/A 08/15/2019    Procedure: Bilateral Skin Sparing Mastectomy, Right Axillary [place] Lymph Node Biopsy;  Surgeon: Anne Yousif MD;  Location: [facility] OR    RECONSTRUCT BREAST, INSERT TISSUE EXPANDER, COMBINED Bilateral 08/15/2019    Procedure: Bilateral Breast Reconstruction With Expanders and SPY;  Surgeon: LALY Ramos MD;  Location: [facility]    REMOVE IMPLANT BREAST Bilateral 08/15/2019    Procedure: Removal of bilateral breast implant and Bilateral excision of implant  capsules;  Surgeon: Anne Yousif MD;  Location: [facility]    REMOVE IMPLANT BREAST Bilateral 10/31/2019    Procedure: Right Breast Implant Removal;  Surgeon: LALY Ramos MD;  Location: [facility]    REMOVE PORT VASCULAR ACCESS N/A 08/15/2019    Procedure: Remove Vascular Access Port;  Surgeon: Anne Yousif MD;  Location: [facility]    SURGICAL HISTORY OF -       removal of right breast papilloma            Social History:     Social History     Tobacco Use    Smoking status: Former     Packs/day: 0.50     Years: 30.00     Pack years: 15.00     Types: Cigarettes     Start date: 1980     Quit date: 2019     Years since quittin.4    Smokeless tobacco: Never    Tobacco comments:     social smoker at times   Vaping Use    Vaping status: Never Used   Substance Use Topics    Alcohol use: Yes     Comment: on weekends if going out            Family History:     Family History   Problem Relation Age of Onset    Hypertension Mother     Thyroid Disease Mother         on meds    Psychotic Disorder Mother         anxiety    Hyperlipidemia Mother     Anxiety Disorder Mother     Cerebrovascular Disease Father         minor    Hypertension Father     Osteoporosis Maternal Grandmother     Asthma Maternal Grandfather     Thyroid Disease Sister         x2-on meds    Psychotic Disorder Sister         problems making decisions    Asthma Other     C.A.D. No family hx of     Diabetes No family hx of     Breast Cancer No family hx of     Cancer - colorectal No family hx of             Allergies:     Allergies   Allergen Reactions    Buspar [Buspirone] Nausea and Vomiting    Gluten Meal     Percocet [Oxycodone-Acetaminophen]      Nausea and Vomiting     Remeron [Mirtazapine]      Dizzy , cant function , balance            Medications:     Current Outpatient Medications   Medication    albuterol (VENTOLIN HFA) 108 (90 Base) MCG/ACT inhaler    amoxicillin-clavulanate (AUGMENTIN) 875-125 MG tablet    atorvastatin  "(LIPITOR) 20 MG tablet    Cholecalciferol (VITAMIN D3 PO)    fluticasone (FLOVENT HFA) 110 MCG/ACT inhaler    letrozole (FEMARA) 2.5 MG tablet    levothyroxine (SYNTHROID/LEVOTHROID) 125 MCG tablet    lisinopril (ZESTRIL) 5 MG tablet    LORazepam (ATIVAN) 0.5 MG tablet    minoxidil (LONITEN) 2.5 MG tablet    montelukast (SINGULAIR) 10 MG tablet    Multiple Vitamins-Minerals (WOMENS ONE DAILY PO)    Multiple Vitamins-Minerals (ZINC PO)    sertraline (ZOLOFT) 100 MG tablet    traZODone (DESYREL) 50 MG tablet     No current facility-administered medications for this visit.             Review of Systems:     A 10 point ROS was performed and reviewed. Specific responses to these questions are noted at the end of the document.         Physical Exam:   Vitals: Ht 1.66 m (5' 5.35\")   Wt 64.9 kg (143 lb)   LMP 11/02/2014   BMI 23.54 kg/m    Constitutional: awake, alert, cooperative, no apparent distress, appears stated age.    Eyes: The sclera are white.  Ears, Nose, Throat: The trachea is midline.  Psychiatric: The patient has a normal affect.  Respiratory: breathing non-labored  Cardiovascular: The extremities are warm and perfused.  Skin: no obvious rashes or lesions.  Musculoskeletal, Neurologic, and Spine:          Lumbar Spine:    Appearance - No gross stepoffs or deformities    Motor -     L2-3: Hip flexion 5/5 R and 5/5 L strength          L3/4:  Knee extension R 5/5 and L 5/5 strength         L4/5:  Foot dorsiflexion R 5/5 L 5/5 and       EHL dorsiflexion R 4/5 L 4/5 strength         S1:  Plantarflexion/Peroneal Muscles  R 5/5 and L 5/5 strength    Sensation: intact to light touch L3-S1 distribution BLE      Neurologic:      REFLEXES Left Right                  Patella 1+ 1+   Ankle jerk 1+ 1+   Babinski No upgoing great toe No upgoing great toe   Clonus 0 beats 0 beats     Hip Exam:  No pain with hip log roll and no tenderness over the greater trochanters.    Alignment:  Patient stands with a neutral standing " sagittal balance.           Imaging:   We ordered and independently reviewed new radiographs at this clinic visit. The results were discussed with the patient.  Findings include:    Lumbar radiographs show degenerative scoliosis, superimposed upon idiopathic scoliosis and previous instrumented thoracic fusion, I did compare these against her 2021 May lumbar films and the magnitude of scoliosis is similar    I reviewed her August 2022 lumbar MRI and she does have left L5-S1 foraminal stenosis in the setting of multilevel degenerative scoliosis with disc collapse and lateral listhesis             Assessment and Plan:   Assessment:  57 year old female with lateral listhesis, foraminal stenosis, left L5 distribution radicular complaints in the setting of degenerative scoliosis below previous instrumented fusion for idiopathic scoliosis     Plan:  Given her current symptoms I think the best treatment would be to try adding some gabapentin.  She is already done 2 injections.  Physical therapy made things worse.  We explained that if things do not improve on the gabapentin a last resort option would be to do an instrumented fusion down to the pelvis.  I spent quite a bit of time discussing risks and benefits and describing what the surgery would look like.  I would like to do a phone follow-up with her in 4 to 6 weeks to assess her progress with the gabapentin.  Risks of this surgery include risk of infection, risk of dural tear resulting in CSF leak which might result in headaches, or possible need for lumbar drain, or possible revision surgery in the setting of a persistent leak. Risk of hematoma or seroma resulting in wound complications.  Possible nerve root injury resulting in numbness weakness or paralysis into the arms or legs. Possible radiculitis which could result in similar symptoms or could result in significant neurogenic type pain. There is also a risk of delayed onset nerve root pals or radiculitis, which I  explained is potentially due to stretch injury or possibly due to delayed onset swelling, and is sometimes temporary but can also be permanent.  Risk of incomplete decompression which might require revision surgery in the future.  Risk of adjacent segment problems requiring surgery in the future. Risk of incomplete relief of symptoms possibly requiring revision surgery in the future. Furthermore, although rare, there are risks of major vessel injury such as to the major vessels anteriorly or to the bowel from the surgery.  Sometimes this can happen if an instrument is passed anteriorly through the disc space. There is a risk of nonunion which might require revision surgery in the future, and risk of hardware complications from the instrumentation.  I do use imaging to help guide the placement of the instrumentation, but even with this there is a chance of implant malposition or problem.  There is a risk of blood clots in the legs or the lungs.  Lastly, although rare, there are certainly risks of the anesthetic including stroke heart attack and death.      In most cases we need to use a bone graft extender in addition to local autograft.  The bone graft extender is usually crushed cancellous allograft from e(ye[employer]BRAIN, [employer] spinal graft Cree, produce crushed cancellous allograft  This is needed because there is usually not enough autograft available to complete the fusion.  In some cases we will also add autogenous iliac crest autograft if the quality of the local bone is poor or very limited in quantity.      For posterior lumbar fusions we use medtronic screws, rods, and interbody devices.      There is no underlying mental illness or substance abuse in this case that would impair the patient's ability to pursue surgery or undertake the recovery after surgery.          Respectfully,  Cosme Lu MD  Spine Surgery  [facility]      Answers for HPI/ROS submitted by the patient on  6/15/2023  General Symptoms: No  Skin Symptoms: No  HENT Symptoms: No  EYE SYMPTOMS: No  HEART SYMPTOMS: No  LUNG SYMPTOMS: Yes  INTESTINAL SYMPTOMS: No  URINARY SYMPTOMS: No  GYNECOLOGIC SYMPTOMS: No  BREAST SYMPTOMS: No  SKELETAL SYMPTOMS: Yes  BLOOD SYMPTOMS: No  NERVOUS SYSTEM SYMPTOMS: Yes  MENTAL HEALTH SYMPTOMS: Yes  Difficulty breating or shortness of breath: Yes  Wheezing: Yes  Difficulty breathing on exertion: Yes  Nighttime Cough: No  Difficulty breathing when lying flat: No  Back pain: Yes  Muscle aches: Yes  Neck pain: No  Swollen joints: No  Joint pain: Yes  Bone pain: Yes  Muscle cramps: No  Muscle weakness: No  Joint stiffness: No  Bone fracture: No  Trouble with coordination: No  Dizziness or trouble with balance: No  Fainting or black-out spells: No  Memory loss: No  Headache: No  Seizures: No  Speech problems: No  Tingling: Yes  Tremor: No  Weakness: No  Difficulty walking: Yes  Paralysis: No  Numbness: Yes  Nervous or Anxious: Yes  Depression: No  Trouble sleeping: No  Trouble thinking or concentrating: No  Mood changes: No  Panic attacks: Yes

## 2023-06-20 NOTE — NURSING NOTE
"Reason For Visit:   Chief Complaint   Patient presents with     Consult     New patient consult for lumbar spine.  C/C low back pain with intermittent left LE radicular pain, and left ankle pain.  Sx for many years.       Primary MD: Rachel Arauz  Ref. MD: Dr. Hairston    ?  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  Part-time.  Date of injury: NA  Type of injury: Chronic.  Date of surgery: 1976  Type of surgery: Scoliosis fusion  Smoker: No  Request smoking cessation information: No    Ht 1.66 m (5' 5.35\")   Wt 64.9 kg (143 lb)   LMP 11/02/2014   BMI 23.54 kg/m      Pain Assessment  Patient Currently in Pain: Yes  0-10 Pain Scale: 6  Primary Pain Location: Ankle (left ankle)  Other Pain Locations: Lumbar spine  Pain Descriptors: Intermittent, Aching    Oswestry (CONSUELO) Questionnaire        6/15/2023    11:49 AM   OSWESTRY DISABILITY INDEX   Count 10   Sum 15   Oswestry Score (%) 30 %            Neck Disability Index (NDI) Questionnaire         View : No data to display.                            Promis 10 Assessment        6/15/2023    11:56 AM   PROMIS 10   In general, would you say your health is: Fair   In general, would you say your quality of life is: Good   In general, how would you rate your physical health? Poor   In general, how would you rate your mental health, including your mood and your ability to think? Very good   In general, how would you rate your satisfaction with your social activities and relationships? Good   In general, please rate how well you carry out your usual social activities and roles Very good   To what extent are you able to carry out your everyday physical activities such as walking, climbing stairs, carrying groceries, or moving a chair? Moderately   In the past 7 days, how often have you been bothered by emotional problems such as feeling anxious, depressed, or irritable? Sometimes   In the past 7 days, how would you rate your fatigue on " average? Mild   In the past 7 days, how would you rate your pain on average, where 0 means no pain, and 10 means worst imaginable pain? 6   In general, would you say your health is: 2   In general, would you say your quality of life is: 3   In general, how would you rate your physical health? 1   In general, how would you rate your mental health, including your mood and your ability to think? 4   In general, how would you rate your satisfaction with your social activities and relationships? 3   In general, please rate how well you carry out your usual social activities and roles. (This includes activities at home, at work and in your community, and responsibilities as a parent, child, spouse, employee, friend, etc.) 4   To what extent are you able to carry out your everyday physical activities such as walking, climbing stairs, carrying groceries, or moving a chair? 3   In the past 7 days, how often have you been bothered by emotional problems such as feeling anxious, depressed, or irritable? 3   In the past 7 days, how would you rate your fatigue on average? 2   In the past 7 days, how would you rate your pain on average, where 0 means no pain, and 10 means worst imaginable pain? 6   Global Mental Health Score 13   Global Physical Health Score 11   PROMIS TOTAL - SUBSCORES 24                Danny Bazzi ATC

## 2023-06-21 ENCOUNTER — MYC MEDICAL ADVICE (OUTPATIENT)
Dept: ORTHOPEDICS | Facility: CLINIC | Age: 58
End: 2023-06-21
Payer: COMMERCIAL

## 2023-06-21 DIAGNOSIS — M54.16 LUMBAR BACK PAIN WITH RADICULOPATHY AFFECTING LEFT LOWER EXTREMITY: Primary | ICD-10-CM

## 2023-06-22 RX ORDER — PREGABALIN 25 MG/1
CAPSULE ORAL
Qty: 30 CAPSULE | Refills: 0 | Status: SHIPPED | OUTPATIENT
Start: 2023-06-22 | End: 2024-05-20

## 2023-07-05 ENCOUNTER — MYC MEDICAL ADVICE (OUTPATIENT)
Dept: ORTHOPEDICS | Facility: CLINIC | Age: 58
End: 2023-07-05
Payer: COMMERCIAL

## 2023-07-05 DIAGNOSIS — M54.16 LUMBAR BACK PAIN WITH RADICULOPATHY AFFECTING LEFT LOWER EXTREMITY: Primary | ICD-10-CM

## 2023-08-22 DIAGNOSIS — F41.9 ANXIETY: ICD-10-CM

## 2023-08-23 RX ORDER — LORAZEPAM 0.5 MG/1
TABLET ORAL
Qty: 30 TABLET | Refills: 0 | Status: SHIPPED | OUTPATIENT
Start: 2023-08-23 | End: 2023-09-27

## 2023-08-23 NOTE — TELEPHONE ENCOUNTER
Writer called patient to inform her of Rx being filled and sent in to the pharmacy.   Patient was appreciative.     AG StoneN GORAN  [facility]

## 2023-08-23 NOTE — TELEPHONE ENCOUNTER
30 day supply refilled while primary care provider is out of office.    Cris Holliday,   Internal Medicine - Pediatrics Physician  [facility]

## 2023-09-11 DIAGNOSIS — E78.5 HYPERLIPIDEMIA LDL GOAL <130: ICD-10-CM

## 2023-09-11 RX ORDER — ATORVASTATIN CALCIUM 20 MG/1
20 TABLET, FILM COATED ORAL DAILY
Qty: 90 TABLET | Refills: 1 | Status: SHIPPED | OUTPATIENT
Start: 2023-09-11 | End: 2024-03-07

## 2023-09-18 ENCOUNTER — ANCILLARY PROCEDURE (OUTPATIENT)
Dept: INTERVENTIONAL RADIOLOGY/VASCULAR | Facility: CLINIC | Age: 58
End: 2023-09-18
Attending: FAMILY MEDICINE
Payer: COMMERCIAL

## 2023-09-18 VITALS
SYSTOLIC BLOOD PRESSURE: 146 MMHG | HEART RATE: 78 BPM | DIASTOLIC BLOOD PRESSURE: 75 MMHG | RESPIRATION RATE: 16 BRPM | OXYGEN SATURATION: 98 % | TEMPERATURE: 98.7 F

## 2023-09-18 PROCEDURE — 64483 NJX AA&/STRD TFRM EPI L/S 1: CPT | Mod: LT | Performed by: RADIOLOGY

## 2023-09-18 RX ORDER — LIDOCAINE HYDROCHLORIDE 10 MG/ML
5 INJECTION, SOLUTION EPIDURAL; INFILTRATION; INTRACAUDAL; PERINEURAL ONCE
Status: COMPLETED | OUTPATIENT
Start: 2023-09-18 | End: 2023-09-18

## 2023-09-18 RX ORDER — METHYLPREDNISOLONE ACETATE 80 MG/ML
80 INJECTION, SUSPENSION INTRA-ARTICULAR; INTRALESIONAL; INTRAMUSCULAR; SOFT TISSUE ONCE
Status: COMPLETED | OUTPATIENT
Start: 2023-09-18 | End: 2023-09-18

## 2023-09-18 RX ORDER — IOPAMIDOL 408 MG/ML
10 INJECTION, SOLUTION INTRATHECAL ONCE
Status: COMPLETED | OUTPATIENT
Start: 2023-09-18 | End: 2023-09-18

## 2023-09-18 RX ORDER — BUPIVACAINE HYDROCHLORIDE 5 MG/ML
10 INJECTION, SOLUTION EPIDURAL; INTRACAUDAL ONCE
Status: COMPLETED | OUTPATIENT
Start: 2023-09-18 | End: 2023-09-18

## 2023-09-18 RX ADMIN — LIDOCAINE HYDROCHLORIDE 5 ML: 10 INJECTION, SOLUTION EPIDURAL; INFILTRATION; INTRACAUDAL; PERINEURAL at 08:20

## 2023-09-18 RX ADMIN — METHYLPREDNISOLONE ACETATE 80 MG: 80 INJECTION, SUSPENSION INTRA-ARTICULAR; INTRALESIONAL; INTRAMUSCULAR; SOFT TISSUE at 08:20

## 2023-09-18 RX ADMIN — IOPAMIDOL 2 ML: 408 INJECTION, SOLUTION INTRATHECAL at 08:20

## 2023-09-18 RX ADMIN — BUPIVACAINE HYDROCHLORIDE 5 MG: 5 INJECTION, SOLUTION EPIDURAL; INTRACAUDAL at 08:20

## 2023-09-18 NOTE — DISCHARGE INSTRUCTIONS
Discharge Instructions for Epidural Steroid Injection/Nerve Block  Care of injection site:  If you have new numbness down your leg after the injection, this may last up to 4-6 hours, but should go away. You may need help with walking until your leg feels normal.  Over the next 24 to 48 hours, pain at the injection site may increase before it gets better.  For the next 48 hours, use ice packs for 15 minutes, 3-4 times a day for pain relief.  If you have a bandage, you may remove it the next morning   Do not submerge injection site in water for 24 hours, (no baths. pools, hot tubs). Showers are OK.            Activity:  You should relax today and then you may return to your regular activity tomorrow.  You may eat a normal diet.  Do not drive for 24 hours.         Medicines:  Continue to take all medications as ordered by your provider.  Restart all your blood thinner medicines tomorrow at your regular dose, including Coumadin (Warfarin).  If you are restarting Coumadin, talk to your doctor about having your INR checked.      The steroid should help reduce swelling and pain. This may take from 3-14 days. Pain from the shot will be mild and go away in 2-3 days. Please follow up with the provider that ordered this injection in a month if you don't already have an appointment with them. Let them know if the injection was effective.    Common side effects may include:  Insomnia  Heartburn  Flushed face  Water retention  Increased appetite  Increased blood sugar    If you have diabetes, watch your blood sugar closely. If needed, call your doctor to help control your blood sugar.    DO NOT GET THE COVID or FLU VACCINE 2 WEEKS AFTER YOUR INJECTION    Call your doctor or go to the Emergency Room if you have new severe pain or fever.

## 2023-09-18 NOTE — PROGRESS NOTES
Pt here for lumbar injection. Pt tolerated without issue. Escorted to lobby.    Shweta Myers, RN'

## 2023-10-09 ENCOUNTER — VIRTUAL VISIT (OUTPATIENT)
Dept: ORTHOPEDICS | Facility: CLINIC | Age: 58
End: 2023-10-09
Payer: COMMERCIAL

## 2023-10-09 VITALS — HEIGHT: 65 IN | BODY MASS INDEX: 23.66 KG/M2 | WEIGHT: 142 LBS

## 2023-10-09 DIAGNOSIS — M54.16 LUMBAR BACK PAIN WITH RADICULOPATHY AFFECTING LEFT LOWER EXTREMITY: Primary | ICD-10-CM

## 2023-10-09 PROCEDURE — 99214 OFFICE O/P EST MOD 30 MIN: CPT | Mod: 93 | Performed by: ORTHOPAEDIC SURGERY

## 2023-10-09 ASSESSMENT — PAIN SCALES - GENERAL: PAINLEVEL: NO PAIN (0)

## 2023-10-09 NOTE — NURSING NOTE
Is the patient currently in the state of [place]? YES    Visit mode:TELEPHONE    If the visit is dropped, the patient can be reconnected by: TELEPHONE VISIT: Phone number:   Telephone Information:   Mobile [phone]       Will anyone else be joining the visit? NO  (If patient encounters technical issues they should call [phone] :[phone])    How would you like to obtain your AVS? MyChart    Are changes needed to the allergy or medication list? No    Reason for visit: RECHECK    Pt completed echeck-in an states medications and allergies are correct.      Michael WALTON

## 2023-10-09 NOTE — LETTER
10/9/2023         RE: Kathy Lei  [address] [place] [address]        Dear Colleague,    Thank you for referring your patient, Kathy Lei, to the [facility] [place]. Please see a copy of my visit note below.    Virtual Visit Details    Type of service:  Telephone Visit   Phone call duration: 12 minutes     Diagnosis: Lumbar Radiculopathy    Mily feels that the injection has helped quite a bit.  This was a left L5-S1 TFESI.  She and I again discussed the degenerative scoliosis that she has.  Fixing that is a last resort type surgery which would require a long fusion construct.      She would like to continue the injections as long as possible.  I think this is reasonable and will reorder 3-4 injections a year if they are effective.  She will let us know and we can order injections whenever needed.   If things worsen and she wishes to discuss a possible surgery she will let me know and we can meet or talk at any time.      Cosme Lu MD

## 2023-10-09 NOTE — PROGRESS NOTES
Virtual Visit Details    Type of service:  Telephone Visit   Phone call duration: 12 minutes     Diagnosis: Lumbar Radiculopathy    Mily feels that the injection has helped quite a bit.  This was a left L5-S1 TFESI.  She and I again discussed the degenerative scoliosis that she has.  Fixing that is a last resort type surgery which would require a long fusion construct.      She would like to continue the injections as long as possible.  I think this is reasonable and will reorder 3-4 injections a year if they are effective.  She will let us know and we can order injections whenever needed.   If things worsen and she wishes to discuss a possible surgery she will let me know and we can meet or talk at any time.    CTM

## 2023-10-15 ENCOUNTER — MYC MEDICAL ADVICE (OUTPATIENT)
Dept: ORTHOPEDICS | Facility: CLINIC | Age: 58
End: 2023-10-15
Payer: COMMERCIAL

## 2023-10-23 ENCOUNTER — TELEPHONE (OUTPATIENT)
Dept: ORTHOPEDICS | Facility: CLINIC | Age: 58
End: 2023-10-23
Payer: COMMERCIAL

## 2023-10-23 DIAGNOSIS — M54.50 LUMBAR PAIN: ICD-10-CM

## 2023-10-23 DIAGNOSIS — M54.16 LUMBAR BACK PAIN WITH RADICULOPATHY AFFECTING LEFT LOWER EXTREMITY: Primary | ICD-10-CM

## 2023-10-23 NOTE — TELEPHONE ENCOUNTER
[facility]    Phone Message    May a detailed message be left on voicemail: yes     Reason for Call: Patient calling because she tried to schedule her epidural but the order hasn't been put in the system yet Please once this has been entered    Action Taken: Message routed to:  [facility] ([facility]): [phone]    Travel Screening: Not Applicable

## 2023-10-25 ENCOUNTER — TELEPHONE (OUTPATIENT)
Dept: ONCOLOGY | Facility: CLINIC | Age: 58
End: 2023-10-25
Payer: COMMERCIAL

## 2023-10-25 DIAGNOSIS — Z17.0 MALIGNANT NEOPLASM OF UPPER-OUTER QUADRANT OF RIGHT BREAST IN FEMALE, ESTROGEN RECEPTOR POSITIVE (H): ICD-10-CM

## 2023-10-25 DIAGNOSIS — Z78.0 ASYMPTOMATIC POSTMENOPAUSAL STATUS: Primary | ICD-10-CM

## 2023-10-25 DIAGNOSIS — C50.411 MALIGNANT NEOPLASM OF UPPER-OUTER QUADRANT OF RIGHT BREAST IN FEMALE, ESTROGEN RECEPTOR POSITIVE (H): ICD-10-CM

## 2023-10-25 RX ORDER — LETROZOLE 2.5 MG/1
2.5 TABLET, FILM COATED ORAL DAILY
Qty: 90 TABLET | Refills: 3 | OUTPATIENT
Start: 2023-10-25

## 2023-10-25 RX ORDER — LETROZOLE 2.5 MG/1
2.5 TABLET, FILM COATED ORAL DAILY
Qty: 90 TABLET | Refills: 3 | Status: SHIPPED | OUTPATIENT
Start: 2023-10-25

## 2023-10-25 NOTE — TELEPHONE ENCOUNTER
"Letrozole 2.5mg tab  Last prescribing provider: Karen Arcos on 10/12/22    Last clinic visit date: 6/7/21; telephone encounter with Dr. Guzman from 2/5/23    Recommendations for requested medication (if none, N/A): from telephone encounter from 2/5/23, \"  I called Mily and apologized that she had been lost to follow-up since 2021 and that there were several things that we need to do.  I discussed that we would continue with letrozole for a total of 10 years.       \"    Any other pertinent information (if none, N/A): fax request. No currently scheduled appts.     Refilled: Y/N, if NO, why?   "

## 2023-10-26 NOTE — TELEPHONE ENCOUNTER
I called Mily because there was letrozole prescription that had been denied by her primary care provider probably because there was no oncology follow-up.  Both Mily it is very important to follow-up with us and then it is probably been more than a year that she has done so.  I said that to safely give oncology medicines including letrozole is important to follow-up with the or another oncologist so that these medicines can be safely given.  I discussed that for example the letrozole can cause low bone density and its important to follow-up with a DEXA scan.  I reviewed the letrozole and send it to her pharmacy.  I put in an order for DEXA scan.  And I sent a message to our  to make sure that she is scheduled to see me in the next month.  All of her questions were answered.  Mily has been doing quite well and has no complaints at this time.  She did acknowledge that it is important for her to follow-up with her oncologist as well as her primary care provider.    Christian Guzman MD

## 2023-11-09 DIAGNOSIS — Z12.11 COLON CANCER SCREENING: ICD-10-CM

## 2023-11-23 ENCOUNTER — LAB (OUTPATIENT)
Dept: FAMILY MEDICINE | Facility: CLINIC | Age: 58
End: 2023-11-23
Payer: COMMERCIAL

## 2023-11-23 DIAGNOSIS — Z12.11 COLON CANCER SCREENING: ICD-10-CM

## 2023-11-26 ENCOUNTER — E-VISIT (OUTPATIENT)
Dept: FAMILY MEDICINE | Facility: CLINIC | Age: 58
End: 2023-11-26
Payer: COMMERCIAL

## 2023-11-26 DIAGNOSIS — J45.41 MODERATE PERSISTENT ASTHMA WITH EXACERBATION: Primary | ICD-10-CM

## 2023-11-26 DIAGNOSIS — B96.89 ACUTE BACTERIAL SINUSITIS: ICD-10-CM

## 2023-11-26 DIAGNOSIS — J01.90 ACUTE BACTERIAL SINUSITIS: ICD-10-CM

## 2023-11-26 DIAGNOSIS — M54.16 LUMBAR BACK PAIN WITH RADICULOPATHY AFFECTING LEFT LOWER EXTREMITY: ICD-10-CM

## 2023-11-26 PROCEDURE — 99421 OL DIG E/M SVC 5-10 MIN: CPT | Performed by: NURSE PRACTITIONER

## 2023-11-27 RX ORDER — PREDNISONE 20 MG/1
TABLET ORAL
Qty: 20 TABLET | Refills: 0 | Status: SHIPPED | OUTPATIENT
Start: 2023-11-27 | End: 2024-04-08

## 2023-12-10 NOTE — PROGRESS NOTES
HPI: Kathy Lei is a 57 yo woman with a new diagnosis of an estrogen-receptor positive, [place]-positive, HER2-negative breast cancer, grade 2, in the upper outer quadrant of the right breast since the end of year 2018.      Mily presented between Christmas and New Years of 2018 with new sharp pains in the upper outer quadrant of the right breast.  She underwent mammographic screening.       IMAGING:  Mammography and ultrasound:  She had a diagnostic mammogram which showed bilateral prepectoral saline implants.  On the right breast at 11:30 position, there were grouped coarse heterogeneous calcifications spanning 1.3 cm, new since 2014, adjacent calcifications 11-o'clock position posterior depth.  There was an irregular mass in the lateral right breast 9-o'clock position mid-posterior depth, and an additional mass with obscured margins.  No significant changes in the left breast.  Right breast ultrasound was performed.  In the area of the palpable lump in the right breast, 11-o'clock position, 6 cm from the nipple, there is an irregular hypoechoic mass with indistinct margins measuring approximately 1.0 x 0.9 x 1.6 cm in size.  Immediately adjacent to the mass, 11:30 position, are microcalcifications.  In the second area of palpable lump right breast, 9:30 position, 5 cm from the nipple, there was an irregular hypoechoic mass measuring 3.2 x 0.5 x 1.3 cm in size felt to account for the mammographic findings.  There were multiple additional round hypoechoic masses similar to the findings at 9:30 position seen incidentally during real time and not directly palpable.  For example, in the right breast at 8-o'clock position, 4 cm from the nipple, there was a mass measuring 0.8 x 0.6 x 0.6 cm, BI-RADS 4.       Contrast mammogram was subsequently performed and the contrast mammogram showed a large area of mass and non-mass-like enhancement in the upper outer right breast measuring 7.2 cm in greatest dimension,  corresponding global asymmetry in the upper outer right breast.  Enhancement extended to the implant without evidence of extension to the skin surface or nipple, and the calcifications were noted as previously found.      PATHOLOGY:  The pathology showed part A, breast needle biopsy 11 o'clock, 6 cm from the nipple, invasive mammary carcinoma, no special type, ductal (and mucinous) [place] grade 2.  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  Calcifications were associated with the DCIS.  There was a focus suspicious for lymphovascular invasion.  Invasive carcinoma was estrogen receptor positive and progesterone receptor negative by immunohistochemistry.  In part B, breast right, 8 o'clock, 4 cm from the nipple, ultrasound-guided core biopsy, invasive mammary carcinoma, no special type, ductal (and mucinous) [place] grade 2, occasional calcifications were noted.  Invasive carcinoma was estrogen receptor positive and progesterone receptor negative by immunohistochemistry.  On quantitation of the ER and CO, ER was positive 99% of the cells staining with strong intensity.  CO was subsequently noted to be positive with 15% of the cells staining with moderate intensity.       There was also a HER2 FISH performed, and the HER2 FISH showed average number of HER2 signals per nucleus 2.6.  Average number of CEN17 signals 1.7 with a ratio of 1.6, VASILIY negative for biopsy A.  For biopsy B, the HER2 signals per nucleus 2.9.  Average number CEN17 signals per nucleus 1.7 with a ratio of 1.7, VASILIY negative.  Overall, by 2018 ASCO/CAP guidelines, this tumor is HER2 negative.     She was enrolled in I-SPY2 trial to Durvulumab, Taxol and Olaparib arm. She completed 12 cycles of weekly Taxol. She also completed 4 cycles  of adriamycin and cyclophosphamide (AC).     PAST MEDICAL HISTORY:  In terms of her breast history, she does have a history of a smaller right breast which was treated  with implants 19 years ago.  She has a history of 2 papillomas in the right breast, 1 removed at the 6-o'clock position 5 years ago, another removed at the 6-o'clock position approximately 10 years ago.  These incisions are approximately at the 5:30 to 6-o'clock position.  She has no history of radiation therapy.  No history of breast cancer of any type.  No history of tumor of any kind.  No history of heart problems, heart attack, breathing problems, blood clots, seizures, stroke, arthritis, peptic ulcer disease or osteoporosis.  No history of bone fractures.  She is not currently participating in a clinical trial. She does have a history of asthma and a history of hypothyroidism.      FAMILY HISTORY:  Entirely negative for breast cancer or ovarian cancer or breast cancer in a male relative.      ALLERGIES:  No known drug allergies.  No allergy to seafood, iodine or contrast dye.  She does not take aspirin.      PAST MENSTRUAL HISTORY:  First period was at age 11.  First and only pregnancy was at age 26.  No miscarriages or abortions.  Occasional use of oral contraceptives in her 20s.  No history of hormone replacement therapy.  Last period was 3 years ago.      SOCIAL HISTORY:  Tobacco history was from age 15 to present, smoking a pack of cigarettes a week.  She is now trying to stop cigarettes.   In terms of alcohol use, in her early teens and early 20s, she drank approximately 10 drinks on the weekend and has tapered off drinking since then.      TREATMENT HISTORY:  A.  Neoadjuvant durvalumab, oliparib, paclitaxel on I SPY 2  B  Neoadjuvant ddAC.  C.  Bilateral mastectomy.  RCB 3 result.   Pathologic stage was [id number] sn.   D.  Post-surgical radiation.  Completed 1-6-20.     Treatment Summary to Date  Treatment Site: Rt CW , nodes + SCV Current Dose: 6040/6040 cGy Fractions: 33/33       E.  Begin CREATE-X. With letrozole. Plan is for 24 weeks. Begun January 14.  Ended June 26.      F.  She held the letrozole  beginning April 28 because of severe joint pains.  G.  She restarted AI with anastrozole 6-30-20.  Discontinued August 15 due to hot flashes and nausea.  H.  Restarted letrozole on 8-15-20 and is tolerating it well.   I.   Bilateral free flap reconstruction performed February 8, 2021.  Letrozole was held for 3 weeks.  I advised her to restart it on March 2, 2021.     Current Outpatient Medications   Medication    albuterol (VENTOLIN HFA) 108 (90 Base) MCG/ACT inhaler    amoxicillin-clavulanate (AUGMENTIN) 875-125 MG tablet    atorvastatin (LIPITOR) 20 MG tablet    Cholecalciferol (VITAMIN D3 PO)    fluticasone (FLOVENT HFA) 110 MCG/ACT inhaler    letrozole (FEMARA) 2.5 MG tablet    levothyroxine (SYNTHROID/LEVOTHROID) 125 MCG tablet    lisinopril (ZESTRIL) 5 MG tablet    LORazepam (ATIVAN) 0.5 MG tablet    minoxidil (LONITEN) 2.5 MG tablet    montelukast (SINGULAIR) 10 MG tablet    Multiple Vitamins-Minerals (WOMENS ONE DAILY PO)    Multiple Vitamins-Minerals (ZINC PO)    sertraline (ZOLOFT) 100 MG tablet    traZODone (DESYREL) 50 MG tablet    pregabalin (LYRICA) 25 MG capsule     No current facility-administered medications for this visit.         INTERVAL HISTORY  Mily has been doing reasonably well.  She has bilateral flaps and does not require breast imaging.  She has some low back pain and has had epidural injections of steroids for that problem.  She denies fatigue, depression, anxiety.  We did discuss Zometa with her and she does not want it related to poor dentition.  We will refer her to the dental school for dental issues including open sockets and teeth that need to be extracted.  She certainly is not a candidate for Zometa at this time.  The plan is to continue letrozole for total of 7 years.      REVIEW OF SYSTEMS:     She denies fevers or chills, cough, chest pain, shortness of breath, nausea, vomiting, constipation, diarrhea, bone pain, back pain, headache.  Remainder of a 10 point review of systems  is negative.     PHYSICAL EXAMINATION:   VITAL SIGNS:  /79 (BP Location: Right arm, Patient Position: Sitting, Cuff Size: Adult Regular)   Pulse 93   Temp 98.3  F (36.8  C) (Oral)   Resp 16   Wt 66.3 kg (146 lb 3.2 oz)   LMP 11/02/2014   SpO2 96%   BMI 24.33 kg/m    GENERAL:  Mily appeared generally well.  She has alopecia, but her hair is growing back.   HEENT:  No lesions in the oropharynx.  On the back of the neck, there is a small, erythematous macular lesion which she says was related to using a clipper on the back of her neck.   LYMPH:  There is no palpable cervical, supraclavicular, subclavicular or axillary lymphadenopathy.   BREASTS:  Right breast reveals a flap in place with incisions that are well-healed without erythema or masses.  There are overlying telangiectasia related to radiation.  The left breast reveals a flap.  The reconstructions appear to be non-nipple sparing.   LUNGS:  Clear to percussion and auscultation.   HEART:  Regular rate and rhythm, S1, S2.   ABDOMEN:  Soft and nontender without hepatosplenomegaly.   EXTREMITIES:  Without edema.   PSYCHIATRIC:  Mood and affect were normal.      LABS reviewed.      Recent Results (from the past 240 hour(s))   CEA    Collection Time: 12/26/23 12:46 PM   Result Value Ref Range    CEA 1.4 ng/mL   COMPREHENSIVE METABOLIC PANEL    Collection Time: 12/26/23 12:46 PM   Result Value Ref Range    Sodium 138 135 - 145 mmol/L    Potassium 3.7 3.4 - 5.3 mmol/L    Carbon Dioxide (CO2) 27 22 - 29 mmol/L    Anion Gap 10 7 - 15 mmol/L    Urea Nitrogen 9.1 8.0 - 23.0 mg/dL    Creatinine 0.61 0.51 - 0.95 mg/dL    GFR Estimate >90 >60 mL/min/1.73m2    Calcium 9.5 8.8 - 10.2 mg/dL    Chloride 101 98 - 107 mmol/L    Glucose 105 (H) 70 - 99 mg/dL    Alkaline Phosphatase 68 40 - 150 U/L    AST 17 0 - 45 U/L    ALT 14 0 - 50 U/L    Protein Total 7.1 6.4 - 8.3 g/dL    Albumin 4.4 3.5 - 5.2 g/dL    Bilirubin Total 0.4 <=1.2 mg/dL   CA27.29  BREAST TUMOR MARKER     Collection Time: 12/26/23 12:46 PM   Result Value Ref Range    CA 27.29 13.6 <=39.0 U/mL   CBC with platelets and differential    Collection Time: 12/26/23 12:46 PM   Result Value Ref Range    WBC Count 7.4 4.0 - 11.0 10e3/uL    RBC Count 4.85 3.80 - 5.20 10e6/uL    Hemoglobin 14.2 11.7 - 15.7 g/dL    Hematocrit 41.4 35.0 - 47.0 %    MCV 85 78 - 100 fL    MCH 29.3 26.5 - 33.0 pg    MCHC 34.3 31.5 - 36.5 g/dL    RDW 13.1 10.0 - 15.0 %    Platelet Count 304 150 - 450 10e3/uL    % Neutrophils 59 %    % Lymphocytes 28 %    % Monocytes 7 %    % Eosinophils 4 %    % Basophils 1 %    % Immature Granulocytes 1 %    NRBCs per 100 WBC 0 <1 /100    Absolute Neutrophils 4.4 1.6 - 8.3 10e3/uL    Absolute Lymphocytes 2.0 0.8 - 5.3 10e3/uL    Absolute Monocytes 0.5 0.0 - 1.3 10e3/uL    Absolute Eosinophils 0.3 0.0 - 0.7 10e3/uL    Absolute Basophils 0.1 0.0 - 0.2 10e3/uL    Absolute Immature Granulocytes 0.0 <=0.4 10e3/uL    Absolute NRBCs 0.0 10e3/uL            ASSESSMENT AND PLAN:     1.  Mily Lei is a 58-year-old woman who presented with a locally advanced right breast cancer.  This cancer developed in the context of previous breast augmentation.  On presentation, she had multiple masses occupying an area of approximately 9 cm in the upper outer quadrant with some extension to the subareolar region.  She did not have any clinically apparent lymph node involvement.  She was treated with neoadjuvant chemotherapy on the I-SPY 2 trial and was randomized to paclitaxel, olaparib and durvalumab followed by dose-dense AC.  She tolerated the treatment reasonably well but had an RCB3 result with final stage [id number].  She started on the CREATE-X trial and had adjuvant capecitabine for 24 weeks combined with an aromatase inhibitor, which will be continued for 10 years.    She completed 24 weeks of Xeloda.  She declined the SELIN trial.   2.  Free flap reconstruction was performed.  3.  Hormonal therapy.      Her hormonal  therapy was changed back to letrozole at her request.  She has fewer hot flashes and less nausea with letrozole.  She would like to stay on letrozole.  We did discuss the plan for 7 years of hormonal therapy and she is in agreement. She says she is taking letrozole and tolerating it well.  Because of high risk we could consider 10 years.   4. Breast reconstruction.  Mily no longer having discomfort with her reconstruction.  She is being followed by Dr. Tena.  5.  Lymphedema.  Mily does have some right arm stiffness.  She feels that her exercise is helping.  If she has increasing discomfort we can refer her to lymphedema clinic.  I did advise her to go back to lymphedema clinic because she does have right arm discomfort.  6.  Exercise.  I advised Mily to walk 30 minutes a day.  This should be more feasible now that the weather is warming up.  7.  Discussion of diet.  I advised her to eat a diet with more fruits and vegetables Mediterranean-style.  She will work on this.  8.  Bone health.  We discussed that her DEXA scan showed a most valid negative T score of -1.7 on 7-8-21.  Recommended calcium vitamin D and weightbearing exercise.  9.  Genetics.  Reviewing the chart and talking with Mily she has not had germ line genetics done.  We will order this. I have discussed it with Mily and she is willing to have germ line genetics performed.   9.  Follow up.  Genetics referral this week.  Follow up with Dental exam and treatment.  Follow up with me June 25 with CBC, CMP, Zometa.  8 AM cortisol and TSH this month.      Sincerely,     Christian Guzman M.D.   Professor   Division of Hematology, Oncology, Transplant   Department of Medicine   [facility]   [phone]            I spent 40 minutes with the patient more than 50% of which was in counseling and coordination of care.

## 2023-12-22 ENCOUNTER — ANCILLARY PROCEDURE (OUTPATIENT)
Dept: BONE DENSITY | Facility: CLINIC | Age: 58
End: 2023-12-22
Attending: INTERNAL MEDICINE
Payer: COMMERCIAL

## 2023-12-22 DIAGNOSIS — Z78.0 ASYMPTOMATIC POSTMENOPAUSAL STATUS: ICD-10-CM

## 2023-12-22 PROCEDURE — 77080 DXA BONE DENSITY AXIAL: CPT

## 2023-12-26 ENCOUNTER — ONCOLOGY VISIT (OUTPATIENT)
Dept: ONCOLOGY | Facility: CLINIC | Age: 58
End: 2023-12-26
Attending: INTERNAL MEDICINE
Payer: COMMERCIAL

## 2023-12-26 ENCOUNTER — PATIENT OUTREACH (OUTPATIENT)
Dept: ONCOLOGY | Facility: CLINIC | Age: 58
End: 2023-12-26

## 2023-12-26 ENCOUNTER — APPOINTMENT (OUTPATIENT)
Dept: LAB | Facility: CLINIC | Age: 58
End: 2023-12-26
Attending: INTERNAL MEDICINE
Payer: COMMERCIAL

## 2023-12-26 VITALS
BODY MASS INDEX: 24.33 KG/M2 | OXYGEN SATURATION: 96 % | TEMPERATURE: 98.3 F | RESPIRATION RATE: 16 BRPM | WEIGHT: 146.2 LBS | HEART RATE: 93 BPM | DIASTOLIC BLOOD PRESSURE: 79 MMHG | SYSTOLIC BLOOD PRESSURE: 118 MMHG

## 2023-12-26 DIAGNOSIS — C50.411 MALIGNANT NEOPLASM OF UPPER-OUTER QUADRANT OF RIGHT BREAST IN FEMALE, ESTROGEN RECEPTOR POSITIVE (H): Primary | ICD-10-CM

## 2023-12-26 DIAGNOSIS — K02.9 DENTAL DECAY: ICD-10-CM

## 2023-12-26 DIAGNOSIS — Z17.0 MALIGNANT NEOPLASM OF UPPER-OUTER QUADRANT OF RIGHT BREAST IN FEMALE, ESTROGEN RECEPTOR POSITIVE (H): Primary | ICD-10-CM

## 2023-12-26 LAB
ALBUMIN SERPL BCG-MCNC: 4.4 G/DL (ref 3.5–5.2)
ALP SERPL-CCNC: 68 U/L (ref 40–150)
ALT SERPL W P-5'-P-CCNC: 14 U/L (ref 0–50)
ANION GAP SERPL CALCULATED.3IONS-SCNC: 10 MMOL/L (ref 7–15)
AST SERPL W P-5'-P-CCNC: 17 U/L (ref 0–45)
BASOPHILS # BLD AUTO: 0.1 10E3/UL (ref 0–0.2)
BASOPHILS NFR BLD AUTO: 1 %
BILIRUB SERPL-MCNC: 0.4 MG/DL
BUN SERPL-MCNC: 9.1 MG/DL (ref 8–23)
CALCIUM SERPL-MCNC: 9.5 MG/DL (ref 8.8–10.2)
CEA SERPL-MCNC: 1.4 NG/ML
CHLORIDE SERPL-SCNC: 101 MMOL/L (ref 98–107)
CREAT SERPL-MCNC: 0.61 MG/DL (ref 0.51–0.95)
DEPRECATED HCO3 PLAS-SCNC: 27 MMOL/L (ref 22–29)
EGFRCR SERPLBLD CKD-EPI 2021: >90 ML/MIN/1.73M2
EOSINOPHIL # BLD AUTO: 0.3 10E3/UL (ref 0–0.7)
EOSINOPHIL NFR BLD AUTO: 4 %
ERYTHROCYTE [DISTWIDTH] IN BLOOD BY AUTOMATED COUNT: 13.1 % (ref 10–15)
GLUCOSE SERPL-MCNC: 105 MG/DL (ref 70–99)
HCT VFR BLD AUTO: 41.4 % (ref 35–47)
HGB BLD-MCNC: 14.2 G/DL (ref 11.7–15.7)
IMM GRANULOCYTES # BLD: 0 10E3/UL
IMM GRANULOCYTES NFR BLD: 1 %
LYMPHOCYTES # BLD AUTO: 2 10E3/UL (ref 0.8–5.3)
LYMPHOCYTES NFR BLD AUTO: 28 %
MCH RBC QN AUTO: 29.3 PG (ref 26.5–33)
MCHC RBC AUTO-ENTMCNC: 34.3 G/DL (ref 31.5–36.5)
MCV RBC AUTO: 85 FL (ref 78–100)
MONOCYTES # BLD AUTO: 0.5 10E3/UL (ref 0–1.3)
MONOCYTES NFR BLD AUTO: 7 %
NEUTROPHILS # BLD AUTO: 4.4 10E3/UL (ref 1.6–8.3)
NEUTROPHILS NFR BLD AUTO: 59 %
NRBC # BLD AUTO: 0 10E3/UL
NRBC BLD AUTO-RTO: 0 /100
PLATELET # BLD AUTO: 304 10E3/UL (ref 150–450)
POTASSIUM SERPL-SCNC: 3.7 MMOL/L (ref 3.4–5.3)
PROT SERPL-MCNC: 7.1 G/DL (ref 6.4–8.3)
RBC # BLD AUTO: 4.85 10E6/UL (ref 3.8–5.2)
SODIUM SERPL-SCNC: 138 MMOL/L (ref 135–145)
WBC # BLD AUTO: 7.4 10E3/UL (ref 4–11)

## 2023-12-26 PROCEDURE — 36415 COLL VENOUS BLD VENIPUNCTURE: CPT | Performed by: INTERNAL MEDICINE

## 2023-12-26 PROCEDURE — 99213 OFFICE O/P EST LOW 20 MIN: CPT | Performed by: INTERNAL MEDICINE

## 2023-12-26 PROCEDURE — 99215 OFFICE O/P EST HI 40 MIN: CPT | Performed by: INTERNAL MEDICINE

## 2023-12-26 PROCEDURE — 86300 IMMUNOASSAY TUMOR CA 15-3: CPT | Performed by: INTERNAL MEDICINE

## 2023-12-26 PROCEDURE — 85025 COMPLETE CBC W/AUTO DIFF WBC: CPT | Performed by: INTERNAL MEDICINE

## 2023-12-26 PROCEDURE — 82378 CARCINOEMBRYONIC ANTIGEN: CPT | Performed by: INTERNAL MEDICINE

## 2023-12-26 PROCEDURE — 80053 COMPREHEN METABOLIC PANEL: CPT | Performed by: INTERNAL MEDICINE

## 2023-12-26 ASSESSMENT — PAIN SCALES - GENERAL: PAINLEVEL: NO PAIN (0)

## 2023-12-26 NOTE — NURSING NOTE
Chief Complaint   Patient presents with    Blood Draw     Labs drawn via  by vascular in lab. VS taken.    Breast Cancer     Labs collected from venipuncture by vascular. Vitals taken. Checked in for appointment(s).    Giulia Prasad RN

## 2023-12-26 NOTE — LETTER
12/26/2023         RE: Kathy Lei  [address] [place] [address]        Dear Colleague,    Thank you for referring your patient, Kathy Lei, to the [facility]. Please see a copy of my visit note below.    HPI: Kathy Lei is a 59 yo woman with a new diagnosis of an estrogen-receptor positive, MA-positive, HER2-negative breast cancer, grade 2, in the upper outer quadrant of the right breast since the end of year 2018.      Mily presented between Christmas and New Years of 2018 with new sharp pains in the upper outer quadrant of the right breast.  She underwent mammographic screening.       IMAGING:  Mammography and ultrasound:  She had a diagnostic mammogram which showed bilateral prepectoral saline implants.  On the right breast at 11:30 position, there were grouped coarse heterogeneous calcifications spanning 1.3 cm, new since 2014, adjacent calcifications 11-o'clock position posterior depth.  There was an irregular mass in the lateral right breast 9-o'clock position mid-posterior depth, and an additional mass with obscured margins.  No significant changes in the left breast.  Right breast ultrasound was performed.  In the area of the palpable lump in the right breast, 11-o'clock position, 6 cm from the nipple, there is an irregular hypoechoic mass with indistinct margins measuring approximately 1.0 x 0.9 x 1.6 cm in size.  Immediately adjacent to the mass, 11:30 position, are microcalcifications.  In the second area of palpable lump right breast, 9:30 position, 5 cm from the nipple, there was an irregular hypoechoic mass measuring 3.2 x 0.5 x 1.3 cm in size felt to account for the mammographic findings.  There were multiple additional round hypoechoic masses similar to the findings at 9:30 position seen incidentally during real time and not directly palpable.  For example, in the right breast at 8-o'clock position, 4 cm from the nipple,  there was a mass measuring 0.8 x 0.6 x 0.6 cm, BI-RADS 4.       Contrast mammogram was subsequently performed and the contrast mammogram showed a large area of mass and non-mass-like enhancement in the upper outer right breast measuring 7.2 cm in greatest dimension, corresponding global asymmetry in the upper outer right breast.  Enhancement extended to the implant without evidence of extension to the skin surface or nipple, and the calcifications were noted as previously found.      PATHOLOGY:  The pathology showed part A, breast needle biopsy 11 o'clock, 6 cm from the nipple, invasive mammary carcinoma, no special type, ductal (and mucinous) Donna grade 2.  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  Calcifications were associated with the DCIS.  There was a focus suspicious for lymphovascular invasion.  Invasive carcinoma was estrogen receptor positive and progesterone receptor negative by immunohistochemistry.  In part B, breast right, 8 o'clock, 4 cm from the nipple, ultrasound-guided core biopsy, invasive mammary carcinoma, no special type, ductal (and mucinous) Donna grade 2, occasional calcifications were noted.  Invasive carcinoma was estrogen receptor positive and progesterone receptor negative by immunohistochemistry.  On quantitation of the ER and NC, ER was positive 99% of the cells staining with strong intensity.  NC was subsequently noted to be positive with 15% of the cells staining with moderate intensity.       There was also a HER2 FISH performed, and the HER2 FISH showed average number of HER2 signals per nucleus 2.6.  Average number of CEN17 signals 1.7 with a ratio of 1.6, VASILIY negative for biopsy A.  For biopsy B, the HER2 signals per nucleus 2.9.  Average number CEN17 signals per nucleus 1.7 with a ratio of 1.7, VASILIY negative.  Overall, by 2018 ASCO/CAP guidelines, this tumor is HER2 negative.     She was enrolled in I-SPY2 trial to  Durvulumab, Taxol and Olaparib arm. She completed 12 cycles of weekly Taxol. She also completed 4 cycles  of adriamycin and cyclophosphamide (AC).     PAST MEDICAL HISTORY:  In terms of her breast history, she does have a history of a smaller right breast which was treated with implants 19 years ago.  She has a history of 2 papillomas in the right breast, 1 removed at the 6-o'clock position 5 years ago, another removed at the 6-o'clock position approximately 10 years ago.  These incisions are approximately at the 5:30 to 6-o'clock position.  She has no history of radiation therapy.  No history of breast cancer of any type.  No history of tumor of any kind.  No history of heart problems, heart attack, breathing problems, blood clots, seizures, stroke, arthritis, peptic ulcer disease or osteoporosis.  No history of bone fractures.  She is not currently participating in a clinical trial. She does have a history of asthma and a history of hypothyroidism.      FAMILY HISTORY:  Entirely negative for breast cancer or ovarian cancer or breast cancer in a male relative.      ALLERGIES:  No known drug allergies.  No allergy to seafood, iodine or contrast dye.  She does not take aspirin.      PAST MENSTRUAL HISTORY:  First period was at age 11.  First and only pregnancy was at age 26.  No miscarriages or abortions.  Occasional use of oral contraceptives in her 20s.  No history of hormone replacement therapy.  Last period was 3 years ago.      SOCIAL HISTORY:  Tobacco history was from age 15 to present, smoking a pack of cigarettes a week.  She is now trying to stop cigarettes.   In terms of alcohol use, in her early teens and early 20s, she drank approximately 10 drinks on the weekend and has tapered off drinking since then.      TREATMENT HISTORY:  A.  Neoadjuvant durvalumab, oliparib, paclitaxel on I SPY 2  B  Neoadjuvant ddAC.  C.  Bilateral mastectomy.  RCB 3 result.   Pathologic stage was [id number] sn.   D.  Post-surgical  radiation.  Completed 1-6-20.     Treatment Summary to Date  Treatment Site: Rt CW , nodes + SCV Current Dose: 6040/6040 cGy Fractions: 33/33       E.  Begin CREATE-X. With letrozole. Plan is for 24 weeks. Begun January 14.  Ended June 26.      F.  She held the letrozole beginning April 28 because of severe joint pains.  G.  She restarted AI with anastrozole 6-30-20.  Discontinued August 15 due to hot flashes and nausea.  H.  Restarted letrozole on 8-15-20 and is tolerating it well.   I.   Bilateral free flap reconstruction performed February 8, 2021.  Letrozole was held for 3 weeks.  I advised her to restart it on March 2, 2021.     Current Outpatient Medications   Medication    albuterol (VENTOLIN HFA) 108 (90 Base) MCG/ACT inhaler    amoxicillin-clavulanate (AUGMENTIN) 875-125 MG tablet    atorvastatin (LIPITOR) 20 MG tablet    Cholecalciferol (VITAMIN D3 PO)    fluticasone (FLOVENT HFA) 110 MCG/ACT inhaler    letrozole (FEMARA) 2.5 MG tablet    levothyroxine (SYNTHROID/LEVOTHROID) 125 MCG tablet    lisinopril (ZESTRIL) 5 MG tablet    LORazepam (ATIVAN) 0.5 MG tablet    minoxidil (LONITEN) 2.5 MG tablet    montelukast (SINGULAIR) 10 MG tablet    Multiple Vitamins-Minerals (WOMENS ONE DAILY PO)    Multiple Vitamins-Minerals (ZINC PO)    sertraline (ZOLOFT) 100 MG tablet    traZODone (DESYREL) 50 MG tablet    pregabalin (LYRICA) 25 MG capsule     No current facility-administered medications for this visit.         INTERVAL HISTORY  Mily has been doing reasonably well.  She has bilateral flaps and does not require breast imaging.  She has some low back pain and has had epidural injections of steroids for that problem.  She denies fatigue, depression, anxiety.  We did discuss Zometa with her and she does not want it related to poor dentition.  We will refer her to the dental school for dental issues including open sockets and teeth that need to be extracted.  She certainly is not a candidate for Zometa at this time.   The plan is to continue letrozole for total of 7 years.      REVIEW OF SYSTEMS:     She denies fevers or chills, cough, chest pain, shortness of breath, nausea, vomiting, constipation, diarrhea, bone pain, back pain, headache.  Remainder of a 10 point review of systems is negative.     PHYSICAL EXAMINATION:   VITAL SIGNS:  /79 (BP Location: Right arm, Patient Position: Sitting, Cuff Size: Adult Regular)   Pulse 93   Temp 98.3  F (36.8  C) (Oral)   Resp 16   Wt 66.3 kg (146 lb 3.2 oz)   LMP 11/02/2014   SpO2 96%   BMI 24.33 kg/m    GENERAL:  Mily appeared generally well.  She has alopecia, but her hair is growing back.   HEENT:  No lesions in the oropharynx.  On the back of the neck, there is a small, erythematous macular lesion which she says was related to using a clipper on the back of her neck.   LYMPH:  There is no palpable cervical, supraclavicular, subclavicular or axillary lymphadenopathy.   BREASTS:  Right breast reveals a flap in place with incisions that are well-healed without erythema or masses.  There are overlying telangiectasia related to radiation.  The left breast reveals a flap.  The reconstructions appear to be non-nipple sparing.   LUNGS:  Clear to percussion and auscultation.   HEART:  Regular rate and rhythm, S1, S2.   ABDOMEN:  Soft and nontender without hepatosplenomegaly.   EXTREMITIES:  Without edema.   PSYCHIATRIC:  Mood and affect were normal.      LABS reviewed.      Recent Results (from the past 240 hour(s))   CEA    Collection Time: 12/26/23 12:46 PM   Result Value Ref Range    CEA 1.4 ng/mL   COMPREHENSIVE METABOLIC PANEL    Collection Time: 12/26/23 12:46 PM   Result Value Ref Range    Sodium 138 135 - 145 mmol/L    Potassium 3.7 3.4 - 5.3 mmol/L    Carbon Dioxide (CO2) 27 22 - 29 mmol/L    Anion Gap 10 7 - 15 mmol/L    Urea Nitrogen 9.1 8.0 - 23.0 mg/dL    Creatinine 0.61 0.51 - 0.95 mg/dL    GFR Estimate >90 >60 mL/min/1.73m2    Calcium 9.5 8.8 - 10.2 mg/dL     Chloride 101 98 - 107 mmol/L    Glucose 105 (H) 70 - 99 mg/dL    Alkaline Phosphatase 68 40 - 150 U/L    AST 17 0 - 45 U/L    ALT 14 0 - 50 U/L    Protein Total 7.1 6.4 - 8.3 g/dL    Albumin 4.4 3.5 - 5.2 g/dL    Bilirubin Total 0.4 <=1.2 mg/dL   CA27.29  BREAST TUMOR MARKER    Collection Time: 12/26/23 12:46 PM   Result Value Ref Range    CA 27.29 13.6 <=39.0 U/mL   CBC with platelets and differential    Collection Time: 12/26/23 12:46 PM   Result Value Ref Range    WBC Count 7.4 4.0 - 11.0 10e3/uL    RBC Count 4.85 3.80 - 5.20 10e6/uL    Hemoglobin 14.2 11.7 - 15.7 g/dL    Hematocrit 41.4 35.0 - 47.0 %    MCV 85 78 - 100 fL    MCH 29.3 26.5 - 33.0 pg    MCHC 34.3 31.5 - 36.5 g/dL    RDW 13.1 10.0 - 15.0 %    Platelet Count 304 150 - 450 10e3/uL    % Neutrophils 59 %    % Lymphocytes 28 %    % Monocytes 7 %    % Eosinophils 4 %    % Basophils 1 %    % Immature Granulocytes 1 %    NRBCs per 100 WBC 0 <1 /100    Absolute Neutrophils 4.4 1.6 - 8.3 10e3/uL    Absolute Lymphocytes 2.0 0.8 - 5.3 10e3/uL    Absolute Monocytes 0.5 0.0 - 1.3 10e3/uL    Absolute Eosinophils 0.3 0.0 - 0.7 10e3/uL    Absolute Basophils 0.1 0.0 - 0.2 10e3/uL    Absolute Immature Granulocytes 0.0 <=0.4 10e3/uL    Absolute NRBCs 0.0 10e3/uL            ASSESSMENT AND PLAN:     1.  Mily Lei is a 58-year-old woman who presented with a locally advanced right breast cancer.  This cancer developed in the context of previous breast augmentation.  On presentation, she had multiple masses occupying an area of approximately 9 cm in the upper outer quadrant with some extension to the subareolar region.  She did not have any clinically apparent lymph node involvement.  She was treated with neoadjuvant chemotherapy on the I-SPY 2 trial and was randomized to paclitaxel, olaparib and durvalumab followed by dose-dense AC.  She tolerated the treatment reasonably well but had an RCB3 result with final stage [id number].  She started on the CREATE-X trial and  had adjuvant capecitabine for 24 weeks combined with an aromatase inhibitor, which will be continued for 10 years.    She completed 24 weeks of Xeloda.  She declined the SELIN trial.   2.  Free flap reconstruction was performed.  3.  Hormonal therapy.      Her hormonal therapy was changed back to letrozole at her request.  She has fewer hot flashes and less nausea with letrozole.  She would like to stay on letrozole.  We did discuss the plan for 7 years of hormonal therapy and she is in agreement. She says she is taking letrozole and tolerating it well.  Because of high risk we could consider 10 years.   4. Breast reconstruction.  Mily no longer having discomfort with her reconstruction.  She is being followed by Dr. Tena.  5.  Lymphedema.  Mily does have some right arm stiffness.  She feels that her exercise is helping.  If she has increasing discomfort we can refer her to lymphedema clinic.  I did advise her to go back to lymphedema clinic because she does have right arm discomfort.  6.  Exercise.  I advised Mily to walk 30 minutes a day.  This should be more feasible now that the weather is warming up.  7.  Discussion of diet.  I advised her to eat a diet with more fruits and vegetables Mediterranean-style.  She will work on this.  8.  Bone health.  We discussed that her DEXA scan showed a most valid negative T score of -1.7 on 7-8-21.  Recommended calcium vitamin D and weightbearing exercise.  9.  Genetics.  Reviewing the chart and talking with Mily she has not had germ line genetics done.  We will order this. I have discussed it with Mily and she is willing to have germ line genetics performed.   9.  Follow up.  Genetics referral this week.  Follow up with Dental exam and treatment.  Follow up with me June 25 with CBC, CMP, Zometa.  8 AM cortisol and TSH this month.      Sincerely,     Christian Guzman M.D.   Professor   Division of Hematology, Oncology, Transplant   Department of Medicine    [facility] [employer]   [phone]            I spent 40 minutes with the patient more than 50% of which was in counseling and coordination of care.

## 2023-12-27 LAB — CANCER AG27-29 SERPL-ACNC: 13.6 U/ML

## 2023-12-27 NOTE — PROGRESS NOTES
Writer received Cancer Risk Management Program referral, referred for:      breast cancer and did not have a genetics consultation as far as I can find        Reviewed for appropriate plan, and sent to New Patient Scheduling for completion.

## 2024-01-01 NOTE — NURSING NOTE
"  Lactation Outpatient Consult      START TIME:1220    Reason for consultation: milk transfer evaluation, latch evaluation    Babys :2024  Baby's MD: Dr. Meléndez    Mother's Name:Kendall Sandoval  Mother's MR#: [id number]    Hospital of birth:[facility], [place], [facility] by [facility]    Maternal history:GDM, Sickle Cell trait, anemia    Breastfeeding History since home:Mom has been pumping exclusively due to painful latch. Mom pumps 4x a day and gets 1-1.5 oz on the right breast and drops on the left. Says baby "bites" when he latches but will occasionally latch him to keep him interested in the breast. Baby dropped 14% of birth weight and began supplementing with formula per doctors recommendation. Mom has a speech therapy appointment scheduled for the baby on Monday. Mom and dad also reports leaking with the bottle.     Supplementation:1.5-2oz formula and EBM every 3 hours. Using spectra slow flowing bottles    Pumpinx a day, 1-1.5oz on right breast, drops on left    Birth Weight: 6lb 9oz  DC weight: 6lb 7oz  Last MD Visit: 6lb 11oz      Advice from Baby's MD: continue feeding plan     Breastfeeding goals:Exclusively breastfeed    Feeding assessment for today's consult: CESAR assisted mom with deep latching. Baby latched to the left breast in cross cradle hold. Mom reported minimal pain upon initial latching but says it became "pinchy" after about a minute. Baby lost 2 mls on that side. Latched to the right breast in football hold for 5-6 minutes. He transferred 4 mls. Mom says the right breast is her under  and only yields drops with the pump. CESAR observed baby bottle feed using side lying paced bottle feeding. Lots of leaking noticed.     Pre feeding weight ( with diaper) 3060g  Post feeding weight ( with diaper) 3062g    Baby transferred : 4 mls (-2 mls on the left breast)    Lactation observations: Baby appeared to be very "chompy" on LC's finger. Higher palate and mom's nipples are flatter. Leaning back after latching helped " "Oncology Rooming Note    May 20, 2019 9:24 AM   Kathy Lei is a 53 year old female who presents for:    Chief Complaint   Patient presents with     Port Draw     Labs drawn via PORT by RN in lab. Line flushed with saline and heparin. VS taken.      Oncology Clinic Visit     Follow Up appt for right breast cancer     Initial Vitals: /76 (BP Location: Right arm, Patient Position: Sitting, Cuff Size: Adult Regular)   Pulse 100   Temp 98.6  F (37  C) (Oral)   Resp 18   Ht 1.676 m (5' 5.98\")   Wt 69.9 kg (154 lb)   LMP 11/02/2014   SpO2 98%   Breastfeeding? No   BMI 24.87 kg/m   Estimated body mass index is 24.87 kg/m  as calculated from the following:    Height as of this encounter: 1.676 m (5' 5.98\").    Weight as of this encounter: 69.9 kg (154 lb). Body surface area is 1.8 meters squared.  No Pain (0) Comment: Data Unavailable   Patient's last menstrual period was 11/02/2014.  Allergies reviewed: Yes  Medications reviewed: Yes    Medications: Medication refills not needed today.  Pharmacy name entered into [employer]:    [facility] [address] [id number] - [place], [place] - [address] AT [facility] [address]    Clinical concerns: Patient & her  here today.  Patient had a breast biopsy this morning & will also have her first chemo of \"red devil\" today as well.  No real questions or concerns today.   Nasreen was notified.      Stacey Potter RN              " "baby to get on deeper. Sucking blisters seen on baby's mouth. Lots of leaking with the bottle. Some good tugs and pulls seen, swallows heard.     Mother: WDL, short nipples    Baby: WDL, alert    Oral Exam:High palate, chompy when sucking, sucking blisters on lips indicating overuse of lips. Baby able to maintain suction to a gloved finger with and without depression of the chin.     Nurse Practitioner: Alina Whalen NP did assessment of baby and reviewed plan from       Recommended Interventions and Plan of Care for Clara Hernandez      X Breastfeed baby  on cue until content at least 8 or more times in 24hrs. No more than one 5 hour stretch at night. If pumping only, empty breast 8 or more times a day with no more than a 5-6 hour stretch at night.      X Use the asymmetric latch as demonstrated during the consult. Go to  www.[email or web address].org  "Attaching Your Baby at the Breast" (English)    X Use breast compression during pauses in sucking as demonstrated during the consult. ( Compress 1,2,3 release)    X Observe for signs of milk transfer to baby:  wide pauses in the sucks  swallows throughout  the feedings  milk on the babys lips when removed from the breast  wet nipple as it comes out of the babys mouth  heavy breasts before a feeding and softer breasts after the feeding    X Try to latch the baby onto the breast until latch occurs or until 10-15 min. elapse.  If unable to latch the baby deeply onto the breasts, supplement the baby and pump the breasts until empty and store the milk for the next feeding.    X Supplement the baby with 1.5-2oz  by slow flow spectra bottles after nursing, until baby is content.     X Use Breast Pump 20-30 minutes after nursing to increase supply, you may feed baby any milk obtained if baby is still rooting and looking hungry.    X Treat routine sore nipples:  correct positioning and latch on, break suction when baby removed from breast, rub expressed breastmilk  " and/or lanolin into nipple after every feeding, begin feeding on least sore  nipple, use different positions.     X  Count and record the number of feedings, urine diapers, and dirty diapers every    24hrs.    X Clean all breastfeeding aids with warm soapy water after each use and sterilize each day.    X Call LC if you feel you need more practice with breastfeeding or if you have more questions.     X See Ped for Follow up as recommended    X  Refer [facility] Resource list and Outpatient Lactation Clinic phone number [phone]    X Reviewed Paced Bottle Feeding    X Lots of skin to skin with mother      CONSULT ENDED AT:1335    CONSULT DURATION: 75 minutes

## 2024-01-06 LAB — NONINV COLON CA DNA+OCC BLD SCRN STL QL: NEGATIVE

## 2024-01-26 ENCOUNTER — ANCILLARY PROCEDURE (OUTPATIENT)
Dept: GENERAL RADIOLOGY | Facility: CLINIC | Age: 59
End: 2024-01-26
Attending: ORTHOPAEDIC SURGERY
Payer: COMMERCIAL

## 2024-01-26 DIAGNOSIS — M54.16 LUMBAR BACK PAIN WITH RADICULOPATHY AFFECTING LEFT LOWER EXTREMITY: ICD-10-CM

## 2024-01-26 DIAGNOSIS — M54.50 LUMBAR PAIN: ICD-10-CM

## 2024-01-26 PROCEDURE — 64483 NJX AA&/STRD TFRM EPI L/S 1: CPT | Mod: LT | Performed by: RADIOLOGY

## 2024-01-26 RX ORDER — LIDOCAINE HYDROCHLORIDE 10 MG/ML
5 INJECTION, SOLUTION EPIDURAL; INFILTRATION; INTRACAUDAL; PERINEURAL ONCE
Status: COMPLETED | OUTPATIENT
Start: 2024-01-26 | End: 2024-01-26

## 2024-01-26 RX ORDER — BUPIVACAINE HYDROCHLORIDE 5 MG/ML
2 INJECTION, SOLUTION PERINEURAL ONCE
Status: COMPLETED | OUTPATIENT
Start: 2024-01-26 | End: 2024-01-26

## 2024-01-26 RX ORDER — METHYLPREDNISOLONE ACETATE 80 MG/ML
80 INJECTION, SUSPENSION INTRA-ARTICULAR; INTRALESIONAL; INTRAMUSCULAR; SOFT TISSUE ONCE
Status: COMPLETED | OUTPATIENT
Start: 2024-01-26 | End: 2024-01-26

## 2024-01-26 RX ORDER — IOPAMIDOL 408 MG/ML
2 INJECTION, SOLUTION INTRATHECAL ONCE
Status: COMPLETED | OUTPATIENT
Start: 2024-01-26 | End: 2024-01-26

## 2024-01-26 RX ADMIN — LIDOCAINE HYDROCHLORIDE 5 ML: 10 INJECTION, SOLUTION EPIDURAL; INFILTRATION; INTRACAUDAL; PERINEURAL at 08:38

## 2024-01-26 RX ADMIN — IOPAMIDOL 2 ML: 408 INJECTION, SOLUTION INTRATHECAL at 08:38

## 2024-01-26 RX ADMIN — BUPIVACAINE HYDROCHLORIDE 10 MG: 5 INJECTION, SOLUTION PERINEURAL at 08:38

## 2024-01-26 RX ADMIN — METHYLPREDNISOLONE ACETATE 80 MG: 80 INJECTION, SUSPENSION INTRA-ARTICULAR; INTRALESIONAL; INTRAMUSCULAR; SOFT TISSUE at 08:38

## 2024-01-26 NOTE — PROGRESS NOTES
Mily was seen in X-ray today for a epidural injection. Patient rated pain before procedure 6/10. After procedure patient rated pain 2/10.   This pain level is acceptable to patient. Patient discharged home with .

## 2024-01-26 NOTE — DISCHARGE SUMMARY
AFTER YOU GO HOME    DO relax; minimize your activity for 24 hours  You may resume normal activity tomorrow  You may remove the bandage in the evening or next morning  You may resume bathing the next day  Drink at least 4 extra glasses of fluid today if not on fluid restrictions  DO NOT drive or operate machinery at home or at work for at least 24 hours      VISIT THE EMERGENCY ROOM OR URGENT CARE IF:    There is redness or swelling at the injection site  There is discharge from the injection site  You develop a temperature of 101  F or greater      ADDITIONAL INSTRUCTIONS:     You may resume your Coumadin or other blood thinner at your regular dose today.  Follow up with your physician to have your INR rechecked if indicated.  If you gain no relief from the injection after two (2) weeks, follow-up with your provider for your options.        Contacts:    During business hours from 8 to 5 pm, you may call [phone] to reach a nurse advisor at [facility].  After hours, call [facility]  [phone].  Ask for the Radiologist on-call.  Someone is on-call 24 hrs/day.  [facility] Toll Free Number   [phone]

## 2024-03-07 DIAGNOSIS — E78.5 HYPERLIPIDEMIA LDL GOAL <130: ICD-10-CM

## 2024-03-07 RX ORDER — ATORVASTATIN CALCIUM 20 MG/1
20 TABLET, FILM COATED ORAL DAILY
Qty: 90 TABLET | Refills: 0 | Status: SHIPPED | OUTPATIENT
Start: 2024-03-07 | End: 2024-06-10

## 2024-03-07 NOTE — TELEPHONE ENCOUNTER
Medication filled 1 time as pt is due for a follow-up in clinic. Pharmacy has been notified to inform patient to call clinic and schedule appointment.    Prescription approved per [employer] Refill Protocol.  Michelle Oleary RN  [facility] Triage Nurse

## 2024-03-11 ENCOUNTER — PATIENT OUTREACH (OUTPATIENT)
Dept: CARE COORDINATION | Facility: CLINIC | Age: 59
End: 2024-03-11
Payer: COMMERCIAL

## 2024-03-20 DIAGNOSIS — M54.16 LUMBAR BACK PAIN WITH RADICULOPATHY AFFECTING LEFT LOWER EXTREMITY: Primary | ICD-10-CM

## 2024-03-20 DIAGNOSIS — M54.50 LUMBAR PAIN: ICD-10-CM

## 2024-03-25 ENCOUNTER — PATIENT OUTREACH (OUTPATIENT)
Dept: CARE COORDINATION | Facility: CLINIC | Age: 59
End: 2024-03-25
Payer: COMMERCIAL

## 2024-04-06 ENCOUNTER — E-VISIT (OUTPATIENT)
Dept: FAMILY MEDICINE | Facility: CLINIC | Age: 59
End: 2024-04-06
Payer: COMMERCIAL

## 2024-04-06 DIAGNOSIS — J45.41 MODERATE PERSISTENT ASTHMA WITH EXACERBATION: ICD-10-CM

## 2024-04-06 PROCEDURE — 99421 OL DIG E/M SVC 5-10 MIN: CPT | Performed by: NURSE PRACTITIONER

## 2024-04-08 RX ORDER — PREDNISONE 20 MG/1
TABLET ORAL
Qty: 20 TABLET | Refills: 1 | Status: SHIPPED | OUTPATIENT
Start: 2024-04-08 | End: 2024-04-20

## 2024-04-08 RX ORDER — PREDNISONE 20 MG/1
TABLET ORAL
Qty: 20 TABLET | Refills: 0 | OUTPATIENT
Start: 2024-04-08

## 2024-04-14 DIAGNOSIS — E03.9 HYPOTHYROIDISM, UNSPECIFIED TYPE: ICD-10-CM

## 2024-04-14 DIAGNOSIS — I42.9 CARDIOMYOPATHY, UNSPECIFIED TYPE (H): ICD-10-CM

## 2024-04-14 DIAGNOSIS — J45.40 MODERATE PERSISTENT ASTHMA WITHOUT COMPLICATION: ICD-10-CM

## 2024-04-15 RX ORDER — LEVOTHYROXINE SODIUM 125 UG/1
TABLET ORAL
Qty: 90 TABLET | Refills: 0 | Status: SHIPPED | OUTPATIENT
Start: 2024-04-15 | End: 2024-05-20

## 2024-04-15 RX ORDER — MONTELUKAST SODIUM 10 MG/1
1 TABLET ORAL AT BEDTIME
Qty: 90 TABLET | Refills: 3 | Status: SHIPPED | OUTPATIENT
Start: 2024-04-15

## 2024-04-15 RX ORDER — LISINOPRIL 5 MG/1
5 TABLET ORAL DAILY
Qty: 90 TABLET | Refills: 3 | Status: SHIPPED | OUTPATIENT
Start: 2024-04-15 | End: 2024-05-20

## 2024-04-23 DIAGNOSIS — F33.0 MILD RECURRENT MAJOR DEPRESSION (H): ICD-10-CM

## 2024-04-24 RX ORDER — SERTRALINE HYDROCHLORIDE 100 MG/1
100 TABLET, FILM COATED ORAL AT BEDTIME
Qty: 90 TABLET | Refills: 0 | Status: SHIPPED | OUTPATIENT
Start: 2024-04-24 | End: 2024-05-20

## 2024-04-24 NOTE — TELEPHONE ENCOUNTER
Left pt. Message regarding to 1 rx refill and appt. On 5/20/2024 with provider.     [facility]  [facility]

## 2024-04-28 DIAGNOSIS — J45.40 MODERATE PERSISTENT ASTHMA WITHOUT COMPLICATION: ICD-10-CM

## 2024-04-30 RX ORDER — ALBUTEROL SULFATE 90 UG/1
AEROSOL, METERED RESPIRATORY (INHALATION)
Qty: 18 G | Status: SHIPPED | OUTPATIENT
Start: 2024-04-30

## 2024-05-08 ENCOUNTER — ANCILLARY PROCEDURE (OUTPATIENT)
Dept: INTERVENTIONAL RADIOLOGY/VASCULAR | Facility: CLINIC | Age: 59
End: 2024-05-08
Attending: ORTHOPAEDIC SURGERY
Payer: COMMERCIAL

## 2024-05-08 VITALS
RESPIRATION RATE: 18 BRPM | OXYGEN SATURATION: 94 % | DIASTOLIC BLOOD PRESSURE: 80 MMHG | SYSTOLIC BLOOD PRESSURE: 126 MMHG | HEART RATE: 95 BPM

## 2024-05-08 PROCEDURE — 64483 NJX AA&/STRD TFRM EPI L/S 1: CPT | Mod: LT | Performed by: RADIOLOGY

## 2024-05-08 RX ORDER — LIDOCAINE HYDROCHLORIDE 10 MG/ML
5 INJECTION, SOLUTION EPIDURAL; INFILTRATION; INTRACAUDAL; PERINEURAL ONCE
Status: COMPLETED | OUTPATIENT
Start: 2024-05-08 | End: 2024-05-08

## 2024-05-08 RX ORDER — BUPIVACAINE HYDROCHLORIDE 5 MG/ML
10 INJECTION, SOLUTION EPIDURAL; INTRACAUDAL ONCE
Status: COMPLETED | OUTPATIENT
Start: 2024-05-08 | End: 2024-05-08

## 2024-05-08 RX ORDER — IOPAMIDOL 408 MG/ML
10 INJECTION, SOLUTION INTRATHECAL ONCE
Status: COMPLETED | OUTPATIENT
Start: 2024-05-08 | End: 2024-05-08

## 2024-05-08 RX ORDER — METHYLPREDNISOLONE ACETATE 80 MG/ML
80 INJECTION, SUSPENSION INTRA-ARTICULAR; INTRALESIONAL; INTRAMUSCULAR; SOFT TISSUE ONCE
Status: COMPLETED | OUTPATIENT
Start: 2024-05-08 | End: 2024-05-08

## 2024-05-08 RX ADMIN — LIDOCAINE HYDROCHLORIDE 5 ML: 10 INJECTION, SOLUTION EPIDURAL; INFILTRATION; INTRACAUDAL; PERINEURAL at 08:32

## 2024-05-08 RX ADMIN — METHYLPREDNISOLONE ACETATE 80 MG: 80 INJECTION, SUSPENSION INTRA-ARTICULAR; INTRALESIONAL; INTRAMUSCULAR; SOFT TISSUE at 08:32

## 2024-05-08 RX ADMIN — BUPIVACAINE HYDROCHLORIDE 10 MG: 5 INJECTION, SOLUTION EPIDURAL; INTRACAUDAL at 08:31

## 2024-05-08 RX ADMIN — IOPAMIDOL 4 ML: 408 INJECTION, SOLUTION INTRATHECAL at 08:32

## 2024-05-08 NOTE — DISCHARGE INSTRUCTIONS
Discharge Instructions for Epidural Steroid Injection/Nerve Block    Care of injection site:  If you have new numbness down your leg after the injection, this may last up to 4-6 hours, but should go away.   Over the next 24 to 48 hours, pain at the injection site may increase before it gets better.  For the next 48 hours, use ice packs for 15 minutes, 3-4 times a day for pain relief.  If you have a bandage, you may remove it the next morning      Do not submerge injection site in water for 24 hours, (no baths. pools, hot tubs).   Showers are OK.              Activity:  You should relax today, and then you may return to your regular activity.  You may eat a normal diet.  Do not drive for 24 hours.         Medicines:  Restart blood thinning medicines tomorrow at your regular dose.  If you are restarting warfarin tomorrow, be sure to discuss a follow up plan with you anticoagulation clinic.  Continue to take all other medications as ordered by your provider.  If you have diabetes, watch your blood sugar closely. If needed, call your doctor to help control your blood sugar and adjust your diabetes medications.     Common side effects may include:  Increased blood sugar  Insomnia  Heartburn  Flushed face  Water retention  Increased appetite    Important Information:  The steroid should help reduce swelling and pain. This may take from 3-14 days.   Pain at the site the needle went in, will be mild and go away in 2-3 days.   Please follow up with the provider that ordered this injection in a month, if you don't already have an appointment with them. Let them know if the injection was effective.  DO NOT GET THE COVID or FLU VACCINE 2 WEEKS AFTER YOUR INJECTION    Call your doctor or go to the Emergency Room if you have NEW severe pain, fever, or loss of control of your legs.

## 2024-05-13 SDOH — HEALTH STABILITY: PHYSICAL HEALTH: ON AVERAGE, HOW MANY DAYS PER WEEK DO YOU ENGAGE IN MODERATE TO STRENUOUS EXERCISE (LIKE A BRISK WALK)?: 2 DAYS

## 2024-05-13 SDOH — HEALTH STABILITY: PHYSICAL HEALTH: ON AVERAGE, HOW MANY MINUTES DO YOU ENGAGE IN EXERCISE AT THIS LEVEL?: 30 MIN

## 2024-05-13 ASSESSMENT — ASTHMA QUESTIONNAIRES
QUESTION_1 LAST FOUR WEEKS HOW MUCH OF THE TIME DID YOUR ASTHMA KEEP YOU FROM GETTING AS MUCH DONE AT WORK, SCHOOL OR AT HOME: SOME OF THE TIME
ACT_TOTALSCORE: 16
QUESTION_3 LAST FOUR WEEKS HOW OFTEN DID YOUR ASTHMA SYMPTOMS (WHEEZING, COUGHING, SHORTNESS OF BREATH, CHEST TIGHTNESS OR PAIN) WAKE YOU UP AT NIGHT OR EARLIER THAN USUAL IN THE MORNING: NOT AT ALL
QUESTION_5 LAST FOUR WEEKS HOW WOULD YOU RATE YOUR ASTHMA CONTROL: SOMEWHAT CONTROLLED
ACT_TOTALSCORE: 16
QUESTION_4 LAST FOUR WEEKS HOW OFTEN HAVE YOU USED YOUR RESCUE INHALER OR NEBULIZER MEDICATION (SUCH AS ALBUTEROL): ONE OR TWO TIMES PER DAY
QUESTION_2 LAST FOUR WEEKS HOW OFTEN HAVE YOU HAD SHORTNESS OF BREATH: THREE TO SIX TIMES A WEEK

## 2024-05-13 ASSESSMENT — SOCIAL DETERMINANTS OF HEALTH (SDOH): HOW OFTEN DO YOU GET TOGETHER WITH FRIENDS OR RELATIVES?: ONCE A WEEK

## 2024-05-15 DIAGNOSIS — L65.9 HAIR LOSS: ICD-10-CM

## 2024-05-15 RX ORDER — MINOXIDIL 2.5 MG/1
TABLET ORAL
Qty: 45 TABLET | Refills: 0 | Status: SHIPPED | OUTPATIENT
Start: 2024-05-15 | End: 2024-05-20

## 2024-05-19 ASSESSMENT — PATIENT HEALTH QUESTIONNAIRE - PHQ9
SUM OF ALL RESPONSES TO PHQ QUESTIONS 1-9: 0
SUM OF ALL RESPONSES TO PHQ QUESTIONS 1-9: 0

## 2024-05-19 NOTE — NURSING NOTE
"Oncology Rooming Note    April 29, 2019 7:50 AM   Kathy Lei is a 53 year old female who presents for:    Chief Complaint   Patient presents with     Port Draw     labs drawn via port by RN     Oncology Clinic Visit     RTN- Breast cancer     Initial Vitals: /81 (BP Location: Left arm, Patient Position: Chair, Cuff Size: Adult Regular)   Pulse 100   Temp 98.1  F (36.7  C) (Oral)   Wt 69.1 kg (152 lb 4.8 oz)   LMP 11/02/2014   SpO2 96%   BMI 24.60 kg/m   Estimated body mass index is 24.6 kg/m  as calculated from the following:    Height as of 4/8/19: 1.676 m (5' 5.98\").    Weight as of this encounter: 69.1 kg (152 lb 4.8 oz). Body surface area is 1.79 meters squared.  No Pain (0) Comment: Data Unavailable   Patient's last menstrual period was 11/02/2014.  Allergies reviewed: Yes  Medications reviewed: Yes    Medications: MEDICATION REFILLS NEEDED TODAY. Provider was notified.  Pharmacy name entered into [facility]:    [id number] - [place], [place] - [address] [place] [address] [id number] - [place], [place] - [address] AT [facility] [address]    Clinical concerns: Refill- Compazine    Aiyana Jimenez, COLETTE              "
Chief Complaint   Patient presents with     Port Draw     labs drawn via port by RN     /81 (BP Location: Left arm, Patient Position: Chair, Cuff Size: Adult Regular)   Pulse 100   Temp 98.1  F (36.7  C) (Oral)   Wt 69.1 kg (152 lb 4.8 oz)   LMP 11/02/2014   SpO2 96%   BMI 24.60 kg/m      Port accessed by RN in lab. Labs collected and sent. Line flushed with NS & Heparin. Pt tolerated well.   Pt checked in for next appointment.    Hui Martin, RN    
chaplaincy/clergy/

## 2024-05-20 ENCOUNTER — OFFICE VISIT (OUTPATIENT)
Dept: FAMILY MEDICINE | Facility: CLINIC | Age: 59
End: 2024-05-20
Payer: COMMERCIAL

## 2024-05-20 VITALS
RESPIRATION RATE: 16 BRPM | HEART RATE: 79 BPM | TEMPERATURE: 98 F | WEIGHT: 144 LBS | SYSTOLIC BLOOD PRESSURE: 120 MMHG | OXYGEN SATURATION: 99 % | DIASTOLIC BLOOD PRESSURE: 79 MMHG | BODY MASS INDEX: 23.99 KG/M2 | HEIGHT: 65 IN

## 2024-05-20 DIAGNOSIS — R73.09 ELEVATED GLUCOSE: ICD-10-CM

## 2024-05-20 DIAGNOSIS — E78.5 HYPERLIPIDEMIA LDL GOAL <130: ICD-10-CM

## 2024-05-20 DIAGNOSIS — E03.9 HYPOTHYROIDISM, UNSPECIFIED TYPE: ICD-10-CM

## 2024-05-20 DIAGNOSIS — Z17.0 MALIGNANT NEOPLASM OF UPPER-OUTER QUADRANT OF RIGHT BREAST IN FEMALE, ESTROGEN RECEPTOR POSITIVE (H): ICD-10-CM

## 2024-05-20 DIAGNOSIS — J45.40 MODERATE PERSISTENT ASTHMA WITHOUT COMPLICATION: ICD-10-CM

## 2024-05-20 DIAGNOSIS — Z00.00 ROUTINE GENERAL MEDICAL EXAMINATION AT A HEALTH CARE FACILITY: Primary | ICD-10-CM

## 2024-05-20 DIAGNOSIS — F33.0 MILD RECURRENT MAJOR DEPRESSION (H): ICD-10-CM

## 2024-05-20 DIAGNOSIS — C50.411 MALIGNANT NEOPLASM OF UPPER-OUTER QUADRANT OF RIGHT BREAST IN FEMALE, ESTROGEN RECEPTOR POSITIVE (H): ICD-10-CM

## 2024-05-20 DIAGNOSIS — I10 BENIGN ESSENTIAL HYPERTENSION: ICD-10-CM

## 2024-05-20 DIAGNOSIS — G62.0 DRUG-INDUCED POLYNEUROPATHY (H): ICD-10-CM

## 2024-05-20 DIAGNOSIS — G47.00 INSOMNIA, UNSPECIFIED TYPE: ICD-10-CM

## 2024-05-20 DIAGNOSIS — L65.9 HAIR LOSS: ICD-10-CM

## 2024-05-20 LAB — HBA1C MFR BLD: 5.6 % (ref 0–5.6)

## 2024-05-20 PROCEDURE — 80061 LIPID PANEL: CPT | Performed by: NURSE PRACTITIONER

## 2024-05-20 PROCEDURE — 84443 ASSAY THYROID STIM HORMONE: CPT | Performed by: NURSE PRACTITIONER

## 2024-05-20 PROCEDURE — 83036 HEMOGLOBIN GLYCOSYLATED A1C: CPT | Performed by: NURSE PRACTITIONER

## 2024-05-20 PROCEDURE — 80053 COMPREHEN METABOLIC PANEL: CPT | Performed by: NURSE PRACTITIONER

## 2024-05-20 PROCEDURE — 36415 COLL VENOUS BLD VENIPUNCTURE: CPT | Performed by: NURSE PRACTITIONER

## 2024-05-20 PROCEDURE — 99214 OFFICE O/P EST MOD 30 MIN: CPT | Mod: 25 | Performed by: NURSE PRACTITIONER

## 2024-05-20 PROCEDURE — 99396 PREV VISIT EST AGE 40-64: CPT | Mod: 25 | Performed by: NURSE PRACTITIONER

## 2024-05-20 RX ORDER — MINOXIDIL 2.5 MG/1
TABLET ORAL
Qty: 45 TABLET | Refills: 3 | Status: SHIPPED | OUTPATIENT
Start: 2024-05-20

## 2024-05-20 RX ORDER — TRAZODONE HYDROCHLORIDE 50 MG/1
50-100 TABLET, FILM COATED ORAL
Qty: 90 TABLET | Status: SHIPPED | OUTPATIENT
Start: 2024-05-20

## 2024-05-20 RX ORDER — LEVOTHYROXINE SODIUM 125 UG/1
TABLET ORAL
Qty: 90 TABLET | Refills: 0 | Status: SHIPPED | OUTPATIENT
Start: 2024-05-20 | End: 2024-05-20

## 2024-05-20 RX ORDER — SERTRALINE HYDROCHLORIDE 100 MG/1
100 TABLET, FILM COATED ORAL AT BEDTIME
Qty: 90 TABLET | Refills: 3 | Status: SHIPPED | OUTPATIENT
Start: 2024-05-20

## 2024-05-20 RX ORDER — LISINOPRIL 5 MG/1
5 TABLET ORAL DAILY
Qty: 90 TABLET | Refills: 3 | Status: SHIPPED | OUTPATIENT
Start: 2024-05-20

## 2024-05-20 RX ORDER — LEVOTHYROXINE SODIUM 125 UG/1
TABLET ORAL
Qty: 90 TABLET | Refills: 3 | Status: SHIPPED | OUTPATIENT
Start: 2024-05-20

## 2024-05-20 NOTE — PATIENT INSTRUCTIONS
"Preventive Care Advice   This is general advice we often give to help people stay healthy. Your care team may have specific advice just for you. Please talk to your care team about your own preventive care needs.  Lifestyle  Exercise at least 150 minutes each week (30 minutes a day, 5 days a week).  Do muscle strengthening activities 2 days a week. These help control your weight and prevent disease.  No smoking.  Wear sunscreen to prevent skin cancer.  Have your home tested for radon every 2 to 5 years. Radon is a colorless, odorless gas that can harm your lungs. To learn more, go to www.health.[facility].[place]. and search for \"Radon in Homes.\"  Keep guns unloaded and locked up in a safe place like a safe or gun vault, or, use a gun lock and hide the keys. Always lock away bullets separately. To learn more, visit [place].gov and search for \"safe gun storage.\"  Nutrition  Eat 5 or more servings of fruits and vegetables each day.  Try wheat bread, brown rice and whole grain pasta (instead of white bread, rice, and pasta).  Get enough calcium and vitamin D. Check the label on foods and aim for 100% of the RDA (recommended daily allowance).  Regular exams  Have a dental exam and cleaning every 6 months.  See your health care team every year to talk about:  Any changes in your health.  Any medicines your care team has prescribed.  Preventive care, family planning, and ways to prevent chronic diseases.  Shots (vaccines)   HPV shots (up to age 26), if you've never had them before.  Hepatitis B shots (up to age 59), if you've never had them before.  COVID-19 shot: Get this shot when it's due.  Flu shot: Get a flu shot every year.  Tetanus shot: Get a tetanus shot every 10 years.  Pneumococcal, hepatitis A, and RSV shots: Ask your care team if you need these based on your risk.  Shingles shot (for age 50 and up).  General health tests  Diabetes screening:  Starting at age 35, Get screened for diabetes at least every 3 years.  If " you are younger than age 35, ask your care team if you should be screened for diabetes.  Cholesterol test: At age 39, start having a cholesterol test every 5 years, or more often if advised.  Bone density scan (DEXA): At age 50, ask your care team if you should have this scan for osteoporosis (brittle bones).  Hepatitis C: Get tested at least once in your life.  Abdominal aortic aneurysm screening: Talk to your doctor about having this screening if you:  Have ever smoked; and  Are biologically male; and  Are between the ages of 65 and 75.  STIs (sexually transmitted infections)  Before age 24: Ask your care team if you should be screened for STIs.  After age 24: Get screened for STIs if you're at risk. You are at risk for STIs (including HIV) if:  You are sexually active with more than one person.  You don't use condoms every time.  You or a partner was diagnosed with a sexually transmitted infection.  If you are at risk for HIV, ask about PrEP medicine to prevent HIV.  Get tested for HIV at least once in your life, whether you are at risk for HIV or not.  Cancer screening tests  Cervical cancer screening: If you have a cervix, begin getting regular cervical cancer screening tests at age 21. Most people who have regular screenings with normal results can stop after age 65. Talk about this with your provider.  Breast cancer scan (mammogram): If you've ever had breasts, begin having regular mammograms starting at age 40. This is a scan to check for breast cancer.  Colon cancer screening: It is important to start screening for colon cancer at age 45.  Have a colonoscopy test every 10 years (or more often if you're at risk) Or, ask your provider about stool tests like a FIT test every year or Cologuard test every 3 years.  To learn more about your testing options, visit: www.[employer]/973380.pdf.  For help making a decision, visit: maegan/aw06531.  Prostate cancer screening test: If you have a prostate and are age 55  to 69, ask your provider if you would benefit from a yearly prostate cancer screening test.  Lung cancer screening: If you are a current or former smoker age 50 to 80, ask your care team if ongoing lung cancer screenings are right for you.  For informational purposes only. Not to replace the advice of your health care provider. Copyright   2023 [facility]. All rights reserved. Clinically reviewed by the [facility]. [employer] 826687 - REV 04/24.    Substance Use Disorder: Care Instructions  Overview     You can improve your life and health by stopping your use of alcohol or drugs. When you don't drink or use drugs, you may feel and sleep better. You may get along better with your family, friends, and coworkers. There are medicines and programs that can help with substance use disorder.  How can you care for yourself at home?  Here are some ways to help you stay sober and prevent relapse.  If you have been given medicine to help keep you sober or reduce your cravings, be sure to take it exactly as prescribed.  Talk to your doctor about programs that can help you stop using drugs or drinking alcohol.  Do not keep alcohol or drugs in your home.  Plan ahead. Think about what you'll say if other people ask you to drink or use drugs. Try not to spend time with people who drink or use drugs.  Use the time and money spent on drinking or drugs to do something that's important to you.  Preventing a relapse  Have a plan to deal with relapse. Learn to recognize changes in your thinking that lead you to drink or use drugs. Get help before you start to drink or use drugs again.  Try to stay away from situations, friends, or places that may lead you to drink or use drugs.  If you feel the need to drink alcohol or use drugs again, seek help right away. Call a trusted friend or family member. Some people get support from organizations such as Narcotics Anonymous or [employer] or from  treatment facilities.  If you relapse, get help as soon as you can. Some people make a plan with another person that outlines what they want that person to do for them if they relapse. The plan usually includes how to handle the relapse and who to notify in case of relapse.  Don't give up. Remember that a relapse doesn't mean that you have failed. Use the experience to learn the triggers that lead you to drink or use drugs. Then quit again. Recovery is a lifelong process. Many people have several relapses before they are able to quit for good.  Follow-up care is a key part of your treatment and safety. Be sure to make and go to all appointments, and call your doctor if you are having problems. It's also a good idea to know your test results and keep a list of the medicines you take.  When should you call for help?   Call 911  anytime you think you may need emergency care. For example, call if you or someone else:    Has overdosed or has withdrawal signs. Be sure to tell the emergency workers that you are or someone else is using or trying to quit using drugs. Overdose or withdrawal signs may include:  Losing consciousness.  Seizure.  Seeing or hearing things that aren't there (hallucinations).     Is thinking or talking about suicide or harming others.   Where to get help 24 hours a day, 7 days a week   If you or someone you know talks about suicide, self-harm, a mental health crisis, a substance use crisis, or any other kind of emotional distress, get help right away. You can:    Call the Suicide and Crisis Lifeline at 988.     Call 1-629-201-TALK (1-156.508.4893).     Text HOME to 418197 to access the Crisis Text Line.   Consider saving these numbers in your phone.  Go to [employer] for more information or to chat online.  Call your doctor now or seek immediate medical care if:    You are having withdrawal symptoms. These may include nausea or vomiting, sweating, shakiness, and anxiety.   Watch closely for  "changes in your health, and be sure to contact your doctor if:    You have a relapse.     You need more help or support to stop.   Where can you learn more?  Go to https://www.[employer].net/patiented  Enter H573 in the search box to learn more about \"Substance Use Disorder: Care Instructions.\"  Current as of: November 15, 2023               Content Version: 14.0    9206-0937 [employer].   Care instructions adapted under license by your healthcare professional. If you have questions about a medical condition or this instruction, always ask your healthcare professional. [employer] disclaims any warranty or liability for your use of this information.      "

## 2024-05-20 NOTE — PROGRESS NOTES
Preventive Care Visit  [facility]  Rachel Arauz, NP, Nurse Practitioner - Family  May 20, 2024      Assessment & Plan     (Z00.00) Routine general medical examination at a health care facility  (primary encounter diagnosis)  Comment:   Plan: Comprehensive metabolic panel (BMP + Alb, Alk         Phos, ALT, AST, Total. Bili, TP)            (E78.5) Hyperlipidemia LDL goal <130  Comment:   Plan: Lipid panel reflex to direct LDL Fasting            (J45.40) Moderate persistent asthma without complication  Comment: stable  Plan: mometasone-formoterol (DULERA) 100-5 MCG/ACT         inhaler        She did use her 's Dulera and felt like it worked better, so will change from Flovent to Dulera.     (E03.9) Hypothyroidism, unspecified type  Comment: stable  Plan: TSH with free T4 reflex, levothyroxine         (SYNTHROID/LEVOTHROID) 125 MCG tablet,         DISCONTINUED: levothyroxine         (SYNTHROID/LEVOTHROID) 125 MCG tablet        The current medical regimen is effective;  continue present plan and medications.        (L65.9) Hair loss  Comment: working well  Plan: minoxidil (LONITEN) 2.5 MG tablet        The current medical regimen is effective;  continue present plan and medications.     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     (G47.00) Insomnia, unspecified type  Comment: stable  Plan: traZODone (DESYREL) 50 MG tablet        The current medical regimen is effective;  continue present plan and medications.     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             Counseling  Appropriate preventive services were discussed with this patient, including applicable screening as appropriate for fall prevention, nutrition, physical activity, Tobacco-use cessation, weight loss and cognition.  Checklist reviewing preventive services available has been given to the patient.  Reviewed patient's diet, addressing concerns and/or questions.   She is at risk for lack of exercise and has been provided with information to increase physical activity for the benefit of her well-being.   The patient was instructed to see the dentist every 6 months.           Nella Minor is a 58 year old, presenting for the following:  Physical        5/20/2024     3:19 PM   Additional Questions   Roomed by Fifi        Health Care Directive  Patient has a Health Care Directive on file  Advance care planning document is on file and is current.    HPI              5/13/2024   General Health   How would you rate your overall physical health? Good   Feel stress (tense, anxious, or unable to sleep) Patient declined         5/13/2024   Nutrition   Three or more servings of calcium each day? Yes   Diet: Gluten-free/reduced   How many servings of fruit and vegetables per day? (!) 0-1   How many sweetened beverages each day? 0-1         5/13/2024   Exercise   Days per week of moderate/strenous exercise 2 days   Average minutes spent exercising at this level 30 min   (!) EXERCISE CONCERN      5/13/2024   Social Factors   Frequency of gathering with friends or relatives Once a week   Worry food won't last until get money to buy more No   Food not last or not have enough money for food? No   Do you have housing?  Yes   Are you worried about losing your housing? No   Lack of transportation? No   Unable to get utilities (heat,electricity)? No         5/13/2024   Fall Risk   Fallen 2 or more times in the past year? No   Trouble with walking or balance? No          5/13/2024   Dental   Dentist two  times every year? (!) NO         2024   TB Screening   Were you born outside of the [place]? No       Today's PHQ-9 Score:       2024     4:00 PM   PHQ-9 SCORE   PHQ-9 Total Score MyChart 0   PHQ-9 Total Score 0         2024   Substance Use   Alcohol more than 3/day or more than 7/wk No   Do you use any other substances recreationally? (!) ALCOHOL    (!) PRESCRIPTION DRUGS    (!) INHALANTS     Social History     Tobacco Use    Smoking status: Former     Current packs/day: 0.00     Average packs/day: 0.5 packs/day for 38.1 years (19.1 ttl pk-yrs)     Types: Cigarettes     Start date: 1980     Quit date: 2019     Years since quittin.3    Smokeless tobacco: Never    Tobacco comments:     social smoker at times   Vaping Use    Vaping status: Never Used   Substance Use Topics    Alcohol use: Yes     Comment: on weekends if going out    Drug use: No                  2024   STI Screening   New sexual partner(s) since last STI/HIV test? No     History of abnormal Pap smear: No - age 30- 64 PAP with HPV every 5 years recommended        Latest Ref Rng & Units 4/10/2023     2:33 PM 2018    10:52 AM 2018     9:42 AM   PAP / HPV   PAP  Negative for Intraepithelial Lesion or Malignancy (NILM)      PAP (Historical)   NIL     HPV 16 DNA Negative Negative   Negative    HPV 18 DNA Negative Negative   Negative    Other HR HPV Negative Negative   Negative      ASCVD Risk   The 10-year ASCVD risk score (Paul CASANOVA, et al., 2019) is: 3.1%    Values used to calculate the score:      Age: 58 years      Sex: Female      Is Non- : No      Diabetic: No      Tobacco smoker: No      Systolic Blood Pressure: 120 mmHg      Is BP treated: No      HDL Cholesterol: 74 mg/dL      Total Cholesterol: 283 mg/dL           Reviewed and updated as needed this visit by Provider                             Objective    Exam  /79 (BP Location: Right arm, Patient Position: Sitting,  "Cuff Size: Adult Regular)   Pulse 79   Temp 98  F (36.7  C) (Temporal)   Resp 16   Ht 1.64 m (5' 4.57\")   Wt 65.3 kg (144 lb)   LMP 11/02/2014   SpO2 99%   BMI 24.29 kg/m     Estimated body mass index is 24.29 kg/m  as calculated from the following:    Height as of this encounter: 1.64 m (5' 4.57\").    Weight as of this encounter: 65.3 kg (144 lb).    Physical Exam  GENERAL: alert and no distress  EYES: Eyes grossly normal to inspection, PERRL and conjunctivae and sclerae normal  HENT: ear canals and TM's normal, nose and mouth without ulcers or lesions  NECK: no adenopathy, no asymmetry, masses, or scars  RESP: lungs clear to auscultation - no rales, rhonchi or wheezes  CV: regular rate and rhythm, normal S1 S2, no S3 or S4, no murmur, click or rub, no peripheral edema  ABDOMEN: soft, nontender, no hepatosplenomegaly, no masses and bowel sounds normal  MS: no gross musculoskeletal defects noted, no edema  SKIN: no suspicious lesions or rashes  NEURO: Normal strength and tone, mentation intact and speech normal  PSYCH: mentation appears normal, affect normal/bright        Signed Electronically by: Rachel Arauz NP    "

## 2024-05-22 LAB
ALBUMIN SERPL BCG-MCNC: 4.8 G/DL (ref 3.5–5.2)
ALP SERPL-CCNC: 63 U/L (ref 40–150)
ALT SERPL W P-5'-P-CCNC: 18 U/L (ref 0–50)
ANION GAP SERPL CALCULATED.3IONS-SCNC: 11 MMOL/L (ref 7–15)
AST SERPL W P-5'-P-CCNC: 24 U/L (ref 0–45)
BILIRUB SERPL-MCNC: 0.4 MG/DL
BUN SERPL-MCNC: 9.9 MG/DL (ref 8–23)
CALCIUM SERPL-MCNC: 9.7 MG/DL (ref 8.8–10.2)
CHLORIDE SERPL-SCNC: 100 MMOL/L (ref 98–107)
CHOLEST SERPL-MCNC: 180 MG/DL
CREAT SERPL-MCNC: 0.61 MG/DL (ref 0.51–0.95)
DEPRECATED HCO3 PLAS-SCNC: 27 MMOL/L (ref 22–29)
EGFRCR SERPLBLD CKD-EPI 2021: >90 ML/MIN/1.73M2
FASTING STATUS PATIENT QL REPORTED: YES
FASTING STATUS PATIENT QL REPORTED: YES
GLUCOSE SERPL-MCNC: 95 MG/DL (ref 70–99)
HDLC SERPL-MCNC: 71 MG/DL
LDLC SERPL CALC-MCNC: 97 MG/DL
NONHDLC SERPL-MCNC: 109 MG/DL
POTASSIUM SERPL-SCNC: 4.1 MMOL/L (ref 3.4–5.3)
PROT SERPL-MCNC: 7.5 G/DL (ref 6.4–8.3)
SODIUM SERPL-SCNC: 138 MMOL/L (ref 135–145)
TRIGL SERPL-MCNC: 59 MG/DL
TSH SERPL DL<=0.005 MIU/L-ACNC: 1.11 UIU/ML (ref 0.3–4.2)

## 2024-06-10 DIAGNOSIS — E78.5 HYPERLIPIDEMIA LDL GOAL <130: ICD-10-CM

## 2024-06-10 RX ORDER — ATORVASTATIN CALCIUM 20 MG/1
20 TABLET, FILM COATED ORAL DAILY
Qty: 90 TABLET | Refills: 2 | Status: SHIPPED | OUTPATIENT
Start: 2024-06-10

## 2024-07-25 NOTE — H&P
No protocol for requested medication.    Medication: HYDROcodone-acetaminophen (NORCO)  MG per tablet   Last office visit date: 7/18/24  Pharmacy: [id number] - [place], [place] - [address]    Order pended, routed to clinician for review.     [facility], [place]    History and Physical  Hematology / Oncology     Date of Admission:  07/10/19   Date of Service (when I saw the patient): 07/10/19    Assessment & Plan   Kathy Lei is a 53 year old female with a history of invasive ductal breast cancer, last cycle of AC on 7/2/19, who presents with a neutropenic fever.      #Neutropenic fever   Initially seen in clinic on 7/8 for Tm 100.8, but was not neutropenic at the time. Presented back to clinic on 7/10 for follow-up with reported temperature of 99.9 after taking Ibuprofen and continuing to feel feverish. Now noted to be neutropenic with . No localizing symptoms other than some bladder discomfort, but UA was negative for infection on 7/8 and 7/10. CXR neg. Blood cultures taken. Received IVF and Cefepime in clinic. Of note, did receive Neulasta OnPro on 7/3.   - Continue Cefepime 2g q8hrs  - Follow blood cultures  - IVF NS @ 100 mL/hr     #Right breast cancer, ER/[place] positive, HER2 negative   Follows with Dr. Guzman. Clinical stage II, T3N0MX, invasive ductal breast cancer in the upper outer quadrant of right breast, multifocal, measuring 8.9 cm on MRI. ER positive and HER2 negative. She enrolled in I-SPY 2 and on durvalumab every 28 days, weekly Taxol, and olaparib arm. Started AC on 5/20/2019. S/p C4 on 7/2.   - Follow up with Dr. Guzman and Dr. Ramos on 7/16    #HTN  - Hold PTA Lisinopril 5 mg. Hydralazine prn if needed.    #Hypothyroidism  - Continue PTA Synthroid 112 mcg    #Depression  - Continue PTA Zyprexa 5 mg and Zoloft 100 mg daily     FEN  - NS @ 100 mL/hr  - Regular diet  - Replace lytes prn    PPx  - VTE: Lovenox 40 mg daily    Code status: Full code    Dispo: Admission for neutropenic fever.       Code Status   Full Code    Primary Care Physician   Rachel Arauz    Chief Complaint   Neutropenic fever    History is obtained from the patient and chart review    History of Present Illness    Kathy Lei is a 53 year old female with a history of invasive ductal breast cancer, last cycle of AC on 19, who presents with a neutropenic fever. Initially seen in clinic on  for Tm 100.8, but was not neutropenic at the time. Presented back to clinic on 7/10 for follow-up with reported temperature of 99.9 after taking Ibuprofen. Now noted to be neutropenic with . No localizing symptoms other than some bladder discomfort, but UA was negative for infection on . CXR neg. Blood cultures taken. Received IVF and Cefepime in clinic. Of note, did receive Neulasta OnPro on 7/3. Currently she is feeling well. She denies significant fatigue, abdominal pain, nausea, vomiting, diarrhea, constipation, cough, rhinorrhea, shortness of breath, or chest pain or leg edema. Her urinary discomfort has gone away - she attributes this to getting fluids.     Past Medical History    I have reviewed this patient's medical history and updated it with pertinent information if needed.   Past Medical History:   Diagnosis Date     Asthma      Depressive disorder, not elsewhere classified      Hypertension      Hypothyroidism      Hypothyroidism, unspecified hypothyroidism type      Malignant neoplasm of upper-outer quadrant of right breast in female, estrogen receptor positive (H) 2019     Mild intermittent asthma      Scoliosis (and kyphoscoliosis), idiopathic        Past Surgical History   I have reviewed this patient's surgical history and updated it with pertinent information if needed.  Past Surgical History:   Procedure Laterality Date     ABDOMEN SURGERY   c section     BACK SURGERY  scoliosis fusion      BIOPSY  breast     BREAST SURGERY  implants      C/SECTION, LOW TRANSVERSE      , Low Transverse     COSMETIC SURGERY  breast implants      INSERT PORT VASCULAR ACCESS Left 3/6/2019    Procedure: Single Lumen Chest Port Placement;  Surgeon: Jerome Dash PA-C;  Location:  [facility] OR     IR CHEST PORT PLACEMENT > 5 YRS OF AGE  3/6/2019     SURGICAL HISTORY OF -       spinal fusion- Lacey kim     SURGICAL HISTORY OF -       removal of right breast papilloma       Prior to Admission Medications   Cannot display prior to admission medications because the patient has not been admitted in this contact.     Allergies   Allergies   Allergen Reactions     Buspar [Buspirone] Nausea and Vomiting     Remeron [Mirtazapine]      Dizzy , cant function , balance       Social History   I have reviewed this patient's social history and updated it with pertinent information if needed. Kathy Lei  reports that she quit smoking about 8 years ago. Her smoking use included cigarettes. She started smoking about 38 years ago. She has a 15.00 pack-year smoking history. She has never used smokeless tobacco. She reports that she drinks alcohol. She reports that she does not use drugs.    Family History   I have reviewed this patient's family history and updated it with pertinent information if needed.   Family History   Problem Relation Age of Onset     Hypertension Mother      Thyroid Disease Mother         on meds     Psychotic Disorder Mother         anxiety     Cerebrovascular Disease Father         minor     Hypertension Father      Osteoporosis Maternal Grandmother      Asthma Maternal Grandfather      Thyroid Disease Sister         x2-on meds     Psychotic Disorder Sister         problems making decisions     Asthma Other      C.A.D. No family hx of      Diabetes No family hx of      Breast Cancer No family hx of      Cancer - colorectal No family hx of        Review of Systems   The 10 point Review of Systems is negative other than noted in the HPI or here.     Physical Exam                      Vital Signs with Ranges  Temp:  [98.4  F (36.9  C)-98.7  F (37.1  C)] 98.4  F (36.9  C)  Pulse:  [88-97] 88  Resp:  [14-16] 14  BP: (105-106)/(62-64) 106/64  SpO2:  [95 %-98 %] 98 %  0 lbs 0  oz    Constitutional: Pleasant woman seen walking around room and sitting in bed. No apparent distress, and appears stated age.  Eyes: Lids and lashes normal, sclera clear, conjunctiva normal.  ENT: Normocephalic, sinuses nontender on palpation, oral pharynx with moist mucus membranes, tonsils without erythema or exudates, gums normal and good dentition.   Respiratory: No increased work of breathing, good air exchange, clear to auscultation bilaterally, no crackles or wheezing.  Cardiovascular: Regular rate and rhythm, normal S1 and S2, and no murmur noted.  GI: No masses or scars. +BS. Soft. No tenderness on palpation.  Skin: No bruising or bleeding, no redness, warmth, or swelling, no rashes, no lesions, no jaundice.  Extremities: There is no redness, warmth, or swelling of the joints. No lower extremity edema. No cyanosis.  Neurologic: Awake, alert, oriented to name, place and time.    Vascular access: Port    Data   ROUTINE IP LABS (Last four results)  BMP  Recent Labs   Lab 07/10/19  0904 07/08/19  1133    136   POTASSIUM 4.0 3.8   CHLORIDE 103 104   BRYANT 8.7 8.8   CO2 26 27   BUN 8 10   CR 0.54 0.52   GLC 92 98     CBC  Recent Labs   Lab 07/10/19  0904 07/08/19  1133   WBC 1.0* 2.5*   RBC 3.50* 3.54*   HGB 10.6* 10.9*   HCT 32.1* 32.7*   MCV 92 92   MCH 30.3 30.8   MCHC 33.0 33.3   RDW 13.4 13.8    170     INRNo lab results found in last 7 days.

## 2024-09-04 DIAGNOSIS — M54.50 LUMBAR PAIN: ICD-10-CM

## 2024-09-04 DIAGNOSIS — M54.16 LUMBAR BACK PAIN WITH RADICULOPATHY AFFECTING LEFT LOWER EXTREMITY: Primary | ICD-10-CM

## 2024-09-09 ENCOUNTER — ANCILLARY PROCEDURE (OUTPATIENT)
Dept: INTERVENTIONAL RADIOLOGY/VASCULAR | Facility: CLINIC | Age: 59
End: 2024-09-09
Attending: ORTHOPAEDIC SURGERY
Payer: COMMERCIAL

## 2024-09-09 VITALS
OXYGEN SATURATION: 97 % | DIASTOLIC BLOOD PRESSURE: 81 MMHG | RESPIRATION RATE: 16 BRPM | HEART RATE: 94 BPM | SYSTOLIC BLOOD PRESSURE: 125 MMHG

## 2024-09-09 DIAGNOSIS — M54.50 LUMBAR PAIN: ICD-10-CM

## 2024-09-09 DIAGNOSIS — M54.16 LUMBAR BACK PAIN WITH RADICULOPATHY AFFECTING LEFT LOWER EXTREMITY: ICD-10-CM

## 2024-09-09 PROCEDURE — 64483 NJX AA&/STRD TFRM EPI L/S 1: CPT | Mod: LT | Performed by: RADIOLOGY

## 2024-09-09 RX ORDER — BUPIVACAINE HYDROCHLORIDE 5 MG/ML
10 INJECTION, SOLUTION EPIDURAL; INTRACAUDAL ONCE
Status: COMPLETED | OUTPATIENT
Start: 2024-09-09 | End: 2024-09-09

## 2024-09-09 RX ORDER — IOPAMIDOL 408 MG/ML
10 INJECTION, SOLUTION INTRATHECAL ONCE
Status: COMPLETED | OUTPATIENT
Start: 2024-09-09 | End: 2024-09-09

## 2024-09-09 RX ORDER — LIDOCAINE HYDROCHLORIDE 10 MG/ML
5 INJECTION, SOLUTION EPIDURAL; INFILTRATION; INTRACAUDAL; PERINEURAL ONCE
Status: COMPLETED | OUTPATIENT
Start: 2024-09-09 | End: 2024-09-09

## 2024-09-09 RX ORDER — METHYLPREDNISOLONE ACETATE 80 MG/ML
80 INJECTION, SUSPENSION INTRA-ARTICULAR; INTRALESIONAL; INTRAMUSCULAR; SOFT TISSUE ONCE
Status: COMPLETED | OUTPATIENT
Start: 2024-09-09 | End: 2024-09-09

## 2024-09-09 RX ADMIN — LIDOCAINE HYDROCHLORIDE 5 ML: 10 INJECTION, SOLUTION EPIDURAL; INFILTRATION; INTRACAUDAL; PERINEURAL at 08:35

## 2024-09-09 RX ADMIN — METHYLPREDNISOLONE ACETATE 80 MG: 80 INJECTION, SUSPENSION INTRA-ARTICULAR; INTRALESIONAL; INTRAMUSCULAR; SOFT TISSUE at 08:35

## 2024-09-09 RX ADMIN — BUPIVACAINE HYDROCHLORIDE 10 MG: 5 INJECTION, SOLUTION EPIDURAL; INTRACAUDAL at 08:35

## 2024-09-09 RX ADMIN — IOPAMIDOL 5 ML: 408 INJECTION, SOLUTION INTRATHECAL at 08:35

## 2024-09-09 NOTE — DISCHARGE INSTRUCTIONS
Date: 9/9/2024  Provider: April NELSON, neuroradiology department  Location: [facility], Imaging Department, [facility] Care Instructions after your Steroid Injection:  If you have new numbness down your legs after the injection, this may last up to 4-6 hours, but is expected go away.   Over the next 24 to 48 hours, pain at the injection site may increase before it gets better.  For the next 48 hours, use ice packs for 15 minutes, 3-4 times a day for pain relief.  If you have a bandage, remove it tomorrow morning.     Do not submerge injection site in water for 24 hours (no baths. pools, hot tubs).  Showers are OK.            Relax today. Return to your regular activity tomorrow.  Do not drive for 24 hours.         Medications  Restart blood thinning/anticoagulant medication tomorrow at your regular dose.  If you are restarting warfarin, discuss a follow up plan/labs with you anticoagulation clinic.  Continue to take all other medications as ordered by your provider.    Common side effects may include:  Increased blood sugar.  (If you are diabetic, watch your blood sugar closely. Call your doctor to help control your blood sugar)   Insomnia  Heartburn  Flushed face  Water retention  Increased appetite    Important Information:  The steroid should help reduce swelling and pain. This may take from 3-14 days.   Follow up with the provider that ordered this injection in 3-4 weeks. Let them know if the injection was effective.  No vaccines in the next 14 days. (Ex. COVID or FLU VACCINE)    Call your doctor or go to the Emergency Room for evaluation:  If you have NEW severe pain.  Fever greater than 100.5 degrees.  Loss of control of your legs

## 2024-11-03 ENCOUNTER — E-VISIT (OUTPATIENT)
Dept: FAMILY MEDICINE | Facility: CLINIC | Age: 59
End: 2024-11-03
Payer: COMMERCIAL

## 2024-11-03 DIAGNOSIS — J01.90 ACUTE BACTERIAL SINUSITIS: ICD-10-CM

## 2024-11-03 DIAGNOSIS — B96.89 ACUTE BACTERIAL SINUSITIS: ICD-10-CM

## 2024-11-03 PROCEDURE — 99421 OL DIG E/M SVC 5-10 MIN: CPT | Performed by: NURSE PRACTITIONER

## 2024-11-04 NOTE — PATIENT INSTRUCTIONS
Dear Kathy Lei    After reviewing your responses, I've been able to diagnose you with Acute bacterial sinusitis.      Based on your responses and diagnosis, I have prescribed   Orders Placed This Encounter   Medications     amoxicillin-clavulanate (AUGMENTIN) 875-125 MG tablet     Sig: Take 1 tablet by mouth 2 times daily for 7 days.     Dispense:  14 tablet     Refill:  0      to treat your symptoms. I have sent this to your pharmacy.?     It is also important to stay well hydrated, get lots of rest and take over-the-counter decongestants,?tylenol?or ibuprofen if you?are able to?take those medications per your primary care provider to help relieve discomfort.?     It is important that you take?all of?your prescribed medication even if your symptoms are improving after a few doses.? Taking?all of?your medicine helps prevent the symptoms from returning.?     If your symptoms worsen, you develop severe headache, vomiting, high fever (>102), or are not improving in 7 days, please contact your primary care provider for an appointment or visit any of our convenient Walk-in Care or Urgent Care Centers to be seen which can be found on our website?here.?     Thanks again for choosing?us?as your health care partner,?   ?  Rachel Arauz NP?

## 2024-11-07 ENCOUNTER — MYC MEDICAL ADVICE (OUTPATIENT)
Dept: FAMILY MEDICINE | Facility: CLINIC | Age: 59
End: 2024-11-07
Payer: COMMERCIAL

## 2024-11-07 DIAGNOSIS — J45.41 MODERATE PERSISTENT ASTHMA WITH EXACERBATION: ICD-10-CM

## 2024-11-08 NOTE — TELEPHONE ENCOUNTER
Pt message:    Gilberto Hopkins and Staff,  I forgot to ask for a round of prednisone on my evisit.  I requested a refill from [employer] and it said they need to get the permission from you.     Thank you, hope that's ok. I don't want to get any worse with my breathing. I'm taking my Amoxicillin like you said.     Elena AMBROCIO BSN, PHN, RN-[facility]  [phone]

## 2024-11-10 RX ORDER — PREDNISONE 20 MG/1
TABLET ORAL
Qty: 20 TABLET | Refills: 1 | Status: SHIPPED | OUTPATIENT
Start: 2024-11-10

## 2024-11-11 NOTE — TELEPHONE ENCOUNTER
Writer replied to patient via deltamethodhart.  AG FrederickN, RN (she/her)  [facility] RN

## 2024-11-26 ENCOUNTER — MYC MEDICAL ADVICE (OUTPATIENT)
Dept: FAMILY MEDICINE | Facility: CLINIC | Age: 59
End: 2024-11-26
Payer: COMMERCIAL

## 2024-11-26 DIAGNOSIS — C50.411 MALIGNANT NEOPLASM OF UPPER-OUTER QUADRANT OF RIGHT BREAST IN FEMALE, ESTROGEN RECEPTOR POSITIVE (H): ICD-10-CM

## 2024-11-26 DIAGNOSIS — Z17.0 MALIGNANT NEOPLASM OF UPPER-OUTER QUADRANT OF RIGHT BREAST IN FEMALE, ESTROGEN RECEPTOR POSITIVE (H): ICD-10-CM

## 2024-11-26 NOTE — TELEPHONE ENCOUNTER
Medication:letrozole  Last prescribing provider:Dr. Guzman  Last clinic visit date: 12/26/2023Dr. Thomas  Recommendations for requested medication:copied and pasted fro last provider visit    Any other pertinent information:routed to Dr. Guzman.

## 2024-11-27 NOTE — TELEPHONE ENCOUNTER
Relayed POC to patient via TreSensah[employer].    AG FrederickN, RN (she/her)  [facility] RN

## 2024-12-02 RX ORDER — LETROZOLE 2.5 MG/1
2.5 TABLET, FILM COATED ORAL DAILY
Qty: 90 TABLET | Refills: 3 | Status: SHIPPED | OUTPATIENT
Start: 2024-12-02

## 2024-12-13 ENCOUNTER — TRANSFERRED RECORDS (OUTPATIENT)
Dept: HEALTH INFORMATION MANAGEMENT | Facility: CLINIC | Age: 59
End: 2024-12-13
Payer: COMMERCIAL

## 2025-01-02 DIAGNOSIS — J01.90 ACUTE BACTERIAL SINUSITIS: ICD-10-CM

## 2025-01-02 DIAGNOSIS — B96.89 ACUTE BACTERIAL SINUSITIS: ICD-10-CM

## 2025-01-20 NOTE — TELEPHONE ENCOUNTER
LOV 6/10/24 - Dr. Weeks - spoke with [facility] and they state they are able to fill this for patient and are getting a paid claim for both a loading and maintenance dose.  Pt's last injection was in May of 2024.  Pt states he isn't flaring on his scalp but is starting to flare a little in his groin area.  Should pt start again with a loading dose or just maintenance?  Both pended in case.  Thank you.   Writer responded via fg microtec.    AG MenchacaN, RN  [facility]

## 2025-01-23 ENCOUNTER — TRANSFERRED RECORDS (OUTPATIENT)
Dept: HEALTH INFORMATION MANAGEMENT | Facility: CLINIC | Age: 60
End: 2025-01-23
Payer: COMMERCIAL

## 2025-03-03 DIAGNOSIS — F41.9 ANXIETY: ICD-10-CM

## 2025-03-04 RX ORDER — LORAZEPAM 0.5 MG/1
TABLET ORAL
Qty: 30 TABLET | Refills: 5 | Status: SHIPPED | OUTPATIENT
Start: 2025-03-04

## 2025-03-17 ENCOUNTER — MYC MEDICAL ADVICE (OUTPATIENT)
Dept: FAMILY MEDICINE | Facility: CLINIC | Age: 60
End: 2025-03-17
Payer: COMMERCIAL

## 2025-03-17 DIAGNOSIS — L98.8 AGE-RELATED FACIAL WRINKLES: Primary | ICD-10-CM

## 2025-03-17 RX ORDER — TRETINOIN 0.25 MG/G
CREAM TOPICAL AT BEDTIME
Qty: 45 G | Refills: 1 | Status: CANCELLED | OUTPATIENT
Start: 2025-03-17

## 2025-03-17 RX ORDER — TRETINOIN 0.25 MG/G
CREAM TOPICAL AT BEDTIME
Qty: 45 G | Refills: 1 | Status: SHIPPED | OUTPATIENT
Start: 2025-03-17

## 2025-03-17 NOTE — TELEPHONE ENCOUNTER
"Kellie -- do you want a visit?    \"My sister and a friend are using Tretinoin Creme .025% for sun damage, brown spots etc on face. It's working on them!   Can I please get a prescription sent to [facility]? So so appreciate, I need this   Sincerely Mily Lei   See you for a physical in [place] :-D\"    Dania Garcia RN  [facility]    "

## 2025-03-18 NOTE — TELEPHONE ENCOUNTER
Writer replied to patient via [employer].  RAINA Rich BSN, PHN, AMB-BC (she/her)  [facility] RN

## 2025-04-14 DIAGNOSIS — E78.5 HYPERLIPIDEMIA LDL GOAL <130: ICD-10-CM

## 2025-04-14 RX ORDER — ATORVASTATIN CALCIUM 20 MG/1
20 TABLET, FILM COATED ORAL DAILY
Qty: 90 TABLET | Refills: 0 | Status: SHIPPED | OUTPATIENT
Start: 2025-04-14

## 2025-05-17 DIAGNOSIS — B96.89 ACUTE BACTERIAL SINUSITIS: ICD-10-CM

## 2025-05-17 DIAGNOSIS — J01.90 ACUTE BACTERIAL SINUSITIS: ICD-10-CM

## 2025-06-04 SDOH — HEALTH STABILITY: PHYSICAL HEALTH: ON AVERAGE, HOW MANY MINUTES DO YOU ENGAGE IN EXERCISE AT THIS LEVEL?: 0 MIN

## 2025-06-04 SDOH — HEALTH STABILITY: PHYSICAL HEALTH: ON AVERAGE, HOW MANY DAYS PER WEEK DO YOU ENGAGE IN MODERATE TO STRENUOUS EXERCISE (LIKE A BRISK WALK)?: 0 DAYS

## 2025-06-04 ASSESSMENT — ASTHMA QUESTIONNAIRES
QUESTION_2 LAST FOUR WEEKS HOW OFTEN HAVE YOU HAD SHORTNESS OF BREATH: ONCE A DAY
QUESTION_5 LAST FOUR WEEKS HOW WOULD YOU RATE YOUR ASTHMA CONTROL: SOMEWHAT CONTROLLED
ACT_TOTALSCORE: 17
QUESTION_3 LAST FOUR WEEKS HOW OFTEN DID YOUR ASTHMA SYMPTOMS (WHEEZING, COUGHING, SHORTNESS OF BREATH, CHEST TIGHTNESS OR PAIN) WAKE YOU UP AT NIGHT OR EARLIER THAN USUAL IN THE MORNING: NOT AT ALL
QUESTION_1 LAST FOUR WEEKS HOW MUCH OF THE TIME DID YOUR ASTHMA KEEP YOU FROM GETTING AS MUCH DONE AT WORK, SCHOOL OR AT HOME: NONE OF THE TIME
QUESTION_4 LAST FOUR WEEKS HOW OFTEN HAVE YOU USED YOUR RESCUE INHALER OR NEBULIZER MEDICATION (SUCH AS ALBUTEROL): ONE OR TWO TIMES PER DAY

## 2025-06-04 ASSESSMENT — SOCIAL DETERMINANTS OF HEALTH (SDOH): HOW OFTEN DO YOU GET TOGETHER WITH FRIENDS OR RELATIVES?: TWICE A WEEK

## 2025-06-09 ENCOUNTER — OFFICE VISIT (OUTPATIENT)
Dept: FAMILY MEDICINE | Facility: CLINIC | Age: 60
End: 2025-06-09
Attending: NURSE PRACTITIONER
Payer: COMMERCIAL

## 2025-06-09 VITALS
SYSTOLIC BLOOD PRESSURE: 123 MMHG | HEART RATE: 92 BPM | TEMPERATURE: 97.9 F | OXYGEN SATURATION: 97 % | RESPIRATION RATE: 16 BRPM | WEIGHT: 146.6 LBS | HEIGHT: 65 IN | BODY MASS INDEX: 24.43 KG/M2 | DIASTOLIC BLOOD PRESSURE: 75 MMHG

## 2025-06-09 DIAGNOSIS — E78.5 HYPERLIPIDEMIA LDL GOAL <130: ICD-10-CM

## 2025-06-09 DIAGNOSIS — L65.9 HAIR LOSS: ICD-10-CM

## 2025-06-09 DIAGNOSIS — Z87.891 HISTORY OF TOBACCO USE, PRESENTING HAZARDS TO HEALTH: ICD-10-CM

## 2025-06-09 DIAGNOSIS — R73.09 ELEVATED GLUCOSE: ICD-10-CM

## 2025-06-09 DIAGNOSIS — E03.9 HYPOTHYROIDISM, UNSPECIFIED TYPE: ICD-10-CM

## 2025-06-09 DIAGNOSIS — Z13.6 SCREENING FOR CARDIOVASCULAR CONDITION: ICD-10-CM

## 2025-06-09 DIAGNOSIS — J45.40 MODERATE PERSISTENT ASTHMA WITHOUT COMPLICATION: ICD-10-CM

## 2025-06-09 DIAGNOSIS — G47.00 INSOMNIA, UNSPECIFIED TYPE: ICD-10-CM

## 2025-06-09 DIAGNOSIS — Z78.0 ASYMPTOMATIC MENOPAUSAL STATE: ICD-10-CM

## 2025-06-09 DIAGNOSIS — F33.0 MILD RECURRENT MAJOR DEPRESSION: ICD-10-CM

## 2025-06-09 DIAGNOSIS — I10 BENIGN ESSENTIAL HYPERTENSION: ICD-10-CM

## 2025-06-09 DIAGNOSIS — Z17.0 MALIGNANT NEOPLASM OF UPPER-OUTER QUADRANT OF RIGHT BREAST IN FEMALE, ESTROGEN RECEPTOR POSITIVE (H): ICD-10-CM

## 2025-06-09 DIAGNOSIS — C50.411 MALIGNANT NEOPLASM OF UPPER-OUTER QUADRANT OF RIGHT BREAST IN FEMALE, ESTROGEN RECEPTOR POSITIVE (H): ICD-10-CM

## 2025-06-09 DIAGNOSIS — Z00.00 ROUTINE GENERAL MEDICAL EXAMINATION AT A HEALTH CARE FACILITY: Primary | ICD-10-CM

## 2025-06-09 LAB
ANION GAP SERPL CALCULATED.3IONS-SCNC: 13 MMOL/L (ref 7–15)
BUN SERPL-MCNC: 8.5 MG/DL (ref 8–23)
CALCIUM SERPL-MCNC: 9.7 MG/DL (ref 8.8–10.4)
CHLORIDE SERPL-SCNC: 101 MMOL/L (ref 98–107)
CHOLEST SERPL-MCNC: 268 MG/DL
CREAT SERPL-MCNC: 0.68 MG/DL (ref 0.51–0.95)
EGFRCR SERPLBLD CKD-EPI 2021: >90 ML/MIN/1.73M2
EST. AVERAGE GLUCOSE BLD GHB EST-MCNC: 114 MG/DL
FASTING STATUS PATIENT QL REPORTED: YES
FASTING STATUS PATIENT QL REPORTED: YES
GLUCOSE SERPL-MCNC: 99 MG/DL (ref 70–99)
HBA1C MFR BLD: 5.6 % (ref 0–5.6)
HCO3 SERPL-SCNC: 27 MMOL/L (ref 22–29)
HDLC SERPL-MCNC: 71 MG/DL
LDLC SERPL CALC-MCNC: 175 MG/DL
NONHDLC SERPL-MCNC: 197 MG/DL
POTASSIUM SERPL-SCNC: 3.7 MMOL/L (ref 3.4–5.3)
SODIUM SERPL-SCNC: 141 MMOL/L (ref 135–145)
TRIGL SERPL-MCNC: 109 MG/DL
TSH SERPL DL<=0.005 MIU/L-ACNC: 3.65 UIU/ML (ref 0.3–4.2)

## 2025-06-09 PROCEDURE — 3044F HG A1C LEVEL LT 7.0%: CPT | Performed by: NURSE PRACTITIONER

## 2025-06-09 PROCEDURE — 80061 LIPID PANEL: CPT | Performed by: NURSE PRACTITIONER

## 2025-06-09 PROCEDURE — 96127 BRIEF EMOTIONAL/BEHAV ASSMT: CPT | Performed by: NURSE PRACTITIONER

## 2025-06-09 PROCEDURE — 3078F DIAST BP <80 MM HG: CPT | Performed by: NURSE PRACTITIONER

## 2025-06-09 PROCEDURE — G2211 COMPLEX E/M VISIT ADD ON: HCPCS | Performed by: NURSE PRACTITIONER

## 2025-06-09 PROCEDURE — 83036 HEMOGLOBIN GLYCOSYLATED A1C: CPT | Performed by: NURSE PRACTITIONER

## 2025-06-09 PROCEDURE — G0296 VISIT TO DETERM LDCT ELIG: HCPCS | Performed by: NURSE PRACTITIONER

## 2025-06-09 PROCEDURE — 99396 PREV VISIT EST AGE 40-64: CPT | Performed by: NURSE PRACTITIONER

## 2025-06-09 PROCEDURE — 36415 COLL VENOUS BLD VENIPUNCTURE: CPT | Performed by: NURSE PRACTITIONER

## 2025-06-09 PROCEDURE — 80048 BASIC METABOLIC PNL TOTAL CA: CPT | Performed by: NURSE PRACTITIONER

## 2025-06-09 PROCEDURE — 3074F SYST BP LT 130 MM HG: CPT | Performed by: NURSE PRACTITIONER

## 2025-06-09 PROCEDURE — 1126F AMNT PAIN NOTED NONE PRSNT: CPT | Performed by: NURSE PRACTITIONER

## 2025-06-09 PROCEDURE — 99214 OFFICE O/P EST MOD 30 MIN: CPT | Mod: 25 | Performed by: NURSE PRACTITIONER

## 2025-06-09 PROCEDURE — 84443 ASSAY THYROID STIM HORMONE: CPT | Performed by: NURSE PRACTITIONER

## 2025-06-09 RX ORDER — LISINOPRIL 5 MG/1
5 TABLET ORAL DAILY
Qty: 90 TABLET | Refills: 3 | Status: SHIPPED | OUTPATIENT
Start: 2025-06-09

## 2025-06-09 RX ORDER — TRAZODONE HYDROCHLORIDE 50 MG/1
50-100 TABLET ORAL
Qty: 90 TABLET | Refills: 3 | Status: SHIPPED | OUTPATIENT
Start: 2025-06-09

## 2025-06-09 RX ORDER — SERTRALINE HYDROCHLORIDE 100 MG/1
100 TABLET, FILM COATED ORAL AT BEDTIME
Qty: 90 TABLET | Refills: 3 | Status: SHIPPED | OUTPATIENT
Start: 2025-06-09

## 2025-06-09 RX ORDER — LEVOTHYROXINE SODIUM 125 UG/1
TABLET ORAL
Qty: 90 TABLET | Refills: 3 | Status: SHIPPED | OUTPATIENT
Start: 2025-06-09

## 2025-06-09 RX ORDER — MONTELUKAST SODIUM 10 MG/1
1 TABLET ORAL AT BEDTIME
Qty: 90 TABLET | Refills: 3 | Status: SHIPPED | OUTPATIENT
Start: 2025-06-09

## 2025-06-09 RX ORDER — MINOXIDIL 2.5 MG/1
TABLET ORAL
Qty: 90 TABLET | Refills: 3 | Status: SHIPPED | OUTPATIENT
Start: 2025-06-09

## 2025-06-09 ASSESSMENT — PAIN SCALES - GENERAL: PAINLEVEL_OUTOF10: NO PAIN (0)

## 2025-06-09 NOTE — PROGRESS NOTES
Preventive Care Visit  [facility]  Rachel Arauz, NP, Nurse Practitioner - Family  Jun 9, 2025      Assessment & Plan     (Z00.00) Routine general medical examination at a health care facility  (primary encounter diagnosis)  Comment:   Plan:     (Z87.891) History of tobacco use, presenting hazards to health  Comment:   Plan: CT Chest Lung Cancer Screen Low Dose Without            (I10) Benign essential hypertension  Comment: at goal  Plan: BASIC METABOLIC PANEL, lisinopril (ZESTRIL) 5         MG tablet        The current medical regimen is effective;  continue present plan and medications.     (Z13.6) Screening for cardiovascular condition  Comment:   Plan:     (E78.5) Hyperlipidemia LDL goal <130  Comment:   Plan: Lipid panel reflex to direct LDL Fasting            (R73.09) Elevated glucose  Comment:   Plan: Hemoglobin A1c            (L65.9) Hair loss  Comment: improving  Plan: minoxidil (LONITEN) 2.5 MG tablet        She would like to increase to a whole tablet (her sister is taking this dose and her hair is thicker).      (E03.9) Hypothyroidism, unspecified type  Comment: stable  Plan: TSH WITH FREE T4 REFLEX, levothyroxine         (SYNTHROID/LEVOTHROID) 125 MCG tablet        The current medical regimen is effective;  continue present plan and medications.     (J45.40) Moderate persistent asthma without complication  Comment:   Plan: mometasone-formoterol (DULERA) 100-5 MCG/ACT         inhaler, montelukast (SINGULAIR) 10 MG tablet        She has not been consistent with taking her Dulera.  Will take it regularly and will repeat ACT in one month.     (F33.0) Mild recurrent major depression  Comment: stable  Plan: sertraline (ZOLOFT) 100 MG tablet        The current medical regimen is effective;  continue present plan and medications.     (G47.00) Insomnia, unspecified type  Comment: stable  Plan: traZODone (DESYREL) 50 MG tablet        The current medical regimen is effective;   continue present plan and medications.     (Z78.0) Asymptomatic menopausal state  Comment:   Plan: DX Bone Density            (C50.411,  Z17.0) Malignant neoplasm of upper-outer quadrant of right breast in female, estrogen receptor positive (H)  Comment: CARLOS  Plan:     The longitudinal plan of care for the diagnosis(es)/condition(s) as documented were addressed during this visit. Due to the added complexity in care, I will continue to support Mily in the subsequent management and with ongoing continuity of care.            Counseling  Appropriate preventive services were addressed with this patient via screening, questionnaire, or discussion as appropriate for fall prevention, nutrition, physical activity, Tobacco-use cessation, social engagement, weight loss and cognition.  Checklist reviewing preventive services available has been given to the patient.  Reviewed patient's diet, addressing concerns and/or questions.   The patient was instructed to see the dentist every 6 months.           Nella Minor is a 59 year old, presenting for the following:  Annual Visit and Recheck Medication (Minoxidil - interested in taking 1 whole tablet)        6/9/2025     1:39 PM   Additional Questions   Roomed by Susie LOCKWOOD           Advance Care Planning    Document on file is a Health Care Directive or POLST.        6/4/2025   General Health   How would you rate your overall physical health? (!) FAIR   Feel stress (tense, anxious, or unable to sleep) Only a little   (!) STRESS CONCERN      6/4/2025   Nutrition   Three or more servings of calcium each day? Yes   Diet: Gluten-free/reduced   How many servings of fruit and vegetables per day? (!) 2-3   How many sweetened beverages each day? 0-1         6/4/2025   Exercise   Days per week of moderate/strenous exercise 0 days   Average minutes spent exercising at this level 0 min   (!) EXERCISE CONCERN      6/4/2025   Social Factors   Frequency of gathering with  friends or relatives Twice a week   Worry food won't last until get money to buy more No   Food not last or not have enough money for food? No   Do you have housing? (Housing is defined as stable permanent housing and does not include staying outside in a car, in a tent, in an abandoned building, in an overnight shelter, or couch-surfing.) Yes   Are you worried about losing your housing? No   Lack of transportation? No   Unable to get utilities (heat,electricity)? No         2025   Fall Risk   Gait Speed Test (Document in seconds) 2.78   Gait Speed Test Interpretation Less than or equal to 5.00 seconds - PASS          2025   Dental   Dentist two times every year? (!) NO       Today's PHQ-9 Score:       2025     1:29 PM   PHQ-9 SCORE   PHQ-9 Total Score MyChart 0   PHQ-9 Total Score 0        Patient-reported         2025   Substance Use   Alcohol more than 3/day or more than 7/wk No   Do you use any other substances recreationally? No    (!) PRESCRIPTION DRUGS    (!) OTHER       Multiple values from one day are sorted in reverse-chronological order     Social History     Tobacco Use    Smoking status: Former     Current packs/day: 0.00     Average packs/day: 0.5 packs/day for 38.1 years (19.1 ttl pk-yrs)     Types: Cigarettes     Start date: 1980     Quit date: 2019     Years since quittin.4    Smokeless tobacco: Never    Tobacco comments:     social smoker at times   Vaping Use    Vaping status: Never Used   Substance Use Topics    Alcohol use: Yes     Comment: on weekends if going out    Drug use: No                  2025   STI Screening   New sexual partner(s) since last STI/HIV test? No     History of abnormal Pap smear: No - age 30- 64 PAP with HPV every 5 years recommended        Latest Ref Rng & Units 4/10/2023     2:33 PM 2018    10:52 AM 2018     9:42 AM   PAP / HPV   PAP  Negative for Intraepithelial Lesion or Malignancy (NILM)      PAP (Historical)   NIL     HPV  "16 DNA Negative Negative   Negative    HPV 18 DNA Negative Negative   Negative    Other HR HPV Negative Negative   Negative      ASCVD Risk   The 10-year ASCVD risk score (Paul CASANOVA, et al., 2019) is: 3.6%    Values used to calculate the score:      Age: 59 years      Sex: Female      Is Non- : No      Diabetic: No      Tobacco smoker: No      Systolic Blood Pressure: 123 mmHg      Is BP treated: Yes      HDL Cholesterol: 71 mg/dL      Total Cholesterol: 180 mg/dL           Reviewed and updated as needed this visit by Provider                             Objective    Exam  /75 (BP Location: Right arm, Patient Position: Sitting, Cuff Size: Adult Regular)   Pulse 92   Temp 97.9  F (36.6  C) (Temporal)   Resp 16   Ht 1.638 m (5' 4.5\")   Wt 66.5 kg (146 lb 9.6 oz)   LMP 11/02/2014   SpO2 97%   BMI 24.78 kg/m     Estimated body mass index is 24.78 kg/m  as calculated from the following:    Height as of this encounter: 1.638 m (5' 4.5\").    Weight as of this encounter: 66.5 kg (146 lb 9.6 oz).    Physical Exam  GENERAL: alert and no distress  EYES: Eyes grossly normal to inspection, PERRL and conjunctivae and sclerae normal  HENT: ear canals and TM's normal, nose and mouth without ulcers or lesions  NECK: no adenopathy, no asymmetry, masses, or scars  RESP: lungs clear to auscultation - no rales, rhonchi or wheezes  BREAST: normal without masses, tenderness or nipple discharge and no palpable axillary masses or adenopathy  CV: regular rate and rhythm, normal S1 S2, no S3 or S4, no murmur, click or rub, no peripheral edema  ABDOMEN: soft, nontender, no hepatosplenomegaly, no masses and bowel sounds normal  MS: no gross musculoskeletal defects noted, no edema  SKIN: no suspicious lesions or rashes  NEURO: Normal strength and tone, mentation intact and speech normal  PSYCH: mentation appears normal, affect normal/bright        Signed Electronically by: Rachel Arauz, " NP    Answers submitted by the patient for this visit:  Patient Health Questionnaire (Submitted on 6/9/2025)  If you checked off any problems, how difficult have these problems made it for you to do your work, take care of things at home, or get along with other people?: Not difficult at all  PHQ9 TOTAL SCORE: 0

## 2025-06-10 ENCOUNTER — MYC MEDICAL ADVICE (OUTPATIENT)
Dept: FAMILY MEDICINE | Facility: CLINIC | Age: 60
End: 2025-06-10
Payer: COMMERCIAL

## 2025-06-10 DIAGNOSIS — E78.5 HYPERLIPIDEMIA LDL GOAL <130: ICD-10-CM

## 2025-06-11 RX ORDER — ATORVASTATIN CALCIUM 20 MG/1
20 TABLET, FILM COATED ORAL DAILY
Qty: 90 TABLET | Refills: 3 | Status: SHIPPED | OUTPATIENT
Start: 2025-06-11

## 2025-06-11 NOTE — TELEPHONE ENCOUNTER
Writer responded via [employer].  Elena AMBROCIO, BSN, PHN, RN-[facility]  [phone]

## 2025-06-11 NOTE — TELEPHONE ENCOUNTER
Pended med from historical list.  Sending to PCP.  Betty, Triage RN  [facility]/ [phone]

## 2025-06-16 ENCOUNTER — RESULTS FOLLOW-UP (OUTPATIENT)
Dept: FAMILY MEDICINE | Facility: CLINIC | Age: 60
End: 2025-06-16
Payer: COMMERCIAL

## 2025-07-14 ENCOUNTER — ANCILLARY PROCEDURE (OUTPATIENT)
Dept: CT IMAGING | Facility: CLINIC | Age: 60
End: 2025-07-14
Attending: NURSE PRACTITIONER
Payer: COMMERCIAL

## 2025-07-14 DIAGNOSIS — Z87.891 HISTORY OF TOBACCO USE, PRESENTING HAZARDS TO HEALTH: ICD-10-CM

## 2025-07-14 PROCEDURE — 71271 CT THORAX LUNG CANCER SCR C-: CPT | Performed by: RADIOLOGY

## 2025-08-11 DIAGNOSIS — J45.41 MODERATE PERSISTENT ASTHMA WITH EXACERBATION: ICD-10-CM

## 2025-08-11 RX ORDER — PREDNISONE 20 MG/1
TABLET ORAL
Qty: 20 TABLET | Refills: 1 | Status: SHIPPED | OUTPATIENT
Start: 2025-08-11

## (undated) DEVICE — SYR 10ML FINGER CONTROL W/O NDL 309695

## (undated) DEVICE — NDL ANGIOCATH 24GA 0.75" 4053

## (undated) DEVICE — TUBING ASPIRATING BYRON 3/8"X12' PT-5558

## (undated) DEVICE — ESU FCP BIPOLAR NONSTICK STR 4"X0.4MM W/CORD 19-3002AU

## (undated) DEVICE — DRAPE U SPLIT 74X120" 29440

## (undated) DEVICE — CLIP HORIZON MED BLUE 002200

## (undated) DEVICE — SU ETHILON 9-0 BV100-4 5" 2829G

## (undated) DEVICE — SYR 10ML LL W/O NDL

## (undated) DEVICE — LIGHT HANDLE X1 31140133

## (undated) DEVICE — DRSG PRIMAPORE 02X3" 7133

## (undated) DEVICE — NDL 30GA 0.5" 305106

## (undated) DEVICE — SPONGE LAP 18X18" X8435

## (undated) DEVICE — STPL SKIN 35W ROTATING HEAD PRW35

## (undated) DEVICE — PITCHER STERILE 1000ML  SSK9004A

## (undated) DEVICE — CLOSURE SYS SKIN PREMIERPRO EXOFINFUSION 4X60CM 3473

## (undated) DEVICE — PREP CHLORAPREP 26ML TINTED HI-LITE ORANGE 930815

## (undated) DEVICE — GLOVE LINER HALF FINGER NYLON KP5015/50

## (undated) DEVICE — SU MONOCRYL 4-0 PS-2 27" UND Y426H

## (undated) DEVICE — SPECIMEN TRAP MUCOUS 40ML LUKI C30200A

## (undated) DEVICE — KIT INTRODUCER FLUENT MICRO 5FRX10CM ECHO TIP KIT-038-04

## (undated) DEVICE — DRAPE SPLIT SHEET 77X108 REINFORCED 29436

## (undated) DEVICE — LINEN TOWEL PACK X30 5481

## (undated) DEVICE — SUCTION MANIFOLD NEPTUNE 2 SYS 4 PORT 0702-020-000

## (undated) DEVICE — GLOVE PROTEXIS POWDER FREE SMT 7.5  2D72PT75X

## (undated) DEVICE — SUCTION TIP YANKAUER STR K87

## (undated) DEVICE — Device

## (undated) DEVICE — SU MONOCRYL 2-0 SH 27" UND Y417H

## (undated) DEVICE — TEST TUBE W/SCREW CAP 17361

## (undated) DEVICE — GLOVE PROTEXIS MICRO 5.5  2D73PM55

## (undated) DEVICE — GLOVE PROTEXIS W/NEU-THERA 7.0  2D73TE70

## (undated) DEVICE — DRAIN JACKSON PRATT CHANNEL 15FR ROUND HUBLESS SIL JP-2228

## (undated) DEVICE — PREP CHLORAPREP 26ML TINTED ORANGE  260815

## (undated) DEVICE — RETR ELASTIC STAYS LONE STAR SHARP 5MM 8/PACK 3311-8G

## (undated) DEVICE — SUCTION MANIFOLD DORNOCH ULTRA CART UL-CL500

## (undated) DEVICE — TUBING SUCTION 12"X1/4" N612

## (undated) DEVICE — CONNECTOR MALE TO MALE LL

## (undated) DEVICE — DRSG TEGADERM 4X4 3/4" 1626W

## (undated) DEVICE — SU STRATAFIX MONOCRYL 3-0 SPIRAL PS-2 45CM SXMP1B107

## (undated) DEVICE — COLLECTION DEVICE SYSTEM TISSUE REVOLVE RV0001 4 PACK RV0004

## (undated) DEVICE — BLADE KNIFE SURG 10 371110

## (undated) DEVICE — DRAIN JACKSON PRATT RESERVOIR 100ML SU130-1305

## (undated) DEVICE — DRSG ABDOMINAL 07 1/2X8" 7197D

## (undated) DEVICE — CLOSURE SYS SKIN PREMIERPRO EXOFIN FUSION 4X22CM STRL 3472

## (undated) DEVICE — SU PDS II 0 CTX 36" Z370T

## (undated) DEVICE — SU MONOCRYL 3-0 PS-1 27" Y936H

## (undated) DEVICE — CUP AND LID 2PK 2OZ STERILE  SSK9006A

## (undated) DEVICE — ESU PENCIL SMOKE EVAC W/ROCKER SWITCH 0703-047-000

## (undated) DEVICE — SUCTION TIP YANKAUER W/O VENT K86

## (undated) DEVICE — SYR 05ML LL W/O NDL

## (undated) DEVICE — BNDG ELASTIC 6" DBL LENGTH UNSTERILE 6611-16

## (undated) DEVICE — SPONGE RAY-TEC 4X8" 7318

## (undated) DEVICE — GLOVE PROTEXIS POWDER FREE SMT 6.5  2D72PT65X

## (undated) DEVICE — SU ETHILON 3-0 PS-1 18" 1663H

## (undated) DEVICE — LINEN TOWEL PACK X5 5464

## (undated) DEVICE — PACK MINOR CUSTOM ASC

## (undated) DEVICE — LABEL MEDICATION SYSTEM 3303-P

## (undated) DEVICE — PROTECTOR ARM ONE-STEP TRENDELENBURG 40418

## (undated) DEVICE — SU VICRYL 3-0 SH 27" J316H

## (undated) DEVICE — SYR BULB IRRIG 50ML LATEX FREE 0035280

## (undated) DEVICE — SYR 50ML LL W/O NDL 309653

## (undated) DEVICE — SU SILK 3-0 SH 30" K832H

## (undated) DEVICE — GLOVE PROTEXIS BLUE W/NEU-THERA 6.5  2D73EB65

## (undated) DEVICE — LINEN TOWEL PACK X6 WHITE 5487

## (undated) DEVICE — GOWN XLG DISP 9545

## (undated) DEVICE — SPONGE LAP 12X12" X8425

## (undated) DEVICE — CATH ANGIO MARINER KUMPE 4FRX40CM 11732704

## (undated) DEVICE — DRSG KERLIX 4 1/2"X4YDS ROLL 6715

## (undated) DEVICE — SU PROLENE 4-0 RB-1DA 36" 8557H

## (undated) DEVICE — SOL WATER IRRIG 1000ML BOTTLE 2F7114

## (undated) DEVICE — CLIP HORIZON SM RED WIDE SLOT 001201

## (undated) DEVICE — DRAPE ISOLATION BAG 1003

## (undated) DEVICE — NDL ANGIOCATH 22GA 1" 4050

## (undated) DEVICE — DRAPE IOBAN INCISE 23X17" 6650EZ

## (undated) DEVICE — DRAPE MICRO ZEISS PENTERO 120X54" G650DL

## (undated) DEVICE — ESU GROUND PAD ADULT W/CORD E7507

## (undated) DEVICE — PACK GOWN 3/PK DISP XL SBA32GPFCB

## (undated) DEVICE — BNDG ELASTIC 6"X5YDS UNSTERILE 6611-60

## (undated) DEVICE — SUCTION CARDIAC FRAZIER TIP 6FR STERILE 3" 10053

## (undated) DEVICE — NDL 18GA 1.5" 305196

## (undated) DEVICE — SYR 50ML CATH TIP W/O NDL 309620

## (undated) DEVICE — BNDG ABDOMINAL BINDER 15X46-62"  08140562

## (undated) DEVICE — SYR PISTON URETHRAL 60ML 68000

## (undated) DEVICE — WIPE INSTRUMENT MEROCEL 400200

## (undated) DEVICE — DRAPE SHEET MED 44X70" 9355

## (undated) DEVICE — PAD CHUX UNDERPAD 23X24" 7136

## (undated) DEVICE — SYR 03ML LL W/O NDL 309657

## (undated) DEVICE — BNDG ABDOMINAL BINDER 9X45-62" 79-89071

## (undated) DEVICE — GLOVE PROTEXIS POWDER FREE SMT 7.0  2D72PT70X

## (undated) DEVICE — SU ETHILON 8-0 BV130-4 5" 2815G

## (undated) DEVICE — BLADE KNIFE SURG 11 371111

## (undated) DEVICE — DECANTER BAG 2002S

## (undated) DEVICE — KNIFE HANDLE W/15 BLADE 371615

## (undated) DEVICE — ESU ELEC BLADE 2.75" COATED/INSULATED E1455

## (undated) DEVICE — SOL NACL 0.9% INJ 250ML BAG 2B1322Q

## (undated) DEVICE — BLADE KNIFE SURG 15 371115

## (undated) DEVICE — STPL SKIN SUBCUTICULAR INSORB  2030

## (undated) DEVICE — SU DERMABOND ADVANCED .7ML DNX12

## (undated) DEVICE — CLIP GEM MICRO GEM2431

## (undated) DEVICE — DRSG GAUZE 2X2" 8042

## (undated) DEVICE — DRSG KERLIX 4 1/2"X4YDS ROLL 6730

## (undated) DEVICE — GOWN LG DISP 9515

## (undated) DEVICE — SU ETHILON 2-0 FS 18" 664H

## (undated) DEVICE — ADH SKIN CLOSURE PREMIERPRO EXOFIN 1.0ML 3470

## (undated) DEVICE — SOL NACL 0.9% IRRIG 1000ML BOTTLE 2F7124

## (undated) DEVICE — RETR BLADE LONE STAR 20MM SPIRA 3 FINGER 3335-4G

## (undated) DEVICE — SOL NACL 0.9% IRRIG 500ML BOTTLE 2F7123

## (undated) DEVICE — SUCTION SLEEVE NEPTUNE 2 125MM 0703-005-125

## (undated) DEVICE — ESU HOLSTER PLASTIC DISP E2400

## (undated) DEVICE — NDL 18GAX1.5" 305185

## (undated) DEVICE — SU MONOCRYL 4-0 P-3 18" UND Y494G

## (undated) DEVICE — CATH TRAY FOLEY SURESTEP 16FR W/URNE MTR STLK LATEX A303316A

## (undated) DEVICE — STRAP UNIVERSAL POSITIONING 2-PIECE 4X47X76" 91-287

## (undated) DEVICE — DRAPE FLUID WARMING 52"X66" ORS-301

## (undated) DEVICE — RETR ELASTIC STAYS LONE STAR BLUNT DUAL LEAD 3550-1G

## (undated) DEVICE — GLOVE PROTEXIS BLUE W/NEU-THERA 6.0  2D73EB60

## (undated) DEVICE — SENSOR MONITOR SM PATCH TISSUE VIOPTIX OXY-2-SPD-1-P5

## (undated) DEVICE — DRAPE POUCH INSTRUMENT 1018

## (undated) DEVICE — DRSG TEGADERM 2 3/8X2 3/4" 1624W

## (undated) DEVICE — BLANKET BAIR HUGGER UNDERBODY AU63500

## (undated) DEVICE — SU VICRYL 0 CT-1 27" UND J260H

## (undated) DEVICE — COVER ULTRASOUND PROBE W/GEL FLEXI-FEEL 6"X58" LF  25-FF658

## (undated) DEVICE — PACK CENTRAL LINE INSERTION SAN32CLFCG

## (undated) DEVICE — BNDG ESMARK 4" STERILE LF 820-3412

## (undated) DEVICE — NDL 25GA 2"  8881200441

## (undated) DEVICE — CLIP GEM MICRO GEM1521

## (undated) DEVICE — EYE SPONGE SPEAR WECK CEL 0008685

## (undated) DEVICE — SU PLAIN FAST ABSORB 5-0 PC-1 18" 1915G

## (undated) DEVICE — APPLICATOR COTTON TIP 6"X2 STERILE LF 6012

## (undated) DEVICE — TUBING SUCTION 10'X3/16" N510

## (undated) DEVICE — ESU ELEC BLADE 6" COATED/INSULATED E1455-6

## (undated) RX ORDER — CEFAZOLIN SODIUM 1 G/3ML
INJECTION, POWDER, FOR SOLUTION INTRAMUSCULAR; INTRAVENOUS
Status: DISPENSED
Start: 2021-02-08

## (undated) RX ORDER — DIPHENHYDRAMINE HYDROCHLORIDE 50 MG/ML
INJECTION INTRAMUSCULAR; INTRAVENOUS
Status: DISPENSED
Start: 2019-07-25

## (undated) RX ORDER — ALBUMIN, HUMAN INJ 5% 5 %
SOLUTION INTRAVENOUS
Status: DISPENSED
Start: 2021-02-08

## (undated) RX ORDER — HYDROMORPHONE HYDROCHLORIDE 1 MG/ML
INJECTION, SOLUTION INTRAMUSCULAR; INTRAVENOUS; SUBCUTANEOUS
Status: DISPENSED
Start: 2021-02-08

## (undated) RX ORDER — DEXAMETHASONE SODIUM PHOSPHATE 4 MG/ML
INJECTION, SOLUTION INTRA-ARTICULAR; INTRALESIONAL; INTRAMUSCULAR; INTRAVENOUS; SOFT TISSUE
Status: DISPENSED
Start: 2021-02-08

## (undated) RX ORDER — FENTANYL CITRATE 50 UG/ML
INJECTION, SOLUTION INTRAMUSCULAR; INTRAVENOUS
Status: DISPENSED
Start: 2021-02-08

## (undated) RX ORDER — FENTANYL CITRATE 50 UG/ML
INJECTION, SOLUTION INTRAMUSCULAR; INTRAVENOUS
Status: DISPENSED
Start: 2019-08-15

## (undated) RX ORDER — EPHEDRINE SULFATE 50 MG/ML
INJECTION, SOLUTION INTRAMUSCULAR; INTRAVENOUS; SUBCUTANEOUS
Status: DISPENSED
Start: 2021-02-08

## (undated) RX ORDER — LIDOCAINE HYDROCHLORIDE 10 MG/ML
INJECTION, SOLUTION EPIDURAL; INFILTRATION; INTRACAUDAL; PERINEURAL
Status: DISPENSED
Start: 2023-06-14

## (undated) RX ORDER — FENTANYL CITRATE 50 UG/ML
INJECTION, SOLUTION INTRAMUSCULAR; INTRAVENOUS
Status: DISPENSED
Start: 2019-07-25

## (undated) RX ORDER — METHYLPREDNISOLONE ACETATE 80 MG/ML
INJECTION, SUSPENSION INTRA-ARTICULAR; INTRALESIONAL; INTRAMUSCULAR; SOFT TISSUE
Status: DISPENSED
Start: 2024-05-08

## (undated) RX ORDER — PHENYLEPHRINE HCL IN 0.9% NACL 1 MG/10 ML
SYRINGE (ML) INTRAVENOUS
Status: DISPENSED
Start: 2019-08-15

## (undated) RX ORDER — ONDANSETRON 2 MG/ML
INJECTION INTRAMUSCULAR; INTRAVENOUS
Status: DISPENSED
Start: 2021-04-22

## (undated) RX ORDER — BUPIVACAINE HYDROCHLORIDE 5 MG/ML
INJECTION, SOLUTION EPIDURAL; INTRACAUDAL
Status: DISPENSED
Start: 2024-09-09

## (undated) RX ORDER — LIDOCAINE HYDROCHLORIDE 20 MG/ML
INJECTION, SOLUTION EPIDURAL; INFILTRATION; INTRACAUDAL; PERINEURAL
Status: DISPENSED
Start: 2019-10-31

## (undated) RX ORDER — CALCIUM CHLORIDE 100 MG/ML
INJECTION INTRAVENOUS; INTRAVENTRICULAR
Status: DISPENSED
Start: 2021-02-08

## (undated) RX ORDER — PROPOFOL 10 MG/ML
INJECTION, EMULSION INTRAVENOUS
Status: DISPENSED
Start: 2019-08-15

## (undated) RX ORDER — FENTANYL CITRATE 50 UG/ML
INJECTION, SOLUTION INTRAMUSCULAR; INTRAVENOUS
Status: DISPENSED
Start: 2019-10-31

## (undated) RX ORDER — HYDROMORPHONE HYDROCHLORIDE 1 MG/ML
INJECTION, SOLUTION INTRAMUSCULAR; INTRAVENOUS; SUBCUTANEOUS
Status: DISPENSED
Start: 2019-08-15

## (undated) RX ORDER — ONDANSETRON 2 MG/ML
INJECTION INTRAMUSCULAR; INTRAVENOUS
Status: DISPENSED
Start: 2021-02-08

## (undated) RX ORDER — LIDOCAINE HYDROCHLORIDE 20 MG/ML
INJECTION, SOLUTION EPIDURAL; INFILTRATION; INTRACAUDAL; PERINEURAL
Status: DISPENSED
Start: 2021-04-22

## (undated) RX ORDER — GABAPENTIN 300 MG/1
CAPSULE ORAL
Status: DISPENSED
Start: 2021-04-22

## (undated) RX ORDER — LIDOCAINE HYDROCHLORIDE 10 MG/ML
INJECTION, SOLUTION EPIDURAL; INFILTRATION; INTRACAUDAL; PERINEURAL
Status: DISPENSED
Start: 2024-05-08

## (undated) RX ORDER — LIDOCAINE HYDROCHLORIDE 10 MG/ML
INJECTION, SOLUTION EPIDURAL; INFILTRATION; INTRACAUDAL; PERINEURAL
Status: DISPENSED
Start: 2021-04-22

## (undated) RX ORDER — BUPIVACAINE HYDROCHLORIDE 5 MG/ML
INJECTION, SOLUTION EPIDURAL; INTRACAUDAL
Status: DISPENSED
Start: 2022-08-29

## (undated) RX ORDER — ONDANSETRON 2 MG/ML
INJECTION INTRAMUSCULAR; INTRAVENOUS
Status: DISPENSED
Start: 2019-07-25

## (undated) RX ORDER — HEPARIN SODIUM (PORCINE) LOCK FLUSH IV SOLN 100 UNIT/ML 100 UNIT/ML
SOLUTION INTRAVENOUS
Status: DISPENSED
Start: 2019-07-22

## (undated) RX ORDER — FENTANYL CITRATE-0.9 % NACL/PF 10 MCG/ML
PLASTIC BAG, INJECTION (ML) INTRAVENOUS
Status: DISPENSED
Start: 2021-02-08

## (undated) RX ORDER — GABAPENTIN 300 MG/1
CAPSULE ORAL
Status: DISPENSED
Start: 2019-03-06

## (undated) RX ORDER — LIDOCAINE HYDROCHLORIDE AND EPINEPHRINE 10; 10 MG/ML; UG/ML
INJECTION, SOLUTION INFILTRATION; PERINEURAL
Status: DISPENSED
Start: 2019-07-25

## (undated) RX ORDER — LIDOCAINE HYDROCHLORIDE 10 MG/ML
INJECTION, SOLUTION EPIDURAL; INFILTRATION; INTRACAUDAL; PERINEURAL
Status: DISPENSED
Start: 2019-07-25

## (undated) RX ORDER — LIDOCAINE HYDROCHLORIDE 20 MG/ML
INJECTION, SOLUTION EPIDURAL; INFILTRATION; INTRACAUDAL; PERINEURAL
Status: DISPENSED
Start: 2019-08-15

## (undated) RX ORDER — METHYLPREDNISOLONE ACETATE 80 MG/ML
INJECTION, SUSPENSION INTRA-ARTICULAR; INTRALESIONAL; INTRAMUSCULAR; SOFT TISSUE
Status: DISPENSED
Start: 2024-09-09

## (undated) RX ORDER — BUPIVACAINE HYDROCHLORIDE 5 MG/ML
INJECTION, SOLUTION EPIDURAL; INTRACAUDAL
Status: DISPENSED
Start: 2023-03-06

## (undated) RX ORDER — PAPAVERINE HYDROCHLORIDE 30 MG/ML
INJECTION INTRAMUSCULAR; INTRAVENOUS
Status: DISPENSED
Start: 2021-02-08

## (undated) RX ORDER — LIDOCAINE HYDROCHLORIDE 10 MG/ML
INJECTION, SOLUTION EPIDURAL; INFILTRATION; INTRACAUDAL; PERINEURAL
Status: DISPENSED
Start: 2022-08-29

## (undated) RX ORDER — MINERAL OIL
OIL (ML) MISCELLANEOUS
Status: DISPENSED
Start: 2021-02-08

## (undated) RX ORDER — PROPOFOL 10 MG/ML
INJECTION, EMULSION INTRAVENOUS
Status: DISPENSED
Start: 2019-10-31

## (undated) RX ORDER — ONDANSETRON 2 MG/ML
INJECTION INTRAMUSCULAR; INTRAVENOUS
Status: DISPENSED
Start: 2019-10-31

## (undated) RX ORDER — LIDOCAINE HYDROCHLORIDE 40 MG/ML
SOLUTION TOPICAL
Status: DISPENSED
Start: 2019-07-25

## (undated) RX ORDER — PROPOFOL 10 MG/ML
INJECTION, EMULSION INTRAVENOUS
Status: DISPENSED
Start: 2021-04-22

## (undated) RX ORDER — LIDOCAINE HYDROCHLORIDE 20 MG/ML
SOLUTION OROPHARYNGEAL
Status: DISPENSED
Start: 2019-07-25

## (undated) RX ORDER — DEXAMETHASONE SODIUM PHOSPHATE 4 MG/ML
INJECTION, SOLUTION INTRA-ARTICULAR; INTRALESIONAL; INTRAMUSCULAR; INTRAVENOUS; SOFT TISSUE
Status: DISPENSED
Start: 2019-10-31

## (undated) RX ORDER — BUPIVACAINE HYDROCHLORIDE 5 MG/ML
INJECTION, SOLUTION EPIDURAL; INTRACAUDAL
Status: DISPENSED
Start: 2023-06-14

## (undated) RX ORDER — CEFAZOLIN SODIUM 1 G/3ML
INJECTION, POWDER, FOR SOLUTION INTRAMUSCULAR; INTRAVENOUS
Status: DISPENSED
Start: 2019-08-15

## (undated) RX ORDER — LIDOCAINE HYDROCHLORIDE 10 MG/ML
INJECTION, SOLUTION EPIDURAL; INFILTRATION; INTRACAUDAL; PERINEURAL
Status: DISPENSED
Start: 2023-09-18

## (undated) RX ORDER — LIDOCAINE HYDROCHLORIDE 20 MG/ML
INJECTION, SOLUTION EPIDURAL; INFILTRATION; INTRACAUDAL; PERINEURAL
Status: DISPENSED
Start: 2021-02-08

## (undated) RX ORDER — LIDOCAINE HYDROCHLORIDE 10 MG/ML
INJECTION, SOLUTION EPIDURAL; INFILTRATION; INTRACAUDAL; PERINEURAL
Status: DISPENSED
Start: 2023-03-06

## (undated) RX ORDER — ACETAMINOPHEN 325 MG/1
TABLET ORAL
Status: DISPENSED
Start: 2021-04-22

## (undated) RX ORDER — OXYCODONE HYDROCHLORIDE 5 MG/1
TABLET ORAL
Status: DISPENSED
Start: 2021-04-22

## (undated) RX ORDER — ACETAMINOPHEN 325 MG/1
TABLET ORAL
Status: DISPENSED
Start: 2019-10-31

## (undated) RX ORDER — BUPIVACAINE HYDROCHLORIDE 5 MG/ML
INJECTION, SOLUTION EPIDURAL; INTRACAUDAL
Status: DISPENSED
Start: 2024-05-08

## (undated) RX ORDER — HEPARIN SODIUM 1000 [USP'U]/ML
INJECTION, SOLUTION INTRAVENOUS; SUBCUTANEOUS
Status: DISPENSED
Start: 2021-02-08

## (undated) RX ORDER — ONDANSETRON 2 MG/ML
INJECTION INTRAMUSCULAR; INTRAVENOUS
Status: DISPENSED
Start: 2019-08-15

## (undated) RX ORDER — CEFAZOLIN SODIUM 1 G/50ML
SOLUTION INTRAVENOUS
Status: DISPENSED
Start: 2019-03-06

## (undated) RX ORDER — METHYLPREDNISOLONE ACETATE 80 MG/ML
INJECTION, SUSPENSION INTRA-ARTICULAR; INTRALESIONAL; INTRAMUSCULAR; SOFT TISSUE
Status: DISPENSED
Start: 2022-08-29

## (undated) RX ORDER — DEXAMETHASONE SODIUM PHOSPHATE 4 MG/ML
INJECTION, SOLUTION INTRA-ARTICULAR; INTRALESIONAL; INTRAMUSCULAR; INTRAVENOUS; SOFT TISSUE
Status: DISPENSED
Start: 2021-04-22

## (undated) RX ORDER — CEFAZOLIN SODIUM 2 G/100ML
INJECTION, SOLUTION INTRAVENOUS
Status: DISPENSED
Start: 2021-02-08

## (undated) RX ORDER — HEPARIN SODIUM (PORCINE) LOCK FLUSH IV SOLN 100 UNIT/ML 100 UNIT/ML
SOLUTION INTRAVENOUS
Status: DISPENSED
Start: 2019-07-25

## (undated) RX ORDER — DEXAMETHASONE SODIUM PHOSPHATE 4 MG/ML
INJECTION, SOLUTION INTRA-ARTICULAR; INTRALESIONAL; INTRAMUSCULAR; INTRAVENOUS; SOFT TISSUE
Status: DISPENSED
Start: 2019-08-15

## (undated) RX ORDER — PROPOFOL 10 MG/ML
INJECTION, EMULSION INTRAVENOUS
Status: DISPENSED
Start: 2021-02-08

## (undated) RX ORDER — ACETAMINOPHEN 325 MG/1
TABLET ORAL
Status: DISPENSED
Start: 2019-03-06

## (undated) RX ORDER — ALBUTEROL SULFATE 0.83 MG/ML
SOLUTION RESPIRATORY (INHALATION)
Status: DISPENSED
Start: 2019-07-25

## (undated) RX ORDER — FENTANYL CITRATE 50 UG/ML
INJECTION, SOLUTION INTRAMUSCULAR; INTRAVENOUS
Status: DISPENSED
Start: 2021-04-22

## (undated) RX ORDER — METHYLPREDNISOLONE ACETATE 80 MG/ML
INJECTION, SUSPENSION INTRA-ARTICULAR; INTRALESIONAL; INTRAMUSCULAR; SOFT TISSUE
Status: DISPENSED
Start: 2023-03-06

## (undated) RX ORDER — LIDOCAINE HYDROCHLORIDE 10 MG/ML
INJECTION, SOLUTION EPIDURAL; INFILTRATION; INTRACAUDAL; PERINEURAL
Status: DISPENSED
Start: 2024-09-09

## (undated) RX ORDER — CEFAZOLIN SODIUM 2 G/50ML
SOLUTION INTRAVENOUS
Status: DISPENSED
Start: 2021-04-22

## (undated) RX ORDER — DEXTROSE MONOHYDRATE, SODIUM CHLORIDE, AND POTASSIUM CHLORIDE 50; 1.49; 4.5 G/1000ML; G/1000ML; G/1000ML
INJECTION, SOLUTION INTRAVENOUS
Status: DISPENSED
Start: 2021-02-08

## (undated) RX ORDER — EPHEDRINE SULFATE 50 MG/ML
INJECTION, SOLUTION INTRAMUSCULAR; INTRAVENOUS; SUBCUTANEOUS
Status: DISPENSED
Start: 2019-08-15

## (undated) RX ORDER — EPHEDRINE SULFATE 50 MG/ML
INJECTION, SOLUTION INTRAMUSCULAR; INTRAVENOUS; SUBCUTANEOUS
Status: DISPENSED
Start: 2021-04-22

## (undated) RX ORDER — CEFAZOLIN SODIUM 2 G/100ML
INJECTION, SOLUTION INTRAVENOUS
Status: DISPENSED
Start: 2019-08-15

## (undated) RX ORDER — ACETAMINOPHEN 325 MG/1
TABLET ORAL
Status: DISPENSED
Start: 2019-08-15

## (undated) RX ORDER — EPINEPHRINE 1 MG/ML
INJECTION, SOLUTION INTRAMUSCULAR; SUBCUTANEOUS
Status: DISPENSED
Start: 2021-04-22

## (undated) RX ORDER — OXYCODONE HYDROCHLORIDE 5 MG/1
TABLET ORAL
Status: DISPENSED
Start: 2019-10-31

## (undated) RX ORDER — METHYLPREDNISOLONE ACETATE 80 MG/ML
INJECTION, SUSPENSION INTRA-ARTICULAR; INTRALESIONAL; INTRAMUSCULAR; SOFT TISSUE
Status: DISPENSED
Start: 2023-09-18

## (undated) RX ORDER — METHYLPREDNISOLONE ACETATE 80 MG/ML
INJECTION, SUSPENSION INTRA-ARTICULAR; INTRALESIONAL; INTRAMUSCULAR; SOFT TISSUE
Status: DISPENSED
Start: 2023-06-14

## (undated) RX ORDER — BUPIVACAINE HYDROCHLORIDE 5 MG/ML
INJECTION, SOLUTION EPIDURAL; INTRACAUDAL
Status: DISPENSED
Start: 2023-09-18